# Patient Record
Sex: MALE | Race: WHITE | NOT HISPANIC OR LATINO
[De-identification: names, ages, dates, MRNs, and addresses within clinical notes are randomized per-mention and may not be internally consistent; named-entity substitution may affect disease eponyms.]

---

## 2018-03-26 ENCOUNTER — APPOINTMENT (OUTPATIENT)
Dept: ORTHOPEDIC SURGERY | Facility: CLINIC | Age: 83
End: 2018-03-26
Payer: MEDICARE

## 2018-03-26 VITALS
HEART RATE: 79 BPM | HEIGHT: 72 IN | BODY MASS INDEX: 27.09 KG/M2 | DIASTOLIC BLOOD PRESSURE: 93 MMHG | WEIGHT: 200 LBS | SYSTOLIC BLOOD PRESSURE: 154 MMHG

## 2018-03-26 DIAGNOSIS — R10.31 RIGHT LOWER QUADRANT PAIN: ICD-10-CM

## 2018-03-26 PROCEDURE — 73502 X-RAY EXAM HIP UNI 2-3 VIEWS: CPT

## 2018-03-26 PROCEDURE — 99213 OFFICE O/P EST LOW 20 MIN: CPT

## 2018-03-28 ENCOUNTER — APPOINTMENT (OUTPATIENT)
Dept: CT IMAGING | Facility: CLINIC | Age: 83
End: 2018-03-28
Payer: MEDICARE

## 2018-03-28 ENCOUNTER — OUTPATIENT (OUTPATIENT)
Dept: OUTPATIENT SERVICES | Facility: HOSPITAL | Age: 83
LOS: 1 days | End: 2018-03-28
Payer: MEDICARE

## 2018-03-28 DIAGNOSIS — Z00.8 ENCOUNTER FOR OTHER GENERAL EXAMINATION: ICD-10-CM

## 2018-03-28 PROCEDURE — 73700 CT LOWER EXTREMITY W/O DYE: CPT | Mod: 26,RT

## 2018-03-28 PROCEDURE — 76376 3D RENDER W/INTRP POSTPROCES: CPT

## 2018-03-28 PROCEDURE — 76376 3D RENDER W/INTRP POSTPROCES: CPT | Mod: 26

## 2018-03-28 PROCEDURE — 73700 CT LOWER EXTREMITY W/O DYE: CPT

## 2018-04-24 ENCOUNTER — APPOINTMENT (OUTPATIENT)
Dept: ORTHOPEDIC SURGERY | Facility: CLINIC | Age: 83
End: 2018-04-24
Payer: MEDICARE

## 2018-04-24 VITALS
BODY MASS INDEX: 27.09 KG/M2 | HEART RATE: 73 BPM | WEIGHT: 200 LBS | SYSTOLIC BLOOD PRESSURE: 152 MMHG | DIASTOLIC BLOOD PRESSURE: 95 MMHG | HEIGHT: 72 IN

## 2018-04-24 DIAGNOSIS — Z86.79 PERSONAL HISTORY OF OTHER DISEASES OF THE CIRCULATORY SYSTEM: ICD-10-CM

## 2018-04-24 DIAGNOSIS — Z87.39 PERSONAL HISTORY OF OTHER DISEASES OF THE MUSCULOSKELETAL SYSTEM AND CONNECTIVE TISSUE: ICD-10-CM

## 2018-04-24 DIAGNOSIS — Z85.46 PERSONAL HISTORY OF MALIGNANT NEOPLASM OF PROSTATE: ICD-10-CM

## 2018-04-24 PROCEDURE — 99214 OFFICE O/P EST MOD 30 MIN: CPT

## 2018-05-01 ENCOUNTER — APPOINTMENT (OUTPATIENT)
Dept: RADIATION ONCOLOGY | Facility: CLINIC | Age: 83
End: 2018-05-01
Payer: MEDICARE

## 2018-05-01 ENCOUNTER — OUTPATIENT (OUTPATIENT)
Dept: OUTPATIENT SERVICES | Facility: HOSPITAL | Age: 83
LOS: 1 days | Discharge: HOME | End: 2018-05-01
Payer: MEDICARE

## 2018-05-01 DIAGNOSIS — Z96.649 BROKEN INTERNAL JOINT PROSTHESIS, OTHER SITE, SUBSEQUENT ENCOUNTER: ICD-10-CM

## 2018-05-01 DIAGNOSIS — T84.018D BROKEN INTERNAL JOINT PROSTHESIS, OTHER SITE, SUBSEQUENT ENCOUNTER: ICD-10-CM

## 2018-05-01 DIAGNOSIS — M25.551 PAIN IN RIGHT HIP: ICD-10-CM

## 2018-05-01 PROCEDURE — 99203 OFFICE O/P NEW LOW 30 MIN: CPT | Mod: 25

## 2018-05-01 PROCEDURE — 77261 THER RADIOLOGY TX PLNG SMPL: CPT

## 2018-05-05 ENCOUNTER — APPOINTMENT (OUTPATIENT)
Dept: ORTHOPEDIC SURGERY | Facility: CLINIC | Age: 83
End: 2018-05-05

## 2018-06-08 ENCOUNTER — OUTPATIENT (OUTPATIENT)
Dept: OUTPATIENT SERVICES | Facility: HOSPITAL | Age: 83
LOS: 1 days | End: 2018-06-08
Payer: MEDICARE

## 2018-06-08 VITALS
RESPIRATION RATE: 16 BRPM | WEIGHT: 198.42 LBS | HEART RATE: 67 BPM | OXYGEN SATURATION: 99 % | SYSTOLIC BLOOD PRESSURE: 155 MMHG | TEMPERATURE: 98 F | DIASTOLIC BLOOD PRESSURE: 75 MMHG | HEIGHT: 72 IN

## 2018-06-08 DIAGNOSIS — Z90.49 ACQUIRED ABSENCE OF OTHER SPECIFIED PARTS OF DIGESTIVE TRACT: Chronic | ICD-10-CM

## 2018-06-08 DIAGNOSIS — Z96.649 PRESENCE OF UNSPECIFIED ARTIFICIAL HIP JOINT: ICD-10-CM

## 2018-06-08 DIAGNOSIS — M25.551 PAIN IN RIGHT HIP: ICD-10-CM

## 2018-06-08 DIAGNOSIS — Z98.49 CATARACT EXTRACTION STATUS, UNSPECIFIED EYE: Chronic | ICD-10-CM

## 2018-06-08 DIAGNOSIS — T84.018D BROKEN INTERNAL JOINT PROSTHESIS, OTHER SITE, SUBSEQUENT ENCOUNTER: ICD-10-CM

## 2018-06-08 DIAGNOSIS — I48.91 UNSPECIFIED ATRIAL FIBRILLATION: Chronic | ICD-10-CM

## 2018-06-08 DIAGNOSIS — Z90.89 ACQUIRED ABSENCE OF OTHER ORGANS: Chronic | ICD-10-CM

## 2018-06-08 DIAGNOSIS — Z98.890 OTHER SPECIFIED POSTPROCEDURAL STATES: Chronic | ICD-10-CM

## 2018-06-08 DIAGNOSIS — Z01.818 ENCOUNTER FOR OTHER PREPROCEDURAL EXAMINATION: ICD-10-CM

## 2018-06-08 DIAGNOSIS — I48.91 UNSPECIFIED ATRIAL FIBRILLATION: ICD-10-CM

## 2018-06-08 DIAGNOSIS — Z96.649 PRESENCE OF UNSPECIFIED ARTIFICIAL HIP JOINT: Chronic | ICD-10-CM

## 2018-06-08 LAB
ALBUMIN SERPL ELPH-MCNC: 3.4 G/DL — SIGNIFICANT CHANGE UP (ref 3.3–5)
ALP SERPL-CCNC: 107 U/L — SIGNIFICANT CHANGE UP (ref 30–120)
ALT FLD-CCNC: 10 U/L DA — SIGNIFICANT CHANGE UP (ref 10–60)
ANION GAP SERPL CALC-SCNC: 3 MMOL/L — LOW (ref 5–17)
APTT BLD: 48.2 SEC — HIGH (ref 27.5–37.4)
AST SERPL-CCNC: 20 U/L — SIGNIFICANT CHANGE UP (ref 10–40)
BILIRUB SERPL-MCNC: 1.3 MG/DL — HIGH (ref 0.2–1.2)
BUN SERPL-MCNC: 29 MG/DL — HIGH (ref 7–23)
CALCIUM SERPL-MCNC: 9.3 MG/DL — SIGNIFICANT CHANGE UP (ref 8.4–10.5)
CHLORIDE SERPL-SCNC: 107 MMOL/L — SIGNIFICANT CHANGE UP (ref 96–108)
CO2 SERPL-SCNC: 33 MMOL/L — HIGH (ref 22–31)
CREAT SERPL-MCNC: 1.2 MG/DL — SIGNIFICANT CHANGE UP (ref 0.5–1.3)
GLUCOSE SERPL-MCNC: 90 MG/DL — SIGNIFICANT CHANGE UP (ref 70–99)
HCT VFR BLD CALC: 40.9 % — SIGNIFICANT CHANGE UP (ref 39–50)
HGB BLD-MCNC: 13.3 G/DL — SIGNIFICANT CHANGE UP (ref 13–17)
INR BLD: 2.41 RATIO — HIGH (ref 0.88–1.16)
MCHC RBC-ENTMCNC: 28.1 PG — SIGNIFICANT CHANGE UP (ref 27–34)
MCHC RBC-ENTMCNC: 32.5 GM/DL — SIGNIFICANT CHANGE UP (ref 32–36)
MCV RBC AUTO: 86.5 FL — SIGNIFICANT CHANGE UP (ref 80–100)
PLATELET # BLD AUTO: 143 K/UL — LOW (ref 150–400)
POTASSIUM SERPL-MCNC: 4.1 MMOL/L — SIGNIFICANT CHANGE UP (ref 3.5–5.3)
POTASSIUM SERPL-SCNC: 4.1 MMOL/L — SIGNIFICANT CHANGE UP (ref 3.5–5.3)
PROT SERPL-MCNC: 7.4 G/DL — SIGNIFICANT CHANGE UP (ref 6–8.3)
PROTHROM AB SERPL-ACNC: 26.7 SEC — HIGH (ref 9.8–12.7)
RBC # BLD: 4.72 M/UL — SIGNIFICANT CHANGE UP (ref 4.2–5.8)
RBC # FLD: 14.8 % — HIGH (ref 10.3–14.5)
SODIUM SERPL-SCNC: 143 MMOL/L — SIGNIFICANT CHANGE UP (ref 135–145)
WBC # BLD: 6.1 K/UL — SIGNIFICANT CHANGE UP (ref 3.8–10.5)
WBC # FLD AUTO: 6.1 K/UL — SIGNIFICANT CHANGE UP (ref 3.8–10.5)

## 2018-06-08 PROCEDURE — 86850 RBC ANTIBODY SCREEN: CPT

## 2018-06-08 PROCEDURE — G0463: CPT

## 2018-06-08 PROCEDURE — 87640 STAPH A DNA AMP PROBE: CPT

## 2018-06-08 PROCEDURE — 80053 COMPREHEN METABOLIC PANEL: CPT

## 2018-06-08 PROCEDURE — 86900 BLOOD TYPING SEROLOGIC ABO: CPT

## 2018-06-08 PROCEDURE — 86901 BLOOD TYPING SEROLOGIC RH(D): CPT

## 2018-06-08 PROCEDURE — 36415 COLL VENOUS BLD VENIPUNCTURE: CPT

## 2018-06-08 PROCEDURE — 85610 PROTHROMBIN TIME: CPT

## 2018-06-08 PROCEDURE — 85730 THROMBOPLASTIN TIME PARTIAL: CPT

## 2018-06-08 PROCEDURE — 87641 MR-STAPH DNA AMP PROBE: CPT

## 2018-06-08 PROCEDURE — 85027 COMPLETE CBC AUTOMATED: CPT

## 2018-06-08 RX ORDER — CHLORHEXIDINE GLUCONATE 213 G/1000ML
1 SOLUTION TOPICAL ONCE
Qty: 0 | Refills: 0 | Status: DISCONTINUED | OUTPATIENT
Start: 2018-06-08 | End: 2018-06-23

## 2018-06-08 RX ORDER — FAMOTIDINE 10 MG/ML
20 INJECTION INTRAVENOUS
Qty: 0 | Refills: 0 | Status: DISCONTINUED | OUTPATIENT
Start: 2018-06-08 | End: 2018-06-23

## 2018-06-08 NOTE — H&P PST ADULT - FAMILY HISTORY
Father  Still living? No  Family history of Alzheimer's disease, Age at diagnosis: Age Unknown  Family history of essential hypertension, Age at diagnosis: Age Unknown     Mother  Still living? No  Family history of breast cancer, Age at diagnosis: Age Unknown     Sibling  Still living? Yes, Estimated age: 81-90  Family history of prostate cancer, Age at diagnosis: Age Unknown

## 2018-06-08 NOTE — H&P PST ADULT - PROBLEM SELECTOR PLAN 2
medical and cardiology clearance needed; pacemaker interogation needed; cardiologist needs to clear patient to be off coumadin for a certain number of days-patient will check with cardiologist regarding length of time to be off coumadin

## 2018-06-08 NOTE — H&P PST ADULT - PSH
Afib  cardiac ablation 1998; several pacemaker/AICD insertion and replacements  S/P cataract extraction  bilateral  S/P cholecystectomy  12/17  S/P hernia repair  1996  S/P tonsillectomy    S/P total hip arthroplasty  2006, right

## 2018-06-08 NOTE — H&P PST ADULT - HISTORY OF PRESENT ILLNESS
this is a 83 y/o male who had right total hip replacement 2/06; developed right groin and leg pain with in the last year; it was determined that it was related to the hip replacement; patient has bone loss; needs revision of right hip

## 2018-06-08 NOTE — H&P PST ADULT - PMH
Afib    Cellulitis  right lower leg in past  Degenerative disc disease, lumbar    Edema  right leg  Gout  finger a long time ago  Hypertension    Osteoarthritis    Prostate cancer  treated with cyber knife 2016  Varicose veins  legs  Venous stasis

## 2018-06-09 LAB
MRSA PCR RESULT.: SIGNIFICANT CHANGE UP
S AUREUS DNA NOSE QL NAA+PROBE: SIGNIFICANT CHANGE UP

## 2018-06-20 NOTE — PROGRESS NOTE ADULT - SUBJECTIVE AND OBJECTIVE BOX
Admission medication reconciliation/Presurgical evaluation:  Allergies:  penicillin: Rash  felodipine: Rash  clonidine: Swelling, Rash  sulfa drugs: Rash  adhesives: Rash, irritation from tape    Home Medications:   · 	sotalol 120 mg 2 times a day  · 	olmesartan-hydrochlorothiazide 40 mg-12.5 mg once a day  · 	Coumadin 4 mg once a day  · 	doxazosin 4 mg once a day  · 	allopurinol 100 mg 2 times a day  · 	Tylenol 1000 mg every 6 hours, As Needed     Past Medical History:  Afib    Cellulitis right lower leg in past  Degenerative disc disease, lumbar    Edema right leg  Gout finger a long time ago  Hypertension    Osteoarthritis    Prostate cancer treated with cyber knife 2016  Varicose veins legs  Venous stasis     Past Surgical History (contributory):  Afib  cardiac ablation 1998; several pacemaker/AICD insertion and replacements  S/P total hip arthroplasty  2006, right    PST values of interest:  Plt 143 (¯)  INR 2.41 (warfarin)  CO2 33 (­)  SCr 1.20 (WNL)  BUN 29 (­)  LFTs WNL  Bilirubin 1.3 (slight ­)  QTc 508ms (prolonged)    Presurgical evaluation:  1.	IV TXA  2.	Patient scheduled for preop RT for HO prophylaxis.  3.	Acetaminophen for pain management – 1000mg IV q6h, then 1000mg PO q12h.   4.	No Celecoxib. Meloxicam 7.5mg Qday if patient can tolerate.  5.	Enoxaparin 40mg Qday beginning POD1. Warfarin may be restarted evening of POD0.    6.	QTc 508ms (prolonged): caution with drugs that prolong QT interval. Continuous telemetry monitoring.

## 2018-06-21 RX ORDER — CHLORHEXIDINE GLUCONATE 213 G/1000ML
1 SOLUTION TOPICAL ONCE
Qty: 0 | Refills: 0 | Status: COMPLETED | OUTPATIENT
Start: 2018-06-27 | End: 2018-06-27

## 2018-06-21 RX ORDER — APREPITANT 80 MG/1
40 CAPSULE ORAL ONCE
Qty: 0 | Refills: 0 | Status: COMPLETED | OUTPATIENT
Start: 2018-06-27 | End: 2018-06-27

## 2018-06-26 ENCOUNTER — TRANSCRIPTION ENCOUNTER (OUTPATIENT)
Age: 83
End: 2018-06-26

## 2018-06-26 PROCEDURE — 77280 THER RAD SIMULAJ FIELD SMPL: CPT | Mod: 26

## 2018-06-26 PROCEDURE — 77300 RADIATION THERAPY DOSE PLAN: CPT | Mod: 26

## 2018-06-26 PROCEDURE — 77431 RADIATION THERAPY MANAGEMENT: CPT

## 2018-06-26 RX ORDER — ONDANSETRON 8 MG/1
4 TABLET, FILM COATED ORAL EVERY 6 HOURS
Qty: 0 | Refills: 0 | Status: DISCONTINUED | OUTPATIENT
Start: 2018-06-27 | End: 2018-07-02

## 2018-06-26 RX ORDER — DOCUSATE SODIUM 100 MG
100 CAPSULE ORAL THREE TIMES A DAY
Qty: 0 | Refills: 0 | Status: DISCONTINUED | OUTPATIENT
Start: 2018-06-27 | End: 2018-07-02

## 2018-06-26 RX ORDER — SENNA PLUS 8.6 MG/1
2 TABLET ORAL AT BEDTIME
Qty: 0 | Refills: 0 | Status: DISCONTINUED | OUTPATIENT
Start: 2018-06-27 | End: 2018-07-02

## 2018-06-26 RX ORDER — MAGNESIUM HYDROXIDE 400 MG/1
30 TABLET, CHEWABLE ORAL DAILY
Qty: 0 | Refills: 0 | Status: DISCONTINUED | OUTPATIENT
Start: 2018-06-27 | End: 2018-07-02

## 2018-06-26 RX ORDER — PANTOPRAZOLE SODIUM 20 MG/1
40 TABLET, DELAYED RELEASE ORAL DAILY
Qty: 0 | Refills: 0 | Status: DISCONTINUED | OUTPATIENT
Start: 2018-06-27 | End: 2018-07-02

## 2018-06-26 RX ORDER — FAMOTIDINE 10 MG/ML
1 INJECTION INTRAVENOUS
Qty: 0 | Refills: 0 | COMMUNITY
Start: 2018-06-26 | End: 2018-06-27

## 2018-06-26 RX ORDER — POLYETHYLENE GLYCOL 3350 17 G/17G
17 POWDER, FOR SOLUTION ORAL DAILY
Qty: 0 | Refills: 0 | Status: DISCONTINUED | OUTPATIENT
Start: 2018-06-27 | End: 2018-07-02

## 2018-06-26 RX ORDER — SODIUM CHLORIDE 9 MG/ML
1000 INJECTION, SOLUTION INTRAVENOUS
Qty: 0 | Refills: 0 | Status: DISCONTINUED | OUTPATIENT
Start: 2018-06-27 | End: 2018-06-30

## 2018-06-27 ENCOUNTER — APPOINTMENT (OUTPATIENT)
Dept: ORTHOPEDIC SURGERY | Facility: HOSPITAL | Age: 83
End: 2018-06-27

## 2018-06-27 ENCOUNTER — RESULT REVIEW (OUTPATIENT)
Age: 83
End: 2018-06-27

## 2018-06-27 ENCOUNTER — INPATIENT (INPATIENT)
Facility: HOSPITAL | Age: 83
LOS: 4 days | Discharge: HOME | DRG: 481 | End: 2018-07-02
Attending: ORTHOPAEDIC SURGERY | Admitting: ORTHOPAEDIC SURGERY
Payer: MEDICARE

## 2018-06-27 VITALS
WEIGHT: 192.46 LBS | HEART RATE: 63 BPM | SYSTOLIC BLOOD PRESSURE: 157 MMHG | HEIGHT: 73 IN | DIASTOLIC BLOOD PRESSURE: 77 MMHG | TEMPERATURE: 98 F | OXYGEN SATURATION: 100 % | RESPIRATION RATE: 14 BRPM

## 2018-06-27 DIAGNOSIS — Z96.649 PRESENCE OF UNSPECIFIED ARTIFICIAL HIP JOINT: ICD-10-CM

## 2018-06-27 DIAGNOSIS — Z98.49 CATARACT EXTRACTION STATUS, UNSPECIFIED EYE: Chronic | ICD-10-CM

## 2018-06-27 DIAGNOSIS — Z90.89 ACQUIRED ABSENCE OF OTHER ORGANS: Chronic | ICD-10-CM

## 2018-06-27 DIAGNOSIS — T84.018D BROKEN INTERNAL JOINT PROSTHESIS, OTHER SITE, SUBSEQUENT ENCOUNTER: ICD-10-CM

## 2018-06-27 DIAGNOSIS — Z98.890 OTHER SPECIFIED POSTPROCEDURAL STATES: Chronic | ICD-10-CM

## 2018-06-27 DIAGNOSIS — I48.91 UNSPECIFIED ATRIAL FIBRILLATION: Chronic | ICD-10-CM

## 2018-06-27 DIAGNOSIS — Z90.49 ACQUIRED ABSENCE OF OTHER SPECIFIED PARTS OF DIGESTIVE TRACT: Chronic | ICD-10-CM

## 2018-06-27 DIAGNOSIS — Z96.649 PRESENCE OF UNSPECIFIED ARTIFICIAL HIP JOINT: Chronic | ICD-10-CM

## 2018-06-27 LAB
APTT BLD: 39.7 SEC — HIGH (ref 27.5–37.4)
INR BLD: 1.54 RATIO — HIGH (ref 0.88–1.16)
PROTHROM AB SERPL-ACNC: 16.9 SEC — HIGH (ref 9.8–12.7)

## 2018-06-27 PROCEDURE — 88300 SURGICAL PATH GROSS: CPT | Mod: 26

## 2018-06-27 PROCEDURE — 93010 ELECTROCARDIOGRAM REPORT: CPT

## 2018-06-27 PROCEDURE — 73502 X-RAY EXAM HIP UNI 2-3 VIEWS: CPT | Mod: 26,RT

## 2018-06-27 PROCEDURE — 27134 REVISE HIP JOINT REPLACEMENT: CPT | Mod: RT

## 2018-06-27 PROCEDURE — 99222 1ST HOSP IP/OBS MODERATE 55: CPT

## 2018-06-27 PROCEDURE — 64712 REVISION OF SCIATIC NERVE: CPT | Mod: 59,RT

## 2018-06-27 PROCEDURE — 27045 EXC HIP/PELV TUM DEEP 5 CM/>: CPT | Mod: RT

## 2018-06-27 PROCEDURE — 88311 DECALCIFY TISSUE: CPT | Mod: 26

## 2018-06-27 PROCEDURE — 88305 TISSUE EXAM BY PATHOLOGIST: CPT | Mod: 26

## 2018-06-27 RX ORDER — ACETAMINOPHEN 500 MG
1000 TABLET ORAL EVERY 6 HOURS
Qty: 0 | Refills: 0 | Status: COMPLETED | OUTPATIENT
Start: 2018-06-28 | End: 2018-06-28

## 2018-06-27 RX ORDER — ACETAMINOPHEN 500 MG
1000 TABLET ORAL EVERY 12 HOURS
Qty: 0 | Refills: 0 | Status: DISCONTINUED | OUTPATIENT
Start: 2018-06-28 | End: 2018-07-02

## 2018-06-27 RX ORDER — SODIUM CHLORIDE 9 MG/ML
1000 INJECTION, SOLUTION INTRAVENOUS
Qty: 0 | Refills: 0 | Status: DISCONTINUED | OUTPATIENT
Start: 2018-06-27 | End: 2018-06-27

## 2018-06-27 RX ORDER — VANCOMYCIN HCL 1 G
1500 VIAL (EA) INTRAVENOUS ONCE
Qty: 0 | Refills: 0 | Status: DISCONTINUED | OUTPATIENT
Start: 2018-06-27 | End: 2018-06-27

## 2018-06-27 RX ORDER — HYDROMORPHONE HYDROCHLORIDE 2 MG/ML
0.5 INJECTION INTRAMUSCULAR; INTRAVENOUS; SUBCUTANEOUS
Qty: 0 | Refills: 0 | Status: DISCONTINUED | OUTPATIENT
Start: 2018-06-27 | End: 2018-07-02

## 2018-06-27 RX ORDER — WARFARIN SODIUM 2.5 MG/1
4 TABLET ORAL ONCE
Qty: 0 | Refills: 0 | Status: COMPLETED | OUTPATIENT
Start: 2018-06-27 | End: 2018-06-27

## 2018-06-27 RX ORDER — VANCOMYCIN HCL 1 G
1250 VIAL (EA) INTRAVENOUS ONCE
Qty: 0 | Refills: 0 | Status: COMPLETED | OUTPATIENT
Start: 2018-06-28 | End: 2018-06-28

## 2018-06-27 RX ORDER — ONDANSETRON 8 MG/1
4 TABLET, FILM COATED ORAL ONCE
Qty: 0 | Refills: 0 | Status: DISCONTINUED | OUTPATIENT
Start: 2018-06-27 | End: 2018-06-27

## 2018-06-27 RX ORDER — OXYCODONE HYDROCHLORIDE 5 MG/1
10 TABLET ORAL
Qty: 0 | Refills: 0 | Status: DISCONTINUED | OUTPATIENT
Start: 2018-06-27 | End: 2018-07-02

## 2018-06-27 RX ORDER — ACETAMINOPHEN 500 MG
1000 TABLET ORAL ONCE
Qty: 0 | Refills: 0 | Status: COMPLETED | OUTPATIENT
Start: 2018-06-27 | End: 2018-06-27

## 2018-06-27 RX ORDER — OXYCODONE HYDROCHLORIDE 5 MG/1
5 TABLET ORAL
Qty: 0 | Refills: 0 | Status: DISCONTINUED | OUTPATIENT
Start: 2018-06-27 | End: 2018-07-02

## 2018-06-27 RX ORDER — ENOXAPARIN SODIUM 100 MG/ML
40 INJECTION SUBCUTANEOUS DAILY
Qty: 0 | Refills: 0 | Status: DISCONTINUED | OUTPATIENT
Start: 2018-06-28 | End: 2018-06-30

## 2018-06-27 RX ORDER — ALLOPURINOL 300 MG
100 TABLET ORAL
Qty: 0 | Refills: 0 | Status: DISCONTINUED | OUTPATIENT
Start: 2018-06-27 | End: 2018-07-02

## 2018-06-27 RX ORDER — BENZOCAINE AND MENTHOL 5; 1 G/100ML; G/100ML
1 LIQUID ORAL
Qty: 0 | Refills: 0 | Status: DISCONTINUED | OUTPATIENT
Start: 2018-06-27 | End: 2018-07-02

## 2018-06-27 RX ORDER — DOXAZOSIN MESYLATE 4 MG
4 TABLET ORAL AT BEDTIME
Qty: 0 | Refills: 0 | Status: DISCONTINUED | OUTPATIENT
Start: 2018-06-27 | End: 2018-07-02

## 2018-06-27 RX ORDER — SOTALOL HCL 120 MG
120 TABLET ORAL
Qty: 0 | Refills: 0 | Status: DISCONTINUED | OUTPATIENT
Start: 2018-06-27 | End: 2018-07-02

## 2018-06-27 RX ORDER — LOSARTAN POTASSIUM 100 MG/1
100 TABLET, FILM COATED ORAL DAILY
Qty: 0 | Refills: 0 | Status: DISCONTINUED | OUTPATIENT
Start: 2018-06-29 | End: 2018-07-02

## 2018-06-27 RX ORDER — VANCOMYCIN HCL 1 G
1250 VIAL (EA) INTRAVENOUS ONCE
Qty: 0 | Refills: 0 | Status: COMPLETED | OUTPATIENT
Start: 2018-06-27 | End: 2018-06-27

## 2018-06-27 RX ORDER — HYDROMORPHONE HYDROCHLORIDE 2 MG/ML
0.5 INJECTION INTRAMUSCULAR; INTRAVENOUS; SUBCUTANEOUS
Qty: 0 | Refills: 0 | Status: DISCONTINUED | OUTPATIENT
Start: 2018-06-27 | End: 2018-06-27

## 2018-06-27 RX ADMIN — Medication 100 MILLIGRAM(S): at 22:22

## 2018-06-27 RX ADMIN — BENZOCAINE AND MENTHOL 1 LOZENGE: 5; 1 LIQUID ORAL at 22:32

## 2018-06-27 RX ADMIN — HYDROMORPHONE HYDROCHLORIDE 0.5 MILLIGRAM(S): 2 INJECTION INTRAMUSCULAR; INTRAVENOUS; SUBCUTANEOUS at 20:15

## 2018-06-27 RX ADMIN — SODIUM CHLORIDE 100 MILLILITER(S): 9 INJECTION, SOLUTION INTRAVENOUS at 19:40

## 2018-06-27 RX ADMIN — APREPITANT 40 MILLIGRAM(S): 80 CAPSULE ORAL at 11:03

## 2018-06-27 RX ADMIN — HYDROMORPHONE HYDROCHLORIDE 0.5 MILLIGRAM(S): 2 INJECTION INTRAMUSCULAR; INTRAVENOUS; SUBCUTANEOUS at 19:35

## 2018-06-27 RX ADMIN — SENNA PLUS 2 TABLET(S): 8.6 TABLET ORAL at 22:21

## 2018-06-27 RX ADMIN — WARFARIN SODIUM 4 MILLIGRAM(S): 2.5 TABLET ORAL at 22:21

## 2018-06-27 RX ADMIN — Medication 4 MILLIGRAM(S): at 22:22

## 2018-06-27 RX ADMIN — CHLORHEXIDINE GLUCONATE 1 APPLICATION(S): 213 SOLUTION TOPICAL at 11:03

## 2018-06-27 NOTE — CONSULT NOTE ADULT - SUBJECTIVE AND OBJECTIVE BOX
This is an 83 y/o M with PMH of HTN, A. Fib s/p AV acosta ablation s/p AICD on Coumadin, Prostate CA s/p cyber knife stereotactic RS s/p brachytherapy, Varicose Veins, and OA who was complaining of severe right groin pain when stands up & walks till gradually wears off, relived by rest, associated with limitation of right hip range of motions & sometimes his hip gives up so he falls down, presented for right total hip revision surgery. Routine post-op medical evaluation was requested. Patient denies any chest pain, SOB, palpitations, dizziness, headache, paraesthesias, or focal muscle weakness.          ALLERGIES :    Clonidine, Felodipine, PCN, Sulfa, Adhesives.              PAST MEDICAL & SURGICAL HISTORY:    Edema: right leg  Degenerative disc disease, lumbar  Cellulitis: right lower leg in past  Gout: finger a long time ago  Varicose veins: legs  Venous stasis  Prostate cancer: treated with cyber knife 2016  Osteoarthritis  Afib  Hypertension  S/P cholecystectomy: 12/17  S/P tonsillectomy  Afib: cardiac ablation 1998; several pacemaker/AICD insertion and replacements  S/P hernia repair: 1996  S/P cataract extraction: bilateral  S/P total hip arthroplasty: 2006, right                SOCIAL HISTORY :     , retired graphics , lives alone, former smoker, 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                FAMILY HISTORY :     Family history of prostate cancer (Sibling)  Family history of breast cancer (Mother)  Family history of essential hypertension (Father)  Family history of Alzheimer's disease (Father)                MEDICATIONS  (STANDING):    acetaminophen   Tablet. 1000 milliGRAM(s) Oral every 12 hours  acetaminophen  IVPB. 1000 milliGRAM(s) IV Intermittent every 6 hours  allopurinol 100 milliGRAM(s) Oral two times a day  docusate sodium 100 milliGRAM(s) Oral three times a day  doxazosin 4 milliGRAM(s) Oral at bedtime  enoxaparin Injectable 40 milliGRAM(s) SubCutaneous daily  lactated ringers. 1000 milliLiter(s) (100 mL/Hr) IV Continuous <Continuous>  pantoprazole    Tablet 40 milliGRAM(s) Oral daily  senna 2 Tablet(s) Oral at bedtime  sotalol 120 milliGRAM(s) Oral two times a day  vancomycin  IVPB 1250 milliGRAM(s) IV Intermittent once                MEDICATIONS  (PRN):    aluminum hydroxide/magnesium hydroxide/simethicone Suspension 30 milliLiter(s) Oral four times a day PRN Indigestion  benzocaine 15 mG/menthol 3.6 mG Lozenge 1 Lozenge Oral every 3 hours PRN Sore Throat  HYDROmorphone  Injectable 0.5 milliGRAM(s) IV Push every 3 hours PRN Severe Pain (7 - 10)  magnesium hydroxide Suspension 30 milliLiter(s) Oral daily PRN Constipation  ondansetron Injectable 4 milliGRAM(s) IV Push every 6 hours PRN Nausea and/or Vomiting  oxyCODONE    IR 5 milliGRAM(s) Oral every 3 hours PRN Mild Pain (1 - 3)  oxyCODONE    IR 10 milliGRAM(s) Oral every 3 hours PRN Moderate Pain (4 - 6)  polyethylene glycol 3350 17 Gram(s) Oral daily PRN Constipation          Home Medications:    allopurinol 100 mg oral tablet: 1 tab(s) orally 2 times a day (27 Jun 2018 11:23)  Coumadin 4 mg oral tablet: 1 tab(s) orally once a day (27 Jun 2018 11:23)  doxazosin 4 mg oral tablet: 1 tab(s) orally once a day (27 Jun 2018 11:23)  olmesartan-hydrochlorothiazide 40 mg-12.5 mg oral tablet: 1 tab(s) orally once a day (27 Jun 2018 11:23)  Pepcid AC Maximum Strength 20 mg oral tablet: 1 tab(s) orally 2 times a day(x2 Preoperatively) (27 Jun 2018 11:23)  sotalol 120 mg oral tablet: 1 tab(s) orally 2 times a day (27 Jun 2018 11:23)  Tylenol 500 mg oral tablet: 2 tab(s) orally every 6 hours, As Needed (27 Jun 2018 11:23)          REVIEW OF SYSTEMS:    CONSTITUTIONAL: No fever, weight loss, or fatigue.  EYES: No eye pain, visual disturbances, or discharge.  ENMT:  No difficulty hearing, tinnitus, vertigo; No sinus or throat pain.  NECK: No pain or stiffness.  RESPIRATORY: No cough, wheezing, or hemoptysis; No shortness of breath.  CARDIOVASCULAR: No chest pain, palpitations, dizziness, or leg swelling.  GASTROINTESTINAL: No abdominal pain, no nausea, vomiting, or hematemesis; No diarrhea or Change in bowel habits. No melena or hematochezia.  GENITOURINARY: No dysuria, frequency, hematuria, or incontinence.  NEUROLOGICAL: No headaches, focal muscle weakness, numbness, or tremors.  SKIN: No itching, burning or rashes.  MUSCULOSKELETAL: No joint swelling or pain.  PSYCHIATRIC: No depression, anxiety, or agitation.  HEME/LYMPH: No easy bruising, bleeding gums, or nose bleed.  ALLERGY AND IMMUNOLOGIC: No hives or eczema.              Vital signs:  Vital Signs Last 24 Hrs  T(C): 36.5 (27 Jun 2018 23:20), Max: 37 (27 Jun 2018 19:18)  T(F): 97.7 (27 Jun 2018 23:20), Max: 98.6 (27 Jun 2018 19:18)  HR: 60 (27 Jun 2018 23:20) (60 - 63)  BP: 102/62 (27 Jun 2018 23:20) (93/51 - 157/77)  BP(mean): --  RR: 14 (27 Jun 2018 23:20) (9 - 16)  SpO2: 96% (27 Jun 2018 23:20) (96% - 100%)            PHYSICAL EXAM:    GENERAL: NAD, well-groomed, well-developed.  HEAD:  Atraumatic, Normocephalic.  EYES: PERRLA, conjunctiva clear.  ENMT: no nasal discharge, no rukhsana-pharyngeal erythema or exudates, MMM.   NECK: Supple, No JVD.  NERVOUS SYSTEM:  Alert & oriented X3, neurologically intact grossly.  CHEST/LUNG: Good air entry B/L, no rales, rhonchi, or wheezing.  HEART: Normal S1 & S2, no murmurs, or extra sounds.  ABDOMEN: Soft, non tender, non distended; bowel sounds present, no palpable masses or organomegaly.  EXTREMITIES:  No clubbing, cyanosis, or edema.  VASCULAR: 2+ radial, DPA / PTA pulses B/L.  SKIN: No rashes or lesions.  PSYCH: normal affect & behavior.              LABs:-        06-28    140  |  106  |  32<H>  ----------------------------<  137<H>  3.8   |  28  |  1.66<H>    Ca    8.2<L>      28 Jun 2018 00:27                     10.9   13.03 )-----------( 131      ( 28 Jun 2018 00:27 )             34.0       PT/INR - ( 27 Jun 2018 11:50 )   PT: 16.9 sec;   INR: 1.54 ratio         PTT - ( 27 Jun 2018 11:50 )  PTT:39.7 sec           XR HIP WITH PELV 2-3V RT    :                      Portable postop study obtained in the operating room, 6:51 PM  AP pelvis not including iliac crests, AP right hip.  A right hip replacement is identified, in place. There is a metallic   density, ovoid shaped, resting on the acetabular component. Correlate   clinically.   Metallic densities overlie the pubic bones may represent fiducial   markings, correlate clinically.  Images reviewed by me.          EKG :    As per my review shows V-paced rhythm at 60/min, with underlying atrial flutter.        - This is an 83 y/o M with PMH of HTN, A. Fib s/p AV acosta ablation s/p AICD on Coumadin, Prostate CA s/p cyber knife stereotactic RS s/p brachytherapy, Varicose Veins, and OA who was complaining of severe right groin pain when stands up & walks till gradually wears off, relived by rest, associated with limitation of right hip range of motions & sometimes his hip gives up so he falls down, presented for right total hip revision surgery. Routine post-op medical evaluation was requested. Patient denies any chest pain, SOB, palpitations, dizziness, headache, paraesthesias, or focal muscle weakness.            ALLERGIES :    Clonidine, Felodipine, PCN, Sulfa, Adhesives.              PAST MEDICAL & SURGICAL HISTORY:    Edema: right leg  Degenerative disc disease, lumbar  Cellulitis: right lower leg in past  Gout: finger a long time ago  Varicose veins: legs  Venous stasis  Prostate cancer: treated with cyber knife 2016  Osteoarthritis  Afib  Hypertension  S/P cholecystectomy: 12/17  S/P tonsillectomy  Afib: cardiac ablation 1998; several pacemaker/AICD insertion and replacements  S/P hernia repair: 1996  S/P cataract extraction: bilateral  S/P total hip arthroplasty: 2006, right                SOCIAL HISTORY :     , retired graphics , lives alone, former smoker, 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                FAMILY HISTORY :     Family history of prostate cancer (Sibling)  Family history of breast cancer (Mother)  Family history of essential hypertension (Father)  Family history of Alzheimer's disease (Father)                MEDICATIONS  (STANDING):    acetaminophen   Tablet. 1000 milliGRAM(s) Oral every 12 hours  acetaminophen  IVPB. 1000 milliGRAM(s) IV Intermittent every 6 hours  allopurinol 100 milliGRAM(s) Oral two times a day  docusate sodium 100 milliGRAM(s) Oral three times a day  doxazosin 4 milliGRAM(s) Oral at bedtime  enoxaparin Injectable 40 milliGRAM(s) SubCutaneous daily  lactated ringers. 1000 milliLiter(s) (100 mL/Hr) IV Continuous <Continuous>  pantoprazole    Tablet 40 milliGRAM(s) Oral daily  senna 2 Tablet(s) Oral at bedtime  sotalol 120 milliGRAM(s) Oral two times a day  vancomycin  IVPB 1250 milliGRAM(s) IV Intermittent once                MEDICATIONS  (PRN):    aluminum hydroxide/magnesium hydroxide/simethicone Suspension 30 milliLiter(s) Oral four times a day PRN Indigestion  benzocaine 15 mG/menthol 3.6 mG Lozenge 1 Lozenge Oral every 3 hours PRN Sore Throat  HYDROmorphone  Injectable 0.5 milliGRAM(s) IV Push every 3 hours PRN Severe Pain (7 - 10)  magnesium hydroxide Suspension 30 milliLiter(s) Oral daily PRN Constipation  ondansetron Injectable 4 milliGRAM(s) IV Push every 6 hours PRN Nausea and/or Vomiting  oxyCODONE    IR 5 milliGRAM(s) Oral every 3 hours PRN Mild Pain (1 - 3)  oxyCODONE    IR 10 milliGRAM(s) Oral every 3 hours PRN Moderate Pain (4 - 6)  polyethylene glycol 3350 17 Gram(s) Oral daily PRN Constipation          Home Medications:    allopurinol 100 mg oral tablet: 1 tab(s) orally 2 times a day (27 Jun 2018 11:23)  Coumadin 4 mg oral tablet: 1 tab(s) orally once a day (27 Jun 2018 11:23)  doxazosin 4 mg oral tablet: 1 tab(s) orally once a day (27 Jun 2018 11:23)  olmesartan-hydrochlorothiazide 40 mg-12.5 mg oral tablet: 1 tab(s) orally once a day (27 Jun 2018 11:23)  Pepcid AC Maximum Strength 20 mg oral tablet: 1 tab(s) orally 2 times a day(x2 Preoperatively) (27 Jun 2018 11:23)  sotalol 120 mg oral tablet: 1 tab(s) orally 2 times a day (27 Jun 2018 11:23)  Tylenol 500 mg oral tablet: 2 tab(s) orally every 6 hours, As Needed (27 Jun 2018 11:23)          REVIEW OF SYSTEMS:    CONSTITUTIONAL: No fever, weight loss, or fatigue.  EYES: No eye pain, visual disturbances, or discharge.  ENMT:  No difficulty hearing, tinnitus, vertigo; No sinus or throat pain.  NECK: No pain or stiffness.  RESPIRATORY: No cough, wheezing, or hemoptysis; No shortness of breath.  CARDIOVASCULAR: No chest pain, palpitations, dizziness, or leg swelling.  GASTROINTESTINAL: No abdominal pain, no nausea, vomiting, or hematemesis; No diarrhea or Change in bowel habits. No melena or hematochezia.  GENITOURINARY: No dysuria, frequency, hematuria, or incontinence.  NEUROLOGICAL: No headaches, focal muscle weakness, numbness, or tremors.  SKIN: No itching, burning or rashes.  MUSCULOSKELETAL: No joint swelling or pain other than operative site.  PSYCHIATRIC: No depression, anxiety, or agitation.  HEME/LYMPH: No easy bruising, bleeding gums, or nose bleed.  ALLERGY AND IMMUNOLOGIC: No hives or eczema.                Vital signs:  Vital Signs Last 24 Hrs  T(C): 36.5 (27 Jun 2018 23:20), Max: 37 (27 Jun 2018 19:18)  T(F): 97.7 (27 Jun 2018 23:20), Max: 98.6 (27 Jun 2018 19:18)  HR: 60 (27 Jun 2018 23:20) (60 - 63)  BP: 102/62 (27 Jun 2018 23:20) (93/51 - 157/77)  BP(mean): --  RR: 14 (27 Jun 2018 23:20) (9 - 16)  SpO2: 96% (27 Jun 2018 23:20) (96% - 100%)            PHYSICAL EXAM:    GENERAL: NAD, well-groomed, well-developed.  HEAD:  Atraumatic, Normocephalic.  EYES: PERRLA, conjunctiva clear.  ENMT: no nasal discharge, no rukhsana-pharyngeal erythema or exudates, MMM.   NECK: Supple, No JVD.  NERVOUS SYSTEM:  Alert & oriented X3, neurologically intact grossly.  CHEST/LUNG: Good air entry B/L, no rales, rhonchi, or wheezing.  HEART: Normal S1 & S2, no murmurs, or extra sounds.  ABDOMEN: Soft, non tender, non distended; bowel sounds present, no palpable masses or organomegaly.  EXTREMITIES:  No clubbing, cyanosis, or edema, (+) right varicose veins.  VASCULAR: 2+ radial, DPA / PTA pulses B/L.  SKIN: No rashes or lesions, (+) stasis dermatitis.  PSYCH: normal affect & behavior.              LABs:-        06-28    140  |  106  |  32<H>  ----------------------------<  137<H>  3.8   |  28  |  1.66<H>    Ca    8.2<L>      28 Jun 2018 00:27                     10.9   13.03 )-----------( 131      ( 28 Jun 2018 00:27 )             34.0       PT/INR - ( 27 Jun 2018 11:50 )   PT: 16.9 sec;   INR: 1.54 ratio         PTT - ( 27 Jun 2018 11:50 )  PTT:39.7 sec           XR HIP WITH PELV 2-3V RT    :                      Portable postop study obtained in the operating room, 6:51 PM  AP pelvis not including iliac crests, AP right hip.  A right hip replacement is identified, in place. There is a metallic   density, ovoid shaped, resting on the acetabular component. Correlate   clinically.   Metallic densities overlie the pubic bones may represent fiducial   markings, correlate clinically.  Images reviewed by me.          EKG :    As per my review shows V-paced rhythm at 60/min, with underlying atrial flutter.        -

## 2018-06-27 NOTE — CONSULT NOTE ADULT - PROBLEM SELECTOR RECOMMENDATION 4
Patient is at high risk for VTE, was started on B/L sequential compression device for DVT prophylaxis, pharmacological DVT prophylaxis (coumadin with LMWH bridging) as per orthopedic surgery team.

## 2018-06-27 NOTE — ASU PREOP CHECKLIST - SELECT TESTS ORDERED
BMP/CBC/PT/PTT/INR/EKG/retyped/Type and Screen BMP/INR/EKG/PT/PTT/retyped,pt ptt inr/CBC/Type and Screen

## 2018-06-27 NOTE — BRIEF OPERATIVE NOTE - PROCEDURE
<<-----Click on this checkbox to enter Procedure Total hip revision  06/27/2018  right  Active  Marvel Ortiz

## 2018-06-28 ENCOUNTER — TRANSCRIPTION ENCOUNTER (OUTPATIENT)
Age: 83
End: 2018-06-28

## 2018-06-28 DIAGNOSIS — I10 ESSENTIAL (PRIMARY) HYPERTENSION: ICD-10-CM

## 2018-06-28 DIAGNOSIS — I48.92 UNSPECIFIED ATRIAL FLUTTER: ICD-10-CM

## 2018-06-28 DIAGNOSIS — Z98.890 OTHER SPECIFIED POSTPROCEDURAL STATES: ICD-10-CM

## 2018-06-28 DIAGNOSIS — I48.2 CHRONIC ATRIAL FIBRILLATION: ICD-10-CM

## 2018-06-28 DIAGNOSIS — I50.22 CHRONIC SYSTOLIC (CONGESTIVE) HEART FAILURE: ICD-10-CM

## 2018-06-28 DIAGNOSIS — Z29.9 ENCOUNTER FOR PROPHYLACTIC MEASURES, UNSPECIFIED: ICD-10-CM

## 2018-06-28 DIAGNOSIS — M25.551 PAIN IN RIGHT HIP: ICD-10-CM

## 2018-06-28 LAB
ANION GAP SERPL CALC-SCNC: 6 MMOL/L — SIGNIFICANT CHANGE UP (ref 5–17)
ANION GAP SERPL CALC-SCNC: 7 MMOL/L — SIGNIFICANT CHANGE UP (ref 5–17)
B PERT IGG+IGM PNL SER: ABNORMAL
BUN SERPL-MCNC: 32 MG/DL — HIGH (ref 7–23)
BUN SERPL-MCNC: 32 MG/DL — HIGH (ref 7–23)
CALCIUM SERPL-MCNC: 7.8 MG/DL — LOW (ref 8.4–10.5)
CALCIUM SERPL-MCNC: 8.2 MG/DL — LOW (ref 8.4–10.5)
CHLORIDE SERPL-SCNC: 106 MMOL/L — SIGNIFICANT CHANGE UP (ref 96–108)
CHLORIDE SERPL-SCNC: 106 MMOL/L — SIGNIFICANT CHANGE UP (ref 96–108)
CO2 SERPL-SCNC: 27 MMOL/L — SIGNIFICANT CHANGE UP (ref 22–31)
CO2 SERPL-SCNC: 28 MMOL/L — SIGNIFICANT CHANGE UP (ref 22–31)
COLOR FLD: SIGNIFICANT CHANGE UP
CREAT SERPL-MCNC: 1.66 MG/DL — HIGH (ref 0.5–1.3)
CREAT SERPL-MCNC: 1.71 MG/DL — HIGH (ref 0.5–1.3)
FLUID INTAKE SUBSTANCE CLASS: SIGNIFICANT CHANGE UP
FLUID SEGMENTED GRANULOCYTES: SIGNIFICANT CHANGE UP
GLUCOSE SERPL-MCNC: 137 MG/DL — HIGH (ref 70–99)
GLUCOSE SERPL-MCNC: 152 MG/DL — HIGH (ref 70–99)
HCT VFR BLD CALC: 30.2 % — LOW (ref 39–50)
HCT VFR BLD CALC: 34 % — LOW (ref 39–50)
HGB BLD-MCNC: 10.9 G/DL — LOW (ref 13–17)
HGB BLD-MCNC: 9.9 G/DL — LOW (ref 13–17)
INR BLD: 1.49 RATIO — HIGH (ref 0.88–1.16)
MCHC RBC-ENTMCNC: 27.7 PG — SIGNIFICANT CHANGE UP (ref 27–34)
MCHC RBC-ENTMCNC: 28.3 PG — SIGNIFICANT CHANGE UP (ref 27–34)
MCHC RBC-ENTMCNC: 32.1 GM/DL — SIGNIFICANT CHANGE UP (ref 32–36)
MCHC RBC-ENTMCNC: 32.8 GM/DL — SIGNIFICANT CHANGE UP (ref 32–36)
MCV RBC AUTO: 86.3 FL — SIGNIFICANT CHANGE UP (ref 80–100)
MCV RBC AUTO: 86.5 FL — SIGNIFICANT CHANGE UP (ref 80–100)
NRBC # BLD: 0 /100 WBCS — SIGNIFICANT CHANGE UP (ref 0–0)
NRBC # BLD: 0 /100 WBCS — SIGNIFICANT CHANGE UP (ref 0–0)
PLATELET # BLD AUTO: 129 K/UL — LOW (ref 150–400)
PLATELET # BLD AUTO: 131 K/UL — LOW (ref 150–400)
POTASSIUM SERPL-MCNC: 3.8 MMOL/L — SIGNIFICANT CHANGE UP (ref 3.5–5.3)
POTASSIUM SERPL-MCNC: 4.4 MMOL/L — SIGNIFICANT CHANGE UP (ref 3.5–5.3)
POTASSIUM SERPL-SCNC: 3.8 MMOL/L — SIGNIFICANT CHANGE UP (ref 3.5–5.3)
POTASSIUM SERPL-SCNC: 4.4 MMOL/L — SIGNIFICANT CHANGE UP (ref 3.5–5.3)
PROTHROM AB SERPL-ACNC: 16.4 SEC — HIGH (ref 9.8–12.7)
RBC # BLD: 3.5 M/UL — LOW (ref 4.2–5.8)
RBC # BLD: 3.93 M/UL — LOW (ref 4.2–5.8)
RBC # FLD: 15.1 % — HIGH (ref 10.3–14.5)
RBC # FLD: 15.3 % — HIGH (ref 10.3–14.5)
RCV VOL RI: HIGH /UL (ref 0–5)
SODIUM SERPL-SCNC: 140 MMOL/L — SIGNIFICANT CHANGE UP (ref 135–145)
SODIUM SERPL-SCNC: 140 MMOL/L — SIGNIFICANT CHANGE UP (ref 135–145)
TOTAL NUCLEATED CELL COUNT, BODY FLUID: HIGH /UL (ref 0–5)
TUBE TYPE: SIGNIFICANT CHANGE UP
WBC # BLD: 12.86 K/UL — HIGH (ref 3.8–10.5)
WBC # BLD: 13.03 K/UL — HIGH (ref 3.8–10.5)
WBC # FLD AUTO: 12.86 K/UL — HIGH (ref 3.8–10.5)
WBC # FLD AUTO: 13.03 K/UL — HIGH (ref 3.8–10.5)

## 2018-06-28 PROCEDURE — 99222 1ST HOSP IP/OBS MODERATE 55: CPT

## 2018-06-28 PROCEDURE — 12345: CPT | Mod: NC

## 2018-06-28 PROCEDURE — 99233 SBSQ HOSP IP/OBS HIGH 50: CPT

## 2018-06-28 RX ORDER — ACETAMINOPHEN 500 MG
2 TABLET ORAL
Qty: 0 | Refills: 0 | COMMUNITY

## 2018-06-28 RX ORDER — SODIUM CHLORIDE 9 MG/ML
1000 INJECTION INTRAMUSCULAR; INTRAVENOUS; SUBCUTANEOUS ONCE
Qty: 0 | Refills: 0 | Status: COMPLETED | OUTPATIENT
Start: 2018-06-28 | End: 2018-06-28

## 2018-06-28 RX ORDER — WARFARIN SODIUM 2.5 MG/1
4 TABLET ORAL ONCE
Qty: 0 | Refills: 0 | Status: COMPLETED | OUTPATIENT
Start: 2018-06-28 | End: 2018-06-28

## 2018-06-28 RX ADMIN — Medication 120 MILLIGRAM(S): at 05:19

## 2018-06-28 RX ADMIN — ENOXAPARIN SODIUM 40 MILLIGRAM(S): 100 INJECTION SUBCUTANEOUS at 10:13

## 2018-06-28 RX ADMIN — Medication 100 MILLIGRAM(S): at 18:27

## 2018-06-28 RX ADMIN — SODIUM CHLORIDE 1000 MILLILITER(S): 9 INJECTION INTRAMUSCULAR; INTRAVENOUS; SUBCUTANEOUS at 04:30

## 2018-06-28 RX ADMIN — Medication 400 MILLIGRAM(S): at 14:05

## 2018-06-28 RX ADMIN — Medication 400 MILLIGRAM(S): at 01:43

## 2018-06-28 RX ADMIN — SENNA PLUS 2 TABLET(S): 8.6 TABLET ORAL at 22:11

## 2018-06-28 RX ADMIN — OXYCODONE HYDROCHLORIDE 5 MILLIGRAM(S): 5 TABLET ORAL at 11:04

## 2018-06-28 RX ADMIN — Medication 400 MILLIGRAM(S): at 07:02

## 2018-06-28 RX ADMIN — Medication 100 MILLIGRAM(S): at 14:05

## 2018-06-28 RX ADMIN — BENZOCAINE AND MENTHOL 1 LOZENGE: 5; 1 LIQUID ORAL at 05:24

## 2018-06-28 RX ADMIN — WARFARIN SODIUM 4 MILLIGRAM(S): 2.5 TABLET ORAL at 22:10

## 2018-06-28 RX ADMIN — Medication 100 MILLIGRAM(S): at 22:11

## 2018-06-28 RX ADMIN — Medication 100 MILLIGRAM(S): at 05:20

## 2018-06-28 RX ADMIN — Medication 1000 MILLIGRAM(S): at 07:03

## 2018-06-28 RX ADMIN — Medication 1000 MILLIGRAM(S): at 01:44

## 2018-06-28 RX ADMIN — Medication 166.67 MILLIGRAM(S): at 03:34

## 2018-06-28 RX ADMIN — Medication 1000 MILLIGRAM(S): at 14:06

## 2018-06-28 RX ADMIN — Medication 1000 MILLIGRAM(S): at 18:35

## 2018-06-28 RX ADMIN — Medication 1000 MILLIGRAM(S): at 18:27

## 2018-06-28 RX ADMIN — PANTOPRAZOLE SODIUM 40 MILLIGRAM(S): 20 TABLET, DELAYED RELEASE ORAL at 10:13

## 2018-06-28 RX ADMIN — Medication 4 MILLIGRAM(S): at 22:12

## 2018-06-28 NOTE — PROGRESS NOTE ADULT - SUBJECTIVE AND OBJECTIVE BOX
Discharge medication calendar:  (Warfarin preop; preop RT)  Lovenox 40mg Qday until INR therapeutic  Warfarin Qday  APAP 1000mg q12h x 2-3 weeks  No NSAID ([facility]IMI)  Pantoprazole 40mg QAM x 6 weeks  Narcotic PRN  Docusate 100mg TID while taking narcotic  Miralax, Senna, or Bisacodyl PRN for treatment of constipation

## 2018-06-28 NOTE — OCCUPATIONAL THERAPY INITIAL EVALUATION ADULT - IADL RETRAINING, OT EVAL
Patient will increase standing tolerance to 3-5 minutes for grooming at the sink with in 3-5 sessions. Protected WB with RW

## 2018-06-28 NOTE — DISCHARGE NOTE ADULT - PATIENT PORTAL LINK FT
You can access the [email or web address] Patient Portal, offered by [facility], by registering with the following website: [email or web address]

## 2018-06-28 NOTE — DISCHARGE NOTE ADULT - INSTRUCTIONS
For Constipation :   • Increase your water intake. Drink at least 8 glasses of water daily.  • Try adding fiber to your diet by eating fruits, vegetables and foods that are rich in grains.  • If you do experience constipation, you may take an over-the-counter stool softener/laxative such as Camilla Colace, Senekot or  Milk of Magnesia.

## 2018-06-28 NOTE — CONSULT NOTE ADULT - PROBLEM SELECTOR RECOMMENDATION 2
Rate controlled; titrate warfarin to goal INR 2.5
V-paced s/p AV acosta ablation will continue Sotalol 120 mg PO BID in addition to Coumadin with target INR between 2 and 3.

## 2018-06-28 NOTE — DISCHARGE NOTE ADULT - MEDICATION SUMMARY - MEDICATIONS TO STOP TAKING
I will STOP taking the medications listed below when I get home from the hospital:    Pepcid AC Maximum Strength 20 mg oral tablet  -- 1 tab(s) by mouth 2 times a day(x2 Preoperatively)

## 2018-06-28 NOTE — CONSULT NOTE ADULT - ASSESSMENT
* Stable cardiac status; tolerated elective hip surgery; will f/u as needed - please call with questions.
Asymptomatic post-op 81 y/o M with PMH of HTN, A. Fib s/p AV acosta ablation s/p AICD on Coumadin, Prostate CA s/p cyber knife stereotactic RS s/p brachytherapy, Varicose Veins, and OA.

## 2018-06-28 NOTE — DISCHARGE NOTE ADULT - PROCEDURE 1
Total hip revision Pupils equal, round and reactive to light, Extra-ocular movement intact, eyes are clear b/l

## 2018-06-28 NOTE — DISCHARGE NOTE ADULT - CARE PLAN
Principal Discharge DX:	Status post total replacement of right hip  Goal:	Improve ambulation, ADLs and quality of life  Assessment and plan of treatment:	Your new total hip replacement requires proper care.  Your surgical care provider is your best resource for information.  Your Physical Therapy /Occupational Therapy will include Ambulation, Transfers , Stairs, ADLs (activities of daily living), range of motion, and isometrics.  Your participation is vital for the fullest recovery and best results.  You may bear full weight as tolerated with rolling walker, cane or assistive device as determined by your therapist.  TOTAL HIP PRECAUTIONS   Do not bend your hip more than 90 degrees.   Do not rotate your leg drastically inward.   Continue abduction pillow while in bed.   Sit in Hip Chair or a chair that does not allow your to bend more than 90 degrees at the hip.  Do not sit on low chairs or low toilet seats.   Keep incision clean and dry.  Change the dressing daily if there is drainage noted.  When there is no drainage the wound may be open to air.   The wound is closed with either sutures (stiches) or Prineo (glued on mesh tape.)  Both sutures and Prineo are removed 2 weeks after surgery at rehab facility or in surgeon's office.  If there is no wet drainage you may shower and pat dry with a clean towel.  Pain medication is to used if needed as prescribed. Constipation may occur from pain medications.  For Constipation :   • Increase your water intake. Drink at least 8 glasses of water daily.  • Try adding fiber to your diet by eating fruits, vegetables and foods that are rich in grains.  • If you do experience constipation, you may take an over-the-counter stool softener/laxative such as Camilla Colace, Senekot or Milk of Magnesia.  Call the office with questions.  If you have an emergency call an ambulance or go to the emergency room. Principal Discharge DX:	Status post revision of total hip replacement  Goal:	Improve ambulation, ADLs and quality of life  Assessment and plan of treatment:	Your new total hip replacement requires proper care.  Your surgical care provider is your best resource for information.  Your Physical Therapy /Occupational Therapy will include Ambulation, Transfers , Stairs, ADLs (activities of daily living), range of motion, and isometrics.  Your participation is vital for the fullest recovery and best results.  You may bear full weight as tolerated with rolling walker, cane or assistive device as determined by your therapist.  TOTAL HIP PRECAUTIONS   Do not bend your hip more than 90 degrees.   Do not rotate your leg drastically inward.   Continue abduction pillow while in bed.   Sit in Hip Chair or a chair that does not allow your to bend more than 90 degrees at the hip.  Do not sit on low chairs or low toilet seats.   Keep incision clean and dry.  Change the dressing daily if there is drainage noted.  When there is no drainage the wound may be open to air.   The wound is closed with either sutures (stiches) or Prineo (glued on mesh tape.)  Both sutures and Prineo are removed 2 weeks after surgery at rehab facility or in surgeon's office.  If there is no wet drainage you may shower and pat dry with a clean towel.  Pain medication is to used if needed as prescribed. Constipation may occur from pain medications.  For Constipation :   • Increase your water intake. Drink at least 8 glasses of water daily.  • Try adding fiber to your diet by eating fruits, vegetables and foods that are rich in grains.  • If you do experience constipation, you may take an over-the-counter stool softener/laxative such as Camilla Colace, Senekot or Milk of Magnesia.  Call the office with questions.  If you have an emergency call an ambulance or go to the emergency room.

## 2018-06-28 NOTE — DISCHARGE NOTE ADULT - NS AS ACTIVITY OBS
No Heavy lifting/straining/Do not make important decisions/Showering allowed/Stairs allowed No Heavy lifting/straining/Do not drive or operate machinery/Walking-Outdoors allowed/Do not make important decisions/Showering allowed/Walking-Indoors allowed/Stairs allowed

## 2018-06-28 NOTE — OCCUPATIONAL THERAPY INITIAL EVALUATION ADULT - ADDITIONAL COMMENTS
Lives alone but can have assist from significant other.  No steps to enter home. Split inside 6 up/down with handrail that he needs to do. Has a RW, cane, commode, hip kit and shower chair. Will need another RW for second level.

## 2018-06-28 NOTE — DISCHARGE NOTE ADULT - MEDICATION SUMMARY - MEDICATIONS TO TAKE
I will START or STAY ON the medications listed below when I get home from the hospital:    Rolling walker with 5 inch wheels  -- s/p revision right THR  -- Indication: For Equipement    oxyCODONE 5 mg oral tablet  -- 1-2 tab(s) by mouth every 4 hours MDD:8 tab-  -- Caution federal law prohibits the transfer of this drug to any person other  than the person for whom it was prescribed.  It is very important that you take or use this exactly as directed.  Do not skip doses or discontinue unless directed by your doctor.  May cause drowsiness.  Alcohol may intensify this effect.  Use care when operating dangerous machinery.  This prescription cannot be refilled.  Using more of this medication than prescribed may cause serious breathing problems.    -- Indication: For Pain as needed    Tylenol Extra Strength 500 mg oral tablet  -- 2 tab(s) by mouth every 6 hours   -- This product contains acetaminophen.  Do not use  with any other product containing acetaminophen to prevent possible liver damage.    -- Indication: For Pain as needed    doxazosin 4 mg oral tablet  -- 1 tab(s) by mouth once a day  -- Indication: For Heart    sotalol 120 mg oral tablet  -- 1 tab(s) by mouth 2 times a day  -- Indication: For Heart    Coumadin 4 mg oral tablet  -- 1 tab(s) by mouth once a day  -- Indication: For Prevent blood clots    allopurinol 100 mg oral tablet  -- 1 tab(s) by mouth 2 times a day  -- Indication: For gout    olmesartan-hydrochlorothiazide 40 mg-12.5 mg oral tablet  -- 1 tab(s) by mouth once a day  -- Indication: For Htn    senna oral tablet  -- 2 tab(s) by mouth once a day (at bedtime)  -- Indication: For Constipation    docusate sodium 100 mg oral capsule  -- 1 cap(s) by mouth 3 times a day  -- Indication: For Constipation

## 2018-06-28 NOTE — DISCHARGE NOTE ADULT - COMMUNITY RESOURCES
You are arranging privately for your own home Physical Therapy. Visiting nurse/ Physical Therapy./Occupational therapy/ blood work

## 2018-06-28 NOTE — DIETITIAN INITIAL EVALUATION ADULT. - OTHER INFO
82M s/p hip revision. Pt seen per coumadin-vit k interaction policy. Pt c hx afib, on coumadin PTA- states he has been previously educated as he has been on medication for ~30 years. States he keeps intake of vit k rich foods consistent. Pt tolerating regular diet c adequate intake. Pt offers no nutrition related questions or concerns at time of visit.

## 2018-06-28 NOTE — CONSULT NOTE ADULT - SUBJECTIVE AND OBJECTIVE BOX
CHIEF COMPLAINT: No cardiac complaints of time of encounter    HPI:  82 year old man with a history of CAD, CHF, atrial fibrillation / atrial flutter, ventricular tachycardia, ICD, HTN, OA, prostate cancer who was electively admitted on 6/27/18 for right total hip revision due to progressively worsening pain and weakness (pain is worsened by activity and modestly relieved with rest); symptoms have been worsening over the past few months.  Mr Padilla tolerated surgery and is presently comfortable and without cardiac complaints / symptoms.  He specifically denies: dyspnea, orthopnea, angina, palpitations.     PAST MEDICAL & SURGICAL HISTORY:  Edema: right leg  Degenerative disc disease, lumbar  Cellulitis: right lower leg in past  Gout: finger a long time ago  Varicose veins: legs  Venous stasis  Prostate cancer: treated with cyber knife 2016  Osteoarthritis  Afib  Hypertension  S/P cholecystectomy: 12/17  S/P tonsillectomy  Afib: cardiac ablation 1998; several pacemaker/AICD insertion and replacements  S/P hernia repair: 1996  S/P cataract extraction: bilateral  S/P total hip arthroplasty: 2006, right    SOCIAL HISTORY:   Alcohol: Drinks alcohol  Smoking: Former smoker    FAMILY HISTORY: FAMILY HISTORY:  Family history of prostate cancer (Sibling)  Family history of breast cancer (Mother)  Family history of essential hypertension (Father)  Family history of Alzheimer's disease (Father)    MEDICATIONS  (STANDING):  acetaminophen   Tablet. 1000 milliGRAM(s) Oral every 12 hours  acetaminophen  IVPB. 1000 milliGRAM(s) IV Intermittent every 6 hours  allopurinol 100 milliGRAM(s) Oral two times a day  docusate sodium 100 milliGRAM(s) Oral three times a day  doxazosin 4 milliGRAM(s) Oral at bedtime  enoxaparin Injectable 40 milliGRAM(s) SubCutaneous daily  lactated ringers. 1000 milliLiter(s) (100 mL/Hr) IV Continuous <Continuous>  pantoprazole    Tablet 40 milliGRAM(s) Oral daily  senna 2 Tablet(s) Oral at bedtime  sotalol 120 milliGRAM(s) Oral two times a day    Allergies:  adhesives (Rash)  clonidine (Swelling; Rash)  felodipine (Rash)  penicillin (Rash)  sulfa drugs (Rash)    REVIEW OF SYSTEMS:  CONSTITUTIONAL: No fevers or chills  Eyes: No visual changes  NECK: No pain  RESPIRATORY: No cough; No shortness of breath  CARDIOVASCULAR: No chest pain or palpitations  GASTROINTESTINAL: No abdominal pain. No nausea, vomiting.  GENITOURINARY: No dysuria.  NEUROLOGICAL: No numbness.  SKIN: No itching or rash  MSK:  Controlled incisional pain  All other review of systems is negative unless indicated above    VITAL SIGNS:   Vital Signs Last 24 Hrs  T(C): 36.8 (28 Jun 2018 07:22), Max: 37 (27 Jun 2018 19:18)  T(F): 98.2 (28 Jun 2018 07:22), Max: 98.6 (27 Jun 2018 19:18)  HR: 56 (28 Jun 2018 07:22) (56 - 63)  BP: 109/65 (28 Jun 2018 07:22) (93/51 - 157/77)  RR: 118 (28 Jun 2018 07:22) (9 - 118)  SpO2: 96% (28 Jun 2018 07:22) (96% - 100%)    PHYSICAL EXAM:  Constitutional: NAD, awake and alert, seated in bedside chair, no distress, appears stated age  HEENT:   No oral cyanosis.  Pulmonary: Non-labored, breath sounds are clear bilaterally, No wheezing, rales or rhonchi  Cardiovascular: regular, mild bradycardia  Gastrointestinal: Bowel Sounds present, abdomen is soft, nontender.   Lymph: No cervical lymphadenopathy.  Neurological: Alert  Skin: No rashes.  Psych:  Mood & affect appropriate    LABS:                    9.9    12.86 )-----------( 129      ( 28 Jun 2018 07:11 )             30.2              140    |  106    |  32     ----------------------------<  152    4.4     |  27     |  1.71     PT/INR - ( 28 Jun 2018 07:11 )   PT: 16.4 sec;   INR: 1.49 ratio    PTT - ( 27 Jun 2018 11:50 )  PTT:39.7 sec    Echo (12/2017):  EF 40-45%.  Mild AI.  Mild PI.  Mild to moderate TR.  No pulm HTN.    Nuclear Stress (9/2017):  EF 52%.  No ischemia.    ECG (6/27/18):  Atrial flutter, electronic ventricular pacing.

## 2018-06-28 NOTE — DISCHARGE NOTE ADULT - PLAN OF CARE
Improve ambulation, ADLs and quality of life Your new total hip replacement requires proper care.  Your surgical care provider is your best resource for information.  Your Physical Therapy /Occupational Therapy will include Ambulation, Transfers , Stairs, ADLs (activities of daily living), range of motion, and isometrics.  Your participation is vital for the fullest recovery and best results.  You may bear full weight as tolerated with rolling walker, cane or assistive device as determined by your therapist.  TOTAL HIP PRECAUTIONS   Do not bend your hip more than 90 degrees.   Do not rotate your leg drastically inward.   Continue abduction pillow while in bed.   Sit in Hip Chair or a chair that does not allow your to bend more than 90 degrees at the hip.  Do not sit on low chairs or low toilet seats.   Keep incision clean and dry.  Change the dressing daily if there is drainage noted.  When there is no drainage the wound may be open to air.   The wound is closed with either sutures (stiches) or Prineo (glued on mesh tape.)  Both sutures and Prineo are removed 2 weeks after surgery at rehab facility or in surgeon's office.  If there is no wet drainage you may shower and pat dry with a clean towel.  Pain medication is to used if needed as prescribed. Constipation may occur from pain medications.  For Constipation :   • Increase your water intake. Drink at least 8 glasses of water daily.  • Try adding fiber to your diet by eating fruits, vegetables and foods that are rich in grains.  • If you do experience constipation, you may take an over-the-counter stool softener/laxative such as Camilla Colace, Senekot or Milk of Magnesia.  Call the office with questions.  If you have an emergency call an ambulance or go to the emergency room.

## 2018-06-28 NOTE — DISCHARGE NOTE ADULT - HOME CARE AGENCY
You declined offer for home care services. [facility] - [phone]  Nurse to visit the day after hospital discharge; physical therapist to follow. Please contact the home care agency at the above phone number if you have not heard from them by 12 noon on the day after your hospital discharge.

## 2018-06-28 NOTE — PROGRESS NOTE ADULT - SUBJECTIVE AND OBJECTIVE BOX
SUBJECTIVE: Patient seen and examined. No nausea/vomiting, nor shortness of breath. Light headed secondary to pain medication taken this morning with physical therapy ambulating patient to the side of bed and the bathroom.    OBJECTIVE:     Vital Signs Last 24 Hrs  T(C): 36.8 (28 Jun 2018 07:22), Max: 37 (27 Jun 2018 19:18)  T(F): 98.2 (28 Jun 2018 07:22), Max: 98.6 (27 Jun 2018 19:18)  HR: 56 (28 Jun 2018 07:22) (56 - 63)  BP: 109/65 (28 Jun 2018 07:22) (93/51 - 157/77)  BP(mean): --  RR: 118 (28 Jun 2018 07:22) (9 - 118)  SpO2: 96% (28 Jun 2018 07:22) (96% - 100%)    PAIN SCORE:    3     SCALE USED: (1-10 VNRS)        Affected extremity:          Dressing: clean/dry/intact  post operative dressing with hemovac in place and draining with output of 170cc         Sensation:  intact to light touch to bilateral LEs         Motor exam:   5/ 5 Tibialis Anterior/Gastrocnemius-Soleus , EHL         warm well perfused; capillary refill <3 seconds     LABS:                        9.9    12.86 )-----------( 129      ( 28 Jun 2018 07:11 )             30.2     06-28    140  |  106  |  32<H>  ----------------------------<  152<H>  4.4   |  27  |  1.71<H>    Ca    7.8<L>      28 Jun 2018 07:11      PT/INR - ( 28 Jun 2018 07:11 )   PT: 16.4 sec;   INR: 1.49 ratio         PTT - ( 27 Jun 2018 11:50 )  PTT:39.7 sec  CAPILLARY BLOOD GLUCOSE          MEDICATIONS:    Anticoagulation:  enoxaparin Injectable 40 milliGRAM(s) SubCutaneous daily  Coumadin as pre-op    Antibiotics: finished vancomycin      Pain medications:   acetaminophen   Tablet. 1000 milliGRAM(s) Oral every 12 hours  acetaminophen  IVPB. 1000 milliGRAM(s) IV Intermittent every 6 hours  HYDROmorphone  Injectable 0.5 milliGRAM(s) IV Push every 3 hours PRN  ondansetron Injectable 4 milliGRAM(s) IV Push every 6 hours PRN  oxyCODONE    IR 5 milliGRAM(s) Oral every 3 hours PRN  oxyCODONE    IR 10 milliGRAM(s) Oral every 3 hours PRN      A/P :  s/p Right  THRevision   POD # 1  -    Pain control  -    DVT ppx: Lovenox and Coumadin till therapuetic  -    Check AM labs  -    Weight bearing status: protected WBAT with walker.  -    Physical Therapy  -    Dispo: Home

## 2018-06-28 NOTE — PROGRESS NOTE ADULT - SUBJECTIVE AND OBJECTIVE BOX
S: POD 1 R hip CIARA. Feeling well, c/o thorat discomfort attributed to intubation, but denies odynophagia, dysphagia or cough. Has yost, denies abd pain. +flatus no BM. Denies CP SOB  O: Vital Signs Last 24 Hrs  T(C): 36.4 (28 Jun 2018 11:30), Max: 37 (27 Jun 2018 19:18)  T(F): 97.6 (28 Jun 2018 11:30), Max: 98.6 (27 Jun 2018 19:18)  HR: 60 (28 Jun 2018 11:30) (56 - 60)  BP: 121/75 (28 Jun 2018 11:30) (93/51 - 127/74)  BP(mean): --  RR: 16 (28 Jun 2018 11:30) (9 - 118)  SpO2: 97% (28 Jun 2018 11:30) (96% - 100%)    General: Well developed, well nourished, NAD  HEENT: NCAT, PERRLA, EOMI bl, moist mucous membranes   Neck: Supple, nontender, no mass  Neurology: A&Ox3, nonfocal, CN II-XII grossly intact, sensation intact,  Respiratory: CTA B/L, No W/R/R  CV: RRR, +S1/S2, no murmurs, rubs or gallops  Abdominal: Soft, NT, ND +BSx4   yost cath with 200 CC yellow   Extremities: trace edema RLE gwen neg bilat. neg edema LLE. drain with min OP.   MSK: Normal ROM, no joint erythema or warmth, no joint swelling   Skin: warm, dry, normal color, no rash or abnormal lesions    MEDICATIONS  (STANDING):  acetaminophen   Tablet. 1000 milliGRAM(s) Oral every 12 hours  allopurinol 100 milliGRAM(s) Oral two times a day  docusate sodium 100 milliGRAM(s) Oral three times a day  doxazosin 4 milliGRAM(s) Oral at bedtime  enoxaparin Injectable 40 milliGRAM(s) SubCutaneous daily  lactated ringers. 1000 milliLiter(s) (100 mL/Hr) IV Continuous <Continuous>  pantoprazole    Tablet 40 milliGRAM(s) Oral daily  senna 2 Tablet(s) Oral at bedtime  sotalol 120 milliGRAM(s) Oral two times a day    MEDICATIONS  (PRN):  aluminum hydroxide/magnesium hydroxide/simethicone Suspension 30 milliLiter(s) Oral four times a day PRN Indigestion  benzocaine 15 mG/menthol 3.6 mG Lozenge 1 Lozenge Oral every 3 hours PRN Sore Throat  HYDROmorphone  Injectable 0.5 milliGRAM(s) IV Push every 3 hours PRN Severe Pain (7 - 10)  magnesium hydroxide Suspension 30 milliLiter(s) Oral daily PRN Constipation  ondansetron Injectable 4 milliGRAM(s) IV Push every 6 hours PRN Nausea and/or Vomiting  oxyCODONE    IR 5 milliGRAM(s) Oral every 3 hours PRN Mild Pain (1 - 3)  oxyCODONE    IR 10 milliGRAM(s) Oral every 3 hours PRN Moderate Pain (4 - 6)  polyethylene glycol 3350 17 Gram(s) Oral daily PRN Constipation

## 2018-06-28 NOTE — CONSULT NOTE ADULT - PROBLEM SELECTOR RECOMMENDATION 9
Chronic CHF with mild LV dysfunction appears compensated; continue Sotalol; resume ARB when BP allows (chronically takes Benicar).
s/p right total hip revision, asymptomatic post-op, pain control, IVF hydration, I & O monitoring, incentive spirometry. PT & OT consults, mobilization & weight bearing as per orthopedic surgery team.

## 2018-06-28 NOTE — CONSULT NOTE ADULT - PROBLEM SELECTOR RECOMMENDATION 3
Controlled.
controlled, will resume Losartan instead of Olmesartan HCTZ on POD2 with hold parameters. Patient is also on Doxazosin for prostate related urinary symptoms, will continue.

## 2018-06-28 NOTE — PHYSICAL THERAPY INITIAL EVALUATION ADULT - ADDITIONAL COMMENTS
pt. lives in multilevel house with 1 step to enter and 6 steps between floors with railings. bedroom is not on main floor. pt. owns shower chair, toilet seat with handles, and hip kit.

## 2018-06-28 NOTE — DISCHARGE NOTE ADULT - HOSPITAL COURSE
This patient was admitted to [facility] with a history of severe degenerative joint disease of the right knee, h/o right knee TKR.  Patient went to Pre-Surgical Testing at [facility] and was medically cleared to undergo elective procedure.  Revision Right TKR performed by Dr. Da Silva on 6/27/18. No operative or radha-operative complications arose during patients hospital course.  Patient received antibiotic according to SCIP guidelines for infection prevention.  Lovenox to coumadin bridge was given for DVT prophylaxis.  Anesthesia, Medical Hospitalist, Physical Therapy and Occupational Therapy were consulted. Patient is stable for discharge with a good prognosis.  Appropriate discharge instructions and medications are provided in this document. This patient was admitted to [facility] with a history of severe degenerative joint disease of the right knee, h/o right knee TKR.  Patient went to Pre-Surgical Testing at [facility] and was medically cleared to undergo elective procedure.  Revision Right TKR performed by Dr. Da Silva on 6/27/18. No operative complications arose.  Patient received antibiotic according to SCIP guidelines for infection prevention.  Lovenox to coumadin bridge was given for DVT prophylaxis. Patient received 1 unit PRBCs on 6/30 secondary to symptomatic post-op anemia. Anesthesia, Medical Hospitalist, Physical Therapy and Occupational Therapy were consulted. Patient is stable for discharge with a good prognosis.  Appropriate discharge instructions and medications are provided in this document. This patient was admitted to [facility] with a history of severe degenerative joint disease of the right knee, h/o right knee TKR.  Patient went to Pre-Surgical Testing at [facility] and was medically cleared to undergo elective procedure.  Revision Right TKR performed by Dr. Da Silva on 6/27/18. No operative complications arose.  Patient received antibiotic according to SCIP guidelines for infection prevention.  Lovenox to coumadin bridge was given for DVT prophylaxis. Patient received 1 unit PRBCs on 6/30 secondary to symptomatic post-op anemia. Anesthesia, Medical Hospitalist, Physical Therapy and Occupational Therapy were consulted. Patient is stable for discharge with a good prognosis.  Appropriate discharge instructions and medications are provided in this document.     afib rate controlled, on coumadin INR 1.6, would need INR check thursday and saturday This patient was admitted to [facility] with a history of of a failed right total hip. Patient went to Pre-Surgical Testing at [facility] and was medically cleared to undergo elective procedure.  Revision Right THR performed by Dr. Da Silva on 6/27/18. No operative complications arose.  Patient received antibiotic according to SCIP guidelines for infection prevention.  Lovenox to coumadin bridge was given for DVT prophylaxis. Patient received 1 unit PRBCs on 6/30 secondary to symptomatic post-op anemia. Anesthesia, Medical Hospitalist, Physical Therapy and Occupational Therapy were consulted. Patient is stable for discharge with a good prognosis.  Appropriate discharge instructions and medications are provided in this document.     afib rate controlled, on coumadin INR 1.6, would need INR check thursday and saturday

## 2018-06-29 DIAGNOSIS — D50.0 IRON DEFICIENCY ANEMIA SECONDARY TO BLOOD LOSS (CHRONIC): ICD-10-CM

## 2018-06-29 LAB
ANION GAP SERPL CALC-SCNC: 6 MMOL/L — SIGNIFICANT CHANGE UP (ref 5–17)
APTT BLD: 38.7 SEC — HIGH (ref 27.5–37.4)
BUN SERPL-MCNC: 36 MG/DL — HIGH (ref 7–23)
CALCIUM SERPL-MCNC: 7.9 MG/DL — LOW (ref 8.4–10.5)
CHLORIDE SERPL-SCNC: 107 MMOL/L — SIGNIFICANT CHANGE UP (ref 96–108)
CO2 SERPL-SCNC: 26 MMOL/L — SIGNIFICANT CHANGE UP (ref 22–31)
CREAT SERPL-MCNC: 1.75 MG/DL — HIGH (ref 0.5–1.3)
GLUCOSE SERPL-MCNC: 102 MG/DL — HIGH (ref 70–99)
HCT VFR BLD CALC: 27.1 % — LOW (ref 39–50)
HCT VFR BLD CALC: 27.3 % — LOW (ref 39–50)
HGB BLD-MCNC: 8.7 G/DL — LOW (ref 13–17)
HGB BLD-MCNC: 8.9 G/DL — LOW (ref 13–17)
INR BLD: 1.98 RATIO — HIGH (ref 0.88–1.16)
MCHC RBC-ENTMCNC: 27.8 PG — SIGNIFICANT CHANGE UP (ref 27–34)
MCHC RBC-ENTMCNC: 27.9 PG — SIGNIFICANT CHANGE UP (ref 27–34)
MCHC RBC-ENTMCNC: 32.1 GM/DL — SIGNIFICANT CHANGE UP (ref 32–36)
MCHC RBC-ENTMCNC: 32.6 GM/DL — SIGNIFICANT CHANGE UP (ref 32–36)
MCV RBC AUTO: 85.3 FL — SIGNIFICANT CHANGE UP (ref 80–100)
MCV RBC AUTO: 86.9 FL — SIGNIFICANT CHANGE UP (ref 80–100)
NRBC # BLD: 0 /100 WBCS — SIGNIFICANT CHANGE UP (ref 0–0)
NRBC # BLD: 0 /100 WBCS — SIGNIFICANT CHANGE UP (ref 0–0)
PLATELET # BLD AUTO: 112 K/UL — LOW (ref 150–400)
PLATELET # BLD AUTO: 116 K/UL — LOW (ref 150–400)
POTASSIUM SERPL-MCNC: 3.4 MMOL/L — LOW (ref 3.5–5.3)
POTASSIUM SERPL-SCNC: 3.4 MMOL/L — LOW (ref 3.5–5.3)
PROTHROM AB SERPL-ACNC: 21.9 SEC — HIGH (ref 9.8–12.7)
RBC # BLD: 3.12 M/UL — LOW (ref 4.2–5.8)
RBC # BLD: 3.2 M/UL — LOW (ref 4.2–5.8)
RBC # FLD: 15.3 % — HIGH (ref 10.3–14.5)
RBC # FLD: 15.8 % — HIGH (ref 10.3–14.5)
SODIUM SERPL-SCNC: 139 MMOL/L — SIGNIFICANT CHANGE UP (ref 135–145)
WBC # BLD: 9.37 K/UL — SIGNIFICANT CHANGE UP (ref 3.8–10.5)
WBC # BLD: 9.52 K/UL — SIGNIFICANT CHANGE UP (ref 3.8–10.5)
WBC # FLD AUTO: 9.37 K/UL — SIGNIFICANT CHANGE UP (ref 3.8–10.5)
WBC # FLD AUTO: 9.52 K/UL — SIGNIFICANT CHANGE UP (ref 3.8–10.5)

## 2018-06-29 PROCEDURE — 99232 SBSQ HOSP IP/OBS MODERATE 35: CPT

## 2018-06-29 PROCEDURE — 12345: CPT | Mod: NC

## 2018-06-29 RX ORDER — POTASSIUM CHLORIDE 20 MEQ
20 PACKET (EA) ORAL ONCE
Qty: 0 | Refills: 0 | Status: COMPLETED | OUTPATIENT
Start: 2018-06-29 | End: 2018-06-29

## 2018-06-29 RX ORDER — SODIUM CHLORIDE 9 MG/ML
1000 INJECTION INTRAMUSCULAR; INTRAVENOUS; SUBCUTANEOUS
Qty: 0 | Refills: 0 | Status: COMPLETED | OUTPATIENT
Start: 2018-06-29 | End: 2018-06-30

## 2018-06-29 RX ORDER — WARFARIN SODIUM 2.5 MG/1
4 TABLET ORAL ONCE
Qty: 0 | Refills: 0 | Status: COMPLETED | OUTPATIENT
Start: 2018-06-29 | End: 2018-06-29

## 2018-06-29 RX ADMIN — Medication 4 MILLIGRAM(S): at 21:26

## 2018-06-29 RX ADMIN — Medication 1000 MILLIGRAM(S): at 05:31

## 2018-06-29 RX ADMIN — Medication 1 DROP(S): at 18:10

## 2018-06-29 RX ADMIN — Medication 1000 MILLIGRAM(S): at 18:07

## 2018-06-29 RX ADMIN — OXYCODONE HYDROCHLORIDE 5 MILLIGRAM(S): 5 TABLET ORAL at 16:39

## 2018-06-29 RX ADMIN — PANTOPRAZOLE SODIUM 40 MILLIGRAM(S): 20 TABLET, DELAYED RELEASE ORAL at 11:43

## 2018-06-29 RX ADMIN — SENNA PLUS 2 TABLET(S): 8.6 TABLET ORAL at 21:27

## 2018-06-29 RX ADMIN — SODIUM CHLORIDE 75 MILLILITER(S): 9 INJECTION INTRAMUSCULAR; INTRAVENOUS; SUBCUTANEOUS at 12:57

## 2018-06-29 RX ADMIN — OXYCODONE HYDROCHLORIDE 5 MILLIGRAM(S): 5 TABLET ORAL at 05:25

## 2018-06-29 RX ADMIN — ENOXAPARIN SODIUM 40 MILLIGRAM(S): 100 INJECTION SUBCUTANEOUS at 08:46

## 2018-06-29 RX ADMIN — WARFARIN SODIUM 4 MILLIGRAM(S): 2.5 TABLET ORAL at 21:27

## 2018-06-29 RX ADMIN — Medication 20 MILLIEQUIVALENT(S): at 18:07

## 2018-06-29 RX ADMIN — Medication 100 MILLIGRAM(S): at 05:25

## 2018-06-29 RX ADMIN — OXYCODONE HYDROCHLORIDE 5 MILLIGRAM(S): 5 TABLET ORAL at 06:20

## 2018-06-29 RX ADMIN — Medication 100 MILLIGRAM(S): at 21:27

## 2018-06-29 RX ADMIN — Medication 100 MILLIGRAM(S): at 15:49

## 2018-06-29 RX ADMIN — Medication 100 MILLIGRAM(S): at 18:07

## 2018-06-29 NOTE — PROGRESS NOTE ADULT - SUBJECTIVE AND OBJECTIVE BOX
Patient is a 83y old  Male who presents with a chief complaint of right groin, leg pain  S/p revision Right THR (28 Jun 2018 14:33)        HPI:83 yo M PMH AF HTN Prostate CA is POD#2 R CIARA, anemic, light headnes       SUBJECTIVE & OBJECTIVE: Pt seen and examined at bedside. felt dizzy with physicaly therapy     PHYSICAL EXAM:  T(C): 36.6 (06-29-18 @ 11:15), Max: 36.7 (06-29-18 @ 03:16)  HR: 60 (06-29-18 @ 11:15) (60 - 65)  BP: 91/50 (06-29-18 @ 11:15) (90/50 - 111/64)  RR: 16 (06-29-18 @ 11:15) (16 - 18)  SpO2: 95% (06-29-18 @ 11:15) (95% - 100%)  Wt(kg): --   GENERAL: NAD, well-groomed, well-developed  HEAD:  Atraumatic, Normocephalic  EYES: EOMI, PERRLA, conjunctiva and sclera clear  ENMT: Moist mucous membranes  NECK: Supple, No JVD  NERVOUS SYSTEM:  Alert & Oriented X3, Motor Strength 5/5 B/L upper and lower extremities; DTRs 2+ intact and symmetric  CHEST/LUNG: Clear to auscultation bilaterally; No rales, rhonchi, wheezing, or rubs  HEART: Regular rate and rhythm; No murmurs, rubs, or gallops  ABDOMEN: Soft, Nontender, Nondistended; Bowel sounds present  EXTREMITIES:  2+ Peripheral Pulses, No clubbing, cyanosis, or edema        MEDICATIONS  (STANDING):  acetaminophen   Tablet. 1000 milliGRAM(s) Oral every 12 hours  allopurinol 100 milliGRAM(s) Oral two times a day  docusate sodium 100 milliGRAM(s) Oral three times a day  doxazosin 4 milliGRAM(s) Oral at bedtime  enoxaparin Injectable 40 milliGRAM(s) SubCutaneous daily  lactated ringers. 1000 milliLiter(s) (100 mL/Hr) IV Continuous <Continuous>  losartan 100 milliGRAM(s) Oral daily  pantoprazole    Tablet 40 milliGRAM(s) Oral daily  potassium chloride   Powder 20 milliEquivalent(s) Oral once  senna 2 Tablet(s) Oral at bedtime  sodium chloride 0.9%. 1000 milliLiter(s) (75 mL/Hr) IV Continuous <Continuous>  sotalol 120 milliGRAM(s) Oral two times a day  warfarin 4 milliGRAM(s) Oral once    MEDICATIONS  (PRN):  aluminum hydroxide/magnesium hydroxide/simethicone Suspension 30 milliLiter(s) Oral four times a day PRN Indigestion  benzocaine 15 mG/menthol 3.6 mG Lozenge 1 Lozenge Oral every 3 hours PRN Sore Throat  bisacodyl Suppository 10 milliGRAM(s) Rectal daily PRN If no bowel movement by POD#2  HYDROmorphone  Injectable 0.5 milliGRAM(s) IV Push every 3 hours PRN Severe Pain (7 - 10)  magnesium hydroxide Suspension 30 milliLiter(s) Oral daily PRN Constipation  ondansetron Injectable 4 milliGRAM(s) IV Push every 6 hours PRN Nausea and/or Vomiting  oxyCODONE    IR 5 milliGRAM(s) Oral every 3 hours PRN Mild Pain (1 - 3)  oxyCODONE    IR 10 milliGRAM(s) Oral every 3 hours PRN Moderate Pain (4 - 6)  polyethylene glycol 3350 17 Gram(s) Oral daily PRN Constipation      LABS:                        8.9    9.52  )-----------( 116      ( 29 Jun 2018 08:18 )             27.3     06-29    139  |  107  |  36<H>  ----------------------------<  102<H>  3.4<L>   |  26  |  1.75<H>    Ca    7.9<L>      29 Jun 2018 08:18      PT/INR - ( 29 Jun 2018 08:18 )   PT: 21.9 sec;   INR: 1.98 ratio         PTT - ( 29 Jun 2018 08:18 )  PTT:38.7 sec      CAPILLARY BLOOD GLUCOSE          CAPILLARY BLOOD GLUCOSE        CAPILLARY BLOOD GLUCOSE                RECENT CULTURES:      RADIOLOGY & ADDITIONAL TESTS:                        DVT/GI ppx  Discussed with pt @ bedside

## 2018-06-29 NOTE — PROGRESS NOTE ADULT - SUBJECTIVE AND OBJECTIVE BOX
RUBÉN GOLDBERG [id number]    Pt is a 83y year old Male s/p right revision THR. pain is 6/10. Tolerating regular diet, (+) voids.  Denies chest pain/shortness of breath/nausea/vomitting.     Vital Signs Last 24 Hrs  T(C): 36.6 (29 Jun 2018 11:15), Max: 36.7 (29 Jun 2018 03:16)  T(F): 97.9 (29 Jun 2018 11:15), Max: 98 (29 Jun 2018 03:16)  HR: 60 (29 Jun 2018 11:15) (60 - 65)  BP: 91/50 (29 Jun 2018 11:15) (90/50 - 111/64)  BP(mean): --  RR: 16 (29 Jun 2018 11:15) (16 - 18)  SpO2: 95% (29 Jun 2018 11:15) (95% - 100%)      06-28 @ 07:01  -  06-29 @ 07:00  --------------------------------------------------------  IN: 0 mL / OUT: 1970 mL / NET: -1970 mL                              8.9    9.52  )-----------( 116      ( 29 Jun 2018 08:18 )             27.3     06-29    139  |  107  |  36<H>  ----------------------------<  102<H>  3.4<L>   |  26  |  1.75<H>    Ca    7.9<L>      29 Jun 2018 08:18          PE:   RLE: Dressing changed, wound clean and intact, no erythema or drainage, sutures intact. Sensation intact to light touch distally, (+2) DP/PT pulses, EHL/FHL/TA intact, Capillary refill < 2 seconds. negative calf tenderness PAS on.    A: 83y year old Male s/p right THR POD#2    Plan:   -DVT ppx = PAS +  enoxaparin Injectable 40 milliGRAM(s) SubCutaneous daily + coumadin    -PT/OT = OOB  -Hip dislocation precautions  -Pain control   -Medicine to follow   -Incentive spirometry  dispo: Home when medically stable and clears PT/OT  -Patient has dizziness when standing, medicine aware.  Patient stable at this time, laying comfortably in bed.  Hgb 8.9, bp 96/52.  Discussed with medical Dr Shaw

## 2018-06-30 LAB
ANION GAP SERPL CALC-SCNC: 4 MMOL/L — LOW (ref 5–17)
BUN SERPL-MCNC: 30 MG/DL — HIGH (ref 7–23)
CALCIUM SERPL-MCNC: 7.6 MG/DL — LOW (ref 8.4–10.5)
CHLORIDE SERPL-SCNC: 107 MMOL/L — SIGNIFICANT CHANGE UP (ref 96–108)
CO2 SERPL-SCNC: 28 MMOL/L — SIGNIFICANT CHANGE UP (ref 22–31)
CREAT SERPL-MCNC: 1.67 MG/DL — HIGH (ref 0.5–1.3)
GLUCOSE SERPL-MCNC: 101 MG/DL — HIGH (ref 70–99)
HCT VFR BLD CALC: 25.6 % — LOW (ref 39–50)
HGB BLD-MCNC: 8.4 G/DL — LOW (ref 13–17)
INR BLD: 2.1 RATIO — HIGH (ref 0.88–1.16)
MCHC RBC-ENTMCNC: 28.5 PG — SIGNIFICANT CHANGE UP (ref 27–34)
MCHC RBC-ENTMCNC: 32.8 GM/DL — SIGNIFICANT CHANGE UP (ref 32–36)
MCV RBC AUTO: 86.8 FL — SIGNIFICANT CHANGE UP (ref 80–100)
NRBC # BLD: 0 /100 WBCS — SIGNIFICANT CHANGE UP (ref 0–0)
PLATELET # BLD AUTO: 117 K/UL — LOW (ref 150–400)
POTASSIUM SERPL-MCNC: 3.5 MMOL/L — SIGNIFICANT CHANGE UP (ref 3.5–5.3)
POTASSIUM SERPL-SCNC: 3.5 MMOL/L — SIGNIFICANT CHANGE UP (ref 3.5–5.3)
PROTHROM AB SERPL-ACNC: 23.2 SEC — HIGH (ref 9.8–12.7)
RBC # BLD: 2.95 M/UL — LOW (ref 4.2–5.8)
RBC # FLD: 15.6 % — HIGH (ref 10.3–14.5)
SODIUM SERPL-SCNC: 139 MMOL/L — SIGNIFICANT CHANGE UP (ref 135–145)
WBC # BLD: 7.96 K/UL — SIGNIFICANT CHANGE UP (ref 3.8–10.5)
WBC # FLD AUTO: 7.96 K/UL — SIGNIFICANT CHANGE UP (ref 3.8–10.5)

## 2018-06-30 PROCEDURE — 99233 SBSQ HOSP IP/OBS HIGH 50: CPT

## 2018-06-30 RX ORDER — WARFARIN SODIUM 2.5 MG/1
2.5 TABLET ORAL ONCE
Qty: 0 | Refills: 0 | Status: COMPLETED | OUTPATIENT
Start: 2018-06-30 | End: 2018-06-30

## 2018-06-30 RX ADMIN — Medication 1000 MILLIGRAM(S): at 06:42

## 2018-06-30 RX ADMIN — ENOXAPARIN SODIUM 40 MILLIGRAM(S): 100 INJECTION SUBCUTANEOUS at 09:22

## 2018-06-30 RX ADMIN — Medication 100 MILLIGRAM(S): at 05:48

## 2018-06-30 RX ADMIN — PANTOPRAZOLE SODIUM 40 MILLIGRAM(S): 20 TABLET, DELAYED RELEASE ORAL at 11:52

## 2018-06-30 RX ADMIN — SENNA PLUS 2 TABLET(S): 8.6 TABLET ORAL at 20:43

## 2018-06-30 RX ADMIN — Medication 100 MILLIGRAM(S): at 20:43

## 2018-06-30 RX ADMIN — Medication 4 MILLIGRAM(S): at 20:43

## 2018-06-30 RX ADMIN — Medication 1 DROP(S): at 18:43

## 2018-06-30 RX ADMIN — OXYCODONE HYDROCHLORIDE 5 MILLIGRAM(S): 5 TABLET ORAL at 13:07

## 2018-06-30 RX ADMIN — Medication 1000 MILLIGRAM(S): at 06:12

## 2018-06-30 RX ADMIN — OXYCODONE HYDROCHLORIDE 5 MILLIGRAM(S): 5 TABLET ORAL at 12:32

## 2018-06-30 RX ADMIN — Medication 1000 MILLIGRAM(S): at 18:00

## 2018-06-30 RX ADMIN — WARFARIN SODIUM 2.5 MILLIGRAM(S): 2.5 TABLET ORAL at 20:45

## 2018-06-30 RX ADMIN — SODIUM CHLORIDE 75 MILLILITER(S): 9 INJECTION INTRAMUSCULAR; INTRAVENOUS; SUBCUTANEOUS at 01:24

## 2018-06-30 RX ADMIN — LOSARTAN POTASSIUM 100 MILLIGRAM(S): 100 TABLET, FILM COATED ORAL at 05:49

## 2018-06-30 RX ADMIN — Medication 100 MILLIGRAM(S): at 17:59

## 2018-06-30 RX ADMIN — Medication 120 MILLIGRAM(S): at 05:49

## 2018-06-30 RX ADMIN — Medication 120 MILLIGRAM(S): at 17:59

## 2018-06-30 NOTE — PROGRESS NOTE ADULT - SUBJECTIVE AND OBJECTIVE BOX
POST OPERATIVE DAY #: 3    83y Male  s/p   Right  Posterior THR                        SUBJECTIVE: Patient seen and examined at bedside. Feeling tired    Pain:  well controlled     Pain scale:   3/10  Denies CP, SOB, N/V/D, weakness, numbness   No new complains     OBJECTIVE:     Vital Signs Last 24 Hrs  T(C): 36.8 (30 Jun 2018 08:03), Max: 37.3 (29 Jun 2018 19:00)  T(F): 98.2 (30 Jun 2018 08:03), Max: 99.1 (29 Jun 2018 19:00)  HR: 60 (30 Jun 2018 08:03) (58 - 61)  BP: 108/65 (30 Jun 2018 08:03) (91/50 - 110/61)  BP(mean): --  RR: 16 (30 Jun 2018 08:03) (16 - 16)  SpO2: 96% (30 Jun 2018 08:03) (95% - 97%)    Affected extremity:  RLE         Dressing: changed, Incision clean/dry/intact    [id number] dry decubitus    HV intact, on self suction         Sensation: intact to light touch          Motor exam:  5 / 5 Tibialis Anterior/Gastrocnemius-Soleus, EHL/FHL         warm, well-perfused; capillary refill < 3 seconds         negative calf tenderness B/L LE      LABS:                        8.4    7.96  )-----------( 117      ( 30 Jun 2018 06:58 )             25.6     06-30    139  |  107  |  30<H>  ----------------------------<  101<H>  3.5   |  28  |  1.67<H>    Ca    7.6<L>      30 Jun 2018 06:58  INR: 2.1        MEDICATIONS:      Pain management:  acetaminophen   Tablet. 1000 milliGRAM(s) Oral every 12 hours  HYDROmorphone  Injectable 0.5 milliGRAM(s) IV Push every 3 hours PRN  ondansetron Injectable 4 milliGRAM(s) IV Push every 6 hours PRN  oxyCODONE    IR 5 milliGRAM(s) Oral every 3 hours PRN  oxyCODONE    IR 10 milliGRAM(s) Oral every 3 hours PRN    DVT prophylaxis:   enoxaparin Injectable 40 milliGRAM(s) SubCutaneous daily      RADIOLOGY & ADDITIONAL STUDIES:    ASSESSMENT AND PLAN:   - anemia: decrease in H/H, possible transfusion   - Analgesic pain control  - DVT prophylaxis/A.Fib: Lovenox/coumadin bridge  SCDs       - PT/OT: Weight Bearing Status:  Weight bearing as tolerated, OOBTC          Posteiror Hip precautions   Abduction pillow    -  Incentive spirometry  - IVF  - Advance diet as tolerated  - Hospitalist is following  -  Follow up labs  -  Disposition: Home

## 2018-06-30 NOTE — PROGRESS NOTE ADULT - SUBJECTIVE AND OBJECTIVE BOX
Patient is a 83y old  Male who presents with a chief complaint of right groin, leg pain  S/p revision Right THR (28 Jun 2018 14:33)        HPI: 81 yo M PMH AF HTN Prostate CA is POD#2 R CIARA, anemic, with complaining of dizziness         SUBJECTIVE & OBJECTIVE: Pt seen and examined at bedside.     PHYSICAL EXAM:  T(C): 36.8 (06-30-18 @ 08:03), Max: 37.3 (06-29-18 @ 19:00)  HR: 60 (06-30-18 @ 08:03) (58 - 61)  BP: 108/65 (06-30-18 @ 08:03) (91/50 - 110/61)  RR: 16 (06-30-18 @ 08:03) (16 - 16)  SpO2: 96% (06-30-18 @ 08:03) (95% - 97%)  Wt(kg): --   GENERAL: NAD, well-groomed, well-developed  HEAD:  Atraumatic, Normocephalic  EYES: EOMI, PERRLA, conjunctiva and sclera clear  ENMT: Moist mucous membranes  NECK: Supple, No JVD  NERVOUS SYSTEM:  Alert & Oriented X3, Motor Strength 5/5 B/L upper and lower extremities; DTRs 2+ intact and symmetric  CHEST/LUNG: Clear to auscultation bilaterally; No rales, rhonchi, wheezing, or rubs  HEART: Regular rate and rhythm; No murmurs, rubs, or gallops  ABDOMEN: Soft, Nontender, Nondistended; Bowel sounds present  EXTREMITIES:  2+ Peripheral Pulses, No clubbing, cyanosis, or edema        MEDICATIONS  (STANDING):  acetaminophen   Tablet. 1000 milliGRAM(s) Oral every 12 hours  allopurinol 100 milliGRAM(s) Oral two times a day  docusate sodium 100 milliGRAM(s) Oral three times a day  doxazosin 4 milliGRAM(s) Oral at bedtime  lactated ringers. 1000 milliLiter(s) (100 mL/Hr) IV Continuous <Continuous>  losartan 100 milliGRAM(s) Oral daily  pantoprazole    Tablet 40 milliGRAM(s) Oral daily  senna 2 Tablet(s) Oral at bedtime  sotalol 120 milliGRAM(s) Oral two times a day  warfarin 2.5 milliGRAM(s) Oral once    MEDICATIONS  (PRN):  aluminum hydroxide/magnesium hydroxide/simethicone Suspension 30 milliLiter(s) Oral four times a day PRN Indigestion  artificial  tears Solution 1 Drop(s) Both EYES every 3 hours PRN Dry Eyes  benzocaine 15 mG/menthol 3.6 mG Lozenge 1 Lozenge Oral every 3 hours PRN Sore Throat  bisacodyl Suppository 10 milliGRAM(s) Rectal daily PRN If no bowel movement by POD#2  HYDROmorphone  Injectable 0.5 milliGRAM(s) IV Push every 3 hours PRN Severe Pain (7 - 10)  magnesium hydroxide Suspension 30 milliLiter(s) Oral daily PRN Constipation  ondansetron Injectable 4 milliGRAM(s) IV Push every 6 hours PRN Nausea and/or Vomiting  oxyCODONE    IR 5 milliGRAM(s) Oral every 3 hours PRN Mild Pain (1 - 3)  oxyCODONE    IR 10 milliGRAM(s) Oral every 3 hours PRN Moderate Pain (4 - 6)  polyethylene glycol 3350 17 Gram(s) Oral daily PRN Constipation      LABS:                        8.4    7.96  )-----------( 117      ( 30 Jun 2018 06:58 )             25.6     06-30    139  |  107  |  30<H>  ----------------------------<  101<H>  3.5   |  28  |  1.67<H>    Ca    7.6<L>      30 Jun 2018 06:58      PT/INR - ( 30 Jun 2018 06:58 )   PT: 23.2 sec;   INR: 2.10 ratio         PTT - ( 29 Jun 2018 08:18 )  PTT:38.7 sec      CAPILLARY BLOOD GLUCOSE          CAPILLARY BLOOD GLUCOSE        CAPILLARY BLOOD GLUCOSE                RECENT CULTURES:      RADIOLOGY & ADDITIONAL TESTS:                        DVT/GI ppx  Discussed with pt @ bedside

## 2018-07-01 LAB
ANION GAP SERPL CALC-SCNC: 8 MMOL/L — SIGNIFICANT CHANGE UP (ref 5–17)
APTT BLD: 41.1 SEC — HIGH (ref 27.5–37.4)
BUN SERPL-MCNC: 25 MG/DL — HIGH (ref 7–23)
CALCIUM SERPL-MCNC: 8.2 MG/DL — LOW (ref 8.4–10.5)
CHLORIDE SERPL-SCNC: 113 MMOL/L — HIGH (ref 96–108)
CO2 SERPL-SCNC: 28 MMOL/L — SIGNIFICANT CHANGE UP (ref 22–31)
CREAT SERPL-MCNC: 1.44 MG/DL — HIGH (ref 0.5–1.3)
GLUCOSE SERPL-MCNC: 94 MG/DL — SIGNIFICANT CHANGE UP (ref 70–99)
HCT VFR BLD CALC: 27.5 % — LOW (ref 39–50)
HGB BLD-MCNC: 8.8 G/DL — LOW (ref 13–17)
INR BLD: 1.79 RATIO — HIGH (ref 0.88–1.16)
MCHC RBC-ENTMCNC: 26.5 PG — LOW (ref 27–34)
MCHC RBC-ENTMCNC: 32 GM/DL — SIGNIFICANT CHANGE UP (ref 32–36)
MCV RBC AUTO: 82.8 FL — SIGNIFICANT CHANGE UP (ref 80–100)
NRBC # BLD: 0 /100 WBCS — SIGNIFICANT CHANGE UP (ref 0–0)
PLATELET # BLD AUTO: 103 K/UL — LOW (ref 150–400)
POTASSIUM SERPL-MCNC: 3.9 MMOL/L — SIGNIFICANT CHANGE UP (ref 3.5–5.3)
POTASSIUM SERPL-SCNC: 3.9 MMOL/L — SIGNIFICANT CHANGE UP (ref 3.5–5.3)
PROTHROM AB SERPL-ACNC: 19.7 SEC — HIGH (ref 9.8–12.7)
RBC # BLD: 3.32 M/UL — LOW (ref 4.2–5.8)
RBC # FLD: 20.6 % — HIGH (ref 10.3–14.5)
SODIUM SERPL-SCNC: 149 MMOL/L — HIGH (ref 135–145)
WBC # BLD: 6.91 K/UL — SIGNIFICANT CHANGE UP (ref 3.8–10.5)
WBC # FLD AUTO: 6.91 K/UL — SIGNIFICANT CHANGE UP (ref 3.8–10.5)

## 2018-07-01 PROCEDURE — 99233 SBSQ HOSP IP/OBS HIGH 50: CPT

## 2018-07-01 RX ORDER — WARFARIN SODIUM 2.5 MG/1
2.5 TABLET ORAL ONCE
Qty: 0 | Refills: 0 | Status: DISCONTINUED | OUTPATIENT
Start: 2018-07-01 | End: 2018-07-01

## 2018-07-01 RX ORDER — WARFARIN SODIUM 2.5 MG/1
3.5 TABLET ORAL ONCE
Qty: 0 | Refills: 0 | Status: COMPLETED | OUTPATIENT
Start: 2018-07-01 | End: 2018-07-01

## 2018-07-01 RX ADMIN — Medication 1000 MILLIGRAM(S): at 06:11

## 2018-07-01 RX ADMIN — WARFARIN SODIUM 3.5 MILLIGRAM(S): 2.5 TABLET ORAL at 22:21

## 2018-07-01 RX ADMIN — PANTOPRAZOLE SODIUM 40 MILLIGRAM(S): 20 TABLET, DELAYED RELEASE ORAL at 13:28

## 2018-07-01 RX ADMIN — Medication 1000 MILLIGRAM(S): at 17:56

## 2018-07-01 RX ADMIN — Medication 1000 MILLIGRAM(S): at 05:33

## 2018-07-01 RX ADMIN — Medication 100 MILLIGRAM(S): at 05:34

## 2018-07-01 RX ADMIN — Medication 4 MILLIGRAM(S): at 22:21

## 2018-07-01 RX ADMIN — Medication 120 MILLIGRAM(S): at 17:55

## 2018-07-01 RX ADMIN — Medication 100 MILLIGRAM(S): at 17:54

## 2018-07-01 NOTE — PROGRESS NOTE ADULT - SUBJECTIVE AND OBJECTIVE BOX
Patient is a 83y old  Male who presents with a chief complaint of right groin, leg pain  S/p revision Right THR (28 Jun 2018 14:33)        HPI:81 yo M PMH AF HTN Prostate CA is POD#2 R CIARA, anemic, with no acute complaints       SUBJECTIVE & OBJECTIVE: Pt seen and examined at bedside. , no acute complaints     PHYSICAL EXAM:  T(C): 36.9 (07-01-18 @ 08:08), Max: 37.1 (06-30-18 @ 23:47)  HR: 60 (07-01-18 @ 08:08) (60 - 72)  BP: 126/74 (07-01-18 @ 08:08) (93/52 - 126/74)  RR: 16 (07-01-18 @ 08:08) (16 - 17)  SpO2: 97% (07-01-18 @ 08:08) (96% - 97%)  Wt(kg): --   GENERAL: NAD, well-groomed, well-developed  HEAD:  Atraumatic, Normocephalic  EYES: EOMI, PERRLA, conjunctiva and sclera clear  ENMT: Moist mucous membranes  NECK: Supple, No JVD  NERVOUS SYSTEM:  Alert & Oriented X3, Motor Strength 5/5 B/L upper and lower extremities; DTRs 2+ intact and symmetric  CHEST/LUNG: Clear to auscultation bilaterally; No rales, rhonchi, wheezing, or rubs  HEART: Regular rate and rhythm; No murmurs, rubs, or gallops  ABDOMEN: Soft, Nontender, Nondistended; Bowel sounds present  EXTREMITIES:  2+ Peripheral Pulses, No clubbing, cyanosis, or edema        MEDICATIONS  (STANDING):  acetaminophen   Tablet. 1000 milliGRAM(s) Oral every 12 hours  allopurinol 100 milliGRAM(s) Oral two times a day  docusate sodium 100 milliGRAM(s) Oral three times a day  doxazosin 4 milliGRAM(s) Oral at bedtime  losartan 100 milliGRAM(s) Oral daily  pantoprazole    Tablet 40 milliGRAM(s) Oral daily  senna 2 Tablet(s) Oral at bedtime  sotalol 120 milliGRAM(s) Oral two times a day  warfarin 2.5 milliGRAM(s) Oral once    MEDICATIONS  (PRN):  aluminum hydroxide/magnesium hydroxide/simethicone Suspension 30 milliLiter(s) Oral four times a day PRN Indigestion  artificial  tears Solution 1 Drop(s) Both EYES every 3 hours PRN Dry Eyes  benzocaine 15 mG/menthol 3.6 mG Lozenge 1 Lozenge Oral every 3 hours PRN Sore Throat  bisacodyl Suppository 10 milliGRAM(s) Rectal daily PRN If no bowel movement by POD#2  HYDROmorphone  Injectable 0.5 milliGRAM(s) IV Push every 3 hours PRN Severe Pain (7 - 10)  magnesium hydroxide Suspension 30 milliLiter(s) Oral daily PRN Constipation  ondansetron Injectable 4 milliGRAM(s) IV Push every 6 hours PRN Nausea and/or Vomiting  oxyCODONE    IR 5 milliGRAM(s) Oral every 3 hours PRN Mild Pain (1 - 3)  oxyCODONE    IR 10 milliGRAM(s) Oral every 3 hours PRN Moderate Pain (4 - 6)  polyethylene glycol 3350 17 Gram(s) Oral daily PRN Constipation      LABS:                        8.8    6.91  )-----------( 103      ( 01 Jul 2018 07:07 )             27.5     07-01    149<H>  |  113<H>  |  25<H>  ----------------------------<  94  3.9   |  28  |  1.44<H>    Ca    8.2<L>      01 Jul 2018 07:07      PT/INR - ( 01 Jul 2018 07:07 )   PT: 19.7 sec;   INR: 1.79 ratio         PTT - ( 01 Jul 2018 07:07 )  PTT:41.1 sec      CAPILLARY BLOOD GLUCOSE          CAPILLARY BLOOD GLUCOSE        CAPILLARY BLOOD GLUCOSE                RECENT CULTURES:      RADIOLOGY & ADDITIONAL TESTS:                        DVT/GI ppx  Discussed with pt @ bedside

## 2018-07-01 NOTE — PROGRESS NOTE ADULT - SUBJECTIVE AND OBJECTIVE BOX
POST OPERATIVE DAY #: 4    83y Male  s/p   Right  Posterior THR                        Pain:  well controlled     Pain scale:   3/10  Denies CP, SOB, N/V/D, weakness, numbness   No new complains     OBJECTIVE:     Vital Signs Last 24 Hrs  T(C): 36.8 (30 Jun 2018 08:03), Max: 37.3 (29 Jun 2018 19:00)  T(F): 98.2 (30 Jun 2018 08:03), Max: 99.1 (29 Jun 2018 19:00)  HR: 60 (30 Jun 2018 08:03) (58 - 61)  BP: 108/65 (30 Jun 2018 08:03) (91/50 - 110/61)  BP(mean): --  RR: 16 (30 Jun 2018 08:03) (16 - 16)  SpO2: 96% (30 Jun 2018 08:03) (95% - 97%)    Affected extremity:  RLE         Dressing: changed, Incision clean/dry/intact    [id number] dry decubitus    HV intact, on self suction         Sensation: intact to light touch          Motor exam:  5 / 5 Tibialis Anterior/Gastrocnemius-Soleus, EHL/FHL         warm, well-perfused; capillary refill < 3 seconds         negative calf tenderness B/L LE      LABS:                        8.4    7.96  )-----------( 117      ( 30 Jun 2018 06:58 )             25.6     06-30    139  |  107  |  30<H>  ----------------------------<  101<H>  3.5   |  28  |  1.67<H>    Ca    7.6<L>      30 Jun 2018 06:58  INR: 2.1        MEDICATIONS:      Pain management:  acetaminophen   Tablet. 1000 milliGRAM(s) Oral every 12 hours  HYDROmorphone  Injectable 0.5 milliGRAM(s) IV Push every 3 hours PRN  ondansetron Injectable 4 milliGRAM(s) IV Push every 6 hours PRN  oxyCODONE    IR 5 milliGRAM(s) Oral every 3 hours PRN  oxyCODONE    IR 10 milliGRAM(s) Oral every 3 hours PRN    DVT prophylaxis:   enoxaparin Injectable 40 milliGRAM(s) SubCutaneous daily      RADIOLOGY & ADDITIONAL STUDIES:    ASSESSMENT AND PLAN:   - Analgesic pain control  - DVT prophylaxis/A.Fib: Lovenox/coumadin bridge  SCDs       - PT/OT: Weight Bearing Status:  Weight bearing as tolerated, OOBTC          Posteiror Hip precautions   Abduction pillow    -  Incentive spirometry  - IVF  - Advance diet as tolerated  - Hospitalist is following  -  Follow up labs  -  Disposition: Home   Monday

## 2018-07-02 VITALS
OXYGEN SATURATION: 96 % | TEMPERATURE: 99 F | HEART RATE: 60 BPM | DIASTOLIC BLOOD PRESSURE: 74 MMHG | RESPIRATION RATE: 17 BRPM | SYSTOLIC BLOOD PRESSURE: 131 MMHG

## 2018-07-02 LAB
ALBUMIN SERPL ELPH-MCNC: 2 G/DL — LOW (ref 3.3–5)
ALP SERPL-CCNC: 72 U/L — SIGNIFICANT CHANGE UP (ref 30–120)
ALT FLD-CCNC: 28 U/L DA — SIGNIFICANT CHANGE UP (ref 10–60)
ANION GAP SERPL CALC-SCNC: 5 MMOL/L — SIGNIFICANT CHANGE UP (ref 5–17)
AST SERPL-CCNC: 20 U/L — SIGNIFICANT CHANGE UP (ref 10–40)
BILIRUB SERPL-MCNC: 0.6 MG/DL — SIGNIFICANT CHANGE UP (ref 0.2–1.2)
BUN SERPL-MCNC: 25 MG/DL — HIGH (ref 7–23)
CALCIUM SERPL-MCNC: 8 MG/DL — LOW (ref 8.4–10.5)
CHLORIDE SERPL-SCNC: 109 MMOL/L — HIGH (ref 96–108)
CO2 SERPL-SCNC: 28 MMOL/L — SIGNIFICANT CHANGE UP (ref 22–31)
CREAT SERPL-MCNC: 1.58 MG/DL — HIGH (ref 0.5–1.3)
CULTURE RESULTS: SIGNIFICANT CHANGE UP
GLUCOSE SERPL-MCNC: 93 MG/DL — SIGNIFICANT CHANGE UP (ref 70–99)
HCT VFR BLD CALC: 26.5 % — LOW (ref 39–50)
HGB BLD-MCNC: 8.7 G/DL — LOW (ref 13–17)
INR BLD: 1.63 RATIO — HIGH (ref 0.88–1.16)
MCHC RBC-ENTMCNC: 27.3 PG — SIGNIFICANT CHANGE UP (ref 27–34)
MCHC RBC-ENTMCNC: 32.8 GM/DL — SIGNIFICANT CHANGE UP (ref 32–36)
MCV RBC AUTO: 83.1 FL — SIGNIFICANT CHANGE UP (ref 80–100)
NRBC # BLD: 0 /100 WBCS — SIGNIFICANT CHANGE UP (ref 0–0)
PLATELET # BLD AUTO: 109 K/UL — LOW (ref 150–400)
POTASSIUM SERPL-MCNC: 3.7 MMOL/L — SIGNIFICANT CHANGE UP (ref 3.5–5.3)
POTASSIUM SERPL-SCNC: 3.7 MMOL/L — SIGNIFICANT CHANGE UP (ref 3.5–5.3)
PROT SERPL-MCNC: 4.9 G/DL — LOW (ref 6–8.3)
PROTHROM AB SERPL-ACNC: 17.9 SEC — HIGH (ref 9.8–12.7)
RBC # BLD: 3.19 M/UL — LOW (ref 4.2–5.8)
RBC # FLD: 20.2 % — HIGH (ref 10.3–14.5)
SODIUM SERPL-SCNC: 142 MMOL/L — SIGNIFICANT CHANGE UP (ref 135–145)
SPECIMEN SOURCE: SIGNIFICANT CHANGE UP
WBC # BLD: 5.86 K/UL — SIGNIFICANT CHANGE UP (ref 3.8–10.5)
WBC # FLD AUTO: 5.86 K/UL — SIGNIFICANT CHANGE UP (ref 3.8–10.5)

## 2018-07-02 PROCEDURE — 85027 COMPLETE CBC AUTOMATED: CPT

## 2018-07-02 PROCEDURE — 87205 SMEAR GRAM STAIN: CPT

## 2018-07-02 PROCEDURE — 97110 THERAPEUTIC EXERCISES: CPT

## 2018-07-02 PROCEDURE — 80053 COMPREHEN METABOLIC PANEL: CPT

## 2018-07-02 PROCEDURE — 86923 COMPATIBILITY TEST ELECTRIC: CPT

## 2018-07-02 PROCEDURE — 85730 THROMBOPLASTIN TIME PARTIAL: CPT

## 2018-07-02 PROCEDURE — 88305 TISSUE EXAM BY PATHOLOGIST: CPT

## 2018-07-02 PROCEDURE — 97165 OT EVAL LOW COMPLEX 30 MIN: CPT

## 2018-07-02 PROCEDURE — 73502 X-RAY EXAM HIP UNI 2-3 VIEWS: CPT

## 2018-07-02 PROCEDURE — 86901 BLOOD TYPING SEROLOGIC RH(D): CPT

## 2018-07-02 PROCEDURE — C1713: CPT

## 2018-07-02 PROCEDURE — 36415 COLL VENOUS BLD VENIPUNCTURE: CPT

## 2018-07-02 PROCEDURE — 88311 DECALCIFY TISSUE: CPT

## 2018-07-02 PROCEDURE — C1776: CPT

## 2018-07-02 PROCEDURE — 86850 RBC ANTIBODY SCREEN: CPT

## 2018-07-02 PROCEDURE — 97535 SELF CARE MNGMENT TRAINING: CPT

## 2018-07-02 PROCEDURE — 87075 CULTR BACTERIA EXCEPT BLOOD: CPT

## 2018-07-02 PROCEDURE — 99239 HOSP IP/OBS DSCHRG MGMT >30: CPT

## 2018-07-02 PROCEDURE — 80048 BASIC METABOLIC PNL TOTAL CA: CPT

## 2018-07-02 PROCEDURE — 93971 EXTREMITY STUDY: CPT | Mod: 26,RT

## 2018-07-02 PROCEDURE — 85610 PROTHROMBIN TIME: CPT

## 2018-07-02 PROCEDURE — C1889: CPT

## 2018-07-02 PROCEDURE — 97116 GAIT TRAINING THERAPY: CPT

## 2018-07-02 PROCEDURE — P9016: CPT

## 2018-07-02 PROCEDURE — 97161 PT EVAL LOW COMPLEX 20 MIN: CPT

## 2018-07-02 PROCEDURE — 88300 SURGICAL PATH GROSS: CPT

## 2018-07-02 PROCEDURE — 86900 BLOOD TYPING SEROLOGIC ABO: CPT

## 2018-07-02 PROCEDURE — 93005 ELECTROCARDIOGRAM TRACING: CPT

## 2018-07-02 PROCEDURE — 93971 EXTREMITY STUDY: CPT

## 2018-07-02 PROCEDURE — 89051 BODY FLUID CELL COUNT: CPT

## 2018-07-02 PROCEDURE — 97530 THERAPEUTIC ACTIVITIES: CPT

## 2018-07-02 PROCEDURE — 87070 CULTURE OTHR SPECIMN AEROBIC: CPT

## 2018-07-02 RX ORDER — SENNA PLUS 8.6 MG/1
2 TABLET ORAL
Qty: 0 | Refills: 0 | DISCHARGE
Start: 2018-07-02

## 2018-07-02 RX ORDER — PANTOPRAZOLE SODIUM 20 MG/1
1 TABLET, DELAYED RELEASE ORAL
Qty: 37 | Refills: 0 | OUTPATIENT
Start: 2018-07-02 | End: 2018-08-07

## 2018-07-02 RX ORDER — OXYCODONE HYDROCHLORIDE 5 MG/1
1 TABLET ORAL
Qty: 60 | Refills: 0
Start: 2018-07-02

## 2018-07-02 RX ORDER — ACETAMINOPHEN 500 MG
2 TABLET ORAL
Qty: 60 | Refills: 0
Start: 2018-07-02

## 2018-07-02 RX ORDER — WARFARIN SODIUM 2.5 MG/1
4 TABLET ORAL ONCE
Qty: 0 | Refills: 0 | Status: DISCONTINUED | OUTPATIENT
Start: 2018-07-02 | End: 2018-07-02

## 2018-07-02 RX ORDER — DOCUSATE SODIUM 100 MG
1 CAPSULE ORAL
Qty: 0 | Refills: 0 | DISCHARGE
Start: 2018-07-02

## 2018-07-02 RX ADMIN — Medication 120 MILLIGRAM(S): at 06:05

## 2018-07-02 RX ADMIN — Medication 1000 MILLIGRAM(S): at 06:05

## 2018-07-02 RX ADMIN — Medication 120 MILLIGRAM(S): at 17:21

## 2018-07-02 RX ADMIN — Medication 1000 MILLIGRAM(S): at 06:49

## 2018-07-02 RX ADMIN — Medication 100 MILLIGRAM(S): at 06:05

## 2018-07-02 RX ADMIN — OXYCODONE HYDROCHLORIDE 5 MILLIGRAM(S): 5 TABLET ORAL at 17:56

## 2018-07-02 RX ADMIN — LOSARTAN POTASSIUM 100 MILLIGRAM(S): 100 TABLET, FILM COATED ORAL at 06:05

## 2018-07-02 RX ADMIN — Medication 100 MILLIGRAM(S): at 17:20

## 2018-07-02 RX ADMIN — PANTOPRAZOLE SODIUM 40 MILLIGRAM(S): 20 TABLET, DELAYED RELEASE ORAL at 13:26

## 2018-07-02 NOTE — PROGRESS NOTE ADULT - PROBLEM SELECTOR PROBLEM 4
Essential hypertension
Prophylactic measure

## 2018-07-02 NOTE — PROGRESS NOTE ADULT - PROBLEM SELECTOR PROBLEM 2
Postoperative state
Chronic atrial fibrillation
Postoperative state

## 2018-07-02 NOTE — PROGRESS NOTE ADULT - SUBJECTIVE AND OBJECTIVE BOX
Patient is a 83y old  Male who presents with a chief complaint of right groin, leg pain  S/p revision Right THR (28 Jun 2018 14:33)        HPI:83 yo M PMH AF HTN Prostate CA is POD#3 R CIARA, anemic, with no acute complaints       SUBJECTIVE & OBJECTIVE: Pt seen and examined at bedside, no acute complaints     PHYSICAL EXAM:  T(C): 36.6 (07-02-18 @ 07:52), Max: 36.9 (07-01-18 @ 23:08)  HR: 60 (07-02-18 @ 07:52) (60 - 61)  BP: 125/69 (07-02-18 @ 07:52) (111/62 - 134/85)  RR: 18 (07-02-18 @ 07:52) (16 - 18)  SpO2: 96% (07-02-18 @ 07:52) (95% - 97%)  Wt(kg): --   GENERAL: NAD, well-groomed, well-developed  HEAD:  Atraumatic, Normocephalic  EYES: EOMI, PERRLA, conjunctiva and sclera clear  ENMT: Moist mucous membranes  NECK: Supple, No JVD  NERVOUS SYSTEM:  Alert & Oriented X3, Motor Strength 5/5 B/L upper and lower extremities; DTRs 2+ intact and symmetric  CHEST/LUNG: Clear to auscultation bilaterally; No rales, rhonchi, wheezing, or rubs  HEART: Regular rate and rhythm; No murmurs, rubs, or gallops  ABDOMEN: Soft, Nontender, Nondistended; Bowel sounds present  EXTREMITIES:  2+ Peripheral Pulses, No clubbing, cyanosis, or edema        MEDICATIONS  (STANDING):  acetaminophen   Tablet. 1000 milliGRAM(s) Oral every 12 hours  allopurinol 100 milliGRAM(s) Oral two times a day  docusate sodium 100 milliGRAM(s) Oral three times a day  doxazosin 4 milliGRAM(s) Oral at bedtime  losartan 100 milliGRAM(s) Oral daily  pantoprazole    Tablet 40 milliGRAM(s) Oral daily  senna 2 Tablet(s) Oral at bedtime  sotalol 120 milliGRAM(s) Oral two times a day  warfarin 4 milliGRAM(s) Oral once    MEDICATIONS  (PRN):  aluminum hydroxide/magnesium hydroxide/simethicone Suspension 30 milliLiter(s) Oral four times a day PRN Indigestion  artificial  tears Solution 1 Drop(s) Both EYES every 3 hours PRN Dry Eyes  benzocaine 15 mG/menthol 3.6 mG Lozenge 1 Lozenge Oral every 3 hours PRN Sore Throat  bisacodyl Suppository 10 milliGRAM(s) Rectal daily PRN If no bowel movement by POD#2  HYDROmorphone  Injectable 0.5 milliGRAM(s) IV Push every 3 hours PRN Severe Pain (7 - 10)  magnesium hydroxide Suspension 30 milliLiter(s) Oral daily PRN Constipation  ondansetron Injectable 4 milliGRAM(s) IV Push every 6 hours PRN Nausea and/or Vomiting  oxyCODONE    IR 5 milliGRAM(s) Oral every 3 hours PRN Mild Pain (1 - 3)  oxyCODONE    IR 10 milliGRAM(s) Oral every 3 hours PRN Moderate Pain (4 - 6)  polyethylene glycol 3350 17 Gram(s) Oral daily PRN Constipation      LABS:                        8.7    5.86  )-----------( 109      ( 02 Jul 2018 08:28 )             26.5     07-02    142  |  109<H>  |  25<H>  ----------------------------<  93  3.7   |  28  |  1.58<H>    Ca    8.0<L>      02 Jul 2018 08:28    TPro  4.9<L>  /  Alb  2.0<L>  /  TBili  0.6  /  DBili  x   /  AST  20  /  ALT  28  /  AlkPhos  72  07-02    PT/INR - ( 02 Jul 2018 08:28 )   PT: 17.9 sec;   INR: 1.63 ratio         PTT - ( 01 Jul 2018 07:07 )  PTT:41.1 sec      CAPILLARY BLOOD GLUCOSE          CAPILLARY BLOOD GLUCOSE        CAPILLARY BLOOD GLUCOSE                RECENT CULTURES:      RADIOLOGY & ADDITIONAL TESTS:                        DVT/GI ppx  Discussed with pt @ bedside

## 2018-07-02 NOTE — PROGRESS NOTE ADULT - PROBLEM SELECTOR PROBLEM 3
Chronic atrial fibrillation
Essential hypertension

## 2018-07-02 NOTE — PROGRESS NOTE ADULT - PROBLEM SELECTOR PLAN 1
likely secondary to acute blood loss from OR  HH 8.9  repaet HH if less then 8 would need tranfuse
DVT proph, Pain and bowel reg per ortho team/pharmacy  Recommend TOV, DC yost
likely secondary to acute blood loss from OR  HH 8.4  would transufse 1 unit
likely secondary to acute blood loss from OR  s/p 1 unit prbc   8.8  d/c planning in am
likely secondary to acute blood loss from OR  s/p 1 unit prbc   8.8  d/c planning in am

## 2018-07-02 NOTE — PROGRESS NOTE ADULT - PROBLEM SELECTOR PLAN 3
cardiology consult appreciated  rate controlled  resume coumadin  monitor INR
cardiology consult appreciated  rate controlled  INR 1.5  will go up on couadmin to 4mg  pt can proceed for d/c planning with close moniotring of INR until becomes theruaptic   d/c lovenox  resume coumadin 2.5 mg  repeat INR in am   monitor INR
cardiology consult appreciated  rate controlled  INR 2.1  d/c lovenox  resume coumadin 2.5 mg  repeat INR in am   monitor INR
cardiology consult appreciated  rate controlled  INR 2.1  d/c lovenox  resume coumadin 2.5 mg  repeat INR in am   monitor INR
stable

## 2018-07-02 NOTE — PROGRESS NOTE ADULT - ASSESSMENT
83 yo M PMH AF HTN Prostate CA is POD#2 R CIARA, anemic, with complaining of dizziness
81 yo M PMH AF HTN Prostate CA is POD#1 R CIARA
81 yo M PMH AF HTN Prostate CA is POD#2 R CIARA, anemic, with complaining of dizziness
83 yo M PMH AF HTN Prostate CA is POD#2 R CIARA, anemic, with no acute complaints
83 yo M PMH AF HTN Prostate CA is POD#2 R CIARA, anemic, with no acute complaints
Neurovascular status in tact

## 2018-07-02 NOTE — PROGRESS NOTE ADULT - PROBLEM SELECTOR PROBLEM 1
Anemia due to blood loss
Postoperative state

## 2018-07-02 NOTE — PROGRESS NOTE ADULT - SUBJECTIVE AND OBJECTIVE BOX
RUBÉN GOLDBERG                                                                [id number]                                                      Allergies---adhesives (Rash)  clonidine (Swelling; Rash)  felodipine (Rash)  penicillin (Rash)  sulfa drugs (Rash)         Pt is a 83y year old Male s/p Revision Right THR.   Pain is 2/10.   Tolerating the diet.   The patient is A&O x 3, resting comfortably in bed, with no complaints.   Denies chest pain / shortness of breath / dyspnea / nausea / vomiting / headaches or light headed ness.         Vital Signs Last 24 Hrs  T(F): 97.9 (07-02-18 @ 07:52), Max: 98.4 (07-01-18 @ 23:08)  HR: 60 (07-02-18 @ 07:52)  BP: 125/69 (07-02-18 @ 07:52)  RR: 18 (07-02-18 @ 07:52)  SpO2: 96% (07-02-18 @ 07:52)    I&O's Detail    01 Jul 2018 07:01  -  02 Jul 2018 07:00  --------------------------------------------------------  IN:  Total IN: 0 mL    OUT:    Voided: 300 mL  Total OUT: 300 mL    Total NET: -300 mL          PE:   Right Lower Extremity:   Dressing is intact and fixated well on the patient.   The wound is closed with sutures with scant blood on the old dressing.   It was changed with no complications.   NO redness, swelling, heat or any other evidence of infection superficial or deep is noted.   NVI.   Sensation intact to light touch distally.   No gross evidence of motor deficit.   EHL/FHL/TA intact.   +2 DP/PT pulses.   Capillary refill < 2 seconds.   Toes are pink and mobile.   Negative calf tenderness.   No evidence of impending compartment syndrome.   PAS on.        A:   Pt is a 83y year old Male S/P revision right total hip replacement, Post Op Day #5        Plan:    - Follow up with Medicine    - OOB with PT/OT   - Hip dislocation precautions    - Pain control    - Incentive spirometry   - Labs in A.M.   - Discharge Planning   - DVT ppx = PAS +                                                                                                                                                                             Chucho FLORES RUBÉN GOLDBERG                                                                [id number]                                                      Allergies---adhesives (Rash)  clonidine (Swelling; Rash)  felodipine (Rash)  penicillin (Rash)  sulfa drugs (Rash)         Pt is a 83y year old Male s/p Revision Right THR.   Pain is 2/10.   Tolerating the diet.   The patient is A&O x 3, resting comfortably in bed, with no complaints.   Denies chest pain / shortness of breath / dyspnea / nausea / vomiting / headaches or light headed ness.         Vital Signs Last 24 Hrs  T(F): 97.9 (07-02-18 @ 07:52), Max: 98.4 (07-01-18 @ 23:08)  HR: 60 (07-02-18 @ 07:52)  BP: 125/69 (07-02-18 @ 07:52)  RR: 18 (07-02-18 @ 07:52)  SpO2: 96% (07-02-18 @ 07:52)    I&O's Detail    01 Jul 2018 07:01  -  02 Jul 2018 07:00  --------------------------------------------------------  IN:  Total IN: 0 mL    OUT:    Voided: 300 mL  Total OUT: 300 mL    Total NET: -300 mL          PE:   Right Lower Extremity:   Dressing is intact and fixated well on the patient.   The wound is closed with sutures with scant blood on the old dressing.   It was changed with no complications.   NO redness, swelling, heat or any other evidence of infection superficial or deep is noted.   NVI.   Sensation intact to light touch distally.   No gross evidence of motor deficit.   EHL/FHL/TA intact.   +2 DP/PT pulses.   Capillary refill < 2 seconds.   Toes are pink and mobile.   Negative calf tenderness.   No evidence of impending compartment syndrome.   PAS on.        Labs:   Pending Results        A:   Pt is a 83y year old Male S/P revision right total hip replacement, Post Op Day #5        Plan:    - Follow up with Medicine    - OOB with PT/OT   - Hip dislocation precautions    - Pain control    - Incentive spirometry   - Labs in A.M.   - Discharge Planning if and when cleared by PT and pending Labs (emily pt/inr)   - DVT ppx = PAS +                                                                                                                                                                             Chucho FLORES

## 2018-07-02 NOTE — PROGRESS NOTE ADULT - PROBLEM SELECTOR PLAN 2
DVT proph, Pain and bowel reg per ortho team/pharmacy  site appears swelling, and warm  will apply ice
cardiology consult appreciated  rate controlled  restarting coumadin?

## 2018-07-02 NOTE — PROGRESS NOTE ADULT - PROVIDER SPECIALTY LIST ADULT
Hospitalist
Orthopedics
Pharmacy
Pharmacy
Orthopedics
Hospitalist

## 2018-07-10 ENCOUNTER — APPOINTMENT (OUTPATIENT)
Dept: ORTHOPEDIC SURGERY | Facility: CLINIC | Age: 83
End: 2018-07-10
Payer: MEDICARE

## 2018-07-10 VITALS
DIASTOLIC BLOOD PRESSURE: 69 MMHG | SYSTOLIC BLOOD PRESSURE: 121 MMHG | WEIGHT: 200 LBS | HEIGHT: 72 IN | HEART RATE: 77 BPM | BODY MASS INDEX: 27.09 KG/M2

## 2018-07-10 PROCEDURE — 73502 X-RAY EXAM HIP UNI 2-3 VIEWS: CPT

## 2018-07-10 PROCEDURE — 99024 POSTOP FOLLOW-UP VISIT: CPT

## 2018-07-10 RX ORDER — CLINDAMYCIN HYDROCHLORIDE 300 MG/1
300 CAPSULE ORAL
Qty: 6 | Refills: 0 | Status: DISCONTINUED | COMMUNITY
Start: 2018-02-06 | End: 2018-07-10

## 2018-07-24 ENCOUNTER — APPOINTMENT (OUTPATIENT)
Dept: ORTHOPEDIC SURGERY | Facility: CLINIC | Age: 83
End: 2018-07-24
Payer: MEDICARE

## 2018-07-24 PROBLEM — M10.9 GOUT, UNSPECIFIED: Chronic | Status: ACTIVE | Noted: 2018-06-08

## 2018-07-24 PROBLEM — I10 ESSENTIAL (PRIMARY) HYPERTENSION: Chronic | Status: ACTIVE | Noted: 2018-06-08

## 2018-07-24 PROBLEM — R60.9 EDEMA, UNSPECIFIED: Chronic | Status: ACTIVE | Noted: 2018-06-08

## 2018-07-24 PROBLEM — I87.8 OTHER SPECIFIED DISORDERS OF VEINS: Chronic | Status: ACTIVE | Noted: 2018-06-08

## 2018-07-24 PROBLEM — M51.36 OTHER INTERVERTEBRAL DISC DEGENERATION, LUMBAR REGION: Chronic | Status: ACTIVE | Noted: 2018-06-08

## 2018-07-24 PROBLEM — C61 MALIGNANT NEOPLASM OF PROSTATE: Chronic | Status: ACTIVE | Noted: 2018-06-08

## 2018-07-24 PROBLEM — M19.90 UNSPECIFIED OSTEOARTHRITIS, UNSPECIFIED SITE: Chronic | Status: ACTIVE | Noted: 2018-06-08

## 2018-07-24 PROBLEM — L03.90 CELLULITIS, UNSPECIFIED: Chronic | Status: ACTIVE | Noted: 2018-06-08

## 2018-07-24 PROBLEM — I48.91 UNSPECIFIED ATRIAL FIBRILLATION: Chronic | Status: ACTIVE | Noted: 2018-06-08

## 2018-07-24 PROBLEM — I83.90 ASYMPTOMATIC VARICOSE VEINS OF UNSPECIFIED LOWER EXTREMITY: Chronic | Status: ACTIVE | Noted: 2018-06-08

## 2018-07-24 PROCEDURE — 73502 X-RAY EXAM HIP UNI 2-3 VIEWS: CPT

## 2018-07-24 PROCEDURE — 99024 POSTOP FOLLOW-UP VISIT: CPT

## 2018-08-07 ENCOUNTER — APPOINTMENT (OUTPATIENT)
Dept: ORTHOPEDIC SURGERY | Facility: CLINIC | Age: 83
End: 2018-08-07
Payer: MEDICARE

## 2018-08-07 VITALS — SYSTOLIC BLOOD PRESSURE: 149 MMHG | HEART RATE: 80 BPM | DIASTOLIC BLOOD PRESSURE: 84 MMHG

## 2018-08-07 PROCEDURE — 99024 POSTOP FOLLOW-UP VISIT: CPT

## 2018-08-07 PROCEDURE — 73502 X-RAY EXAM HIP UNI 2-3 VIEWS: CPT

## 2018-08-21 ENCOUNTER — APPOINTMENT (OUTPATIENT)
Dept: ORTHOPEDIC SURGERY | Facility: CLINIC | Age: 83
End: 2018-08-21
Payer: MEDICARE

## 2018-08-21 VITALS — SYSTOLIC BLOOD PRESSURE: 133 MMHG | DIASTOLIC BLOOD PRESSURE: 80 MMHG | HEART RATE: 86 BPM

## 2018-08-21 PROCEDURE — 99024 POSTOP FOLLOW-UP VISIT: CPT

## 2018-08-21 PROCEDURE — 73502 X-RAY EXAM HIP UNI 2-3 VIEWS: CPT

## 2018-09-04 ENCOUNTER — APPOINTMENT (OUTPATIENT)
Dept: ORTHOPEDIC SURGERY | Facility: CLINIC | Age: 83
End: 2018-09-04
Payer: MEDICARE

## 2018-09-04 VITALS
HEIGHT: 72 IN | SYSTOLIC BLOOD PRESSURE: 120 MMHG | WEIGHT: 186 LBS | BODY MASS INDEX: 25.19 KG/M2 | DIASTOLIC BLOOD PRESSURE: 75 MMHG | HEART RATE: 83 BPM

## 2018-09-04 PROCEDURE — 99024 POSTOP FOLLOW-UP VISIT: CPT

## 2018-10-02 ENCOUNTER — APPOINTMENT (OUTPATIENT)
Dept: ORTHOPEDIC SURGERY | Facility: CLINIC | Age: 83
End: 2018-10-02
Payer: MEDICARE

## 2018-10-02 VITALS
BODY MASS INDEX: 25.19 KG/M2 | HEIGHT: 72 IN | WEIGHT: 186 LBS | SYSTOLIC BLOOD PRESSURE: 136 MMHG | DIASTOLIC BLOOD PRESSURE: 86 MMHG | HEART RATE: 79 BPM

## 2018-10-02 PROCEDURE — 99213 OFFICE O/P EST LOW 20 MIN: CPT

## 2018-10-02 RX ORDER — PREDNISONE 20 MG/1
20 TABLET ORAL
Qty: 18 | Refills: 0 | Status: DISCONTINUED | COMMUNITY
Start: 2018-07-17

## 2018-11-13 ENCOUNTER — APPOINTMENT (OUTPATIENT)
Dept: ORTHOPEDIC SURGERY | Facility: CLINIC | Age: 83
End: 2018-11-13
Payer: MEDICARE

## 2018-11-13 VITALS
SYSTOLIC BLOOD PRESSURE: 154 MMHG | HEART RATE: 69 BPM | HEIGHT: 72 IN | WEIGHT: 186 LBS | BODY MASS INDEX: 25.19 KG/M2 | DIASTOLIC BLOOD PRESSURE: 90 MMHG

## 2018-11-13 PROCEDURE — 99213 OFFICE O/P EST LOW 20 MIN: CPT

## 2018-11-13 PROCEDURE — 73502 X-RAY EXAM HIP UNI 2-3 VIEWS: CPT

## 2019-02-19 ENCOUNTER — APPOINTMENT (OUTPATIENT)
Dept: ORTHOPEDIC SURGERY | Facility: CLINIC | Age: 84
End: 2019-02-19
Payer: MEDICARE

## 2019-02-19 VITALS — BODY MASS INDEX: 25.19 KG/M2 | HEIGHT: 72 IN | WEIGHT: 186 LBS

## 2019-02-19 PROCEDURE — 99213 OFFICE O/P EST LOW 20 MIN: CPT

## 2019-02-19 PROCEDURE — 73502 X-RAY EXAM HIP UNI 2-3 VIEWS: CPT

## 2019-02-19 NOTE — HISTORY OF PRESENT ILLNESS
[de-identified] : Patient is an 83-year-old male who presents today for evaluation of his right hip. He is status post revision of his right total hip replacement in June of 2018. Overall he states he is doing very well with no complaints of any pain or discomfort. He states he has returned back to his previous activities. It is no longer taking any pain medications or anti-inflammatories. He presents today for evaluation. And denies any history of new or recent injury. [Improving] : improving [0] : an average pain level of 0/10

## 2019-02-19 NOTE — DISCUSSION/SUMMARY
[de-identified] : Plan the patient is to continue with the present level of activity. This includes a good home exercise program along with pain medicine if only as needed. It is suggested that the patient returned back to the office for his one-year postoperative visit with x-rays. However if he does any increase in pain or discomfort to notify the office.

## 2019-02-19 NOTE — PHYSICAL EXAM
[Normal] : Gait: normal [LE] : Sensory: Intact in bilateral lower extremities [ALL] : dorsalis pedis, posterior tibial, femoral, popliteal, and radial 2+ and symmetric bilaterally [de-identified] : On physical examination of the right hip. Patient is status post revision of a right total hip replacement. There is a well-healed surgical scar with no evidence of infection superficial or deep. He is nontender on examination. Has excellent range of motion in all planes without reproducible pain. There is no evidence of any muscle atrophy. No evidence of any motor or sensory deficit. Patient does have good distal pulses and no calf tenderness. [de-identified] : X-rays of the right hip reveals status post revision of a right total hip replacement. Hardware is in place and shows excellent as well as fixation. There was no evidence of any fracture dislocation or loosening of any hardware.

## 2019-04-13 NOTE — DISCHARGE NOTE ADULT - WARFARIN/COUMADIN INCREASES YOUR RISK FOR BLEEDING. NOTIFY YOUR DOCTOR IF YOU SEE ANY BLEEDING OR ANY OF THE SIDE EFFECTS LISTED IN THE WARFARIN/COUMADIN BOOKLET. DIET AND MEDICATIONS CAN AFFECT THE PT/INR
[id number]  Study Date: 5/7/2018  [id number]  Statement Selected

## 2019-06-18 ENCOUNTER — APPOINTMENT (OUTPATIENT)
Dept: ORTHOPEDIC SURGERY | Facility: CLINIC | Age: 84
End: 2019-06-18
Payer: MEDICARE

## 2019-06-18 VITALS
WEIGHT: 186 LBS | HEIGHT: 72 IN | BODY MASS INDEX: 25.19 KG/M2 | SYSTOLIC BLOOD PRESSURE: 168 MMHG | HEART RATE: 85 BPM | DIASTOLIC BLOOD PRESSURE: 99 MMHG

## 2019-06-18 PROCEDURE — 99212 OFFICE O/P EST SF 10 MIN: CPT

## 2019-06-18 PROCEDURE — 73502 X-RAY EXAM HIP UNI 2-3 VIEWS: CPT

## 2020-07-07 ENCOUNTER — APPOINTMENT (OUTPATIENT)
Dept: ORTHOPEDIC SURGERY | Facility: CLINIC | Age: 85
End: 2020-07-07
Payer: MEDICARE

## 2020-07-07 VITALS — HEART RATE: 87 BPM | DIASTOLIC BLOOD PRESSURE: 86 MMHG | SYSTOLIC BLOOD PRESSURE: 139 MMHG | TEMPERATURE: 98 F

## 2020-07-07 DIAGNOSIS — Z96.649 PRESENCE OF UNSPECIFIED ARTIFICIAL HIP JOINT: ICD-10-CM

## 2020-07-07 DIAGNOSIS — Z80.9 FAMILY HISTORY OF MALIGNANT NEOPLASM, UNSPECIFIED: ICD-10-CM

## 2020-07-07 PROCEDURE — 73502 X-RAY EXAM HIP UNI 2-3 VIEWS: CPT | Mod: RT

## 2020-07-07 PROCEDURE — 99213 OFFICE O/P EST LOW 20 MIN: CPT

## 2020-07-07 RX ORDER — WARFARIN 5 MG/1
5 TABLET ORAL
Refills: 0 | Status: DISCONTINUED | COMMUNITY
End: 2020-07-07

## 2020-07-07 RX ORDER — OXYCODONE 5 MG/1
5 TABLET ORAL
Qty: 60 | Refills: 0 | Status: DISCONTINUED | COMMUNITY
Start: 2018-07-02 | End: 2020-07-07

## 2020-07-07 RX ORDER — WARFARIN 2 MG/1
2 TABLET ORAL
Qty: 15 | Refills: 0 | Status: DISCONTINUED | COMMUNITY
Start: 2018-04-20 | End: 2020-07-07

## 2020-12-23 NOTE — PHYSICAL THERAPY INITIAL EVALUATION ADULT - STANDING BALANCE: DYNAMIC, REHAB EVAL
Postoperative Wound Care Instructions  Physician: Dr. Raisa Sam    Day 1: First 24 hours after surgery  · Do not remove the pressure bandage that has been applied to your surgical site in order to prevent bleeding.  · Keep the surgical site and bandage dry.  · Apply ice to the area, 15 to 20 minutes every hour until bedtime for 24-48 hours.  · Applying ice to the area will help reduce both pain and swelling.  · A “baggy” filled with crushed ice and wrapped in a this towel will do nicely.    Day 2: The day after your surgery  · You may remove the bandage once it has been 24 hours.  · You may get the area wet and lightly lather with soap and water in the shower; do not scrub. Air dry to avoid trauma.  · Swelling is normal; to reduce swelling, continue to apply ice to the area, 15 to 20 minutes every hour     Two times daily, starting 24 hours after surgery, and for the following 6 days   • Wash your hands with soap before changing the bandage.  • Dilute hydrogen peroxide with tap water (half hydrogen peroxide and half tap water) apply to surgical site with a cotton tip applicator to remove any crusts from the wound. Do not scrub the incision with peroxide.  • Apply a thin layer of sterile petroleum jelly with a cotton tip applicator to the site.  • Cover the site with a clean piece of non-adherent bandage, then secure with paper tape.  • Typically, the site can get wet starting 24 hours after surgery (you may shower).  • After 6 days of wound care, you may stop using hydrogen peroxide.  There is nothing for you to do except protect the site from trauma and stretching for 8 weeks.  • The dissolving yellow stitches on top of the skin (used in most cases) typically are gone within 7-10 days; if black stitches were used, these will be removed in clinic in 7-14 days      Bleeding  If bleeding occurs following surgery, apply constant pressure on the bandage for 20 minutes without peeking.  That will stop minor bleeding.   If bleeding does not stop, notify our office immediately.  Phone number: [phone].     Patient concerns  Of note, Dr. Sam is not in the office Fridays but our nurses are. If you have concerns, please call before the weekend so we can get you in if needed. Over the weekend, you may page Dr. Sam for emergent issues by calling [phone].     Activity  To reduce the possibility of bleeding, please follow these instructions:  · Limit all activities for the first 24 hours and limit strenuous activities for the first 2 weeks following surgery.  Limiting activities will help in the healing of your scar and cosmetic outcome.  · Keep the operative site elevated (if appropriate).  · If your surgery was on the face, head, or neck: avoid bending or heavy lifting and straining, and sleep with your head and shoulders elevated on extra pillows.  · If your surgery was on the shin: while elevating your leg, place a pillow underneath knee to help alleviate pressure on your surgical site.      Other Instructions:  Follow up:  If you have any questions or concerns about your surgical site, please call our office at [phone]. We will typically get patients in within a day or 2 if they would like a follow up. Keep in mind that it does take a few months before the lumps and bumps disappear.    Antibiotics  If you were prescribed an antibiotic, please complete the whole course of the medication.  If any side effects occur, please call us and we will change your prescribed antibiotic.    Pain Medications - as needed for pain  Following you surgery, start acetaminophen 1000mg every 8 hours (2 extra strength Tylenol).    Infection  Infection seldom occurs when the wound care instructions have been carefully followed.  • Signs of infection are: increased pain, swelling, redness, warmth of the skin, or excessive yellowish drainage several days after surgery.  Call our office immediately if any of these symptoms  occur.  • Note: Pain, swelling, and redness after the surgery is normal.  If it continues to persist and/or increase, immediately call [phone].     Scar  The way the scar will look depends on its size, location, your genetics, and general health.  At first, it will be very bruised, lumpy, firm, red, numb, and uneven.  Although the bruising will go away within 1 to 2 weeks, the scar could remain numb, firm, and reddish for up to a year.  Remember, everyone heals at a different rate.       Frequently Asked Questions about Postoperative Wound Care:  What do I do after completing the wound care for 5-7 days?  • Stop the hydrogen peroxide- it will irritate the skin.  • The area can be cleaned with soap and water.  • Leave it out to the air unless it is oozing - in that case, call us    When can I put make up, sunscreen, or facial moisturizer on the scar?  • Make up, sunscreen and face moisturizers can be applied gently to the scar after 1 week.    Do I need to use scar creams or oils (example: Vitamin E oil/Mederma) to aid in healing?  • No; scar creams and oils are not recommended unless specifically instructed  • Do not put anything on that we have not told you to.  • If you would like to start massaging the incision after 6 weeks, that is OK. It will help the bottoms stitches dissolve faster. DO NOT MASSAGE BEFORE IT HAS BEEN ONE MONTH; this could make the incision fall apart.    How long do I need to use the hydrogen peroxide for?  • Hydrogen peroxide should not be used any longer than 7 days.    When can the site get wet?  • The site can get wet starting 24 hours after surgery.    How long should I ice the wound?  • Applying ice to the wound is recommended for 15-20 minutes every hour while awake for the first 48 hours.  • Use it up to 72 hours if it is still swollen or bruised.    The scar is still numb; is this normal?  • Yes; the scar can remain numb, firm, and or red for several months.    How long do I  need to wear the compression stockings? (Only if your wound is on the lower leg)  • We want you to wear the stockings and if you will be standing on your feet for longer than 15-20 minutes.  This will aid in healing of the wound and increase the circulation in that particular area.  • Wear them for 2 weeks or longer if not healed.  • Wear them if you are in the car/plane for more than 20 minutes.  • Still put your feet up.    Can I go golfing/running/play tennis/bike today? Why not?  • No; you will break your sutures.  • Rest for one week if wound is on your face; 2-8 weeks if anywhere else.    Why should I ice over this big bandage, it’s not getting through?  • Icing over the bandage is imperative and IS getting through the bandage to this site.  A couple degrees temperature difference is all the wound needs to aid in swelling and discomfort.    When can I wash my hair?  • The area can get wet after 24 hours.  You can shower and wash your hair after the pressure bandage comes off, but avoid scrubbing the surgery site area for one week.        When can I shave over the surgical site?  • You can shave over the surgical site after the stitches dissolve (typically 7-10 days) or after the stitches were removed (only if black stitches were used that had to be taken out in clinic)                   fair plus

## 2021-01-01 ENCOUNTER — APPOINTMENT (OUTPATIENT)
Dept: INFUSION THERAPY | Facility: HOSPITAL | Age: 86
End: 2021-01-01

## 2021-01-01 ENCOUNTER — NON-APPOINTMENT (OUTPATIENT)
Age: 86
End: 2021-01-01

## 2021-01-01 ENCOUNTER — OUTPATIENT (OUTPATIENT)
Dept: OUTPATIENT SERVICES | Facility: HOSPITAL | Age: 86
LOS: 1 days | End: 2021-01-01
Payer: MEDICARE

## 2021-01-01 ENCOUNTER — LABORATORY RESULT (OUTPATIENT)
Age: 86
End: 2021-01-01

## 2021-01-01 ENCOUNTER — RESULT REVIEW (OUTPATIENT)
Age: 86
End: 2021-01-01

## 2021-01-01 ENCOUNTER — TRANSCRIPTION ENCOUNTER (OUTPATIENT)
Age: 86
End: 2021-01-01

## 2021-01-01 ENCOUNTER — INPATIENT (INPATIENT)
Facility: HOSPITAL | Age: 86
LOS: 11 days | DRG: 291 | End: 2021-12-13
Attending: INTERNAL MEDICINE | Admitting: INTERNAL MEDICINE
Payer: MEDICARE

## 2021-01-01 ENCOUNTER — APPOINTMENT (OUTPATIENT)
Dept: HEMATOLOGY ONCOLOGY | Facility: CLINIC | Age: 86
End: 2021-01-01
Payer: MEDICARE

## 2021-01-01 ENCOUNTER — OUTPATIENT (OUTPATIENT)
Dept: OUTPATIENT SERVICES | Facility: HOSPITAL | Age: 86
LOS: 1 days | Discharge: HOME | End: 2021-01-01
Payer: MEDICARE

## 2021-01-01 ENCOUNTER — OUTPATIENT (OUTPATIENT)
Dept: OUTPATIENT SERVICES | Facility: HOSPITAL | Age: 86
LOS: 1 days | Discharge: HOME | End: 2021-01-01

## 2021-01-01 ENCOUNTER — INPATIENT (INPATIENT)
Facility: HOSPITAL | Age: 86
LOS: 21 days | Discharge: NURSING, REHAB OR LONG-TERM CARE | DRG: 689 | End: 2021-10-27
Attending: INTERNAL MEDICINE | Admitting: INTERNAL MEDICINE
Payer: MEDICARE

## 2021-01-01 ENCOUNTER — INPATIENT (INPATIENT)
Facility: HOSPITAL | Age: 86
LOS: 5 days | Discharge: HOME HEALTH | DRG: 683 | End: 2021-07-02
Attending: HOSPITALIST | Admitting: HOSPITALIST
Payer: MEDICARE

## 2021-01-01 ENCOUNTER — APPOINTMENT (OUTPATIENT)
Dept: HEMATOLOGY ONCOLOGY | Facility: CLINIC | Age: 86
End: 2021-01-01

## 2021-01-01 ENCOUNTER — INPATIENT (INPATIENT)
Facility: HOSPITAL | Age: 86
LOS: 6 days | Discharge: HOME HEALTH | DRG: 811 | End: 2021-08-26
Attending: HOSPITALIST | Admitting: HOSPITALIST
Payer: MEDICARE

## 2021-01-01 ENCOUNTER — EMERGENCY (EMERGENCY)
Facility: HOSPITAL | Age: 86
LOS: 1 days | Discharge: HOME | End: 2021-01-01
Attending: STUDENT IN AN ORGANIZED HEALTH CARE EDUCATION/TRAINING PROGRAM
Payer: MEDICARE

## 2021-01-01 ENCOUNTER — INPATIENT (INPATIENT)
Facility: HOSPITAL | Age: 86
LOS: 14 days | Discharge: HOME | DRG: 834 | End: 2021-09-23
Attending: STUDENT IN AN ORGANIZED HEALTH CARE EDUCATION/TRAINING PROGRAM | Admitting: STUDENT IN AN ORGANIZED HEALTH CARE EDUCATION/TRAINING PROGRAM
Payer: MEDICARE

## 2021-01-01 VITALS
HEIGHT: 73 IN | HEART RATE: 65 BPM | SYSTOLIC BLOOD PRESSURE: 118 MMHG | RESPIRATION RATE: 20 BRPM | TEMPERATURE: 98 F | WEIGHT: 199.96 LBS | OXYGEN SATURATION: 100 % | DIASTOLIC BLOOD PRESSURE: 70 MMHG

## 2021-01-01 VITALS
RESPIRATION RATE: 18 BRPM | DIASTOLIC BLOOD PRESSURE: 47 MMHG | HEART RATE: 78 BPM | HEIGHT: 73 IN | SYSTOLIC BLOOD PRESSURE: 78 MMHG | OXYGEN SATURATION: 99 % | TEMPERATURE: 98 F | WEIGHT: 190.92 LBS

## 2021-01-01 VITALS
DIASTOLIC BLOOD PRESSURE: 69 MMHG | OXYGEN SATURATION: 97 % | RESPIRATION RATE: 18 BRPM | SYSTOLIC BLOOD PRESSURE: 117 MMHG | HEART RATE: 62 BPM | TEMPERATURE: 98 F

## 2021-01-01 VITALS
HEART RATE: 62 BPM | OXYGEN SATURATION: 97 % | RESPIRATION RATE: 18 BRPM | TEMPERATURE: 98 F | SYSTOLIC BLOOD PRESSURE: 111 MMHG | DIASTOLIC BLOOD PRESSURE: 69 MMHG

## 2021-01-01 VITALS
OXYGEN SATURATION: 93 % | TEMPERATURE: 98 F | DIASTOLIC BLOOD PRESSURE: 52 MMHG | OXYGEN SATURATION: 100 % | DIASTOLIC BLOOD PRESSURE: 67 MMHG | HEART RATE: 74 BPM | SYSTOLIC BLOOD PRESSURE: 110 MMHG | HEART RATE: 77 BPM | RESPIRATION RATE: 18 BRPM | TEMPERATURE: 98 F | RESPIRATION RATE: 24 BRPM | SYSTOLIC BLOOD PRESSURE: 139 MMHG

## 2021-01-01 VITALS
OXYGEN SATURATION: 99 % | TEMPERATURE: 98 F | HEART RATE: 65 BPM | SYSTOLIC BLOOD PRESSURE: 113 MMHG | DIASTOLIC BLOOD PRESSURE: 48 MMHG | RESPIRATION RATE: 18 BRPM

## 2021-01-01 VITALS
HEART RATE: 62 BPM | TEMPERATURE: 97.1 F | DIASTOLIC BLOOD PRESSURE: 72 MMHG | SYSTOLIC BLOOD PRESSURE: 108 MMHG | RESPIRATION RATE: 14 BRPM | BODY MASS INDEX: 22.37 KG/M2 | OXYGEN SATURATION: 96 % | WEIGHT: 168.43 LBS

## 2021-01-01 VITALS
BODY MASS INDEX: 25.27 KG/M2 | OXYGEN SATURATION: 99 % | SYSTOLIC BLOOD PRESSURE: 127 MMHG | DIASTOLIC BLOOD PRESSURE: 66 MMHG | HEIGHT: 72.76 IN | TEMPERATURE: 97.3 F | WEIGHT: 190.7 LBS | RESPIRATION RATE: 16 BRPM | HEART RATE: 94 BPM

## 2021-01-01 VITALS
TEMPERATURE: 98 F | WEIGHT: 175.05 LBS | HEART RATE: 74 BPM | RESPIRATION RATE: 18 BRPM | SYSTOLIC BLOOD PRESSURE: 115 MMHG | DIASTOLIC BLOOD PRESSURE: 65 MMHG | OXYGEN SATURATION: 94 % | HEIGHT: 73 IN

## 2021-01-01 VITALS
DIASTOLIC BLOOD PRESSURE: 66 MMHG | WEIGHT: 182.98 LBS | RESPIRATION RATE: 14 BRPM | TEMPERATURE: 97.3 F | SYSTOLIC BLOOD PRESSURE: 125 MMHG | HEART RATE: 66 BPM | BODY MASS INDEX: 24.3 KG/M2

## 2021-01-01 VITALS
SYSTOLIC BLOOD PRESSURE: 104 MMHG | RESPIRATION RATE: 28 BRPM | DIASTOLIC BLOOD PRESSURE: 43 MMHG | OXYGEN SATURATION: 92 % | TEMPERATURE: 98 F | HEART RATE: 61 BPM

## 2021-01-01 VITALS
SYSTOLIC BLOOD PRESSURE: 136 MMHG | WEIGHT: 195.11 LBS | HEART RATE: 60 BPM | HEIGHT: 73 IN | TEMPERATURE: 98 F | DIASTOLIC BLOOD PRESSURE: 70 MMHG | OXYGEN SATURATION: 97 % | RESPIRATION RATE: 18 BRPM

## 2021-01-01 VITALS
DIASTOLIC BLOOD PRESSURE: 72 MMHG | HEART RATE: 74 BPM | WEIGHT: 192.02 LBS | SYSTOLIC BLOOD PRESSURE: 134 MMHG | HEIGHT: 73 IN | OXYGEN SATURATION: 97 % | RESPIRATION RATE: 16 BRPM | TEMPERATURE: 98 F

## 2021-01-01 VITALS
SYSTOLIC BLOOD PRESSURE: 133 MMHG | DIASTOLIC BLOOD PRESSURE: 76 MMHG | OXYGEN SATURATION: 98 % | RESPIRATION RATE: 18 BRPM | TEMPERATURE: 98 F | HEART RATE: 62 BPM

## 2021-01-01 DIAGNOSIS — Z51.89 ENCOUNTER FOR OTHER SPECIFIED AFTERCARE: ICD-10-CM

## 2021-01-01 DIAGNOSIS — Z96.649 PRESENCE OF UNSPECIFIED ARTIFICIAL HIP JOINT: Chronic | ICD-10-CM

## 2021-01-01 DIAGNOSIS — R11.2 NAUSEA WITH VOMITING, UNSPECIFIED: ICD-10-CM

## 2021-01-01 DIAGNOSIS — Z98.49 CATARACT EXTRACTION STATUS, UNSPECIFIED EYE: Chronic | ICD-10-CM

## 2021-01-01 DIAGNOSIS — Z98.890 OTHER SPECIFIED POSTPROCEDURAL STATES: Chronic | ICD-10-CM

## 2021-01-01 DIAGNOSIS — Z71.89 OTHER SPECIFIED COUNSELING: ICD-10-CM

## 2021-01-01 DIAGNOSIS — M10.9 GOUT, UNSPECIFIED: ICD-10-CM

## 2021-01-01 DIAGNOSIS — D46.9 MYELODYSPLASTIC SYNDROME, UNSPECIFIED: ICD-10-CM

## 2021-01-01 DIAGNOSIS — Z90.49 ACQUIRED ABSENCE OF OTHER SPECIFIED PARTS OF DIGESTIVE TRACT: Chronic | ICD-10-CM

## 2021-01-01 DIAGNOSIS — D64.9 ANEMIA, UNSPECIFIED: ICD-10-CM

## 2021-01-01 DIAGNOSIS — I10 ESSENTIAL (PRIMARY) HYPERTENSION: ICD-10-CM

## 2021-01-01 DIAGNOSIS — Z51.11 ENCOUNTER FOR ANTINEOPLASTIC CHEMOTHERAPY: ICD-10-CM

## 2021-01-01 DIAGNOSIS — R53.2 FUNCTIONAL QUADRIPLEGIA: ICD-10-CM

## 2021-01-01 DIAGNOSIS — I50.9 HEART FAILURE, UNSPECIFIED: ICD-10-CM

## 2021-01-01 DIAGNOSIS — I48.91 UNSPECIFIED ATRIAL FIBRILLATION: Chronic | ICD-10-CM

## 2021-01-01 DIAGNOSIS — I48.20 CHRONIC ATRIAL FIBRILLATION, UNSPECIFIED: ICD-10-CM

## 2021-01-01 DIAGNOSIS — R53.1 WEAKNESS: ICD-10-CM

## 2021-01-01 DIAGNOSIS — Z90.89 ACQUIRED ABSENCE OF OTHER ORGANS: Chronic | ICD-10-CM

## 2021-01-01 DIAGNOSIS — N18.9 CHRONIC KIDNEY DISEASE, UNSPECIFIED: ICD-10-CM

## 2021-01-01 DIAGNOSIS — N18.30 CHRONIC KIDNEY DISEASE, STAGE 3 UNSPECIFIED: ICD-10-CM

## 2021-01-01 DIAGNOSIS — I50.23 ACUTE ON CHRONIC SYSTOLIC (CONGESTIVE) HEART FAILURE: ICD-10-CM

## 2021-01-01 DIAGNOSIS — E87.71 TRANSFUSION ASSOCIATED CIRCULATORY OVERLOAD: ICD-10-CM

## 2021-01-01 DIAGNOSIS — R31.9 HEMATURIA, UNSPECIFIED: ICD-10-CM

## 2021-01-01 DIAGNOSIS — I50.33 ACUTE ON CHRONIC DIASTOLIC (CONGESTIVE) HEART FAILURE: ICD-10-CM

## 2021-01-01 DIAGNOSIS — R60.0 LOCALIZED EDEMA: ICD-10-CM

## 2021-01-01 DIAGNOSIS — Z29.9 ENCOUNTER FOR PROPHYLACTIC MEASURES, UNSPECIFIED: ICD-10-CM

## 2021-01-01 DIAGNOSIS — Z02.9 ENCOUNTER FOR ADMINISTRATIVE EXAMINATIONS, UNSPECIFIED: ICD-10-CM

## 2021-01-01 DIAGNOSIS — N30.01 ACUTE CYSTITIS WITH HEMATURIA: ICD-10-CM

## 2021-01-01 DIAGNOSIS — Z95.810 PRESENCE OF AUTOMATIC (IMPLANTABLE) CARDIAC DEFIBRILLATOR: ICD-10-CM

## 2021-01-01 DIAGNOSIS — B99.9 UNSPECIFIED INFECTIOUS DISEASE: ICD-10-CM

## 2021-01-01 DIAGNOSIS — J96.01 ACUTE RESPIRATORY FAILURE WITH HYPOXIA: ICD-10-CM

## 2021-01-01 DIAGNOSIS — R09.89 OTHER SPECIFIED SYMPTOMS AND SIGNS INVOLVING THE CIRCULATORY AND RESPIRATORY SYSTEMS: ICD-10-CM

## 2021-01-01 DIAGNOSIS — G93.49 OTHER ENCEPHALOPATHY: ICD-10-CM

## 2021-01-01 DIAGNOSIS — I48.91 UNSPECIFIED ATRIAL FIBRILLATION: ICD-10-CM

## 2021-01-01 DIAGNOSIS — N40.0 BENIGN PROSTATIC HYPERPLASIA WITHOUT LOWER URINARY TRACT SYMPTOMS: ICD-10-CM

## 2021-01-01 DIAGNOSIS — D69.6 THROMBOCYTOPENIA, UNSPECIFIED: ICD-10-CM

## 2021-01-01 DIAGNOSIS — N39.0 URINARY TRACT INFECTION, SITE NOT SPECIFIED: ICD-10-CM

## 2021-01-01 DIAGNOSIS — D46.22 REFRACTORY ANEMIA WITH EXCESS OF BLASTS 2: ICD-10-CM

## 2021-01-01 DIAGNOSIS — N17.9 ACUTE KIDNEY FAILURE, UNSPECIFIED: ICD-10-CM

## 2021-01-01 DIAGNOSIS — K57.92 DIVERTICULITIS OF INTESTINE, PART UNSPECIFIED, WITHOUT PERFORATION OR ABSCESS WITHOUT BLEEDING: ICD-10-CM

## 2021-01-01 DIAGNOSIS — R06.02 SHORTNESS OF BREATH: ICD-10-CM

## 2021-01-01 DIAGNOSIS — C92.00 ACUTE MYELOBLASTIC LEUKEMIA, NOT HAVING ACHIEVED REMISSION: ICD-10-CM

## 2021-01-01 DIAGNOSIS — Z51.5 ENCOUNTER FOR PALLIATIVE CARE: ICD-10-CM

## 2021-01-01 DIAGNOSIS — D69.59 OTHER SECONDARY THROMBOCYTOPENIA: ICD-10-CM

## 2021-01-01 DIAGNOSIS — D46.C MYELODYSPLASTIC SYNDROME WITH ISOLATED DEL(5Q) CHROMOSOMAL ABNORMALITY: ICD-10-CM

## 2021-01-01 DIAGNOSIS — E87.0 HYPEROSMOLALITY AND HYPERNATREMIA: ICD-10-CM

## 2021-01-01 DIAGNOSIS — I31.3 PERICARDIAL EFFUSION (NONINFLAMMATORY): ICD-10-CM

## 2021-01-01 LAB
-  AMPICILLIN: SIGNIFICANT CHANGE UP
-  CIPROFLOXACIN: SIGNIFICANT CHANGE UP
-  LEVOFLOXACIN: SIGNIFICANT CHANGE UP
-  NITROFURANTOIN: SIGNIFICANT CHANGE UP
-  TETRACYCLINE: SIGNIFICANT CHANGE UP
-  VANCOMYCIN: SIGNIFICANT CHANGE UP
A1C WITH ESTIMATED AVERAGE GLUCOSE RESULT: 5.6 % — SIGNIFICANT CHANGE UP (ref 4–5.6)
ACANTHOCYTES BLD QL SMEAR: SLIGHT — SIGNIFICANT CHANGE UP
ACANTHOCYTES BLD QL SMEAR: SLIGHT — SIGNIFICANT CHANGE UP
ALBUMIN SERPL ELPH-MCNC: 2.8 G/DL — LOW (ref 3.3–5)
ALBUMIN SERPL ELPH-MCNC: 2.8 G/DL — LOW (ref 3.3–5)
ALBUMIN SERPL ELPH-MCNC: 2.9 G/DL — LOW (ref 3.3–5)
ALBUMIN SERPL ELPH-MCNC: 2.9 G/DL — LOW (ref 3.3–5)
ALBUMIN SERPL ELPH-MCNC: 3 G/DL — LOW (ref 3.3–5)
ALBUMIN SERPL ELPH-MCNC: 3 G/DL — LOW (ref 3.3–5)
ALBUMIN SERPL ELPH-MCNC: 3.1 G/DL — LOW (ref 3.3–5)
ALBUMIN SERPL ELPH-MCNC: 3.2 G/DL — LOW (ref 3.3–5)
ALBUMIN SERPL ELPH-MCNC: 3.3 G/DL — SIGNIFICANT CHANGE UP (ref 3.3–5)
ALBUMIN SERPL ELPH-MCNC: 3.3 G/DL — SIGNIFICANT CHANGE UP (ref 3.3–5)
ALBUMIN SERPL ELPH-MCNC: 3.4 G/DL — SIGNIFICANT CHANGE UP (ref 3.3–5)
ALBUMIN SERPL ELPH-MCNC: 3.5 G/DL — SIGNIFICANT CHANGE UP (ref 3.3–5)
ALBUMIN SERPL ELPH-MCNC: 3.6 G/DL
ALBUMIN SERPL ELPH-MCNC: 3.6 G/DL — SIGNIFICANT CHANGE UP (ref 3.3–5)
ALBUMIN SERPL ELPH-MCNC: 3.6 G/DL — SIGNIFICANT CHANGE UP (ref 3.3–5)
ALBUMIN SERPL ELPH-MCNC: 3.7 G/DL
ALP BLD-CCNC: 118 U/L
ALP BLD-CCNC: 125 U/L
ALP SERPL-CCNC: 100 U/L — SIGNIFICANT CHANGE UP (ref 40–120)
ALP SERPL-CCNC: 100 U/L — SIGNIFICANT CHANGE UP (ref 40–120)
ALP SERPL-CCNC: 102 U/L — SIGNIFICANT CHANGE UP (ref 40–120)
ALP SERPL-CCNC: 103 U/L — SIGNIFICANT CHANGE UP (ref 40–120)
ALP SERPL-CCNC: 104 U/L — SIGNIFICANT CHANGE UP (ref 40–120)
ALP SERPL-CCNC: 104 U/L — SIGNIFICANT CHANGE UP (ref 40–120)
ALP SERPL-CCNC: 105 U/L — SIGNIFICANT CHANGE UP (ref 40–120)
ALP SERPL-CCNC: 105 U/L — SIGNIFICANT CHANGE UP (ref 40–120)
ALP SERPL-CCNC: 106 U/L — SIGNIFICANT CHANGE UP (ref 40–120)
ALP SERPL-CCNC: 107 U/L — SIGNIFICANT CHANGE UP (ref 40–120)
ALP SERPL-CCNC: 109 U/L — SIGNIFICANT CHANGE UP (ref 40–120)
ALP SERPL-CCNC: 111 U/L — SIGNIFICANT CHANGE UP (ref 40–120)
ALP SERPL-CCNC: 113 U/L — SIGNIFICANT CHANGE UP (ref 40–120)
ALP SERPL-CCNC: 114 U/L — SIGNIFICANT CHANGE UP (ref 40–120)
ALP SERPL-CCNC: 114 U/L — SIGNIFICANT CHANGE UP (ref 40–120)
ALP SERPL-CCNC: 115 U/L — SIGNIFICANT CHANGE UP (ref 40–120)
ALP SERPL-CCNC: 116 U/L — SIGNIFICANT CHANGE UP (ref 40–120)
ALP SERPL-CCNC: 117 U/L — SIGNIFICANT CHANGE UP (ref 40–120)
ALP SERPL-CCNC: 119 U/L — SIGNIFICANT CHANGE UP (ref 40–120)
ALP SERPL-CCNC: 119 U/L — SIGNIFICANT CHANGE UP (ref 40–120)
ALP SERPL-CCNC: 120 U/L — SIGNIFICANT CHANGE UP (ref 40–120)
ALP SERPL-CCNC: 120 U/L — SIGNIFICANT CHANGE UP (ref 40–120)
ALP SERPL-CCNC: 122 U/L — HIGH (ref 40–120)
ALP SERPL-CCNC: 132 U/L — HIGH (ref 40–120)
ALP SERPL-CCNC: 132 U/L — HIGH (ref 40–120)
ALP SERPL-CCNC: 134 U/L — HIGH (ref 40–120)
ALP SERPL-CCNC: 65 U/L — SIGNIFICANT CHANGE UP (ref 40–120)
ALP SERPL-CCNC: 86 U/L — SIGNIFICANT CHANGE UP (ref 40–120)
ALP SERPL-CCNC: 88 U/L — SIGNIFICANT CHANGE UP (ref 40–120)
ALP SERPL-CCNC: 90 U/L — SIGNIFICANT CHANGE UP (ref 40–120)
ALP SERPL-CCNC: 97 U/L — SIGNIFICANT CHANGE UP (ref 40–120)
ALP SERPL-CCNC: 98 U/L — SIGNIFICANT CHANGE UP (ref 40–120)
ALT FLD-CCNC: 10 U/L — SIGNIFICANT CHANGE UP (ref 10–45)
ALT FLD-CCNC: 11 U/L — SIGNIFICANT CHANGE UP (ref 10–45)
ALT FLD-CCNC: 11 U/L — SIGNIFICANT CHANGE UP (ref 10–45)
ALT FLD-CCNC: 12 U/L — SIGNIFICANT CHANGE UP (ref 10–45)
ALT FLD-CCNC: 123 U/L — HIGH (ref 10–45)
ALT FLD-CCNC: 14 U/L — SIGNIFICANT CHANGE UP (ref 10–45)
ALT FLD-CCNC: 14 U/L — SIGNIFICANT CHANGE UP (ref 10–45)
ALT FLD-CCNC: 5 U/L — LOW (ref 10–45)
ALT FLD-CCNC: 5 U/L — LOW (ref 10–45)
ALT FLD-CCNC: 6 U/L — LOW (ref 10–45)
ALT FLD-CCNC: 7 U/L — LOW (ref 10–45)
ALT FLD-CCNC: 8 U/L — LOW (ref 10–45)
ALT FLD-CCNC: 9 U/L — LOW (ref 10–45)
ALT SERPL-CCNC: 10 U/L
ALT SERPL-CCNC: 19 U/L
ANION GAP SERPL CALC-SCNC: 10 MMOL/L — SIGNIFICANT CHANGE UP (ref 5–17)
ANION GAP SERPL CALC-SCNC: 11 MMOL/L — SIGNIFICANT CHANGE UP (ref 5–17)
ANION GAP SERPL CALC-SCNC: 12 MMOL/L
ANION GAP SERPL CALC-SCNC: 12 MMOL/L
ANION GAP SERPL CALC-SCNC: 12 MMOL/L — SIGNIFICANT CHANGE UP (ref 5–17)
ANION GAP SERPL CALC-SCNC: 13 MMOL/L — SIGNIFICANT CHANGE UP (ref 5–17)
ANION GAP SERPL CALC-SCNC: 14 MMOL/L — SIGNIFICANT CHANGE UP (ref 5–17)
ANION GAP SERPL CALC-SCNC: 15 MMOL/L — SIGNIFICANT CHANGE UP (ref 5–17)
ANION GAP SERPL CALC-SCNC: 16 MMOL/L — SIGNIFICANT CHANGE UP (ref 5–17)
ANION GAP SERPL CALC-SCNC: 3 MMOL/L — LOW (ref 5–17)
ANION GAP SERPL CALC-SCNC: 4 MMOL/L — LOW (ref 5–17)
ANION GAP SERPL CALC-SCNC: 5 MMOL/L — SIGNIFICANT CHANGE UP (ref 5–17)
ANION GAP SERPL CALC-SCNC: 7 MMOL/L — SIGNIFICANT CHANGE UP (ref 5–17)
ANION GAP SERPL CALC-SCNC: 8 MMOL/L — SIGNIFICANT CHANGE UP (ref 5–17)
ANION GAP SERPL CALC-SCNC: 9 MMOL/L — SIGNIFICANT CHANGE UP (ref 5–17)
ANISOCYTOSIS BLD QL: SIGNIFICANT CHANGE UP
ANISOCYTOSIS BLD QL: SLIGHT — SIGNIFICANT CHANGE UP
APPEARANCE UR: ABNORMAL
APPEARANCE UR: CLEAR — SIGNIFICANT CHANGE UP
APTT 50/50 2HOUR INCUB: 34.9 SEC — SIGNIFICANT CHANGE UP (ref 27.5–36.5)
APTT BLD: 32.8 SEC — SIGNIFICANT CHANGE UP (ref 27.5–35.5)
APTT BLD: 32.8 SEC — SIGNIFICANT CHANGE UP (ref 27.5–36.5)
APTT BLD: 35.7 SEC — HIGH (ref 27.5–35.5)
APTT BLD: 36 SEC
APTT BLD: 36 SEC — HIGH (ref 27.5–35.5)
APTT BLD: 36.6 SEC — HIGH (ref 27.5–35.5)
APTT BLD: 36.6 SEC — HIGH (ref 27.5–35.5)
APTT BLD: 37.1 SEC — HIGH (ref 27.5–35.5)
APTT BLD: 37.4 SEC — HIGH (ref 27.5–35.5)
APTT BLD: 37.6 SEC — HIGH (ref 27.5–35.5)
APTT BLD: 37.7 SEC — HIGH (ref 27.5–35.5)
APTT BLD: 37.7 SEC — HIGH (ref 27.5–35.5)
APTT BLD: 37.8 SEC — HIGH (ref 27.5–35.5)
APTT BLD: 38.5 SEC — HIGH (ref 27.5–35.5)
APTT BLD: 39.2 SEC
APTT BLD: 44.7 SEC — HIGH (ref 27.5–35.5)
AST SERPL-CCNC: 10 U/L
AST SERPL-CCNC: 10 U/L — SIGNIFICANT CHANGE UP (ref 10–40)
AST SERPL-CCNC: 13 U/L
AST SERPL-CCNC: 14 U/L — SIGNIFICANT CHANGE UP (ref 10–40)
AST SERPL-CCNC: 151 U/L — HIGH (ref 10–40)
AST SERPL-CCNC: 18 U/L — SIGNIFICANT CHANGE UP (ref 10–40)
AST SERPL-CCNC: 23 U/L — SIGNIFICANT CHANGE UP (ref 10–40)
AST SERPL-CCNC: 5 U/L — LOW (ref 10–40)
AST SERPL-CCNC: 5 U/L — LOW (ref 10–40)
AST SERPL-CCNC: 6 U/L — LOW (ref 10–40)
AST SERPL-CCNC: 7 U/L — LOW (ref 10–40)
AST SERPL-CCNC: 8 U/L — LOW (ref 10–40)
AST SERPL-CCNC: 9 U/L — LOW (ref 10–40)
AST SERPL-CCNC: <5 U/L — LOW (ref 10–40)
BACTERIA # UR AUTO: NEGATIVE — SIGNIFICANT CHANGE UP
BASE EXCESS BLDA CALC-SCNC: 14 MMOL/L — HIGH (ref -2–3)
BASE EXCESS BLDA CALC-SCNC: 14.4 MMOL/L — HIGH (ref -2–3)
BASE EXCESS BLDA CALC-SCNC: 3.5 MMOL/L — HIGH (ref -2–3)
BASE EXCESS BLDV CALC-SCNC: -1.6 MMOL/L — SIGNIFICANT CHANGE UP (ref -2–2)
BASE EXCESS BLDV CALC-SCNC: 0.7 MMOL/L — SIGNIFICANT CHANGE UP (ref -2–2)
BASE EXCESS BLDV CALC-SCNC: 0.8 MMOL/L — SIGNIFICANT CHANGE UP (ref -2–2)
BASE EXCESS BLDV CALC-SCNC: 2.6 MMOL/L — HIGH (ref -2–2)
BASOPHILS # BLD AUTO: 0 K/UL — SIGNIFICANT CHANGE UP (ref 0–0.2)
BASOPHILS # BLD AUTO: 0.04 K/UL — SIGNIFICANT CHANGE UP (ref 0–0.2)
BASOPHILS # BLD AUTO: 0.07 K/UL — SIGNIFICANT CHANGE UP (ref 0–0.2)
BASOPHILS # BLD AUTO: 0.08 K/UL — SIGNIFICANT CHANGE UP (ref 0–0.2)
BASOPHILS # BLD AUTO: 0.08 K/UL — SIGNIFICANT CHANGE UP (ref 0–0.2)
BASOPHILS # BLD AUTO: 0.09 K/UL — SIGNIFICANT CHANGE UP (ref 0–0.2)
BASOPHILS # BLD AUTO: 0.1 K/UL — SIGNIFICANT CHANGE UP (ref 0–0.2)
BASOPHILS # BLD AUTO: 0.11 K/UL — SIGNIFICANT CHANGE UP (ref 0–0.2)
BASOPHILS # BLD AUTO: 0.11 K/UL — SIGNIFICANT CHANGE UP (ref 0–0.2)
BASOPHILS # BLD AUTO: 0.14 K/UL — SIGNIFICANT CHANGE UP (ref 0–0.2)
BASOPHILS # BLD AUTO: 0.14 K/UL — SIGNIFICANT CHANGE UP (ref 0–0.2)
BASOPHILS # BLD AUTO: 0.15 K/UL — SIGNIFICANT CHANGE UP (ref 0–0.2)
BASOPHILS # BLD AUTO: 0.17 K/UL — SIGNIFICANT CHANGE UP (ref 0–0.2)
BASOPHILS # BLD AUTO: 0.17 K/UL — SIGNIFICANT CHANGE UP (ref 0–0.2)
BASOPHILS # BLD AUTO: 0.18 K/UL — SIGNIFICANT CHANGE UP (ref 0–0.2)
BASOPHILS # BLD AUTO: 0.21 K/UL — HIGH (ref 0–0.2)
BASOPHILS # BLD AUTO: 0.22 K/UL — HIGH (ref 0–0.2)
BASOPHILS # BLD AUTO: 0.3 K/UL — HIGH (ref 0–0.2)
BASOPHILS # BLD AUTO: 0.33 K/UL — HIGH (ref 0–0.2)
BASOPHILS # BLD AUTO: 0.4 K/UL — HIGH (ref 0–0.2)
BASOPHILS # BLD AUTO: 0.4 K/UL — HIGH (ref 0–0.2)
BASOPHILS # BLD AUTO: 0.48 K/UL — HIGH (ref 0–0.2)
BASOPHILS # BLD AUTO: 0.48 K/UL — HIGH (ref 0–0.2)
BASOPHILS # BLD AUTO: 0.49 K/UL — HIGH (ref 0–0.2)
BASOPHILS # BLD AUTO: 0.57 K/UL — HIGH (ref 0–0.2)
BASOPHILS # BLD AUTO: 0.62 K/UL — HIGH (ref 0–0.2)
BASOPHILS # BLD AUTO: 0.65 K/UL — HIGH (ref 0–0.2)
BASOPHILS # BLD AUTO: 0.67 K/UL — HIGH (ref 0–0.2)
BASOPHILS # BLD AUTO: 0.8 K/UL — HIGH (ref 0–0.2)
BASOPHILS # BLD AUTO: 0.82 K/UL — HIGH (ref 0–0.2)
BASOPHILS # BLD AUTO: 0.83 K/UL — HIGH (ref 0–0.2)
BASOPHILS # BLD AUTO: 1.01 K/UL — HIGH (ref 0–0.2)
BASOPHILS # BLD AUTO: 1.02 K/UL — HIGH (ref 0–0.2)
BASOPHILS # BLD AUTO: 1.08 K/UL — HIGH (ref 0–0.2)
BASOPHILS # BLD AUTO: 1.11 K/UL — HIGH (ref 0–0.2)
BASOPHILS # BLD AUTO: 1.14 K/UL — HIGH (ref 0–0.2)
BASOPHILS # BLD AUTO: 1.16 K/UL — HIGH (ref 0–0.2)
BASOPHILS # BLD AUTO: 1.51 K/UL — HIGH (ref 0–0.2)
BASOPHILS # BLD AUTO: 2.25 K/UL — HIGH (ref 0–0.2)
BASOPHILS NFR BLD AUTO: 0 % — SIGNIFICANT CHANGE UP (ref 0–2)
BASOPHILS NFR BLD AUTO: 1 % — SIGNIFICANT CHANGE UP (ref 0–2)
BASOPHILS NFR BLD AUTO: 10 % — HIGH (ref 0–2)
BASOPHILS NFR BLD AUTO: 11 % — HIGH (ref 0–2)
BASOPHILS NFR BLD AUTO: 11 % — HIGH (ref 0–2)
BASOPHILS NFR BLD AUTO: 12 % — HIGH (ref 0–2)
BASOPHILS NFR BLD AUTO: 14 % — HIGH (ref 0–2)
BASOPHILS NFR BLD AUTO: 14.3 % — HIGH (ref 0–2)
BASOPHILS NFR BLD AUTO: 15 % — HIGH (ref 0–2)
BASOPHILS NFR BLD AUTO: 17 % — HIGH (ref 0–2)
BASOPHILS NFR BLD AUTO: 18 % — HIGH (ref 0–2)
BASOPHILS NFR BLD AUTO: 18 % — HIGH (ref 0–2)
BASOPHILS NFR BLD AUTO: 2 % — SIGNIFICANT CHANGE UP (ref 0–2)
BASOPHILS NFR BLD AUTO: 20 % — HIGH (ref 0–2)
BASOPHILS NFR BLD AUTO: 22.5 % — HIGH (ref 0–2)
BASOPHILS NFR BLD AUTO: 23 % — HIGH (ref 0–2)
BASOPHILS NFR BLD AUTO: 23 % — HIGH (ref 0–2)
BASOPHILS NFR BLD AUTO: 24 % — HIGH (ref 0–2)
BASOPHILS NFR BLD AUTO: 26.1 % — HIGH (ref 0–2)
BASOPHILS NFR BLD AUTO: 27.2 % — HIGH (ref 0–2)
BASOPHILS NFR BLD AUTO: 28 % — HIGH (ref 0–2)
BASOPHILS NFR BLD AUTO: 3 % — HIGH (ref 0–2)
BASOPHILS NFR BLD AUTO: 3.6 % — HIGH (ref 0–2)
BASOPHILS NFR BLD AUTO: 30 % — HIGH (ref 0–2)
BASOPHILS NFR BLD AUTO: 32.1 % — HIGH (ref 0–2)
BASOPHILS NFR BLD AUTO: 38 % — HIGH (ref 0–2)
BASOPHILS NFR BLD AUTO: 4 % — HIGH (ref 0–2)
BASOPHILS NFR BLD AUTO: 4.4 % — HIGH (ref 0–2)
BASOPHILS NFR BLD AUTO: 5 % — HIGH (ref 0–2)
BASOPHILS NFR BLD AUTO: 6 % — HIGH (ref 0–2)
BASOPHILS NFR BLD AUTO: 9 % — HIGH (ref 0–2)
BASOPHILS NFR BLD AUTO: 9.5 % — HIGH (ref 0–2)
BILIRUB DIRECT SERPL-MCNC: 0.3 MG/DL — HIGH (ref 0–0.2)
BILIRUB DIRECT SERPL-MCNC: 0.4 MG/DL — HIGH (ref 0–0.3)
BILIRUB INDIRECT FLD-MCNC: 0.8 MG/DL — SIGNIFICANT CHANGE UP (ref 0.2–1)
BILIRUB INDIRECT FLD-MCNC: 1.5 MG/DL — HIGH (ref 0.2–1)
BILIRUB SERPL-MCNC: 0.3 MG/DL — SIGNIFICANT CHANGE UP (ref 0.2–1.2)
BILIRUB SERPL-MCNC: 1.1 MG/DL — SIGNIFICANT CHANGE UP (ref 0.2–1.2)
BILIRUB SERPL-MCNC: 1.2 MG/DL — SIGNIFICANT CHANGE UP (ref 0.2–1.2)
BILIRUB SERPL-MCNC: 1.2 MG/DL — SIGNIFICANT CHANGE UP (ref 0.2–1.2)
BILIRUB SERPL-MCNC: 1.3 MG/DL — HIGH (ref 0.2–1.2)
BILIRUB SERPL-MCNC: 1.4 MG/DL
BILIRUB SERPL-MCNC: 1.4 MG/DL — HIGH (ref 0.2–1.2)
BILIRUB SERPL-MCNC: 1.5 MG/DL — HIGH (ref 0.2–1.2)
BILIRUB SERPL-MCNC: 1.5 MG/DL — HIGH (ref 0.2–1.2)
BILIRUB SERPL-MCNC: 1.6 MG/DL — HIGH (ref 0.2–1.2)
BILIRUB SERPL-MCNC: 1.7 MG/DL — HIGH (ref 0.2–1.2)
BILIRUB SERPL-MCNC: 1.8 MG/DL — HIGH (ref 0.2–1.2)
BILIRUB SERPL-MCNC: 1.9 MG/DL — HIGH (ref 0.2–1.2)
BILIRUB SERPL-MCNC: 2 MG/DL — HIGH (ref 0.2–1.2)
BILIRUB SERPL-MCNC: 2.1 MG/DL
BILIRUB SERPL-MCNC: 2.2 MG/DL — HIGH (ref 0.2–1.2)
BILIRUB SERPL-MCNC: 2.5 MG/DL — HIGH (ref 0.2–1.2)
BILIRUB SERPL-MCNC: 2.5 MG/DL — HIGH (ref 0.2–1.2)
BILIRUB SERPL-MCNC: 4.4 MG/DL — HIGH (ref 0.2–1.2)
BILIRUB UR-MCNC: NEGATIVE — SIGNIFICANT CHANGE UP
BLASTS # FLD: 1 % — HIGH (ref 0–0)
BLASTS # FLD: 1 % — HIGH (ref 0–0)
BLASTS # FLD: 11 % — HIGH (ref 0–0)
BLASTS # FLD: 12 % — HIGH (ref 0–0)
BLASTS # FLD: 12 % — HIGH (ref 0–0)
BLASTS # FLD: 12.6 % — HIGH (ref 0–0)
BLASTS # FLD: 13 % — HIGH (ref 0–0)
BLASTS # FLD: 13 % — HIGH (ref 0–0)
BLASTS # FLD: 13.2 % — HIGH (ref 0–0)
BLASTS # FLD: 14 % — HIGH (ref 0–0)
BLASTS # FLD: 17 % — HIGH (ref 0–0)
BLASTS # FLD: 18 % — HIGH (ref 0–0)
BLASTS # FLD: 18 % — HIGH (ref 0–0)
BLASTS # FLD: 2 % — HIGH (ref 0–0)
BLASTS # FLD: 2 % — HIGH (ref 0–0)
BLASTS # FLD: 2.7 % — HIGH (ref 0–0)
BLASTS # FLD: 3 % — HIGH (ref 0–0)
BLASTS # FLD: 4 % — HIGH (ref 0–0)
BLASTS # FLD: 5 % — HIGH (ref 0–0)
BLASTS # FLD: 5.3 % — HIGH (ref 0–0)
BLASTS # FLD: 6 % — HIGH (ref 0–0)
BLASTS # FLD: 6 % — HIGH (ref 0–0)
BLASTS # FLD: 8 % — HIGH (ref 0–0)
BLASTS # FLD: 8.9 % — HIGH (ref 0–0)
BLASTS # FLD: 9 % — HIGH (ref 0–0)
BLASTS # FLD: 9 % — HIGH (ref 0–0)
BLD GP AB SCN SERPL QL: NEGATIVE — SIGNIFICANT CHANGE UP
BUN SERPL-MCNC: 27 MG/DL — HIGH (ref 7–23)
BUN SERPL-MCNC: 27 MG/DL — HIGH (ref 7–23)
BUN SERPL-MCNC: 28 MG/DL — HIGH (ref 7–23)
BUN SERPL-MCNC: 30 MG/DL — HIGH (ref 7–23)
BUN SERPL-MCNC: 31 MG/DL — HIGH (ref 7–23)
BUN SERPL-MCNC: 33 MG/DL — HIGH (ref 7–23)
BUN SERPL-MCNC: 34 MG/DL — HIGH (ref 7–23)
BUN SERPL-MCNC: 35 MG/DL — HIGH (ref 7–23)
BUN SERPL-MCNC: 35 MG/DL — HIGH (ref 7–23)
BUN SERPL-MCNC: 36 MG/DL — HIGH (ref 7–23)
BUN SERPL-MCNC: 36 MG/DL — HIGH (ref 7–23)
BUN SERPL-MCNC: 37 MG/DL — HIGH (ref 7–23)
BUN SERPL-MCNC: 38 MG/DL — HIGH (ref 7–23)
BUN SERPL-MCNC: 39 MG/DL — HIGH (ref 7–23)
BUN SERPL-MCNC: 39 MG/DL — HIGH (ref 7–23)
BUN SERPL-MCNC: 40 MG/DL — HIGH (ref 7–23)
BUN SERPL-MCNC: 41 MG/DL — HIGH (ref 7–23)
BUN SERPL-MCNC: 42 MG/DL — HIGH (ref 7–23)
BUN SERPL-MCNC: 43 MG/DL
BUN SERPL-MCNC: 43 MG/DL — HIGH (ref 7–23)
BUN SERPL-MCNC: 43 MG/DL — HIGH (ref 7–23)
BUN SERPL-MCNC: 44 MG/DL — HIGH (ref 7–23)
BUN SERPL-MCNC: 45 MG/DL — HIGH (ref 7–23)
BUN SERPL-MCNC: 46 MG/DL — HIGH (ref 7–23)
BUN SERPL-MCNC: 46 MG/DL — HIGH (ref 7–23)
BUN SERPL-MCNC: 47 MG/DL — HIGH (ref 7–23)
BUN SERPL-MCNC: 48 MG/DL — HIGH (ref 7–23)
BUN SERPL-MCNC: 49 MG/DL
BUN SERPL-MCNC: 49 MG/DL — HIGH (ref 7–23)
BUN SERPL-MCNC: 49 MG/DL — HIGH (ref 7–23)
BUN SERPL-MCNC: 5 MG/DL — LOW (ref 7–23)
BUN SERPL-MCNC: 51 MG/DL — HIGH (ref 7–23)
BUN SERPL-MCNC: 52 MG/DL — HIGH (ref 7–23)
BUN SERPL-MCNC: 52 MG/DL — HIGH (ref 7–23)
BUN SERPL-MCNC: 53 MG/DL — HIGH (ref 7–23)
BUN SERPL-MCNC: 56 MG/DL — HIGH (ref 7–23)
BUN SERPL-MCNC: 56 MG/DL — HIGH (ref 7–23)
BUN SERPL-MCNC: 57 MG/DL — HIGH (ref 7–23)
BUN SERPL-MCNC: 59 MG/DL — HIGH (ref 7–23)
BUN SERPL-MCNC: 59 MG/DL — HIGH (ref 7–23)
BUN SERPL-MCNC: 60 MG/DL — HIGH (ref 7–23)
BUN SERPL-MCNC: 62 MG/DL — HIGH (ref 7–23)
BUN SERPL-MCNC: 63 MG/DL — HIGH (ref 7–23)
BUN SERPL-MCNC: 64 MG/DL — HIGH (ref 7–23)
BUN SERPL-MCNC: 65 MG/DL — HIGH (ref 7–23)
BUN SERPL-MCNC: 66 MG/DL — HIGH (ref 7–23)
BUN SERPL-MCNC: 70 MG/DL — HIGH (ref 7–23)
CA-I SERPL-SCNC: 1.16 MMOL/L — SIGNIFICANT CHANGE UP (ref 1.12–1.3)
CA-I SERPL-SCNC: 1.26 MMOL/L — SIGNIFICANT CHANGE UP (ref 1.15–1.33)
CA-I SERPL-SCNC: 1.28 MMOL/L — SIGNIFICANT CHANGE UP (ref 1.15–1.33)
CA-I SERPL-SCNC: 1.32 MMOL/L — SIGNIFICANT CHANGE UP (ref 1.15–1.33)
CALCIUM SERPL-MCNC: 8 MG/DL — LOW (ref 8.4–10.5)
CALCIUM SERPL-MCNC: 8 MG/DL — LOW (ref 8.4–10.5)
CALCIUM SERPL-MCNC: 8.1 MG/DL — LOW (ref 8.4–10.5)
CALCIUM SERPL-MCNC: 8.2 MG/DL — LOW (ref 8.4–10.5)
CALCIUM SERPL-MCNC: 8.3 MG/DL — LOW (ref 8.4–10.5)
CALCIUM SERPL-MCNC: 8.4 MG/DL — SIGNIFICANT CHANGE UP (ref 8.4–10.5)
CALCIUM SERPL-MCNC: 8.5 MG/DL — SIGNIFICANT CHANGE UP (ref 8.4–10.5)
CALCIUM SERPL-MCNC: 8.6 MG/DL — SIGNIFICANT CHANGE UP (ref 8.4–10.5)
CALCIUM SERPL-MCNC: 8.7 MG/DL — SIGNIFICANT CHANGE UP (ref 8.4–10.5)
CALCIUM SERPL-MCNC: 8.8 MG/DL
CALCIUM SERPL-MCNC: 8.8 MG/DL — SIGNIFICANT CHANGE UP (ref 8.4–10.5)
CALCIUM SERPL-MCNC: 8.9 MG/DL — SIGNIFICANT CHANGE UP (ref 8.4–10.5)
CALCIUM SERPL-MCNC: 9 MG/DL — SIGNIFICANT CHANGE UP (ref 8.4–10.5)
CALCIUM SERPL-MCNC: 9.1 MG/DL — SIGNIFICANT CHANGE UP (ref 8.4–10.5)
CALCIUM SERPL-MCNC: 9.2 MG/DL — SIGNIFICANT CHANGE UP (ref 8.4–10.5)
CALCIUM SERPL-MCNC: 9.3 MG/DL — SIGNIFICANT CHANGE UP (ref 8.4–10.5)
CALCIUM SERPL-MCNC: 9.4 MG/DL — SIGNIFICANT CHANGE UP (ref 8.4–10.5)
CALCIUM SERPL-MCNC: 9.5 MG/DL
CALCIUM SERPL-MCNC: 9.5 MG/DL — SIGNIFICANT CHANGE UP (ref 8.4–10.5)
CALCIUM SERPL-MCNC: 9.6 MG/DL — SIGNIFICANT CHANGE UP (ref 8.4–10.5)
CALCIUM SERPL-MCNC: 9.6 MG/DL — SIGNIFICANT CHANGE UP (ref 8.4–10.5)
CHLORIDE BLDV-SCNC: 103 MMOL/L — SIGNIFICANT CHANGE UP (ref 96–108)
CHLORIDE BLDV-SCNC: 111 MMOL/L — HIGH (ref 96–108)
CHLORIDE BLDV-SCNC: 113 MMOL/L — HIGH (ref 96–108)
CHLORIDE BLDV-SCNC: 113 MMOL/L — HIGH (ref 96–108)
CHLORIDE SERPL-SCNC: 101 MMOL/L — SIGNIFICANT CHANGE UP (ref 96–108)
CHLORIDE SERPL-SCNC: 102 MMOL/L — SIGNIFICANT CHANGE UP (ref 96–108)
CHLORIDE SERPL-SCNC: 102 MMOL/L — SIGNIFICANT CHANGE UP (ref 96–108)
CHLORIDE SERPL-SCNC: 103 MMOL/L — SIGNIFICANT CHANGE UP (ref 96–108)
CHLORIDE SERPL-SCNC: 104 MMOL/L — SIGNIFICANT CHANGE UP (ref 96–108)
CHLORIDE SERPL-SCNC: 105 MMOL/L — SIGNIFICANT CHANGE UP (ref 96–108)
CHLORIDE SERPL-SCNC: 106 MMOL/L
CHLORIDE SERPL-SCNC: 106 MMOL/L — SIGNIFICANT CHANGE UP (ref 96–108)
CHLORIDE SERPL-SCNC: 107 MMOL/L
CHLORIDE SERPL-SCNC: 107 MMOL/L — SIGNIFICANT CHANGE UP (ref 96–108)
CHLORIDE SERPL-SCNC: 108 MMOL/L — SIGNIFICANT CHANGE UP (ref 96–108)
CHLORIDE SERPL-SCNC: 109 MMOL/L — HIGH (ref 96–108)
CHLORIDE SERPL-SCNC: 110 MMOL/L — HIGH (ref 96–108)
CHLORIDE SERPL-SCNC: 110 MMOL/L — HIGH (ref 96–108)
CHLORIDE SERPL-SCNC: 111 MMOL/L — HIGH (ref 96–108)
CHLORIDE SERPL-SCNC: 112 MMOL/L — HIGH (ref 96–108)
CHLORIDE SERPL-SCNC: 114 MMOL/L — HIGH (ref 96–108)
CHLORIDE SERPL-SCNC: 115 MMOL/L — HIGH (ref 96–108)
CHLORIDE SERPL-SCNC: 95 MMOL/L — LOW (ref 96–108)
CK MB BLD-MCNC: 2.4 % — SIGNIFICANT CHANGE UP (ref 0–3.5)
CK MB CFR SERPL CALC: 2.1 NG/ML — SIGNIFICANT CHANGE UP (ref 0–6.7)
CK MB CFR SERPL CALC: 2.9 NG/ML — SIGNIFICANT CHANGE UP (ref 0–6.7)
CK SERPL-CCNC: 19 U/L — LOW (ref 30–200)
CK SERPL-CCNC: 87 U/L — SIGNIFICANT CHANGE UP (ref 30–200)
CO2 BLDA-SCNC: 32 MMOL/L — HIGH (ref 19–24)
CO2 BLDA-SCNC: 46 MMOL/L — HIGH (ref 19–24)
CO2 BLDA-SCNC: 47 MMOL/L — HIGH (ref 19–24)
CO2 BLDV-SCNC: 27 MMOL/L — HIGH (ref 22–26)
CO2 BLDV-SCNC: 27 MMOL/L — SIGNIFICANT CHANGE UP (ref 22–30)
CO2 BLDV-SCNC: 29 MMOL/L — HIGH (ref 22–26)
CO2 BLDV-SCNC: 34 MMOL/L — HIGH (ref 22–26)
CO2 SERPL-SCNC: 19 MMOL/L — LOW (ref 22–31)
CO2 SERPL-SCNC: 20 MMOL/L — LOW (ref 22–31)
CO2 SERPL-SCNC: 20 MMOL/L — LOW (ref 22–31)
CO2 SERPL-SCNC: 21 MMOL/L — LOW (ref 22–31)
CO2 SERPL-SCNC: 22 MMOL/L — SIGNIFICANT CHANGE UP (ref 22–31)
CO2 SERPL-SCNC: 23 MMOL/L — SIGNIFICANT CHANGE UP (ref 22–31)
CO2 SERPL-SCNC: 24 MMOL/L — SIGNIFICANT CHANGE UP (ref 22–31)
CO2 SERPL-SCNC: 25 MMOL/L — SIGNIFICANT CHANGE UP (ref 22–31)
CO2 SERPL-SCNC: 26 MMOL/L
CO2 SERPL-SCNC: 26 MMOL/L — SIGNIFICANT CHANGE UP (ref 22–31)
CO2 SERPL-SCNC: 27 MMOL/L — SIGNIFICANT CHANGE UP (ref 22–31)
CO2 SERPL-SCNC: 27 MMOL/L — SIGNIFICANT CHANGE UP (ref 22–31)
CO2 SERPL-SCNC: 28 MMOL/L
CO2 SERPL-SCNC: 28 MMOL/L — SIGNIFICANT CHANGE UP (ref 22–31)
CO2 SERPL-SCNC: 29 MMOL/L — SIGNIFICANT CHANGE UP (ref 22–31)
CO2 SERPL-SCNC: 30 MMOL/L — SIGNIFICANT CHANGE UP (ref 22–31)
CO2 SERPL-SCNC: 31 MMOL/L — SIGNIFICANT CHANGE UP (ref 22–31)
CO2 SERPL-SCNC: 32 MMOL/L — HIGH (ref 22–31)
CO2 SERPL-SCNC: 32 MMOL/L — HIGH (ref 22–31)
CO2 SERPL-SCNC: 35 MMOL/L — HIGH (ref 22–31)
CO2 SERPL-SCNC: 35 MMOL/L — HIGH (ref 22–31)
CO2 SERPL-SCNC: 36 MMOL/L — HIGH (ref 22–31)
CO2 SERPL-SCNC: 38 MMOL/L — HIGH (ref 22–31)
CO2 SERPL-SCNC: 40 MMOL/L — HIGH (ref 22–31)
CO2 SERPL-SCNC: 41 MMOL/L — HIGH (ref 22–31)
CO2 SERPL-SCNC: 43 MMOL/L — HIGH (ref 22–31)
COLOR SPEC: ABNORMAL
COLOR SPEC: ABNORMAL
COLOR SPEC: SIGNIFICANT CHANGE UP
COLOR SPEC: SIGNIFICANT CHANGE UP
COLOR SPEC: YELLOW — SIGNIFICANT CHANGE UP
COLOR SPEC: YELLOW — SIGNIFICANT CHANGE UP
COVID-19 SPIKE DOMAIN AB INTERP: POSITIVE
COVID-19 SPIKE DOMAIN ANTIBODY RESULT: 230 U/ML — HIGH
COVID-19 SPIKE DOMAIN ANTIBODY RESULT: 95.8 U/ML — HIGH
COVID-19 SPIKE DOMAIN ANTIBODY RESULT: >250 U/ML — HIGH
COVID-19 SPIKE DOMAIN ANTIBODY RESULT: >250 U/ML — HIGH
CREAT ?TM UR-MCNC: 88 MG/DL — SIGNIFICANT CHANGE UP
CREAT ?TM UR-MCNC: 88 MG/DL — SIGNIFICANT CHANGE UP
CREAT SERPL-MCNC: 0.57 MG/DL — SIGNIFICANT CHANGE UP (ref 0.5–1.3)
CREAT SERPL-MCNC: 0.97 MG/DL — SIGNIFICANT CHANGE UP (ref 0.5–1.3)
CREAT SERPL-MCNC: 1.03 MG/DL — SIGNIFICANT CHANGE UP (ref 0.5–1.3)
CREAT SERPL-MCNC: 1.05 MG/DL — SIGNIFICANT CHANGE UP (ref 0.5–1.3)
CREAT SERPL-MCNC: 1.07 MG/DL — SIGNIFICANT CHANGE UP (ref 0.5–1.3)
CREAT SERPL-MCNC: 1.11 MG/DL — SIGNIFICANT CHANGE UP (ref 0.5–1.3)
CREAT SERPL-MCNC: 1.11 MG/DL — SIGNIFICANT CHANGE UP (ref 0.5–1.3)
CREAT SERPL-MCNC: 1.12 MG/DL — SIGNIFICANT CHANGE UP (ref 0.5–1.3)
CREAT SERPL-MCNC: 1.15 MG/DL — SIGNIFICANT CHANGE UP (ref 0.5–1.3)
CREAT SERPL-MCNC: 1.16 MG/DL — SIGNIFICANT CHANGE UP (ref 0.5–1.3)
CREAT SERPL-MCNC: 1.17 MG/DL — SIGNIFICANT CHANGE UP (ref 0.5–1.3)
CREAT SERPL-MCNC: 1.17 MG/DL — SIGNIFICANT CHANGE UP (ref 0.5–1.3)
CREAT SERPL-MCNC: 1.18 MG/DL — SIGNIFICANT CHANGE UP (ref 0.5–1.3)
CREAT SERPL-MCNC: 1.18 MG/DL — SIGNIFICANT CHANGE UP (ref 0.5–1.3)
CREAT SERPL-MCNC: 1.21 MG/DL — SIGNIFICANT CHANGE UP (ref 0.5–1.3)
CREAT SERPL-MCNC: 1.22 MG/DL — SIGNIFICANT CHANGE UP (ref 0.5–1.3)
CREAT SERPL-MCNC: 1.23 MG/DL — SIGNIFICANT CHANGE UP (ref 0.5–1.3)
CREAT SERPL-MCNC: 1.24 MG/DL — SIGNIFICANT CHANGE UP (ref 0.5–1.3)
CREAT SERPL-MCNC: 1.24 MG/DL — SIGNIFICANT CHANGE UP (ref 0.5–1.3)
CREAT SERPL-MCNC: 1.28 MG/DL — SIGNIFICANT CHANGE UP (ref 0.5–1.3)
CREAT SERPL-MCNC: 1.29 MG/DL — SIGNIFICANT CHANGE UP (ref 0.5–1.3)
CREAT SERPL-MCNC: 1.3 MG/DL — SIGNIFICANT CHANGE UP (ref 0.5–1.3)
CREAT SERPL-MCNC: 1.3 MG/DL — SIGNIFICANT CHANGE UP (ref 0.5–1.3)
CREAT SERPL-MCNC: 1.31 MG/DL — HIGH (ref 0.5–1.3)
CREAT SERPL-MCNC: 1.31 MG/DL — HIGH (ref 0.5–1.3)
CREAT SERPL-MCNC: 1.32 MG/DL — HIGH (ref 0.5–1.3)
CREAT SERPL-MCNC: 1.33 MG/DL — HIGH (ref 0.5–1.3)
CREAT SERPL-MCNC: 1.36 MG/DL — HIGH (ref 0.5–1.3)
CREAT SERPL-MCNC: 1.37 MG/DL — HIGH (ref 0.5–1.3)
CREAT SERPL-MCNC: 1.38 MG/DL
CREAT SERPL-MCNC: 1.39 MG/DL — HIGH (ref 0.5–1.3)
CREAT SERPL-MCNC: 1.4 MG/DL — HIGH (ref 0.5–1.3)
CREAT SERPL-MCNC: 1.42 MG/DL — HIGH (ref 0.5–1.3)
CREAT SERPL-MCNC: 1.43 MG/DL — HIGH (ref 0.5–1.3)
CREAT SERPL-MCNC: 1.43 MG/DL — HIGH (ref 0.5–1.3)
CREAT SERPL-MCNC: 1.44 MG/DL — HIGH (ref 0.5–1.3)
CREAT SERPL-MCNC: 1.45 MG/DL — HIGH (ref 0.5–1.3)
CREAT SERPL-MCNC: 1.47 MG/DL
CREAT SERPL-MCNC: 1.48 MG/DL — HIGH (ref 0.5–1.3)
CREAT SERPL-MCNC: 1.49 MG/DL — HIGH (ref 0.5–1.3)
CREAT SERPL-MCNC: 1.52 MG/DL — HIGH (ref 0.5–1.3)
CREAT SERPL-MCNC: 1.53 MG/DL — HIGH (ref 0.5–1.3)
CREAT SERPL-MCNC: 1.55 MG/DL — HIGH (ref 0.5–1.3)
CREAT SERPL-MCNC: 1.56 MG/DL — HIGH (ref 0.5–1.3)
CREAT SERPL-MCNC: 1.57 MG/DL — HIGH (ref 0.5–1.3)
CREAT SERPL-MCNC: 1.58 MG/DL — HIGH (ref 0.5–1.3)
CREAT SERPL-MCNC: 1.58 MG/DL — HIGH (ref 0.5–1.3)
CREAT SERPL-MCNC: 1.59 MG/DL — HIGH (ref 0.5–1.3)
CREAT SERPL-MCNC: 1.59 MG/DL — HIGH (ref 0.5–1.3)
CREAT SERPL-MCNC: 1.62 MG/DL — HIGH (ref 0.5–1.3)
CREAT SERPL-MCNC: 1.63 MG/DL — HIGH (ref 0.5–1.3)
CREAT SERPL-MCNC: 1.63 MG/DL — HIGH (ref 0.5–1.3)
CREAT SERPL-MCNC: 1.65 MG/DL — HIGH (ref 0.5–1.3)
CREAT SERPL-MCNC: 1.67 MG/DL — HIGH (ref 0.5–1.3)
CREAT SERPL-MCNC: 1.69 MG/DL — HIGH (ref 0.5–1.3)
CREAT SERPL-MCNC: 1.69 MG/DL — HIGH (ref 0.5–1.3)
CREAT SERPL-MCNC: 1.7 MG/DL — HIGH (ref 0.5–1.3)
CREAT SERPL-MCNC: 1.71 MG/DL — HIGH (ref 0.5–1.3)
CREAT SERPL-MCNC: 1.75 MG/DL — HIGH (ref 0.5–1.3)
CREAT SERPL-MCNC: 1.79 MG/DL — HIGH (ref 0.5–1.3)
CREAT SERPL-MCNC: 1.84 MG/DL — HIGH (ref 0.5–1.3)
CREAT SERPL-MCNC: 1.91 MG/DL — HIGH (ref 0.5–1.3)
CREAT SERPL-MCNC: 1.93 MG/DL — HIGH (ref 0.5–1.3)
CREAT SERPL-MCNC: 2.03 MG/DL — HIGH (ref 0.5–1.3)
CREAT SERPL-MCNC: 2.03 MG/DL — HIGH (ref 0.5–1.3)
CREAT SERPL-MCNC: 2.1 MG/DL — HIGH (ref 0.5–1.3)
CREAT SERPL-MCNC: 2.41 MG/DL — HIGH (ref 0.5–1.3)
CULTURE RESULTS: NO GROWTH — SIGNIFICANT CHANGE UP
CULTURE RESULTS: NO GROWTH — SIGNIFICANT CHANGE UP
CULTURE RESULTS: SIGNIFICANT CHANGE UP
D DIMER BLD IA.RAPID-MCNC: 2025 NG/ML DDU — HIGH
D DIMER BLD IA.RAPID-MCNC: 2755 NG/ML DDU — HIGH
DACRYOCYTES BLD QL SMEAR: SLIGHT — SIGNIFICANT CHANGE UP
DAT POLY-SP REAG RBC QL: NEGATIVE — SIGNIFICANT CHANGE UP
DIFF PNL FLD: ABNORMAL
DIFF PNL FLD: NEGATIVE — SIGNIFICANT CHANGE UP
ELLIPTOCYTES BLD QL SMEAR: SLIGHT — SIGNIFICANT CHANGE UP
EOSINOPHIL # BLD AUTO: 0 K/UL — SIGNIFICANT CHANGE UP (ref 0–0.5)
EOSINOPHIL # BLD AUTO: 0 K/UL — SIGNIFICANT CHANGE UP (ref 0–0.5)
EOSINOPHIL # BLD AUTO: 0.02 K/UL — SIGNIFICANT CHANGE UP (ref 0–0.5)
EOSINOPHIL # BLD AUTO: 0.03 K/UL — SIGNIFICANT CHANGE UP (ref 0–0.5)
EOSINOPHIL # BLD AUTO: 0.05 K/UL — SIGNIFICANT CHANGE UP (ref 0–0.5)
EOSINOPHIL # BLD AUTO: 0.06 K/UL — SIGNIFICANT CHANGE UP (ref 0–0.5)
EOSINOPHIL # BLD AUTO: 0.07 K/UL — SIGNIFICANT CHANGE UP (ref 0–0.5)
EOSINOPHIL # BLD AUTO: 0.08 K/UL — SIGNIFICANT CHANGE UP (ref 0–0.5)
EOSINOPHIL # BLD AUTO: 0.09 K/UL — SIGNIFICANT CHANGE UP (ref 0–0.5)
EOSINOPHIL # BLD AUTO: 0.1 K/UL — SIGNIFICANT CHANGE UP (ref 0–0.5)
EOSINOPHIL # BLD AUTO: 0.11 K/UL — SIGNIFICANT CHANGE UP (ref 0–0.5)
EOSINOPHIL # BLD AUTO: 0.13 K/UL — SIGNIFICANT CHANGE UP (ref 0–0.5)
EOSINOPHIL # BLD AUTO: 0.14 K/UL — SIGNIFICANT CHANGE UP (ref 0–0.5)
EOSINOPHIL # BLD AUTO: 0.16 K/UL — SIGNIFICANT CHANGE UP (ref 0–0.5)
EOSINOPHIL # BLD AUTO: 0.17 K/UL — SIGNIFICANT CHANGE UP (ref 0–0.5)
EOSINOPHIL # BLD AUTO: 0.17 K/UL — SIGNIFICANT CHANGE UP (ref 0–0.5)
EOSINOPHIL # BLD AUTO: 0.18 K/UL — SIGNIFICANT CHANGE UP (ref 0–0.5)
EOSINOPHIL # BLD AUTO: 0.18 K/UL — SIGNIFICANT CHANGE UP (ref 0–0.5)
EOSINOPHIL # BLD AUTO: 0.19 K/UL — SIGNIFICANT CHANGE UP (ref 0–0.5)
EOSINOPHIL # BLD AUTO: 0.2 K/UL — SIGNIFICANT CHANGE UP (ref 0–0.5)
EOSINOPHIL # BLD AUTO: 0.2 K/UL — SIGNIFICANT CHANGE UP (ref 0–0.5)
EOSINOPHIL # BLD AUTO: 0.21 K/UL — SIGNIFICANT CHANGE UP (ref 0–0.5)
EOSINOPHIL # BLD AUTO: 0.21 K/UL — SIGNIFICANT CHANGE UP (ref 0–0.5)
EOSINOPHIL # BLD AUTO: 0.22 K/UL — SIGNIFICANT CHANGE UP (ref 0–0.5)
EOSINOPHIL # BLD AUTO: 0.23 K/UL — SIGNIFICANT CHANGE UP (ref 0–0.5)
EOSINOPHIL # BLD AUTO: 0.25 K/UL — SIGNIFICANT CHANGE UP (ref 0–0.5)
EOSINOPHIL # BLD AUTO: 0.26 K/UL — SIGNIFICANT CHANGE UP (ref 0–0.5)
EOSINOPHIL # BLD AUTO: 0.26 K/UL — SIGNIFICANT CHANGE UP (ref 0–0.5)
EOSINOPHIL # BLD AUTO: 0.27 K/UL — SIGNIFICANT CHANGE UP (ref 0–0.5)
EOSINOPHIL # BLD AUTO: 0.27 K/UL — SIGNIFICANT CHANGE UP (ref 0–0.5)
EOSINOPHIL # BLD AUTO: 0.29 K/UL — SIGNIFICANT CHANGE UP (ref 0–0.5)
EOSINOPHIL # BLD AUTO: 0.32 K/UL — SIGNIFICANT CHANGE UP (ref 0–0.5)
EOSINOPHIL # BLD AUTO: 0.42 K/UL — SIGNIFICANT CHANGE UP (ref 0–0.5)
EOSINOPHIL # BLD AUTO: 0.45 K/UL — SIGNIFICANT CHANGE UP (ref 0–0.5)
EOSINOPHIL # BLD AUTO: 0.48 K/UL — SIGNIFICANT CHANGE UP (ref 0–0.5)
EOSINOPHIL # BLD AUTO: 0.49 K/UL — SIGNIFICANT CHANGE UP (ref 0–0.5)
EOSINOPHIL # BLD AUTO: 0.63 K/UL — HIGH (ref 0–0.5)
EOSINOPHIL NFR BLD AUTO: 0 % — SIGNIFICANT CHANGE UP (ref 0–6)
EOSINOPHIL NFR BLD AUTO: 0 % — SIGNIFICANT CHANGE UP (ref 0–6)
EOSINOPHIL NFR BLD AUTO: 0.9 % — SIGNIFICANT CHANGE UP (ref 0–6)
EOSINOPHIL NFR BLD AUTO: 0.9 % — SIGNIFICANT CHANGE UP (ref 0–6)
EOSINOPHIL NFR BLD AUTO: 1 % — SIGNIFICANT CHANGE UP (ref 0–6)
EOSINOPHIL NFR BLD AUTO: 1 % — SIGNIFICANT CHANGE UP (ref 0–6)
EOSINOPHIL NFR BLD AUTO: 10 % — HIGH (ref 0–6)
EOSINOPHIL NFR BLD AUTO: 10 % — HIGH (ref 0–6)
EOSINOPHIL NFR BLD AUTO: 12 % — HIGH (ref 0–6)
EOSINOPHIL NFR BLD AUTO: 12.2 % — HIGH (ref 0–6)
EOSINOPHIL NFR BLD AUTO: 13 % — HIGH (ref 0–6)
EOSINOPHIL NFR BLD AUTO: 2 % — SIGNIFICANT CHANGE UP (ref 0–6)
EOSINOPHIL NFR BLD AUTO: 2.7 % — SIGNIFICANT CHANGE UP (ref 0–6)
EOSINOPHIL NFR BLD AUTO: 3 % — SIGNIFICANT CHANGE UP (ref 0–6)
EOSINOPHIL NFR BLD AUTO: 4 % — SIGNIFICANT CHANGE UP (ref 0–6)
EOSINOPHIL NFR BLD AUTO: 4.1 % — SIGNIFICANT CHANGE UP (ref 0–6)
EOSINOPHIL NFR BLD AUTO: 4.5 % — SIGNIFICANT CHANGE UP (ref 0–6)
EOSINOPHIL NFR BLD AUTO: 4.5 % — SIGNIFICANT CHANGE UP (ref 0–6)
EOSINOPHIL NFR BLD AUTO: 5 % — SIGNIFICANT CHANGE UP (ref 0–6)
EOSINOPHIL NFR BLD AUTO: 6 % — SIGNIFICANT CHANGE UP (ref 0–6)
EOSINOPHIL NFR BLD AUTO: 7 % — HIGH (ref 0–6)
EOSINOPHIL NFR BLD AUTO: 7.1 % — HIGH (ref 0–6)
EOSINOPHIL NFR BLD AUTO: 7.9 % — HIGH (ref 0–6)
EOSINOPHIL NFR BLD AUTO: 8 % — HIGH (ref 0–6)
EOSINOPHIL NFR BLD AUTO: 8 % — HIGH (ref 0–6)
EOSINOPHIL NFR BLD AUTO: 9 % — HIGH (ref 0–6)
EPI CELLS # UR: 0 /HPF — SIGNIFICANT CHANGE UP
EPI CELLS # UR: 1 /HPF — SIGNIFICANT CHANGE UP
EPI CELLS # UR: 6 — SIGNIFICANT CHANGE UP
ESTIMATED AVERAGE GLUCOSE: 114 MG/DL — SIGNIFICANT CHANGE UP (ref 68–114)
FIBRINOGEN AG PPP IA-MCNC: 385 MG/DL — HIGH (ref 180–350)
FIBRINOGEN PPP-MCNC: 430 MG/DL — SIGNIFICANT CHANGE UP (ref 290–520)
FIBRINOGEN PPP-MCNC: 511 MG/DL — SIGNIFICANT CHANGE UP (ref 290–520)
FIBRINOGEN PPP-MCNC: 567 MG/DL — HIGH (ref 290–520)
FIBRINOGEN PPP-MCNC: 575 MG/DL — HIGH (ref 290–520)
G6PD RBC-CCNC: 16.3 U/G HGB — SIGNIFICANT CHANGE UP (ref 7–20.5)
GAS PNL BLDA: SIGNIFICANT CHANGE UP
GAS PNL BLDV: 134 MMOL/L — LOW (ref 135–145)
GAS PNL BLDV: 145 MMOL/L — SIGNIFICANT CHANGE UP (ref 136–145)
GAS PNL BLDV: 146 MMOL/L — HIGH (ref 136–145)
GAS PNL BLDV: 146 MMOL/L — HIGH (ref 136–145)
GAS PNL BLDV: SIGNIFICANT CHANGE UP
GLUCOSE BLDC GLUCOMTR-MCNC: 100 MG/DL — HIGH (ref 70–99)
GLUCOSE BLDC GLUCOMTR-MCNC: 101 MG/DL — HIGH (ref 70–99)
GLUCOSE BLDC GLUCOMTR-MCNC: 102 MG/DL — HIGH (ref 70–99)
GLUCOSE BLDC GLUCOMTR-MCNC: 103 MG/DL — HIGH (ref 70–99)
GLUCOSE BLDC GLUCOMTR-MCNC: 104 MG/DL — HIGH (ref 70–99)
GLUCOSE BLDC GLUCOMTR-MCNC: 113 MG/DL — HIGH (ref 70–99)
GLUCOSE BLDC GLUCOMTR-MCNC: 115 MG/DL — HIGH (ref 70–99)
GLUCOSE BLDC GLUCOMTR-MCNC: 117 MG/DL — HIGH (ref 70–99)
GLUCOSE BLDC GLUCOMTR-MCNC: 119 MG/DL — HIGH (ref 70–99)
GLUCOSE BLDC GLUCOMTR-MCNC: 120 MG/DL — HIGH (ref 70–99)
GLUCOSE BLDC GLUCOMTR-MCNC: 122 MG/DL — HIGH (ref 70–99)
GLUCOSE BLDC GLUCOMTR-MCNC: 124 MG/DL — HIGH (ref 70–99)
GLUCOSE BLDC GLUCOMTR-MCNC: 130 MG/DL — HIGH (ref 70–99)
GLUCOSE BLDC GLUCOMTR-MCNC: 131 MG/DL — HIGH (ref 70–99)
GLUCOSE BLDC GLUCOMTR-MCNC: 134 MG/DL — HIGH (ref 70–99)
GLUCOSE BLDC GLUCOMTR-MCNC: 136 MG/DL — HIGH (ref 70–99)
GLUCOSE BLDC GLUCOMTR-MCNC: 144 MG/DL — HIGH (ref 70–99)
GLUCOSE BLDC GLUCOMTR-MCNC: 153 MG/DL — HIGH (ref 70–99)
GLUCOSE BLDC GLUCOMTR-MCNC: 153 MG/DL — HIGH (ref 70–99)
GLUCOSE BLDC GLUCOMTR-MCNC: 164 MG/DL — HIGH (ref 70–99)
GLUCOSE BLDC GLUCOMTR-MCNC: 84 MG/DL — SIGNIFICANT CHANGE UP (ref 70–99)
GLUCOSE BLDC GLUCOMTR-MCNC: 86 MG/DL — SIGNIFICANT CHANGE UP (ref 70–99)
GLUCOSE BLDC GLUCOMTR-MCNC: 92 MG/DL — SIGNIFICANT CHANGE UP (ref 70–99)
GLUCOSE BLDC GLUCOMTR-MCNC: 95 MG/DL — SIGNIFICANT CHANGE UP (ref 70–99)
GLUCOSE BLDC GLUCOMTR-MCNC: 98 MG/DL — SIGNIFICANT CHANGE UP (ref 70–99)
GLUCOSE BLDV-MCNC: 115 MG/DL — HIGH (ref 70–99)
GLUCOSE BLDV-MCNC: 120 MG/DL — HIGH (ref 70–99)
GLUCOSE BLDV-MCNC: 79 MG/DL — SIGNIFICANT CHANGE UP (ref 70–99)
GLUCOSE BLDV-MCNC: 94 MG/DL — SIGNIFICANT CHANGE UP (ref 70–99)
GLUCOSE SERPL-MCNC: 100 MG/DL — HIGH (ref 70–99)
GLUCOSE SERPL-MCNC: 100 MG/DL — HIGH (ref 70–99)
GLUCOSE SERPL-MCNC: 101 MG/DL — HIGH (ref 70–99)
GLUCOSE SERPL-MCNC: 102 MG/DL — HIGH (ref 70–99)
GLUCOSE SERPL-MCNC: 103 MG/DL — HIGH (ref 70–99)
GLUCOSE SERPL-MCNC: 105 MG/DL — HIGH (ref 70–99)
GLUCOSE SERPL-MCNC: 106 MG/DL — HIGH (ref 70–99)
GLUCOSE SERPL-MCNC: 107 MG/DL — HIGH (ref 70–99)
GLUCOSE SERPL-MCNC: 108 MG/DL — HIGH (ref 70–99)
GLUCOSE SERPL-MCNC: 111 MG/DL — HIGH (ref 70–99)
GLUCOSE SERPL-MCNC: 112 MG/DL — HIGH (ref 70–99)
GLUCOSE SERPL-MCNC: 113 MG/DL — HIGH (ref 70–99)
GLUCOSE SERPL-MCNC: 122 MG/DL — HIGH (ref 70–99)
GLUCOSE SERPL-MCNC: 124 MG/DL — HIGH (ref 70–99)
GLUCOSE SERPL-MCNC: 128 MG/DL — HIGH (ref 70–99)
GLUCOSE SERPL-MCNC: 129 MG/DL — HIGH (ref 70–99)
GLUCOSE SERPL-MCNC: 131 MG/DL — HIGH (ref 70–99)
GLUCOSE SERPL-MCNC: 134 MG/DL — HIGH (ref 70–99)
GLUCOSE SERPL-MCNC: 150 MG/DL — HIGH (ref 70–99)
GLUCOSE SERPL-MCNC: 250 MG/DL — HIGH (ref 70–99)
GLUCOSE SERPL-MCNC: 282 MG/DL — HIGH (ref 70–99)
GLUCOSE SERPL-MCNC: 79 MG/DL — SIGNIFICANT CHANGE UP (ref 70–99)
GLUCOSE SERPL-MCNC: 81 MG/DL — SIGNIFICANT CHANGE UP (ref 70–99)
GLUCOSE SERPL-MCNC: 82 MG/DL — SIGNIFICANT CHANGE UP (ref 70–99)
GLUCOSE SERPL-MCNC: 83 MG/DL
GLUCOSE SERPL-MCNC: 83 MG/DL — SIGNIFICANT CHANGE UP (ref 70–99)
GLUCOSE SERPL-MCNC: 83 MG/DL — SIGNIFICANT CHANGE UP (ref 70–99)
GLUCOSE SERPL-MCNC: 85 MG/DL — SIGNIFICANT CHANGE UP (ref 70–99)
GLUCOSE SERPL-MCNC: 86 MG/DL — SIGNIFICANT CHANGE UP (ref 70–99)
GLUCOSE SERPL-MCNC: 86 MG/DL — SIGNIFICANT CHANGE UP (ref 70–99)
GLUCOSE SERPL-MCNC: 87 MG/DL — SIGNIFICANT CHANGE UP (ref 70–99)
GLUCOSE SERPL-MCNC: 88 MG/DL — SIGNIFICANT CHANGE UP (ref 70–99)
GLUCOSE SERPL-MCNC: 89 MG/DL — SIGNIFICANT CHANGE UP (ref 70–99)
GLUCOSE SERPL-MCNC: 90 MG/DL — SIGNIFICANT CHANGE UP (ref 70–99)
GLUCOSE SERPL-MCNC: 91 MG/DL — SIGNIFICANT CHANGE UP (ref 70–99)
GLUCOSE SERPL-MCNC: 91 MG/DL — SIGNIFICANT CHANGE UP (ref 70–99)
GLUCOSE SERPL-MCNC: 92 MG/DL — SIGNIFICANT CHANGE UP (ref 70–99)
GLUCOSE SERPL-MCNC: 92 MG/DL — SIGNIFICANT CHANGE UP (ref 70–99)
GLUCOSE SERPL-MCNC: 93 MG/DL — SIGNIFICANT CHANGE UP (ref 70–99)
GLUCOSE SERPL-MCNC: 94 MG/DL — SIGNIFICANT CHANGE UP (ref 70–99)
GLUCOSE SERPL-MCNC: 95 MG/DL — SIGNIFICANT CHANGE UP (ref 70–99)
GLUCOSE SERPL-MCNC: 96 MG/DL — SIGNIFICANT CHANGE UP (ref 70–99)
GLUCOSE SERPL-MCNC: 98 MG/DL
GLUCOSE SERPL-MCNC: 99 MG/DL — SIGNIFICANT CHANGE UP (ref 70–99)
GLUCOSE UR QL: NEGATIVE — SIGNIFICANT CHANGE UP
HAPTOGLOB SERPL-MCNC: 120 MG/DL — SIGNIFICANT CHANGE UP (ref 34–200)
HAPTOGLOB SERPL-MCNC: 150 MG/DL — SIGNIFICANT CHANGE UP (ref 34–200)
HAPTOGLOB SERPL-MCNC: 151 MG/DL — SIGNIFICANT CHANGE UP (ref 34–200)
HBV CORE IGG+IGM SER QL: NONREACTIVE
HBV SURFACE AB SER QL: NONREACTIVE
HBV SURFACE AG SER QL: NONREACTIVE
HCO3 BLDA-SCNC: 31 MMOL/L — HIGH (ref 21–28)
HCO3 BLDA-SCNC: 43 MMOL/L — HIGH (ref 21–28)
HCO3 BLDA-SCNC: 44 MMOL/L — HIGH (ref 21–28)
HCO3 BLDV-SCNC: 25 MMOL/L — SIGNIFICANT CHANGE UP (ref 21–29)
HCO3 BLDV-SCNC: 26 MMOL/L — SIGNIFICANT CHANGE UP (ref 22–29)
HCO3 BLDV-SCNC: 28 MMOL/L — SIGNIFICANT CHANGE UP (ref 22–29)
HCO3 BLDV-SCNC: 31 MMOL/L — HIGH (ref 22–29)
HCT VFR BLD CALC: 16.1 % — CRITICAL LOW (ref 39–50)
HCT VFR BLD CALC: 17.5 % — CRITICAL LOW (ref 39–50)
HCT VFR BLD CALC: 18.4 % — CRITICAL LOW (ref 39–50)
HCT VFR BLD CALC: 18.5 % — CRITICAL LOW (ref 39–50)
HCT VFR BLD CALC: 18.7 % — CRITICAL LOW (ref 39–50)
HCT VFR BLD CALC: 19.2 % — CRITICAL LOW (ref 39–50)
HCT VFR BLD CALC: 19.4 % — CRITICAL LOW (ref 39–50)
HCT VFR BLD CALC: 20 % — CRITICAL LOW (ref 39–50)
HCT VFR BLD CALC: 20 % — CRITICAL LOW (ref 39–50)
HCT VFR BLD CALC: 20.1 % — CRITICAL LOW (ref 39–50)
HCT VFR BLD CALC: 20.5 % — CRITICAL LOW (ref 39–50)
HCT VFR BLD CALC: 20.9 % — CRITICAL LOW (ref 39–50)
HCT VFR BLD CALC: 21.1 % — LOW (ref 39–50)
HCT VFR BLD CALC: 21.2 % — LOW (ref 39–50)
HCT VFR BLD CALC: 21.3 % — LOW (ref 39–50)
HCT VFR BLD CALC: 21.3 % — LOW (ref 39–50)
HCT VFR BLD CALC: 21.4 % — LOW (ref 39–50)
HCT VFR BLD CALC: 21.5 % — LOW (ref 39–50)
HCT VFR BLD CALC: 21.5 % — LOW (ref 39–50)
HCT VFR BLD CALC: 21.7 % — LOW (ref 39–50)
HCT VFR BLD CALC: 21.7 % — LOW (ref 39–50)
HCT VFR BLD CALC: 21.8 % — LOW (ref 39–50)
HCT VFR BLD CALC: 21.8 % — LOW (ref 39–50)
HCT VFR BLD CALC: 21.9 % — LOW (ref 39–50)
HCT VFR BLD CALC: 21.9 % — LOW (ref 39–50)
HCT VFR BLD CALC: 22 % — LOW (ref 39–50)
HCT VFR BLD CALC: 22.1 % — LOW (ref 39–50)
HCT VFR BLD CALC: 22.2 % — LOW (ref 39–50)
HCT VFR BLD CALC: 22.3 % — LOW (ref 39–50)
HCT VFR BLD CALC: 22.4 % — LOW (ref 39–50)
HCT VFR BLD CALC: 22.5 % — LOW (ref 39–50)
HCT VFR BLD CALC: 22.6 % — LOW (ref 39–50)
HCT VFR BLD CALC: 22.7 % — LOW (ref 39–50)
HCT VFR BLD CALC: 22.7 % — LOW (ref 39–50)
HCT VFR BLD CALC: 23.2 % — LOW (ref 39–50)
HCT VFR BLD CALC: 23.3 % — LOW (ref 39–50)
HCT VFR BLD CALC: 23.3 % — LOW (ref 39–50)
HCT VFR BLD CALC: 23.4 % — LOW (ref 39–50)
HCT VFR BLD CALC: 23.4 % — LOW (ref 39–50)
HCT VFR BLD CALC: 23.5 % — LOW (ref 39–50)
HCT VFR BLD CALC: 23.7 % — LOW (ref 39–50)
HCT VFR BLD CALC: 23.8 % — LOW (ref 39–50)
HCT VFR BLD CALC: 23.9 % — LOW (ref 39–50)
HCT VFR BLD CALC: 24 % — LOW (ref 39–50)
HCT VFR BLD CALC: 24.1 % — LOW (ref 39–50)
HCT VFR BLD CALC: 24.1 % — LOW (ref 39–50)
HCT VFR BLD CALC: 24.3 % — LOW (ref 39–50)
HCT VFR BLD CALC: 24.4 % — LOW (ref 39–50)
HCT VFR BLD CALC: 24.6 % — LOW (ref 39–50)
HCT VFR BLD CALC: 24.7 % — LOW (ref 39–50)
HCT VFR BLD CALC: 24.8 % — LOW (ref 39–50)
HCT VFR BLD CALC: 24.9 % — LOW (ref 39–50)
HCT VFR BLD CALC: 24.9 % — LOW (ref 39–50)
HCT VFR BLD CALC: 25.2 % — LOW (ref 39–50)
HCT VFR BLD CALC: 25.2 % — LOW (ref 39–50)
HCT VFR BLD CALC: 25.4 % — LOW (ref 39–50)
HCT VFR BLD CALC: 25.5 % — LOW (ref 39–50)
HCT VFR BLD CALC: 25.6 % — LOW (ref 39–50)
HCT VFR BLD CALC: 25.7 % — LOW (ref 39–50)
HCT VFR BLD CALC: 26.2 % — LOW (ref 39–50)
HCT VFR BLD CALC: 26.3 % — LOW (ref 39–50)
HCT VFR BLD CALC: 26.7 % — LOW (ref 39–50)
HCT VFR BLD CALC: 26.8 % — LOW (ref 39–50)
HCT VFR BLD CALC: 27.1 % — LOW (ref 39–50)
HCT VFR BLD CALC: 27.3 % — LOW (ref 39–50)
HCT VFR BLD CALC: 27.4 % — LOW (ref 39–50)
HCT VFR BLD CALC: 27.4 % — LOW (ref 39–50)
HCT VFR BLD CALC: 27.8 % — LOW (ref 39–50)
HCT VFR BLD CALC: 27.9 % — LOW (ref 39–50)
HCT VFR BLD CALC: 28.3 % — LOW (ref 39–50)
HCT VFR BLD CALC: 28.3 % — LOW (ref 39–50)
HCT VFR BLD CALC: 28.4 % — LOW (ref 39–50)
HCT VFR BLD CALC: 28.6 % — LOW (ref 39–50)
HCT VFR BLD CALC: 28.7 % — LOW (ref 39–50)
HCT VFR BLD CALC: 28.7 % — LOW (ref 39–50)
HCT VFR BLD CALC: 29.1 % — LOW (ref 39–50)
HCT VFR BLD CALC: 32 % — LOW (ref 39–50)
HCT VFR BLDA CALC: 23 % — LOW (ref 39–51)
HCT VFR BLDA CALC: 23 % — LOW (ref 39–51)
HCT VFR BLDA CALC: 24 % — LOW (ref 39–51)
HCT VFR BLDA CALC: 26 % — LOW (ref 39–50)
HCV AB SER QL: NONREACTIVE
HCV S/CO RATIO: 0.17 S/CO
HGB BLD CALC-MCNC: 7.6 G/DL — LOW (ref 12.6–17.4)
HGB BLD CALC-MCNC: 7.8 G/DL — LOW (ref 12.6–17.4)
HGB BLD CALC-MCNC: 8.1 G/DL — LOW (ref 12.6–17.4)
HGB BLD CALC-MCNC: 8.4 G/DL — LOW (ref 13–17)
HGB BLD-MCNC: 10.3 G/DL — LOW (ref 13–17)
HGB BLD-MCNC: 5.4 G/DL — CRITICAL LOW (ref 13–17)
HGB BLD-MCNC: 5.4 G/DL — CRITICAL LOW (ref 13–17)
HGB BLD-MCNC: 5.6 G/DL — CRITICAL LOW (ref 13–17)
HGB BLD-MCNC: 6 G/DL — CRITICAL LOW (ref 13–17)
HGB BLD-MCNC: 6 G/DL — CRITICAL LOW (ref 13–17)
HGB BLD-MCNC: 6.3 G/DL — CRITICAL LOW (ref 13–17)
HGB BLD-MCNC: 6.4 G/DL — CRITICAL LOW (ref 13–17)
HGB BLD-MCNC: 6.5 G/DL — CRITICAL LOW (ref 13–17)
HGB BLD-MCNC: 6.6 G/DL — CRITICAL LOW (ref 13–17)
HGB BLD-MCNC: 6.7 G/DL — CRITICAL LOW (ref 13–17)
HGB BLD-MCNC: 6.8 G/DL — CRITICAL LOW (ref 13–17)
HGB BLD-MCNC: 6.8 G/DL — CRITICAL LOW (ref 13–17)
HGB BLD-MCNC: 6.9 G/DL — CRITICAL LOW (ref 13–17)
HGB BLD-MCNC: 7 G/DL — CRITICAL LOW (ref 13–17)
HGB BLD-MCNC: 7.1 G/DL — LOW (ref 13–17)
HGB BLD-MCNC: 7.2 G/DL — LOW (ref 13–17)
HGB BLD-MCNC: 7.3 G/DL — LOW (ref 13–17)
HGB BLD-MCNC: 7.4 G/DL — LOW (ref 13–17)
HGB BLD-MCNC: 7.5 G/DL — LOW (ref 13–17)
HGB BLD-MCNC: 7.6 G/DL — LOW (ref 13–17)
HGB BLD-MCNC: 7.7 G/DL — LOW (ref 13–17)
HGB BLD-MCNC: 7.8 G/DL — LOW (ref 13–17)
HGB BLD-MCNC: 7.9 G/DL — LOW (ref 13–17)
HGB BLD-MCNC: 8 G/DL — LOW (ref 13–17)
HGB BLD-MCNC: 8.1 G/DL — LOW (ref 13–17)
HGB BLD-MCNC: 8.2 G/DL — LOW (ref 13–17)
HGB BLD-MCNC: 8.2 G/DL — LOW (ref 13–17)
HGB BLD-MCNC: 8.3 G/DL — LOW (ref 13–17)
HGB BLD-MCNC: 8.3 G/DL — LOW (ref 13–17)
HGB BLD-MCNC: 8.4 G/DL — LOW (ref 13–17)
HGB BLD-MCNC: 8.5 G/DL — LOW (ref 13–17)
HGB BLD-MCNC: 8.6 G/DL — LOW (ref 13–17)
HGB BLD-MCNC: 8.6 G/DL — LOW (ref 13–17)
HGB BLD-MCNC: 8.7 G/DL — LOW (ref 13–17)
HGB BLD-MCNC: 8.7 G/DL — LOW (ref 13–17)
HGB BLD-MCNC: 8.8 G/DL — LOW (ref 13–17)
HGB BLD-MCNC: 8.9 G/DL — LOW (ref 13–17)
HGB BLD-MCNC: 9 G/DL — LOW (ref 13–17)
HGB BLD-MCNC: 9.1 G/DL — LOW (ref 13–17)
HGB BLD-MCNC: 9.2 G/DL — LOW (ref 13–17)
HGB BLD-MCNC: 9.2 G/DL — LOW (ref 13–17)
HGB BLD-MCNC: 9.3 G/DL — LOW (ref 13–17)
HIV1+2 AB SPEC QL IA.RAPID: NONREACTIVE
HOROWITZ INDEX BLDA+IHG-RTO: 36 — SIGNIFICANT CHANGE UP
HOROWITZ INDEX BLDA+IHG-RTO: 36 — SIGNIFICANT CHANGE UP
HYALINE CASTS # UR AUTO: 2 /LPF — SIGNIFICANT CHANGE UP (ref 0–2)
HYALINE CASTS # UR AUTO: 29 /LPF — HIGH (ref 0–7)
HYPOCHROMIA BLD QL: SIGNIFICANT CHANGE UP
HYPOCHROMIA BLD QL: SLIGHT — SIGNIFICANT CHANGE UP
HYPOGRAN NEUTS BLD QL SMEAR: PRESENT — SIGNIFICANT CHANGE UP
IMM GRANULOCYTES NFR BLD AUTO: 0.8 % — SIGNIFICANT CHANGE UP (ref 0–1.5)
INR BLD: 1.24 RATIO — HIGH (ref 0.88–1.16)
INR BLD: 1.26 RATIO — HIGH (ref 0.88–1.16)
INR BLD: 1.3 RATIO — HIGH (ref 0.88–1.16)
INR BLD: 1.32 RATIO — HIGH (ref 0.88–1.16)
INR BLD: 1.36 RATIO — HIGH (ref 0.88–1.16)
INR BLD: 1.36 RATIO — HIGH (ref 0.88–1.16)
INR BLD: 1.37 RATIO — HIGH (ref 0.88–1.16)
INR BLD: 1.37 RATIO — HIGH (ref 0.88–1.16)
INR BLD: 1.38 RATIO — HIGH (ref 0.88–1.16)
INR BLD: 1.41 RATIO — HIGH (ref 0.88–1.16)
INR BLD: 1.43 RATIO — HIGH (ref 0.88–1.16)
INR BLD: 2.27 RATIO — HIGH (ref 0.88–1.16)
INR BLD: 2.37 RATIO — HIGH (ref 0.88–1.16)
INR BLD: 2.44 RATIO — HIGH (ref 0.88–1.16)
INR BLD: 2.48 RATIO — HIGH (ref 0.88–1.16)
INR BLD: 3.08 RATIO — HIGH (ref 0.88–1.16)
INR BLD: 3.27 RATIO — HIGH (ref 0.88–1.16)
INR BLD: 3.35 RATIO — HIGH (ref 0.88–1.16)
INR PPP: 1.18 RATIO
KETONES UR-MCNC: NEGATIVE — SIGNIFICANT CHANGE UP
LACTATE BLDV-MCNC: 1 MMOL/L — SIGNIFICANT CHANGE UP (ref 0.7–2)
LACTATE BLDV-MCNC: 1 MMOL/L — SIGNIFICANT CHANGE UP (ref 0.7–2)
LACTATE BLDV-MCNC: 1.4 MMOL/L — SIGNIFICANT CHANGE UP (ref 0.7–2)
LACTATE BLDV-MCNC: 2 MMOL/L — SIGNIFICANT CHANGE UP (ref 0.7–2)
LDH SERPL L TO P-CCNC: 122 U/L — SIGNIFICANT CHANGE UP (ref 50–242)
LDH SERPL L TO P-CCNC: 123 U/L — SIGNIFICANT CHANGE UP (ref 50–242)
LDH SERPL L TO P-CCNC: 124 U/L — SIGNIFICANT CHANGE UP (ref 50–242)
LDH SERPL L TO P-CCNC: 124 U/L — SIGNIFICANT CHANGE UP (ref 50–242)
LDH SERPL L TO P-CCNC: 126 U/L — SIGNIFICANT CHANGE UP (ref 50–242)
LDH SERPL L TO P-CCNC: 136 U/L — SIGNIFICANT CHANGE UP (ref 50–242)
LDH SERPL L TO P-CCNC: 137 U/L — SIGNIFICANT CHANGE UP (ref 50–242)
LDH SERPL L TO P-CCNC: 142 U/L — SIGNIFICANT CHANGE UP (ref 50–242)
LDH SERPL L TO P-CCNC: 144 U/L — SIGNIFICANT CHANGE UP (ref 50–242)
LDH SERPL L TO P-CCNC: 146 U/L — SIGNIFICANT CHANGE UP (ref 50–242)
LDH SERPL L TO P-CCNC: 147 U/L — SIGNIFICANT CHANGE UP (ref 50–242)
LDH SERPL L TO P-CCNC: 149 U/L — SIGNIFICANT CHANGE UP (ref 50–242)
LDH SERPL L TO P-CCNC: 155 U/L — SIGNIFICANT CHANGE UP (ref 50–242)
LDH SERPL L TO P-CCNC: 160 U/L — SIGNIFICANT CHANGE UP (ref 50–242)
LDH SERPL L TO P-CCNC: 165 U/L — SIGNIFICANT CHANGE UP (ref 50–242)
LDH SERPL L TO P-CCNC: 171 U/L — SIGNIFICANT CHANGE UP (ref 50–242)
LDH SERPL-CCNC: 134 U/L
LEUKOCYTE ESTERASE UR-ACNC: ABNORMAL
LEUKOCYTE ESTERASE UR-ACNC: NEGATIVE — SIGNIFICANT CHANGE UP
LG PLATELETS BLD QL AUTO: SLIGHT — SIGNIFICANT CHANGE UP
LYMPHOCYTES # BLD AUTO: 0.24 K/UL — LOW (ref 1–3.3)
LYMPHOCYTES # BLD AUTO: 0.27 K/UL — LOW (ref 1–3.3)
LYMPHOCYTES # BLD AUTO: 0.3 K/UL — LOW (ref 1–3.3)
LYMPHOCYTES # BLD AUTO: 0.32 K/UL — LOW (ref 1–3.3)
LYMPHOCYTES # BLD AUTO: 0.33 K/UL — LOW (ref 1–3.3)
LYMPHOCYTES # BLD AUTO: 0.34 K/UL — LOW (ref 1–3.3)
LYMPHOCYTES # BLD AUTO: 0.39 K/UL — LOW (ref 1–3.3)
LYMPHOCYTES # BLD AUTO: 0.45 K/UL — LOW (ref 1–3.3)
LYMPHOCYTES # BLD AUTO: 0.45 K/UL — LOW (ref 1–3.3)
LYMPHOCYTES # BLD AUTO: 0.47 K/UL — LOW (ref 1–3.3)
LYMPHOCYTES # BLD AUTO: 0.5 K/UL — LOW (ref 1–3.3)
LYMPHOCYTES # BLD AUTO: 0.54 K/UL — LOW (ref 1–3.3)
LYMPHOCYTES # BLD AUTO: 0.54 K/UL — LOW (ref 1–3.3)
LYMPHOCYTES # BLD AUTO: 0.56 K/UL — LOW (ref 1–3.3)
LYMPHOCYTES # BLD AUTO: 0.63 K/UL — LOW (ref 1–3.3)
LYMPHOCYTES # BLD AUTO: 0.63 K/UL — LOW (ref 1–3.3)
LYMPHOCYTES # BLD AUTO: 0.68 K/UL — LOW (ref 1–3.3)
LYMPHOCYTES # BLD AUTO: 0.74 K/UL — LOW (ref 1–3.3)
LYMPHOCYTES # BLD AUTO: 0.76 K/UL — LOW (ref 1–3.3)
LYMPHOCYTES # BLD AUTO: 0.77 K/UL — LOW (ref 1–3.3)
LYMPHOCYTES # BLD AUTO: 0.78 K/UL — LOW (ref 1–3.3)
LYMPHOCYTES # BLD AUTO: 0.84 K/UL — LOW (ref 1–3.3)
LYMPHOCYTES # BLD AUTO: 0.84 K/UL — LOW (ref 1–3.3)
LYMPHOCYTES # BLD AUTO: 0.87 K/UL — LOW (ref 1–3.3)
LYMPHOCYTES # BLD AUTO: 0.88 K/UL — LOW (ref 1–3.3)
LYMPHOCYTES # BLD AUTO: 0.89 K/UL — LOW (ref 1–3.3)
LYMPHOCYTES # BLD AUTO: 0.9 K/UL — LOW (ref 1–3.3)
LYMPHOCYTES # BLD AUTO: 1.01 K/UL — SIGNIFICANT CHANGE UP (ref 1–3.3)
LYMPHOCYTES # BLD AUTO: 1.02 K/UL — SIGNIFICANT CHANGE UP (ref 1–3.3)
LYMPHOCYTES # BLD AUTO: 1.03 K/UL — SIGNIFICANT CHANGE UP (ref 1–3.3)
LYMPHOCYTES # BLD AUTO: 1.05 K/UL — SIGNIFICANT CHANGE UP (ref 1–3.3)
LYMPHOCYTES # BLD AUTO: 1.06 K/UL — SIGNIFICANT CHANGE UP (ref 1–3.3)
LYMPHOCYTES # BLD AUTO: 1.07 K/UL — SIGNIFICANT CHANGE UP (ref 1–3.3)
LYMPHOCYTES # BLD AUTO: 1.12 K/UL — SIGNIFICANT CHANGE UP (ref 1–3.3)
LYMPHOCYTES # BLD AUTO: 1.16 K/UL — SIGNIFICANT CHANGE UP (ref 1–3.3)
LYMPHOCYTES # BLD AUTO: 1.18 K/UL — SIGNIFICANT CHANGE UP (ref 1–3.3)
LYMPHOCYTES # BLD AUTO: 1.19 K/UL — SIGNIFICANT CHANGE UP (ref 1–3.3)
LYMPHOCYTES # BLD AUTO: 1.23 K/UL — SIGNIFICANT CHANGE UP (ref 1–3.3)
LYMPHOCYTES # BLD AUTO: 1.24 K/UL — SIGNIFICANT CHANGE UP (ref 1–3.3)
LYMPHOCYTES # BLD AUTO: 1.34 K/UL — SIGNIFICANT CHANGE UP (ref 1–3.3)
LYMPHOCYTES # BLD AUTO: 1.43 K/UL — SIGNIFICANT CHANGE UP (ref 1–3.3)
LYMPHOCYTES # BLD AUTO: 11 % — LOW (ref 13–44)
LYMPHOCYTES # BLD AUTO: 12 % — LOW (ref 13–44)
LYMPHOCYTES # BLD AUTO: 14 % — SIGNIFICANT CHANGE UP (ref 13–44)
LYMPHOCYTES # BLD AUTO: 14 % — SIGNIFICANT CHANGE UP (ref 13–44)
LYMPHOCYTES # BLD AUTO: 15 % — SIGNIFICANT CHANGE UP (ref 13–44)
LYMPHOCYTES # BLD AUTO: 16 % — SIGNIFICANT CHANGE UP (ref 13–44)
LYMPHOCYTES # BLD AUTO: 16.8 % — SIGNIFICANT CHANGE UP (ref 13–44)
LYMPHOCYTES # BLD AUTO: 17 % — SIGNIFICANT CHANGE UP (ref 13–44)
LYMPHOCYTES # BLD AUTO: 17 % — SIGNIFICANT CHANGE UP (ref 13–44)
LYMPHOCYTES # BLD AUTO: 18 % — SIGNIFICANT CHANGE UP (ref 13–44)
LYMPHOCYTES # BLD AUTO: 19 % — SIGNIFICANT CHANGE UP (ref 13–44)
LYMPHOCYTES # BLD AUTO: 20 % — SIGNIFICANT CHANGE UP (ref 13–44)
LYMPHOCYTES # BLD AUTO: 20 % — SIGNIFICANT CHANGE UP (ref 13–44)
LYMPHOCYTES # BLD AUTO: 20.7 % — SIGNIFICANT CHANGE UP (ref 13–44)
LYMPHOCYTES # BLD AUTO: 20.9 % — SIGNIFICANT CHANGE UP (ref 13–44)
LYMPHOCYTES # BLD AUTO: 21.5 % — SIGNIFICANT CHANGE UP (ref 13–44)
LYMPHOCYTES # BLD AUTO: 22 % — SIGNIFICANT CHANGE UP (ref 13–44)
LYMPHOCYTES # BLD AUTO: 23 % — SIGNIFICANT CHANGE UP (ref 13–44)
LYMPHOCYTES # BLD AUTO: 23 % — SIGNIFICANT CHANGE UP (ref 13–44)
LYMPHOCYTES # BLD AUTO: 24 % — SIGNIFICANT CHANGE UP (ref 13–44)
LYMPHOCYTES # BLD AUTO: 24 % — SIGNIFICANT CHANGE UP (ref 13–44)
LYMPHOCYTES # BLD AUTO: 25 % — SIGNIFICANT CHANGE UP (ref 13–44)
LYMPHOCYTES # BLD AUTO: 25.4 % — SIGNIFICANT CHANGE UP (ref 13–44)
LYMPHOCYTES # BLD AUTO: 25.9 % — SIGNIFICANT CHANGE UP (ref 13–44)
LYMPHOCYTES # BLD AUTO: 27 % — SIGNIFICANT CHANGE UP (ref 13–44)
LYMPHOCYTES # BLD AUTO: 28 % — SIGNIFICANT CHANGE UP (ref 13–44)
LYMPHOCYTES # BLD AUTO: 28 % — SIGNIFICANT CHANGE UP (ref 13–44)
LYMPHOCYTES # BLD AUTO: 28.8 % — SIGNIFICANT CHANGE UP (ref 13–44)
LYMPHOCYTES # BLD AUTO: 29 % — SIGNIFICANT CHANGE UP (ref 13–44)
LYMPHOCYTES # BLD AUTO: 29 % — SIGNIFICANT CHANGE UP (ref 13–44)
LYMPHOCYTES # BLD AUTO: 30 % — SIGNIFICANT CHANGE UP (ref 13–44)
LYMPHOCYTES # BLD AUTO: 30.3 % — SIGNIFICANT CHANGE UP (ref 13–44)
LYMPHOCYTES # BLD AUTO: 33 % — SIGNIFICANT CHANGE UP (ref 13–44)
LYMPHOCYTES # BLD AUTO: 39 % — SIGNIFICANT CHANGE UP (ref 13–44)
MACROCYTES BLD QL: SLIGHT — SIGNIFICANT CHANGE UP
MAGNESIUM SERPL-MCNC: 1.8 MG/DL — SIGNIFICANT CHANGE UP (ref 1.6–2.6)
MAGNESIUM SERPL-MCNC: 1.9 MG/DL — SIGNIFICANT CHANGE UP (ref 1.6–2.6)
MAGNESIUM SERPL-MCNC: 2 MG/DL — SIGNIFICANT CHANGE UP (ref 1.6–2.6)
MAGNESIUM SERPL-MCNC: 2.1 MG/DL
MAGNESIUM SERPL-MCNC: 2.1 MG/DL — SIGNIFICANT CHANGE UP (ref 1.6–2.6)
MAGNESIUM SERPL-MCNC: 2.2 MG/DL — SIGNIFICANT CHANGE UP (ref 1.6–2.6)
MAGNESIUM SERPL-MCNC: 2.3 MG/DL — SIGNIFICANT CHANGE UP (ref 1.6–2.6)
MAGNESIUM SERPL-MCNC: 2.4 MG/DL
MAGNESIUM SERPL-MCNC: 2.4 MG/DL — SIGNIFICANT CHANGE UP (ref 1.6–2.6)
MAGNESIUM SERPL-MCNC: 2.5 MG/DL — SIGNIFICANT CHANGE UP (ref 1.6–2.6)
MANUAL DIF COMMENT BLD-IMP: SIGNIFICANT CHANGE UP
MANUAL SMEAR VERIFICATION: SIGNIFICANT CHANGE UP
MCHC RBC-ENTMCNC: 26.7 PG — LOW (ref 27–34)
MCHC RBC-ENTMCNC: 26.8 PG — LOW (ref 27–34)
MCHC RBC-ENTMCNC: 27 PG — SIGNIFICANT CHANGE UP (ref 27–34)
MCHC RBC-ENTMCNC: 27 PG — SIGNIFICANT CHANGE UP (ref 27–34)
MCHC RBC-ENTMCNC: 27.1 PG — SIGNIFICANT CHANGE UP (ref 27–34)
MCHC RBC-ENTMCNC: 27.1 PG — SIGNIFICANT CHANGE UP (ref 27–34)
MCHC RBC-ENTMCNC: 27.2 PG — SIGNIFICANT CHANGE UP (ref 27–34)
MCHC RBC-ENTMCNC: 27.3 PG — SIGNIFICANT CHANGE UP (ref 27–34)
MCHC RBC-ENTMCNC: 27.4 PG — SIGNIFICANT CHANGE UP (ref 27–34)
MCHC RBC-ENTMCNC: 27.5 PG — SIGNIFICANT CHANGE UP (ref 27–34)
MCHC RBC-ENTMCNC: 27.6 PG — SIGNIFICANT CHANGE UP (ref 27–34)
MCHC RBC-ENTMCNC: 27.7 PG — SIGNIFICANT CHANGE UP (ref 27–34)
MCHC RBC-ENTMCNC: 27.8 PG — SIGNIFICANT CHANGE UP (ref 27–34)
MCHC RBC-ENTMCNC: 27.8 PG — SIGNIFICANT CHANGE UP (ref 27–34)
MCHC RBC-ENTMCNC: 27.9 PG — SIGNIFICANT CHANGE UP (ref 27–34)
MCHC RBC-ENTMCNC: 28 PG — SIGNIFICANT CHANGE UP (ref 27–34)
MCHC RBC-ENTMCNC: 28.1 PG — SIGNIFICANT CHANGE UP (ref 27–34)
MCHC RBC-ENTMCNC: 28.2 PG — SIGNIFICANT CHANGE UP (ref 27–34)
MCHC RBC-ENTMCNC: 28.3 PG — SIGNIFICANT CHANGE UP (ref 27–34)
MCHC RBC-ENTMCNC: 28.4 PG — SIGNIFICANT CHANGE UP (ref 27–34)
MCHC RBC-ENTMCNC: 28.5 PG — SIGNIFICANT CHANGE UP (ref 27–34)
MCHC RBC-ENTMCNC: 28.6 PG — SIGNIFICANT CHANGE UP (ref 27–34)
MCHC RBC-ENTMCNC: 28.6 PG — SIGNIFICANT CHANGE UP (ref 27–34)
MCHC RBC-ENTMCNC: 28.7 PG — SIGNIFICANT CHANGE UP (ref 27–34)
MCHC RBC-ENTMCNC: 28.8 PG — SIGNIFICANT CHANGE UP (ref 27–34)
MCHC RBC-ENTMCNC: 28.9 PG — SIGNIFICANT CHANGE UP (ref 27–34)
MCHC RBC-ENTMCNC: 29 PG — SIGNIFICANT CHANGE UP (ref 27–34)
MCHC RBC-ENTMCNC: 29 PG — SIGNIFICANT CHANGE UP (ref 27–34)
MCHC RBC-ENTMCNC: 29.1 PG — SIGNIFICANT CHANGE UP (ref 27–34)
MCHC RBC-ENTMCNC: 29.2 PG — SIGNIFICANT CHANGE UP (ref 27–34)
MCHC RBC-ENTMCNC: 29.2 PG — SIGNIFICANT CHANGE UP (ref 27–34)
MCHC RBC-ENTMCNC: 29.3 GM/DL — LOW (ref 32–36)
MCHC RBC-ENTMCNC: 29.3 PG — SIGNIFICANT CHANGE UP (ref 27–34)
MCHC RBC-ENTMCNC: 29.4 PG — SIGNIFICANT CHANGE UP (ref 27–34)
MCHC RBC-ENTMCNC: 29.4 PG — SIGNIFICANT CHANGE UP (ref 27–34)
MCHC RBC-ENTMCNC: 29.5 PG — SIGNIFICANT CHANGE UP (ref 27–34)
MCHC RBC-ENTMCNC: 29.6 GM/DL — LOW (ref 32–36)
MCHC RBC-ENTMCNC: 29.6 PG — SIGNIFICANT CHANGE UP (ref 27–34)
MCHC RBC-ENTMCNC: 29.7 GM/DL — LOW (ref 32–36)
MCHC RBC-ENTMCNC: 29.7 PG — SIGNIFICANT CHANGE UP (ref 27–34)
MCHC RBC-ENTMCNC: 29.8 GM/DL — LOW (ref 32–36)
MCHC RBC-ENTMCNC: 29.8 GM/DL — LOW (ref 32–36)
MCHC RBC-ENTMCNC: 29.8 PG — SIGNIFICANT CHANGE UP (ref 27–34)
MCHC RBC-ENTMCNC: 30 GM/DL — LOW (ref 32–36)
MCHC RBC-ENTMCNC: 30.1 GM/DL — LOW (ref 32–36)
MCHC RBC-ENTMCNC: 30.1 PG — SIGNIFICANT CHANGE UP (ref 27–34)
MCHC RBC-ENTMCNC: 30.2 GM/DL — LOW (ref 32–36)
MCHC RBC-ENTMCNC: 30.4 GM/DL — LOW (ref 32–36)
MCHC RBC-ENTMCNC: 30.5 GM/DL — LOW (ref 32–36)
MCHC RBC-ENTMCNC: 30.5 GM/DL — LOW (ref 32–36)
MCHC RBC-ENTMCNC: 30.6 GM/DL — LOW (ref 32–36)
MCHC RBC-ENTMCNC: 30.7 GM/DL — LOW (ref 32–36)
MCHC RBC-ENTMCNC: 30.9 GM/DL — LOW (ref 32–36)
MCHC RBC-ENTMCNC: 31 GM/DL — LOW (ref 32–36)
MCHC RBC-ENTMCNC: 31.1 GM/DL — LOW (ref 32–36)
MCHC RBC-ENTMCNC: 31.2 GM/DL — LOW (ref 32–36)
MCHC RBC-ENTMCNC: 31.3 G/DL — LOW (ref 32–36)
MCHC RBC-ENTMCNC: 31.3 GM/DL — LOW (ref 32–36)
MCHC RBC-ENTMCNC: 31.4 GM/DL — LOW (ref 32–36)
MCHC RBC-ENTMCNC: 31.5 GM/DL — LOW (ref 32–36)
MCHC RBC-ENTMCNC: 31.5 GM/DL — LOW (ref 32–36)
MCHC RBC-ENTMCNC: 31.6 G/DL — LOW (ref 32–36)
MCHC RBC-ENTMCNC: 31.6 GM/DL — LOW (ref 32–36)
MCHC RBC-ENTMCNC: 31.7 G/DL — LOW (ref 32–36)
MCHC RBC-ENTMCNC: 31.7 GM/DL — LOW (ref 32–36)
MCHC RBC-ENTMCNC: 31.8 GM/DL — LOW (ref 32–36)
MCHC RBC-ENTMCNC: 31.9 GM/DL — LOW (ref 32–36)
MCHC RBC-ENTMCNC: 32 G/DL — SIGNIFICANT CHANGE UP (ref 32–36)
MCHC RBC-ENTMCNC: 32 GM/DL — SIGNIFICANT CHANGE UP (ref 32–36)
MCHC RBC-ENTMCNC: 32.1 GM/DL — SIGNIFICANT CHANGE UP (ref 32–36)
MCHC RBC-ENTMCNC: 32.2 G/DL — SIGNIFICANT CHANGE UP (ref 32–36)
MCHC RBC-ENTMCNC: 32.2 GM/DL — SIGNIFICANT CHANGE UP (ref 32–36)
MCHC RBC-ENTMCNC: 32.3 GM/DL — SIGNIFICANT CHANGE UP (ref 32–36)
MCHC RBC-ENTMCNC: 32.4 G/DL — SIGNIFICANT CHANGE UP (ref 32–36)
MCHC RBC-ENTMCNC: 32.4 G/DL — SIGNIFICANT CHANGE UP (ref 32–36)
MCHC RBC-ENTMCNC: 32.4 GM/DL — SIGNIFICANT CHANGE UP (ref 32–36)
MCHC RBC-ENTMCNC: 32.5 GM/DL — SIGNIFICANT CHANGE UP (ref 32–36)
MCHC RBC-ENTMCNC: 32.7 G/DL — SIGNIFICANT CHANGE UP (ref 32–36)
MCHC RBC-ENTMCNC: 32.8 G/DL — SIGNIFICANT CHANGE UP (ref 32–36)
MCHC RBC-ENTMCNC: 32.8 GM/DL — SIGNIFICANT CHANGE UP (ref 32–36)
MCHC RBC-ENTMCNC: 32.9 G/DL — SIGNIFICANT CHANGE UP (ref 32–36)
MCHC RBC-ENTMCNC: 32.9 G/DL — SIGNIFICANT CHANGE UP (ref 32–36)
MCHC RBC-ENTMCNC: 32.9 GM/DL — SIGNIFICANT CHANGE UP (ref 32–36)
MCHC RBC-ENTMCNC: 33.1 G/DL — SIGNIFICANT CHANGE UP (ref 32–36)
MCHC RBC-ENTMCNC: 33.2 G/DL — SIGNIFICANT CHANGE UP (ref 32–36)
MCHC RBC-ENTMCNC: 33.2 G/DL — SIGNIFICANT CHANGE UP (ref 32–36)
MCHC RBC-ENTMCNC: 33.3 G/DL — SIGNIFICANT CHANGE UP (ref 32–36)
MCHC RBC-ENTMCNC: 33.3 GM/DL — SIGNIFICANT CHANGE UP (ref 32–36)
MCHC RBC-ENTMCNC: 33.5 G/DL — SIGNIFICANT CHANGE UP (ref 32–36)
MCHC RBC-ENTMCNC: 33.5 G/DL — SIGNIFICANT CHANGE UP (ref 32–36)
MCHC RBC-ENTMCNC: 33.6 G/DL — SIGNIFICANT CHANGE UP (ref 32–36)
MCHC RBC-ENTMCNC: 33.7 G/DL — SIGNIFICANT CHANGE UP (ref 32–36)
MCHC RBC-ENTMCNC: 33.8 G/DL — SIGNIFICANT CHANGE UP (ref 32–36)
MCV RBC AUTO: 100 FL — SIGNIFICANT CHANGE UP (ref 80–100)
MCV RBC AUTO: 84.7 FL — SIGNIFICANT CHANGE UP (ref 80–100)
MCV RBC AUTO: 85.5 FL — SIGNIFICANT CHANGE UP (ref 80–100)
MCV RBC AUTO: 85.7 FL — SIGNIFICANT CHANGE UP (ref 80–100)
MCV RBC AUTO: 85.8 FL — SIGNIFICANT CHANGE UP (ref 80–100)
MCV RBC AUTO: 85.8 FL — SIGNIFICANT CHANGE UP (ref 80–100)
MCV RBC AUTO: 85.9 FL — SIGNIFICANT CHANGE UP (ref 80–100)
MCV RBC AUTO: 85.9 FL — SIGNIFICANT CHANGE UP (ref 80–100)
MCV RBC AUTO: 86.1 FL — SIGNIFICANT CHANGE UP (ref 80–100)
MCV RBC AUTO: 86.2 FL — SIGNIFICANT CHANGE UP (ref 80–100)
MCV RBC AUTO: 86.2 FL — SIGNIFICANT CHANGE UP (ref 80–100)
MCV RBC AUTO: 86.6 FL — SIGNIFICANT CHANGE UP (ref 80–100)
MCV RBC AUTO: 86.8 FL — SIGNIFICANT CHANGE UP (ref 80–100)
MCV RBC AUTO: 86.8 FL — SIGNIFICANT CHANGE UP (ref 80–100)
MCV RBC AUTO: 86.9 FL — SIGNIFICANT CHANGE UP (ref 80–100)
MCV RBC AUTO: 86.9 FL — SIGNIFICANT CHANGE UP (ref 80–100)
MCV RBC AUTO: 87 FL — SIGNIFICANT CHANGE UP (ref 80–100)
MCV RBC AUTO: 87.1 FL — SIGNIFICANT CHANGE UP (ref 80–100)
MCV RBC AUTO: 87.2 FL — SIGNIFICANT CHANGE UP (ref 80–100)
MCV RBC AUTO: 87.3 FL — SIGNIFICANT CHANGE UP (ref 80–100)
MCV RBC AUTO: 87.4 FL — SIGNIFICANT CHANGE UP (ref 80–100)
MCV RBC AUTO: 87.5 FL — SIGNIFICANT CHANGE UP (ref 80–100)
MCV RBC AUTO: 87.5 FL — SIGNIFICANT CHANGE UP (ref 80–100)
MCV RBC AUTO: 87.6 FL — SIGNIFICANT CHANGE UP (ref 80–100)
MCV RBC AUTO: 87.7 FL — SIGNIFICANT CHANGE UP (ref 80–100)
MCV RBC AUTO: 87.8 FL — SIGNIFICANT CHANGE UP (ref 80–100)
MCV RBC AUTO: 87.8 FL — SIGNIFICANT CHANGE UP (ref 80–100)
MCV RBC AUTO: 87.9 FL — SIGNIFICANT CHANGE UP (ref 80–100)
MCV RBC AUTO: 88 FL — SIGNIFICANT CHANGE UP (ref 80–100)
MCV RBC AUTO: 88.2 FL — SIGNIFICANT CHANGE UP (ref 80–100)
MCV RBC AUTO: 88.3 FL — SIGNIFICANT CHANGE UP (ref 80–100)
MCV RBC AUTO: 88.4 FL — SIGNIFICANT CHANGE UP (ref 80–100)
MCV RBC AUTO: 88.5 FL — SIGNIFICANT CHANGE UP (ref 80–100)
MCV RBC AUTO: 88.6 FL — SIGNIFICANT CHANGE UP (ref 80–100)
MCV RBC AUTO: 88.9 FL — SIGNIFICANT CHANGE UP (ref 80–100)
MCV RBC AUTO: 88.9 FL — SIGNIFICANT CHANGE UP (ref 80–100)
MCV RBC AUTO: 89 FL — SIGNIFICANT CHANGE UP (ref 80–100)
MCV RBC AUTO: 89.1 FL — SIGNIFICANT CHANGE UP (ref 80–100)
MCV RBC AUTO: 89.2 FL — SIGNIFICANT CHANGE UP (ref 80–100)
MCV RBC AUTO: 89.3 FL — SIGNIFICANT CHANGE UP (ref 80–100)
MCV RBC AUTO: 89.4 FL — SIGNIFICANT CHANGE UP (ref 80–100)
MCV RBC AUTO: 89.5 FL — SIGNIFICANT CHANGE UP (ref 80–100)
MCV RBC AUTO: 89.8 FL — SIGNIFICANT CHANGE UP (ref 80–100)
MCV RBC AUTO: 90.2 FL — SIGNIFICANT CHANGE UP (ref 80–100)
MCV RBC AUTO: 90.3 FL — SIGNIFICANT CHANGE UP (ref 80–100)
MCV RBC AUTO: 90.3 FL — SIGNIFICANT CHANGE UP (ref 80–100)
MCV RBC AUTO: 90.5 FL — SIGNIFICANT CHANGE UP (ref 80–100)
MCV RBC AUTO: 90.7 FL — SIGNIFICANT CHANGE UP (ref 80–100)
MCV RBC AUTO: 90.7 FL — SIGNIFICANT CHANGE UP (ref 80–100)
MCV RBC AUTO: 91.3 FL — SIGNIFICANT CHANGE UP (ref 80–100)
MCV RBC AUTO: 91.9 FL — SIGNIFICANT CHANGE UP (ref 80–100)
MCV RBC AUTO: 93.6 FL — SIGNIFICANT CHANGE UP (ref 80–100)
MCV RBC AUTO: 95 FL — SIGNIFICANT CHANGE UP (ref 80–100)
MCV RBC AUTO: 95.6 FL — SIGNIFICANT CHANGE UP (ref 80–100)
MCV RBC AUTO: 95.9 FL — SIGNIFICANT CHANGE UP (ref 80–100)
MCV RBC AUTO: 95.9 FL — SIGNIFICANT CHANGE UP (ref 80–100)
MCV RBC AUTO: 96.2 FL — SIGNIFICANT CHANGE UP (ref 80–100)
MCV RBC AUTO: 96.3 FL — SIGNIFICANT CHANGE UP (ref 80–100)
MCV RBC AUTO: 96.8 FL — SIGNIFICANT CHANGE UP (ref 80–100)
MCV RBC AUTO: 97 FL — SIGNIFICANT CHANGE UP (ref 80–100)
MCV RBC AUTO: 97 FL — SIGNIFICANT CHANGE UP (ref 80–100)
MCV RBC AUTO: 97.4 FL — SIGNIFICANT CHANGE UP (ref 80–100)
MCV RBC AUTO: 97.4 FL — SIGNIFICANT CHANGE UP (ref 80–100)
MCV RBC AUTO: 97.8 FL — SIGNIFICANT CHANGE UP (ref 80–100)
MCV RBC AUTO: 97.9 FL — SIGNIFICANT CHANGE UP (ref 80–100)
MCV RBC AUTO: 98.3 FL — SIGNIFICANT CHANGE UP (ref 80–100)
METAMYELOCYTES # FLD: 0.9 % — HIGH (ref 0–0)
METAMYELOCYTES # FLD: 1 % — HIGH (ref 0–0)
METAMYELOCYTES # FLD: 2 % — HIGH (ref 0–0)
METHOD TYPE: SIGNIFICANT CHANGE UP
MICROCYTES BLD QL: SLIGHT — SIGNIFICANT CHANGE UP
MONOCYTES # BLD AUTO: 0 K/UL — SIGNIFICANT CHANGE UP (ref 0–0.9)
MONOCYTES # BLD AUTO: 0 K/UL — SIGNIFICANT CHANGE UP (ref 0–0.9)
MONOCYTES # BLD AUTO: 0.03 K/UL — SIGNIFICANT CHANGE UP (ref 0–0.9)
MONOCYTES # BLD AUTO: 0.03 K/UL — SIGNIFICANT CHANGE UP (ref 0–0.9)
MONOCYTES # BLD AUTO: 0.04 K/UL — SIGNIFICANT CHANGE UP (ref 0–0.9)
MONOCYTES # BLD AUTO: 0.04 K/UL — SIGNIFICANT CHANGE UP (ref 0–0.9)
MONOCYTES # BLD AUTO: 0.05 K/UL — SIGNIFICANT CHANGE UP (ref 0–0.9)
MONOCYTES # BLD AUTO: 0.05 K/UL — SIGNIFICANT CHANGE UP (ref 0–0.9)
MONOCYTES # BLD AUTO: 0.06 K/UL — SIGNIFICANT CHANGE UP (ref 0–0.9)
MONOCYTES # BLD AUTO: 0.07 K/UL — SIGNIFICANT CHANGE UP (ref 0–0.9)
MONOCYTES # BLD AUTO: 0.08 K/UL — SIGNIFICANT CHANGE UP (ref 0–0.9)
MONOCYTES # BLD AUTO: 0.09 K/UL — SIGNIFICANT CHANGE UP (ref 0–0.9)
MONOCYTES # BLD AUTO: 0.1 K/UL — SIGNIFICANT CHANGE UP (ref 0–0.9)
MONOCYTES # BLD AUTO: 0.11 K/UL — SIGNIFICANT CHANGE UP (ref 0–0.9)
MONOCYTES # BLD AUTO: 0.11 K/UL — SIGNIFICANT CHANGE UP (ref 0–0.9)
MONOCYTES # BLD AUTO: 0.12 K/UL — SIGNIFICANT CHANGE UP (ref 0–0.9)
MONOCYTES # BLD AUTO: 0.12 K/UL — SIGNIFICANT CHANGE UP (ref 0–0.9)
MONOCYTES # BLD AUTO: 0.13 K/UL — SIGNIFICANT CHANGE UP (ref 0–0.9)
MONOCYTES # BLD AUTO: 0.14 K/UL — SIGNIFICANT CHANGE UP (ref 0–0.9)
MONOCYTES # BLD AUTO: 0.15 K/UL — SIGNIFICANT CHANGE UP (ref 0–0.9)
MONOCYTES # BLD AUTO: 0.15 K/UL — SIGNIFICANT CHANGE UP (ref 0–0.9)
MONOCYTES # BLD AUTO: 0.17 K/UL — SIGNIFICANT CHANGE UP (ref 0–0.9)
MONOCYTES # BLD AUTO: 0.19 K/UL — SIGNIFICANT CHANGE UP (ref 0–0.9)
MONOCYTES # BLD AUTO: 0.25 K/UL — SIGNIFICANT CHANGE UP (ref 0–0.9)
MONOCYTES # BLD AUTO: 0.25 K/UL — SIGNIFICANT CHANGE UP (ref 0–0.9)
MONOCYTES # BLD AUTO: 0.27 K/UL — SIGNIFICANT CHANGE UP (ref 0–0.9)
MONOCYTES # BLD AUTO: 0.27 K/UL — SIGNIFICANT CHANGE UP (ref 0–0.9)
MONOCYTES # BLD AUTO: 0.28 K/UL — SIGNIFICANT CHANGE UP (ref 0–0.9)
MONOCYTES # BLD AUTO: 0.29 K/UL — SIGNIFICANT CHANGE UP (ref 0–0.9)
MONOCYTES # BLD AUTO: 0.72 K/UL — SIGNIFICANT CHANGE UP (ref 0–0.9)
MONOCYTES # BLD AUTO: 1.03 K/UL — HIGH (ref 0–0.9)
MONOCYTES NFR BLD AUTO: 0 % — LOW (ref 2–14)
MONOCYTES NFR BLD AUTO: 0 % — LOW (ref 2–14)
MONOCYTES NFR BLD AUTO: 1 % — LOW (ref 2–14)
MONOCYTES NFR BLD AUTO: 1.7 % — LOW (ref 2–14)
MONOCYTES NFR BLD AUTO: 1.8 % — LOW (ref 2–14)
MONOCYTES NFR BLD AUTO: 1.8 % — LOW (ref 2–14)
MONOCYTES NFR BLD AUTO: 11 % — SIGNIFICANT CHANGE UP (ref 2–14)
MONOCYTES NFR BLD AUTO: 11 % — SIGNIFICANT CHANGE UP (ref 2–14)
MONOCYTES NFR BLD AUTO: 2 % — SIGNIFICANT CHANGE UP (ref 2–14)
MONOCYTES NFR BLD AUTO: 2.6 % — SIGNIFICANT CHANGE UP (ref 2–14)
MONOCYTES NFR BLD AUTO: 2.7 % — SIGNIFICANT CHANGE UP (ref 2–14)
MONOCYTES NFR BLD AUTO: 26.4 % — HIGH (ref 2–14)
MONOCYTES NFR BLD AUTO: 3 % — SIGNIFICANT CHANGE UP (ref 2–14)
MONOCYTES NFR BLD AUTO: 4 % — SIGNIFICANT CHANGE UP (ref 2–14)
MONOCYTES NFR BLD AUTO: 5 % — SIGNIFICANT CHANGE UP (ref 2–14)
MONOCYTES NFR BLD AUTO: 6 % — SIGNIFICANT CHANGE UP (ref 2–14)
MONOCYTES NFR BLD AUTO: 7 % — SIGNIFICANT CHANGE UP (ref 2–14)
MONOCYTES NFR BLD AUTO: 7 % — SIGNIFICANT CHANGE UP (ref 2–14)
MONOCYTES NFR BLD AUTO: 8 % — SIGNIFICANT CHANGE UP (ref 2–14)
MYELOCYTES NFR BLD: 0.9 % — HIGH (ref 0–0)
MYELOCYTES NFR BLD: 1 % — HIGH (ref 0–0)
MYELOCYTES NFR BLD: 1.8 % — HIGH (ref 0–0)
MYELOCYTES NFR BLD: 2 % — HIGH (ref 0–0)
MYELOCYTES NFR BLD: 3 % — HIGH (ref 0–0)
MYELOCYTES NFR BLD: 4 % — HIGH (ref 0–0)
MYELOCYTES NFR BLD: 4 % — HIGH (ref 0–0)
MYELOCYTES NFR BLD: 4.4 % — HIGH (ref 0–0)
NEUTROPHILS # BLD AUTO: 0.79 K/UL — LOW (ref 1.8–7.4)
NEUTROPHILS # BLD AUTO: 0.84 K/UL — LOW (ref 1.8–7.4)
NEUTROPHILS # BLD AUTO: 0.89 K/UL — LOW (ref 1.8–7.4)
NEUTROPHILS # BLD AUTO: 0.92 K/UL — LOW (ref 1.8–7.4)
NEUTROPHILS # BLD AUTO: 0.96 K/UL — LOW (ref 1.8–7.4)
NEUTROPHILS # BLD AUTO: 0.96 K/UL — LOW (ref 1.8–7.4)
NEUTROPHILS # BLD AUTO: 1.07 K/UL — LOW (ref 1.8–7.4)
NEUTROPHILS # BLD AUTO: 1.07 K/UL — LOW (ref 1.8–7.4)
NEUTROPHILS # BLD AUTO: 1.1 K/UL — LOW (ref 1.8–7.4)
NEUTROPHILS # BLD AUTO: 1.15 K/UL — LOW (ref 1.8–7.4)
NEUTROPHILS # BLD AUTO: 1.15 K/UL — LOW (ref 1.8–7.4)
NEUTROPHILS # BLD AUTO: 1.17 K/UL — LOW (ref 1.8–7.4)
NEUTROPHILS # BLD AUTO: 1.2 K/UL — LOW (ref 1.8–7.4)
NEUTROPHILS # BLD AUTO: 1.25 K/UL — LOW (ref 1.8–7.4)
NEUTROPHILS # BLD AUTO: 1.34 K/UL — LOW (ref 1.8–7.4)
NEUTROPHILS # BLD AUTO: 1.34 K/UL — LOW (ref 1.8–7.4)
NEUTROPHILS # BLD AUTO: 1.37 K/UL — LOW (ref 1.8–7.4)
NEUTROPHILS # BLD AUTO: 1.37 K/UL — LOW (ref 1.8–7.4)
NEUTROPHILS # BLD AUTO: 1.38 K/UL — LOW (ref 1.8–7.4)
NEUTROPHILS # BLD AUTO: 1.38 K/UL — LOW (ref 1.8–7.4)
NEUTROPHILS # BLD AUTO: 1.41 K/UL — LOW (ref 1.8–7.4)
NEUTROPHILS # BLD AUTO: 1.5 K/UL — LOW (ref 1.8–7.4)
NEUTROPHILS # BLD AUTO: 1.51 K/UL — LOW (ref 1.8–7.4)
NEUTROPHILS # BLD AUTO: 1.6 K/UL — LOW (ref 1.8–7.4)
NEUTROPHILS # BLD AUTO: 1.62 K/UL — LOW (ref 1.8–7.4)
NEUTROPHILS # BLD AUTO: 1.65 K/UL — LOW (ref 1.8–7.4)
NEUTROPHILS # BLD AUTO: 1.69 K/UL — LOW (ref 1.8–7.4)
NEUTROPHILS # BLD AUTO: 1.7 K/UL — LOW (ref 1.8–7.4)
NEUTROPHILS # BLD AUTO: 1.72 K/UL — LOW (ref 1.8–7.4)
NEUTROPHILS # BLD AUTO: 1.74 K/UL — LOW (ref 1.8–7.4)
NEUTROPHILS # BLD AUTO: 1.74 K/UL — LOW (ref 1.8–7.4)
NEUTROPHILS # BLD AUTO: 1.77 K/UL — LOW (ref 1.8–7.4)
NEUTROPHILS # BLD AUTO: 1.81 K/UL — SIGNIFICANT CHANGE UP (ref 1.8–7.4)
NEUTROPHILS # BLD AUTO: 1.9 K/UL — SIGNIFICANT CHANGE UP (ref 1.8–7.4)
NEUTROPHILS # BLD AUTO: 2.07 K/UL — SIGNIFICANT CHANGE UP (ref 1.8–7.4)
NEUTROPHILS # BLD AUTO: 2.21 K/UL — SIGNIFICANT CHANGE UP (ref 1.8–7.4)
NEUTROPHILS # BLD AUTO: 2.33 K/UL — SIGNIFICANT CHANGE UP (ref 1.8–7.4)
NEUTROPHILS # BLD AUTO: 2.58 K/UL — SIGNIFICANT CHANGE UP (ref 1.8–7.4)
NEUTROPHILS # BLD AUTO: 2.74 K/UL — SIGNIFICANT CHANGE UP (ref 1.8–7.4)
NEUTROPHILS # BLD AUTO: 2.85 K/UL — SIGNIFICANT CHANGE UP (ref 1.8–7.4)
NEUTROPHILS # BLD AUTO: 2.98 K/UL — SIGNIFICANT CHANGE UP (ref 1.8–7.4)
NEUTROPHILS # BLD AUTO: 3.1 K/UL — SIGNIFICANT CHANGE UP (ref 1.8–7.4)
NEUTROPHILS # BLD AUTO: 4.75 K/UL — SIGNIFICANT CHANGE UP (ref 1.8–7.4)
NEUTROPHILS NFR BLD AUTO: 17.5 % — LOW (ref 43–77)
NEUTROPHILS NFR BLD AUTO: 22.3 % — LOW (ref 43–77)
NEUTROPHILS NFR BLD AUTO: 27 % — LOW (ref 43–77)
NEUTROPHILS NFR BLD AUTO: 27 % — LOW (ref 43–77)
NEUTROPHILS NFR BLD AUTO: 31 % — LOW (ref 43–77)
NEUTROPHILS NFR BLD AUTO: 31 % — LOW (ref 43–77)
NEUTROPHILS NFR BLD AUTO: 31.9 % — LOW (ref 43–77)
NEUTROPHILS NFR BLD AUTO: 33.4 % — LOW (ref 43–77)
NEUTROPHILS NFR BLD AUTO: 34 % — LOW (ref 43–77)
NEUTROPHILS NFR BLD AUTO: 34.3 % — LOW (ref 43–77)
NEUTROPHILS NFR BLD AUTO: 36 % — LOW (ref 43–77)
NEUTROPHILS NFR BLD AUTO: 37 % — LOW (ref 43–77)
NEUTROPHILS NFR BLD AUTO: 37 % — LOW (ref 43–77)
NEUTROPHILS NFR BLD AUTO: 38 % — LOW (ref 43–77)
NEUTROPHILS NFR BLD AUTO: 40 % — LOW (ref 43–77)
NEUTROPHILS NFR BLD AUTO: 43 % — SIGNIFICANT CHANGE UP (ref 43–77)
NEUTROPHILS NFR BLD AUTO: 43.6 % — SIGNIFICANT CHANGE UP (ref 43–77)
NEUTROPHILS NFR BLD AUTO: 46 % — SIGNIFICANT CHANGE UP (ref 43–77)
NEUTROPHILS NFR BLD AUTO: 46 % — SIGNIFICANT CHANGE UP (ref 43–77)
NEUTROPHILS NFR BLD AUTO: 47 % — SIGNIFICANT CHANGE UP (ref 43–77)
NEUTROPHILS NFR BLD AUTO: 47.8 % — SIGNIFICANT CHANGE UP (ref 43–77)
NEUTROPHILS NFR BLD AUTO: 48 % — SIGNIFICANT CHANGE UP (ref 43–77)
NEUTROPHILS NFR BLD AUTO: 50 % — SIGNIFICANT CHANGE UP (ref 43–77)
NEUTROPHILS NFR BLD AUTO: 50 % — SIGNIFICANT CHANGE UP (ref 43–77)
NEUTROPHILS NFR BLD AUTO: 51 % — SIGNIFICANT CHANGE UP (ref 43–77)
NEUTROPHILS NFR BLD AUTO: 53 % — SIGNIFICANT CHANGE UP (ref 43–77)
NEUTROPHILS NFR BLD AUTO: 54 % — SIGNIFICANT CHANGE UP (ref 43–77)
NEUTROPHILS NFR BLD AUTO: 55 % — SIGNIFICANT CHANGE UP (ref 43–77)
NEUTROPHILS NFR BLD AUTO: 56 % — SIGNIFICANT CHANGE UP (ref 43–77)
NEUTROPHILS NFR BLD AUTO: 59 % — SIGNIFICANT CHANGE UP (ref 43–77)
NEUTROPHILS NFR BLD AUTO: 60 % — SIGNIFICANT CHANGE UP (ref 43–77)
NEUTROPHILS NFR BLD AUTO: 62 % — SIGNIFICANT CHANGE UP (ref 43–77)
NEUTROPHILS NFR BLD AUTO: 63 % — SIGNIFICANT CHANGE UP (ref 43–77)
NEUTROPHILS NFR BLD AUTO: 63 % — SIGNIFICANT CHANGE UP (ref 43–77)
NEUTROPHILS NFR BLD AUTO: 66 % — SIGNIFICANT CHANGE UP (ref 43–77)
NEUTROPHILS NFR BLD AUTO: 69 % — SIGNIFICANT CHANGE UP (ref 43–77)
NEUTROPHILS NFR BLD AUTO: 69.9 % — SIGNIFICANT CHANGE UP (ref 43–77)
NEUTROPHILS NFR BLD AUTO: 71 % — SIGNIFICANT CHANGE UP (ref 43–77)
NEUTROPHILS NFR BLD AUTO: 72 % — SIGNIFICANT CHANGE UP (ref 43–77)
NEUTS BAND # BLD: 0.9 % — SIGNIFICANT CHANGE UP (ref 0–8)
NEUTS BAND # BLD: 1 % — SIGNIFICANT CHANGE UP (ref 0–8)
NEUTS BAND # BLD: 1.8 % — SIGNIFICANT CHANGE UP (ref 0–8)
NEUTS BAND # BLD: 2 % — SIGNIFICANT CHANGE UP (ref 0–8)
NEUTS BAND # BLD: 2.7 % — SIGNIFICANT CHANGE UP (ref 0–8)
NEUTS BAND # BLD: 3 % — SIGNIFICANT CHANGE UP (ref 0–8)
NEUTS BAND # BLD: 3 % — SIGNIFICANT CHANGE UP (ref 0–8)
NEUTS BAND # BLD: 5 % — SIGNIFICANT CHANGE UP (ref 0–8)
NITRITE UR-MCNC: NEGATIVE — SIGNIFICANT CHANGE UP
NRBC # BLD: 0 /100 WBCS — SIGNIFICANT CHANGE UP (ref 0–0)
NRBC # BLD: 0 /100 — SIGNIFICANT CHANGE UP (ref 0–0)
NRBC # BLD: 1 /100 WBCS — HIGH (ref 0–0)
NRBC # BLD: 1 /100 — HIGH (ref 0–0)
NRBC # BLD: 2 /100 WBCS — HIGH (ref 0–0)
NRBC # BLD: 2 /100 — HIGH (ref 0–0)
NRBC # BLD: 3 /100 WBCS — HIGH (ref 0–0)
NRBC # BLD: 3 /100 — HIGH (ref 0–0)
NRBC # BLD: 4 /100 WBCS — HIGH (ref 0–0)
NRBC # BLD: 4 /100 WBCS — HIGH (ref 0–0)
NRBC # BLD: 4 /100 — HIGH (ref 0–0)
NRBC # BLD: 5 /100 WBCS — HIGH (ref 0–0)
NRBC # BLD: SIGNIFICANT CHANGE UP /100 WBCS (ref 0–0)
NT-PROBNP SERPL-SCNC: 4076 PG/ML — HIGH (ref 0–300)
NT-PROBNP SERPL-SCNC: 6278 PG/ML — HIGH (ref 0–300)
NT-PROBNP SERPL-SCNC: 8549 PG/ML — HIGH (ref 0–300)
NT-PROBNP SERPL-SCNC: 9370 PG/ML — HIGH (ref 0–300)
NT-PROBNP SERPL-SCNC: HIGH PG/ML (ref 0–300)
ORGANISM # SPEC MICROSCOPIC CNT: SIGNIFICANT CHANGE UP
ORGANISM # SPEC MICROSCOPIC CNT: SIGNIFICANT CHANGE UP
OSMOLALITY UR: 467 MOS/KG — SIGNIFICANT CHANGE UP (ref 300–900)
OVALOCYTES BLD QL SMEAR: SLIGHT — SIGNIFICANT CHANGE UP
PAT CTL 2H: 36.3 SEC — SIGNIFICANT CHANGE UP (ref 27.5–36.5)
PCO2 BLDA: 57 MMHG — HIGH (ref 35–48)
PCO2 BLDA: 75 MMHG — CRITICAL HIGH (ref 35–48)
PCO2 BLDA: 86 MMHG — CRITICAL HIGH (ref 35–48)
PCO2 BLDV: 43 MMHG — SIGNIFICANT CHANGE UP (ref 35–50)
PCO2 BLDV: 56 MMHG — HIGH (ref 42–55)
PCO2 BLDV: 57 MMHG — HIGH (ref 42–55)
PCO2 BLDV: 78 MMHG — HIGH (ref 42–55)
PH BLDA: 7.32 — LOW (ref 7.35–7.45)
PH BLDA: 7.34 — LOW (ref 7.35–7.45)
PH BLDA: 7.37 — SIGNIFICANT CHANGE UP (ref 7.35–7.45)
PH BLDV: 7.21 — LOW (ref 7.32–7.43)
PH BLDV: 7.26 — LOW (ref 7.32–7.43)
PH BLDV: 7.3 — LOW (ref 7.32–7.43)
PH BLDV: 7.38 — SIGNIFICANT CHANGE UP (ref 7.35–7.45)
PH UR: 5 — SIGNIFICANT CHANGE UP (ref 5–8)
PH UR: 5.5 — SIGNIFICANT CHANGE UP (ref 5–8)
PH UR: 6 — SIGNIFICANT CHANGE UP (ref 5–8)
PH UR: 8 — SIGNIFICANT CHANGE UP (ref 5–8)
PHOSPHATE SERPL-MCNC: 2.3 MG/DL — LOW (ref 2.5–4.5)
PHOSPHATE SERPL-MCNC: 2.4 MG/DL — LOW (ref 2.5–4.5)
PHOSPHATE SERPL-MCNC: 2.6 MG/DL — SIGNIFICANT CHANGE UP (ref 2.5–4.5)
PHOSPHATE SERPL-MCNC: 2.6 MG/DL — SIGNIFICANT CHANGE UP (ref 2.5–4.5)
PHOSPHATE SERPL-MCNC: 2.7 MG/DL — SIGNIFICANT CHANGE UP (ref 2.5–4.5)
PHOSPHATE SERPL-MCNC: 2.7 MG/DL — SIGNIFICANT CHANGE UP (ref 2.5–4.5)
PHOSPHATE SERPL-MCNC: 2.8 MG/DL — SIGNIFICANT CHANGE UP (ref 2.5–4.5)
PHOSPHATE SERPL-MCNC: 3 MG/DL — SIGNIFICANT CHANGE UP (ref 2.5–4.5)
PHOSPHATE SERPL-MCNC: 3 MG/DL — SIGNIFICANT CHANGE UP (ref 2.5–4.5)
PHOSPHATE SERPL-MCNC: 3.1 MG/DL — SIGNIFICANT CHANGE UP (ref 2.5–4.5)
PHOSPHATE SERPL-MCNC: 3.1 MG/DL — SIGNIFICANT CHANGE UP (ref 2.5–4.5)
PHOSPHATE SERPL-MCNC: 3.2 MG/DL — SIGNIFICANT CHANGE UP (ref 2.5–4.5)
PHOSPHATE SERPL-MCNC: 3.4 MG/DL — SIGNIFICANT CHANGE UP (ref 2.5–4.5)
PHOSPHATE SERPL-MCNC: 3.7 MG/DL — SIGNIFICANT CHANGE UP (ref 2.5–4.5)
PHOSPHATE SERPL-MCNC: 3.8 MG/DL
PHOSPHATE SERPL-MCNC: 4.1 MG/DL — SIGNIFICANT CHANGE UP (ref 2.5–4.5)
PHOSPHATE SERPL-MCNC: 4.1 MG/DL — SIGNIFICANT CHANGE UP (ref 2.5–4.5)
PHOSPHATE SERPL-MCNC: 4.5 MG/DL — SIGNIFICANT CHANGE UP (ref 2.5–4.5)
PHOSPHATE SERPL-MCNC: 4.8 MG/DL
PHOSPHATE SERPL-MCNC: 5.9 MG/DL — HIGH (ref 2.5–4.5)
PHOSPHATE SERPL-MCNC: 6.6 MG/DL — HIGH (ref 2.5–4.5)
PLAT MORPH BLD: ABNORMAL
PLAT MORPH BLD: NORMAL — SIGNIFICANT CHANGE UP
PLATELET # BLD AUTO: 10 K/UL — CRITICAL LOW (ref 150–400)
PLATELET # BLD AUTO: 11 K/UL — CRITICAL LOW (ref 150–400)
PLATELET # BLD AUTO: 12 K/UL — CRITICAL LOW (ref 150–400)
PLATELET # BLD AUTO: 13 K/UL — CRITICAL LOW (ref 150–400)
PLATELET # BLD AUTO: 14 K/UL — CRITICAL LOW (ref 150–400)
PLATELET # BLD AUTO: 15 K/UL — CRITICAL LOW (ref 150–400)
PLATELET # BLD AUTO: 16 K/UL — CRITICAL LOW (ref 150–400)
PLATELET # BLD AUTO: 17 K/UL — CRITICAL LOW (ref 150–400)
PLATELET # BLD AUTO: 18 K/UL — CRITICAL LOW (ref 150–400)
PLATELET # BLD AUTO: 19 K/UL — CRITICAL LOW (ref 150–400)
PLATELET # BLD AUTO: 2 K/UL — CRITICAL LOW (ref 150–400)
PLATELET # BLD AUTO: 20 K/UL — CRITICAL LOW (ref 150–400)
PLATELET # BLD AUTO: 20 K/UL — CRITICAL LOW (ref 150–400)
PLATELET # BLD AUTO: 21 K/UL — LOW (ref 150–400)
PLATELET # BLD AUTO: 22 K/UL — LOW (ref 150–400)
PLATELET # BLD AUTO: 23 K/UL — LOW (ref 150–400)
PLATELET # BLD AUTO: 23 K/UL — LOW (ref 150–400)
PLATELET # BLD AUTO: 24 K/UL — LOW (ref 150–400)
PLATELET # BLD AUTO: 25 K/UL — LOW (ref 150–400)
PLATELET # BLD AUTO: 26 K/UL — LOW (ref 150–400)
PLATELET # BLD AUTO: 27 K/UL — LOW (ref 150–400)
PLATELET # BLD AUTO: 28 K/UL — LOW (ref 150–400)
PLATELET # BLD AUTO: 28 K/UL — LOW (ref 150–400)
PLATELET # BLD AUTO: 29 K/UL — LOW (ref 150–400)
PLATELET # BLD AUTO: 30 K/UL — LOW (ref 150–400)
PLATELET # BLD AUTO: 32 K/UL — LOW (ref 150–400)
PLATELET # BLD AUTO: 33 K/UL — LOW (ref 150–400)
PLATELET # BLD AUTO: 37 K/UL — LOW (ref 150–400)
PLATELET # BLD AUTO: 38 K/UL — LOW (ref 150–400)
PLATELET # BLD AUTO: 4 K/UL — CRITICAL LOW (ref 150–400)
PLATELET # BLD AUTO: 4 K/UL — CRITICAL LOW (ref 150–400)
PLATELET # BLD AUTO: 5 K/UL — CRITICAL LOW (ref 150–400)
PLATELET # BLD AUTO: 6 K/UL — CRITICAL LOW (ref 150–400)
PLATELET # BLD AUTO: 7 K/UL — CRITICAL LOW (ref 150–400)
PLATELET # BLD AUTO: 8 K/UL — CRITICAL LOW (ref 150–400)
PLATELET # BLD AUTO: 9 K/UL — CRITICAL LOW (ref 150–400)
PLATELET COUNT - ESTIMATE: ABNORMAL
PO2 BLDA: 138 MMHG — HIGH (ref 83–108)
PO2 BLDA: 146 MMHG — HIGH (ref 83–108)
PO2 BLDA: 91 MMHG — SIGNIFICANT CHANGE UP (ref 83–108)
PO2 BLDV: 18 MMHG — LOW (ref 25–45)
PO2 BLDV: 28 MMHG — SIGNIFICANT CHANGE UP (ref 25–45)
PO2 BLDV: 40 MMHG — SIGNIFICANT CHANGE UP (ref 25–45)
PO2 BLDV: <20 MMHG — LOW (ref 25–45)
POIKILOCYTOSIS BLD QL AUTO: SIGNIFICANT CHANGE UP
POIKILOCYTOSIS BLD QL AUTO: SLIGHT — SIGNIFICANT CHANGE UP
POLYCHROMASIA BLD QL SMEAR: SLIGHT — SIGNIFICANT CHANGE UP
POLYCHROMASIA BLD QL SMEAR: SLIGHT — SIGNIFICANT CHANGE UP
POTASSIUM BLDV-SCNC: 3.5 MMOL/L — SIGNIFICANT CHANGE UP (ref 3.5–5.3)
POTASSIUM BLDV-SCNC: 3.7 MMOL/L — SIGNIFICANT CHANGE UP (ref 3.5–5.1)
POTASSIUM BLDV-SCNC: 3.9 MMOL/L — SIGNIFICANT CHANGE UP (ref 3.5–5.1)
POTASSIUM BLDV-SCNC: 4.9 MMOL/L — SIGNIFICANT CHANGE UP (ref 3.5–5.1)
POTASSIUM SERPL-MCNC: 3.2 MMOL/L — LOW (ref 3.5–5.3)
POTASSIUM SERPL-MCNC: 3.3 MMOL/L — LOW (ref 3.5–5.3)
POTASSIUM SERPL-MCNC: 3.4 MMOL/L — LOW (ref 3.5–5.3)
POTASSIUM SERPL-MCNC: 3.4 MMOL/L — LOW (ref 3.5–5.3)
POTASSIUM SERPL-MCNC: 3.5 MMOL/L — SIGNIFICANT CHANGE UP (ref 3.5–5.3)
POTASSIUM SERPL-MCNC: 3.6 MMOL/L — SIGNIFICANT CHANGE UP (ref 3.5–5.3)
POTASSIUM SERPL-MCNC: 3.7 MMOL/L — SIGNIFICANT CHANGE UP (ref 3.5–5.3)
POTASSIUM SERPL-MCNC: 3.8 MMOL/L — SIGNIFICANT CHANGE UP (ref 3.5–5.3)
POTASSIUM SERPL-MCNC: 3.9 MMOL/L — SIGNIFICANT CHANGE UP (ref 3.5–5.3)
POTASSIUM SERPL-MCNC: 4 MMOL/L — SIGNIFICANT CHANGE UP (ref 3.5–5.3)
POTASSIUM SERPL-MCNC: 4.1 MMOL/L — SIGNIFICANT CHANGE UP (ref 3.5–5.3)
POTASSIUM SERPL-MCNC: 4.2 MMOL/L — SIGNIFICANT CHANGE UP (ref 3.5–5.3)
POTASSIUM SERPL-MCNC: 4.3 MMOL/L — SIGNIFICANT CHANGE UP (ref 3.5–5.3)
POTASSIUM SERPL-MCNC: 4.4 MMOL/L — SIGNIFICANT CHANGE UP (ref 3.5–5.3)
POTASSIUM SERPL-MCNC: 4.5 MMOL/L — SIGNIFICANT CHANGE UP (ref 3.5–5.3)
POTASSIUM SERPL-MCNC: 4.5 MMOL/L — SIGNIFICANT CHANGE UP (ref 3.5–5.3)
POTASSIUM SERPL-MCNC: 4.6 MMOL/L — SIGNIFICANT CHANGE UP (ref 3.5–5.3)
POTASSIUM SERPL-MCNC: 4.7 MMOL/L — SIGNIFICANT CHANGE UP (ref 3.5–5.3)
POTASSIUM SERPL-MCNC: 4.9 MMOL/L — SIGNIFICANT CHANGE UP (ref 3.5–5.3)
POTASSIUM SERPL-MCNC: 5.1 MMOL/L — SIGNIFICANT CHANGE UP (ref 3.5–5.3)
POTASSIUM SERPL-MCNC: 5.2 MMOL/L — SIGNIFICANT CHANGE UP (ref 3.5–5.3)
POTASSIUM SERPL-MCNC: 5.3 MMOL/L — SIGNIFICANT CHANGE UP (ref 3.5–5.3)
POTASSIUM SERPL-SCNC: 3.2 MMOL/L — LOW (ref 3.5–5.3)
POTASSIUM SERPL-SCNC: 3.3 MMOL/L — LOW (ref 3.5–5.3)
POTASSIUM SERPL-SCNC: 3.4 MMOL/L — LOW (ref 3.5–5.3)
POTASSIUM SERPL-SCNC: 3.4 MMOL/L — LOW (ref 3.5–5.3)
POTASSIUM SERPL-SCNC: 3.5 MMOL/L — SIGNIFICANT CHANGE UP (ref 3.5–5.3)
POTASSIUM SERPL-SCNC: 3.6 MMOL/L — SIGNIFICANT CHANGE UP (ref 3.5–5.3)
POTASSIUM SERPL-SCNC: 3.7 MMOL/L — SIGNIFICANT CHANGE UP (ref 3.5–5.3)
POTASSIUM SERPL-SCNC: 3.8 MMOL/L — SIGNIFICANT CHANGE UP (ref 3.5–5.3)
POTASSIUM SERPL-SCNC: 3.9 MMOL/L — SIGNIFICANT CHANGE UP (ref 3.5–5.3)
POTASSIUM SERPL-SCNC: 4 MMOL/L — SIGNIFICANT CHANGE UP (ref 3.5–5.3)
POTASSIUM SERPL-SCNC: 4.1 MMOL/L
POTASSIUM SERPL-SCNC: 4.1 MMOL/L — SIGNIFICANT CHANGE UP (ref 3.5–5.3)
POTASSIUM SERPL-SCNC: 4.2 MMOL/L — SIGNIFICANT CHANGE UP (ref 3.5–5.3)
POTASSIUM SERPL-SCNC: 4.3 MMOL/L — SIGNIFICANT CHANGE UP (ref 3.5–5.3)
POTASSIUM SERPL-SCNC: 4.4 MMOL/L — SIGNIFICANT CHANGE UP (ref 3.5–5.3)
POTASSIUM SERPL-SCNC: 4.5 MMOL/L — SIGNIFICANT CHANGE UP (ref 3.5–5.3)
POTASSIUM SERPL-SCNC: 4.5 MMOL/L — SIGNIFICANT CHANGE UP (ref 3.5–5.3)
POTASSIUM SERPL-SCNC: 4.6 MMOL/L — SIGNIFICANT CHANGE UP (ref 3.5–5.3)
POTASSIUM SERPL-SCNC: 4.7 MMOL/L — SIGNIFICANT CHANGE UP (ref 3.5–5.3)
POTASSIUM SERPL-SCNC: 4.9 MMOL/L — SIGNIFICANT CHANGE UP (ref 3.5–5.3)
POTASSIUM SERPL-SCNC: 5.1 MMOL/L
POTASSIUM SERPL-SCNC: 5.1 MMOL/L — SIGNIFICANT CHANGE UP (ref 3.5–5.3)
POTASSIUM SERPL-SCNC: 5.2 MMOL/L — SIGNIFICANT CHANGE UP (ref 3.5–5.3)
POTASSIUM SERPL-SCNC: 5.3 MMOL/L — SIGNIFICANT CHANGE UP (ref 3.5–5.3)
PROMYELOCYTES # FLD: 0.9 % — HIGH (ref 0–0)
PROMYELOCYTES # FLD: 3 % — HIGH (ref 0–0)
PROT ?TM UR-MCNC: 17 MG/DL — HIGH (ref 0–12)
PROT SERPL-MCNC: 5.1 G/DL — LOW (ref 6–8.3)
PROT SERPL-MCNC: 5.4 G/DL — LOW (ref 6–8.3)
PROT SERPL-MCNC: 5.5 G/DL — LOW (ref 6–8.3)
PROT SERPL-MCNC: 5.6 G/DL — LOW (ref 6–8.3)
PROT SERPL-MCNC: 5.7 G/DL — LOW (ref 6–8.3)
PROT SERPL-MCNC: 5.8 G/DL — LOW (ref 6–8.3)
PROT SERPL-MCNC: 5.9 G/DL
PROT SERPL-MCNC: 5.9 G/DL — LOW (ref 6–8.3)
PROT SERPL-MCNC: 6 G/DL — SIGNIFICANT CHANGE UP (ref 6–8.3)
PROT SERPL-MCNC: 6.2 G/DL — SIGNIFICANT CHANGE UP (ref 6–8.3)
PROT SERPL-MCNC: 6.4 G/DL
PROT SERPL-MCNC: 6.5 G/DL — SIGNIFICANT CHANGE UP (ref 6–8.3)
PROT UR-MCNC: ABNORMAL
PROT UR-MCNC: SIGNIFICANT CHANGE UP
PROT UR-MCNC: SIGNIFICANT CHANGE UP
PROT/CREAT UR-RTO: 0.2 RATIO — SIGNIFICANT CHANGE UP (ref 0–0.2)
PROTHROM AB SERPL-ACNC: 14.7 SEC — HIGH (ref 10.6–13.6)
PROTHROM AB SERPL-ACNC: 15 SEC — HIGH (ref 10.6–13.6)
PROTHROM AB SERPL-ACNC: 15.4 SEC — HIGH (ref 10.6–13.6)
PROTHROM AB SERPL-ACNC: 15.6 SEC — HIGH (ref 10.6–13.6)
PROTHROM AB SERPL-ACNC: 16.1 SEC — HIGH (ref 10.6–13.6)
PROTHROM AB SERPL-ACNC: 16.1 SEC — HIGH (ref 10.6–13.6)
PROTHROM AB SERPL-ACNC: 16.2 SEC — HIGH (ref 10.6–13.6)
PROTHROM AB SERPL-ACNC: 16.2 SEC — HIGH (ref 10.6–13.6)
PROTHROM AB SERPL-ACNC: 16.3 SEC — HIGH (ref 10.6–13.6)
PROTHROM AB SERPL-ACNC: 16.7 SEC — HIGH (ref 10.6–13.6)
PROTHROM AB SERPL-ACNC: 16.9 SEC — HIGH (ref 10.6–13.6)
PROTHROM AB SERPL-ACNC: 26.2 SEC — HIGH (ref 10.6–13.6)
PROTHROM AB SERPL-ACNC: 27.3 SEC — HIGH (ref 10.6–13.6)
PROTHROM AB SERPL-ACNC: 28.1 SEC — HIGH (ref 10.6–13.6)
PROTHROM AB SERPL-ACNC: 28.5 SEC — HIGH (ref 10.6–13.6)
PROTHROM AB SERPL-ACNC: 35 SEC — HIGH (ref 10.6–13.6)
PROTHROM AB SERPL-ACNC: 37.1 SEC — HIGH (ref 10.6–13.6)
PROTHROM AB SERPL-ACNC: 38 SEC — HIGH (ref 10.6–13.6)
PT 100%: 16.1 SEC — HIGH (ref 10.6–13.6)
PT 50/50: 13 SEC — SIGNIFICANT CHANGE UP (ref 10.6–14.7)
PT BLD: 13.8 SEC
RBC # BLD: 1.84 M/UL — LOW (ref 4.2–5.8)
RBC # BLD: 1.87 M/UL — LOW (ref 4.2–5.8)
RBC # BLD: 2.02 M/UL — LOW (ref 4.2–5.8)
RBC # BLD: 2.11 M/UL — LOW (ref 4.2–5.8)
RBC # BLD: 2.14 M/UL — LOW (ref 4.2–5.8)
RBC # BLD: 2.2 M/UL — LOW (ref 4.2–5.8)
RBC # BLD: 2.21 M/UL — LOW (ref 4.2–5.8)
RBC # BLD: 2.22 M/UL — LOW (ref 4.2–5.8)
RBC # BLD: 2.24 M/UL — LOW (ref 4.2–5.8)
RBC # BLD: 2.25 M/UL — LOW (ref 4.2–5.8)
RBC # BLD: 2.26 M/UL — LOW (ref 4.2–5.8)
RBC # BLD: 2.28 M/UL — LOW (ref 4.2–5.8)
RBC # BLD: 2.29 M/UL — LOW (ref 4.2–5.8)
RBC # BLD: 2.3 M/UL — LOW (ref 4.2–5.8)
RBC # BLD: 2.33 M/UL — LOW (ref 4.2–5.8)
RBC # BLD: 2.35 M/UL — LOW (ref 4.2–5.8)
RBC # BLD: 2.38 M/UL — LOW (ref 4.2–5.8)
RBC # BLD: 2.4 M/UL — LOW (ref 4.2–5.8)
RBC # BLD: 2.42 M/UL — LOW (ref 4.2–5.8)
RBC # BLD: 2.43 M/UL — LOW (ref 4.2–5.8)
RBC # BLD: 2.44 M/UL — LOW (ref 4.2–5.8)
RBC # BLD: 2.44 M/UL — LOW (ref 4.2–5.8)
RBC # BLD: 2.46 M/UL — LOW (ref 4.2–5.8)
RBC # BLD: 2.46 M/UL — LOW (ref 4.2–5.8)
RBC # BLD: 2.47 M/UL — LOW (ref 4.2–5.8)
RBC # BLD: 2.48 M/UL — LOW (ref 4.2–5.8)
RBC # BLD: 2.48 M/UL — LOW (ref 4.2–5.8)
RBC # BLD: 2.5 M/UL — LOW (ref 4.2–5.8)
RBC # BLD: 2.5 M/UL — LOW (ref 4.2–5.8)
RBC # BLD: 2.51 M/UL — LOW (ref 4.2–5.8)
RBC # BLD: 2.51 M/UL — LOW (ref 4.2–5.8)
RBC # BLD: 2.52 M/UL — LOW (ref 4.2–5.8)
RBC # BLD: 2.52 M/UL — LOW (ref 4.2–5.8)
RBC # BLD: 2.53 M/UL — LOW (ref 4.2–5.8)
RBC # BLD: 2.53 M/UL — LOW (ref 4.2–5.8)
RBC # BLD: 2.54 M/UL — LOW (ref 4.2–5.8)
RBC # BLD: 2.54 M/UL — LOW (ref 4.2–5.8)
RBC # BLD: 2.55 M/UL — LOW (ref 4.2–5.8)
RBC # BLD: 2.55 M/UL — LOW (ref 4.2–5.8)
RBC # BLD: 2.56 M/UL — LOW (ref 4.2–5.8)
RBC # BLD: 2.56 M/UL — LOW (ref 4.2–5.8)
RBC # BLD: 2.57 M/UL — LOW (ref 4.2–5.8)
RBC # BLD: 2.58 M/UL — LOW (ref 4.2–5.8)
RBC # BLD: 2.59 M/UL — LOW (ref 4.2–5.8)
RBC # BLD: 2.6 M/UL — LOW (ref 4.2–5.8)
RBC # BLD: 2.6 M/UL — LOW (ref 4.2–5.8)
RBC # BLD: 2.61 M/UL — LOW (ref 4.2–5.8)
RBC # BLD: 2.62 M/UL — LOW (ref 4.2–5.8)
RBC # BLD: 2.62 M/UL — LOW (ref 4.2–5.8)
RBC # BLD: 2.64 M/UL — LOW (ref 4.2–5.8)
RBC # BLD: 2.65 M/UL — LOW (ref 4.2–5.8)
RBC # BLD: 2.66 M/UL — LOW (ref 4.2–5.8)
RBC # BLD: 2.66 M/UL — LOW (ref 4.2–5.8)
RBC # BLD: 2.67 M/UL — LOW (ref 4.2–5.8)
RBC # BLD: 2.69 M/UL — LOW (ref 4.2–5.8)
RBC # BLD: 2.7 M/UL — LOW (ref 4.2–5.8)
RBC # BLD: 2.71 M/UL — LOW (ref 4.2–5.8)
RBC # BLD: 2.72 M/UL — LOW (ref 4.2–5.8)
RBC # BLD: 2.74 M/UL — LOW (ref 4.2–5.8)
RBC # BLD: 2.75 M/UL — LOW (ref 4.2–5.8)
RBC # BLD: 2.76 M/UL — LOW (ref 4.2–5.8)
RBC # BLD: 2.76 M/UL — LOW (ref 4.2–5.8)
RBC # BLD: 2.77 M/UL — LOW (ref 4.2–5.8)
RBC # BLD: 2.79 M/UL — LOW (ref 4.2–5.8)
RBC # BLD: 2.8 M/UL — LOW (ref 4.2–5.8)
RBC # BLD: 2.85 M/UL — LOW (ref 4.2–5.8)
RBC # BLD: 2.87 M/UL — LOW (ref 4.2–5.8)
RBC # BLD: 2.87 M/UL — LOW (ref 4.2–5.8)
RBC # BLD: 2.89 M/UL — LOW (ref 4.2–5.8)
RBC # BLD: 2.89 M/UL — LOW (ref 4.2–5.8)
RBC # BLD: 2.91 M/UL — LOW (ref 4.2–5.8)
RBC # BLD: 2.91 M/UL — LOW (ref 4.2–5.8)
RBC # BLD: 2.92 M/UL — LOW (ref 4.2–5.8)
RBC # BLD: 2.93 M/UL — LOW (ref 4.2–5.8)
RBC # BLD: 2.94 M/UL — LOW (ref 4.2–5.8)
RBC # BLD: 2.96 M/UL — LOW (ref 4.2–5.8)
RBC # BLD: 2.97 M/UL — LOW (ref 4.2–5.8)
RBC # BLD: 2.99 M/UL — LOW (ref 4.2–5.8)
RBC # BLD: 3 M/UL — LOW (ref 4.2–5.8)
RBC # BLD: 3.09 M/UL — LOW (ref 4.2–5.8)
RBC # BLD: 3.1 M/UL — LOW (ref 4.2–5.8)
RBC # BLD: 3.1 M/UL — LOW (ref 4.2–5.8)
RBC # BLD: 3.12 M/UL — LOW (ref 4.2–5.8)
RBC # BLD: 3.15 M/UL — LOW (ref 4.2–5.8)
RBC # BLD: 3.15 M/UL — LOW (ref 4.2–5.8)
RBC # BLD: 3.17 M/UL — LOW (ref 4.2–5.8)
RBC # BLD: 3.17 M/UL — LOW (ref 4.2–5.8)
RBC # BLD: 3.2 M/UL — LOW (ref 4.2–5.8)
RBC # BLD: 3.22 M/UL — LOW (ref 4.2–5.8)
RBC # BLD: 3.23 M/UL — LOW (ref 4.2–5.8)
RBC # BLD: 3.24 M/UL — LOW (ref 4.2–5.8)
RBC # BLD: 3.26 M/UL — LOW (ref 4.2–5.8)
RBC # BLD: 3.27 M/UL — LOW (ref 4.2–5.8)
RBC # BLD: 3.29 M/UL — LOW (ref 4.2–5.8)
RBC # BLD: 3.66 M/UL — LOW (ref 4.2–5.8)
RBC # FLD: 13.2 % — SIGNIFICANT CHANGE UP (ref 10.3–14.5)
RBC # FLD: 13.3 % — SIGNIFICANT CHANGE UP (ref 10.3–14.5)
RBC # FLD: 13.4 % — SIGNIFICANT CHANGE UP (ref 10.3–14.5)
RBC # FLD: 13.5 % — SIGNIFICANT CHANGE UP (ref 10.3–14.5)
RBC # FLD: 13.5 % — SIGNIFICANT CHANGE UP (ref 10.3–14.5)
RBC # FLD: 13.6 % — SIGNIFICANT CHANGE UP (ref 10.3–14.5)
RBC # FLD: 13.8 % — SIGNIFICANT CHANGE UP (ref 10.3–14.5)
RBC # FLD: 13.9 % — SIGNIFICANT CHANGE UP (ref 10.3–14.5)
RBC # FLD: 14 % — SIGNIFICANT CHANGE UP (ref 10.3–14.5)
RBC # FLD: 14.1 % — SIGNIFICANT CHANGE UP (ref 10.3–14.5)
RBC # FLD: 14.2 % — SIGNIFICANT CHANGE UP (ref 10.3–14.5)
RBC # FLD: 14.3 % — SIGNIFICANT CHANGE UP (ref 10.3–14.5)
RBC # FLD: 14.3 % — SIGNIFICANT CHANGE UP (ref 10.3–14.5)
RBC # FLD: 14.4 % — SIGNIFICANT CHANGE UP (ref 10.3–14.5)
RBC # FLD: 14.4 % — SIGNIFICANT CHANGE UP (ref 10.3–14.5)
RBC # FLD: 14.5 % — SIGNIFICANT CHANGE UP (ref 10.3–14.5)
RBC # FLD: 14.6 % — HIGH (ref 10.3–14.5)
RBC # FLD: 14.7 % — HIGH (ref 10.3–14.5)
RBC # FLD: 14.8 % — HIGH (ref 10.3–14.5)
RBC # FLD: 14.9 % — HIGH (ref 10.3–14.5)
RBC # FLD: 15 % — HIGH (ref 10.3–14.5)
RBC # FLD: 15.1 % — HIGH (ref 10.3–14.5)
RBC # FLD: 15.1 % — HIGH (ref 10.3–14.5)
RBC # FLD: 15.2 % — HIGH (ref 10.3–14.5)
RBC # FLD: 15.3 % — HIGH (ref 10.3–14.5)
RBC # FLD: 15.3 % — HIGH (ref 10.3–14.5)
RBC # FLD: 15.4 % — HIGH (ref 10.3–14.5)
RBC # FLD: 15.6 % — HIGH (ref 10.3–14.5)
RBC # FLD: 15.6 % — HIGH (ref 10.3–14.5)
RBC # FLD: 15.7 % — HIGH (ref 10.3–14.5)
RBC # FLD: 15.8 % — HIGH (ref 10.3–14.5)
RBC # FLD: 15.8 % — HIGH (ref 10.3–14.5)
RBC # FLD: 15.9 % — HIGH (ref 10.3–14.5)
RBC # FLD: 16 % — HIGH (ref 10.3–14.5)
RBC # FLD: 16.1 % — HIGH (ref 10.3–14.5)
RBC # FLD: 16.2 % — HIGH (ref 10.3–14.5)
RBC # FLD: 16.3 % — HIGH (ref 10.3–14.5)
RBC # FLD: 16.6 % — HIGH (ref 10.3–14.5)
RBC # FLD: 16.8 % — HIGH (ref 10.3–14.5)
RBC # FLD: 17.2 % — HIGH (ref 10.3–14.5)
RBC # FLD: 17.3 % — HIGH (ref 10.3–14.5)
RBC # FLD: 17.4 % — HIGH (ref 10.3–14.5)
RBC # FLD: 17.4 % — HIGH (ref 10.3–14.5)
RBC # FLD: 17.5 % — HIGH (ref 10.3–14.5)
RBC # FLD: 20 % — HIGH (ref 10.3–14.5)
RBC # FLD: 22.3 % — HIGH (ref 10.3–14.5)
RBC # FLD: 22.6 % — HIGH (ref 10.3–14.5)
RBC # FLD: 22.7 % — HIGH (ref 10.3–14.5)
RBC # FLD: 23.4 % — HIGH (ref 10.3–14.5)
RBC # FLD: 23.8 % — HIGH (ref 10.3–14.5)
RBC # FLD: 23.9 % — HIGH (ref 10.3–14.5)
RBC # FLD: 24.4 % — HIGH (ref 10.3–14.5)
RBC BLD AUTO: ABNORMAL
RBC BLD AUTO: SIGNIFICANT CHANGE UP
RBC CASTS # UR COMP ASSIST: 1 /HPF — SIGNIFICANT CHANGE UP (ref 0–4)
RBC CASTS # UR COMP ASSIST: 1865 /HPF — HIGH (ref 0–4)
RBC CASTS # UR COMP ASSIST: 3 /HPF — SIGNIFICANT CHANGE UP (ref 0–4)
RBC CASTS # UR COMP ASSIST: 561 /HPF — HIGH (ref 0–4)
RBC CASTS # UR COMP ASSIST: 78 /HPF — HIGH (ref 0–4)
RETICS #: 15.5 K/UL — LOW (ref 25–125)
RETICS #: 27.1 K/UL — SIGNIFICANT CHANGE UP (ref 25–125)
RETICS #: 5.8 K/UL — LOW (ref 25–125)
RETICS/RBC NFR: 0.2 % — LOW (ref 0.5–2.5)
RETICS/RBC NFR: 0.6 % — SIGNIFICANT CHANGE UP (ref 0.5–2.5)
RETICS/RBC NFR: 1.1 % — SIGNIFICANT CHANGE UP (ref 0.5–2.5)
RH IG SCN BLD-IMP: POSITIVE — SIGNIFICANT CHANGE UP
SAO2 % BLDA: 98.8 % — HIGH (ref 94–98)
SAO2 % BLDA: 99.5 % — HIGH (ref 94–98)
SAO2 % BLDA: 99.6 % — HIGH (ref 94–98)
SAO2 % BLDV: 22 % — LOW (ref 67–88)
SAO2 % BLDV: 23.5 % — LOW (ref 67–88)
SAO2 % BLDV: 47 % — LOW (ref 67–88)
SAO2 % BLDV: 66.9 % — LOW (ref 67–88)
SARS-COV-2 IGG+IGM SERPL QL IA: 230 U/ML — HIGH
SARS-COV-2 IGG+IGM SERPL QL IA: 95.8 U/ML — HIGH
SARS-COV-2 IGG+IGM SERPL QL IA: >250 U/ML — HIGH
SARS-COV-2 IGG+IGM SERPL QL IA: >250 U/ML — HIGH
SARS-COV-2 IGG+IGM SERPL QL IA: POSITIVE
SARS-COV-2 RNA SPEC QL NAA+PROBE: SIGNIFICANT CHANGE UP
SCHISTOCYTES BLD QL AUTO: SLIGHT — SIGNIFICANT CHANGE UP
SMUDGE CELLS # BLD: PRESENT — SIGNIFICANT CHANGE UP
SMUDGE CELLS # BLD: PRESENT — SIGNIFICANT CHANGE UP
SODIUM SERPL-SCNC: 133 MMOL/L — LOW (ref 135–145)
SODIUM SERPL-SCNC: 135 MMOL/L — SIGNIFICANT CHANGE UP (ref 135–145)
SODIUM SERPL-SCNC: 137 MMOL/L — SIGNIFICANT CHANGE UP (ref 135–145)
SODIUM SERPL-SCNC: 137 MMOL/L — SIGNIFICANT CHANGE UP (ref 135–145)
SODIUM SERPL-SCNC: 139 MMOL/L — SIGNIFICANT CHANGE UP (ref 135–145)
SODIUM SERPL-SCNC: 140 MMOL/L — SIGNIFICANT CHANGE UP (ref 135–145)
SODIUM SERPL-SCNC: 141 MMOL/L — SIGNIFICANT CHANGE UP (ref 135–145)
SODIUM SERPL-SCNC: 141 MMOL/L — SIGNIFICANT CHANGE UP (ref 135–145)
SODIUM SERPL-SCNC: 142 MMOL/L — SIGNIFICANT CHANGE UP (ref 135–145)
SODIUM SERPL-SCNC: 143 MMOL/L — SIGNIFICANT CHANGE UP (ref 135–145)
SODIUM SERPL-SCNC: 144 MMOL/L — SIGNIFICANT CHANGE UP (ref 135–145)
SODIUM SERPL-SCNC: 145 MMOL/L
SODIUM SERPL-SCNC: 145 MMOL/L
SODIUM SERPL-SCNC: 145 MMOL/L — SIGNIFICANT CHANGE UP (ref 135–145)
SODIUM SERPL-SCNC: 146 MMOL/L — HIGH (ref 135–145)
SODIUM SERPL-SCNC: 147 MMOL/L — HIGH (ref 135–145)
SODIUM SERPL-SCNC: 148 MMOL/L — HIGH (ref 135–145)
SODIUM SERPL-SCNC: 149 MMOL/L — HIGH (ref 135–145)
SODIUM SERPL-SCNC: 150 MMOL/L — HIGH (ref 135–145)
SODIUM SERPL-SCNC: 150 MMOL/L — HIGH (ref 135–145)
SODIUM SERPL-SCNC: 152 MMOL/L — HIGH (ref 135–145)
SODIUM SERPL-SCNC: 152 MMOL/L — HIGH (ref 135–145)
SODIUM SERPL-SCNC: 153 MMOL/L — HIGH (ref 135–145)
SODIUM SERPL-SCNC: 154 MMOL/L — HIGH (ref 135–145)
SODIUM SERPL-SCNC: 154 MMOL/L — HIGH (ref 135–145)
SODIUM SERPL-SCNC: 155 MMOL/L — HIGH (ref 135–145)
SODIUM SERPL-SCNC: 156 MMOL/L — HIGH (ref 135–145)
SODIUM UR-SCNC: 64 MMOL/L — SIGNIFICANT CHANGE UP
SODIUM UR-SCNC: 98 MMOL/L — SIGNIFICANT CHANGE UP
SP GR SPEC: 1.01 — SIGNIFICANT CHANGE UP (ref 1.01–1.02)
SP GR SPEC: 1.02 — SIGNIFICANT CHANGE UP (ref 1.01–1.02)
SPECIMEN SOURCE: SIGNIFICANT CHANGE UP
SPHEROCYTES BLD QL SMEAR: SLIGHT — SIGNIFICANT CHANGE UP
TARGETS BLD QL SMEAR: SLIGHT — SIGNIFICANT CHANGE UP
TARGETS BLD QL SMEAR: SLIGHT — SIGNIFICANT CHANGE UP
THROMBIN TIME: 31.5 SEC — HIGH (ref 16–25)
TOXIC GRANULES BLD QL SMEAR: PRESENT — SIGNIFICANT CHANGE UP
TRANSFUSION REACTION INTERP 1: SIGNIFICANT CHANGE UP
TRANSFUSION REACTION INTERP 2: SIGNIFICANT CHANGE UP
TROPONIN T, HIGH SENSITIVITY RESULT: 19 NG/L — SIGNIFICANT CHANGE UP (ref 0–51)
TROPONIN T, HIGH SENSITIVITY RESULT: 22 NG/L — SIGNIFICANT CHANGE UP (ref 0–51)
TROPONIN T, HIGH SENSITIVITY RESULT: 24 NG/L — SIGNIFICANT CHANGE UP (ref 0–51)
TROPONIN T, HIGH SENSITIVITY RESULT: 25 NG/L — SIGNIFICANT CHANGE UP (ref 0–51)
TRYPTASE SERPL-MCNC: 17.8 UG/L — HIGH (ref 2.2–13.2)
URATE SERPL-MCNC: 5.1 MG/DL — SIGNIFICANT CHANGE UP (ref 3.4–8.8)
URATE SERPL-MCNC: 5.3 MG/DL — SIGNIFICANT CHANGE UP (ref 3.4–8.8)
URATE SERPL-MCNC: 5.4 MG/DL — SIGNIFICANT CHANGE UP (ref 3.4–8.8)
URATE SERPL-MCNC: 5.4 MG/DL — SIGNIFICANT CHANGE UP (ref 3.4–8.8)
URATE SERPL-MCNC: 5.6 MG/DL — SIGNIFICANT CHANGE UP (ref 3.4–8.8)
URATE SERPL-MCNC: 5.6 MG/DL — SIGNIFICANT CHANGE UP (ref 3.4–8.8)
URATE SERPL-MCNC: 5.7 MG/DL — SIGNIFICANT CHANGE UP (ref 3.4–8.8)
URATE SERPL-MCNC: 5.9 MG/DL — SIGNIFICANT CHANGE UP (ref 3.4–8.8)
URATE SERPL-MCNC: 6 MG/DL — SIGNIFICANT CHANGE UP (ref 3.4–8.8)
URATE SERPL-MCNC: 6 MG/DL — SIGNIFICANT CHANGE UP (ref 3.4–8.8)
URATE SERPL-MCNC: 6.1 MG/DL — SIGNIFICANT CHANGE UP (ref 3.4–8.8)
URATE SERPL-MCNC: 6.1 MG/DL — SIGNIFICANT CHANGE UP (ref 3.4–8.8)
URATE SERPL-MCNC: 6.2 MG/DL — SIGNIFICANT CHANGE UP (ref 3.4–8.8)
UROBILINOGEN FLD QL: NEGATIVE — SIGNIFICANT CHANGE UP
WBC # BLD: 1.54 K/UL — LOW (ref 3.8–10.5)
WBC # BLD: 1.89 K/UL — LOW (ref 3.8–10.5)
WBC # BLD: 1.9 K/UL — LOW (ref 3.8–10.5)
WBC # BLD: 1.91 K/UL — LOW (ref 3.8–10.5)
WBC # BLD: 1.91 K/UL — LOW (ref 3.8–10.5)
WBC # BLD: 1.95 K/UL — LOW (ref 3.8–10.5)
WBC # BLD: 1.98 K/UL — LOW (ref 3.8–10.5)
WBC # BLD: 2.01 K/UL — LOW (ref 3.8–10.5)
WBC # BLD: 2.01 K/UL — LOW (ref 3.8–10.5)
WBC # BLD: 2.07 K/UL — LOW (ref 3.8–10.5)
WBC # BLD: 2.1 K/UL — LOW (ref 3.8–10.5)
WBC # BLD: 2.13 K/UL — LOW (ref 3.8–10.5)
WBC # BLD: 2.14 K/UL — LOW (ref 3.8–10.5)
WBC # BLD: 2.22 K/UL — LOW (ref 3.8–10.5)
WBC # BLD: 2.22 K/UL — LOW (ref 3.8–10.5)
WBC # BLD: 2.23 K/UL — LOW (ref 3.8–10.5)
WBC # BLD: 2.33 K/UL — LOW (ref 3.8–10.5)
WBC # BLD: 2.36 K/UL — LOW (ref 3.8–10.5)
WBC # BLD: 2.41 K/UL — LOW (ref 3.8–10.5)
WBC # BLD: 2.41 K/UL — LOW (ref 3.8–10.5)
WBC # BLD: 2.43 K/UL — LOW (ref 3.8–10.5)
WBC # BLD: 2.43 K/UL — LOW (ref 3.8–10.5)
WBC # BLD: 2.44 K/UL — LOW (ref 3.8–10.5)
WBC # BLD: 2.45 K/UL — LOW (ref 3.8–10.5)
WBC # BLD: 2.46 K/UL — LOW (ref 3.8–10.5)
WBC # BLD: 2.5 K/UL — LOW (ref 3.8–10.5)
WBC # BLD: 2.52 K/UL — LOW (ref 3.8–10.5)
WBC # BLD: 2.52 K/UL — LOW (ref 3.8–10.5)
WBC # BLD: 2.54 K/UL — LOW (ref 3.8–10.5)
WBC # BLD: 2.59 K/UL — LOW (ref 3.8–10.5)
WBC # BLD: 2.6 K/UL — LOW (ref 3.8–10.5)
WBC # BLD: 2.6 K/UL — LOW (ref 3.8–10.5)
WBC # BLD: 2.61 K/UL — LOW (ref 3.8–10.5)
WBC # BLD: 2.63 K/UL — LOW (ref 3.8–10.5)
WBC # BLD: 2.67 K/UL — LOW (ref 3.8–10.5)
WBC # BLD: 2.71 K/UL — LOW (ref 3.8–10.5)
WBC # BLD: 2.71 K/UL — LOW (ref 3.8–10.5)
WBC # BLD: 2.74 K/UL — LOW (ref 3.8–10.5)
WBC # BLD: 2.75 K/UL — LOW (ref 3.8–10.5)
WBC # BLD: 2.75 K/UL — LOW (ref 3.8–10.5)
WBC # BLD: 2.76 K/UL — LOW (ref 3.8–10.5)
WBC # BLD: 2.77 K/UL — LOW (ref 3.8–10.5)
WBC # BLD: 2.78 K/UL — LOW (ref 3.8–10.5)
WBC # BLD: 2.78 K/UL — LOW (ref 3.8–10.5)
WBC # BLD: 2.8 K/UL — LOW (ref 3.8–10.5)
WBC # BLD: 2.83 K/UL — LOW (ref 3.8–10.5)
WBC # BLD: 2.86 K/UL — LOW (ref 3.8–10.5)
WBC # BLD: 2.89 K/UL — LOW (ref 3.8–10.5)
WBC # BLD: 2.95 K/UL — LOW (ref 3.8–10.5)
WBC # BLD: 2.96 K/UL — LOW (ref 3.8–10.5)
WBC # BLD: 2.96 K/UL — LOW (ref 3.8–10.5)
WBC # BLD: 2.97 K/UL — LOW (ref 3.8–10.5)
WBC # BLD: 2.97 K/UL — LOW (ref 3.8–10.5)
WBC # BLD: 3 K/UL — LOW (ref 3.8–10.5)
WBC # BLD: 3.01 K/UL — LOW (ref 3.8–10.5)
WBC # BLD: 3.04 K/UL — LOW (ref 3.8–10.5)
WBC # BLD: 3.05 K/UL — LOW (ref 3.8–10.5)
WBC # BLD: 3.09 K/UL — LOW (ref 3.8–10.5)
WBC # BLD: 3.1 K/UL — LOW (ref 3.8–10.5)
WBC # BLD: 3.13 K/UL — LOW (ref 3.8–10.5)
WBC # BLD: 3.25 K/UL — LOW (ref 3.8–10.5)
WBC # BLD: 3.31 K/UL — LOW (ref 3.8–10.5)
WBC # BLD: 3.33 K/UL — LOW (ref 3.8–10.5)
WBC # BLD: 3.37 K/UL — LOW (ref 3.8–10.5)
WBC # BLD: 3.37 K/UL — LOW (ref 3.8–10.5)
WBC # BLD: 3.41 K/UL — LOW (ref 3.8–10.5)
WBC # BLD: 3.43 K/UL — LOW (ref 3.8–10.5)
WBC # BLD: 3.46 K/UL — LOW (ref 3.8–10.5)
WBC # BLD: 3.47 K/UL — LOW (ref 3.8–10.5)
WBC # BLD: 3.53 K/UL — LOW (ref 3.8–10.5)
WBC # BLD: 3.54 K/UL — LOW (ref 3.8–10.5)
WBC # BLD: 3.55 K/UL — LOW (ref 3.8–10.5)
WBC # BLD: 3.58 K/UL — LOW (ref 3.8–10.5)
WBC # BLD: 3.65 K/UL — LOW (ref 3.8–10.5)
WBC # BLD: 3.71 K/UL — LOW (ref 3.8–10.5)
WBC # BLD: 3.71 K/UL — LOW (ref 3.8–10.5)
WBC # BLD: 3.73 K/UL — LOW (ref 3.8–10.5)
WBC # BLD: 3.8 K/UL — SIGNIFICANT CHANGE UP (ref 3.8–10.5)
WBC # BLD: 3.86 K/UL — SIGNIFICANT CHANGE UP (ref 3.8–10.5)
WBC # BLD: 3.9 K/UL — SIGNIFICANT CHANGE UP (ref 3.8–10.5)
WBC # BLD: 3.9 K/UL — SIGNIFICANT CHANGE UP (ref 3.8–10.5)
WBC # BLD: 3.96 K/UL — SIGNIFICANT CHANGE UP (ref 3.8–10.5)
WBC # BLD: 4.01 K/UL — SIGNIFICANT CHANGE UP (ref 3.8–10.5)
WBC # BLD: 4.07 K/UL — SIGNIFICANT CHANGE UP (ref 3.8–10.5)
WBC # BLD: 4.32 K/UL — SIGNIFICANT CHANGE UP (ref 3.8–10.5)
WBC # BLD: 4.38 K/UL — SIGNIFICANT CHANGE UP (ref 3.8–10.5)
WBC # BLD: 4.38 K/UL — SIGNIFICANT CHANGE UP (ref 3.8–10.5)
WBC # BLD: 4.41 K/UL — SIGNIFICANT CHANGE UP (ref 3.8–10.5)
WBC # BLD: 4.55 K/UL — SIGNIFICANT CHANGE UP (ref 3.8–10.5)
WBC # BLD: 4.62 K/UL — SIGNIFICANT CHANGE UP (ref 3.8–10.5)
WBC # BLD: 4.66 K/UL — SIGNIFICANT CHANGE UP (ref 3.8–10.5)
WBC # BLD: 4.74 K/UL — SIGNIFICANT CHANGE UP (ref 3.8–10.5)
WBC # BLD: 4.81 K/UL — SIGNIFICANT CHANGE UP (ref 3.8–10.5)
WBC # BLD: 5.14 K/UL — SIGNIFICANT CHANGE UP (ref 3.8–10.5)
WBC # BLD: 5.16 K/UL — SIGNIFICANT CHANGE UP (ref 3.8–10.5)
WBC # BLD: 5.28 K/UL — SIGNIFICANT CHANGE UP (ref 3.8–10.5)
WBC # BLD: 5.28 K/UL — SIGNIFICANT CHANGE UP (ref 3.8–10.5)
WBC # BLD: 5.41 K/UL — SIGNIFICANT CHANGE UP (ref 3.8–10.5)
WBC # BLD: 5.52 K/UL — SIGNIFICANT CHANGE UP (ref 3.8–10.5)
WBC # BLD: 5.74 K/UL — SIGNIFICANT CHANGE UP (ref 3.8–10.5)
WBC # BLD: 5.92 K/UL — SIGNIFICANT CHANGE UP (ref 3.8–10.5)
WBC # BLD: 6.07 K/UL — SIGNIFICANT CHANGE UP (ref 3.8–10.5)
WBC # BLD: 6.16 K/UL — SIGNIFICANT CHANGE UP (ref 3.8–10.5)
WBC # BLD: 6.3 K/UL — SIGNIFICANT CHANGE UP (ref 3.8–10.5)
WBC # BLD: 6.48 K/UL — SIGNIFICANT CHANGE UP (ref 3.8–10.5)
WBC # BLD: 6.58 K/UL — SIGNIFICANT CHANGE UP (ref 3.8–10.5)
WBC # BLD: 6.59 K/UL — SIGNIFICANT CHANGE UP (ref 3.8–10.5)
WBC # BLD: 7.49 K/UL — SIGNIFICANT CHANGE UP (ref 3.8–10.5)
WBC # BLD: 7.92 K/UL — SIGNIFICANT CHANGE UP (ref 3.8–10.5)
WBC # FLD AUTO: 1.54 K/UL — LOW (ref 3.8–10.5)
WBC # FLD AUTO: 1.89 K/UL — LOW (ref 3.8–10.5)
WBC # FLD AUTO: 1.9 K/UL — LOW (ref 3.8–10.5)
WBC # FLD AUTO: 1.91 K/UL — LOW (ref 3.8–10.5)
WBC # FLD AUTO: 1.91 K/UL — LOW (ref 3.8–10.5)
WBC # FLD AUTO: 1.95 K/UL — LOW (ref 3.8–10.5)
WBC # FLD AUTO: 1.98 K/UL — LOW (ref 3.8–10.5)
WBC # FLD AUTO: 2.01 K/UL — LOW (ref 3.8–10.5)
WBC # FLD AUTO: 2.01 K/UL — LOW (ref 3.8–10.5)
WBC # FLD AUTO: 2.07 K/UL — LOW (ref 3.8–10.5)
WBC # FLD AUTO: 2.1 K/UL — LOW (ref 3.8–10.5)
WBC # FLD AUTO: 2.13 K/UL — LOW (ref 3.8–10.5)
WBC # FLD AUTO: 2.14 K/UL — LOW (ref 3.8–10.5)
WBC # FLD AUTO: 2.22 K/UL — LOW (ref 3.8–10.5)
WBC # FLD AUTO: 2.22 K/UL — LOW (ref 3.8–10.5)
WBC # FLD AUTO: 2.23 K/UL — LOW (ref 3.8–10.5)
WBC # FLD AUTO: 2.33 K/UL — LOW (ref 3.8–10.5)
WBC # FLD AUTO: 2.36 K/UL — LOW (ref 3.8–10.5)
WBC # FLD AUTO: 2.41 K/UL — LOW (ref 3.8–10.5)
WBC # FLD AUTO: 2.41 K/UL — LOW (ref 3.8–10.5)
WBC # FLD AUTO: 2.43 K/UL — LOW (ref 3.8–10.5)
WBC # FLD AUTO: 2.43 K/UL — LOW (ref 3.8–10.5)
WBC # FLD AUTO: 2.44 K/UL — LOW (ref 3.8–10.5)
WBC # FLD AUTO: 2.45 K/UL — LOW (ref 3.8–10.5)
WBC # FLD AUTO: 2.46 K/UL — LOW (ref 3.8–10.5)
WBC # FLD AUTO: 2.5 K/UL — LOW (ref 3.8–10.5)
WBC # FLD AUTO: 2.52 K/UL — LOW (ref 3.8–10.5)
WBC # FLD AUTO: 2.52 K/UL — LOW (ref 3.8–10.5)
WBC # FLD AUTO: 2.54 K/UL — LOW (ref 3.8–10.5)
WBC # FLD AUTO: 2.59 K/UL — LOW (ref 3.8–10.5)
WBC # FLD AUTO: 2.6 K/UL — LOW (ref 3.8–10.5)
WBC # FLD AUTO: 2.6 K/UL — LOW (ref 3.8–10.5)
WBC # FLD AUTO: 2.61 K/UL — LOW (ref 3.8–10.5)
WBC # FLD AUTO: 2.63 K/UL — LOW (ref 3.8–10.5)
WBC # FLD AUTO: 2.67 K/UL — LOW (ref 3.8–10.5)
WBC # FLD AUTO: 2.71 K/UL — LOW (ref 3.8–10.5)
WBC # FLD AUTO: 2.71 K/UL — LOW (ref 3.8–10.5)
WBC # FLD AUTO: 2.74 K/UL — LOW (ref 3.8–10.5)
WBC # FLD AUTO: 2.75 K/UL — LOW (ref 3.8–10.5)
WBC # FLD AUTO: 2.75 K/UL — LOW (ref 3.8–10.5)
WBC # FLD AUTO: 2.76 K/UL — LOW (ref 3.8–10.5)
WBC # FLD AUTO: 2.77 K/UL — LOW (ref 3.8–10.5)
WBC # FLD AUTO: 2.78 K/UL — LOW (ref 3.8–10.5)
WBC # FLD AUTO: 2.78 K/UL — LOW (ref 3.8–10.5)
WBC # FLD AUTO: 2.8 K/UL — LOW (ref 3.8–10.5)
WBC # FLD AUTO: 2.83 K/UL — LOW (ref 3.8–10.5)
WBC # FLD AUTO: 2.86 K/UL — LOW (ref 3.8–10.5)
WBC # FLD AUTO: 2.89 K/UL — LOW (ref 3.8–10.5)
WBC # FLD AUTO: 2.95 K/UL — LOW (ref 3.8–10.5)
WBC # FLD AUTO: 2.96 K/UL — LOW (ref 3.8–10.5)
WBC # FLD AUTO: 2.96 K/UL — LOW (ref 3.8–10.5)
WBC # FLD AUTO: 2.97 K/UL — LOW (ref 3.8–10.5)
WBC # FLD AUTO: 2.97 K/UL — LOW (ref 3.8–10.5)
WBC # FLD AUTO: 3 K/UL — LOW (ref 3.8–10.5)
WBC # FLD AUTO: 3.01 K/UL — LOW (ref 3.8–10.5)
WBC # FLD AUTO: 3.04 K/UL — LOW (ref 3.8–10.5)
WBC # FLD AUTO: 3.05 K/UL — LOW (ref 3.8–10.5)
WBC # FLD AUTO: 3.09 K/UL — LOW (ref 3.8–10.5)
WBC # FLD AUTO: 3.1 K/UL — LOW (ref 3.8–10.5)
WBC # FLD AUTO: 3.13 K/UL — LOW (ref 3.8–10.5)
WBC # FLD AUTO: 3.25 K/UL — LOW (ref 3.8–10.5)
WBC # FLD AUTO: 3.31 K/UL — LOW (ref 3.8–10.5)
WBC # FLD AUTO: 3.33 K/UL — LOW (ref 3.8–10.5)
WBC # FLD AUTO: 3.37 K/UL — LOW (ref 3.8–10.5)
WBC # FLD AUTO: 3.37 K/UL — LOW (ref 3.8–10.5)
WBC # FLD AUTO: 3.41 K/UL — LOW (ref 3.8–10.5)
WBC # FLD AUTO: 3.43 K/UL — LOW (ref 3.8–10.5)
WBC # FLD AUTO: 3.46 K/UL — LOW (ref 3.8–10.5)
WBC # FLD AUTO: 3.47 K/UL — LOW (ref 3.8–10.5)
WBC # FLD AUTO: 3.53 K/UL — LOW (ref 3.8–10.5)
WBC # FLD AUTO: 3.54 K/UL — LOW (ref 3.8–10.5)
WBC # FLD AUTO: 3.55 K/UL — LOW (ref 3.8–10.5)
WBC # FLD AUTO: 3.58 K/UL — LOW (ref 3.8–10.5)
WBC # FLD AUTO: 3.65 K/UL — LOW (ref 3.8–10.5)
WBC # FLD AUTO: 3.71 K/UL — LOW (ref 3.8–10.5)
WBC # FLD AUTO: 3.71 K/UL — LOW (ref 3.8–10.5)
WBC # FLD AUTO: 3.73 K/UL — LOW (ref 3.8–10.5)
WBC # FLD AUTO: 3.8 K/UL — SIGNIFICANT CHANGE UP (ref 3.8–10.5)
WBC # FLD AUTO: 3.86 K/UL — SIGNIFICANT CHANGE UP (ref 3.8–10.5)
WBC # FLD AUTO: 3.9 K/UL — SIGNIFICANT CHANGE UP (ref 3.8–10.5)
WBC # FLD AUTO: 3.9 K/UL — SIGNIFICANT CHANGE UP (ref 3.8–10.5)
WBC # FLD AUTO: 3.96 K/UL — SIGNIFICANT CHANGE UP (ref 3.8–10.5)
WBC # FLD AUTO: 4.01 K/UL — SIGNIFICANT CHANGE UP (ref 3.8–10.5)
WBC # FLD AUTO: 4.07 K/UL — SIGNIFICANT CHANGE UP (ref 3.8–10.5)
WBC # FLD AUTO: 4.32 K/UL — SIGNIFICANT CHANGE UP (ref 3.8–10.5)
WBC # FLD AUTO: 4.38 K/UL — SIGNIFICANT CHANGE UP (ref 3.8–10.5)
WBC # FLD AUTO: 4.38 K/UL — SIGNIFICANT CHANGE UP (ref 3.8–10.5)
WBC # FLD AUTO: 4.41 K/UL — SIGNIFICANT CHANGE UP (ref 3.8–10.5)
WBC # FLD AUTO: 4.55 K/UL — SIGNIFICANT CHANGE UP (ref 3.8–10.5)
WBC # FLD AUTO: 4.62 K/UL — SIGNIFICANT CHANGE UP (ref 3.8–10.5)
WBC # FLD AUTO: 4.66 K/UL — SIGNIFICANT CHANGE UP (ref 3.8–10.5)
WBC # FLD AUTO: 4.74 K/UL — SIGNIFICANT CHANGE UP (ref 3.8–10.5)
WBC # FLD AUTO: 4.81 K/UL — SIGNIFICANT CHANGE UP (ref 3.8–10.5)
WBC # FLD AUTO: 5.14 K/UL — SIGNIFICANT CHANGE UP (ref 3.8–10.5)
WBC # FLD AUTO: 5.16 K/UL — SIGNIFICANT CHANGE UP (ref 3.8–10.5)
WBC # FLD AUTO: 5.28 K/UL — SIGNIFICANT CHANGE UP (ref 3.8–10.5)
WBC # FLD AUTO: 5.28 K/UL — SIGNIFICANT CHANGE UP (ref 3.8–10.5)
WBC # FLD AUTO: 5.41 K/UL — SIGNIFICANT CHANGE UP (ref 3.8–10.5)
WBC # FLD AUTO: 5.52 K/UL — SIGNIFICANT CHANGE UP (ref 3.8–10.5)
WBC # FLD AUTO: 5.74 K/UL — SIGNIFICANT CHANGE UP (ref 3.8–10.5)
WBC # FLD AUTO: 5.92 K/UL — SIGNIFICANT CHANGE UP (ref 3.8–10.5)
WBC # FLD AUTO: 6.07 K/UL — SIGNIFICANT CHANGE UP (ref 3.8–10.5)
WBC # FLD AUTO: 6.16 K/UL — SIGNIFICANT CHANGE UP (ref 3.8–10.5)
WBC # FLD AUTO: 6.3 K/UL — SIGNIFICANT CHANGE UP (ref 3.8–10.5)
WBC # FLD AUTO: 6.48 K/UL — SIGNIFICANT CHANGE UP (ref 3.8–10.5)
WBC # FLD AUTO: 6.58 K/UL — SIGNIFICANT CHANGE UP (ref 3.8–10.5)
WBC # FLD AUTO: 6.59 K/UL — SIGNIFICANT CHANGE UP (ref 3.8–10.5)
WBC # FLD AUTO: 7.49 K/UL — SIGNIFICANT CHANGE UP (ref 3.8–10.5)
WBC # FLD AUTO: 7.92 K/UL — SIGNIFICANT CHANGE UP (ref 3.8–10.5)
WBC UR QL: 0 /HPF — SIGNIFICANT CHANGE UP (ref 0–5)
WBC UR QL: 1 /HPF — SIGNIFICANT CHANGE UP (ref 0–5)
WBC UR QL: 2 /HPF — SIGNIFICANT CHANGE UP (ref 0–5)
WBC UR QL: 28 /HPF — HIGH (ref 0–5)
WBC UR QL: 5 /HPF — SIGNIFICANT CHANGE UP (ref 0–5)

## 2021-01-01 PROCEDURE — 99238 HOSP IP/OBS DSCHRG MGMT 30/<: CPT

## 2021-01-01 PROCEDURE — 82435 ASSAY OF BLOOD CHLORIDE: CPT

## 2021-01-01 PROCEDURE — 99232 SBSQ HOSP IP/OBS MODERATE 35: CPT

## 2021-01-01 PROCEDURE — 99497 ADVNCD CARE PLAN 30 MIN: CPT | Mod: 25

## 2021-01-01 PROCEDURE — 99232 SBSQ HOSP IP/OBS MODERATE 35: CPT | Mod: GC

## 2021-01-01 PROCEDURE — 84300 ASSAY OF URINE SODIUM: CPT

## 2021-01-01 PROCEDURE — 82803 BLOOD GASES ANY COMBINATION: CPT

## 2021-01-01 PROCEDURE — 73030 X-RAY EXAM OF SHOULDER: CPT | Mod: 26,LT

## 2021-01-01 PROCEDURE — 86850 RBC ANTIBODY SCREEN: CPT

## 2021-01-01 PROCEDURE — 71045 X-RAY EXAM CHEST 1 VIEW: CPT | Mod: 26

## 2021-01-01 PROCEDURE — 99285 EMERGENCY DEPT VISIT HI MDM: CPT | Mod: GC

## 2021-01-01 PROCEDURE — 88342 IMHCHEM/IMCYTCHM 1ST ANTB: CPT

## 2021-01-01 PROCEDURE — 99291 CRITICAL CARE FIRST HOUR: CPT

## 2021-01-01 PROCEDURE — 85385 FIBRINOGEN ANTIGEN: CPT

## 2021-01-01 PROCEDURE — 80053 COMPREHEN METABOLIC PANEL: CPT

## 2021-01-01 PROCEDURE — 83010 ASSAY OF HAPTOGLOBIN QUANT: CPT

## 2021-01-01 PROCEDURE — 96374 THER/PROPH/DIAG INJ IV PUSH: CPT | Mod: XU

## 2021-01-01 PROCEDURE — 96361 HYDRATE IV INFUSION ADD-ON: CPT

## 2021-01-01 PROCEDURE — 86901 BLOOD TYPING SEROLOGIC RH(D): CPT

## 2021-01-01 PROCEDURE — 86769 SARS-COV-2 COVID-19 ANTIBODY: CPT

## 2021-01-01 PROCEDURE — 99223 1ST HOSP IP/OBS HIGH 75: CPT | Mod: 25

## 2021-01-01 PROCEDURE — 86923 COMPATIBILITY TEST ELECTRIC: CPT

## 2021-01-01 PROCEDURE — 83735 ASSAY OF MAGNESIUM: CPT

## 2021-01-01 PROCEDURE — P9037: CPT

## 2021-01-01 PROCEDURE — 99214 OFFICE O/P EST MOD 30 MIN: CPT

## 2021-01-01 PROCEDURE — 88360 TUMOR IMMUNOHISTOCHEM/MANUAL: CPT

## 2021-01-01 PROCEDURE — 83880 ASSAY OF NATRIURETIC PEPTIDE: CPT

## 2021-01-01 PROCEDURE — 93970 EXTREMITY STUDY: CPT

## 2021-01-01 PROCEDURE — 88237 TISSUE CULTURE BONE MARROW: CPT

## 2021-01-01 PROCEDURE — 88275 CYTOGENETICS 100-300: CPT

## 2021-01-01 PROCEDURE — 93010 ELECTROCARDIOGRAM REPORT: CPT

## 2021-01-01 PROCEDURE — 84156 ASSAY OF PROTEIN URINE: CPT

## 2021-01-01 PROCEDURE — 85730 THROMBOPLASTIN TIME PARTIAL: CPT

## 2021-01-01 PROCEDURE — P9040: CPT

## 2021-01-01 PROCEDURE — 88305 TISSUE EXAM BY PATHOLOGIST: CPT

## 2021-01-01 PROCEDURE — 84100 ASSAY OF PHOSPHORUS: CPT

## 2021-01-01 PROCEDURE — 85027 COMPLETE CBC AUTOMATED: CPT

## 2021-01-01 PROCEDURE — 85379 FIBRIN DEGRADATION QUANT: CPT

## 2021-01-01 PROCEDURE — 99233 SBSQ HOSP IP/OBS HIGH 50: CPT

## 2021-01-01 PROCEDURE — 87205 SMEAR GRAM STAIN: CPT

## 2021-01-01 PROCEDURE — 85610 PROTHROMBIN TIME: CPT

## 2021-01-01 PROCEDURE — 88185 FLOWCYTOMETRY/TC ADD-ON: CPT

## 2021-01-01 PROCEDURE — 97530 THERAPEUTIC ACTIVITIES: CPT

## 2021-01-01 PROCEDURE — 82947 ASSAY GLUCOSE BLOOD QUANT: CPT

## 2021-01-01 PROCEDURE — 76604 US EXAM CHEST: CPT | Mod: 26

## 2021-01-01 PROCEDURE — 88313 SPECIAL STAINS GROUP 2: CPT

## 2021-01-01 PROCEDURE — 96374 THER/PROPH/DIAG INJ IV PUSH: CPT

## 2021-01-01 PROCEDURE — 99233 SBSQ HOSP IP/OBS HIGH 50: CPT | Mod: GC

## 2021-01-01 PROCEDURE — 93005 ELECTROCARDIOGRAM TRACING: CPT

## 2021-01-01 PROCEDURE — 83615 LACTATE (LD) (LDH) ENZYME: CPT

## 2021-01-01 PROCEDURE — 99223 1ST HOSP IP/OBS HIGH 75: CPT | Mod: GC,25

## 2021-01-01 PROCEDURE — 86900 BLOOD TYPING SEROLOGIC ABO: CPT

## 2021-01-01 PROCEDURE — 99223 1ST HOSP IP/OBS HIGH 75: CPT | Mod: GC

## 2021-01-01 PROCEDURE — 88342 IMHCHEM/IMCYTCHM 1ST ANTB: CPT | Mod: 26,59

## 2021-01-01 PROCEDURE — 81245 FLT3 GENE: CPT

## 2021-01-01 PROCEDURE — 99284 EMERGENCY DEPT VISIT MOD MDM: CPT

## 2021-01-01 PROCEDURE — 82248 BILIRUBIN DIRECT: CPT

## 2021-01-01 PROCEDURE — 96375 TX/PRO/DX INJ NEW DRUG ADDON: CPT

## 2021-01-01 PROCEDURE — 93306 TTE W/DOPPLER COMPLETE: CPT | Mod: 26

## 2021-01-01 PROCEDURE — 36430 TRANSFUSION BLD/BLD COMPNT: CPT

## 2021-01-01 PROCEDURE — 85732 THROMBOPLASTIN TIME PARTIAL: CPT

## 2021-01-01 PROCEDURE — 99285 EMERGENCY DEPT VISIT HI MDM: CPT | Mod: 25

## 2021-01-01 PROCEDURE — 87635 SARS-COV-2 COVID-19 AMP PRB: CPT

## 2021-01-01 PROCEDURE — 82330 ASSAY OF CALCIUM: CPT

## 2021-01-01 PROCEDURE — 99223 1ST HOSP IP/OBS HIGH 75: CPT

## 2021-01-01 PROCEDURE — 80048 BASIC METABOLIC PNL TOTAL CA: CPT

## 2021-01-01 PROCEDURE — 94660 CPAP INITIATION&MGMT: CPT

## 2021-01-01 PROCEDURE — U0003: CPT

## 2021-01-01 PROCEDURE — 74176 CT ABD & PELVIS W/O CONTRAST: CPT | Mod: 26

## 2021-01-01 PROCEDURE — 88341 IMHCHEM/IMCYTCHM EA ADD ANTB: CPT | Mod: 26,59

## 2021-01-01 PROCEDURE — 85611 PROTHROMBIN TEST: CPT

## 2021-01-01 PROCEDURE — 93971 EXTREMITY STUDY: CPT

## 2021-01-01 PROCEDURE — 71045 X-RAY EXAM CHEST 1 VIEW: CPT

## 2021-01-01 PROCEDURE — 84295 ASSAY OF SERUM SODIUM: CPT

## 2021-01-01 PROCEDURE — 84132 ASSAY OF SERUM POTASSIUM: CPT

## 2021-01-01 PROCEDURE — 99212 OFFICE O/P EST SF 10 MIN: CPT

## 2021-01-01 PROCEDURE — 82565 ASSAY OF CREATININE: CPT

## 2021-01-01 PROCEDURE — 99292 CRITICAL CARE ADDL 30 MIN: CPT | Mod: 25

## 2021-01-01 PROCEDURE — 83605 ASSAY OF LACTIC ACID: CPT

## 2021-01-01 PROCEDURE — 93971 EXTREMITY STUDY: CPT | Mod: 26,LT

## 2021-01-01 PROCEDURE — 86880 COOMBS TEST DIRECT: CPT

## 2021-01-01 PROCEDURE — 85018 HEMOGLOBIN: CPT

## 2021-01-01 PROCEDURE — U0005: CPT

## 2021-01-01 PROCEDURE — 85025 COMPLETE CBC W/AUTO DIFF WBC: CPT

## 2021-01-01 PROCEDURE — 97110 THERAPEUTIC EXERCISES: CPT

## 2021-01-01 PROCEDURE — 99291 CRITICAL CARE FIRST HOUR: CPT | Mod: 25

## 2021-01-01 PROCEDURE — P9016: CPT

## 2021-01-01 PROCEDURE — 85384 FIBRINOGEN ACTIVITY: CPT

## 2021-01-01 PROCEDURE — 81001 URINALYSIS AUTO W/SCOPE: CPT

## 2021-01-01 PROCEDURE — 82553 CREATINE MB FRACTION: CPT

## 2021-01-01 PROCEDURE — 85097 BONE MARROW INTERPRETATION: CPT

## 2021-01-01 PROCEDURE — 73030 X-RAY EXAM OF SHOULDER: CPT

## 2021-01-01 PROCEDURE — 93010 ELECTROCARDIOGRAM REPORT: CPT | Mod: GC

## 2021-01-01 PROCEDURE — 86985 SPLIT BLOOD OR PRODUCTS: CPT

## 2021-01-01 PROCEDURE — 82955 ASSAY OF G6PD ENZYME: CPT

## 2021-01-01 PROCEDURE — 70450 CT HEAD/BRAIN W/O DYE: CPT | Mod: 26

## 2021-01-01 PROCEDURE — 97161 PT EVAL LOW COMPLEX 20 MIN: CPT

## 2021-01-01 PROCEDURE — 88280 CHROMOSOME KARYOTYPE STUDY: CPT

## 2021-01-01 PROCEDURE — 88271 CYTOGENETICS DNA PROBE: CPT

## 2021-01-01 PROCEDURE — 82962 GLUCOSE BLOOD TEST: CPT

## 2021-01-01 PROCEDURE — 88341 IMHCHEM/IMCYTCHM EA ADD ANTB: CPT

## 2021-01-01 PROCEDURE — 99215 OFFICE O/P EST HI 40 MIN: CPT

## 2021-01-01 PROCEDURE — 93308 TTE F-UP OR LMTD: CPT

## 2021-01-01 PROCEDURE — 83036 HEMOGLOBIN GLYCOSYLATED A1C: CPT

## 2021-01-01 PROCEDURE — 88360 TUMOR IMMUNOHISTOCHEM/MANUAL: CPT | Mod: 26

## 2021-01-01 PROCEDURE — 80076 HEPATIC FUNCTION PANEL: CPT

## 2021-01-01 PROCEDURE — 38222 DX BONE MARROW BX & ASPIR: CPT | Mod: GC,RT

## 2021-01-01 PROCEDURE — 93308 TTE F-UP OR LMTD: CPT | Mod: 26

## 2021-01-01 PROCEDURE — 93306 TTE W/DOPPLER COMPLETE: CPT

## 2021-01-01 PROCEDURE — 84484 ASSAY OF TROPONIN QUANT: CPT

## 2021-01-01 PROCEDURE — 94640 AIRWAY INHALATION TREATMENT: CPT

## 2021-01-01 PROCEDURE — 97116 GAIT TRAINING THERAPY: CPT

## 2021-01-01 PROCEDURE — 87086 URINE CULTURE/COLONY COUNT: CPT

## 2021-01-01 PROCEDURE — 82550 ASSAY OF CK (CPK): CPT

## 2021-01-01 PROCEDURE — 86078 PHYS BLOOD BANK SERV REACTJ: CPT

## 2021-01-01 PROCEDURE — P9011: CPT

## 2021-01-01 PROCEDURE — 93970 EXTREMITY STUDY: CPT | Mod: 26

## 2021-01-01 PROCEDURE — 87040 BLOOD CULTURE FOR BACTERIA: CPT

## 2021-01-01 PROCEDURE — 74018 RADEX ABDOMEN 1 VIEW: CPT | Mod: 26

## 2021-01-01 PROCEDURE — 71045 X-RAY EXAM CHEST 1 VIEW: CPT | Mod: 26,76

## 2021-01-01 PROCEDURE — 99285 EMERGENCY DEPT VISIT HI MDM: CPT

## 2021-01-01 PROCEDURE — 88313 SPECIAL STAINS GROUP 2: CPT | Mod: 26

## 2021-01-01 PROCEDURE — 88189 FLOWCYTOMETRY/READ 16 & >: CPT

## 2021-01-01 PROCEDURE — 88264 CHROMOSOME ANALYSIS 20-25: CPT

## 2021-01-01 PROCEDURE — 85014 HEMATOCRIT: CPT

## 2021-01-01 PROCEDURE — 85060 BLOOD SMEAR INTERPRETATION: CPT

## 2021-01-01 PROCEDURE — 99205 OFFICE O/P NEW HI 60 MIN: CPT

## 2021-01-01 PROCEDURE — 96376 TX/PRO/DX INJ SAME DRUG ADON: CPT

## 2021-01-01 PROCEDURE — G0378: CPT

## 2021-01-01 PROCEDURE — 82247 BILIRUBIN TOTAL: CPT

## 2021-01-01 PROCEDURE — 36415 COLL VENOUS BLD VENIPUNCTURE: CPT

## 2021-01-01 PROCEDURE — 92610 EVALUATE SWALLOWING FUNCTION: CPT

## 2021-01-01 PROCEDURE — 85670 THROMBIN TIME PLASMA: CPT

## 2021-01-01 PROCEDURE — 83520 IMMUNOASSAY QUANT NOS NONAB: CPT

## 2021-01-01 PROCEDURE — 88305 TISSUE EXAM BY PATHOLOGIST: CPT | Mod: 26

## 2021-01-01 PROCEDURE — 82570 ASSAY OF URINE CREATININE: CPT

## 2021-01-01 PROCEDURE — 74018 RADEX ABDOMEN 1 VIEW: CPT

## 2021-01-01 PROCEDURE — 96360 HYDRATION IV INFUSION INIT: CPT

## 2021-01-01 PROCEDURE — 88184 FLOWCYTOMETRY/ TC 1 MARKER: CPT

## 2021-01-01 PROCEDURE — 74176 CT ABD & PELVIS W/O CONTRAST: CPT

## 2021-01-01 PROCEDURE — 84550 ASSAY OF BLOOD/URIC ACID: CPT

## 2021-01-01 PROCEDURE — 85045 AUTOMATED RETICULOCYTE COUNT: CPT

## 2021-01-01 PROCEDURE — 87186 SC STD MICRODIL/AGAR DIL: CPT

## 2021-01-01 RX ORDER — LACTULOSE 10 G/15ML
20 SOLUTION ORAL
Refills: 0 | Status: DISCONTINUED | OUTPATIENT
Start: 2021-01-01 | End: 2021-01-01

## 2021-01-01 RX ORDER — METOCLOPRAMIDE 10 MG/1
10 TABLET ORAL
Qty: 30 | Refills: 0 | Status: COMPLETED | COMMUNITY
Start: 2021-01-01

## 2021-01-01 RX ORDER — WARFARIN SODIUM 2.5 MG/1
4 TABLET ORAL ONCE
Refills: 0 | Status: COMPLETED | OUTPATIENT
Start: 2021-01-01 | End: 2021-01-01

## 2021-01-01 RX ORDER — ALPRAZOLAM 0.5 MG/1
0.5 TABLET, ORALLY DISINTEGRATING ORAL
Qty: 30 | Refills: 0 | Status: COMPLETED | COMMUNITY
Start: 2021-01-01

## 2021-01-01 RX ORDER — METOPROLOL TARTRATE 50 MG
1 TABLET ORAL
Qty: 30 | Refills: 1
Start: 2021-01-01 | End: 2021-01-01

## 2021-01-01 RX ORDER — ONDANSETRON 8 MG/1
8 TABLET, FILM COATED ORAL EVERY 24 HOURS
Refills: 0 | Status: COMPLETED | OUTPATIENT
Start: 2021-01-01 | End: 2021-01-01

## 2021-01-01 RX ORDER — FUROSEMIDE 40 MG
40 TABLET ORAL
Refills: 0 | Status: DISCONTINUED | OUTPATIENT
Start: 2021-01-01 | End: 2021-01-01

## 2021-01-01 RX ORDER — DEXTROSE 50 % IN WATER 50 %
12.5 SYRINGE (ML) INTRAVENOUS ONCE
Refills: 0 | Status: DISCONTINUED | OUTPATIENT
Start: 2021-01-01 | End: 2021-01-01

## 2021-01-01 RX ORDER — SALIVA SUBSTITUTE COMB NO.11 351 MG
15 POWDER IN PACKET (EA) MUCOUS MEMBRANE
Refills: 0 | Status: DISCONTINUED | OUTPATIENT
Start: 2021-01-01 | End: 2021-01-01

## 2021-01-01 RX ORDER — AZACITIDINE FOR 100 MG/1
52.6 INJECTION, POWDER, LYOPHILIZED, FOR SOLUTION INTRAVENOUS; SUBCUTANEOUS EVERY 24 HOURS
Refills: 0 | Status: COMPLETED | OUTPATIENT
Start: 2021-01-01 | End: 2021-01-01

## 2021-01-01 RX ORDER — SOTALOL HCL 120 MG
1 TABLET ORAL
Qty: 0 | Refills: 0 | DISCHARGE

## 2021-01-01 RX ORDER — NITROGLYCERIN 6.5 MG
150 CAPSULE, EXTENDED RELEASE ORAL
Qty: 50 | Refills: 0 | Status: DISCONTINUED | OUTPATIENT
Start: 2021-01-01 | End: 2021-01-01

## 2021-01-01 RX ORDER — INFLUENZA VIRUS VACCINE 15; 15; 15; 15 UG/.5ML; UG/.5ML; UG/.5ML; UG/.5ML
0.5 SUSPENSION INTRAMUSCULAR ONCE
Refills: 0 | Status: DISCONTINUED | OUTPATIENT
Start: 2021-01-01 | End: 2021-01-01

## 2021-01-01 RX ORDER — FUROSEMIDE 40 MG
40 TABLET ORAL ONCE
Refills: 0 | Status: COMPLETED | OUTPATIENT
Start: 2021-01-01 | End: 2021-01-01

## 2021-01-01 RX ORDER — PHYTONADIONE (VIT K1) 5 MG
10 TABLET ORAL DAILY
Refills: 0 | Status: COMPLETED | OUTPATIENT
Start: 2021-01-01 | End: 2021-01-01

## 2021-01-01 RX ORDER — SODIUM CHLORIDE 9 MG/ML
500 INJECTION, SOLUTION INTRAVENOUS ONCE
Refills: 0 | Status: DISCONTINUED | OUTPATIENT
Start: 2021-01-01 | End: 2021-01-01

## 2021-01-01 RX ORDER — FUROSEMIDE 40 MG
20 TABLET ORAL ONCE
Refills: 0 | Status: COMPLETED | OUTPATIENT
Start: 2021-01-01 | End: 2021-01-01

## 2021-01-01 RX ORDER — BENZOCAINE AND MENTHOL 5; 1 G/100ML; G/100ML
1 LIQUID ORAL ONCE
Refills: 0 | Status: COMPLETED | OUTPATIENT
Start: 2021-01-01 | End: 2021-01-01

## 2021-01-01 RX ORDER — DIPHENHYDRAMINE HCL 50 MG
25 CAPSULE ORAL ONCE
Refills: 0 | Status: COMPLETED | OUTPATIENT
Start: 2021-01-01 | End: 2022-09-05

## 2021-01-01 RX ORDER — METRONIDAZOLE 500 MG
500 TABLET ORAL EVERY 8 HOURS
Refills: 0 | Status: DISCONTINUED | OUTPATIENT
Start: 2021-01-01 | End: 2021-01-01

## 2021-01-01 RX ORDER — SODIUM,POTASSIUM PHOSPHATES 278-250MG
1 POWDER IN PACKET (EA) ORAL
Refills: 0 | Status: COMPLETED | OUTPATIENT
Start: 2021-01-01 | End: 2021-01-01

## 2021-01-01 RX ORDER — ALLOPURINOL 300 MG
100 TABLET ORAL
Refills: 0 | Status: DISCONTINUED | OUTPATIENT
Start: 2021-01-01 | End: 2021-01-01

## 2021-01-01 RX ORDER — FUROSEMIDE 40 MG
20 TABLET ORAL DAILY
Refills: 0 | Status: DISCONTINUED | OUTPATIENT
Start: 2021-01-01 | End: 2021-01-01

## 2021-01-01 RX ORDER — SODIUM CHLORIDE 9 MG/ML
1000 INJECTION, SOLUTION INTRAVENOUS
Refills: 0 | Status: DISCONTINUED | OUTPATIENT
Start: 2021-01-01 | End: 2021-01-01

## 2021-01-01 RX ORDER — ACETAMINOPHEN 500 MG
650 TABLET ORAL EVERY 6 HOURS
Refills: 0 | Status: DISCONTINUED | OUTPATIENT
Start: 2021-01-01 | End: 2021-01-01

## 2021-01-01 RX ORDER — CHLORHEXIDINE GLUCONATE 213 G/1000ML
1 SOLUTION TOPICAL DAILY
Refills: 0 | Status: DISCONTINUED | OUTPATIENT
Start: 2021-01-01 | End: 2021-01-01

## 2021-01-01 RX ORDER — FLUCONAZOLE 150 MG/1
1 TABLET ORAL
Qty: 30 | Refills: 0
Start: 2021-01-01 | End: 2021-01-01

## 2021-01-01 RX ORDER — WARFARIN SODIUM 2.5 MG/1
1 TABLET ORAL
Qty: 0 | Refills: 0 | DISCHARGE

## 2021-01-01 RX ORDER — AZTREONAM 2 G
1000 VIAL (EA) INJECTION ONCE
Refills: 0 | Status: COMPLETED | OUTPATIENT
Start: 2021-01-01 | End: 2021-01-01

## 2021-01-01 RX ORDER — IPRATROPIUM/ALBUTEROL SULFATE 18-103MCG
3 AEROSOL WITH ADAPTER (GRAM) INHALATION ONCE
Refills: 0 | Status: COMPLETED | OUTPATIENT
Start: 2021-01-01 | End: 2021-01-01

## 2021-01-01 RX ORDER — OLMESARTAN MEDOXOMIL-HYDROCHLOROTHIAZIDE 25; 40 MG/1; MG/1
1 TABLET, FILM COATED ORAL
Qty: 0 | Refills: 0 | DISCHARGE

## 2021-01-01 RX ORDER — SENNA PLUS 8.6 MG/1
2 TABLET ORAL AT BEDTIME
Refills: 0 | Status: DISCONTINUED | OUTPATIENT
Start: 2021-01-01 | End: 2021-01-01

## 2021-01-01 RX ORDER — HEPARIN SODIUM 5000 [USP'U]/ML
5000 INJECTION INTRAVENOUS; SUBCUTANEOUS EVERY 8 HOURS
Refills: 0 | Status: DISCONTINUED | OUTPATIENT
Start: 2021-01-01 | End: 2021-01-01

## 2021-01-01 RX ORDER — DIPHENHYDRAMINE HCL 50 MG
25 CAPSULE ORAL ONCE
Refills: 0 | Status: COMPLETED | OUTPATIENT
Start: 2021-01-01 | End: 2021-01-01

## 2021-01-01 RX ORDER — SODIUM CHLORIDE 9 MG/ML
1000 INJECTION INTRAMUSCULAR; INTRAVENOUS; SUBCUTANEOUS ONCE
Refills: 0 | Status: COMPLETED | OUTPATIENT
Start: 2021-01-01 | End: 2021-01-01

## 2021-01-01 RX ORDER — SOTALOL HCL 120 MG
120 TABLET ORAL DAILY
Refills: 0 | Status: DISCONTINUED | OUTPATIENT
Start: 2021-01-01 | End: 2021-01-01

## 2021-01-01 RX ORDER — POTASSIUM CHLORIDE 20 MEQ
40 PACKET (EA) ORAL ONCE
Refills: 0 | Status: COMPLETED | OUTPATIENT
Start: 2021-01-01 | End: 2021-01-01

## 2021-01-01 RX ORDER — LACTOBACILLUS ACIDOPHILUS 100MM CELL
1 CAPSULE ORAL
Qty: 28 | Refills: 0
Start: 2021-01-01 | End: 2021-01-01

## 2021-01-01 RX ORDER — WARFARIN 4 MG/1
4 TABLET ORAL
Qty: 90 | Refills: 0 | Status: DISCONTINUED | COMMUNITY
Start: 2018-04-20 | End: 2021-01-01

## 2021-01-01 RX ORDER — ELTROMBOPAG OLAMINE 50 MG/1
50 TABLET, FILM COATED ORAL DAILY
Refills: 0 | Status: DISCONTINUED | OUTPATIENT
Start: 2021-01-01 | End: 2021-01-01

## 2021-01-01 RX ORDER — FUROSEMIDE 40 MG
20 TABLET ORAL
Refills: 0 | Status: DISCONTINUED | OUTPATIENT
Start: 2021-01-01 | End: 2021-01-01

## 2021-01-01 RX ORDER — AZTREONAM 2 G
1000 VIAL (EA) INJECTION EVERY 8 HOURS
Refills: 0 | Status: DISCONTINUED | OUTPATIENT
Start: 2021-01-01 | End: 2021-01-01

## 2021-01-01 RX ORDER — DIPHENHYDRAMINE HCL 50 MG
25 CAPSULE ORAL EVERY 6 HOURS
Refills: 0 | Status: DISCONTINUED | OUTPATIENT
Start: 2021-01-01 | End: 2021-01-01

## 2021-01-01 RX ORDER — POLYETHYLENE GLYCOL 3350 17 G/17G
17 POWDER, FOR SOLUTION ORAL
Refills: 0 | Status: DISCONTINUED | OUTPATIENT
Start: 2021-01-01 | End: 2021-01-01

## 2021-01-01 RX ORDER — VANCOMYCIN HCL 1 G
750 VIAL (EA) INTRAVENOUS ONCE
Refills: 0 | Status: COMPLETED | OUTPATIENT
Start: 2021-01-01 | End: 2021-01-01

## 2021-01-01 RX ORDER — DEXTROSE 50 % IN WATER 50 %
25 SYRINGE (ML) INTRAVENOUS ONCE
Refills: 0 | Status: DISCONTINUED | OUTPATIENT
Start: 2021-01-01 | End: 2021-01-01

## 2021-01-01 RX ORDER — HYDROMORPHONE HYDROCHLORIDE 2 MG/ML
0.3 INJECTION INTRAMUSCULAR; INTRAVENOUS; SUBCUTANEOUS
Refills: 0 | Status: DISCONTINUED | OUTPATIENT
Start: 2021-01-01 | End: 2021-01-01

## 2021-01-01 RX ORDER — ACETAMINOPHEN 500 MG
2 TABLET ORAL
Qty: 0 | Refills: 0 | DISCHARGE
Start: 2021-01-01

## 2021-01-01 RX ORDER — POTASSIUM CHLORIDE 20 MEQ
40 PACKET (EA) ORAL EVERY 4 HOURS
Refills: 0 | Status: COMPLETED | OUTPATIENT
Start: 2021-01-01 | End: 2021-01-01

## 2021-01-01 RX ORDER — SENNOSIDES 8.6 MG/1
CAPSULE, GELATIN COATED ORAL
Refills: 0 | Status: ACTIVE | COMMUNITY

## 2021-01-01 RX ORDER — ACETAMINOPHEN 500 MG
650 TABLET ORAL ONCE
Refills: 0 | Status: COMPLETED | OUTPATIENT
Start: 2021-01-01 | End: 2021-01-01

## 2021-01-01 RX ORDER — LORATADINE 10 MG
17 TABLET,DISINTEGRATING ORAL
Refills: 0 | Status: ACTIVE | COMMUNITY

## 2021-01-01 RX ORDER — FUROSEMIDE 40 MG
1 TABLET ORAL
Qty: 30 | Refills: 0
Start: 2021-01-01 | End: 2021-01-01

## 2021-01-01 RX ORDER — POSACONAZOLE 100 MG/1
300 TABLET, DELAYED RELEASE ORAL
Refills: 0 | Status: COMPLETED | OUTPATIENT
Start: 2021-01-01 | End: 2021-01-01

## 2021-01-01 RX ORDER — CLOBETASOL PROPIONATE 0.5 MG/G
0.05 CREAM TOPICAL
Qty: 60 | Refills: 0 | Status: COMPLETED | COMMUNITY
Start: 2021-01-01

## 2021-01-01 RX ORDER — LENALIDOMIDE 5 MG/1
1 CAPSULE ORAL
Qty: 0 | Refills: 0 | DISCHARGE

## 2021-01-01 RX ORDER — FUROSEMIDE 40 MG
1 TABLET ORAL
Qty: 0 | Refills: 0 | DISCHARGE

## 2021-01-01 RX ORDER — FUROSEMIDE 40 MG
40 TABLET ORAL ONCE
Refills: 0 | Status: DISCONTINUED | OUTPATIENT
Start: 2021-01-01 | End: 2021-01-01

## 2021-01-01 RX ORDER — CIPROFLOXACIN LACTATE 400MG/40ML
400 VIAL (ML) INTRAVENOUS EVERY 12 HOURS
Refills: 0 | Status: DISCONTINUED | OUTPATIENT
Start: 2021-01-01 | End: 2021-01-01

## 2021-01-01 RX ORDER — ACETAZOLAMIDE 250 MG/1
250 TABLET ORAL EVERY 8 HOURS
Refills: 0 | Status: DISCONTINUED | OUTPATIENT
Start: 2021-01-01 | End: 2021-01-01

## 2021-01-01 RX ORDER — SENNA PLUS 8.6 MG/1
2 TABLET ORAL
Qty: 0 | Refills: 0 | DISCHARGE
Start: 2021-01-01

## 2021-01-01 RX ORDER — SODIUM CHLORIDE 9 MG/ML
1000 INJECTION INTRAMUSCULAR; INTRAVENOUS; SUBCUTANEOUS
Refills: 0 | Status: DISCONTINUED | OUTPATIENT
Start: 2021-01-01 | End: 2021-01-01

## 2021-01-01 RX ORDER — ELTROMBOPAG OLAMINE 50 MG/1
2 TABLET, FILM COATED ORAL
Qty: 60 | Refills: 0
Start: 2021-01-01 | End: 2021-01-01

## 2021-01-01 RX ORDER — DOXYCYCLINE HYCLATE 100 MG/1
100 TABLET ORAL
Qty: 10 | Refills: 0 | Status: COMPLETED | COMMUNITY
Start: 2021-01-01

## 2021-01-01 RX ORDER — LACTOBACILLUS ACIDOPHILUS 100MM CELL
1 CAPSULE ORAL
Refills: 0 | Status: DISCONTINUED | OUTPATIENT
Start: 2021-01-01 | End: 2021-01-01

## 2021-01-01 RX ORDER — FUROSEMIDE 40 MG
40 TABLET ORAL DAILY
Refills: 0 | Status: DISCONTINUED | OUTPATIENT
Start: 2021-01-01 | End: 2021-01-01

## 2021-01-01 RX ORDER — FUROSEMIDE 20 MG/1
20 TABLET ORAL
Qty: 30 | Refills: 0 | Status: COMPLETED | COMMUNITY
Start: 2021-01-01

## 2021-01-01 RX ORDER — LENALIDOMIDE 5 MG/1
15 CAPSULE ORAL DAILY
Refills: 0 | Status: DISCONTINUED | OUTPATIENT
Start: 2021-01-01 | End: 2021-01-01

## 2021-01-01 RX ORDER — CLOBETASOL PROPIONATE 0.5 MG/G
0.05 OINTMENT TOPICAL
Qty: 15 | Refills: 0 | Status: COMPLETED | COMMUNITY
Start: 2021-01-01

## 2021-01-01 RX ORDER — ALLOPURINOL 300 MG
2 TABLET ORAL
Qty: 0 | Refills: 0 | DISCHARGE

## 2021-01-01 RX ORDER — METOPROLOL TARTRATE 50 MG
25 TABLET ORAL DAILY
Refills: 0 | Status: DISCONTINUED | OUTPATIENT
Start: 2021-01-01 | End: 2021-01-01

## 2021-01-01 RX ORDER — DOXAZOSIN MESYLATE 4 MG
4 TABLET ORAL AT BEDTIME
Refills: 0 | Status: DISCONTINUED | OUTPATIENT
Start: 2021-01-01 | End: 2021-01-01

## 2021-01-01 RX ORDER — POSACONAZOLE 100 MG/1
300 TABLET, DELAYED RELEASE ORAL DAILY
Refills: 0 | Status: DISCONTINUED | OUTPATIENT
Start: 2021-01-01 | End: 2021-01-01

## 2021-01-01 RX ORDER — FUROSEMIDE 20 MG/1
20 TABLET ORAL
Qty: 30 | Refills: 2 | Status: ACTIVE | COMMUNITY
Start: 2021-01-01 | End: 1900-01-01

## 2021-01-01 RX ORDER — OLMESARTAN MEDOXOMIL AND HYDROCHLOROTHIAZIDE 40; 12.5 MG/1; MG/1
40-12.5 TABLET ORAL
Qty: 90 | Refills: 0 | Status: DISCONTINUED | COMMUNITY
Start: 2018-01-24 | End: 2021-01-01

## 2021-01-01 RX ORDER — AZTREONAM 2 G
2000 VIAL (EA) INJECTION EVERY 8 HOURS
Refills: 0 | Status: DISCONTINUED | OUTPATIENT
Start: 2021-01-01 | End: 2021-01-01

## 2021-01-01 RX ORDER — POTASSIUM CHLORIDE 20 MEQ
40 PACKET (EA) ORAL ONCE
Refills: 0 | Status: DISCONTINUED | OUTPATIENT
Start: 2021-01-01 | End: 2021-01-01

## 2021-01-01 RX ORDER — FUROSEMIDE 40 MG
40 TABLET ORAL EVERY 4 HOURS
Refills: 0 | Status: DISCONTINUED | OUTPATIENT
Start: 2021-01-01 | End: 2021-01-01

## 2021-01-01 RX ORDER — POLYETHYLENE GLYCOL 3350 17 G/17G
17 POWDER, FOR SOLUTION ORAL DAILY
Refills: 0 | Status: DISCONTINUED | OUTPATIENT
Start: 2021-01-01 | End: 2021-01-01

## 2021-01-01 RX ORDER — PHYTONADIONE (VIT K1) 5 MG
5 TABLET ORAL DAILY
Refills: 0 | Status: COMPLETED | OUTPATIENT
Start: 2021-01-01 | End: 2021-01-01

## 2021-01-01 RX ORDER — LACTULOSE 10 G/15ML
20 SOLUTION ORAL ONCE
Refills: 0 | Status: COMPLETED | OUTPATIENT
Start: 2021-01-01 | End: 2021-01-01

## 2021-01-01 RX ORDER — MAGNESIUM SULFATE 500 MG/ML
1 VIAL (ML) INJECTION ONCE
Refills: 0 | Status: COMPLETED | OUTPATIENT
Start: 2021-01-01 | End: 2021-01-01

## 2021-01-01 RX ORDER — LIDOCAINE 4 G/100G
1 CREAM TOPICAL ONCE
Refills: 0 | Status: DISCONTINUED | OUTPATIENT
Start: 2021-01-01 | End: 2021-01-01

## 2021-01-01 RX ORDER — DOXAZOSIN MESYLATE 4 MG
1 TABLET ORAL
Qty: 0 | Refills: 0 | DISCHARGE

## 2021-01-01 RX ORDER — ACETAZOLAMIDE 250 MG/1
250 TABLET ORAL EVERY 8 HOURS
Refills: 0 | Status: COMPLETED | OUTPATIENT
Start: 2021-01-01 | End: 2021-01-01

## 2021-01-01 RX ORDER — LACTULOSE 10 G/15ML
10 SOLUTION ORAL ONCE
Refills: 0 | Status: COMPLETED | OUTPATIENT
Start: 2021-01-01 | End: 2021-01-01

## 2021-01-01 RX ORDER — ONDANSETRON 8 MG/1
4 TABLET, FILM COATED ORAL ONCE
Refills: 0 | Status: COMPLETED | OUTPATIENT
Start: 2021-01-01 | End: 2021-01-01

## 2021-01-01 RX ORDER — DICYCLOMINE HYDROCHLORIDE 10 MG/1
10 CAPSULE ORAL
Qty: 56 | Refills: 0 | Status: COMPLETED | COMMUNITY
Start: 2021-01-01

## 2021-01-01 RX ORDER — SODIUM CHLORIDE 9 MG/ML
250 INJECTION INTRAMUSCULAR; INTRAVENOUS; SUBCUTANEOUS ONCE
Refills: 0 | Status: COMPLETED | OUTPATIENT
Start: 2021-01-01 | End: 2021-01-01

## 2021-01-01 RX ORDER — METOPROLOL SUCCINATE 25 MG/1
25 TABLET, EXTENDED RELEASE ORAL
Refills: 0 | Status: ACTIVE | COMMUNITY

## 2021-01-01 RX ORDER — HALOPERIDOL DECANOATE 100 MG/ML
0.5 INJECTION INTRAMUSCULAR ONCE
Refills: 0 | Status: COMPLETED | OUTPATIENT
Start: 2021-01-01 | End: 2021-01-01

## 2021-01-01 RX ORDER — LANOLIN ALCOHOL/MO/W.PET/CERES
3 CREAM (GRAM) TOPICAL AT BEDTIME
Refills: 0 | Status: DISCONTINUED | OUTPATIENT
Start: 2021-01-01 | End: 2021-01-01

## 2021-01-01 RX ORDER — ONDANSETRON 8 MG/1
8 TABLET, FILM COATED ORAL EVERY 24 HOURS
Refills: 0 | Status: DISCONTINUED | OUTPATIENT
Start: 2021-01-01 | End: 2021-01-01

## 2021-01-01 RX ORDER — METOPROLOL TARTRATE 50 MG
12.5 TABLET ORAL
Refills: 0 | Status: DISCONTINUED | OUTPATIENT
Start: 2021-01-01 | End: 2021-01-01

## 2021-01-01 RX ORDER — DEXTROSE 50 % IN WATER 50 %
15 SYRINGE (ML) INTRAVENOUS ONCE
Refills: 0 | Status: DISCONTINUED | OUTPATIENT
Start: 2021-01-01 | End: 2021-01-01

## 2021-01-01 RX ORDER — MIDODRINE HYDROCHLORIDE 2.5 MG/1
1 TABLET ORAL
Qty: 90 | Refills: 0
Start: 2021-01-01 | End: 2021-01-01

## 2021-01-01 RX ORDER — POTASSIUM CHLORIDE 20 MEQ
20 PACKET (EA) ORAL ONCE
Refills: 0 | Status: COMPLETED | OUTPATIENT
Start: 2021-01-01 | End: 2021-01-01

## 2021-01-01 RX ORDER — POLYETHYLENE GLYCOL 3350 17 G/17G
17 POWDER, FOR SOLUTION ORAL
Qty: 0 | Refills: 0 | DISCHARGE
Start: 2021-01-01

## 2021-01-01 RX ORDER — MIDODRINE HYDROCHLORIDE 10 MG/1
10 TABLET ORAL 3 TIMES DAILY
Refills: 0 | Status: DISCONTINUED | COMMUNITY
End: 2021-01-01

## 2021-01-01 RX ORDER — SALIVA SUBSTITUTE COMB NO.11 351 MG
15 POWDER IN PACKET (EA) MUCOUS MEMBRANE THREE TIMES A DAY
Refills: 0 | Status: DISCONTINUED | OUTPATIENT
Start: 2021-01-01 | End: 2021-01-01

## 2021-01-01 RX ORDER — ALLOPURINOL 300 MG
200 TABLET ORAL DAILY
Refills: 0 | Status: DISCONTINUED | OUTPATIENT
Start: 2021-01-01 | End: 2021-01-01

## 2021-01-01 RX ORDER — SOTALOL HYDROCHLORIDE TABLETS AF 160 MG/1
TABLET ORAL
Refills: 0 | Status: DISCONTINUED | COMMUNITY
End: 2021-01-01

## 2021-01-01 RX ORDER — FUROSEMIDE 40 MG
40 TABLET ORAL EVERY 12 HOURS
Refills: 0 | Status: DISCONTINUED | OUTPATIENT
Start: 2021-01-01 | End: 2021-01-01

## 2021-01-01 RX ORDER — LIDOCAINE 4 G/100G
1 CREAM TOPICAL EVERY 24 HOURS
Refills: 0 | Status: DISCONTINUED | OUTPATIENT
Start: 2021-01-01 | End: 2021-01-01

## 2021-01-01 RX ORDER — DOXAZOSIN 4 MG/1
4 TABLET ORAL DAILY
Refills: 0 | Status: ACTIVE | COMMUNITY

## 2021-01-01 RX ORDER — LIDOCAINE HCL 20 MG/ML
20 VIAL (ML) INJECTION ONCE
Refills: 0 | Status: DISCONTINUED | OUTPATIENT
Start: 2021-01-01 | End: 2021-01-01

## 2021-01-01 RX ORDER — TRAMADOL HYDROCHLORIDE 50 MG/1
25 TABLET ORAL ONCE
Refills: 0 | Status: DISCONTINUED | OUTPATIENT
Start: 2021-01-01 | End: 2021-01-01

## 2021-01-01 RX ORDER — OLMESARTAN MEDOXOMIL / AMLODIPINE BESYLATE / HYDROCHLOROTHIAZIDE 10; 25; 40 MG/1; MG/1; MG/1
TABLET, FILM COATED ORAL
Refills: 0 | Status: DISCONTINUED | COMMUNITY
End: 2021-01-01

## 2021-01-01 RX ORDER — FUROSEMIDE 40 MG
1 TABLET ORAL
Qty: 0 | Refills: 0 | DISCHARGE
Start: 2021-01-01

## 2021-01-01 RX ORDER — LEVOFLOXACIN 750 MG/1
750 TABLET, FILM COATED ORAL DAILY
Qty: 7 | Refills: 0 | Status: ACTIVE | COMMUNITY
Start: 2021-01-01 | End: 1900-01-01

## 2021-01-01 RX ORDER — MIDODRINE HYDROCHLORIDE 2.5 MG/1
5 TABLET ORAL THREE TIMES A DAY
Refills: 0 | Status: DISCONTINUED | OUTPATIENT
Start: 2021-01-01 | End: 2021-01-01

## 2021-01-01 RX ORDER — GLUCAGON INJECTION, SOLUTION 0.5 MG/.1ML
1 INJECTION, SOLUTION SUBCUTANEOUS ONCE
Refills: 0 | Status: DISCONTINUED | OUTPATIENT
Start: 2021-01-01 | End: 2021-01-01

## 2021-01-01 RX ORDER — ONDANSETRON 8 MG/1
4 TABLET, FILM COATED ORAL EVERY 8 HOURS
Refills: 0 | Status: DISCONTINUED | OUTPATIENT
Start: 2021-01-01 | End: 2021-01-01

## 2021-01-01 RX ORDER — SOTALOL HCL 120 MG
120 TABLET ORAL
Refills: 0 | Status: DISCONTINUED | OUTPATIENT
Start: 2021-01-01 | End: 2021-01-01

## 2021-01-01 RX ORDER — FUROSEMIDE 40 MG
20 TABLET ORAL ONCE
Refills: 0 | Status: DISCONTINUED | OUTPATIENT
Start: 2021-01-01 | End: 2021-01-01

## 2021-01-01 RX ORDER — POLYETHYLENE GLYCOL 3350 17 G/17G
17 POWDER, FOR SOLUTION ORAL ONCE
Refills: 0 | Status: COMPLETED | OUTPATIENT
Start: 2021-01-01 | End: 2021-01-01

## 2021-01-01 RX ORDER — ALLOPURINOL 300 MG
1 TABLET ORAL
Qty: 0 | Refills: 0 | DISCHARGE

## 2021-01-01 RX ORDER — MIDODRINE HYDROCHLORIDE 2.5 MG/1
2 TABLET ORAL
Qty: 0 | Refills: 0 | DISCHARGE

## 2021-01-01 RX ORDER — ALLOPURINOL 100 MG/1
100 TABLET ORAL DAILY
Refills: 0 | Status: ACTIVE | COMMUNITY
Start: 2018-03-19

## 2021-01-01 RX ORDER — SOTALOL HCL 120 MG
1 TABLET ORAL
Qty: 0 | Refills: 0 | DISCHARGE
Start: 2021-01-01

## 2021-01-01 RX ORDER — AZTREONAM 2 G
VIAL (EA) INJECTION
Refills: 0 | Status: DISCONTINUED | OUTPATIENT
Start: 2021-01-01 | End: 2021-01-01

## 2021-01-01 RX ORDER — INFLUENZA VIRUS VACCINE 15; 15; 15; 15 UG/.5ML; UG/.5ML; UG/.5ML; UG/.5ML
0.7 SUSPENSION INTRAMUSCULAR ONCE
Refills: 0 | Status: DISCONTINUED | OUTPATIENT
Start: 2021-01-01 | End: 2021-01-01

## 2021-01-01 RX ORDER — MAGNESIUM SULFATE 500 MG/ML
2 VIAL (ML) INJECTION ONCE
Refills: 0 | Status: COMPLETED | OUTPATIENT
Start: 2021-01-01 | End: 2021-01-01

## 2021-01-01 RX ORDER — LENALIDOMIDE 5 MG/1
5 CAPSULE ORAL DAILY
Refills: 0 | Status: DISCONTINUED | OUTPATIENT
Start: 2021-01-01 | End: 2021-01-01

## 2021-01-01 RX ORDER — ACETAMINOPHEN 500 MG
650 TABLET ORAL ONCE
Refills: 0 | Status: DISCONTINUED | OUTPATIENT
Start: 2021-01-01 | End: 2021-01-01

## 2021-01-01 RX ORDER — METOCLOPRAMIDE HCL 10 MG
1 TABLET ORAL
Qty: 56 | Refills: 0
Start: 2021-01-01 | End: 2021-01-01

## 2021-01-01 RX ORDER — DOXAZOSIN MESYLATE 4 MG
1 TABLET ORAL
Qty: 0 | Refills: 0 | DISCHARGE
Start: 2021-01-01

## 2021-01-01 RX ORDER — ZOLPIDEM TARTRATE 5 MG/1
5 TABLET ORAL
Qty: 30 | Refills: 0 | Status: COMPLETED | COMMUNITY
Start: 2021-01-01

## 2021-01-01 RX ADMIN — Medication 120 MILLIGRAM(S): at 05:28

## 2021-01-01 RX ADMIN — Medication 100 MILLIGRAM(S): at 06:09

## 2021-01-01 RX ADMIN — Medication 4 MILLIGRAM(S): at 21:45

## 2021-01-01 RX ADMIN — Medication 650 MILLIGRAM(S): at 04:24

## 2021-01-01 RX ADMIN — Medication 15 MILLILITER(S): at 21:13

## 2021-01-01 RX ADMIN — Medication 10 MILLIGRAM(S): at 09:58

## 2021-01-01 RX ADMIN — Medication 3 MILLIGRAM(S): at 21:29

## 2021-01-01 RX ADMIN — MIDODRINE HYDROCHLORIDE 5 MILLIGRAM(S): 2.5 TABLET ORAL at 05:36

## 2021-01-01 RX ADMIN — Medication 15 MILLILITER(S): at 21:47

## 2021-01-01 RX ADMIN — Medication 100 MILLIGRAM(S): at 17:11

## 2021-01-01 RX ADMIN — POLYETHYLENE GLYCOL 3350 17 GRAM(S): 17 POWDER, FOR SOLUTION ORAL at 17:39

## 2021-01-01 RX ADMIN — Medication 100 MILLIGRAM(S): at 15:39

## 2021-01-01 RX ADMIN — Medication 4 MILLIGRAM(S): at 21:12

## 2021-01-01 RX ADMIN — ONDANSETRON 8 MILLIGRAM(S): 8 TABLET, FILM COATED ORAL at 15:21

## 2021-01-01 RX ADMIN — Medication 25 MILLIGRAM(S): at 06:36

## 2021-01-01 RX ADMIN — Medication 25 MILLIGRAM(S): at 11:14

## 2021-01-01 RX ADMIN — Medication 25 MILLIGRAM(S): at 06:11

## 2021-01-01 RX ADMIN — Medication 4 MILLIGRAM(S): at 21:30

## 2021-01-01 RX ADMIN — Medication 3 MILLILITER(S): at 10:36

## 2021-01-01 RX ADMIN — Medication 100 MILLIGRAM(S): at 05:36

## 2021-01-01 RX ADMIN — Medication 25 MILLIGRAM(S): at 05:18

## 2021-01-01 RX ADMIN — Medication 15 MILLILITER(S): at 21:40

## 2021-01-01 RX ADMIN — AZACITIDINE FOR 52.6 MILLIGRAM(S): 100 INJECTION, POWDER, LYOPHILIZED, FOR SOLUTION INTRAVENOUS; SUBCUTANEOUS at 17:24

## 2021-01-01 RX ADMIN — AZACITIDINE FOR 52.6 MILLIGRAM(S): 100 INJECTION, POWDER, LYOPHILIZED, FOR SOLUTION INTRAVENOUS; SUBCUTANEOUS at 16:29

## 2021-01-01 RX ADMIN — MIDODRINE HYDROCHLORIDE 5 MILLIGRAM(S): 2.5 TABLET ORAL at 17:12

## 2021-01-01 RX ADMIN — Medication 25 MILLIGRAM(S): at 14:20

## 2021-01-01 RX ADMIN — Medication 25 MILLIGRAM(S): at 05:35

## 2021-01-01 RX ADMIN — ONDANSETRON 8 MILLIGRAM(S): 8 TABLET, FILM COATED ORAL at 15:26

## 2021-01-01 RX ADMIN — Medication 1 TABLET(S): at 08:22

## 2021-01-01 RX ADMIN — Medication 100 MILLIGRAM(S): at 21:07

## 2021-01-01 RX ADMIN — Medication 100 MILLIGRAM(S): at 17:39

## 2021-01-01 RX ADMIN — SENNA PLUS 2 TABLET(S): 8.6 TABLET ORAL at 21:46

## 2021-01-01 RX ADMIN — AZACITIDINE FOR 52.6 MILLIGRAM(S): 100 INJECTION, POWDER, LYOPHILIZED, FOR SOLUTION INTRAVENOUS; SUBCUTANEOUS at 17:18

## 2021-01-01 RX ADMIN — Medication 100 MILLIGRAM(S): at 23:21

## 2021-01-01 RX ADMIN — Medication 40 MILLIGRAM(S): at 05:15

## 2021-01-01 RX ADMIN — Medication 25 MILLIGRAM(S): at 05:11

## 2021-01-01 RX ADMIN — Medication 650 MILLIGRAM(S): at 20:02

## 2021-01-01 RX ADMIN — ONDANSETRON 8 MILLIGRAM(S): 8 TABLET, FILM COATED ORAL at 16:05

## 2021-01-01 RX ADMIN — Medication 50 MILLIGRAM(S): at 05:16

## 2021-01-01 RX ADMIN — Medication 1 TABLET(S): at 21:30

## 2021-01-01 RX ADMIN — Medication 650 MILLIGRAM(S): at 09:00

## 2021-01-01 RX ADMIN — Medication 25 MILLIGRAM(S): at 05:26

## 2021-01-01 RX ADMIN — Medication 40 MILLIGRAM(S): at 07:39

## 2021-01-01 RX ADMIN — Medication 4 MILLIGRAM(S): at 21:14

## 2021-01-01 RX ADMIN — Medication 25 MILLIGRAM(S): at 05:37

## 2021-01-01 RX ADMIN — Medication 4 MILLIGRAM(S): at 21:00

## 2021-01-01 RX ADMIN — AZACITIDINE FOR 52.6 MILLIGRAM(S): 100 INJECTION, POWDER, LYOPHILIZED, FOR SOLUTION INTRAVENOUS; SUBCUTANEOUS at 14:07

## 2021-01-01 RX ADMIN — Medication 200 MILLIGRAM(S): at 18:21

## 2021-01-01 RX ADMIN — Medication 1 TABLET(S): at 21:50

## 2021-01-01 RX ADMIN — Medication 12.5 MILLIGRAM(S): at 16:54

## 2021-01-01 RX ADMIN — Medication 30 MILLILITER(S): at 00:45

## 2021-01-01 RX ADMIN — Medication 100 MILLIGRAM(S): at 14:19

## 2021-01-01 RX ADMIN — MIDODRINE HYDROCHLORIDE 5 MILLIGRAM(S): 2.5 TABLET ORAL at 05:31

## 2021-01-01 RX ADMIN — Medication 650 MILLIGRAM(S): at 10:54

## 2021-01-01 RX ADMIN — Medication 25 MILLIGRAM(S): at 05:39

## 2021-01-01 RX ADMIN — Medication 15 MILLILITER(S): at 06:10

## 2021-01-01 RX ADMIN — Medication 50 MILLIGRAM(S): at 14:00

## 2021-01-01 RX ADMIN — Medication 5 MILLIGRAM(S): at 11:30

## 2021-01-01 RX ADMIN — Medication 650 MILLIGRAM(S): at 08:56

## 2021-01-01 RX ADMIN — Medication 100 MILLIGRAM(S): at 05:27

## 2021-01-01 RX ADMIN — AZACITIDINE FOR 52.6 MILLIGRAM(S): 100 INJECTION, POWDER, LYOPHILIZED, FOR SOLUTION INTRAVENOUS; SUBCUTANEOUS at 17:20

## 2021-01-01 RX ADMIN — Medication 25 MILLIGRAM(S): at 05:23

## 2021-01-01 RX ADMIN — Medication 650 MILLIGRAM(S): at 07:41

## 2021-01-01 RX ADMIN — Medication 100 MILLIGRAM(S): at 17:24

## 2021-01-01 RX ADMIN — Medication 25 MILLIGRAM(S): at 11:24

## 2021-01-01 RX ADMIN — Medication 4 MILLIGRAM(S): at 18:53

## 2021-01-01 RX ADMIN — Medication 100 MILLIGRAM(S): at 19:23

## 2021-01-01 RX ADMIN — AZACITIDINE FOR 52.6 MILLIGRAM(S): 100 INJECTION, POWDER, LYOPHILIZED, FOR SOLUTION INTRAVENOUS; SUBCUTANEOUS at 16:17

## 2021-01-01 RX ADMIN — Medication 15 MILLILITER(S): at 21:48

## 2021-01-01 RX ADMIN — Medication 40 MILLIGRAM(S): at 01:30

## 2021-01-01 RX ADMIN — Medication 40 MILLIGRAM(S): at 05:42

## 2021-01-01 RX ADMIN — Medication 4 MILLIGRAM(S): at 22:12

## 2021-01-01 RX ADMIN — Medication 100 MILLIGRAM(S): at 05:28

## 2021-01-01 RX ADMIN — Medication 650 MILLIGRAM(S): at 08:33

## 2021-01-01 RX ADMIN — Medication 50 GRAM(S): at 11:33

## 2021-01-01 RX ADMIN — Medication 100 MILLIGRAM(S): at 18:09

## 2021-01-01 RX ADMIN — Medication 20 MILLIGRAM(S): at 13:35

## 2021-01-01 RX ADMIN — Medication 100 MILLIGRAM(S): at 16:05

## 2021-01-01 RX ADMIN — Medication 25 MILLIGRAM(S): at 10:54

## 2021-01-01 RX ADMIN — Medication 25 MILLIGRAM(S): at 12:01

## 2021-01-01 RX ADMIN — Medication 100 MILLIGRAM(S): at 05:33

## 2021-01-01 RX ADMIN — MIDODRINE HYDROCHLORIDE 5 MILLIGRAM(S): 2.5 TABLET ORAL at 05:42

## 2021-01-01 RX ADMIN — POLYETHYLENE GLYCOL 3350 17 GRAM(S): 17 POWDER, FOR SOLUTION ORAL at 21:31

## 2021-01-01 RX ADMIN — Medication 15 MILLILITER(S): at 05:40

## 2021-01-01 RX ADMIN — Medication 10 MILLIGRAM(S): at 11:12

## 2021-01-01 RX ADMIN — Medication 100 MILLIGRAM(S): at 17:25

## 2021-01-01 RX ADMIN — Medication 4 MILLIGRAM(S): at 23:52

## 2021-01-01 RX ADMIN — CHLORHEXIDINE GLUCONATE 1 APPLICATION(S): 213 SOLUTION TOPICAL at 11:16

## 2021-01-01 RX ADMIN — Medication 200 MILLIGRAM(S): at 05:42

## 2021-01-01 RX ADMIN — MIDODRINE HYDROCHLORIDE 5 MILLIGRAM(S): 2.5 TABLET ORAL at 12:50

## 2021-01-01 RX ADMIN — Medication 25 MILLIGRAM(S): at 02:23

## 2021-01-01 RX ADMIN — AZACITIDINE FOR 52.6 MILLIGRAM(S): 100 INJECTION, POWDER, LYOPHILIZED, FOR SOLUTION INTRAVENOUS; SUBCUTANEOUS at 15:26

## 2021-01-01 RX ADMIN — Medication 20 MILLIGRAM(S): at 06:10

## 2021-01-01 RX ADMIN — Medication 40 MILLIGRAM(S): at 10:42

## 2021-01-01 RX ADMIN — Medication 15 MILLILITER(S): at 05:42

## 2021-01-01 RX ADMIN — LIDOCAINE 1 PATCH: 4 CREAM TOPICAL at 00:22

## 2021-01-01 RX ADMIN — POSACONAZOLE 300 MILLIGRAM(S): 100 TABLET, DELAYED RELEASE ORAL at 18:35

## 2021-01-01 RX ADMIN — Medication 4 MILLIGRAM(S): at 22:21

## 2021-01-01 RX ADMIN — WARFARIN SODIUM 4 MILLIGRAM(S): 2.5 TABLET ORAL at 21:16

## 2021-01-01 RX ADMIN — Medication 25 MILLIGRAM(S): at 05:13

## 2021-01-01 RX ADMIN — Medication 20 MILLIEQUIVALENT(S): at 13:35

## 2021-01-01 RX ADMIN — Medication 120 MILLIGRAM(S): at 05:43

## 2021-01-01 RX ADMIN — Medication 100 MILLIGRAM(S): at 17:12

## 2021-01-01 RX ADMIN — Medication 100 MILLIGRAM(S): at 05:42

## 2021-01-01 RX ADMIN — SODIUM CHLORIDE 50 MILLILITER(S): 9 INJECTION, SOLUTION INTRAVENOUS at 18:10

## 2021-01-01 RX ADMIN — Medication 40 MILLIGRAM(S): at 05:41

## 2021-01-01 RX ADMIN — SODIUM CHLORIDE 75 MILLILITER(S): 9 INJECTION, SOLUTION INTRAVENOUS at 11:30

## 2021-01-01 RX ADMIN — Medication 50 MILLIGRAM(S): at 21:40

## 2021-01-01 RX ADMIN — Medication 15 MILLILITER(S): at 05:33

## 2021-01-01 RX ADMIN — AZACITIDINE FOR 52.6 MILLIGRAM(S): 100 INJECTION, POWDER, LYOPHILIZED, FOR SOLUTION INTRAVENOUS; SUBCUTANEOUS at 17:55

## 2021-01-01 RX ADMIN — Medication 10 MILLIGRAM(S): at 14:22

## 2021-01-01 RX ADMIN — Medication 120 MILLIGRAM(S): at 17:52

## 2021-01-01 RX ADMIN — SODIUM CHLORIDE 1000 MILLILITER(S): 9 INJECTION INTRAMUSCULAR; INTRAVENOUS; SUBCUTANEOUS at 20:02

## 2021-01-01 RX ADMIN — Medication 4 MILLIGRAM(S): at 22:09

## 2021-01-01 RX ADMIN — Medication 4 MILLIGRAM(S): at 20:07

## 2021-01-01 RX ADMIN — SODIUM CHLORIDE 500 MILLILITER(S): 9 INJECTION INTRAMUSCULAR; INTRAVENOUS; SUBCUTANEOUS at 17:10

## 2021-01-01 RX ADMIN — Medication 20 MILLIGRAM(S): at 05:36

## 2021-01-01 RX ADMIN — Medication 10 MILLIGRAM(S): at 12:02

## 2021-01-01 RX ADMIN — Medication 100 MILLIGRAM(S): at 18:46

## 2021-01-01 RX ADMIN — Medication 50 MILLIGRAM(S): at 21:45

## 2021-01-01 RX ADMIN — WARFARIN SODIUM 4 MILLIGRAM(S): 2.5 TABLET ORAL at 21:45

## 2021-01-01 RX ADMIN — ONDANSETRON 8 MILLIGRAM(S): 8 TABLET, FILM COATED ORAL at 17:26

## 2021-01-01 RX ADMIN — Medication 25 MILLIGRAM(S): at 06:00

## 2021-01-01 RX ADMIN — Medication 5 MILLIGRAM(S): at 12:32

## 2021-01-01 RX ADMIN — Medication 3 MILLIGRAM(S): at 21:34

## 2021-01-01 RX ADMIN — Medication 25 MILLIGRAM(S): at 05:38

## 2021-01-01 RX ADMIN — Medication 25 MILLIGRAM(S): at 06:09

## 2021-01-01 RX ADMIN — AZACITIDINE FOR 52.6 MILLIGRAM(S): 100 INJECTION, POWDER, LYOPHILIZED, FOR SOLUTION INTRAVENOUS; SUBCUTANEOUS at 15:27

## 2021-01-01 RX ADMIN — MIDODRINE HYDROCHLORIDE 5 MILLIGRAM(S): 2.5 TABLET ORAL at 12:41

## 2021-01-01 RX ADMIN — Medication 15 MILLILITER(S): at 05:34

## 2021-01-01 RX ADMIN — Medication 40 MILLIGRAM(S): at 06:05

## 2021-01-01 RX ADMIN — Medication 40 MILLIGRAM(S): at 05:14

## 2021-01-01 RX ADMIN — Medication 100 MILLIGRAM(S): at 21:29

## 2021-01-01 RX ADMIN — Medication 650 MILLIGRAM(S): at 06:40

## 2021-01-01 RX ADMIN — Medication 100 MILLIGRAM(S): at 17:03

## 2021-01-01 RX ADMIN — Medication 40 MILLIGRAM(S): at 05:35

## 2021-01-01 RX ADMIN — Medication 200 MILLIGRAM(S): at 06:21

## 2021-01-01 RX ADMIN — Medication 120 MILLIGRAM(S): at 06:10

## 2021-01-01 RX ADMIN — ELTROMBOPAG OLAMINE 50 MILLIGRAM(S): 50 TABLET, FILM COATED ORAL at 12:01

## 2021-01-01 RX ADMIN — Medication 25 MILLIGRAM(S): at 05:27

## 2021-01-01 RX ADMIN — Medication 15 MILLILITER(S): at 21:32

## 2021-01-01 RX ADMIN — Medication 15 MILLILITER(S): at 05:41

## 2021-01-01 RX ADMIN — Medication 100 MILLIGRAM(S): at 23:44

## 2021-01-01 RX ADMIN — Medication 100 GRAM(S): at 12:14

## 2021-01-01 RX ADMIN — MIDODRINE HYDROCHLORIDE 5 MILLIGRAM(S): 2.5 TABLET ORAL at 11:57

## 2021-01-01 RX ADMIN — Medication 100 MILLIGRAM(S): at 17:43

## 2021-01-01 RX ADMIN — Medication 100 MILLIGRAM(S): at 17:26

## 2021-01-01 RX ADMIN — Medication 20 MILLIGRAM(S): at 06:21

## 2021-01-01 RX ADMIN — Medication 200 MILLIGRAM(S): at 19:24

## 2021-01-01 RX ADMIN — Medication 40 MILLIGRAM(S): at 18:13

## 2021-01-01 RX ADMIN — AZACITIDINE FOR 52.6 MILLIGRAM(S): 100 INJECTION, POWDER, LYOPHILIZED, FOR SOLUTION INTRAVENOUS; SUBCUTANEOUS at 16:28

## 2021-01-01 RX ADMIN — BENZOCAINE AND MENTHOL 1 LOZENGE: 5; 1 LIQUID ORAL at 21:48

## 2021-01-01 RX ADMIN — Medication 0.5 MILLIGRAM(S): at 02:59

## 2021-01-01 RX ADMIN — Medication 100 MILLIGRAM(S): at 05:03

## 2021-01-01 RX ADMIN — SODIUM CHLORIDE 50 MILLILITER(S): 9 INJECTION, SOLUTION INTRAVENOUS at 18:30

## 2021-01-01 RX ADMIN — Medication 20 MILLIGRAM(S): at 11:19

## 2021-01-01 RX ADMIN — Medication 40 MILLIEQUIVALENT(S): at 15:27

## 2021-01-01 RX ADMIN — Medication 25 MILLIGRAM(S): at 05:42

## 2021-01-01 RX ADMIN — Medication 100 MILLIGRAM(S): at 05:38

## 2021-01-01 RX ADMIN — Medication 100 MILLIGRAM(S): at 05:37

## 2021-01-01 RX ADMIN — Medication 15 MILLILITER(S): at 22:44

## 2021-01-01 RX ADMIN — Medication 40 MILLIGRAM(S): at 05:17

## 2021-01-01 RX ADMIN — LACTULOSE 20 GRAM(S): 10 SOLUTION ORAL at 17:53

## 2021-01-01 RX ADMIN — Medication 20 MILLIGRAM(S): at 05:11

## 2021-01-01 RX ADMIN — Medication 50 MILLIGRAM(S): at 05:26

## 2021-01-01 RX ADMIN — Medication 100 MILLIGRAM(S): at 06:47

## 2021-01-01 RX ADMIN — Medication 100 MILLIGRAM(S): at 05:31

## 2021-01-01 RX ADMIN — Medication 25 MILLIGRAM(S): at 12:04

## 2021-01-01 RX ADMIN — Medication 15 MILLILITER(S): at 05:37

## 2021-01-01 RX ADMIN — Medication 100 MILLIGRAM(S): at 21:28

## 2021-01-01 RX ADMIN — MIDODRINE HYDROCHLORIDE 5 MILLIGRAM(S): 2.5 TABLET ORAL at 17:14

## 2021-01-01 RX ADMIN — Medication 650 MILLIGRAM(S): at 11:13

## 2021-01-01 RX ADMIN — Medication 4 MILLIGRAM(S): at 21:32

## 2021-01-01 RX ADMIN — Medication 15 MILLILITER(S): at 14:35

## 2021-01-01 RX ADMIN — Medication 100 MILLIGRAM(S): at 07:42

## 2021-01-01 RX ADMIN — ACETAZOLAMIDE 250 MILLIGRAM(S): 250 TABLET ORAL at 13:08

## 2021-01-01 RX ADMIN — ONDANSETRON 8 MILLIGRAM(S): 8 TABLET, FILM COATED ORAL at 16:22

## 2021-01-01 RX ADMIN — Medication 3 MILLIGRAM(S): at 21:50

## 2021-01-01 RX ADMIN — Medication 100 MILLIGRAM(S): at 17:13

## 2021-01-01 RX ADMIN — Medication 40 MILLIEQUIVALENT(S): at 19:33

## 2021-01-01 RX ADMIN — Medication 200 MILLIGRAM(S): at 11:57

## 2021-01-01 RX ADMIN — Medication 40 MILLIGRAM(S): at 21:05

## 2021-01-01 RX ADMIN — HYDROMORPHONE HYDROCHLORIDE 0.3 MILLIGRAM(S): 2 INJECTION INTRAMUSCULAR; INTRAVENOUS; SUBCUTANEOUS at 12:07

## 2021-01-01 RX ADMIN — Medication 50 MILLIGRAM(S): at 06:00

## 2021-01-01 RX ADMIN — Medication 100 MILLIGRAM(S): at 17:16

## 2021-01-01 RX ADMIN — Medication 20 MILLIGRAM(S): at 12:04

## 2021-01-01 RX ADMIN — Medication 100 MILLIGRAM(S): at 05:40

## 2021-01-01 RX ADMIN — Medication 4 MILLIGRAM(S): at 21:51

## 2021-01-01 RX ADMIN — MIDODRINE HYDROCHLORIDE 5 MILLIGRAM(S): 2.5 TABLET ORAL at 11:19

## 2021-01-01 RX ADMIN — HYDROMORPHONE HYDROCHLORIDE 0.3 MILLIGRAM(S): 2 INJECTION INTRAMUSCULAR; INTRAVENOUS; SUBCUTANEOUS at 18:33

## 2021-01-01 RX ADMIN — Medication 100 MILLIGRAM(S): at 17:45

## 2021-01-01 RX ADMIN — Medication 4 MILLIGRAM(S): at 22:11

## 2021-01-01 RX ADMIN — Medication 15 MILLILITER(S): at 14:10

## 2021-01-01 RX ADMIN — Medication 40 MILLIEQUIVALENT(S): at 01:30

## 2021-01-01 RX ADMIN — ELTROMBOPAG OLAMINE 50 MILLIGRAM(S): 50 TABLET, FILM COATED ORAL at 11:25

## 2021-01-01 RX ADMIN — AZACITIDINE FOR 52.6 MILLIGRAM(S): 100 INJECTION, POWDER, LYOPHILIZED, FOR SOLUTION INTRAVENOUS; SUBCUTANEOUS at 16:45

## 2021-01-01 RX ADMIN — ONDANSETRON 8 MILLIGRAM(S): 8 TABLET, FILM COATED ORAL at 15:45

## 2021-01-01 RX ADMIN — Medication 100 MILLIGRAM(S): at 06:43

## 2021-01-01 RX ADMIN — Medication 650 MILLIGRAM(S): at 10:35

## 2021-01-01 RX ADMIN — Medication 20 MILLIGRAM(S): at 05:28

## 2021-01-01 RX ADMIN — Medication 20 MILLIGRAM(S): at 18:02

## 2021-01-01 RX ADMIN — Medication 650 MILLIGRAM(S): at 11:36

## 2021-01-01 RX ADMIN — Medication 15 MILLILITER(S): at 21:10

## 2021-01-01 RX ADMIN — Medication 12.5 MILLIGRAM(S): at 11:30

## 2021-01-01 RX ADMIN — SODIUM CHLORIDE 75 MILLILITER(S): 9 INJECTION, SOLUTION INTRAVENOUS at 16:27

## 2021-01-01 RX ADMIN — POLYETHYLENE GLYCOL 3350 17 GRAM(S): 17 POWDER, FOR SOLUTION ORAL at 18:24

## 2021-01-01 RX ADMIN — Medication 15 MILLILITER(S): at 14:17

## 2021-01-01 RX ADMIN — Medication 100 MILLIGRAM(S): at 18:32

## 2021-01-01 RX ADMIN — AZACITIDINE FOR 52.6 MILLIGRAM(S): 100 INJECTION, POWDER, LYOPHILIZED, FOR SOLUTION INTRAVENOUS; SUBCUTANEOUS at 17:16

## 2021-01-01 RX ADMIN — Medication 20 MILLIGRAM(S): at 05:43

## 2021-01-01 RX ADMIN — Medication 20 MILLIGRAM(S): at 05:07

## 2021-01-01 RX ADMIN — Medication 5 MILLIGRAM(S): at 13:53

## 2021-01-01 RX ADMIN — Medication 15 MILLILITER(S): at 21:42

## 2021-01-01 RX ADMIN — SENNA PLUS 2 TABLET(S): 8.6 TABLET ORAL at 21:31

## 2021-01-01 RX ADMIN — Medication 5 MILLIGRAM(S): at 12:11

## 2021-01-01 RX ADMIN — Medication 40 MILLIGRAM(S): at 18:19

## 2021-01-01 RX ADMIN — Medication 15 MILLILITER(S): at 05:24

## 2021-01-01 RX ADMIN — LIDOCAINE 1 PATCH: 4 CREAM TOPICAL at 18:40

## 2021-01-01 RX ADMIN — ACETAZOLAMIDE 250 MILLIGRAM(S): 250 TABLET ORAL at 22:19

## 2021-01-01 RX ADMIN — Medication 20 MILLIGRAM(S): at 05:38

## 2021-01-01 RX ADMIN — Medication 20 MILLIGRAM(S): at 23:25

## 2021-01-01 RX ADMIN — Medication 20 MILLIGRAM(S): at 05:41

## 2021-01-01 RX ADMIN — Medication 120 MILLIGRAM(S): at 18:32

## 2021-01-01 RX ADMIN — POLYETHYLENE GLYCOL 3350 17 GRAM(S): 17 POWDER, FOR SOLUTION ORAL at 07:55

## 2021-01-01 RX ADMIN — Medication 4 MILLIGRAM(S): at 21:59

## 2021-01-01 RX ADMIN — Medication 100 MILLIGRAM(S): at 18:22

## 2021-01-01 RX ADMIN — Medication 100 MILLIGRAM(S): at 13:55

## 2021-01-01 RX ADMIN — Medication 200 MILLIGRAM(S): at 17:45

## 2021-01-01 RX ADMIN — Medication 50 MILLIGRAM(S): at 01:14

## 2021-01-01 RX ADMIN — AZACITIDINE FOR 52.6 MILLIGRAM(S): 100 INJECTION, POWDER, LYOPHILIZED, FOR SOLUTION INTRAVENOUS; SUBCUTANEOUS at 16:43

## 2021-01-01 RX ADMIN — Medication 200 MILLIGRAM(S): at 18:14

## 2021-01-01 RX ADMIN — Medication 15 MILLILITER(S): at 21:50

## 2021-01-01 RX ADMIN — Medication 200 MILLIGRAM(S): at 16:54

## 2021-01-01 RX ADMIN — Medication 20 MILLIGRAM(S): at 04:47

## 2021-01-01 RX ADMIN — Medication 15 MILLILITER(S): at 05:38

## 2021-01-01 RX ADMIN — Medication 4 MILLIGRAM(S): at 21:28

## 2021-01-01 RX ADMIN — Medication 650 MILLIGRAM(S): at 05:03

## 2021-01-01 RX ADMIN — ONDANSETRON 8 MILLIGRAM(S): 8 TABLET, FILM COATED ORAL at 16:27

## 2021-01-01 RX ADMIN — TRAMADOL HYDROCHLORIDE 25 MILLIGRAM(S): 50 TABLET ORAL at 10:20

## 2021-01-01 RX ADMIN — ELTROMBOPAG OLAMINE 50 MILLIGRAM(S): 50 TABLET, FILM COATED ORAL at 11:24

## 2021-01-01 RX ADMIN — Medication 15 MILLILITER(S): at 08:16

## 2021-01-01 RX ADMIN — MIDODRINE HYDROCHLORIDE 5 MILLIGRAM(S): 2.5 TABLET ORAL at 05:40

## 2021-01-01 RX ADMIN — Medication 4 MILLIGRAM(S): at 21:40

## 2021-01-01 RX ADMIN — ACETAZOLAMIDE 250 MILLIGRAM(S): 250 TABLET ORAL at 21:32

## 2021-01-01 RX ADMIN — Medication 120 MILLIGRAM(S): at 05:10

## 2021-01-01 RX ADMIN — Medication 20 MILLIGRAM(S): at 17:46

## 2021-01-01 RX ADMIN — Medication 15 MILLILITER(S): at 14:07

## 2021-01-01 RX ADMIN — Medication 250 MILLIGRAM(S): at 22:40

## 2021-01-01 RX ADMIN — Medication 5 MILLIGRAM(S): at 11:04

## 2021-01-01 RX ADMIN — Medication 20 MILLIGRAM(S): at 17:04

## 2021-01-01 RX ADMIN — Medication 4 MILLIGRAM(S): at 21:24

## 2021-01-01 RX ADMIN — Medication 20 MILLIGRAM(S): at 06:09

## 2021-01-01 RX ADMIN — MIDODRINE HYDROCHLORIDE 5 MILLIGRAM(S): 2.5 TABLET ORAL at 05:33

## 2021-01-01 RX ADMIN — Medication 100 MILLIGRAM(S): at 05:22

## 2021-01-01 RX ADMIN — Medication 100 MILLIGRAM(S): at 22:07

## 2021-01-01 RX ADMIN — Medication 25 MILLIGRAM(S): at 05:24

## 2021-01-01 RX ADMIN — Medication 100 MILLIGRAM(S): at 05:13

## 2021-01-01 RX ADMIN — Medication 15 MILLILITER(S): at 13:27

## 2021-01-01 RX ADMIN — Medication 100 MILLIGRAM(S): at 17:20

## 2021-01-01 RX ADMIN — ONDANSETRON 4 MILLIGRAM(S): 8 TABLET, FILM COATED ORAL at 14:27

## 2021-01-01 RX ADMIN — Medication 4 MILLIGRAM(S): at 22:19

## 2021-01-01 RX ADMIN — Medication 650 MILLIGRAM(S): at 23:00

## 2021-01-01 RX ADMIN — Medication 100 MILLIGRAM(S): at 05:11

## 2021-01-01 RX ADMIN — Medication 40 MILLIGRAM(S): at 18:44

## 2021-01-01 RX ADMIN — MIDODRINE HYDROCHLORIDE 5 MILLIGRAM(S): 2.5 TABLET ORAL at 17:25

## 2021-01-01 RX ADMIN — CHLORHEXIDINE GLUCONATE 1 APPLICATION(S): 213 SOLUTION TOPICAL at 12:00

## 2021-01-01 RX ADMIN — SODIUM CHLORIDE 60 MILLILITER(S): 9 INJECTION, SOLUTION INTRAVENOUS at 05:06

## 2021-01-01 RX ADMIN — AZACITIDINE FOR 52.6 MILLIGRAM(S): 100 INJECTION, POWDER, LYOPHILIZED, FOR SOLUTION INTRAVENOUS; SUBCUTANEOUS at 16:44

## 2021-01-01 RX ADMIN — MIDODRINE HYDROCHLORIDE 5 MILLIGRAM(S): 2.5 TABLET ORAL at 17:27

## 2021-01-01 RX ADMIN — Medication 15 MILLILITER(S): at 20:32

## 2021-01-01 RX ADMIN — Medication 15 MILLILITER(S): at 00:07

## 2021-01-01 RX ADMIN — Medication 20 MILLIGRAM(S): at 01:42

## 2021-01-01 RX ADMIN — Medication 100 MILLIGRAM(S): at 18:54

## 2021-01-01 RX ADMIN — Medication 15 MILLILITER(S): at 21:00

## 2021-01-01 RX ADMIN — Medication 15 MILLILITER(S): at 15:43

## 2021-01-01 RX ADMIN — Medication 50 MILLIGRAM(S): at 05:15

## 2021-01-01 RX ADMIN — Medication 40 MILLIGRAM(S): at 16:30

## 2021-01-01 RX ADMIN — Medication 40 MILLIGRAM(S): at 05:24

## 2021-01-01 RX ADMIN — Medication 50 MILLIGRAM(S): at 21:21

## 2021-01-01 RX ADMIN — Medication 200 MILLIGRAM(S): at 11:32

## 2021-01-01 RX ADMIN — Medication 50 MILLIGRAM(S): at 22:01

## 2021-01-01 RX ADMIN — Medication 25 MILLIGRAM(S): at 19:00

## 2021-01-01 RX ADMIN — Medication 50 MILLIGRAM(S): at 13:35

## 2021-01-01 RX ADMIN — Medication 100 MILLIGRAM(S): at 22:50

## 2021-01-01 RX ADMIN — Medication 15 MILLILITER(S): at 00:27

## 2021-01-01 RX ADMIN — Medication 650 MILLIGRAM(S): at 09:56

## 2021-01-01 RX ADMIN — Medication 4 MILLIGRAM(S): at 21:11

## 2021-01-01 RX ADMIN — POLYETHYLENE GLYCOL 3350 17 GRAM(S): 17 POWDER, FOR SOLUTION ORAL at 05:42

## 2021-01-01 RX ADMIN — Medication 25 MILLIGRAM(S): at 06:20

## 2021-01-01 RX ADMIN — Medication 3 MILLIGRAM(S): at 21:11

## 2021-01-01 RX ADMIN — MIDODRINE HYDROCHLORIDE 5 MILLIGRAM(S): 2.5 TABLET ORAL at 17:26

## 2021-01-01 RX ADMIN — Medication 100 MILLIGRAM(S): at 05:14

## 2021-01-01 RX ADMIN — Medication 4 MILLIGRAM(S): at 00:02

## 2021-01-01 RX ADMIN — Medication 100 MILLIGRAM(S): at 18:38

## 2021-01-01 RX ADMIN — Medication 3 MILLIGRAM(S): at 21:01

## 2021-01-01 RX ADMIN — Medication 40 MILLIGRAM(S): at 17:35

## 2021-01-01 RX ADMIN — AZACITIDINE FOR 52.6 MILLIGRAM(S): 100 INJECTION, POWDER, LYOPHILIZED, FOR SOLUTION INTRAVENOUS; SUBCUTANEOUS at 17:23

## 2021-01-01 RX ADMIN — SODIUM CHLORIDE 40 MILLILITER(S): 9 INJECTION INTRAMUSCULAR; INTRAVENOUS; SUBCUTANEOUS at 18:16

## 2021-01-01 RX ADMIN — Medication 40 MILLIEQUIVALENT(S): at 11:32

## 2021-01-01 RX ADMIN — Medication 40 MILLIGRAM(S): at 13:35

## 2021-01-01 RX ADMIN — Medication 25 MILLIGRAM(S): at 05:40

## 2021-01-01 RX ADMIN — Medication 200 MILLIGRAM(S): at 12:38

## 2021-01-01 RX ADMIN — Medication 4 MILLIGRAM(S): at 22:44

## 2021-01-01 RX ADMIN — Medication 15 MILLILITER(S): at 05:51

## 2021-01-01 RX ADMIN — SODIUM CHLORIDE 1000 MILLILITER(S): 9 INJECTION INTRAMUSCULAR; INTRAVENOUS; SUBCUTANEOUS at 14:35

## 2021-01-01 RX ADMIN — MIDODRINE HYDROCHLORIDE 5 MILLIGRAM(S): 2.5 TABLET ORAL at 18:07

## 2021-01-01 RX ADMIN — Medication 4 MILLIGRAM(S): at 21:04

## 2021-01-01 RX ADMIN — ONDANSETRON 8 MILLIGRAM(S): 8 TABLET, FILM COATED ORAL at 15:27

## 2021-01-01 RX ADMIN — SENNA PLUS 2 TABLET(S): 8.6 TABLET ORAL at 21:11

## 2021-01-01 RX ADMIN — Medication 650 MILLIGRAM(S): at 11:43

## 2021-01-01 RX ADMIN — Medication 100 MILLIGRAM(S): at 17:41

## 2021-01-01 RX ADMIN — AZACITIDINE FOR 52.6 MILLIGRAM(S): 100 INJECTION, POWDER, LYOPHILIZED, FOR SOLUTION INTRAVENOUS; SUBCUTANEOUS at 17:17

## 2021-01-01 RX ADMIN — Medication 3 MILLIGRAM(S): at 21:24

## 2021-01-01 RX ADMIN — Medication 4 MILLIGRAM(S): at 21:31

## 2021-01-01 RX ADMIN — Medication 15 MILLILITER(S): at 22:12

## 2021-01-01 RX ADMIN — MIDODRINE HYDROCHLORIDE 5 MILLIGRAM(S): 2.5 TABLET ORAL at 11:53

## 2021-01-01 RX ADMIN — Medication 25 MILLIGRAM(S): at 06:13

## 2021-01-01 RX ADMIN — Medication 15 MILLILITER(S): at 22:08

## 2021-01-01 RX ADMIN — Medication 20 MILLIGRAM(S): at 06:04

## 2021-01-01 RX ADMIN — Medication 1 TABLET(S): at 11:42

## 2021-01-01 RX ADMIN — Medication 4 MILLIGRAM(S): at 20:46

## 2021-01-01 RX ADMIN — ONDANSETRON 8 MILLIGRAM(S): 8 TABLET, FILM COATED ORAL at 14:33

## 2021-01-01 RX ADMIN — POSACONAZOLE 300 MILLIGRAM(S): 100 TABLET, DELAYED RELEASE ORAL at 18:45

## 2021-01-01 RX ADMIN — Medication 650 MILLIGRAM(S): at 22:30

## 2021-01-01 RX ADMIN — Medication 20 MILLIGRAM(S): at 06:12

## 2021-01-01 RX ADMIN — Medication 25 MILLIGRAM(S): at 04:25

## 2021-01-01 RX ADMIN — SODIUM CHLORIDE 60 MILLILITER(S): 9 INJECTION, SOLUTION INTRAVENOUS at 16:19

## 2021-01-01 RX ADMIN — AZACITIDINE FOR 52.6 MILLIGRAM(S): 100 INJECTION, POWDER, LYOPHILIZED, FOR SOLUTION INTRAVENOUS; SUBCUTANEOUS at 15:25

## 2021-01-01 RX ADMIN — AZACITIDINE FOR 52.6 MILLIGRAM(S): 100 INJECTION, POWDER, LYOPHILIZED, FOR SOLUTION INTRAVENOUS; SUBCUTANEOUS at 14:05

## 2021-01-01 RX ADMIN — Medication 20 MILLIGRAM(S): at 17:37

## 2021-01-01 RX ADMIN — Medication 15 MILLILITER(S): at 04:40

## 2021-01-01 RX ADMIN — POSACONAZOLE 300 MILLIGRAM(S): 100 TABLET, DELAYED RELEASE ORAL at 11:48

## 2021-01-01 RX ADMIN — Medication 4 MILLIGRAM(S): at 20:51

## 2021-01-01 RX ADMIN — Medication 15 MILLILITER(S): at 13:20

## 2021-01-01 RX ADMIN — Medication 15 MILLILITER(S): at 13:16

## 2021-01-01 RX ADMIN — Medication 650 MILLIGRAM(S): at 12:00

## 2021-01-01 RX ADMIN — Medication 4 MILLIGRAM(S): at 21:27

## 2021-01-01 RX ADMIN — Medication 4 MILLIGRAM(S): at 21:29

## 2021-01-01 RX ADMIN — Medication 50 MILLIGRAM(S): at 21:48

## 2021-01-01 RX ADMIN — MIDODRINE HYDROCHLORIDE 5 MILLIGRAM(S): 2.5 TABLET ORAL at 18:24

## 2021-01-01 RX ADMIN — SENNA PLUS 2 TABLET(S): 8.6 TABLET ORAL at 21:00

## 2021-01-01 RX ADMIN — Medication 4 MILLIGRAM(S): at 21:48

## 2021-01-01 RX ADMIN — Medication 100 MILLIGRAM(S): at 17:52

## 2021-01-01 RX ADMIN — Medication 200 MILLIGRAM(S): at 18:02

## 2021-01-01 RX ADMIN — Medication 10 MILLIGRAM(S): at 09:21

## 2021-01-01 RX ADMIN — Medication 10 MILLIGRAM(S): at 21:51

## 2021-01-01 RX ADMIN — MIDODRINE HYDROCHLORIDE 5 MILLIGRAM(S): 2.5 TABLET ORAL at 05:38

## 2021-01-01 RX ADMIN — Medication 4 MILLIGRAM(S): at 21:42

## 2021-01-01 RX ADMIN — Medication 50 MILLIGRAM(S): at 13:00

## 2021-01-01 RX ADMIN — Medication 15 MILLILITER(S): at 12:32

## 2021-01-01 RX ADMIN — Medication 100 MILLIGRAM(S): at 05:09

## 2021-01-01 RX ADMIN — Medication 40 MILLIGRAM(S): at 12:08

## 2021-01-01 RX ADMIN — AZACITIDINE FOR 52.6 MILLIGRAM(S): 100 INJECTION, POWDER, LYOPHILIZED, FOR SOLUTION INTRAVENOUS; SUBCUTANEOUS at 15:18

## 2021-01-01 RX ADMIN — ONDANSETRON 8 MILLIGRAM(S): 8 TABLET, FILM COATED ORAL at 15:39

## 2021-01-01 RX ADMIN — Medication 20 MILLIGRAM(S): at 12:41

## 2021-01-01 RX ADMIN — HALOPERIDOL DECANOATE 0.5 MILLIGRAM(S): 100 INJECTION INTRAMUSCULAR at 02:29

## 2021-01-01 RX ADMIN — Medication 4 MILLIGRAM(S): at 21:05

## 2021-01-01 RX ADMIN — Medication 25 MILLIGRAM(S): at 15:27

## 2021-01-01 RX ADMIN — AZACITIDINE FOR 52.6 MILLIGRAM(S): 100 INJECTION, POWDER, LYOPHILIZED, FOR SOLUTION INTRAVENOUS; SUBCUTANEOUS at 15:20

## 2021-01-01 RX ADMIN — Medication 40 MILLIGRAM(S): at 05:20

## 2021-01-01 RX ADMIN — Medication 50 MILLIGRAM(S): at 05:38

## 2021-01-01 RX ADMIN — Medication 40 MILLIGRAM(S): at 17:34

## 2021-01-01 RX ADMIN — SENNA PLUS 2 TABLET(S): 8.6 TABLET ORAL at 21:08

## 2021-01-01 RX ADMIN — Medication 4 MILLIGRAM(S): at 22:01

## 2021-01-01 RX ADMIN — Medication 650 MILLIGRAM(S): at 06:46

## 2021-01-01 RX ADMIN — Medication 5 MILLIGRAM(S): at 12:15

## 2021-01-01 RX ADMIN — Medication 3 MILLIGRAM(S): at 21:18

## 2021-01-01 RX ADMIN — Medication 15 MILLILITER(S): at 21:59

## 2021-01-01 RX ADMIN — Medication 120 MILLIGRAM(S): at 18:45

## 2021-01-01 RX ADMIN — Medication 20 MILLIGRAM(S): at 05:37

## 2021-01-01 RX ADMIN — AZACITIDINE FOR 52.6 MILLIGRAM(S): 100 INJECTION, POWDER, LYOPHILIZED, FOR SOLUTION INTRAVENOUS; SUBCUTANEOUS at 15:28

## 2021-01-01 RX ADMIN — POLYETHYLENE GLYCOL 3350 17 GRAM(S): 17 POWDER, FOR SOLUTION ORAL at 05:38

## 2021-01-01 RX ADMIN — MIDODRINE HYDROCHLORIDE 5 MILLIGRAM(S): 2.5 TABLET ORAL at 18:14

## 2021-01-01 RX ADMIN — Medication 25 MILLIGRAM(S): at 05:41

## 2021-01-01 RX ADMIN — Medication 200 MILLIGRAM(S): at 11:23

## 2021-01-01 RX ADMIN — SENNA PLUS 2 TABLET(S): 8.6 TABLET ORAL at 21:40

## 2021-01-01 RX ADMIN — Medication 50 MILLIGRAM(S): at 05:34

## 2021-01-01 RX ADMIN — Medication 25 MILLIGRAM(S): at 21:56

## 2021-01-01 RX ADMIN — Medication 650 MILLIGRAM(S): at 15:28

## 2021-01-01 RX ADMIN — Medication 25 MILLIGRAM(S): at 10:59

## 2021-01-01 RX ADMIN — MIDODRINE HYDROCHLORIDE 5 MILLIGRAM(S): 2.5 TABLET ORAL at 11:55

## 2021-01-01 RX ADMIN — Medication 4 MILLIGRAM(S): at 21:08

## 2021-01-01 RX ADMIN — Medication 200 MILLIGRAM(S): at 05:13

## 2021-01-01 RX ADMIN — POLYETHYLENE GLYCOL 3350 17 GRAM(S): 17 POWDER, FOR SOLUTION ORAL at 12:08

## 2021-01-01 RX ADMIN — POLYETHYLENE GLYCOL 3350 17 GRAM(S): 17 POWDER, FOR SOLUTION ORAL at 17:53

## 2021-01-01 RX ADMIN — Medication 100 MILLIGRAM(S): at 17:28

## 2021-01-01 RX ADMIN — CHLORHEXIDINE GLUCONATE 1 APPLICATION(S): 213 SOLUTION TOPICAL at 12:09

## 2021-01-01 RX ADMIN — Medication 20 MILLIGRAM(S): at 05:10

## 2021-01-01 RX ADMIN — Medication 650 MILLIGRAM(S): at 15:10

## 2021-01-01 RX ADMIN — Medication 15 MILLILITER(S): at 19:40

## 2021-01-01 RX ADMIN — Medication 200 MILLIGRAM(S): at 18:37

## 2021-01-01 RX ADMIN — WARFARIN SODIUM 4 MILLIGRAM(S): 2.5 TABLET ORAL at 21:52

## 2021-01-01 RX ADMIN — Medication 10 MILLIGRAM(S): at 17:41

## 2021-01-01 RX ADMIN — Medication 25 MILLIGRAM(S): at 05:31

## 2021-01-01 RX ADMIN — LIDOCAINE 1 PATCH: 4 CREAM TOPICAL at 12:30

## 2021-01-01 RX ADMIN — Medication 4 MILLIGRAM(S): at 22:08

## 2021-01-01 RX ADMIN — CHLORHEXIDINE GLUCONATE 1 APPLICATION(S): 213 SOLUTION TOPICAL at 12:31

## 2021-01-01 RX ADMIN — MIDODRINE HYDROCHLORIDE 5 MILLIGRAM(S): 2.5 TABLET ORAL at 12:28

## 2021-01-01 RX ADMIN — Medication 25 MILLIGRAM(S): at 09:56

## 2021-01-01 RX ADMIN — Medication 20 MILLIGRAM(S): at 05:12

## 2021-01-01 RX ADMIN — Medication 15 MILLILITER(S): at 05:19

## 2021-01-01 RX ADMIN — ACETAZOLAMIDE 250 MILLIGRAM(S): 250 TABLET ORAL at 13:25

## 2021-01-01 RX ADMIN — Medication 25 MILLIGRAM(S): at 05:36

## 2021-01-01 RX ADMIN — Medication 15 MILLILITER(S): at 15:28

## 2021-01-01 RX ADMIN — Medication 20 MILLIGRAM(S): at 21:56

## 2021-01-01 RX ADMIN — Medication 40 MILLIGRAM(S): at 05:23

## 2021-01-01 RX ADMIN — Medication 25 MILLIGRAM(S): at 07:40

## 2021-01-01 RX ADMIN — Medication 40 MILLIGRAM(S): at 05:26

## 2021-01-01 RX ADMIN — ONDANSETRON 8 MILLIGRAM(S): 8 TABLET, FILM COATED ORAL at 14:02

## 2021-01-01 RX ADMIN — Medication 200 MILLIGRAM(S): at 12:02

## 2021-01-01 RX ADMIN — SODIUM CHLORIDE 250 MILLILITER(S): 9 INJECTION INTRAMUSCULAR; INTRAVENOUS; SUBCUTANEOUS at 17:12

## 2021-01-01 RX ADMIN — ACETAZOLAMIDE 250 MILLIGRAM(S): 250 TABLET ORAL at 06:20

## 2021-01-01 RX ADMIN — Medication 3 MILLIGRAM(S): at 21:40

## 2021-01-01 RX ADMIN — SODIUM CHLORIDE 75 MILLILITER(S): 9 INJECTION, SOLUTION INTRAVENOUS at 19:50

## 2021-01-01 RX ADMIN — Medication 1 TABLET(S): at 12:28

## 2021-01-01 RX ADMIN — SENNA PLUS 2 TABLET(S): 8.6 TABLET ORAL at 21:14

## 2021-01-01 RX ADMIN — Medication 40 MILLIGRAM(S): at 05:39

## 2021-01-01 RX ADMIN — Medication 1 TABLET(S): at 17:24

## 2021-01-01 RX ADMIN — LACTULOSE 10 GRAM(S): 10 SOLUTION ORAL at 23:00

## 2021-01-01 RX ADMIN — Medication 25 MILLIGRAM(S): at 10:15

## 2021-01-01 RX ADMIN — Medication 20 MILLIGRAM(S): at 11:54

## 2021-01-01 RX ADMIN — Medication 20 MILLIGRAM(S): at 06:47

## 2021-01-01 RX ADMIN — Medication 4 MILLIGRAM(S): at 21:50

## 2021-01-01 RX ADMIN — Medication 25 MILLIGRAM(S): at 05:15

## 2021-01-01 RX ADMIN — Medication 20 MILLIGRAM(S): at 13:27

## 2021-01-01 RX ADMIN — ELTROMBOPAG OLAMINE 50 MILLIGRAM(S): 50 TABLET, FILM COATED ORAL at 11:43

## 2021-01-01 RX ADMIN — Medication 100 MILLIGRAM(S): at 06:11

## 2021-01-01 RX ADMIN — Medication 650 MILLIGRAM(S): at 02:50

## 2021-01-01 RX ADMIN — Medication 120 MILLIGRAM(S): at 05:41

## 2021-01-01 RX ADMIN — SENNA PLUS 2 TABLET(S): 8.6 TABLET ORAL at 21:30

## 2021-01-01 RX ADMIN — Medication 40 MILLIGRAM(S): at 17:18

## 2021-01-01 RX ADMIN — Medication 200 MILLIGRAM(S): at 13:25

## 2021-01-01 RX ADMIN — Medication 50 MILLIGRAM(S): at 12:27

## 2021-01-01 RX ADMIN — Medication 50 MILLIGRAM(S): at 13:53

## 2021-01-01 RX ADMIN — MIDODRINE HYDROCHLORIDE 5 MILLIGRAM(S): 2.5 TABLET ORAL at 12:09

## 2021-01-01 RX ADMIN — Medication 40 MILLIGRAM(S): at 05:32

## 2021-01-01 RX ADMIN — ACETAZOLAMIDE 250 MILLIGRAM(S): 250 TABLET ORAL at 22:13

## 2021-01-01 RX ADMIN — Medication 40 MILLIGRAM(S): at 17:36

## 2021-01-01 RX ADMIN — Medication 4 MILLIGRAM(S): at 23:01

## 2021-01-01 RX ADMIN — Medication 15 MILLILITER(S): at 14:01

## 2021-01-01 RX ADMIN — Medication 200 MILLIGRAM(S): at 12:39

## 2021-01-01 RX ADMIN — Medication 15 MILLILITER(S): at 16:09

## 2021-01-01 RX ADMIN — Medication 15 MILLILITER(S): at 17:06

## 2021-01-01 RX ADMIN — POLYETHYLENE GLYCOL 3350 17 GRAM(S): 17 POWDER, FOR SOLUTION ORAL at 12:50

## 2021-01-01 RX ADMIN — Medication 15 MILLILITER(S): at 21:08

## 2021-01-01 RX ADMIN — Medication 650 MILLIGRAM(S): at 05:42

## 2021-01-01 RX ADMIN — Medication 20 MILLIGRAM(S): at 05:40

## 2021-01-01 RX ADMIN — Medication 40 MILLIGRAM(S): at 05:38

## 2021-01-01 RX ADMIN — Medication 40 MILLIGRAM(S): at 17:19

## 2021-01-01 RX ADMIN — Medication 650 MILLIGRAM(S): at 02:23

## 2021-01-01 RX ADMIN — Medication 20 MILLIGRAM(S): at 06:07

## 2021-01-01 RX ADMIN — Medication 40 MILLIGRAM(S): at 12:30

## 2021-01-01 RX ADMIN — Medication 100 MILLIGRAM(S): at 05:43

## 2021-01-01 RX ADMIN — POLYETHYLENE GLYCOL 3350 17 GRAM(S): 17 POWDER, FOR SOLUTION ORAL at 05:51

## 2021-01-01 RX ADMIN — HYDROMORPHONE HYDROCHLORIDE 0.3 MILLIGRAM(S): 2 INJECTION INTRAMUSCULAR; INTRAVENOUS; SUBCUTANEOUS at 16:06

## 2021-01-01 RX ADMIN — Medication 650 MILLIGRAM(S): at 08:20

## 2021-01-01 RX ADMIN — POLYETHYLENE GLYCOL 3350 17 GRAM(S): 17 POWDER, FOR SOLUTION ORAL at 05:39

## 2021-01-01 RX ADMIN — Medication 650 MILLIGRAM(S): at 07:23

## 2021-01-01 RX ADMIN — Medication 40 MILLIGRAM(S): at 06:00

## 2021-01-01 RX ADMIN — Medication 100 MILLIGRAM(S): at 06:42

## 2021-01-01 RX ADMIN — Medication 200 MILLIGRAM(S): at 09:41

## 2021-01-01 RX ADMIN — Medication 120 MILLIGRAM(S): at 17:16

## 2021-01-01 RX ADMIN — Medication 25 MILLIGRAM(S): at 08:33

## 2021-01-01 RX ADMIN — Medication 15 MILLILITER(S): at 20:06

## 2021-01-01 RX ADMIN — Medication 4 MILLIGRAM(S): at 21:18

## 2021-01-01 RX ADMIN — Medication 15 MILLILITER(S): at 05:15

## 2021-01-01 RX ADMIN — MIDODRINE HYDROCHLORIDE 5 MILLIGRAM(S): 2.5 TABLET ORAL at 11:23

## 2021-01-01 RX ADMIN — Medication 200 MILLIGRAM(S): at 09:01

## 2021-01-01 RX ADMIN — POLYETHYLENE GLYCOL 3350 17 GRAM(S): 17 POWDER, FOR SOLUTION ORAL at 17:27

## 2021-01-01 RX ADMIN — SODIUM CHLORIDE 75 MILLILITER(S): 9 INJECTION INTRAMUSCULAR; INTRAVENOUS; SUBCUTANEOUS at 12:06

## 2021-01-01 RX ADMIN — Medication 100 MILLIGRAM(S): at 18:04

## 2021-01-01 RX ADMIN — Medication 15 MILLILITER(S): at 13:04

## 2021-01-01 RX ADMIN — SODIUM CHLORIDE 75 MILLILITER(S): 9 INJECTION, SOLUTION INTRAVENOUS at 21:24

## 2021-01-01 RX ADMIN — Medication 15 MILLILITER(S): at 11:17

## 2021-01-01 RX ADMIN — Medication 200 MILLIGRAM(S): at 13:32

## 2021-01-01 RX ADMIN — Medication 25 MILLIGRAM(S): at 05:32

## 2021-01-01 RX ADMIN — Medication 100 MILLIGRAM(S): at 18:39

## 2021-01-01 RX ADMIN — Medication 3 MILLILITER(S): at 18:41

## 2021-01-01 RX ADMIN — AZACITIDINE FOR 52.6 MILLIGRAM(S): 100 INJECTION, POWDER, LYOPHILIZED, FOR SOLUTION INTRAVENOUS; SUBCUTANEOUS at 15:24

## 2021-01-01 RX ADMIN — Medication 4 MILLIGRAM(S): at 21:02

## 2021-01-01 RX ADMIN — Medication 20 MILLIGRAM(S): at 05:19

## 2021-01-01 RX ADMIN — Medication 40 MILLIGRAM(S): at 05:18

## 2021-01-01 RX ADMIN — Medication 20 MILLIGRAM(S): at 06:11

## 2021-01-01 RX ADMIN — Medication 200 MILLIGRAM(S): at 05:29

## 2021-01-01 RX ADMIN — Medication 25 MILLIGRAM(S): at 05:21

## 2021-01-01 RX ADMIN — Medication 1 TABLET(S): at 17:11

## 2021-01-01 RX ADMIN — Medication 100 MILLIGRAM(S): at 14:24

## 2021-01-01 RX ADMIN — Medication 100 MILLIGRAM(S): at 05:29

## 2021-01-01 RX ADMIN — CHLORHEXIDINE GLUCONATE 1 APPLICATION(S): 213 SOLUTION TOPICAL at 13:21

## 2021-01-01 RX ADMIN — Medication 650 MILLIGRAM(S): at 14:19

## 2021-01-01 RX ADMIN — Medication 100 MILLIGRAM(S): at 18:24

## 2021-01-01 RX ADMIN — MIDODRINE HYDROCHLORIDE 5 MILLIGRAM(S): 2.5 TABLET ORAL at 17:39

## 2021-01-01 RX ADMIN — Medication 650 MILLIGRAM(S): at 09:41

## 2021-01-01 RX ADMIN — Medication 200 MILLIGRAM(S): at 17:42

## 2021-01-01 RX ADMIN — Medication 20 MILLIGRAM(S): at 17:39

## 2021-01-01 RX ADMIN — Medication 50 MILLIGRAM(S): at 05:23

## 2021-01-01 RX ADMIN — AZACITIDINE FOR 52.6 MILLIGRAM(S): 100 INJECTION, POWDER, LYOPHILIZED, FOR SOLUTION INTRAVENOUS; SUBCUTANEOUS at 17:21

## 2021-01-01 RX ADMIN — Medication 1 TABLET(S): at 07:46

## 2021-01-01 RX ADMIN — Medication 50 MILLIGRAM(S): at 20:51

## 2021-01-01 RX ADMIN — POSACONAZOLE 300 MILLIGRAM(S): 100 TABLET, DELAYED RELEASE ORAL at 05:28

## 2021-01-01 RX ADMIN — Medication 10 MILLIGRAM(S): at 11:14

## 2021-01-01 RX ADMIN — Medication 100 MILLIGRAM(S): at 13:38

## 2021-01-01 RX ADMIN — ELTROMBOPAG OLAMINE 50 MILLIGRAM(S): 50 TABLET, FILM COATED ORAL at 11:57

## 2021-01-01 RX ADMIN — Medication 4 MILLIGRAM(S): at 21:46

## 2021-01-01 RX ADMIN — SODIUM CHLORIDE 75 MILLILITER(S): 9 INJECTION INTRAMUSCULAR; INTRAVENOUS; SUBCUTANEOUS at 20:05

## 2021-01-01 RX ADMIN — AZACITIDINE FOR 52.6 MILLIGRAM(S): 100 INJECTION, POWDER, LYOPHILIZED, FOR SOLUTION INTRAVENOUS; SUBCUTANEOUS at 14:06

## 2021-01-01 RX ADMIN — Medication 15 MILLILITER(S): at 13:17

## 2021-01-01 RX ADMIN — Medication 200 MILLIGRAM(S): at 06:04

## 2021-01-01 RX ADMIN — Medication 40 MILLIGRAM(S): at 06:12

## 2021-01-01 RX ADMIN — Medication 650 MILLIGRAM(S): at 21:30

## 2021-01-01 RX ADMIN — Medication 100 MILLIGRAM(S): at 18:14

## 2021-01-01 RX ADMIN — Medication 100 MILLIGRAM(S): at 05:44

## 2021-01-01 RX ADMIN — Medication 200 MILLIGRAM(S): at 11:43

## 2021-01-01 RX ADMIN — Medication 25 MILLIGRAM(S): at 09:41

## 2021-01-01 RX ADMIN — Medication 200 MILLIGRAM(S): at 13:07

## 2021-01-01 RX ADMIN — MIDODRINE HYDROCHLORIDE 5 MILLIGRAM(S): 2.5 TABLET ORAL at 11:20

## 2021-01-01 RX ADMIN — TRAMADOL HYDROCHLORIDE 25 MILLIGRAM(S): 50 TABLET ORAL at 09:33

## 2021-01-01 RX ADMIN — Medication 15 MILLILITER(S): at 21:30

## 2021-01-01 RX ADMIN — Medication 20 MILLIGRAM(S): at 14:01

## 2021-01-01 RX ADMIN — Medication 100 MILLIGRAM(S): at 17:35

## 2021-01-01 RX ADMIN — Medication 15 MILLILITER(S): at 14:27

## 2021-01-01 RX ADMIN — Medication 20 MILLIGRAM(S): at 16:03

## 2021-01-01 RX ADMIN — Medication 15 MILLILITER(S): at 20:51

## 2021-01-01 RX ADMIN — Medication 50 MILLIGRAM(S): at 14:27

## 2021-01-01 RX ADMIN — Medication 40 MILLIGRAM(S): at 06:44

## 2021-01-01 RX ADMIN — Medication 20 MILLIGRAM(S): at 16:59

## 2021-01-01 RX ADMIN — Medication 4 MILLIGRAM(S): at 21:21

## 2021-01-01 RX ADMIN — Medication 45 MICROGRAM(S)/MIN: at 18:02

## 2021-01-01 RX ADMIN — Medication 100 MILLIGRAM(S): at 05:19

## 2021-01-01 RX ADMIN — ACETAZOLAMIDE 250 MILLIGRAM(S): 250 TABLET ORAL at 05:10

## 2021-01-01 RX ADMIN — Medication 200 MILLIGRAM(S): at 18:09

## 2021-06-26 NOTE — ED CLERICAL - NS ED CLERK NOTE PRE-ARRIVAL INFORMATION; ADDITIONAL PRE-ARRIVAL INFORMATION
CC/Reason For referral: [facility] MDS - c/o 10 lb weight loss, dehydration, anorexia  Preferred Consultant(if applicable):  Who admits for you (if needed): [facility]  Do you have documents you would like to fax over? NO  Would you still like to speak to an ED attending? YES

## 2021-06-26 NOTE — H&P ADULT - NSHPREVIEWOFSYSTEMS_GEN_ALL_CORE
General: +weakness, no fever/chills, no weight loss/gain  Skin/Breast: no rash, no jaundice  Ophthalmologic: no vision changes, no dry eyes   Respiratory and Thorax: no cough, no wheezing, no hemoptysis, no dyspnea  Cardiovascular: no chest pain, no shortness of breath, no orthopnea  Gastrointestinal: no n/v/d, no abdominal pain, no dysphagia   Genitourinary: no dysuria, no frequency, no nocturia, no hematuria  Musculoskeletal: no trauma, no sprain/strain, no myalgias, no arthralgias, no fracture  Neurological: no HA, no dizziness, no weakness, no numbness  Psychiatric: no depression, no SI/HI  Hematology/Lymphatics: no easy bruising  Endocrine: no heat or cold intolerance. no weight gain or loss  Allergic/Immunologic: no allergy or recent reaction

## 2021-06-26 NOTE — ED PROVIDER NOTE - CADM POA PRESS ULCER
Hematology/Oncology Clinic Follow Up Visit    Patient Name: Alisha Hooper  Medical Record Number: [id number]    YOB: 1954    PCP: Dr. Yousuf Chaudhry  Other providers:  Dr. Rolo Barker (cardiology), Dr. Edmond Villalobos (rad onc)    Reason for Co insurance authorization  -8/7/17- cycle 2 cis/gem/necitumumab  -8/28/17- cycle 3 cis/gem/necitumumab    =============================================  Interval events:   Presents for follow up and for C3D1 chemotherapy.  A couple days ago he states he got b Past Surgical History:  2/8/17: ANGIOPLASTY (CORONARY)      Comment: 99% SVG to OM s/p 3.0x22 mm Resolute DAYANARA  3/26/06: BYPASS SURGERY      Comment: CABG x3 LIMA to LAD, SVG to OM, SVG to PDA  3/2006: CABG      Comment: 3V (LAD, Circumflex, RCA)  1/26/ hours as needed for Nausea. Disp: 30 tablet Rfl: 3   Pantoprazole Sodium 40 MG Oral Tab EC Take 40 mg by mouth every morning before breakfast. Disp:  Rfl:    aspirin 81 MG Oral Tab Take 81 mg by mouth daily.  Disp:  Rfl:    [DISCONTINUED] carvedilol 6.25 MG 0809)  Temp: 97.9 °F (36.6 °C) (08/28 0809)  Do Not Use - Resp Rate: --  SpO2: 99 % (08/28 0809)    Wt Readings from Last 6 Encounters:  08/28/17 : 91.2 kg (201 lb)  08/14/17 : 89.5 kg (197 lb 6.4 oz)  08/07/17 : 91.2 kg (201 lb)  07/25/17 : 87.9 kg (193 l Lab Results  Component Value Date   PTT 36.0 (H) 04/21/2014   PT 13.8 04/21/2014   INR 1.08 06/27/2017     Component      Latest Ref Rng & Units 8/28/2017   Magnesium, Serum      1.7 - 3.0 mg/dL 2.0     Imaging:    As reviewed above    Impression & P cxs  -will give 1 dose of IV vanco 1000mg now  -continue doxy as above. Add amoxicillin 500mg TID x 10 days  -advised to monitor closely and if erythema is not improving by tomorrow and continuing to improve, he is to call and would admit for IV abx. No

## 2021-06-26 NOTE — ED ADULT TRIAGE NOTE - CHIEF COMPLAINT QUOTE
Pt presents with worsening generalized weakness x 1 month since starting a new medication, Revlamid, for MDS  Reports increase in symptoms today. BP on arrival 78/47

## 2021-06-26 NOTE — H&P ADULT - ASSESSMENT
********************* Incomplete note *********************  84 yo M hx of HTN, a fib s/p ablation w/ AICD now on coumadin, prostate CA, recent MDS started on Revlimid comes to ED for generalized weakness, progressive x 1 month, extra fatigued today, no lightheadedness or falls, has had decreased PO during this 1 month, lost 10lbs this past 2 weeks. Was recently initiated on Revlamid 5 weeks ago. Denies f/c, cp, sob, cough, n/v, diarrhea, abdominal pain, or change in urine/bowel. Last BM today, no melena or blood.    MDS on Revlimid  Dehydration/hypotension  Acute on chronic CKD likely prerenal  LLQ abd pain  Hypertension  Afib s/p ablation s/p AICD  Prostate Ca in remission    Plan:  Pt on Revlimid past few months for MDS (neutropenia and thrombocytopenia baseline ~ 20-30s)  Feeling weak. will r/o infectious etiology. WBC 7 is high for him per oncology.  Blood cultures sent. UA and CXR unremarkable.  CT abd/plevis with oral contrast.  acute on chronic CKD likely prerenal, s/p 1L LR. will continue IVF 75 cc x 24 hrs  monitor urine outpt. bladder scan if retaining. Restart Doxazocin for BPH once his BP better.  Holding all his BP meds for now: Sotolol, Omesartan, HCTZ etc.   Regular diet  DVT ppx    d/w pt and significant other at bedside.  d/w oncology Dr. Crawford.    [facility]  - [phone]  86 yo M hx of HTN, a fib s/p ablation w/ AICD now on coumadin, prostate CA, recent MDS started on Revlimid comes to ED for generalized weakness, progressive x 1 month, extra fatigued today, no lightheadedness or falls, has had decreased PO during this 1 month, lost 10lbs this past 2 weeks. Was recently initiated on Revlamid 5 weeks ago. Denies f/c, cp, sob, cough, n/v, diarrhea, abdominal pain, or change in urine/bowel. Last BM today, no melena or blood.    MDS on Revlimid  Dehydration/hypotension  Acute on chronic CKD likely prerenal  LLQ abd pain  Hypertension  Afib s/p ablation s/p AICD  Prostate Ca in remission    Plan:  Pt on Revlimid past few months for MDS (neutropenia and thrombocytopenia baseline ~ 20-30s)  Feeling weak. will r/o infectious etiology. WBC 7 is high for him per oncology.  Blood cultures sent. UA and CXR unremarkable.  CT abd/plevis with oral contrast for mild LLQ pain with occasional loose stool.   acute on chronic CKD likely prerenal, s/p 1L LR. will continue IVF 75 cc x 24 hrs  monitor urine outpt. bladder scan if retaining. Restart Doxazocin for BPH once his BP better.  Holding all his BP meds for now: Sotolol, Omesartan, HCTZ etc.   Regular diet  DVT ppx    d/w pt and significant other at bedside.  d/w oncology Dr. Crawford.    - Dr. ARELY Evans ([facility])  - [phone]

## 2021-06-26 NOTE — ED PROVIDER NOTE - OBJECTIVE STATEMENT
84yo M hx of HTN, afib s/p ablation w/ AICD now on coumadin, prostate CA, recent MDS comes to ED for generalized weakness. progressive generalized weakness x1 month, extra fatigued today, no lightheadedness or falls, has had decreased PO during this 1 month, lost 10lbs this past 2 weeks. Was recently initiated on     Oncologist: [facility] Dr. Sal Abdalla 84yo M hx of HTN, afib s/p ablation w/ AICD now on coumadin, prostate CA, recent MDS comes to ED for generalized weakness. progressive generalized weakness x1 month, extra fatigued today, no lightheadedness or falls, has had decreased PO during this 1 month, lost 10lbs this past 2 weeks. Was recently initiated on Revlamid 5 weeks ago. Denies f/c, cp, sob, cough, n/v, diarrhea, abdominal pain, or change in urine/bowel. Last BM today, no melena or blood.     PCP: Dr. Crawford [facility]  Oncologist: [facility] Dr. Sal Haile

## 2021-06-26 NOTE — ED ADULT NURSE NOTE - OBJECTIVE STATEMENT
85y male PMHx HTN, afib, prostate ca, and MDS presenting to ED for generalized weakness. Patient reports progressive weakness starting a month back after being started on Revlamid. Pt reports decrease PO intake resulting in a 10 pound weight loss within 2 weeks. Pt reports excessive weakness and fatigue today. Pt denies LOC, lightheadedness, visual disturbances and falls. 85y male PMHx HTN, afib, prostate ca, and MDS presenting to ED for generalized weakness. Patient reports progressive weakness starting a month back after being started on Revlamid. Pt reports decrease PO intake resulting in a 10 pound weight loss within 2 weeks. Pt reports excessive weakness and fatigue today. On exam pt A&Ox3, breathing spontaneously & unlabored w/o distress on room air, abdomen soft, non-tender, non-distended upon palpation, +BS in all 4 quadrants, neg CVA, PERRL 3mm, neg facial droop, neg arm drift, equal b/l strength and sensation in all extremities, no vision changes. Denies dizziness, LOC, visual disturbances, lightheaded, numbness, tingling, no loss or bladder or bowel. Denies N/V/D, fever, chills, dysuria, hematuria, melena. Labs drawn, IV placed, and CM shows paced rhythm.

## 2021-06-26 NOTE — H&P ADULT - NSHPPHYSICALEXAM_GEN_ALL_CORE
Vital Signs Last 24 Hrs  T(C): 36.5 (26 Jun 2021 14:00), Max: 36.5 (26 Jun 2021 14:00)  T(F): 97.7 (26 Jun 2021 14:00), Max: 97.7 (26 Jun 2021 14:00)  HR: 60 (26 Jun 2021 15:45) (60 - 78)  BP: 96/52 (26 Jun 2021 15:45) (74/44 - 96/52)  BP(mean): --  RR: 18 (26 Jun 2021 14:32) (18 - 19)  SpO2: 99% (26 Jun 2021 14:32) (99% - 100%)    PHYSICAL EXAM:  GENERAL: NAD, well-developed, comfortable  HEAD:  Atraumatic, Normocephalic  EYES: EOMI, PERRLA, conjunctiva and sclera clear  NECK: Supple, No JVD  CHEST/LUNG: Clear to auscultation bilaterally; No wheeze  HEART: Regular rate and rhythm; No murmurs, rubs, or gallops  ABDOMEN: Soft, Nontender, Nondistended; Bowel sounds present  Neuro: AAOx3, no focal weakness, 5/5 b/l extremity strength  EXTREMITIES:  2+ Peripheral Pulses, No clubbing, cyanosis, or edema  SKIN: No rashes or lesions

## 2021-06-26 NOTE — H&P ADULT - HISTORY OF PRESENT ILLNESS
84 yo M hx of HTN, a fib s/p ablation w/ AICD now on coumadin, prostate CA, recent MDS started on Revlimid comes to ED for generalized weakness, progressive generalized weakness x 1 month, extra fatigued today, no lightheadedness or falls, has had decreased PO during this 1 month, lost 10lbs this past 2 weeks. Was recently initiated on Revlamid 5 weeks ago. Denies f/c, cp, sob, cough, n/v, diarrhea, abdominal pain, or change in urine/bowel. Last BM today, no melena or blood.

## 2021-06-26 NOTE — ED ADULT NURSE NOTE - NSIMPLEMENTINTERV_GEN_ALL_ED
Implemented All Universal Safety Interventions:  [place] to call system. Call bell, personal items and telephone within reach. Instruct patient to call for assistance. Room bathroom lighting operational. Non-slip footwear when patient is off stretcher. Physically safe environment: no spills, clutter or unnecessary equipment. Stretcher in lowest position, wheels locked, appropriate side rails in place.

## 2021-06-26 NOTE — H&P ADULT - NSHPLABSRESULTS_GEN_ALL_CORE
LABS:                        8.5    7.92  )-----------( 28       ( 2021 15:00 )             26.2         135  |  103  |  41<H>  ----------------------------<  124<H>  3.7   |  20<L>  |  2.41<H>    Ca    8.3<L>      2021 15:00    TPro  5.8<L>  /  Alb  2.8<L>  /  TBili  1.9<H>  /  DBili  x   /  AST  14  /  ALT  14  /  AlkPhos  86        CAPILLARY BLOOD GLUCOSE            Urinalysis Basic - ( 2021 15:45 )    Color: Yellow / Appearance: Slightly Turbid / S.023 / pH: x  Gluc: x / Ketone: Negative  / Bili: Negative / Urobili: Negative   Blood: x / Protein: 100 mg/dL / Nitrite: Negative   Leuk Esterase: Negative / RBC: 3 /hpf / WBC 5 /HPF   Sq Epi: x / Non Sq Epi: 6 / Bacteria: Negative        RADIOLOGY & ADDITIONAL TESTS:    Imaging Personally Reviewed:  [x] YES  [ ] NO    Consultant(s) Notes Reviewed:  [x] YES  [ ] NO    Care Discussed with Consultants/Other Providers [x] YES  [ ] NO

## 2021-06-26 NOTE — PROGRESS NOTE ADULT - SUBJECTIVE AND OBJECTIVE BOX
Patient with MDS and complex cytogenetics has been on Revlimid for about 6 weeks. In last two weeks has had worsening fatigue, weight loss abdominal cramps and at least one episode of explosive diarrhea.  No other localizing symptoms  Was hypotensive on triage  Of note has PPM with paced rhythym    VITAL SIGNS:  T(F): 97.7 (21 @ 14:00)  HR: 60 (21 @ 15:45)  BP: 96/52 (21 @ 15:45)  RR: 18 (21 @ 14:32)  SpO2: 99% (21 @ 14:32)  Wt(kg): --    PHYSICAL EXAM:    Constitutional: NAD  Eyes: EOMI, sclera non-icteric  Neck: supple, no masses, no JVD  Respiratory: CTA b/l, good air entry b/l  Cardiovascular: RRR, no M/R/G  Gastrointestinal: soft minimal tenderness on deep palpation LLQ  Extremities: no c/c/e  Neurological: AAOx3      MEDICATIONS  (STANDING):  lactated ringers Bolus 500 milliLiter(s) IV Bolus once    MEDICATIONS  (PRN):      Allergies    adhesives (Rash)  clonidine (Swelling; Rash)  felodipine (Rash)  penicillin (Rash)  sulfa drugs (Rash)    Intolerances        LABS:                        8.5    7.92  )-----------( 28       ( 2021 15:00 )             26.2         135  |  103  |  41<H>  ----------------------------<  124<H>  3.7   |  20<L>  |  2.41<H>    Ca    8.3<L>      2021 15:00    TPro  5.8<L>  /  Alb  2.8<L>  /  TBili  1.9<H>  /  DBili  x   /  AST  14  /  ALT  14  /  AlkPhos  86        Urinalysis Basic - ( 2021 15:45 )        Color: Yellow / Appearance: Slightly Turbid / S.023 / pH: x  Gluc: x / Ketone: Negative  / Bili: Negative / Urobili: Negative   Blood: x / Protein: 100 mg/dL / Nitrite: Negative   Leuk Esterase: Negative / RBC: 3 /hpf / WBC 5 /HPF   Sq Epi: x / Non Sq Epi: 6 / Bacteria: Negative        RADIOLOGY & ADDITIONAL TESTS:  Studies reviewed.

## 2021-06-26 NOTE — ED PROVIDER NOTE - PHYSICAL EXAMINATION
General: non-toxic, NAD  HEENT: NCAT, PERRL, +scleral palor  Cardiac: RRR, no murmurs, 2+ peripheral pulses  Chest: CTA  Abdomen: soft, non-distended, bowel sounds present, no ttp, no rebound or guarding  Extremities: no peripheral edema, calf tenderness, or leg size discrepancies  Skin: no rashes  Neuro: AAOx4, 5+motor, sensory grossly intact  Psych: mood and affect appropriate

## 2021-06-26 NOTE — ED PROVIDER NOTE - PROGRESS NOTE DETAILS
SANJUANA Lazar (Resident) - pt w/ MIMI on labs, no leukocystosis. BP improved with fluid bolus, HH stable. Will admit to [facility] hospitalists for MIMI. SANJUANA Lazar (Resident) - pt w/ MIMI on labs, no leukocystosis. BP improved with fluid bolus, HH stable. Will admit to [facility] hospitalists for MIMI.     Patient initially hypotensive on arrival, responded to IVF meeting meeting critical care time.  Theodore PROCTOR

## 2021-06-26 NOTE — H&P ADULT - NSICDXPASTMEDICALHX_GEN_ALL_CORE_FT
PAST MEDICAL HISTORY:  Afib     Cellulitis right lower leg in past    Degenerative disc disease, lumbar     Edema right leg    Gout finger a long time ago    Hypertension     Osteoarthritis     Prostate cancer treated with cyber knife 2016    Varicose veins legs    Venous stasis

## 2021-06-26 NOTE — ED PROVIDER NOTE - ATTENDING CONTRIBUTION TO CARE
pt is a 84 y/o male here with weakness decrease po intake likely hypovolemia and taking his bp meds this am. pt started a new chemo med last month for MDS which has improved his blood counts but side effect is weakness. no fever or chills presented hypotensive, ekg paced, likely due to hypovolemia, ivf bolus ordered, doubt sepsis, abd soft, nt, likely will require admission, labs sent.

## 2021-06-26 NOTE — ED PROVIDER NOTE - SHIFT CHANGE DETAILS
pt received at signed out, pt hx MDS on relivamab?, who presented for fatigue/ poor po intake, generalized weakness ongoing x 3 days, pt reassessed, vitals have improved after 1L IVF, chart review w/ hx Atrial fibrillation on coumadin, Congestive cardiomyopathy (unable to find EF), Hypertension, MDS (myelodysplastic syndrome), Osteoarthritis, Prostate cancer, PPM. Pt paced rhythm on monitor. Pt reports feeling improved, asymptomatic at current, no chest pain, on exam, abd soft and nontender, no reported fever/ infectious symptoms, revital's bp 97/48 MAP 63. Plan for admission, pending UA. Will trial further gentle fluids. pt received at signed out, pt hx MDS on relivamab?, who presented for fatigue/ poor po intake, generalized weakness ongoing x 3 days, pt reassessed, vitals have improved after 1L IVF, chart review w/ hx Atrial fibrillation on coumadin, Congestive cardiomyopathy (unable to find EF), Hypertension, MDS (myelodysplastic syndrome), Osteoarthritis, Prostate cancer, PPM. Pt paced rhythm on monitor. Pt reports feeling improved, asymptomatic at current, no chest pain, on exam, abd soft and nontender, no reported fever/ infectious symptoms, revital's bp 97/48 MAP 63. Plan for admission, pending UA. Will trial further gentle fluids. Plts 28, pt denies any episodes of bleeding, hematochezia/ melena.

## 2021-06-26 NOTE — ED ADULT NURSE NOTE - PAIN RATING/NUMBER SCALE (0-10): REST
Drinks 3-4 alcoholic beverages nightly  Will need to monitor closely for s/s of alcohol withdrawal while inpatient  Will consider consulting addiction medicine if indicated  Prn ativan available   5

## 2021-06-26 NOTE — ED PROVIDER NOTE - NS ED ROS FT
Constitutional: no fevers, chills  HEENT: no cough, rhinorrhea  Cardiac: no chest pain, palpitations  Respiratory: no SOB  GI: no n/v, abd pain, bloody or dark stools  : no dysuria, frequency, or hematuria  MSK: no joint pain  Skin: no rashes  Neuro: no headache, change in vision, +generalized weakness  Psych: negative

## 2021-06-26 NOTE — PROGRESS NOTE ADULT - ASSESSMENT
Hypotension MIMI  His ANC is high for him  R/O infection    Pan culture    Recommend abdominal CT    d/w Dr Evans  Management per Dr Eavns.

## 2021-06-27 NOTE — PROGRESS NOTE ADULT - ASSESSMENT
86 yo M hx of HTN, a fib s/p ablation w/ AICD now on coumadin, prostate CA, recent MDS started on Revlimid comes to ED for generalized weakness, progressive x 1 month, extra fatigued today, no lightheadedness or falls, has had decreased PO during this 1 month, lost 10lbs this past 2 weeks. Was recently initiated on Revlamid 5 weeks ago. Denies f/c, cp, sob, cough, n/v, diarrhea, abdominal pain, or change in urine/bowel. Last BM today, no melena or blood.    MDS on Revlimid  Dehydration/hypotension  Acute on chronic CKD likely prerenal  LLQ abd pain, questionable protocolitis/diverticulitis  Hypertension  Afib s/p ablation s/p AICD  Prostate Ca in remission    Plan:  Pt on Revlimid past few months for MDS (neutropenia and thrombocytopenia baseline ~ 20-30s)  Feeling weak. will r/o infectious etiology. WBC 7 is high for him per oncology. usually low baseline  Blood cultures sent. UA and CXR unremarkable.  CT abd/plevis with oral contrast for mild LLQ pain with occasional loose stool  CT with questionable protocolitis/diverticulitis, cipro/flagyl started. GI Dr. Delgado consulted.   acute on chronic CKD likely prerenal, s/p 1L LR. will continue IVF 75 cc x 24 hrs, Cr stable  monitor urine outpt. bladder scan if retaining. Restart Doxazocin for BPH once his BP better.  Holding all his BP meds for now: Sotolol, Omesartan, HCTZ etc.   Regular diet  DVT ppx    - Dr. MCGEE Htet ([facility])  - [phone]

## 2021-06-27 NOTE — PROGRESS NOTE ADULT - SUBJECTIVE AND OBJECTIVE BOX
[facility] [place] [facility]   MD VIOLA BAHENA MD RUORU WONG, PA    TEL:  OFFICE: [phone]  DR DAY CELL: [phone]  ZAHRA NESBITT CELL: [phone]  DR. COON CELL: [phone]  DR. QUEEN CELl: [phone]    FROM 5 PM- 7 AM PLEASE CALL ANSWERING SERVICE AT [phone]    -- INITIAL RENAL CONSULT NOTE --- Date Of service 06-27-21 @ 08:20  --------------------------------------------------------------------------------  HPI:    86 yo M hx of HTN, a fib s/p ablation w/ AICD now on coumadin, prostate CA, recent MDS started on Revlimid comes to ED for generalized weakness, progressive generalized weakness x 1 month, extra fatigued today, no lightheadedness or falls, has had decreased PO during this 1 month, lost 10lbs this past 2 weeks. Was recently initiated on Revlamid 5 weeks ago. Denies f/c, cp, sob, cough, n/v, diarrhea, abdominal pain, or change in urine/bowel. Last BM today, no melena or blood.  Nephrology consulted for MIMI  PAST HISTORY  --------------------------------------------------------------------------------  PAST MEDICAL & SURGICAL HISTORY:  Hypertension    Afib    Osteoarthritis    Prostate cancer  treated with cyber knife 2016    Venous stasis    Varicose veins  legs    Gout  finger a long time ago    Cellulitis  right lower leg in past    Degenerative disc disease, lumbar    Edema  right leg    S/P total hip arthroplasty  2006, right    S/P cataract extraction  bilateral    S/P hernia repair  1996    Afib  cardiac ablation 1998; several pacemaker/AICD insertion and replacements    S/P tonsillectomy    S/P cholecystectomy  12/17      FAMILY HISTORY:  Family history of Alzheimer&#x27;s disease (Father)    Family history of essential hypertension (Father)    Family history of breast cancer (Mother)    Family history of prostate cancer (Sibling)      PAST SOCIAL HISTORY:    ALLERGIES & MEDICATIONS  --------------------------------------------------------------------------------  Allergies    adhesives (Rash)  clonidine (Swelling; Rash)  felodipine (Rash)  penicillin (Rash)  sulfa drugs (Rash)    Intolerances      Standing Inpatient Medications  allopurinol 100 milliGRAM(s) Oral two times a day  sodium chloride 0.9%. 1000 milliLiter(s) IV Continuous <Continuous>    PRN Inpatient Medications  acetaminophen   Tablet .. 650 milliGRAM(s) Oral every 6 hours PRN      REVIEW OF SYSTEMS  --------------------------------------------------------------------------------  Gen: No fevers/chills  Skin: No rashes  Head/Eyes/Ears: Normal hearing,  Normal vision   Respiratory: No dyspnea, cough  CV: No chest pain  GI: No abdominal pain, diarrhea, constipation, nausea, vomiting  : No dysuria, hematuria  MSK: No  edema  Heme: No easy bruising or bleeding  Psych: No significant depression    All other systems were reviewed and are negative, except as noted.    VITALS/PHYSICAL EXAM  --------------------------------------------------------------------------------  T(C): 36.7 (06-27-21 @ 05:11), Max: 36.7 (06-26-21 @ 20:08)  HR: 60 (06-27-21 @ 05:11) (60 - 83)  BP: 103/62 (06-27-21 @ 05:11) (74/44 - 120/67)  RR: 18 (06-27-21 @ 05:11) (18 - 19)  SpO2: 98% (06-27-21 @ 05:11) (95% - 100%)  Wt(kg): --  Height (cm): 185.4 (06-26-21 @ 14:00)  Weight (kg): 86.6 (06-26-21 @ 14:00)  BMI (kg/m2): 25.2 (06-26-21 @ 14:00)  BSA (m2): 2.11 (06-26-21 @ 14:00)      06-26-21 @ 07:01  -  06-27-21 @ 07:00  --------------------------------------------------------  IN: 1140 mL / OUT: 0 mL / NET: 1140 mL      Physical Exam:  	Gen: NAD  	HEENT: MMM  	Pulm: CTA B/L  	CV: S1S2  	Abd: Soft, +BS   	Ext: No LE edema B/L  	Neuro: Awake, alert  	Skin: Warm and dry  	Vascular access:          SHELBY: priyank  LABS/STUDIES  --------------------------------------------------------------------------------              7.5    6.58  >-----------<  26       [06-27-21 @ 07:33]              23.4     135  |  103  |  41  ----------------------------<  124      [06-26-21 @ 15:00]  3.7   |  20  |  2.41        Ca     8.3     [06-26-21 @ 15:00]    TPro  5.8  /  Alb  2.8  /  TBili  1.9  /  DBili  x   /  AST  14  /  ALT  14  /  AlkPhos  86  [06-26-21 @ 15:00]    PT/INR: PT 38.0 , INR 3.35       [06-27-21 @ 06:26]  PTT: 44.7       [06-26-21 @ 18:10]      Creatinine Trend:  SCr 2.41 [06-26 @ 15:00]    Urinalysis - [06-26-21 @ 15:45]      Color Yellow / Appearance Slightly Turbid / SG 1.023 / pH 6.0      Gluc Negative / Ketone Negative  / Bili Negative / Urobili Negative       Blood Trace / Protein 100 mg/dL / Leuk Est Negative / Nitrite Negative      RBC 3 / WBC 5 / Hyaline 29 / Gran  / Sq Epi  / Non Sq Epi 6 / Bacteria Negative           [facility] [place] [facility]   MD VIOLA BAHENA MD RUORU WONG, PA    TEL:  OFFICE: [phone]  DR DAY CELL: [phone]  ZAHRA NESBITT CELL: [phone]  DR. COON CELL: [phone]  DR. QUEEN CELl: [phone]    FROM 5 PM- 7 AM PLEASE CALL ANSWERING SERVICE AT [phone]    -- INITIAL RENAL CONSULT NOTE --- Date Of service 06-27-21 @ 08:20  --------------------------------------------------------------------------------  HPI:    86 yo M hx of HTN, a fib s/p ablation w/ AICD now on coumadin, prostate CA, recent MDS started on Revlimid comes to ED for generalized weakness, progressive generalized weakness x 1 month, extra fatigued today, no lightheadedness or falls, has had decreased PO during this 1 month, lost 10lbs this past 2 weeks. Was recently initiated on Revlamid 5 weeks ago. Denies f/c, cp, sob, cough, n/v, diarrhea, abdominal pain, or change in urine/bowel. Last BM today, no melena or blood.  Nephrology consulted for MIMI  PAST HISTORY  --------------------------------------------------------------------------------  PAST MEDICAL & SURGICAL HISTORY:  Hypertension    Afib    Osteoarthritis    Prostate cancer  treated with cyber knife 2016    Venous stasis    Varicose veins  legs    Gout  finger a long time ago    Cellulitis  right lower leg in past    Degenerative disc disease, lumbar    Edema  right leg    S/P total hip arthroplasty  2006, right    S/P cataract extraction  bilateral    S/P hernia repair  1996    Afib  cardiac ablation 1998; several pacemaker/AICD insertion and replacements    S/P tonsillectomy    S/P cholecystectomy  12/17      FAMILY HISTORY:  Family history of Alzheimer&#x27;s disease (Father)    Family history of essential hypertension (Father)    Family history of breast cancer (Mother)    Family history of prostate cancer (Sibling)      PAST SOCIAL HISTORY:    ALLERGIES & MEDICATIONS  --------------------------------------------------------------------------------  Allergies    adhesives (Rash)  clonidine (Swelling; Rash)  felodipine (Rash)  penicillin (Rash)  sulfa drugs (Rash)    Intolerances      Standing Inpatient Medications  allopurinol 100 milliGRAM(s) Oral two times a day  sodium chloride 0.9%. 1000 milliLiter(s) IV Continuous <Continuous>    PRN Inpatient Medications  acetaminophen   Tablet .. 650 milliGRAM(s) Oral every 6 hours PRN      REVIEW OF SYSTEMS  --------------------------------------------------------------------------------  Gen: No fevers/chills  Skin: No rashes  Head/Eyes/Ears: Normal hearing,  Normal vision   Respiratory: No dyspnea, cough  CV: No chest pain  GI: No abdominal pain, diarrhea, constipation, nausea, vomiting  : No dysuria, hematuria  MSK: No  edema  Heme: No easy bruising or bleeding  Psych: No significant depression    All other systems were reviewed and are negative, except as noted.    VITALS/PHYSICAL EXAM  --------------------------------------------------------------------------------  T(C): 36.7 (06-27-21 @ 05:11), Max: 36.7 (06-26-21 @ 20:08)  HR: 60 (06-27-21 @ 05:11) (60 - 83)  BP: 103/62 (06-27-21 @ 05:11) (74/44 - 120/67)  RR: 18 (06-27-21 @ 05:11) (18 - 19)  SpO2: 98% (06-27-21 @ 05:11) (95% - 100%)  Wt(kg): --  Height (cm): 185.4 (06-26-21 @ 14:00)  Weight (kg): 86.6 (06-26-21 @ 14:00)  BMI (kg/m2): 25.2 (06-26-21 @ 14:00)  BSA (m2): 2.11 (06-26-21 @ 14:00)      06-26-21 @ 07:01  -  06-27-21 @ 07:00  --------------------------------------------------------  IN: 1140 mL / OUT: 0 mL / NET: 1140 mL      Physical Exam:  	Gen: NAD  	HEENT: MMM  	Pulm: CTA B/L  	CV: S1S2  	Abd: Soft, +BS   	Ext: No LE edema B/L  	Neuro: Awake, alert  	Skin: Warm and dry  	Vascular access: no HD cathter          : no  priyank  LABS/STUDIES  --------------------------------------------------------------------------------              7.5    6.58  >-----------<  26       [06-27-21 @ 07:33]              23.4     135  |  103  |  41  ----------------------------<  124      [06-26-21 @ 15:00]  3.7   |  20  |  2.41        Ca     8.3     [06-26-21 @ 15:00]    TPro  5.8  /  Alb  2.8  /  TBili  1.9  /  DBili  x   /  AST  14  /  ALT  14  /  AlkPhos  86  [06-26-21 @ 15:00]    PT/INR: PT 38.0 , INR 3.35       [06-27-21 @ 06:26]  PTT: 44.7       [06-26-21 @ 18:10]      Creatinine Trend:  SCr 2.41 [06-26 @ 15:00]    Urinalysis - [06-26-21 @ 15:45]      Color Yellow / Appearance Slightly Turbid / SG 1.023 / pH 6.0      Gluc Negative / Ketone Negative  / Bili Negative / Urobili Negative       Blood Trace / Protein 100 mg/dL / Leuk Est Negative / Nitrite Negative      RBC 3 / WBC 5 / Hyaline 29 / Gran  / Sq Epi  / Non Sq Epi 6 / Bacteria Negative

## 2021-06-27 NOTE — PROGRESS NOTE ADULT - ASSESSMENT
86 yo M hx of HTN, a fib s/p ablation w/ AICD now on coumadin, prostate CA, recent MDS started on Revlimid comes to ED for generalized weakness, progressive generalized weakness x 1 month, extra fatigued today, no lightheadedness or falls, has had decreased PO during this 1 month, lost 10lbs this past 2 weeks. Was recently initiated on Revlamid 5 weeks ago. Denies f/c, cp, sob, cough, n/v, diarrhea, abdominal pain, or change in urine/bowel. Last BM today, no melena or blood.  Nephrology consulted for MIMI      MIMI  Scr 1.5-1.6 in 2018  Scr 2.4 on admission   MIMI likely pre-renal sec to decreased PO intake  UA with high SG and hyaline casts  Recommmend to continue gentle hydration   Check URine lytes, bladder scan  MOnitor BMP   Avoid further nephrotoxics, NSAIDS RCA      HTN  BP fluctuating  MOnitor BP      Proteinuria/Hematuria  Check repeat UA, Urine TP/Cr  MOnitor at present    84 yo M hx of HTN, a fib s/p ablation w/ AICD now on coumadin, prostate CA, recent MDS started on Revlimid comes to ED for generalized weakness, progressive generalized weakness x 1 month, extra fatigued today, no lightheadedness or falls, has had decreased PO during this 1 month, lost 10lbs this past 2 weeks. Was recently initiated on Revlamid 5 weeks ago. Denies f/c, cp, sob, cough, n/v, diarrhea, abdominal pain, or change in urine/bowel. Last BM today, no melena or blood.  Nephrology consulted for MIMI      MIMI  Scr 1.5-1.6 in 2018  Scr 2.4 on admission   MIMI likely pre-renal sec to decreased PO intake  UA with high SG and hyaline casts  Recommmend to continue gentle hydration   Check URine lytes, bladder scan (ordered)  MOnitor BMP   Avoid further nephrotoxics, NSAIDS RCA      HTN  BP fluctuating  MOnitor BP      Proteinuria/Hematuria  Check repeat UA, Urine TP/Cr  MOnitor at present

## 2021-06-27 NOTE — PROGRESS NOTE ADULT - SUBJECTIVE AND OBJECTIVE BOX
SUBJECTIVE / OVERNIGHT EVENTS:  feels weak  lower abd pain  some loose stool  CT with questionable protocolitis/diverticulitis  cipro/flagyl started  no cp, no sob, no headache, no dizziness.         --------------------------------------------------------------------------------------------  LABS:                        7.5    6.58  )-----------(        ( 2021 07:33 )             23.4         139  |  107  |  36<H>  ----------------------------<  82  3.8   |  22  |  2.10<H>    Ca    8.1<L>      2021 07:30  Mg     1.8         TPro  5.8<L>  /  Alb  2.8<L>  /  TBili  1.9<H>  /  DBili  x   /  AST  14  /  ALT  14  /  AlkPhos  86      PT/INR - ( 2021 06:26 )   PT: 38.0 sec;   INR: 3.35 ratio         PTT - ( 2021 18:10 )  PTT:44.7 sec  CAPILLARY BLOOD GLUCOSE            Urinalysis Basic - ( 2021 09:42 )    Color: Light Yellow / Appearance: Clear / S.013 / pH: x  Gluc: x / Ketone: Negative  / Bili: Negative / Urobili: Negative   Blood: x / Protein: Trace / Nitrite: Negative   Leuk Esterase: Negative / RBC: 2 /hpf / WBC 3 /HPF   Sq Epi: x / Non Sq Epi: 1 /hpf / Bacteria: Negative        RADIOLOGY & ADDITIONAL TESTS:    Imaging Personally Reviewed:  [x] YES  [ ] NO    Consultant(s) Notes Reviewed:  [x] YES  [ ] NO    MEDICATIONS  (STANDING):  allopurinol 100 milliGRAM(s) Oral two times a day  ciprofloxacin   IVPB 400 milliGRAM(s) IV Intermittent every 12 hours  metroNIDAZOLE  IVPB 500 milliGRAM(s) IV Intermittent every 8 hours  sodium chloride 0.9%. 1000 milliLiter(s) (75 mL/Hr) IV Continuous <Continuous>    MEDICATIONS  (PRN):  acetaminophen   Tablet .. 650 milliGRAM(s) Oral every 6 hours PRN Temp greater or equal to 38C (100.4F), Mild Pain (1 - 3)      Care Discussed with Consultants/Other Providers [x] YES  [ ] NO    Vital Signs Last 24 Hrs  T(C): 36.7 (2021 11:44), Max: 36.7 (2021 20:08)  T(F): 98.1 (2021 11:44), Max: 98.1 (2021 00:06)  HR: 65 (2021 11:44) (60 - 83)  BP: 101/61 (2021 11:44) (96/52 - 120/67)  BP(mean): 71 (2021 20:08) (71 - 71)  RR: 18 (2021 11:44) (18 - 18)  SpO2: 94% (2021 11:44) (94% - 100%)  I&O's Summary    2021 07:01  -  2021 07:00  --------------------------------------------------------  IN: 1140 mL / OUT: 0 mL / NET: 1140 mL    2021 07:01  -  2021 15:27  --------------------------------------------------------  IN: 1095 mL / OUT: 255 mL / NET: 840 mL      PHYSICAL EXAM:  GENERAL: NAD, well-developed, comfortable  HEAD:  Atraumatic, Normocephalic  EYES: EOMI, PERRLA, conjunctiva and sclera clear  NECK: Supple, No JVD  CHEST/LUNG: Clear to auscultation bilaterally; No wheeze  HEART: Regular rate and rhythm; No murmurs, rubs, or gallops  ABDOMEN: Soft, Nontender, mild lower abd discomfort, Nondistended; Bowel sounds present, no guarding, no rebound tenderness  Neuro: AAOx3, no focal weakness, 5/5 b/l extremity strength  EXTREMITIES:  2+ Peripheral Pulses, No clubbing, cyanosis, or edema  SKIN: No rashes or lesions

## 2021-06-28 NOTE — PROGRESS NOTE ADULT - ASSESSMENT
84 yo M hx of HTN, a fib s/p ablation w/ AICD now on coumadin, prostate CA, recent MDS started on Revlimid comes to ED for generalized weakness, progressive x 1 month, extra fatigued today, no lightheadedness or falls, has had decreased PO during this 1 month, lost 10lbs this past 2 weeks. Was recently initiated on Revlamid 5 weeks ago. Denies f/c, cp, sob, cough, n/v, diarrhea, abdominal pain, or change in urine/bowel. Last BM today, no melena or blood.    MDS on Revlimid  Dehydration/hypotension  Acute on chronic CKD likely prerenal  LLQ abd pain, questionable protocolitis/diverticulitis  Hypertension  Afib s/p ablation s/p AICD  Prostate Ca in remission    Plan:  Pt on Revlimid past few months for MDS (neutropenia and thrombocytopenia baseline ~ 20-30s)  Feeling weak. will r/o infectious etiology. WBC 7 is high for him per oncology. usually low baseline  Blood cultures sent, so far negative. UA and CXR unremarkable.  CT abd/plevis with oral contrast for mild LLQ pain with occasional loose stool  CT with questionable protocolitis/diverticulitis, cipro/flagyl started. GI Dr. Delgado consulted.     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  d/w pt's onc Dr. aSl Haile. Plan to transfuse PRBC with hgb goal of 9 piror to his discharge.  no melena, no hematochezia. no BRBPR.     Acute on chronic CKD: likely prerenal, s/p 1L LR. will continue IVF 75 cc x 24 hrs, Cr stable  monitor urine outpt. bladder scan if retaining. Restart Doxazocin for BPH once his BP better.    HTN: Holding all his BP meds for now: restart Sotolol if BP stable.  holding Omesartan, HCTZ etc.     Regular diet  DVT ppx    - [facility]  - [phone]

## 2021-06-28 NOTE — PROGRESS NOTE ADULT - ASSESSMENT
86 yo M hx of HTN, a fib s/p ablation w/ AICD now on coumadin, prostate CA, recent MDS started on Revlimid comes to ED for generalized weakness, progressive generalized weakness x 1 month, extra fatigued today, no lightheadedness or falls, has had decreased PO during this 1 month, lost 10lbs this past 2 weeks. Was recently initiated on Revlamid 5 weeks ago. Denies f/c, cp, sob, cough, n/v, diarrhea, abdominal pain, or change in urine/bowel. Last BM today, no melena or blood.  Nephrology consulted for MIMI    MIMI  Scr 1.5-1.6 in 2018  Scr 2.4 on admission   MIMI likely pre-renal sec to decreased PO intake  Scr improving w. IVF   UA with high SG and hyaline casts  MOnitor BMP   Avoid further nephrotoxics, NSAIDS RCA    HTN  BP controlled  MOnitor BP    Proteinuria/Hematuria  Check repeat UA, Urine TP/Cr  MOnitor at present

## 2021-06-28 NOTE — CONSULT NOTE ADULT - ASSESSMENT
85 year old man with gastrointesinal symptoms, possible colitis vs diverticulitis    1. Possible colitis, if ongoing diarrhea would check cdiff, GI PCR    2. Unintentional weight loss    3. Possible chronic pancreaititis, cannot have an MRI, consider elective EUS    4. MDS        Advanced care planning forms were discussed. Code status including forceful chest compressions, defibrillation and intubation were discussed. The risks benefits and alternatives to pertinent gastrointestinal procedures and interventions were discussed in detail and all questions were answered. Duration: 15 Minutes.      Arya Delgado M.D.   Gastroenterology and Hepatology  [address] [place], [place]  Office: [phone]  Cell: [phone]

## 2021-06-28 NOTE — CONSULT NOTE ADULT - SUBJECTIVE AND OBJECTIVE BOX
Chief Complaint:  Patient is a 85y old  Male who presents with a chief complaint of weakness (2021 16:26)      Date of service: 21 @ 23:51    HPI:    The patient is a 86 yo M hx of HTN, a fib s/p ablation w/ AICD now on coumadin, prostate CA, recent MDS started on Revlimid comes to ED for generalized weakness, progressive generalized weakness x 1 month, extra fatigued today, no lightheadedness or falls, has had decreased PO during this 1 month, lost 10lbs this past 2 weeks. Was recently initiated on Revlamid 5 weeks ago. Denies f/c, cp, sob, cough, n/v, diarrhea, abdominal pain, or change in urine/bowel.     The patient denies dysphagia, nausea and vomiting, but does endorse abdominal bloating.    Allergies:  adhesives (Rash)  clonidine (Swelling; Rash)  felodipine (Rash)  penicillin (Rash)  sulfa drugs (Rash)      Home Medications:    Hospital Medications:  acetaminophen   Tablet .. 650 milliGRAM(s) Oral every 6 hours PRN  allopurinol 100 milliGRAM(s) Oral two times a day  ciprofloxacin   IVPB 400 milliGRAM(s) IV Intermittent every 12 hours  metroNIDAZOLE  IVPB 500 milliGRAM(s) IV Intermittent every 8 hours      PMHX/PSHX:  Hypertension    Afib    Osteoarthritis    Prostate cancer    Venous stasis    Varicose veins    Gout    Cellulitis    Degenerative disc disease, lumbar    Edema    S/P total hip arthroplasty    S/P cataract extraction    S/P hernia repair    Afib    S/P tonsillectomy    S/P cholecystectomy        Family history:  Family history of Alzheimer&#x27;s disease (Father)    Family history of essential hypertension (Father)    Family history of breast cancer (Mother)    Family history of prostate cancer (Sibling)        Social History:   Denies ethanol use.  Denies illicit drug use.    ROS:     General:  No wt loss, fevers, chills, night sweats, fatigue,   Eyes:  Good vision, no reported pain  ENT:  No sore throat, pain, runny nose, dysphagia  CV:  No pain, palpitations, hypo/hypertension  Resp:  No dyspnea, cough, tachypnea, wheezing  GI:  See HPI  :  No pain, bleeding, incontinence, nocturia  Muscle:  No pain, weakness  Neuro:  No weakness, tingling, memory problems  Psych:  No fatigue, insomnia, mood problems, depression  Endocrine:  No polyuria, polydipsia, cold/heat intolerance  Heme:  No petechiae, ecchymosis, easy bruisability  Integumentary:  No rash, edema      PHYSICAL EXAM:     GENERAL:  Appears stated age, well-groomed, well-nourished, no distress  HEENT:  NC/AT,  conjunctivae anicteric, clear and pink,   NECK: supple, trachea midline  CHEST:  Full & symmetric excursion, no increased effort, breath sounds clear  HEART:  Regular rhythm, no JVD  ABDOMEN:  Soft, non-tender, non-distended, normoactive bowel sounds,  no masses , no hepatosplenomegaly  EXTREMITIES:  no cyanosis,clubbing or edema  SKIN:  No rash, erythema, or, ecchymoses, no jaundice  NEURO:  Alert, non-focal, no asterixis  PSYCH: Appropriate affect, oriented to place and time  RECTAL: Deferred      Vital Signs:  Vital Signs Last 24 Hrs  T(C): 36.8 (2021 20:40), Max: 36.9 (2021 12:08)  T(F): 98.2 (2021 20:40), Max: 98.4 (2021 12:08)  HR: 62 (2021 20:40) (60 - 63)  BP: 103/62 (2021 20:40) (103/62 - 126/71)  BP(mean): --  RR: 19 (2021 20:40) (18 - 19)  SpO2: 98% (2021 20:40) (96% - 100%)  Daily     Daily     LABS: Labs personally reviewed by me:                        7.2    6.07  )-----------( 24       ( 2021 07:09 )             22.6     06-28    139  |  106  |  33<H>  ----------------------------<  94  3.5   |  21<L>  |  1.93<H>    Ca    8.1<L>      2021 07:08  Mg     1.8     06-27        PT/INR - ( 2021 07:09 )   PT: 35.0 sec;   INR: 3.08 ratio           Urinalysis Basic - ( 2021 09:42 )    Color: Light Yellow / Appearance: Clear / S.013 / pH: x  Gluc: x / Ketone: Negative  / Bili: Negative / Urobili: Negative   Blood: x / Protein: Trace / Nitrite: Negative   Leuk Esterase: Negative / RBC: 2 /hpf / WBC 3 /HPF   Sq Epi: x / Non Sq Epi: 1 /hpf / Bacteria: Negative          Imaging personally reviewed by me:

## 2021-06-28 NOTE — DIETITIAN INITIAL EVALUATION ADULT. - ADD RECOMMEND
monitor for continued improved appetite and adequate intake; assist  with menu selections via diet tech

## 2021-06-28 NOTE — DIETITIAN INITIAL EVALUATION ADULT. - PERTINENT MEDS FT
MEDICATIONS  (STANDING):  allopurinol 100 milliGRAM(s) Oral two times a day  ciprofloxacin   IVPB 400 milliGRAM(s) IV Intermittent every 12 hours  metroNIDAZOLE  IVPB 500 milliGRAM(s) IV Intermittent every 8 hours  sodium chloride 0.9%. 1000 milliLiter(s) (75 mL/Hr) IV Continuous <Continuous>

## 2021-06-28 NOTE — PROGRESS NOTE ADULT - SUBJECTIVE AND OBJECTIVE BOX
SUBJECTIVE / OVERNIGHT EVENTS:  overall feeling better  hgb downtrending due to MDS  no cp, no sob, no n/v/d. no abdominal pain.  no headache, no dizziness.   diarrhea resolving.       --------------------------------------------------------------------------------------------  LABS:                        7.2    6.07  )-----------( 24       ( 2021 07:09 )             22.6     06-    139  |  106  |  33<H>  ----------------------------<  94  3.5   |  21<L>  |  1.93<H>    Ca    8.1<L>      2021 07:08  Mg     1.8     -      PT/INR - ( 2021 07:09 )   PT: 35.0 sec;   INR: 3.08 ratio           CAPILLARY BLOOD GLUCOSE            Urinalysis Basic - ( 2021 09:42 )    Color: Light Yellow / Appearance: Clear / S.013 / pH: x  Gluc: x / Ketone: Negative  / Bili: Negative / Urobili: Negative   Blood: x / Protein: Trace / Nitrite: Negative   Leuk Esterase: Negative / RBC: 2 /hpf / WBC 3 /HPF   Sq Epi: x / Non Sq Epi: 1 /hpf / Bacteria: Negative        RADIOLOGY & ADDITIONAL TESTS:    Imaging Personally Reviewed:  [x] YES  [ ] NO    Consultant(s) Notes Reviewed:  [x] YES  [ ] NO    MEDICATIONS  (STANDING):  allopurinol 100 milliGRAM(s) Oral two times a day  ciprofloxacin   IVPB 400 milliGRAM(s) IV Intermittent every 12 hours  metroNIDAZOLE  IVPB 500 milliGRAM(s) IV Intermittent every 8 hours    MEDICATIONS  (PRN):  acetaminophen   Tablet .. 650 milliGRAM(s) Oral every 6 hours PRN Temp greater or equal to 38C (100.4F), Mild Pain (1 - 3)      Care Discussed with Consultants/Other Providers [x] YES  [ ] NO    Vital Signs Last 24 Hrs  T(C): 36.9 (2021 12:08), Max: 37.5 (2021 20:51)  T(F): 98.4 (2021 12:08), Max: 99.5 (2021 20:51)  HR: 60 (2021 12:08) (60 - 64)  BP: 107/65 (2021 12:08) (107/65 - 126/71)  BP(mean): --  RR: 18 (2021 12:08) (18 - 19)  SpO2: 100% (2021 12:08) (94% - 100%)  I&O's Summary    2021 07:01  -  2021 07:00  --------------------------------------------------------  IN: 2820 mL / OUT: 595 mL / NET: 2225 mL    2021 07:01  -  2021 19:27  --------------------------------------------------------  IN: 1060 mL / OUT: 0 mL / NET: 1060 mL      PHYSICAL EXAM:  GENERAL: NAD, well-developed, comfortable  HEAD:  Atraumatic, Normocephalic  EYES: EOMI, PERRLA, conjunctiva and sclera clear  NECK: Supple, No JVD  CHEST/LUNG: Clear to auscultation bilaterally; No wheeze  HEART: Regular rate and rhythm; No murmurs, rubs, or gallops  ABDOMEN: Soft, Nontender, mild lower abd discomfort, Nondistended; Bowel sounds present, no guarding, no rebound tenderness  Neuro: AAOx3, no focal weakness, 5/5 b/l extremity strength  EXTREMITIES:  2+ Peripheral Pulses, No clubbing, cyanosis, or edema  SKIN: No rashes or lesions

## 2021-06-28 NOTE — PROGRESS NOTE ADULT - SUBJECTIVE AND OBJECTIVE BOX
[facility]   MD Jorge Brito MD, D.O. Ruoru Wong, PA    From 7 AM - 5 PM:  OFFICE: [phone]  Dr. Troy cell: [phone]  Dr. Garcia cell: [phone]  Dr. Stephen cell: [phone]  CHAYO Gutierrez cell: [phone]    From 5 PM - 7 AM: Answering Service: [phone]  Date of service: 06-28-21 @ 09:57    RENAL FOLLOW UP NOTE  --------------------------------------------------------------------------------  HPI:  Pt seen and examined at bedside. Improving appetite   Denies SOB, chest pain     PAST HISTORY  --------------------------------------------------------------------------------  No significant changes to PMH, PSH, FHx, SHx, unless otherwise noted    ALLERGIES & MEDICATIONS  --------------------------------------------------------------------------------  Allergies    adhesives (Rash)  clonidine (Swelling; Rash)  felodipine (Rash)  penicillin (Rash)  sulfa drugs (Rash)    Intolerances      Standing Inpatient Medications  allopurinol 100 milliGRAM(s) Oral two times a day  ciprofloxacin   IVPB 400 milliGRAM(s) IV Intermittent every 12 hours  metroNIDAZOLE  IVPB 500 milliGRAM(s) IV Intermittent every 8 hours  sodium chloride 0.9%. 1000 milliLiter(s) IV Continuous <Continuous>    PRN Inpatient Medications  acetaminophen   Tablet .. 650 milliGRAM(s) Oral every 6 hours PRN      REVIEW OF SYSTEMS  --------------------------------------------------------------------------------  General: no fever  CVS: no chest pain  RESP: no sob, no cough  ABD: no abdominal pain  : no dysuria,  MSK: no edema     VITALS/PHYSICAL EXAM  --------------------------------------------------------------------------------  T(C): 36.6 (06-28-21 @ 04:32), Max: 37.5 (06-27-21 @ 20:51)  HR: 63 (06-28-21 @ 04:32) (63 - 65)  BP: 126/71 (06-28-21 @ 04:32) (101/61 - 126/71)  RR: 19 (06-28-21 @ 04:32) (18 - 19)  SpO2: 96% (06-28-21 @ 04:32) (94% - 96%)  Wt(kg): --  Height (cm): 185.4 (06-26-21 @ 14:00)  Weight (kg): 86.6 (06-26-21 @ 14:00)  BMI (kg/m2): 25.2 (06-26-21 @ 14:00)  BSA (m2): 2.11 (06-26-21 @ 14:00)      06-27-21 @ 07:01  -  06-28-21 @ 07:00  --------------------------------------------------------  IN: 2820 mL / OUT: 595 mL / NET: 2225 mL    06-28-21 @ 07:01  -  06-28-21 @ 09:57  --------------------------------------------------------  IN: 240 mL / OUT: 0 mL / NET: 240 mL      Physical Exam:  	Gen: NAD  	HEENT: MMM  	Pulm: CTA B/L  	CV: S1S2  	Abd: Soft, +BS  	Ext: No LE edema B/L                      Neuro: Awake   	Skin: Warm and Dry   	  LABS/STUDIES  --------------------------------------------------------------------------------              7.2    6.07  >-----------<  24       [06-28-21 @ 07:09]              22.6     139  |  106  |  33  ----------------------------<  94      [06-28-21 @ 07:08]  3.5   |  21  |  1.93        Ca     8.1     [06-28-21 @ 07:08]      Mg     1.8     [06-27-21 @ 07:30]    TPro  5.8  /  Alb  2.8  /  TBili  1.9  /  DBili  x   /  AST  14  /  ALT  14  /  AlkPhos  86  [06-26-21 @ 15:00]    PT/INR: PT 35.0 , INR 3.08       [06-28-21 @ 07:09]  PTT: 44.7       [06-26-21 @ 18:10]      Creatinine Trend:  SCr 1.93 [06-28 @ 07:08]  SCr 2.10 [06-27 @ 07:30]  SCr 2.41 [06-26 @ 15:00]    Urinalysis - [06-27-21 @ 09:42]      Color Light Yellow / Appearance Clear / SG 1.013 / pH 5.0      Gluc Negative / Ketone Negative  / Bili Negative / Urobili Negative       Blood Trace / Protein Trace / Leuk Est Negative / Nitrite Negative      RBC 2 / WBC 3 / Hyaline 4 / Gran  / Sq Epi  / Non Sq Epi 1 / Bacteria Negative    Urine Creatinine 88      [06-27-21 @ 16:09]  Urine Protein 17      [06-27-21 @ 16:09]  Urine Sodium 64      [06-27-21 @ 09:42]

## 2021-06-28 NOTE — DIETITIAN INITIAL EVALUATION ADULT. - OTHER INFO
patient plans to drink more fluids after discharge stating he was admitted with dehydration because often he will drink water only with morning meds and not for remainder of the day. Hydration tips provided

## 2021-06-28 NOTE — DIETITIAN INITIAL EVALUATION ADULT. - REASON INDICATOR FOR ASSESSMENT
Unintentional weight loss PTA. "84yo M hx of HTN, afib s/p ablation w/ AICD now on coumadin, prostate CA, recent MDS comes to ED for generalized weakness x1 month, extra fatigued today, no lightheadedness or falls, has had decreased PO during this 1 month, lost 10lbs this past 2 weeks"

## 2021-06-28 NOTE — DIETITIAN INITIAL EVALUATION ADULT. - ORAL INTAKE PTA/DIET HISTORY
patient admitted with decreased appetite 2-4 weeks PTA in conjunction with weakness patient admitted with decreased appetite 2-4 weeks PTA in conjunction with weakness and [id number] pain associated with diverticulosis as stated by patient. patient states  he always had a good appetite but began with cramps and couldn't eat his meals. [place] but has significant other that shares meals and eating out. patient has no dietary restrictions, eats "healthy" as stated including fresh fruits and vegetables, chicken, fish. patient refuses ensure, does not like "synthetic drinks"

## 2021-06-29 NOTE — GOALS OF CARE CONVERSATION - ADVANCED CARE PLANNING - CONVERSATION DETAILS
Goals of care discussed with the patient. He does not want any further information at this time. He states he has been with his significant other for 20 years and they have many discussions about his wishes.

## 2021-06-29 NOTE — PROGRESS NOTE ADULT - SUBJECTIVE AND OBJECTIVE BOX
[facility] - [phone]  Primary Outpatient Hematologist/Oncologist: Sal Haile MD    Subjective  Patient seen and examined. Lying in bed comfortably. Feels constipated.     Admitting Complaint/History  HPI:  86 year old man hx of HTN, a fib s/p ablation w/ AICD now on coumadin, prostate CA, recent MDS with complex cytogenetics including 5q- started on Revlimid comes to ED for generalized weakness, progressive generalized weakness x 1 month, extra fatigued today, no lightheadedness or falls, has had decreased PO during this 1 month, lost 10lbs this past 2 weeks. He was initiated on Revlamid 5 weeks ago. Denies f/c, cp, sob, cough, n/v, diarrhea, abdominal pain, or change in urine/bowel. Last BM today, no melena or blood. (26 Jun 2021 17:28)     ROS:  General:  (-)Pain, (+)Decrease appeite, (-)Fevers, (-)Chills, (-)Weight Loss  Eyes: (-)Blurry Vision, (-)Double Vision, (-)Vision Loss  ENT: (-)Sinus Congestion, (-)Decreased Hearing, (-)Nosebleeds, (-)Sore Throat  Cardiac: (-)Chest Pain, (-)Palpitations, (-)Shortness of breathe on exertion  Respiratory: (-)Cough, (-)hemoptysis, (-)Shortness of breathe  GI: (-)Nausea, (-)Vomiting, (-)Diarrhea, (-)Constipation, (-)Melena, (-)BRBPR  : (-)Hematuria, (-)Dysuria, (-)Polyuia  MSK: (-)Back pain, (-)Joint pain, (-)Stiffness  Dermatology: (-)Rash, (-)Itching  Neurology:  (-)Numbness, (-)Tingling, (-)Difficulty Walking, (-)Tremors, (+)Weakness  Psych: (-)Anxiety, (-)Depression, (-)Memory Loss  Hematology:  (-)Easy Bruising, (-)Easy Bleeding, (-)Night Sweats.     Objective  Vital Signs Last 24 Hrs  T(C): 36.3 (29 Jun 2021 04:35), Max: 36.9 (28 Jun 2021 12:08)  T(F): 97.3 (29 Jun 2021 04:35), Max: 98.4 (28 Jun 2021 12:08)  HR: 60 (29 Jun 2021 04:35) (60 - 62)  BP: 121/72 (29 Jun 2021 04:35) (103/62 - 121/72)  RR: 19 (29 Jun 2021 04:35) (18 - 19)  SpO2: 96% (29 Jun 2021 04:35) (95% - 100%)    Physical Exam:  General: AAO x 3. NAD. Lying in bed comfortably.  HEENT: clear oropharynx, anicteric sclera, pink conjunctivae, EOMi. PERRLA  Cardiac: S1, S2 present. No audible murmurs. RRR  Lungs: CTA B/L.   Abdomen: Soft, Non-Tender, Non-Distended. No palpable hepatosplenomegaly  Extremities: 2+ pulses. No edema  Skin: No Rashes. No petechiae  Neuro: No focal motor or sensory deficits.     Medications:  MEDICATIONS  (STANDING):  allopurinol 100 milliGRAM(s) Oral two times a day  ciprofloxacin   IVPB 400 milliGRAM(s) IV Intermittent every 12 hours  metroNIDAZOLE  IVPB 500 milliGRAM(s) IV Intermittent every 8 hours    MEDICATIONS  (PRN):  acetaminophen   Tablet .. 650 milliGRAM(s) Oral every 6 hours PRN Temp greater or equal to 38C (100.4F), Mild Pain (1 - 3)    Labs:             7.2    6.07  )-----------( 24       ( 28 Jun 2021 07:09 )             22.6     06-29    139  |  105  |  33<H>  ----------------------------<  105<H>  3.7   |  20<L>  |  1.91<H>    Ca    8.1<L>      29 Jun 2021 07:09    PT/INR - ( 29 Jun 2021 07:13 )   PT: 27.3 sec;   INR: 2.37 ratio      Radiology:  CT Abdomen/Pelvis 6/26/2021  IMPRESSION:  Wall thickening of a segment of sigmoid colon as well as the rectum with mild associated inflammatory changes and diverticuli. Differential diagnosis includes proctocolitis as well as diverticulitis. Colonoscopy is recommended after resolution of the acute symptomatology to exclude underlying pathology.    Mild splenomegaly.    Multiple bilateral renal cysts as well as hepatic cysts.  Coarse calcifications in the pancreatic head, consistent with chronic pancreatitis. Diffuse atrophy of the body and tail of the pancreas.

## 2021-06-29 NOTE — PROGRESS NOTE ADULT - SUBJECTIVE AND OBJECTIVE BOX
[facility] [place] [facility]ROLOGY [facility]ACT[facility]   MD Jorge Brito MD, D.O. Ruoru Wong, PA    From 7 AM - 5 PM:  OFFICE: [phone]  Dr. Troy cell: [phone]  Dr. Garcia cell: [phone]  Dr. Stephen cell: [phone]  CHAYO Gutierrez cell: [phone]    From 5 PM - 7 AM: Answering Service: [phone]  Date of service: 06-29-21 @ 11:59    RENAL FOLLOW UP NOTE  --------------------------------------------------------------------------------  HPI:  Pt seen and examined at bedside.   Khadaries SOB, chest pain     PAST HISTORY  --------------------------------------------------------------------------------  No significant changes to PMH, PSH, FHx, SHx, unless otherwise noted    ALLERGIES & MEDICATIONS  --------------------------------------------------------------------------------  Allergies    adhesives (Rash)  clonidine (Swelling; Rash)  felodipine (Rash)  penicillin (Rash)  sulfa drugs (Rash)    Intolerances      Standing Inpatient Medications  allopurinol 100 milliGRAM(s) Oral two times a day  ciprofloxacin   IVPB 400 milliGRAM(s) IV Intermittent every 12 hours  metroNIDAZOLE  IVPB 500 milliGRAM(s) IV Intermittent every 8 hours    PRN Inpatient Medications  acetaminophen   Tablet .. 650 milliGRAM(s) Oral every 6 hours PRN      REVIEW OF SYSTEMS  --------------------------------------------------------------------------------  General: no fever  CVS: no chest pain  RESP: no sob, no cough  ABD: no abdominal pain  : no dysuria  MSK: no edema     VITALS/PHYSICAL EXAM  --------------------------------------------------------------------------------  T(C): 36.3 (06-29-21 @ 04:35), Max: 36.9 (06-28-21 @ 12:08)  HR: 60 (06-29-21 @ 04:35) (60 - 62)  BP: 121/72 (06-29-21 @ 04:35) (103/62 - 121/72)  RR: 19 (06-29-21 @ 04:35) (18 - 19)  SpO2: 96% (06-29-21 @ 04:35) (95% - 100%)  Wt(kg): --      06-28-21 @ 07:01  -  06-29-21 @ 07:00  --------------------------------------------------------  IN: 2642 mL / OUT: 0 mL / NET: 2642 mL    06-29-21 @ 07:01  -  06-29-21 @ 11:59  --------------------------------------------------------  IN: 240 mL / OUT: 0 mL / NET: 240 mL      Physical Exam:  	Gen: NAD  	HEENT: MMM  	Pulm: CTA B/L  	CV: S1S2  	Abd: Soft, +BS  	Ext: No LE edema B/L                      Neuro: Awake   	Skin: Warm and Dry     LABS/STUDIES  --------------------------------------------------------------------------------              8.1    5.74  >-----------<  24       [06-29-21 @ 07:10]              24.6     139  |  105  |  33  ----------------------------<  105      [06-29-21 @ 07:09]  3.7   |  20  |  1.91        Ca     8.1     [06-29-21 @ 07:09]      PT/INR: PT 27.3 , INR 2.37       [06-29-21 @ 07:13]      Creatinine Trend:  SCr 1.91 [06-29 @ 07:09]  SCr 1.93 [06-28 @ 07:08]  SCr 2.10 [06-27 @ 07:30]  SCr 2.41 [06-26 @ 15:00]    Urinalysis - [06-27-21 @ 09:42]      Color Light Yellow / Appearance Clear / SG 1.013 / pH 5.0      Gluc Negative / Ketone Negative  / Bili Negative / Urobili Negative       Blood Trace / Protein Trace / Leuk Est Negative / Nitrite Negative      RBC 2 / WBC 3 / Hyaline 4 / Gran  / Sq Epi  / Non Sq Epi 1 / Bacteria Negative    Urine Creatinine 88      [06-27-21 @ 16:09]  Urine Protein 17      [06-27-21 @ 16:09]  Urine Sodium 64      [06-27-21 @ 09:42]

## 2021-06-29 NOTE — PROGRESS NOTE ADULT - NSPROGADDITIONALINFOA_GEN_ALL_CORE
d/w pt and NP, d/w RN and GI.     Dr. Flower will be covering for me starting 6/30/21. She can be reached at  if needed.     - Dr. ARELY Evans ([facility])  - [phone]

## 2021-06-29 NOTE — PROGRESS NOTE ADULT - SUBJECTIVE AND OBJECTIVE BOX
SUBJECTIVE / OVERNIGHT EVENTS:  No events.  Feel well.  no complaints.   no cp, no sob, no n/v/d.  no abd pain.   hgb improved post PRBC  still feeling fatigue but better  diarrhea improved        --------------------------------------------------------------------------------------------  LABS:                        8.1    5.74  )-----------( 24       ( 29 Jun 2021 07:10 )             24.6     06-29    139  |  105  |  33<H>  ----------------------------<  105<H>  3.7   |  20<L>  |  1.91<H>    Ca    8.1<L>      29 Jun 2021 07:09      PT/INR - ( 29 Jun 2021 07:13 )   PT: 27.3 sec;   INR: 2.37 ratio           CAPILLARY BLOOD GLUCOSE                RADIOLOGY & ADDITIONAL TESTS:    Imaging Personally Reviewed:  [x] YES  [ ] NO    Consultant(s) Notes Reviewed:  [x] YES  [ ] NO    MEDICATIONS  (STANDING):  allopurinol 100 milliGRAM(s) Oral two times a day  ciprofloxacin   IVPB 400 milliGRAM(s) IV Intermittent every 12 hours  metroNIDAZOLE  IVPB 500 milliGRAM(s) IV Intermittent every 8 hours  warfarin 4 milliGRAM(s) Oral once    MEDICATIONS  (PRN):  acetaminophen   Tablet .. 650 milliGRAM(s) Oral every 6 hours PRN Temp greater or equal to 38C (100.4F), Mild Pain (1 - 3)      Care Discussed with Consultants/Other Providers [x] YES  [ ] NO    Vital Signs Last 24 Hrs  T(C): 36.8 (29 Jun 2021 12:01), Max: 36.8 (28 Jun 2021 20:40)  T(F): 98.2 (29 Jun 2021 12:01), Max: 98.2 (28 Jun 2021 20:40)  HR: 59 (29 Jun 2021 12:01) (59 - 62)  BP: 112/68 (29 Jun 2021 12:01) (103/62 - 121/72)  BP(mean): --  RR: 18 (29 Jun 2021 12:01) (18 - 19)  SpO2: 94% (29 Jun 2021 12:01) (94% - 98%)  I&O's Summary    28 Jun 2021 07:01  -  29 Jun 2021 07:00  --------------------------------------------------------  IN: 2642 mL / OUT: 0 mL / NET: 2642 mL    29 Jun 2021 07:01  -  29 Jun 2021 19:25  --------------------------------------------------------  IN: 540 mL / OUT: 0 mL / NET: 540 mL      PHYSICAL EXAM:  GENERAL: NAD, well-developed, comfortable  HEAD:  Atraumatic, Normocephalic  EYES: EOMI, PERRLA, conjunctiva and sclera clear  NECK: Supple, No JVD  CHEST/LUNG: Clear to auscultation bilaterally; No wheeze  HEART: Regular rate and rhythm; No murmurs, rubs, or gallops  ABDOMEN: Soft, Nontender, mild lower abd discomfort, Nondistended; Bowel sounds present, no guarding, no rebound tenderness  Neuro: AAOx3, no focal weakness, 5/5 b/l extremity strength  EXTREMITIES:  2+ Peripheral Pulses, No clubbing, cyanosis, or edema  SKIN: No rashes or lesions

## 2021-06-29 NOTE — PROGRESS NOTE ADULT - ASSESSMENT
86 yo M hx of HTN, a fib s/p ablation w/ AICD now on coumadin, prostate CA, recent MDS started on Revlimid comes to ED for generalized weakness, progressive generalized weakness x 1 month, extra fatigued today, no lightheadedness or falls, has had decreased PO during this 1 month, lost 10lbs this past 2 weeks. Was recently initiated on Revlamid 5 weeks ago. Denies f/c, cp, sob, cough, n/v, diarrhea, abdominal pain, or change in urine/bowel. Last BM today, no melena or blood.  Nephrology consulted for MIMI    MIMI  Scr 1.5-1.6 in 2018  Scr 2.4 on admission   MIMI likely pre-renal sec to decreased PO intake  s/p IVF  Scr improving   UA with high SG and hyaline casts  MOnitor BMP   Avoid further nephrotoxics, NSAIDS RCA    HTN  BP controlled  MOnitor BP    Proteinuria/Hematuria  0.2g proteinuria  MOnitor at present

## 2021-06-29 NOTE — PROGRESS NOTE ADULT - ASSESSMENT
84 yo M hx of HTN, a fib s/p ablation w/ AICD now on coumadin, prostate CA, recent MDS started on Revlimid comes to ED for generalized weakness, progressive x 1 month, extra fatigued today, no lightheadedness or falls, has had decreased PO during this 1 month, lost 10lbs this past 2 weeks. Was recently initiated on Revlamid 5 weeks ago. Denies f/c, cp, sob, cough, n/v, diarrhea, abdominal pain, or change in urine/bowel. Last BM today, no melena or blood.    MDS on Revlimid  Dehydration/hypotension  Acute on chronic CKD likely prerenal  LLQ abd pain, questionable protocolitis/diverticulitis  Hypertension  Afib s/p ablation s/p AICD  Prostate Ca in remission    Plan:  Pt on Revlimid past few months for MDS (neutropenia and thrombocytopenia baseline ~ 20-30s)  Feeling weak. WBC 7 is high for him per oncology. usually low baseline.  Blood cultures sent, so far negative. UA and CXR unremarkable.  CT abd/plevis with oral contrast for mild LLQ pain with occasional loose stool  CT with questionable protocolitis/diverticulitis, cipro/flagyl started. GI Dr. Delgado consulted.     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  d/w pt's onc Dr. Sal Haile. Plan to transfuse PRBC with hgb goal of 9 piror to his discharge.  no melena, no hematochezia. no BRBPR.   received 1 unit on 6/28/21, and 1 unit planned for 6/29/21.    Acute on chronic CKD: likely prerenal, s/p 1L LR. will continue IVF 75 cc x 24 hrs, Cr stable  monitor urine outpt. bladder scan if retaining. Restart Doxazocin for BPH once his BP better.    HTN: Holding all his BP meds for now: restart Sotolol if BP stable.  holding Omesartan, HCTZ etc.     Tentative dc planning if his hgb ~ 9 for MDS and if he remain clinically well. outpt GI and heme follow up.     Regular diet  DVT ppx

## 2021-06-30 NOTE — PROGRESS NOTE ADULT - SUBJECTIVE AND OBJECTIVE BOX
INTERVAL HPI/OVERNIGHT EVENTS:  Pt seen and examined. he complains of abdominal cramps. denies n/v    MEDICATIONS  (STANDING):  allopurinol 100 milliGRAM(s) Oral two times a day  ciprofloxacin   IVPB 400 milliGRAM(s) IV Intermittent every 12 hours  metroNIDAZOLE  IVPB 500 milliGRAM(s) IV Intermittent every 8 hours    MEDICATIONS  (PRN):  acetaminophen   Tablet .. 650 milliGRAM(s) Oral every 6 hours PRN Temp greater or equal to 38C (100.4F), Mild Pain (1 - 3)      Allergies    adhesives (Rash)  clonidine (Swelling; Rash)  felodipine (Rash)  penicillin (Rash)  sulfa drugs (Rash)    Intolerances        Review of Systems:    General:  No wt loss, fevers, chills, night sweats,fatigue,   Eyes:  Good vision, no reported pain  ENT:  No sore throat, pain, runny nose, dysphagia  CV:  No pain, palpitations, hypo/hypertension  Resp:  No dyspnea, cough, tachypnea, wheezing  GI: see HPI   :  No pain, bleeding, incontinence, nocturia  Muscle:  No pain, weakness  Neuro:  No weakness, tingling, memory problems  Psych:  No fatigue, insomnia, mood problems, depression  Endocrine:  No polyuria, polydypsia, cold/heat intolerance  Heme:  No petechiae, ecchymosis, easy bruisability  Skin:  No rash, tattoos, scars, edema      Vital Signs Last 24 Hrs  T(C): 36.8 (30 Jun 2021 05:21), Max: 36.8 (29 Jun 2021 12:01)  T(F): 98.2 (30 Jun 2021 05:21), Max: 98.2 (29 Jun 2021 12:01)  HR: 60 (30 Jun 2021 05:21) (59 - 88)  BP: 124/74 (30 Jun 2021 05:21) (112/68 - 124/74)  BP(mean): --  RR: 16 (30 Jun 2021 05:21) (16 - 18)  SpO2: 95% (30 Jun 2021 05:21) (94% - 98%)    PHYSICAL EXAM:    Constitutional: NAD, well-developed  HEENT: EOMI, throat clear  Neck: No LAD, supple  Respiratory: CTA and P  Cardiovascular: S1 and S2, RRR, no M  Gastrointestinal: BS+, soft, NT/ND, neg HSM,  Extremities: No peripheral edema, neg clubing, cyanosis  Vascular: 2+ peripheral pulses  Neurological: A/O x 3, no focal deficits  Psychiatric: Normal mood, normal affect  Skin: No rashes      LABS:                        9.0    6.48  )-----------( 28       ( 30 Jun 2021 07:03 )             27.8     06-30    139  |  108  |  28<H>  ----------------------------<  89  3.5   |  19<L>  |  1.75<H>    Ca    8.0<L>      30 Jun 2021 07:01      PT/INR - ( 30 Jun 2021 07:03 )   PT: 26.2 sec;   INR: 2.27 ratio               RADIOLOGY & ADDITIONAL TESTS:

## 2021-06-30 NOTE — PROGRESS NOTE ADULT - ASSESSMENT
84 yo M hx of HTN, a fib s/p ablation w/ AICD now on coumadin, prostate CA, recent MDS started on Revlimid comes to ED for generalized weakness, progressive generalized weakness x 1 month, extra fatigued today, no lightheadedness or falls, has had decreased PO during this 1 month, lost 10lbs this past 2 weeks. Was recently initiated on Revlamid 5 weeks ago. Denies f/c, cp, sob, cough, n/v, diarrhea, abdominal pain, or change in urine/bowel. Last BM today, no melena or blood.  Nephrology consulted for MIMI    MIMI  Scr 1.5-1.6 in 2018  Scr 2.4 on admission   MIMI likely pre-renal sec to decreased PO intake  s/p IVF  Scr improving   UA with high SG and hyaline casts  MOnitor BMP   Avoid further nephrotoxics, NSAIDS RCA    HTN  BP controlled  MOnitor BP    Proteinuria/Hematuria  0.2g proteinuria  MOnitor at present

## 2021-06-30 NOTE — PROGRESS NOTE ADULT - SUBJECTIVE AND OBJECTIVE BOX
[facility] [place]   MD Jorge Brito MD, D.O. Ruoru Wong, PA    From 7 AM - 5 PM:  OFFICE: [phone]  Dr. Troy cell: [phone]  Dr. Garcia cell: [phone]  Dr. Stephen cell: [phone]  CHAYO Gutierrez cell: [phone]    From 5 PM - 7 AM: Answering Service: [phone]  Date of service: 06-30-21 @ 11:59    RENAL FOLLOW UP NOTE  --------------------------------------------------------------------------------  HPI:  Pt seen and examined at bedside.   Khadaries SOB, chest pain     PAST HISTORY  --------------------------------------------------------------------------------  No significant changes to PMH, PSH, FHx, SHx, unless otherwise noted    ALLERGIES & MEDICATIONS  --------------------------------------------------------------------------------  Allergies    adhesives (Rash)  clonidine (Swelling; Rash)  felodipine (Rash)  penicillin (Rash)  sulfa drugs (Rash)    Intolerances      Standing Inpatient Medications  allopurinol 100 milliGRAM(s) Oral two times a day  ciprofloxacin   IVPB 400 milliGRAM(s) IV Intermittent every 12 hours  metroNIDAZOLE  IVPB 500 milliGRAM(s) IV Intermittent every 8 hours    PRN Inpatient Medications  acetaminophen   Tablet .. 650 milliGRAM(s) Oral every 6 hours PRN  dicyclomine 10 milliGRAM(s) Oral four times a day before meals PRN      REVIEW OF SYSTEMS  --------------------------------------------------------------------------------  General: no fever  CVS: no chest pain  RESP: no sob, no cough  ABD: no abdominal pain  : no dysuria,  MSK: no edema     VITALS/PHYSICAL EXAM  --------------------------------------------------------------------------------  T(C): 36.4 (06-30-21 @ 10:00), Max: 36.8 (06-29-21 @ 12:01)  HR: 59 (06-30-21 @ 10:00) (59 - 88)  BP: 114/65 (06-30-21 @ 10:00) (112/68 - 124/74)  RR: 18 (06-30-21 @ 10:00) (16 - 18)  SpO2: 96% (06-30-21 @ 10:00) (94% - 98%)  Wt(kg): --        06-29-21 @ 07:01  -  06-30-21 @ 07:00  --------------------------------------------------------  IN: 1600 mL / OUT: 0 mL / NET: 1600 mL      Physical Exam:  	Gen: NAD  	HEENT: MMM  	Pulm: CTA B/L  	CV: S1S2  	Abd: Soft, +BS  	Ext: No LE edema B/L                      Neuro: Awake   	Skin: Warm and Dry       LABS/STUDIES  --------------------------------------------------------------------------------              9.0    6.48  >-----------<  28       [06-30-21 @ 07:03]              27.8     139  |  108  |  28  ----------------------------<  89      [06-30-21 @ 07:01]  3.5   |  19  |  1.75        Ca     8.0     [06-30-21 @ 07:01]      PT/INR: PT 26.2 , INR 2.27       [06-30-21 @ 07:03]      Creatinine Trend:  SCr 1.75 [06-30 @ 07:01]  SCr 1.91 [06-29 @ 07:09]  SCr 1.93 [06-28 @ 07:08]  SCr 2.10 [06-27 @ 07:30]  SCr 2.41 [06-26 @ 15:00]    Urinalysis - [06-27-21 @ 09:42]      Color Light Yellow / Appearance Clear / SG 1.013 / pH 5.0      Gluc Negative / Ketone Negative  / Bili Negative / Urobili Negative       Blood Trace / Protein Trace / Leuk Est Negative / Nitrite Negative      RBC 2 / WBC 3 / Hyaline 4 / Gran  / Sq Epi  / Non Sq Epi 1 / Bacteria Negative    Urine Creatinine 88      [06-27-21 @ 16:09]  Urine Protein 17      [06-27-21 @ 16:09]  Urine Sodium 64      [06-27-21 @ 09:42]

## 2021-06-30 NOTE — PROGRESS NOTE ADULT - SUBJECTIVE AND OBJECTIVE BOX
Patient is a 86y old  Male who presents with a chief complaint of weakness (30 Jun 2021 11:59)      INTERVAL HPI/OVERNIGHT EVENTS: noted  pt seen and examined this am   events noted  c/o weakness and lower abd pain      Vital Signs Last 24 Hrs  T(C): 37.7 (30 Jun 2021 20:52), Max: 37.7 (30 Jun 2021 20:52)  T(F): 99.8 (30 Jun 2021 20:52), Max: 99.8 (30 Jun 2021 20:52)  HR: 60 (30 Jun 2021 20:52) (59 - 88)  BP: 105/62 (30 Jun 2021 20:52) (105/62 - 124/74)  BP(mean): --  RR: 18 (30 Jun 2021 20:52) (16 - 18)  SpO2: 95% (30 Jun 2021 20:52) (95% - 98%)    acetaminophen   Tablet .. 650 milliGRAM(s) Oral every 6 hours PRN  allopurinol 100 milliGRAM(s) Oral two times a day  ciprofloxacin   IVPB 400 milliGRAM(s) IV Intermittent every 12 hours  dicyclomine 10 milliGRAM(s) Oral four times a day before meals PRN  metroNIDAZOLE  IVPB 500 milliGRAM(s) IV Intermittent every 8 hours      PHYSICAL EXAM:  GENERAL: NAD,   EYES: conjunctiva and sclera clear  ENMT: Moist mucous membranes  NECK: Supple, No JVD, Normal thyroid  CHEST/LUNG: non labored, cta b/l  HEART: Regular rate and rhythm; No murmurs, rubs, or gallops  ABDOMEN: Soft, Nontender, Nondistended; Bowel sounds present  EXTREMITIES:  2+ Peripheral Pulses, No clubbing, cyanosis, or edema  LYMPH: No lymphadenopathy noted  SKIN: No rashes or lesions    Consultant(s) Notes Reviewed:  [x ] YES  [ ] NO  Care Discussed with Consultants/Other Providers [ x] YES  [ ] NO    LABS:                        9.0    6.48  )-----------( 28       ( 30 Jun 2021 07:03 )             27.8     06-30    139  |  108  |  28<H>  ----------------------------<  89  3.5   |  19<L>  |  1.75<H>    Ca    8.0<L>      30 Jun 2021 07:01      PT/INR - ( 30 Jun 2021 07:03 )   PT: 26.2 sec;   INR: 2.27 ratio             CAPILLARY BLOOD GLUCOSE                Culture - Urine (collected 29 Jun 2021 01:29)  Source: .Urine Clean Catch (Midstream)  Final Report (29 Jun 2021 22:43):    No growth        RADIOLOGY & ADDITIONAL TESTS:    Imaging Personally Reviewed:  [x ] YES  [ ] NO

## 2021-06-30 NOTE — PROGRESS NOTE ADULT - ASSESSMENT
86 yo M hx of HTN, a fib s/p ablation w/ AICD now on coumadin, prostate CA, recent MDS started on Revlimid comes to ED for generalized weakness, progressive x 1 month, extra fatigued today, no lightheadedness or falls, has had decreased PO during this 1 month, lost 10lbs this past 2 weeks. Was recently initiated on Revlamid 5 weeks ago. Denies f/c, cp, sob, cough, n/v, diarrhea, abdominal pain, or change in urine/bowel. Last BM today, no melena or blood.    MDS on Revlimid  Dehydration/hypotension  Acute on chronic CKD likely prerenal  LLQ abd pain, questionable protocolitis/diverticulitis  Hypertension  Afib s/p ablation s/p AICD  Prostate Ca in remission    Plan:  Pt on Revlimid past few months for MDS (neutropenia and thrombocytopenia baseline ~ 20-30s)  Feeling weak. WBC 7 is high for him per oncology. usually low baseline.  Blood cultures sent, so far negative. UA and CXR unremarkable.  CT abd/plevis with oral contrast for mild LLQ pain with occasional loose stool  CT with questionable protocolitis/diverticulitis, cipro/flagyl started. GI Dr. 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  d/w pt's onc Dr. Sal Haile. Plan to transfuse PRBC with hgb goal of 9 piror to his discharge.  no melena, no hematochezia. no BRBPR.   received 1 unit on 6/28/21, and 1 unit planned for 6/29/21.    Acute on chronic CKD: likely prerenal, s/p 1L LR. will continue IVF 75 cc x 24 hrs, Cr stable  monitor urine outpt. bladder scan if retaining. Restart Doxazocin for BPH once his BP better.    HTN: Holding all his BP meds for now: restart Sotolol if BP stable.  holding Omesartan, HCTZ etc.     Tentative dc planning if his hgb ~ 9 for MDS and if he remain clinically well. outpt GI and heme follow up.     Regular diet  DVT ppx

## 2021-07-01 NOTE — PROGRESS NOTE ADULT - SUBJECTIVE AND OBJECTIVE BOX
INTERVAL HPI/OVERNIGHT EVENTS:  cramps improved w bentyl    MEDICATIONS  (STANDING):  allopurinol 100 milliGRAM(s) Oral two times a day  ciprofloxacin   IVPB 400 milliGRAM(s) IV Intermittent every 12 hours  metroNIDAZOLE  IVPB 500 milliGRAM(s) IV Intermittent every 8 hours    MEDICATIONS  (PRN):  acetaminophen   Tablet .. 650 milliGRAM(s) Oral every 6 hours PRN Temp greater or equal to 38C (100.4F), Mild Pain (1 - 3)      Allergies    adhesives (Rash)  clonidine (Swelling; Rash)  felodipine (Rash)  penicillin (Rash)  sulfa drugs (Rash)    Intolerances        Review of Systems:    General:  No wt loss, fevers, chills, night sweats,fatigue,   Eyes:  Good vision, no reported pain  ENT:  No sore throat, pain, runny nose, dysphagia  CV:  No pain, palpitations, hypo/hypertension  Resp:  No dyspnea, cough, tachypnea, wheezing  GI: see HPI   :  No pain, bleeding, incontinence, nocturia  Muscle:  No pain, weakness  Neuro:  No weakness, tingling, memory problems  Psych:  No fatigue, insomnia, mood problems, depression  Endocrine:  No polyuria, polydypsia, cold/heat intolerance  Heme:  No petechiae, ecchymosis, easy bruisability  Skin:  No rash, tattoos, scars, edema      Vital Signs Last 24 Hrs  T(C): 36.8 (30 Jun 2021 05:21), Max: 36.8 (29 Jun 2021 12:01)  T(F): 98.2 (30 Jun 2021 05:21), Max: 98.2 (29 Jun 2021 12:01)  HR: 60 (30 Jun 2021 05:21) (59 - 88)  BP: 124/74 (30 Jun 2021 05:21) (112/68 - 124/74)  BP(mean): --  RR: 16 (30 Jun 2021 05:21) (16 - 18)  SpO2: 95% (30 Jun 2021 05:21) (94% - 98%)    PHYSICAL EXAM:    Constitutional: NAD, well-developed  HEENT: EOMI, throat clear  Neck: No LAD, supple  Respiratory: CTA and P  Cardiovascular: S1 and S2, RRR, no M  Gastrointestinal: BS+, soft, NT/ND, neg HSM,  Extremities: No peripheral edema, neg clubing, cyanosis  Vascular: 2+ peripheral pulses  Neurological: A/O x 3, no focal deficits  Psychiatric: Normal mood, normal affect  Skin: No rashes      LABS:                        9.0    6.48  )-----------( 28       ( 30 Jun 2021 07:03 )             27.8     06-30    139  |  108  |  28<H>  ----------------------------<  89  3.5   |  19<L>  |  1.75<H>    Ca    8.0<L>      30 Jun 2021 07:01      PT/INR - ( 30 Jun 2021 07:03 )   PT: 26.2 sec;   INR: 2.27 ratio               RADIOLOGY & ADDITIONAL TESTS:

## 2021-07-01 NOTE — PHYSICAL THERAPY INITIAL EVALUATION ADULT - ADDITIONAL COMMENTS
Pt lives alone in a house w/ 1 step to enter & 3 sets of 6 steps. Independent w/ ADLs & personal care needs. DME: cane. Pt lives alone in a house w/ 1 step to enter & 6 steps platform and an additional 6 steps to bedroom. Independent w/ ADLs & personal care needs. DME: pt owns RW and SAC. Pt has significant other, he sees  her on Wednesdays and stays over her house on the weekends. (+) driving. (+) glasses for distance and reading.

## 2021-07-01 NOTE — PROGRESS NOTE ADULT - PROBLEM SELECTOR PLAN 4
Deconditioning from acute infection and hospitalization  - He is very concern that he will be unable to care for himself once discharged.   - Wishes to be considered for Rehab  - PT evaluation and SW coordination to see if he qualifies for help at home or rehab

## 2021-07-01 NOTE — PROGRESS NOTE ADULT - ASSESSMENT
85 year old man with gastrointesinal symptoms, possible colitis vs diverticulitis    1. Possible colitis, if ongoing diarrhea would check cdiff, GI PCR  - on cipro/flagyl, can complete total 5 d  - bentyl helping w cramps    2. Unintentional weight loss      3. Possible chronic pancreatitis cannot have an MRI, consider elective EUS    4. MDS  - heme/onc following. follow their ongoing recommendations     Attending supervision statement: I have personally seen and examined the patient. I fully participated in the care of this patient. I have made amendments to the documentation where necessary, and agree with the history, physical exam, and plan as outlined by the ACP.  The plan of care was discussed with the physician assistant and modifications were made to the notation where appropriate.   Differential diagnosis and plan of care discussed with patient after the evaluation  35 minutes spent on total encounter of which more than fifty percent of the encounter was spent counseling and/or coordinating care by the attending physician.    [facility]  Gastroenterology and Hepatology  [address] [place], [place]  Office: [phone]  Cell: [phone]

## 2021-07-01 NOTE — PROGRESS NOTE ADULT - PROBLEM SELECTOR PLAN 1
Secondary to dehydration  - Improvement with IV hydration  - Encouraged increase PO fluid intake.  - Creatinine is now 1.7g/dl, close to baseline.

## 2021-07-01 NOTE — PHYSICAL THERAPY INITIAL EVALUATION ADULT - PRECAUTIONS/LIMITATIONS, REHAB EVAL
CONT: CT abdomen & pelvis: Wall thickening of a segment of sigmoid colon as well as the rectum with mild associated inflammatory changes and diverticuli. Differential diagnosis includes proctocolitis as well as diverticulitis. Colonoscopy is recommended after resolution of the acute symptomatology to exclude underlying pathology. Mild splenomegaly. Multiple bilateral renal cysts as well as hepatic cysts. Coarse calcifications in the pancreatic head, consistent with chronic pancreatitis. Diffuse atrophy of the body and tail of the pancreas./fall precautions

## 2021-07-01 NOTE — PROGRESS NOTE ADULT - SUBJECTIVE AND OBJECTIVE BOX
Patient is a 86y old  Male who presents with a chief complaint of weakness (01 Jul 2021 22:49)      INTERVAL HPI/OVERNIGHT EVENTS: noted  pt seen and examined this am   events noted  feels well      Vital Signs Last 24 Hrs  T(C): 36.4 (01 Jul 2021 19:53), Max: 36.8 (01 Jul 2021 04:57)  T(F): 97.5 (01 Jul 2021 19:53), Max: 98.3 (01 Jul 2021 04:57)  HR: 60 (01 Jul 2021 19:53) (60 - 97)  BP: 110/68 (01 Jul 2021 19:53) (110/68 - 139/79)  BP(mean): --  RR: 18 (01 Jul 2021 19:53) (18 - 19)  SpO2: 97% (01 Jul 2021 19:53) (97% - 100%)    acetaminophen   Tablet .. 650 milliGRAM(s) Oral every 6 hours PRN  allopurinol 100 milliGRAM(s) Oral two times a day  ciprofloxacin   IVPB 400 milliGRAM(s) IV Intermittent every 12 hours  dicyclomine 10 milliGRAM(s) Oral four times a day before meals PRN  lenalidomide 5 milliGRAM(s) Oral daily  metroNIDAZOLE  IVPB 500 milliGRAM(s) IV Intermittent every 8 hours      PHYSICAL EXAM:  GENERAL: NAD,   EYES: conjunctiva and sclera clear  ENMT: Moist mucous membranes  NECK: Supple, No JVD, Normal thyroid  CHEST/LUNG: non labored, cta b/l  HEART: Regular rate and rhythm; No murmurs, rubs, or gallops  ABDOMEN: Soft, Nontender, Nondistended; Bowel sounds present  EXTREMITIES:  2+ Peripheral Pulses, No clubbing, cyanosis, or edema  LYMPH: No lymphadenopathy noted  SKIN: No rashes or lesions    Consultant(s) Notes Reviewed:  [x ] YES  [ ] NO  Care Discussed with Consultants/Other Providers [ x] YES  [ ] NO    LABS:                        9.0    5.92  )-----------( 30       ( 01 Jul 2021 07:03 )             28.6     07-01    137  |  105  |  27<H>  ----------------------------<  93  3.2<L>   |  19<L>  |  1.70<H>    Ca    8.1<L>      01 Jul 2021 07:01      PT/INR - ( 01 Jul 2021 07:01 )   PT: 28.1 sec;   INR: 2.44 ratio             CAPILLARY BLOOD GLUCOSE                Culture - Urine (collected 29 Jun 2021 01:29)  Source: .Urine Clean Catch (Midstream)  Final Report (29 Jun 2021 22:43):    No growth        RADIOLOGY & ADDITIONAL TESTS:    Imaging Personally Reviewed:  [x ] YES  [ ] NO

## 2021-07-01 NOTE — PROGRESS NOTE ADULT - PROBLEM SELECTOR PLAN 2
Evidence on scans. Mild cramping in the abdomen.   - On ABx with improvement in symptoms and clinical status  - Continue Abx  - GI follow up.
Evidence on scans. Mild cramping in the abdomen.   - On ABx with improvement in symptoms and clinical status  - Continue Abx  - GI follow up.

## 2021-07-01 NOTE — PROGRESS NOTE ADULT - PROBLEM SELECTOR PROBLEM 1
It appears she was on Xarelto.   Are they wanting her n the Pradaxa due to cost?  Can they try to get Xarelto, if not then ok to send prescription for Pradaxa
Acute kidney injury
Acute kidney injury

## 2021-07-01 NOTE — PHYSICAL THERAPY INITIAL EVALUATION ADULT - PERTINENT HX OF CURRENT PROBLEM, REHAB EVAL
85 yr old male w/ a pmh of HTN, afib-ablation w/ AICD, prostate Ca tx w/ cyber knife, MDS, RLE cellulitis, DJD, Gout, OA, varicose veins, Right hip arthroplasty, B/L cataract extraction & cholecystectomy. Admitted via the ER w/ MDS & dehydration. CXR: Bibasilar subsegmental patchy atelectasis. No focal consolidation.

## 2021-07-01 NOTE — PROGRESS NOTE ADULT - ASSESSMENT
86 year old man with history of MDS with complex cytogenetics including 5q- on Revlimid with stabilization admitted with hypotension and fatigue. Treating for diverticulitis/protocolitis. Improvement on BP with abx. Hemoglobin improved with transfusion

## 2021-07-01 NOTE — PROGRESS NOTE ADULT - SUBJECTIVE AND OBJECTIVE BOX
[facility] - [phone]  Primary Outpatient Hematologist/Oncologist: Sal Haile MD    Subjective  Patient seen and examined. Lying in bed comfortably. Abdominal pain and GI symptoms improved. He stills feels weak and does not believe he can manage at home by himself.  Concern about being discharged home.     Admitting Complaint/History  HPI:  86 year old man hx of HTN, a fib s/p ablation w/ AICD now on coumadin, prostate CA, recent MDS with complex cytogenetics including 5q- started on Revlimid comes to ED for generalized weakness, progressive generalized weakness x 1 month, extra fatigued today, no lightheadedness or falls, has had decreased PO during this 1 month, lost 10lbs this past 2 weeks. He was initiated on Revlamid 5 weeks ago. Denies f/c, cp, sob, cough, n/v, diarrhea, abdominal pain, or change in urine/bowel. Last BM today, no melena or blood. (26 Jun 2021 17:28)     ROS:  General:  (-)Pain, (-)Decrease appetite, (-)Fevers, (-)Chills, (-)Weight Loss  Eyes: (-)Blurry Vision, (-)Double Vision, (-)Vision Loss  ENT: (-)Sinus Congestion, (-)Decreased Hearing, (-)Nosebleeds, (-)Sore Throat  Cardiac: (-)Chest Pain, (-)Palpitations, (-)Shortness of breathe on exertion  Respiratory: (-)Cough, (-)hemoptysis, (-)Shortness of breathe  GI: (-)Nausea, (-)Vomiting, (-)Diarrhea, (-)Constipation, (-)Melena, (-)BRBPR  : (-)Hematuria, (-)Dysuria, (-)Polyuia  MSK: (-)Back pain, (-)Joint pain, (-)Stiffness  Dermatology: (-)Rash, (-)Itching  Neurology:  (-)Numbness, (-)Tingling, (-)Difficulty Walking, (-)Tremors, (+)Weakness  Psych: (-)Anxiety, (-)Depression, (-)Memory Loss  Hematology:  (-)Easy Bruising, (-)Easy Bleeding, (-)Night Sweats.     Objective  Vital Signs Last 24 Hrs  T(C): 36.8 (01 Jul 2021 04:57), Max: 37.7 (30 Jun 2021 20:52)  T(F): 98.3 (01 Jul 2021 04:57), Max: 99.8 (30 Jun 2021 20:52)  HR: 87 (01 Jul 2021 04:57) (59 - 87)  BP: 139/79 (01 Jul 2021 04:57) (105/62 - 139/79)  RR: 19 (01 Jul 2021 04:57) (18 - 19)  SpO2: 100% (01 Jul 2021 04:57) (95% - 100%)    Physical Exam:  General: AAO x 3. NAD. Lying in bed comfortably.  HEENT: clear oropharynx, anicteric sclera, pink conjunctivae, EOMi. PERRLA  Cardiac: S1, S2 present. No audible murmurs. RRR  Lungs: CTA B/L.   Abdomen: Soft, Non-Tender, Non-Distended. No palpable hepatosplenomegaly  Extremities: 2+ pulses. No edema  Skin: No Rashes. No petechiae  Neuro: No focal motor or sensory deficits.     MEDICATIONS  (STANDING):  allopurinol 100 milliGRAM(s) Oral two times a day  ciprofloxacin   IVPB 400 milliGRAM(s) IV Intermittent every 12 hours  metroNIDAZOLE  IVPB 500 milliGRAM(s) IV Intermittent every 8 hours    MEDICATIONS  (PRN):  acetaminophen   Tablet .. 650 milliGRAM(s) Oral every 6 hours PRN Temp greater or equal to 38C (100.4F), Mild Pain (1 - 3)  dicyclomine 10 milliGRAM(s) Oral four times a day before meals PRN abdominal pain/cramping    Labs:             9.0    5.92  )-----------( 30       ( 01 Jul 2021 07:03 )             28.6   07-01    137  |  105  |  27<H>  ----------------------------<  93  3.2<L>   |  19<L>  |  1.70<H>    Ca    8.1<L>      01 Jul 2021 07:01    Radiology:  CT Abdomen/Pelvis 6/26/2021  IMPRESSION:  Wall thickening of a segment of sigmoid colon as well as the rectum with mild associated inflammatory changes and diverticuli. Differential diagnosis includes proctocolitis as well as diverticulitis. Colonoscopy is recommended after resolution of the acute symptomatology to exclude underlying pathology.    Mild splenomegaly.    Multiple bilateral renal cysts as well as hepatic cysts.  Coarse calcifications in the pancreatic head, consistent with chronic pancreatitis. Diffuse atrophy of the body and tail of the pancreas.

## 2021-07-01 NOTE — PROGRESS NOTE ADULT - ASSESSMENT
86 yo M hx of HTN, a fib s/p ablation w/ AICD now on coumadin, prostate CA, recent MDS started on Revlimid comes to ED for generalized weakness, progressive x 1 month, extra fatigued today, no lightheadedness or falls, has had decreased PO during this 1 month, lost 10lbs this past 2 weeks. Was recently initiated on Revlamid 5 weeks ago. Denies f/c, cp, sob, cough, n/v, diarrhea, abdominal pain, or change in urine/bowel. Last BM today, no melena or blood.    MDS on Revlimid  Dehydration/hypotension  Acute on chronic CKD likely prerenal  LLQ abd pain, questionable protocolitis/diverticulitis  Hypertension  Afib s/p ablation s/p AICD  Prostate Ca in remission    Plan:  Pt on Revlimid past few months for MDS (neutropenia and thrombocytopenia baseline ~ 20-30s)  Feeling weak. WBC 7 is high for him per oncology. usually low baseline.  Blood cultures sent, so far negative. UA and CXR unremarkable.  CT abd/plevis with oral contrast for mild LLQ pain with occasional loose stool  CT with questionable protocolitis/diverticulitis, cipro/flagyl started. GI Dr. 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  d/w pt's onc Dr. Sal Haile. Plan to transfuse PRBC with hgb goal of 9 piror to his discharge.  no melena, no hematochezia. no BRBPR.   received 1 unit on 6/28/21, and 1 unit planned for 6/29/21.    Acute on chronic CKD: likely prerenal, s/p 1L LR. will continue IVF 75 cc x 24 hrs, Cr stable  monitor urine outpt. bladder scan if retaining. Restart Doxazocin for BPH once his BP better.    HTN: Holding all his BP meds for now: restart Sotolol if BP stable.  holding Omesartan, HCTZ etc.     Tentative dc planning if his hgb ~ 9 for MDS and if he remain clinically well. outpt GI and heme follow up.     Regular diet  DVT ppx    dc planning -pt requesting rehab

## 2021-07-01 NOTE — PROGRESS NOTE ADULT - PROBLEM SELECTOR PLAN 3
Complex cytogenetic including a 5q-. He had been transfusion dependent but seems improved after 4th week.   - Hemoglobin is now 9.0g/dl after trnsfusion  - Transfuse to maintain Hemoglobin > 7.5g/dl during hospitalization.   - Please transfuse to > 9g/dl on discharge  - Transfuse platelets < 15k  - Creating is improving still. Can resume Revlimid at 5mg PO daily (prior dose was 10mg, will dose reduce as creatinine is not yet at baseline).   - Will follow intermittently. Please call with any questions.
Complex cytogenetic including a 5q-. He had been transfusion dependent but seems improved after 4th week.   - Hemoglobin is now 7.2g/dl. Suppression from infection, abx, and discontinuation of Revlimid.   - Transfuse to maintain Hemoglobin > 7.5g/dl during hospitalization.   - Please transfuse to > 9g/dl on discharge  - Transfuse platelets < 15k  - Restart Revlimid 10mg PO daily once Hypotension resolves and creatinine >1.5g/dl.   - Will follow intermittently. Please call with any questions.

## 2021-07-02 NOTE — DISCHARGE NOTE PROVIDER - NSDCMRMEDTOKEN_GEN_ALL_CORE_FT
allopurinol 100 mg oral tablet: 1 tab(s) orally 2 times a day  Coumadin 4 mg oral tablet: 1 tab(s) orally once a day  doxazosin 4 mg oral tablet: 1 tab(s) orally once a day  olmesartan-hydrochlorothiazide 40 mg-12.5 mg oral tablet: 1 tab(s) orally once a day  sotalol 120 mg oral tablet: 1 tab(s) orally 2 times a day   acetaminophen 325 mg oral tablet: 2 tab(s) orally every 6 hours, As needed, Temp greater or equal to 38C (100.4F), Mild Pain (1 - 3)  allopurinol 100 mg oral tablet: 1 tab(s) orally 2 times a day  Coumadin 4 mg oral tablet: 1 tab(s) orally once a day  doxazosin 4 mg oral tablet: 1 tab(s) orally once a day  olmesartan-hydrochlorothiazide 40 mg-12.5 mg oral tablet: 1 tab(s) orally once a day  sotalol 120 mg oral tablet: 1 tab(s) orally 2 times a day   acetaminophen 325 mg oral tablet: 2 tab(s) orally every 6 hours, As needed, Temp greater or equal to 38C (100.4F), Mild Pain (1 - 3)  allopurinol 100 mg oral tablet: 1 tab(s) orally 2 times a day  Coumadin 4 mg oral tablet: 1 tab(s) orally once a day  dicyclomine 10 mg oral capsule: 1 cap(s) orally 4 times a day (before meals and at bedtime), As needed, abdominal pain/cramping  doxazosin 4 mg oral tablet: 1 tab(s) orally once a day  lactobacillus acidophilus oral capsule: 1 tab(s) orally 2 times a day (before meals)   olmesartan-hydrochlorothiazide 40 mg-12.5 mg oral tablet: 1 tab(s) orally once a day  Revlimid 5 mg oral capsule: 1 cap(s) orally once a day  sotalol 120 mg oral tablet: 1 tab(s) orally 2 times a day   acetaminophen 325 mg oral tablet: 2 tab(s) orally every 6 hours, As needed, Temp greater or equal to 38C (100.4F), Mild Pain (1 - 3)  allopurinol 100 mg oral tablet: 1 tab(s) orally 2 times a day  Coumadin 4 mg oral tablet: 1 tab(s) orally once a day  dicyclomine 10 mg oral capsule: 1 cap(s) orally 4 times a day (before meals and at bedtime), As needed, abdominal pain/cramping  doxazosin 4 mg oral tablet: 1 tab(s) orally once a day  lactobacillus acidophilus oral capsule: 1 tab(s) orally 2 times a day (before meals)   olmesartan-hydrochlorothiazide 40 mg-12.5 mg oral tablet: 1 tab(s) orally once a day  Revlimid 10 mg oral capsule: 1 cap(s) orally every other day  sotalol 120 mg oral tablet: 1 tab(s) orally 2 times a day

## 2021-07-02 NOTE — PROGRESS NOTE ADULT - NSICDXPILOT_GEN_ALL_CORE
[place]

## 2021-07-02 NOTE — PROGRESS NOTE ADULT - SUBJECTIVE AND OBJECTIVE BOX
[facility]   MD VIOLA BAHENA MD RUORU WONG, PA    TEL:  OFFICE: [phone]  DR DAY CELL: [phone]  ZAHRA NESBITT CELL: [phone]  DR. COON CELL: [phone]      FROM 5 PM - 7 AM PLEASE CALL ANSWERING SERVICE: [phone]    RENAL FOLLOW UP NOTE--Date of Service 07-02-21 @ 08:55  --------------------------------------------------------------------------------  HPI:      Pt seen and examined at bedside.       PAST HISTORY  --------------------------------------------------------------------------------  No significant changes to PMH, PSH, FHx, SHx, unless otherwise noted    ALLERGIES & MEDICATIONS  --------------------------------------------------------------------------------  Allergies    adhesives (Rash)  clonidine (Swelling; Rash)  felodipine (Rash)  penicillin (Rash)  sulfa drugs (Rash)    Intolerances      Standing Inpatient Medications  allopurinol 100 milliGRAM(s) Oral two times a day  ciprofloxacin   IVPB 400 milliGRAM(s) IV Intermittent every 12 hours  lenalidomide 5 milliGRAM(s) Oral daily  metroNIDAZOLE  IVPB 500 milliGRAM(s) IV Intermittent every 8 hours    PRN Inpatient Medications  acetaminophen   Tablet .. 650 milliGRAM(s) Oral every 6 hours PRN  dicyclomine 10 milliGRAM(s) Oral four times a day before meals PRN      REVIEW OF SYSTEMS  --------------------------------------------------------------------------------  General: no fever    MSK: no edema     VITALS/PHYSICAL EXAM  --------------------------------------------------------------------------------  T(C): 36.5 (07-02-21 @ 04:18), Max: 36.6 (07-01-21 @ 11:28)  HR: 63 (07-02-21 @ 04:18) (60 - 97)  BP: 130/73 (07-02-21 @ 04:18) (110/68 - 130/73)  RR: 18 (07-02-21 @ 04:18) (18 - 18)  SpO2: 97% (07-02-21 @ 04:18) (97% - 97%)  Wt(kg): --        07-01-21 @ 07:01  -  07-02-21 @ 07:00  --------------------------------------------------------  IN: 1290 mL / OUT: 0 mL / NET: 1290 mL    07-02-21 @ 07:01  -  07-02-21 @ 08:55  --------------------------------------------------------  IN: 100 mL / OUT: 0 mL / NET: 100 mL      Physical Exam:  	Gen: NAD  	HEENT: MMM  	Pulm: CTA B/L  	CV: S1S2  	Abd: Soft, +BS  	Ext: No LE edema B/L                      Neuro: Awake   	Skin: Warm and Dry   	Vascular access: NO HD catheter            no  priyank  LABS/STUDIES  --------------------------------------------------------------------------------              9.1    5.52  >-----------<  33       [07-02-21 @ 07:09]              28.7     137  |  107  |  28  ----------------------------<  85      [07-02-21 @ 07:09]  4.2   |  21  |  1.53        Ca     8.0     [07-02-21 @ 07:09]      PT/INR: PT 28.5 , INR 2.48       [07-02-21 @ 07:09]      Creatinine Trend:  SCr 1.53 [07-02 @ 07:09]  SCr 1.70 [07-01 @ 07:01]  SCr 1.75 [06-30 @ 07:01]  SCr 1.91 [06-29 @ 07:09]  SCr 1.93 [06-28 @ 07:08]    Urinalysis - [06-27-21 @ 09:42]      Color Light Yellow / Appearance Clear / SG 1.013 / pH 5.0      Gluc Negative / Ketone Negative  / Bili Negative / Urobili Negative       Blood Trace / Protein Trace / Leuk Est Negative / Nitrite Negative      RBC 2 / WBC 3 / Hyaline 4 / Gran  / Sq Epi  / Non Sq Epi 1 / Bacteria Negative    Urine Creatinine 88      [06-27-21 @ 16:09]  Urine Protein 17      [06-27-21 @ 16:09]  Urine Sodium 64      [06-27-21 @ 09:42]

## 2021-07-02 NOTE — DISCHARGE NOTE PROVIDER - CARE PROVIDER_API CALL
The patient has been examined and the H&P has been reviewed:    I concur with the findings and no changes have occurred since H&P was written.    Anesthesia/Surgery risks, benefits and alternative options discussed and understood by patient/family.          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      Sal Haile)  Hematology; Internal Medicine; Medical Oncology  [address], [address]  [place] [address]  Phone: [phone]  Fax: [phone]  Established Patient  Follow Up Time:

## 2021-07-02 NOTE — DISCHARGE NOTE PROVIDER - NSDCCPCAREPLAN_GEN_ALL_CORE_FT
PRINCIPAL DISCHARGE DIAGNOSIS  Diagnosis: Diverticulitis  Assessment and Plan of Treatment: Seen by Gastroenterology:   CT with questionable protocolitis/diverticulitis, Completed course of Cipro/ Flagyl started.      SECONDARY DISCHARGE DIAGNOSES  Diagnosis: Generalized weakness  Assessment and Plan of Treatment: Seen by Physical Therapy for increase mobility and strengthening. Home PT recommended.    Diagnosis: Acute kidney injury  Assessment and Plan of Treatment: Nephrology consulted for MIMI likely from dehydration  Improved with intravenous hydration. Encourage oral fluid intake. Creatinine improved(1.5 on 7/3). Can resume Revlimid at 5mg PO daily (prior dose was 10mg, (dose reduced as creatinine is not yet at baseline).  Follow up with Hematology for further dose.    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  Required blood transfusion during hospitalization  no acute bleeding noted.   Received 1 unit on 6/28/21, and second unit on 6/29/21.     PRINCIPAL DISCHARGE DIAGNOSIS  Diagnosis: Diverticulitis  Assessment and Plan of Treatment: Seen by Gastroenterology:   CT with questionable protocolitis/diverticulitis, Completed course of Cipro/ Flagyl started.      SECONDARY DISCHARGE DIAGNOSES  Diagnosis: Generalized weakness  Assessment and Plan of Treatment: Seen by Physical Therapy for increase mobility and strengthening. Home PT recommended.    Diagnosis: Acute kidney injury  Assessment and Plan of Treatment: Nephrology consulted for MIMI likely from dehydration  Improved with intravenous hydration. Encourage oral fluid intake. Creatinine improved(1.5 on 7/3). Can resume Revlimid at 10mg oral daily.  Follow up with Hematology for further dose.    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  Required blood transfusion during hospitalization  no acute bleeding noted.   Received 1 unit on 6/28/21, and second unit on 6/29/21.    Diagnosis: Atrial fibrillation  Assessment and Plan of Treatment: Continue with Coumadin  Follow up with your doctor for INR check  Resume Sotalol - discuss [facility] doctor

## 2021-07-02 NOTE — PROGRESS NOTE ADULT - PROVIDER SPECIALTY LIST ADULT
Gastroenterology
Gastroenterology
Heme/Onc
Internal Medicine
Nephrology
Heme/Onc
Nephrology
Nephrology
Gastroenterology
Internal Medicine
Internal Medicine
Nephrology
Heme/Onc

## 2021-07-02 NOTE — DISCHARGE NOTE NURSING/CASE MANAGEMENT/SOCIAL WORK - PATIENT PORTAL LINK FT
You can access the FollowMyHealth Patient Portal offered by [facility] by registering at the following website: [email or web address]. By joining [facility]’s FollowMyHealth portal, you will also be able to view your health information using other applications (apps) compatible with our system.

## 2021-07-02 NOTE — DISCHARGE NOTE PROVIDER - REASON FOR ADMISSION
----- Message from Joy Kraus DO sent at 8/2/2018  8:49 AM CDT -----  Normal blood sugar, elytes, liver labs  BUN/Cr ratio was high which can be seen when pts are not drinking enough, which can often be seen with relation to fasting-I recall her telling me at her appt she drank abundant water, she should just be sure to continue doing this  Normal thyroid and CBC   weakness

## 2021-07-02 NOTE — PROGRESS NOTE ADULT - ASSESSMENT
85 year old man with gastrointesinal symptoms, possible colitis vs diverticulitis    1. Possible colitis, if ongoing diarrhea would check cdiff, GI PCR  - on cipro/flagyl, can complete total 5 d  - bentyl helping w cramps    2. Unintentional weight loss      3. Possible chronic pancreatitis cannot have an MRI, consider elective EUS    4. MDS  - heme/onc following. follow their ongoing recommendations       [facility]  Gastroenterology and Hepatology  [address] [place], [place]  Office: [phone]  Cell: [phone]

## 2021-07-02 NOTE — DISCHARGE NOTE PROVIDER - HOSPITAL COURSE
86 year old man with history of MDS with complex cytogenetics including 5q- on Revlimid with stabilization admitted with hypotension and fatigue. Treating for diverticulitis /proctocolitis. Improvement on BP with abx. Hemoglobin improved with transfusion    Seen by GI:   Possible colitis,   - on cipro/flagyl, complete total 5 d  - bentyl helping w cramps   Unintentional weight loss  Possible chronic pancreatitis cannot have an MRI    Myelodysplastic syndrome.  Complex cytogenetic including a 5q-. He had been transfusion dependent but seems improved after 4th week.   - Hemoglobin is now 9.0g/dl after trnsfusion  - Transfuse to maintain Hemoglobin > 7.5g/dl during hospitalization.   - Please transfuse to > 9g/dl on discharge  - Transfuse platelets < 15k  - Creating is improving still. Can resume Revlimid at 5mg PO daily (prior dose was 10mg, will dose reduce as creatinine is not yet at baseline).   - Will follow intermittently. Please call with any questions.      Generalized weakness.  Deconditioning from acute infection and hospitalization  - PT evaluation - recommended Home PT.     Nephrology consulted for MIMI likely from dehydration  Scr 1.5-1.6 in 2018  Scr 2.4 on admission   MIMI likely pre-renal sec to decreased PO intake  s/p IVF  Scr improving   UA with high SG and hyaline casts  Avoid further nephrotoxics, NSAIDS RCA  - Encouraged increase PO fluid intake.    Pt medically cleared for discharge to home with home PT 86 year old man with history of MDS with complex cytogenetics including 5q- on Revlimid with stabilization admitted with hypotension and fatigue. Treating for diverticulitis /proctocolitis. Improvement on BP with abx. Hemoglobin improved with transfusion    Seen by GI:   Possible colitis,   - on cipro/flagyl, complete total 5 d  - bentyl helping w cramps   Unintentional weight loss  Possible chronic pancreatitis cannot have an MRI    Myelodysplastic syndrome.  Complex cytogenetic including a 5q-. He had been transfusion dependent but seems improved after 4th week.   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  no melena, no hematochezia. no BRBPR.   received 1 unit on 6/28/21, and 1 unit on 6/29/21.  - Creatinine is improving. Can resume Revlimid at 5mg PO daily (prior dose was 10mg, will dose reduce as creatinine is not yet at baseline).   - Will follow intermittently. Please call with any questions.      Generalized weakness.  Deconditioning from acute infection and hospitalization  - PT evaluation - recommended Home PT.     Nephrology consulted for MIMI likely from dehydration  Scr 1.5-1.6 in 2018  Scr 2.4 on admission   MIMI likely pre-renal sec to decreased PO intake  s/p IVF  Scr improving   UA with high SG and hyaline casts  Avoid further nephrotoxics, NSAIDS RCA  - Encouraged increase PO fluid intake.    Pt medically cleared for discharge to home with home PT 86 year old man with history of MDS with complex cytogenetics including 5q- on Revlimid with stabilization admitted with hypotension and fatigue. Treating for diverticulitis /proctocolitis. Improvement on BP with abx. Hemoglobin improved with transfusion    Seen by GI:   Possible colitis,   - on cipro/flagyl, complete total 5 d  - bentyl helping w cramps   Unintentional weight loss  Possible chronic pancreatitis cannot have an MRI    Myelodysplastic syndrome.  Complex cytogenetic including a 5q-. He had been transfusion dependent but seems improved after 4th week.   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  no melena, no hematochezia. no BRBPR.   received 1 unit on 6/28/21, and 1 unit on 6/29/21.  - Creatinine is improving. Can resume Revlimid at 10mg, every other day.   - Will follow intermittently. Please call with any questions.      Generalized weakness.  Deconditioning from acute infection and hospitalization  - PT evaluation - recommended Home PT.     Nephrology consulted for MIMI likely from dehydration  Scr 1.5-1.6 in 2018  Scr 2.4 on admission   MIMI likely pre-renal sec to decreased PO intake  s/p IVF  Scr improving   UA with high SG and hyaline casts  Avoid further nephrotoxics, NSAIDS RCA  - Encouraged increase PO fluid intake.    Pt medically cleared for discharge to home with home PT

## 2021-08-19 NOTE — H&P ADULT - NSHPPHYSICALEXAM_GEN_ALL_CORE
Vital Signs Last 24 Hrs  T(C): 36.4 (19 Aug 2021 16:43), Max: 36.6 (19 Aug 2021 12:58)  T(F): 97.5 (19 Aug 2021 16:43), Max: 97.9 (19 Aug 2021 12:58)  HR: 67 (19 Aug 2021 16:43) (65 - 98)  BP: 115/59 (19 Aug 2021 16:43) (102/65 - 118/70)  BP(mean): --  RR: 20 (19 Aug 2021 16:43) (20 - 20)  SpO2: 95% (19 Aug 2021 16:43) (95% - 100%)    PHYSICAL EXAM:  GENERAL: NAD, well-developed, comfortable  HEAD:  Atraumatic, Normocephalic  EYES: EOMI, PERRLA, conjunctiva and sclera clear  NECK: Supple, No JVD  CHEST/LUNG: Clear to auscultation bilaterally; No wheeze  HEART: Irregular rate and rhythm; No murmurs, rubs, or gallops  ABDOMEN: Soft, Nontender, Nondistended; Bowel sounds present  Neuro: AAOx3, no focal weakness, 5/5 b/l extremity strength  EXTREMITIES:  2+ Peripheral Pulses, No clubbing, cyanosis, 1+ b/l LE edema  SKIN: No rashes or lesions

## 2021-08-19 NOTE — ED PROVIDER NOTE - OBJECTIVE STATEMENT
84 yo M hx of HTN, a fib s/p ablation w/ AICD now on coumadin, prostate CA, recent MDS started on Revlimid presenting to ED as directed by his oncologist for low H&H and transfusion of plt/prbc. Pt reports extreme fatigue and anemia 2/2 to MDS and regular transfusions ~bi-weekly. Pt has been provided w/ lasix for transfusions in the past due to edema. Pt denies hematochezia and melena. Denies abd pain. Reports bilat LE edema but states this 2/2 to MDS, and has had recent duplex US which were negative.

## 2021-08-19 NOTE — H&P ADULT - NSHPLABSRESULTS_GEN_ALL_CORE
LABS:                        5.6    5.41  )-----------( x        ( 19 Aug 2021 17:35 )             17.5     08-19    143  |  112<H>  |  42<H>  ----------------------------<  107<H>  3.4<L>   |  19<L>  |  2.03<H>    Ca    8.8      19 Aug 2021 17:35    TPro  5.8<L>  /  Alb  3.5  /  TBili  2.5<H>  /  DBili  x   /  AST  8<L>  /  ALT  11  /  AlkPhos  100  08-19    PT/INR - ( 19 Aug 2021 17:35 )   PT: 16.2 sec;   INR: 1.37 ratio         PTT - ( 19 Aug 2021 17:35 )  PTT:32.8 sec  CAPILLARY BLOOD GLUCOSE                RADIOLOGY & ADDITIONAL TESTS:    Imaging Personally Reviewed:  [x] YES  [ ] NO    Consultant(s) Notes Reviewed:  [x] YES  [ ] NO    Care Discussed with Consultants/Other Providers [x] YES  [ ] NO

## 2021-08-19 NOTE — ED PROVIDER NOTE - NSICDXFAMILYHX_GEN_ALL_CORE_FT
FAMILY HISTORY:  Father  Still living? No  Family history of Alzheimer's disease, Age at diagnosis: Age Unknown  Family history of essential hypertension, Age at diagnosis: Age Unknown    Mother  Still living? No  Family history of breast cancer, Age at diagnosis: Age Unknown    Sibling  Still living? Yes, Estimated age: 81-90  Family history of prostate cancer, Age at diagnosis: Age Unknown

## 2021-08-19 NOTE — ED ADULT NURSE NOTE - OBJECTIVE STATEMENT
85 yo male with PMH pacemaker/ defib, MDS, anemia (Q2 week blood transfusions) presents to the ED from home c/o weakness x weeks, worsening the last 2 days. patient states he is supposed to get platelets and RBCs tomorrow- unsure of counts as per patient. c/o greater fatigue and SOB on exertion. patient is AAOx3. lung sounds CTA bilaterally. cap refill <3sec. +2 radial pulses noted. abdomen is soft, nontender, nondistended. skin intact. +2 pitting edema to lower extremities. patient denies CP, fevers, chills, cough, N/V/D, abdominal pain, back pain, numbness or tingling, bloody stools, changes in appetites, weight loss. VSS. MD at the bedside. will continue to monitor.

## 2021-08-19 NOTE — ED PROVIDER NOTE - ATTENDING CONTRIBUTION TO CARE
Private Physician Mic Villafana/Leobardo  86y male pmh Afib off ac, DJD, Gout, HTN, Osteoarthritis, Prostate ca, Pt follow by [facility] and was planning to have transfusion tomorrow [facility]. Pt was seen by pmd and had HCT with Low H/H and feeling fatigued. Referred to [facility] for transfusion. No loc, nvdc, cp. +christopher, PE WDNW male awake alert normocephalic atraumatic neck supple chest clear anterior & posterior cv no rubs, gallops or murmurs abd soft +bs no mass guarding neruo gcs 15 speech fluent power 5/5 all extr pain light touch intact  msk serafin pedal edema.  Pedro Kaur MD, Facep Private Physician Sal Haile : [phone]  [facility]  86y male pmh Afib off ac, DJD, Gout, HTN, Osteoarthritis, Prostate ca, Pt follow by [facility] and was planning to have transfusion tomorrow [facility]. Pt was seen by pmd and had HCT with Low H/H and feeling fatigued. Referred to [facility] for transfusion. No loc, nvdc, cp. +christopher, PE WDNW male awake alert normocephalic atraumatic neck supple chest clear anterior & posterior cv no rubs, gallops or murmurs abd soft +bs no mass guarding neruo gcs 15 speech fluent power 5/5 all extr pain light touch intact  msk serafin pedal edema.  Pedro Kaur MD, Facep

## 2021-08-19 NOTE — ED ADULT NURSE REASSESSMENT NOTE - NS ED NURSE REASSESS COMMENT FT1
Received report from [facility]. Pt is awake and alert, resting comfortably in stretcher, speaking in full coherent sentences. Pt denies CP, SOB, fevers, chills, N/V/D, HA, vision changes. Call bell within reach, comfort & safety provided.

## 2021-08-19 NOTE — ED PROVIDER NOTE - NS ED ROS FT
Gen: Denies fever. +fatigue  HEENT: Denies headache. Denies congestion.  CV: Denies chest pain. Denies lightheadedness.  Skin: Denies rash.   Resp: Denies SOB. Denies cough.  GI: Denies abd pain. Denies nausea. Denies vomiting. Denies diarrhea. Denies melena. Denies hematochezia.  Msk: +extremity swelling. Denies extremity pain.  : Denies dysuria. Denies hematuria.  Neuro: Denies LOC. Denies dizziness. Denies new numbness/tingling. Denies new focal weakness.  Psych: Denies SI

## 2021-08-19 NOTE — H&P ADULT - ASSESSMENT
86 yo M hx of HTN, a fib s/p ablation w/ AICD on coumadin but on hold due to severe thrombocytopenia, prostate CA, MDS started on Revlimid presenting to ED as directed by his oncologist for low H&H and transfusion of plt/prbc. Pt reports extreme fatigue and anemia 2/2 to MDS and regular transfusions ~bi-weekly. Pt has been provided w/ Lasix for transfusions in the past due to edema. Pt denies hematochezia and melena. Denies abd pain. Reports bilat LE edema but states this 2/2 to MDS, and has had recent duplex US which were negative.    MDS on Revlimid  (severe anemia, thrombocytopenia)   chronic CKD stage III  recently treated protocolitis/diverticulitis  Hypertension  Afib s/p ablation s/p AICD  Prostate Ca in remission    Plan:  Pt on Revlimid past few months for MDS (neutropenia and thrombocytopenia baseline ~ 20-30s)  with extreme fatigue/weak. hgb 5.6, plts 6 (outpt)  Anemia/thrombocytopenia due to MDS  d/w pt's onc Dr. Sal Haile. Plan to transfuse PRBC with hgb goal of 9 piror to his discharge.  no melena, no hematochezia. no BRBPR.   Plan for 2 unit PRBC, 1 unit plts.  (with IV Lasix 20 mg in between)    chronic CKD stage III: stable.    monitor urine outpt.   monitor Cr.    BPH on Doxazocin      HTN: c/w Sotolol  he no longer takes Omesartan, HCTZ.     low salt, regular diet  DVT ppx    d/w pt and significant other at bedside with permission.  d/w oncology Dr. Haile.   Physical therapy. Out of bed to chair with assistance.     - Dr. ARELY Evans ([email or web address])  - [phone]  86 yo M hx of HTN, a fib s/p ablation w/ AICD on coumadin but on hold due to severe thrombocytopenia, prostate CA, MDS started on Revlimid presenting to ED as directed by his oncologist for low H&H and transfusion of plt/prbc. Pt reports extreme fatigue and anemia 2/2 to MDS and regular transfusions ~bi-weekly. Pt has been provided w/ Lasix for transfusions in the past due to edema. Pt denies hematochezia and melena. Denies abd pain. Reports bilat LE edema but states this 2/2 to MDS, and has had recent duplex US which were negative.    MDS on Revlimid  (severe anemia, thrombocytopenia)   chronic CKD stage III  recently treated protocolitis/diverticulitis  Hypertension  Afib s/p ablation s/p AICD  Prostate Ca in remission    Plan:  MDS:   Pt on Revlimid past few months for MDS (neutropenia and thrombocytopenia baseline ~ 20-30s)  with extreme fatigue/weak. hgb 5.6, plts 6 (outpt)  Anemia/thrombocytopenia due to MDS  d/w pt's onc Dr. Sal Haile. Plan to transfuse PRBC with hgb goal of 9 piror to his discharge.  no melena, no hematochezia. no BRBPR.   Plan for 2 unit PRBC, 1 unit plts.  (with IV Lasix 20 mg in between)  check TTE given edema LE (can be done as outpt).     chronic CKD stage III: stable.    monitor urine outpt.   monitor Cr.    BPH: c/w Doxazocin      HTN: c/w Sotolol  he no longer takes Omesartan, HCTZ due to soft BP.    low salt, regular diet  DVT ppx    d/w pt and significant other at bedside with permission.  d/w oncology Dr. Haile.   Physical therapy. Out of bed to chair with assistance.     - [facility] ARELY [facility]  - [phone]  84 yo M hx of HTN, a fib s/p ablation w/ AICD on coumadin but on hold due to severe thrombocytopenia, prostate CA, MDS started on Revlimid presenting to ED as directed by his oncologist for low H&H and transfusion of plt/prbc. Pt reports extreme fatigue and anemia 2/2 to MDS and regular transfusions ~bi-weekly. Pt has been provided w/ Lasix for transfusions in the past due to edema. Pt denies hematochezia and melena. Denies abd pain. Reports bilat LE edema but states this 2/2 to MDS, and has had recent duplex US which were negative.    MDS on Revlimid  (severe anemia, thrombocytopenia)   chronic CKD stage III  recently treated protocolitis/diverticulitis  Hypertension  Afib s/p ablation s/p AICD  Prostate Ca in remission    Plan:  MDS:   Pt on Revlimid past few months for MDS (neutropenia and thrombocytopenia baseline ~ 20-30s)  with extreme fatigue/weak. hgb 5.6, plts 6 (outpt)  Anemia/thrombocytopenia due to MDS  d/w pt's onc Dr. Sal Haile. Plan to transfuse PRBC with hgb goal of 9 piror to his discharge.  no melena, no hematochezia. no BRBPR. (report brown stool)  Plan for 2 unit PRBC, 1 unit plts.  (with IV Lasix 20 mg in between)  no signs of infection. okay to continue Revlimid per onc.   check TTE given edema LE (can be done as outpt).   check LE dopplers.     chronic CKD stage III: stable.    monitor urine outpt.   monitor Cr.    BPH: c/w Doxazocin      HTN: c/w Sotolol  he no longer takes Omesartan, HCTZ due to soft BP.    low salt, regular diet  DVT ppx    d/w pt and significant other at bedside with permission.  d/w oncology Dr. Haile.   Physical therapy. Out of bed to chair with assistance.     - Dr. ARELY Evans ([employer])  - [phone]

## 2021-08-19 NOTE — ED PROVIDER NOTE - CLINICAL SUMMARY MEDICAL DECISION MAKING FREE TEXT BOX
86 yo M hx of HTN, a fib s/p ablation w/ AICD now on coumadin, prostate CA, recent MDS started on Revlimid presenting to ED as directed by his oncologist for low H&H and transfusion of plt/prbc. No GIB. Pt denies fevers. No known hx of CHF. Exam as above. Spoke w/ pt's oncologist, they rec admission for anemia and transfusions. Labs, prbc, will admit.

## 2021-08-19 NOTE — H&P ADULT - HISTORY OF PRESENT ILLNESS
84 yo M hx of HTN, a fib s/p ablation w/ AICD on coumadin but on hold due to severe thrombocytopenia, prostate CA, MDS started on Revlimid presenting to ED as directed by his oncologist for low H&H and transfusion of plt/prbc. Pt reports extreme fatigue and anemia 2/2 to MDS and regular transfusions ~bi-weekly. Pt has been provided w/ Lasix for transfusions in the past due to edema. Pt denies hematochezia and melena. Denies abd pain. Reports bilat LE edema but states this 2/2 to MDS, and has had recent duplex US which were negative.

## 2021-08-19 NOTE — ED PROVIDER NOTE - PROGRESS NOTE DETAILS
Discussed with Dr Mic Dempsey Admit [facility] for transfusoin/blood ptls  Pedro Kaur MD, Facep

## 2021-08-19 NOTE — H&P ADULT - NSHPREVIEWOFSYSTEMS_GEN_ALL_CORE
General: +weakness, no fever/chills, no weight loss/gain, +severe fatigue   Skin/Breast: no rash, no jaundice  Ophthalmologic: no vision changes, no dry eyes   Respiratory and Thorax: no cough, no wheezing, no hemoptysis, no dyspnea  Cardiovascular: no chest pain, no shortness of breath, no orthopnea  Gastrointestinal: no n/v/d, no abdominal pain, no dysphagia   Genitourinary: no dysuria, no frequency, no nocturia, no hematuria  Musculoskeletal: no trauma, no sprain/strain, no myalgias, no arthralgias, no fracture  Neurological: no HA, no dizziness, no weakness, no numbness  Psychiatric: no depression, no SI/HI  Hematology/Lymphatics: no easy bruising  Endocrine: no heat or cold intolerance. no weight gain or loss  Allergic/Immunologic: no allergy or recent reaction

## 2021-08-19 NOTE — ED PROVIDER NOTE - NSICDXPASTSURGICALHX_GEN_ALL_CORE_FT
PAST SURGICAL HISTORY:  Afib cardiac ablation 1998; several pacemaker/AICD insertion and replacements    S/P cataract extraction bilateral    S/P cholecystectomy 12/17    S/P hernia repair 1996    S/P tonsillectomy     S/P total hip arthroplasty 2006, right

## 2021-08-19 NOTE — ED ADULT NURSE NOTE - NSIMPLEMENTINTERV_GEN_ALL_ED
Implemented All Fall Risk Interventions:  [place] to call system. Call bell, personal items and telephone within reach. Instruct patient to call for assistance. Room bathroom lighting operational. Non-slip footwear when patient is off stretcher. Physically safe environment: no spills, clutter or unnecessary equipment. Stretcher in lowest position, wheels locked, appropriate side rails in place. Provide visual cue, wrist band, yellow gown, etc. Monitor gait and stability. Monitor for mental status changes and reorient to person, place, and time. Review medications for side effects contributing to fall risk. Reinforce activity limits and safety measures with patient and family.

## 2021-08-20 NOTE — PROGRESS NOTE ADULT - ASSESSMENT
84 yo M hx of HTN, a fib s/p ablation w/ AICD on coumadin but on hold due to severe thrombocytopenia, prostate CA, MDS started on Revlimid presenting to ED as directed by his oncologist for low H&H and transfusion of plt/prbc. Pt reports extreme fatigue and anemia 2/2 to MDS and regular transfusions ~bi-weekly. Pt has been provided w/ Lasix for transfusions in the past due to edema. Pt denies hematochezia and melena. Denies abd pain. Reports bilat LE edema but states this 2/2 to MDS, and has had recent duplex US which were negative.    MDS on Revlimid  (severe anemia, thrombocytopenia)   chronic CKD stage III  recently treated protocolitis/diverticulitis  Hypertension  Afib s/p ablation s/p AICD  Prostate Ca in remission    Plan:  MDS:   Pt on Revlimid past few months for MDS (neutropenia and thrombocytopenia baseline ~ 20-30s)  with extreme fatigue/weak. hgb 5.6, plts 6 (outpt)  Anemia/thrombocytopenia due to MDS  d/w pt's onc Dr. Sal Haile. Plan to transfuse PRBC with hgb goal of 9 piror to his discharge.  no melena, no hematochezia. no BRBPR. (report brown stool)  Plan for 2 unit PRBC, 1 unit plts.  (with IV Lasix 20 mg in between)  no signs of infection. okay to continue Revlimid per onc.   8/20: Plan for 1 unit PRBC  Echo results above  LE dopplers pending     Chronic CKD stage III: stable.    monitor urine outpt.   monitor Cr.    BPH:   c/w Doxazocin      HTN:   c/w Sotalol  he no longer takes Omesartan, HCTZ due to soft BP.    low salt, regular diet  DVT ppx    d/w pt and significant other at bedside with permission.  d/w oncology Dr. Haile.   Physical therapy. Out of bed to chair with assistance.    86 yo M hx of HTN, a fib s/p ablation w/ AICD on coumadin but on hold due to severe thrombocytopenia, prostate CA, MDS started on Revlimid presenting to ED as directed by his oncologist for low H&H and transfusion of plt/prbc. Pt reports extreme fatigue and anemia 2/2 to MDS and regular transfusions ~bi-weekly. Pt has been provided w/ Lasix for transfusions in the past due to edema. Pt denies hematochezia and melena. Denies abd pain. Reports bilat LE edema but states this 2/2 to MDS, and has had recent duplex US which were negative.    MDS on Revlimid  (severe anemia, thrombocytopenia)   chronic CKD stage III  recently treated protocolitis/diverticulitis  Hypertension  Afib s/p ablation s/p AICD  Prostate Ca in remission    Plan:  MDS:   Pt on Revlimid past few months for MDS (neutropenia and thrombocytopenia baseline ~ 20-30s)  with extreme fatigue/weak. hgb 5.6, plts 6 (outpt)  Anemia/thrombocytopenia due to MDS  d/w pt's onc Dr. Sal Haile. Plan to transfuse PRBC with hgb goal of 9 piror to his discharge.  no melena, no hematochezia. no BRBPR. (report brown stool)  Plan for 2 unit PRBC, 1 unit plts.  (with IV Lasix 20 mg in between)  no signs of infection. okay to continue Revlimid per onc.   8/20: Plan for blood transfusion today  Echo results above  LE dopplers pending     Chronic CKD stage III: stable.    monitor urine outpt.   monitor Cr.    BPH:   c/w Doxazocin      HTN:   c/w Sotalol  he no longer takes Omesartan, HCTZ due to soft BP.    low salt, regular diet  DVT ppx    d/w pt and significant other at bedside with permission.  d/w oncology Dr. Haile.   Physical therapy. Out of bed to chair with assistance.

## 2021-08-20 NOTE — PHYSICAL THERAPY INITIAL EVALUATION ADULT - PERTINENT HX OF CURRENT PROBLEM, REHAB EVAL
Pt is an 84 y/o M with PMH of HTN, a-fib s/p ablation w/ AICD on coumadin on hold due to severe thrombocytopenia, prostate CA who presented to ED as directed by his oncologist for low H&H and transfusion of PLT/PRBC. Pt reported extreme fatigue and anemia 2/2 to MDS and regular transfusions ~bi-weekly. Pt has been provided w/ Lasix for transfusions in the past due to edema.

## 2021-08-20 NOTE — PHYSICAL THERAPY INITIAL EVALUATION ADULT - ADDITIONAL COMMENTS
Per pt and significant other at bedside, pt lives alone in house with 1 step to enter and 6+6 steps inside house to living room/kitchen and then to bedroom/bathroom. +tub, +shower chair. Reports pt being independent with all functional mobility prior to admission without AD until 1 month ago when pt began feeling weaker/fatigued and required SAC. +drives, +reading/distance glasses, R handed. Significant other reports she can assist if needed, however she would need to take pt to her house (she lives in house with 4 steps to enter +1 flight to bedroom/bathroom, +walk in shower).

## 2021-08-20 NOTE — PROGRESS NOTE ADULT - ATTENDING COMMENTS
Pt seen and examined with the NP. Agree with the assessment and plan.  Vitals, labs and radiology results personally reviewed.  Above note edited as appropriate.  Discussed with the pt and the significant other Massiel at bedside. Answered all questions.    Still requiring more PRBC.   d/c Sotolol to make room for BP.  start Lasix 20 mg BID for edema and bibasilar crackles in lung.   TTE reviewed. noted small pericardial effusion. EF 45%.    Dr. Evans ([facility])  [phone]

## 2021-08-21 NOTE — CONSULT NOTE ADULT - ASSESSMENT
Poor prognosis myelodysplasia with complex cytogenetics thus far refractory to revlimid.  Would transfuse PRBC to Hgb of 9 grams with lasix as necessary.  Would transfuse platelets for platelet count less than or equal to 10,000.  I have had some success in this setting with off label use of promacta 50 mg daily to raise platelet count.  I have ordered that medication.  We will continue to follow.

## 2021-08-21 NOTE — CONSULT NOTE ADULT - SUBJECTIVE AND OBJECTIVE BOX
This is an 85 yo man followed in our office by Dr. Sal Haile for myelodysplasia with complex cytogenetics including 5q-.  Because of that last mutation, the patient has been treated with revlimid, but unfortunately not with good effect thus far.  He was admitted with extreme fatigue, a Hgb of 6.7, and severe thrombocytopenia.  He has thus far received one unit of PRBC and is presently receiving a second unit.  He also received single donor platelets without any durable benefit. The coumadin which he takes for chronic atrial fibrillation is on hold.    PMH: Chronic A. Fib. s/p failed ablation in 1998           prostate CA           CKD           diverticulitis       P.E.    Chronically ill appearing  Without palpable lymphadenopathy  Lungs clear  Heart irregularly irregular  Abdomen soft, without splenomegaly or masses  Extremities with bilateral pretibial edema    WBC 3.65  Hgb 7.1  Platelets 15K    creatinine 1.84

## 2021-08-21 NOTE — PROGRESS NOTE ADULT - ASSESSMENT
84 yo M hx of HTN, a fib s/p ablation w/ AICD on coumadin but on hold due to severe thrombocytopenia, prostate CA, MDS started on Revlimid presenting to ED as directed by his oncologist for low H&H and transfusion of plt/prbc. Pt reports extreme fatigue and anemia 2/2 to MDS and regular transfusions ~bi-weekly. Pt has been provided w/ Lasix for transfusions in the past due to edema. Pt denies hematochezia and melena. Denies abd pain. Reports bilat LE edema but states this 2/2 to MDS, and has had recent duplex US which were negative.    MDS on Revlimid  (severe anemia, thrombocytopenia)   chronic CKD stage III  recently treated protocolitis/diverticulitis  Hypertension  Afib s/p ablation s/p AICD  Prostate Ca in remission    Plan:  MDS:   Pt on Revlimid past few months for MDS (neutropenia and thrombocytopenia baseline ~ 20-30s)  with extreme fatigue/weak. hgb 5.6, plts 6 (outpt)  Anemia/thrombocytopenia due to MDS  d/w pt's onc Dr. Sal Haile. Plan to transfuse PRBC with hgb goal of 9 piror to his discharge.  no melena, no hematochezia. no BRBPR. (report brown stool)  s/p 2 unit PRBC, 1 unit plts (with IV Lasix 20 mg in between)  no signs of infection. okay to continue Revlimid per onc. The significant other Fritz will bring in the bottle.   8/20: 1 unit PRBC  d/c Sotolol to make room for BP. Start Lasix 20 mg BID for LE edema and bibasilar crackles in lung.  TTE reviewed. noted small pericardial effusion. EF 45%. Echo results above  LE dopplers pending     Chronic CKD stage III: stable.    monitor urine outpt.   monitor Cr.    BPH:   c/w Doxazocin      HTN:   c/w Sotalol reduced dose.   he no longer takes Omesartan, HCTZ due to soft BP.    low salt, regular diet  DVT ppx    Physical therapy. Out of bed to chair with assistance.

## 2021-08-22 NOTE — PROGRESS NOTE ADULT - ASSESSMENT
84 yo M hx of HTN, a fib s/p ablation w/ AICD on coumadin but on hold due to severe thrombocytopenia, prostate CA, MDS started on Revlimid presenting to ED as directed by his oncologist for low H&H and transfusion of plt/prbc. Pt reports extreme fatigue and anemia 2/2 to MDS and regular transfusions ~bi-weekly. Pt has been provided w/ Lasix for transfusions in the past due to edema. Pt denies hematochezia and melena. Denies abd pain. Reports bilat LE edema but states this 2/2 to MDS, and has had recent duplex US which were negative.    MDS on Revlimid  (severe anemia, thrombocytopenia)   chronic CKD stage III  recently treated protocolitis/diverticulitis  Hypertension  Afib s/p ablation s/p AICD  Prostate Ca in remission    Plan:  MDS:   Pt on Revlimid past few months for MDS (neutropenia and thrombocytopenia baseline ~ 20-30s)  with extreme fatigue/weak. hgb 5.6, plts 6 (outpt)  Anemia/thrombocytopenia due to MDS  d/w pt's onc Dr. Sal Haile. Plan to transfuse PRBC with hgb goal of 9 piror to his discharge.  no melena, no hematochezia. no BRBPR. (report brown stool)  s/p 2 unit PRBC, 1 unit plts (with IV Lasix 20 mg in between)  8/20: 1 unit PRBC, 8/21: 1 unit PBRC, 8/22 1 unit PRBC planned.   d/c Sotolol to make room for BP. Start Lasix 20 mg BID for LE edema and bibasilar crackles in lung.  TTE reviewed. noted small pericardial effusion. EF 45%. Echo results above  LE dopplers pending   (per onc Dr. Zimmerman, kirsten hold Revlimid for now. Start promacta per onc).  transfuse plts if < 10    Chronic CKD stage III: stable.    monitor urine outpt.   monitor Cr.    BPH:   c/w Doxazocin      HTN:   c/w Sotalol reduced dose.   he no longer takes Omesartan, HCTZ due to soft BP.    low salt, regular diet  DVT ppx    Physical therapy. Out of bed to chair with assistance.

## 2021-08-23 NOTE — PROGRESS NOTE ADULT - ASSESSMENT
84 yo M hx of HTN, a fib s/p ablation w/ AICD on coumadin but on hold due to severe thrombocytopenia, prostate CA, MDS started on Revlimid presenting to ED as directed by his oncologist for low H&H and transfusion of plt/prbc. Pt reports extreme fatigue and anemia 2/2 to MDS and regular transfusions ~bi-weekly. Pt has been provided w/ Lasix for transfusions in the past due to edema. Pt denies hematochezia and melena. Denies abd pain. Reports bilat LE edema but states this 2/2 to MDS, and has had recent duplex US which were negative.    MDS on Revlimid  (severe anemia, thrombocytopenia)   chronic CKD stage III  recently treated protocolitis/diverticulitis  Hypertension  Afib s/p ablation s/p AICD  Prostate Ca in remission    Plan:  MDS:   Pt on Revlimid past few months for MDS (neutropenia and thrombocytopenia baseline ~ 20-30s)  with extreme fatigue/weak. hgb 5.6, plts 6 (outpt)  Anemia/thrombocytopenia due to MDS  d/w pt's onc Dr. Sal Haile. Plan to transfuse PRBC with hgb goal of 9 piror to his discharge.  no melena, no hematochezia. no BRBPR. (report brown stool)  s/p 2 unit PRBC, 1 unit plts (with IV Lasix 20 mg in between)  8/20: 1 unit PRBC, 8/21: 1 unit PBRC, 8/22 1 unit PRBC planned. 8/23 1 unit PRBC & 1 unit plts  d/c Sotalol to make room for BP.   Start Lasix 20 mg BID for LE edema and bibasilar crackles in lung.  TTE reviewed. noted small pericardial effusion. EF 45%. Echo results above  LE dopplers negative for DVT  (per onc Dr. Zimmerman, kirsten hold Revlimid for now. Start Promacta per onc).  transfuse plts if < 10    Chronic CKD stage III: stable.    monitor urine outpt.   monitor Cr.    BPH:   c/w Doxazocin      HTN:   c/w Sotalol reduced dose.   he no longer takes Omesartan, HCTZ due to soft BP.    low salt, regular diet  DVT ppx    Physical therapy. Out of bed to chair with assistance.

## 2021-08-23 NOTE — PROGRESS NOTE ADULT - ATTENDING COMMENTS
Pt seen and examined with the NP. Agree with the assessment and plan.  Vitals, labs and radiology results personally reviewed.  Above note edited as appropriate.    MDS  1unit of PRBC and 1 unit Plt today.  Monitor clinical improvement on Promacta.   Discussed with the pt and the significant other Fritz at bedside.  Answered all questions.  d/w NP.     Dr. Evans ([facility])  [phone]

## 2021-08-24 NOTE — PROGRESS NOTE ADULT - ASSESSMENT
86 yo M hx of HTN, a fib s/p ablation w/ AICD on coumadin but on hold due to severe thrombocytopenia, prostate CA, MDS started on Revlimid presenting to ED as directed by his oncologist for low H&H and transfusion of plt/prbc. Pt reports extreme fatigue and anemia 2/2 to MDS and regular transfusions ~bi-weekly. Pt has been provided w/ Lasix for transfusions in the past due to edema. Pt denies hematochezia and melena. Denies abd pain. Reports bilat LE edema but states this 2/2 to MDS, and has had recent duplex US which were negative.    MDS on Revlimid  (severe anemia, thrombocytopenia)   chronic CKD stage III  recently treated protocolitis/diverticulitis  Hypertension  Afib s/p ablation s/p AICD  Prostate Ca in remission    Plan:  MDS:   Pt on Revlimid past few months for MDS (neutropenia and thrombocytopenia baseline ~ 20-30s)  with extreme fatigue/weak. hgb 5.6, plts 6 (outpt)  Anemia/thrombocytopenia due to MDS  d/w pt's onc Dr. Sal Haile. Plan to transfuse PRBC with hgb goal of 9 piror to his discharge.  no melena, no hematochezia. no BRBPR. (report brown stool)  8/19: s/p 2 unit PRBC, 1 unit plts (with IV Lasix 20 mg in between)  8/20: 1 unit PRBC  8/21: 1 unit PBRC  8/22: 1 unit PRBC   8/23: 1 unit PRBC & 1 unit plts  8/24: hgb 9, monitor for now. Plts > 10 so no need (transfuse plts if < 10)  d/c Sotalol to make room for BP.   on Lasix 20 mg BID for LE edema and bibasilar crackles in lung. improved. lower dose to Lasix 20 mg qdaily (if Euvolemic, will likely discontinue)   (TTE reviewed. noted small pericardial effusion. EF 45%). LE dopplers negative for DVT  (per onc Dr. Zimmerman, will hold Revlimid for now. Start Promacta per onc, off label use).  If his hgb relatively stable without PRBC, then dc planning.  Physical therapy. Out of bed to chair with assistance.     Chronic CKD stage III: stable at baseline.   monitor urine outpt.   monitor Cr.    BPH:   c/w Doxazocin      HTN:   borderline BP, Sotalol discontinued   he no longer takes Omesartan, HCTZ due to soft BP at home.     low salt, regular diet  DVT ppx

## 2021-08-24 NOTE — PROGRESS NOTE ADULT - NSPROGADDITIONALINFOA_GEN_ALL_CORE
d/w pt and NP Ana Luisa.     - Dr. ARELY Evans ([facility])  - [phone]
Jim Byrd will be covering for me starting 8/25/21. He can be reached at  if needed.     d/w pt and the significant other Fritz at bedside.    - Dr. ARELY Evans ([facility])  - [phone]
d/w pt and NP Hilton.   d/w Oncology Dr. Zimmerman.     - Dr. ARELY Evans ([facility])  - [phone]

## 2021-08-25 NOTE — PROGRESS NOTE ADULT - SUBJECTIVE AND OBJECTIVE BOX
He denies any overt bleeding overnight  No acute SOB or CP    Vital Signs Last 24 Hrs  T(C): 36.6 (25 Aug 2021 08:05), Max: 36.8 (25 Aug 2021 00:41)  T(F): 97.9 (25 Aug 2021 08:05), Max: 98.3 (25 Aug 2021 00:41)  HR: 60 (25 Aug 2021 08:05) (59 - 69)  BP: 148/70 (25 Aug 2021 08:05) (113/63 - 152/74)  BP(mean): --  RR: 18 (25 Aug 2021 08:05) (18 - 18)  SpO2: 97% (25 Aug 2021 08:05) (95% - 97%)    I&O's Summary    08-24-21 @ 07:01  -  08-25-21 @ 07:00  --------------------------------------------------------  IN: 580 mL / OUT: 200 mL / NET: 380 mL        PHYSICAL EXAM:  GENERAL: NAD, well-developed, comfortable  HEAD:  Atraumatic, Normocephalic  EYES: EOMI, PERRLA, conjunctiva and sclera clear  NECK: Supple, No JVD  CHEST/LUNG: mild decrease breath sounds bilaterally; No wheeze   HEART: Regular rate and rhythm; No murmurs, rubs, or gallops  ABDOMEN: Soft, Nontender, Nondistended; Bowel sounds present  NEURO: AAOx3, no focal weakness, 5/5 b/l extremity strength, b/l knee no arthritis, no effusion   EXTREMITIES:  2+ Peripheral Pulses, No clubbing, cyanosis, 1+ b/l edema  SKIN: No rashes or lesions    LABS:                        8.7    2.78  )-----------( 11       ( 25 Aug 2021 07:30 )             27.1     08-25    144  |  112<H>  |  33<H>  ----------------------------<  86  3.6   |  24  |  1.43<H>    Ca    8.3<L>      25 Aug 2021 07:19    TPro  5.4<L>  /  Alb  3.1<L>  /  TBili  4.4<H>  /  DBili  x   /  AST  10  /  ALT  12  /  AlkPhos  109  08-24      CAPILLARY BLOOD GLUCOSE                RADIOLOGY & ADDITIONAL TESTS:    Imaging Personally Reviewed:  [x] YES  [ ] NO    Will obtain old records:  [ ] YES  [x] NO      
SUBJECTIVE / OVERNIGHT EVENTS:  Pt seen and examined at bedside.   No overnight event.  Feeling better though remain fatigue.  no cp, no sob, no n/v/d.   the significant other at bedside.  hgb 9  no melena, no hematochezia. no BRBPR.              --------------------------------------------------------------------------------------------  LABS:                        9.0    3.05  )-----------( 15       ( 24 Aug 2021 07:00 )             27.9     08-24    145  |  111<H>  |  36<H>  ----------------------------<  88  3.6   |  22  |  1.56<H>    Ca    8.4      24 Aug 2021 06:59    TPro  5.4<L>  /  Alb  3.1<L>  /  TBili  4.4<H>  /  DBili  x   /  AST  10  /  ALT  12  /  AlkPhos  109  08-24      CAPILLARY BLOOD GLUCOSE                RADIOLOGY & ADDITIONAL TESTS:    Imaging Personally Reviewed:  [x] YES  [ ] NO    Consultant(s) Notes Reviewed:  [x] YES  [ ] NO    MEDICATIONS  (STANDING):  allopurinol 200 milliGRAM(s) Oral daily  doxazosin 4 milliGRAM(s) Oral at bedtime  eltrombopag 50 milliGRAM(s) Oral daily  furosemide    Tablet 20 milliGRAM(s) Oral daily    MEDICATIONS  (PRN):  acetaminophen   Tablet .. 650 milliGRAM(s) Oral every 6 hours PRN Temp greater or equal to 38.5C (101.3F), Mild Pain (1 - 3)  aluminum hydroxide/magnesium hydroxide/simethicone Suspension 30 milliLiter(s) Oral every 4 hours PRN Dyspepsia  melatonin 3 milliGRAM(s) Oral at bedtime PRN Insomnia  ondansetron Injectable 4 milliGRAM(s) IV Push every 8 hours PRN Nausea and/or Vomiting      Care Discussed with Consultants/Other Providers [x] YES  [ ] NO    Vital Signs Last 24 Hrs  T(C): 36.7 (24 Aug 2021 16:13), Max: 36.7 (24 Aug 2021 00:19)  T(F): 98 (24 Aug 2021 16:13), Max: 98.1 (24 Aug 2021 00:19)  HR: 59 (24 Aug 2021 16:13) (59 - 85)  BP: 113/63 (24 Aug 2021 16:13) (113/63 - 150/85)  BP(mean): --  RR: 18 (24 Aug 2021 16:13) (18 - 18)  SpO2: 95% (24 Aug 2021 16:13) (93% - 97%)  I&O's Summary    23 Aug 2021 07:01  -  24 Aug 2021 07:00  --------------------------------------------------------  IN: 530 mL / OUT: 0 mL / NET: 530 mL    24 Aug 2021 07:01  -  24 Aug 2021 17:23  --------------------------------------------------------  IN: 340 mL / OUT: 0 mL / NET: 340 mL      PHYSICAL EXAM:  GENERAL: NAD, well-developed, comfortable  HEAD:  Atraumatic, Normocephalic  EYES: EOMI, PERRLA, conjunctiva and sclera clear  NECK: Supple, No JVD  CHEST/LUNG: mild decrease breath sounds bilaterally; No wheeze   HEART: Regular rate and rhythm; No murmurs, rubs, or gallops  ABDOMEN: Soft, Nontender, Nondistended; Bowel sounds present  NEURO: AAOx3, no focal weakness, 5/5 b/l extremity strength, b/l knee no arthritis, no effusion   EXTREMITIES:  2+ Peripheral Pulses, No clubbing, cyanosis, 1+ b/l edema  SKIN: No rashes or lesions      
SUBJECTIVE / OVERNIGHT EVENTS:    low h/h and plt this AM  awaiting blood transfusion  denies cp/sob  no n/v/d    --------------------------------------------------------------------------------------------  LABS:                        6.7    5.14  )-----------( 19       ( 20 Aug 2021 11:22 )             21.2     08-20    143  |  112<H>  |  41<H>  ----------------------------<  93  3.3<L>   |  19<L>  |  2.03<H>    Ca    8.6      20 Aug 2021 07:17  Mg     1.9     08-20    TPro  5.8<L>  /  Alb  3.5  /  TBili  2.5<H>  /  DBili  x   /  AST  8<L>  /  ALT  11  /  AlkPhos  100  08-19    PT/INR - ( 19 Aug 2021 17:35 )   PT: 16.2 sec;   INR: 1.37 ratio         PTT - ( 19 Aug 2021 17:35 )  PTT:32.8 sec  CAPILLARY BLOOD GLUCOSE            RADIOLOGY & ADDITIONAL TESTS:    < from: Transthoracic Echocardiogram (08.20.21 @ 08:27) >  Conclusions:  1. Aortic valve notwell visualized; appears calcified.  Mild-moderate aortic regurgitation.  2. Severely dilated left atrium.  LA volume index = 56  cc/m2. 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  3. Endocardium not well visualized; mild left ventricular  systolic dysfunction. Paradoxical septal motion consistent  with paced rhythm.  4. Mild right ventricular enlargement with normal right  ventricular systolic function. A device wire is noted in  the right heart.  5. Small-moderate pericardial effusion adjacent to the  right heart. The effusion measures approximately 1.0 cm  inferior to the right heart. No echocardiographic evidence  of pericardial tamponade.  6. Bilateral pleural effusions.  7. Echo-free space measuring approximately 4.0 cm x 3.7 cm  is noted in the liver consistent with cyst, however,  dedicated imaging recommended for further evaluation if  clinically indicated.  *** No previous Echo exam.    < end of copied text >      Imaging Personally Reviewed:  [x] YES  [ ] NO    Consultant(s) Notes Reviewed:  [x] YES  [ ] NO    MEDICATIONS  (STANDING):  allopurinol 200 milliGRAM(s) Oral daily  doxazosin 4 milliGRAM(s) Oral at bedtime  lenalidomide 15 milliGRAM(s) Oral daily  potassium chloride    Tablet ER 40 milliEquivalent(s) Oral every 4 hours  sotalol 120 milliGRAM(s) Oral daily    MEDICATIONS  (PRN):  acetaminophen   Tablet .. 650 milliGRAM(s) Oral every 6 hours PRN Temp greater or equal to 38.5C (101.3F), Mild Pain (1 - 3)  aluminum hydroxide/magnesium hydroxide/simethicone Suspension 30 milliLiter(s) Oral every 4 hours PRN Dyspepsia  melatonin 3 milliGRAM(s) Oral at bedtime PRN Insomnia  ondansetron Injectable 4 milliGRAM(s) IV Push every 8 hours PRN Nausea and/or Vomiting      Care Discussed with Consultants/Other Providers [x] YES  [ ] NO    Vital Signs Last 24 Hrs  T(C): 36.8 (20 Aug 2021 06:30), Max: 36.8 (20 Aug 2021 06:30)  T(F): 98.2 (20 Aug 2021 06:30), Max: 98.2 (20 Aug 2021 06:30)  HR: 61 (20 Aug 2021 06:30) (60 - 98)  BP: 106/67 (20 Aug 2021 06:30) (102/65 - 119/68)  BP(mean): --  RR: 18 (20 Aug 2021 06:30) (16 - 20)  SpO2: 98% (20 Aug 2021 06:30) (95% - 99%)  I&O's Summary    19 Aug 2021 07:01  -  20 Aug 2021 07:00  --------------------------------------------------------  IN: 0 mL / OUT: 200 mL / NET: -200 mL    PHYSICAL EXAM:  GENERAL: NAD, well-developed, comfortable  HEAD:  Atraumatic, Normocephalic  EYES: EOMI, PERRLA, conjunctiva and sclera clear  NECK: Supple, No JVD  CHEST/LUNG: Clear to auscultation bilaterally; No wheeze  HEART: Regular rate and rhythm; No murmurs, rubs, or gallops  ABDOMEN: Soft, Nontender, Nondistended; Bowel sounds present  NEURO: AAOx3, no focal weakness, 5/5 b/l extremity strength, b/l knee no arthritis, no effusion   EXTREMITIES:  2+ Peripheral Pulses, No clubbing, cyanosis, or edema  SKIN: No rashes or lesions        
SUBJECTIVE / OVERNIGHT EVENTS:  No overnight events.  No cp/sob/n/v/d.  No HA/dizziness.  No abdominal pain.   Urinating a lot with Lasix per pt. will change to am dose but today he still needs pm dose since he needs prbc.   Hgb 7.7, 1 unit PRBC today     no melena, no hematochezia. no BRBPR.        --------------------------------------------------------------------------------------------  LABS:                        7.7    3.54  )-----------( 12       ( 22 Aug 2021 07:23 )             23.9     08-22    146<H>  |  114<H>  |  39<H>  ----------------------------<  79  3.9   |  19<L>  |  1.79<H>    Ca    8.4      22 Aug 2021 07:07        CAPILLARY BLOOD GLUCOSE                RADIOLOGY & ADDITIONAL TESTS:    Imaging Personally Reviewed:  [x] YES  [ ] NO    Consultant(s) Notes Reviewed:  [x] YES  [ ] NO    MEDICATIONS  (STANDING):  allopurinol 200 milliGRAM(s) Oral daily  doxazosin 4 milliGRAM(s) Oral at bedtime  eltrombopag 50 milliGRAM(s) Oral daily  furosemide    Tablet 20 milliGRAM(s) Oral two times a day  lenalidomide 15 milliGRAM(s) Oral daily    MEDICATIONS  (PRN):  acetaminophen   Tablet .. 650 milliGRAM(s) Oral every 6 hours PRN Temp greater or equal to 38.5C (101.3F), Mild Pain (1 - 3)  aluminum hydroxide/magnesium hydroxide/simethicone Suspension 30 milliLiter(s) Oral every 4 hours PRN Dyspepsia  melatonin 3 milliGRAM(s) Oral at bedtime PRN Insomnia  ondansetron Injectable 4 milliGRAM(s) IV Push every 8 hours PRN Nausea and/or Vomiting      Care Discussed with Consultants/Other Providers [x] YES  [ ] NO    Vital Signs Last 24 Hrs  T(C): 36.6 (22 Aug 2021 07:38), Max: 36.7 (21 Aug 2021 23:27)  T(F): 97.9 (22 Aug 2021 07:38), Max: 98 (21 Aug 2021 23:27)  HR: 63 (22 Aug 2021 07:38) (60 - 63)  BP: 133/68 (22 Aug 2021 07:38) (118/70 - 133/68)  BP(mean): --  RR: 18 (22 Aug 2021 07:38) (18 - 18)  SpO2: 94% (22 Aug 2021 07:38) (94% - 95%)  I&O's Summary    21 Aug 2021 07:01  -  22 Aug 2021 07:00  --------------------------------------------------------  IN: 980 mL / OUT: 200 mL / NET: 780 mL    22 Aug 2021 07:01  -  22 Aug 2021 16:30  --------------------------------------------------------  IN: 220 mL / OUT: 0 mL / NET: 220 mL      PHYSICAL EXAM:  GENERAL: NAD, well-developed, comfortable  HEAD:  Atraumatic, Normocephalic  EYES: EOMI, PERRLA, conjunctiva and sclera clear  NECK: Supple, No JVD  CHEST/LUNG: mild decrease breath sounds bilaterally; No wheeze   HEART: Regular rate and rhythm; No murmurs, rubs, or gallops  ABDOMEN: Soft, Nontender, Nondistended; Bowel sounds present  NEURO: AAOx3, no focal weakness, 5/5 b/l extremity strength, b/l knee no arthritis, no effusion   EXTREMITIES:  2+ Peripheral Pulses, No clubbing, cyanosis, 2+edema  SKIN: No rashes or lesions        
SUBJECTIVE / OVERNIGHT EVENTS:    complaints of difficultly sleeping due to constantly having to urinate during the night   patient seen and examined  denies cp/sob  no n/v/d  s/p 1 unit plt  waiting for PRBC transfusion    --------------------------------------------------------------------------------------------  LABS:                        8.4    2.74  )-----------( 8        ( 23 Aug 2021 07:12 )             25.6     08-22    146<H>  |  114<H>  |  39<H>  ----------------------------<  79  3.9   |  19<L>  |  1.79<H>    Ca    8.4      22 Aug 2021 07:07        CAPILLARY BLOOD GLUCOSE                RADIOLOGY & ADDITIONAL TESTS:    Imaging Personally Reviewed:  [x] YES  [ ] NO    Consultant(s) Notes Reviewed:  [x] YES  [ ] NO    MEDICATIONS  (STANDING):  allopurinol 200 milliGRAM(s) Oral daily  doxazosin 4 milliGRAM(s) Oral at bedtime  eltrombopag 50 milliGRAM(s) Oral daily  furosemide    Tablet 20 milliGRAM(s) Oral two times a day    MEDICATIONS  (PRN):  acetaminophen   Tablet .. 650 milliGRAM(s) Oral every 6 hours PRN Temp greater or equal to 38.5C (101.3F), Mild Pain (1 - 3)  aluminum hydroxide/magnesium hydroxide/simethicone Suspension 30 milliLiter(s) Oral every 4 hours PRN Dyspepsia  melatonin 3 milliGRAM(s) Oral at bedtime PRN Insomnia  ondansetron Injectable 4 milliGRAM(s) IV Push every 8 hours PRN Nausea and/or Vomiting      Care Discussed with Consultants/Other Providers [x] YES  [ ] NO    Vital Signs Last 24 Hrs  T(C): 36.8 (23 Aug 2021 08:18), Max: 36.8 (22 Aug 2021 22:00)  T(F): 98.3 (23 Aug 2021 08:18), Max: 98.3 (22 Aug 2021 22:00)  HR: 62 (23 Aug 2021 08:18) (58 - 85)  BP: 138/75 (23 Aug 2021 08:18) (99/56 - 138/75)  BP(mean): --  RR: 18 (23 Aug 2021 08:18) (17 - 18)  SpO2: 97% (23 Aug 2021 08:18) (96% - 99%)  I&O's Summary    22 Aug 2021 07:01  -  23 Aug 2021 07:00  --------------------------------------------------------  IN: 220 mL / OUT: 0 mL / NET: 220 mL    PHYSICAL EXAM:  GENERAL: NAD, well-developed, comfortable  HEAD:  Atraumatic, Normocephalic  EYES: EOMI, PERRLA, conjunctiva and sclera clear  NECK: Supple, No JVD  CHEST/LUNG: mild decrease breath sounds bilaterally; No wheeze   HEART: Regular rate and rhythm; No murmurs, rubs, or gallops  ABDOMEN: Soft, Nontender, Nondistended; Bowel sounds present  NEURO: AAOx3, no focal weakness, 5/5 b/l extremity strength, b/l knee no arthritis, no effusion   EXTREMITIES:  2+ Peripheral Pulses, No clubbing, cyanosis, 2+edema  SKIN: No rashes or lesions      
SUBJECTIVE / OVERNIGHT EVENTS:  No overnight events.  No complaints.  No cp, sob, abdominal pain.  No n/v/d. no HA/dizziness.   AM CBC pending        --------------------------------------------------------------------------------------------  LABS:                        7.3    4.01  )-----------( 17       ( 20 Aug 2021 22:09 )             22.5     08-20    143  |  112<H>  |  41<H>  ----------------------------<  93  3.3<L>   |  19<L>  |  2.03<H>    Ca    8.6      20 Aug 2021 07:17  Mg     1.9     08-20    TPro  5.8<L>  /  Alb  3.5  /  TBili  2.5<H>  /  DBili  x   /  AST  8<L>  /  ALT  11  /  AlkPhos  100  08-19    PT/INR - ( 19 Aug 2021 17:35 )   PT: 16.2 sec;   INR: 1.37 ratio         PTT - ( 19 Aug 2021 17:35 )  PTT:32.8 sec  CAPILLARY BLOOD GLUCOSE                RADIOLOGY & ADDITIONAL TESTS:    Imaging Personally Reviewed:  [x] YES  [ ] NO    Consultant(s) Notes Reviewed:  [x] YES  [ ] NO    MEDICATIONS  (STANDING):  allopurinol 200 milliGRAM(s) Oral daily  doxazosin 4 milliGRAM(s) Oral at bedtime  furosemide    Tablet 20 milliGRAM(s) Oral two times a day  lenalidomide 15 milliGRAM(s) Oral daily    MEDICATIONS  (PRN):  acetaminophen   Tablet .. 650 milliGRAM(s) Oral every 6 hours PRN Temp greater or equal to 38.5C (101.3F), Mild Pain (1 - 3)  aluminum hydroxide/magnesium hydroxide/simethicone Suspension 30 milliLiter(s) Oral every 4 hours PRN Dyspepsia  melatonin 3 milliGRAM(s) Oral at bedtime PRN Insomnia  ondansetron Injectable 4 milliGRAM(s) IV Push every 8 hours PRN Nausea and/or Vomiting      Care Discussed with Consultants/Other Providers [x] YES  [ ] NO    Vital Signs Last 24 Hrs  T(C): 37 (21 Aug 2021 09:12), Max: 37 (21 Aug 2021 09:12)  T(F): 98.6 (21 Aug 2021 09:12), Max: 98.6 (21 Aug 2021 09:12)  HR: 95 (21 Aug 2021 09:12) (60 - 95)  BP: 114/65 (21 Aug 2021 09:12) (96/61 - 117/64)  BP(mean): --  RR: 18 (21 Aug 2021 09:12) (16 - 18)  SpO2: 96% (21 Aug 2021 09:12) (94% - 98%)  I&O's Summary    20 Aug 2021 07:01  -  21 Aug 2021 07:00  --------------------------------------------------------  IN: 500 mL / OUT: 400 mL / NET: 100 mL      PHYSICAL EXAM:  GENERAL: NAD, well-developed, comfortable  HEAD:  Atraumatic, Normocephalic  EYES: EOMI, PERRLA, conjunctiva and sclera clear  NECK: Supple, No JVD  CHEST/LUNG: mild decrease breath sounds bilaterally; No wheeze   HEART: Regular rate and rhythm; No murmurs, rubs, or gallops  ABDOMEN: Soft, Nontender, Nondistended; Bowel sounds present  NEURO: AAOx3, no focal weakness, 5/5 b/l extremity strength, b/l knee no arthritis, no effusion   EXTREMITIES:  2+ Peripheral Pulses, No clubbing, cyanosis, 2+edema  SKIN: No rashes or lesions

## 2021-08-25 NOTE — CHART NOTE - NSCHARTNOTEFT_GEN_A_CORE
[id number]  RUBÉN GOLDBERG    Briefly, this is a 85yoM with hx of HTN, a fib s/p ablation w/ AICD on coumadin but on hold due to severe thrombocytopenia, prostate CA, MDS started on Revlimid presenting to ED as directed by his oncologist for low H&H and transfusion of plt/prbc. Pt reports extreme fatigue and anemia 2/2 to MDS and regular transfusions ~bi-weekly. Pt has been provided w/ Lasix for transfusions in the past due to edema.   Patient is ordered for 1 unit pRBC and 1 unit platelets for Hgb 5.6 and PLT 12k. Per discussion with attending Dr. Evans, will give patient Lasix 20mg IVP after transfusion of one unit pRBC and then transfuse additional unit pRBC.   Patient has a documented allergy to sulfa drugs in [place], discussed this with patient who states that he has received Lasix in the past (most recently 2 weeks ago) without any issues. Patient states his "allergy" with sulfa drugs occurred approximately 40 years ago when he was given an antibiotic cream to apply to his scalp and he "turned red."   Lasix 20mg IVP x1 ordered to be given post first unit pRBC, discussed with RN. Will repeat CBC in AM. Heme/onc eval pending. Will continue to closely monitor patient/vitals .                            5.6    5.41  )-----------( 12       ( 19 Aug 2021 17:35 )             17.5           Titus Downing PA-C  Dept of Medicine  [id number]
Platelets 8 today - 1 unit Platelets  H/H - 8.4/25.6 - 1 unit PRBC today  Pt on Lasix PO 20mg BID   Discussed with Dr. Evans - No need for additional Lasix    [id number]
86 yo M hx of HTN, a fib s/p ablation w/ AICD on coumadin but on hold due to severe thrombocytopenia, prostate CA, MDS started on Revlimid presenting to ED as directed by his oncologist for low H&H and transfusion of plt/prbc s/p plt and prbc transfusion. PT plt 11 today one single donor plt ordered as d/w Dr. Rothman. Called by RN to evaluate pt with SOB near completion of platelets transfusion.    SVital Signs Last 24 Hrs  T(C): 36.3 (25 Aug 2021 18:20), Max: 36.8 (25 Aug 2021 00:41)  T(F): 97.4 (25 Aug 2021 18:20), Max: 98.3 (25 Aug 2021 00:41)  HR: 60 (25 Aug 2021 18:20) (60 - 69)  BP: 151/66 (25 Aug 2021 18:20) (115/63 - 152/74)  BP(mean): --  RR: 18 (25 Aug 2021 18:20) (18 - 18)  SpO2: 93% (25 Aug 2021 18:20) (93% - 97%)    Physical exam   -Aox3  -CV-S1.S2,   -pulm: diminished, wheezing   -abd: soft, nt, nd bsx4  -no C/E/E      08-25    144  |  112<H>  |  33<H>  ----------------------------<  86  3.6   |  24  |  1.43<H>    Ca    8.3<L>      25 Aug 2021 07:19    TPro  5.4<L>  /  Alb  3.1<L>  /  TBili  4.4<H>  /  DBili  x   /  AST  10  /  ALT  12  /  AlkPhos  109  08-24                          8.7    2.78  )-----------( 11       ( 25 Aug 2021 07:30 )             27.1     A/P SOB possible due to transfusion overload (no fever or hypotension)  -Lasix IV 40mgx1 given  -Duonebx1    -CXR ordered   oxygen supplement ordered  f/u repeat cbc in AM  plan d/w Dr. Rothman.

## 2021-08-25 NOTE — PROVIDER CONTACT NOTE (CRITICAL VALUE NOTIFICATION) - ASSESSMENT
Patient's VSS and in NAD
Patient in NAD, VSS
Patient A&Ox4. Vital signs stable. No active signs of bleeding. Patient denies pain or discomfort at this time.
pt resting in bed. no s/s of distress, all VSS at this time.
Pt AOX4, VSS. Denies pain and SOB. No s/s of bleeding.

## 2021-08-25 NOTE — PROGRESS NOTE ADULT - ASSESSMENT
84 yo M hx of HTN, a fib s/p ablation w/ AICD on coumadin but on hold due to severe thrombocytopenia, prostate CA, MDS started on Revlimid presenting to ED as directed by his oncologist for low H&H and transfusion of plt/prbc. Pt reports extreme fatigue and anemia 2/2 to MDS and regular transfusions ~bi-weekly. Pt has been provided w/ Lasix for transfusions in the past due to edema. Pt denies hematochezia and melena. Denies abd pain. Reports bilat LE edema but states this 2/2 to MDS, and has had recent duplex US which were negative.    MDS on Revlimid  (severe anemia, thrombocytopenia)   chronic CKD stage III  recently treated protocolitis/diverticulitis  Hypertension  Afib s/p ablation s/p AICD  Prostate Ca in remission    Plan:  MDS:   Pt on Revlimid past few months for MDS (neutropenia and thrombocytopenia baseline ~ 20-30s)  with extreme fatigue/weak. hgb 5.6, plts 6 (outpt)  Anemia/thrombocytopenia due to MDS  d/w pt's onc Dr. Sal Haile. Plan to transfuse PRBC with hgb goal of 9 piror to his discharge.  no melena, no hematochezia. no BRBPR. (report brown stool)  8/19: s/p 2 unit PRBC, 1 unit plts (with IV Lasix 20 mg in between)  8/20: 1 unit PRBC  8/21: 1 unit PBRC  8/22: 1 unit PRBC   8/23: 1 unit PRBC & 1 unit plts  8/24: hgb 9, monitor for now. Plts > 10 so no need (transfuse plts if < 10)  8/25: no need for transfusion of either pRBC or plts today  d/c Sotalol to make room for BP.   on Lasix 20 mg BID for LE edema and bibasilar crackles in lung. improved. lower dose to Lasix 20 mg qdaily (if Euvolemic, will likely discontinue)   (TTE reviewed. noted small pericardial effusion. EF 45%). LE dopplers negative for DVT  (per onc Dr. Zimmerman, will hold Revlimid for now. Start Promacta per onc, off label use).  If his hgb relatively stable without PRBC, then dc planning.  Physical therapy. Out of bed to chair with assistance.     Chronic CKD stage III: stable at baseline.   monitor urine outpt.   monitor Cr.    BPH:   c/w Doxazocin      HTN:   borderline BP, Sotalol discontinued   he no longer takes Omesartan, HCTZ due to soft BP at home.     low salt, regular diet  DVT ppx

## 2021-08-25 NOTE — PROVIDER CONTACT NOTE (CRITICAL VALUE NOTIFICATION) - SITUATION
Called  by lab for critical Platelet of 15
Called by lab for critical Plt of 11
Platelets 12
Platelets 8
Patients platelet count is 15.

## 2021-08-26 NOTE — DISCHARGE NOTE PROVIDER - NSDCCPCAREPLAN_GEN_ALL_CORE_FT
PRINCIPAL DISCHARGE DIAGNOSIS  Diagnosis: Anemia  Assessment and Plan of Treatment: stable, follow up with Hm/onc      SECONDARY DISCHARGE DIAGNOSES  Diagnosis: CKD (chronic kidney disease)  Assessment and Plan of Treatment: stable    Diagnosis: HTN (hypertension)  Assessment and Plan of Treatment: controlled, cont current home meds    Diagnosis: Atrial fibrillation  Assessment and Plan of Treatment: cont current home meds, coumadin on hold    Diagnosis: Thrombocytopenia  Assessment and Plan of Treatment: s/p platelet transfusion, follow up with hm/onc     PRINCIPAL DISCHARGE DIAGNOSIS  Diagnosis: Anemia  Assessment and Plan of Treatment: stable, follow up with Hm/onc      SECONDARY DISCHARGE DIAGNOSES  Diagnosis: CKD (chronic kidney disease)  Assessment and Plan of Treatment: stable    Diagnosis: HTN (hypertension)  Assessment and Plan of Treatment: controlled, cont current home meds    Diagnosis: Atrial fibrillation  Assessment and Plan of Treatment: controlled,  coumadin and sotalol are on hold, follow up with PCP and card    Diagnosis: Thrombocytopenia  Assessment and Plan of Treatment: s/p platelet transfusion, follow up with hm/onc

## 2021-08-26 NOTE — PROVIDER CONTACT NOTE (CRITICAL VALUE NOTIFICATION) - ACTION/TREATMENT ORDERED:
ordered 1 unit prbc's
NO ORDERS AT THIS TIME
NP made aware. Will continue to monitor patient as per order.
will follow up
Tx team aware. Continue current plan of care
Awaiting intervention at this time
CHARO Julian notified. Ordered 1U platelet. Platelet administered. Will continue to monitor.
No other intervention at this time
Provider aware. no interventions at this time. will continue to monitor.

## 2021-08-26 NOTE — PROVIDER CONTACT NOTE (CRITICAL VALUE NOTIFICATION) - PERSON GIVING RESULT:
Aidan Silvestre
Hematology: Joseline Barone
Aidan Silvestre
Aidan Silvestre, Lab
Aidan Silvestre
Gerardo Saeed
Joseline sAYED

## 2021-08-26 NOTE — PROVIDER CONTACT NOTE (CRITICAL VALUE NOTIFICATION) - TEST AND RESULT REPORTED:
Platelets 12
PLATELETS 19
Platelet: 15
hgb 6.7, plt 19
Platelet count: 15
Platelets 8
Platelets are 17
hgb 6.4, hct 20, plt 19
Platelets 11

## 2021-08-26 NOTE — PROVIDER CONTACT NOTE (CRITICAL VALUE NOTIFICATION) - BACKGROUND
mds
Pt admitted for anemia. PMHx prostate ca, HTN, afib s/p ablation w/ AICD on coumadin and MDS. Received 1U PRBC. Hgb now 8.4. Platelet low 8
Patient with history of MDS and admitting diagnosis of anemia
admitted with anemia
Patient admitted for anemia s/p  transfusion of RBCs and Platelet
Patient admitted for anemia.

## 2021-08-26 NOTE — DISCHARGE NOTE PROVIDER - HOSPITAL COURSE
84 yo M hx of HTN, a fib s/p ablation w/ AICD on coumadin but on hold due to severe thrombocytopenia, prostate CA, MDS started on Revlimid presenting to ED as directed by his oncologist for low H&H and transfusion of plt/prbc. Pt reports extreme fatigue and anemia 2/2 to MDS and regular transfusions ~bi-weekly. Pt has been provided w/ Lasix for transfusions in the past due to edema.   He received multiple PRBC transfusions. He was seen by Hm/Onc, and PT. He is hemodynamically stable to be discharged home today after blood transfusion--he refused lasix, spoke to Attending.

## 2021-08-26 NOTE — DISCHARGE NOTE PROVIDER - CARE PROVIDER_API CALL
Nunu Singh  INTERNAL MEDICINE  [address] [place] [address]  Phone: [phone]  Fax: [phone]  Follow Up Time:     Deandre Zimmerman  [place] [id number] [address]  Phone: [phone]  Fax: [phone]  Follow Up Time:

## 2021-08-26 NOTE — DISCHARGE NOTE PROVIDER - NSDCMRMEDTOKEN_GEN_ALL_CORE_FT
acetaminophen 325 mg oral tablet: 2 tab(s) orally every 6 hours, As needed, Temp greater or equal to 38C (100.4F), Mild Pain (1 - 3)  allopurinol 100 mg oral tablet: 1 tab(s) orally 2 times a day  doxazosin 4 mg oral tablet: 1 tab(s) orally once a day  lactobacillus acidophilus oral capsule: 1 tab(s) orally 2 times a day (before meals)   Revlimid 10 mg oral capsule: 1 cap(s) orally every other day  sotalol 120 mg oral tablet: 1 tab(s) orally 2 times a day   acetaminophen 325 mg oral tablet: 2 tab(s) orally every 6 hours, As needed, Temp greater or equal to 38C (100.4F), Mild Pain (1 - 3)  allopurinol 100 mg oral tablet: 1 tab(s) orally 2 times a day  doxazosin 4 mg oral tablet: 1 tab(s) orally once a day (at bedtime)  eltrombopag 25 mg oral tablet: 2 tab(s) orally once a day  furosemide 20 mg oral tablet: 1 tab(s) orally once a day

## 2021-08-26 NOTE — DISCHARGE NOTE NURSING/CASE MANAGEMENT/SOCIAL WORK - PATIENT PORTAL LINK FT
You can access the FollowMyHealth Patient Portal offered by [facility] by registering at the following website: [email or web address]. By joining [facility]’s FollowMyHealth portal, you will also be able to view your health information using other applications (apps) compatible with our system.

## 2021-09-08 NOTE — ED PROVIDER NOTE - NS ED ROS FT
CONST: no fevers, no chills  EYES: no pain, no vision changes  ENT: no sore throat, no ear pain, no change in hearing  CV: no chest pain, + leg swelling  RESP: + shortness of breath, no cough  ABD: no abdominal pain, no nausea, no vomiting, no diarrhea  : no dysuria, no flank pain, no hematuria  MSK: no back pain, no extremity pain  NEURO: no headache or additional neurologic complaints  HEME: no easy bleeding  SKIN:  no rash

## 2021-09-08 NOTE — ED PROVIDER NOTE - CLINICAL SUMMARY MEDICAL DECISION MAKING FREE TEXT BOX
85 y/o M with hx of MDS presenting to the ED with anemia, thrombocytopenia, concern for blast crisis. Vitals stable. Patient in no acute distress. Plan for labs, EKG, admission heme onc vs medicine. Deandre Travis, DO PGY3 87 y/o M with hx of MDS presenting to the ED with anemia, thrombocytopenia, concern for blast crisis. Vitals stable. Patient in no acute distress. Plan for labs, EKG, admission heme onc vs medicine. Deandre Travis,  PGY3  Rafi: 87 y/o M with PMHx of HTN, Afib, recently dx with CHF, and leukemia presents to the ER c/o fatigue and weakness. has anemia which is symtomatic, thrombocytopenia. Had high band as outpt here not quite as high. Would admit to heme onc to ensure and evaluate leukemic transformation. no fever. will need admission

## 2021-09-08 NOTE — ED PROVIDER NOTE - PHYSICAL EXAMINATION
GENERAL: Awake, alert, NAD  HEENT: NC/AT, moist mucous membranes  LUNGS: CTAB, no wheezes or crackles   CARDIAC: RRR, no m/r/g  ABDOMEN: Soft, normal BS, non tender, non distended, no rebound, no guarding  EXT: 2+ pitting edema to mild calf, no calf tenderness, 2+ DP pulses bilaterally, no deformities.  NEURO: A&Ox3. Moving all extremities.  SKIN: Warm and dry. Few ecchymosis to bilateral extremities  PSYCH: Normal affect.

## 2021-09-08 NOTE — ED ADULT NURSE NOTE - NSIMPLEMENTINTERV_GEN_ALL_ED
Implemented All Universal Safety Interventions:  [place] to call system. Call bell, personal items and telephone within reach. Instruct patient to call for assistance. Room bathroom lighting operational. Non-slip footwear when patient is off stretcher. Physically safe environment: no spills, clutter or unnecessary equipment. Stretcher in lowest position, wheels locked, appropriate side rails in place.

## 2021-09-08 NOTE — ED ADULT TRIAGE NOTE - CHIEF COMPLAINT QUOTE
recently diagnosed with leukemia. Informed to come to ED for transfusion and admission. Denies any complaints at this time. Per PCP blood work, Blast 22% and hemoglobulin 7.1

## 2021-09-08 NOTE — ED PROVIDER NOTE - ATTENDING CONTRIBUTION TO CARE
I performed a history and physical exam of the patient and discussed their management with the resident and /or advanced care provider. I reviewed the resident and /or ACP's note and agree with the documented findings and plan of care. My medical decision making and observations are found above.  Lungs clear, abd soft awake and alert

## 2021-09-08 NOTE — ED ADULT NURSE NOTE - OBJECTIVE STATEMENT
86y M arrived to the ED c/o weakness. Pt PMHx leukemia sent in by oncologist for blood transfusion. Pt states "I have been having fatigue and weakness, I went to my doctor today and was told I had a low hemoglobin and platelets." Upon arrival to ED pt endorsing weakness at present. Pt denies chest pain, HA, N/V/D, abdominal pain, fever, urinary symptoms. Peripheral pulses present b/l. Skin warm, dry and pink. Pt placed in position of comfort. Pt educated on call bell system and provided call bell. Bed in lowest position, wheels locked, appropriate side rails raised. Pt denies needs at this time.

## 2021-09-08 NOTE — ED CLERICAL - NS ED CLERK NOTE PRE-ARRIVAL INFORMATION; ADDITIONAL PRE-ARRIVAL INFORMATION
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 no  Would you still like to speak to an ED attending? no

## 2021-09-08 NOTE — ED PROVIDER NOTE - RAPID ASSESSMENT
86 M with PMHx of HTN, Afib, recently dx with CHF, and Leukemia presents to the ER c/o fatigue, and weakness. Pt had blood work done at [facility] and hgb was noted to be 7.1. of note, stopped taking coumadin secondary to thrombocytopenia one month ago. Well appearing, pale appearing.     Scribe Statement: Sincere PINA Tiffany, attest that this documentation has been prepared under the direction and in the presence of Logan Rodriguez (PA) 86 M with PMHx of HTN, Afib, recently dx with CHF, and Leukemia presents to the ER c/o fatigue, and weakness. Pt had blood work done at [facility] and hgb was noted to be 7.1. of note, stopped taking coumadin secondary to thrombocytopenia one month ago. Denies fever/chills. Well appearing, pale appearing.     Scribe Statement: I, Margy Post, attest that this documentation has been prepared under the direction and in the presence of Logan Rodriguez (PA)    Logan Rodriguez PA-C: The scribe's documentation has been prepared under my direction and personally reviewed by me in its entirety. I confirm that the note above accurately reflects history obtained.   Patient was rapidly assessed via telemedicine encounter; a limited history was obtained. The patient will be seen and further examined and worked up in the main ED and their care will be completed by the main ED team. Receiving team will follow up on labs, analgesia, any clinical imaging, and perform reassessment and disposition of the patient as clinically indicated. All decisions regarding the progression of care will be made at their discretion.

## 2021-09-08 NOTE — ED PROVIDER NOTE - OBJECTIVE STATEMENT
87 y/o M with PMHx of HTN, Afib, recently dx with CHF, and leukemia presents to the ER c/o fatigue and weakness. Pt had [place] work done [place] [facility] and was found to be anemic to 7.1 and with increased blasts concerning for blast crisis. He endorses feeling more tired than normal and states he has noticed increased swelling in his bilateral lower extremities. No chest pain at this time. No recent fever. No diarrhea. No urinary symptoms.

## 2021-09-09 NOTE — CONSULT NOTE ADULT - ASSESSMENT
85yo M w/ PMHx of afib s/p ablation w/ AICD (on coumadin but on hold due to severe thrombocytopenia), prostate CA, MDS with complex cytogenetics refractory to Revlimid, found to have elevated blasts in peripheral blood from outpatient work up, advised to seek inpatient treatment for likely MDS progression to leukemia.     # Leukemia c/b bi-cytopenia - thrombocytopenia and anemia   - h/o MDS refractory to Revlimid, initially stable however in recent 2-3 months recurrent thrombocytopenia and symptomatic anemia  - upon routine work up, found to have 23% blasts in peripheral blood, prompting visit for likely progression to leukemia  - aPTT and PT/INR mildly elevated   - blasts while at [facility] is 12.6% with WBC 6.2k   - obtain records from previous bone marrow bx and flow cytometry   - send flow cytometry   - will review peripheral smear   - pt may need another marrow bx   - trend daily TLS labs: CMP, Phos, Uric acid, LDH  - check G6PD prior to starting rasburicase if uric acid is elevated   - maintain active T&S, transfuse Hgb to maintain above 7 and Plt goals above 10, above 20 if febrile, above 50 if bleeding  - hold all anticoagulants in s/o thrombocytopenia   - r/o DIC and hemolysis: send fibrinogen, co-ags, hapto, LDH    Plan not discussed with fellow/ attending yet. ***INCOMPLETE***     87yo M w/ PMHx of afib s/p ablation w/ AICD (on coumadin but on hold due to severe thrombocytopenia), prostate CA, MDS with complex cytogenetics refractory to Revlimid, found to have elevated blasts in peripheral blood from outpatient work up, advised to seek inpatient treatment for likely MDS progression to leukemia.     # Leukemia c/b bi-cytopenia - thrombocytopenia and anemia   - h/o MDS refractory to Revlimid, initially stable however in recent 2-3 months recurrent thrombocytopenia and symptomatic anemia  - upon routine work up, found to have 23% blasts in peripheral blood, prompting visit for likely progression to leukemia  - aPTT and PT/INR mildly elevated   - blasts while at [facility] is 12.6% with WBC 6.2k   - obtain records from previous bone marrow bx and flow cytometry   - send flow cytometry   - will review peripheral smear   - bone marrow bx today   - trend daily TLS labs: CMP, Phos, Uric acid, LDH  - check G6PD prior to starting rasburicase if uric acid is elevated   - maintain active T&S, transfuse Hgb to maintain above 7 and Plt goals above 10, above 20 if febrile, above 50 if bleeding  - hold all anticoagulants in s/o thrombocytopenia   - r/o DIC and hemolysis: send fibrinogen, co-ags, hapto, LDH         87yo M w/ PMHx of afib s/p ablation w/ AICD (on coumadin but on hold due to severe thrombocytopenia), prostate CA, MDS with complex cytogenetics refractory to Revlimid, found to have elevated blasts in peripheral blood from outpatient work up, advised to seek inpatient treatment for likely MDS progression to leukemia.     # Leukemia c/b bi-cytopenia - thrombocytopenia and anemia   - h/o MDS refractory to Revlimid, initially stable however in recent 2-3 months recurrent thrombocytopenia and symptomatic anemia  - upon routine work up, found to have 23% blasts in peripheral blood, prompting visit for likely progression to leukemia  - aPTT and PT/INR mildly elevated   - blasts while at [facility] is 12.6% with WBC 6.2k   - obtain records from previous bone marrow bx and flow cytometry   - send flow cytometry   - bone marrow bx today   - trend daily TLS labs: CMP, Phos, Uric acid, LDH  - check G6PD prior to starting rasburicase if uric acid is elevated   - maintain active T&S, transfuse Hgb to maintain above 7 and Plt goals above 10, above 20 if febrile, above 50 if bleeding  - hold all anticoagulants in s/o thrombocytopenia   - r/o DIC and hemolysis: send fibrinogen, co-ags, hapto, LDH         85yo M w/ PMHx of afib s/p ablation w/ AICD (on coumadin but on hold due to severe thrombocytopenia), prostate CA, MDS with complex cytogenetics refractory to Revlimid, found to have elevated blasts in peripheral blood from outpatient work up, advised to seek inpatient treatment for likely MDS progression to leukemia.     # Leukemia c/b bi-cytopenia - thrombocytopenia and anemia   - h/o MDS refractory to Revlimid, initially stable however in recent 2-3 months recurrent thrombocytopenia and symptomatic anemia  - upon routine work up, found to have 23% blasts in peripheral blood, prompting visit for likely progression to leukemia  - aPTT and PT/INR mildly elevated   - blasts while at [facility] is 12.6% with WBC 6.2k   - obtain records from previous bone marrow bx and flow cytometry   - send flow cytometry   - bone marrow bx at bedside obtained 9/9/21, f/u results   - trend daily TLS labs: CMP, Phos, Uric acid, LDH  - check G6PD prior to starting rasburicase if uric acid is elevated to above 10  - maintain active T&S, transfuse Hgb to maintain above 7 and Plt goals above 10, above 20 if febrile/infection, above 50 if bleeding  - hold all anticoagulants/antiplatelets in s/o thrombocytopenia   - r/o DIC and hemolysis: send fibrinogen, co-ags, hapto, LDH  - discussed with [facility] for transfer, likely need inpatient treatment for AML pending marrow bx and flow cytometry       87yo M w/ PMHx of afib s/p ablation w/ AICD (on coumadin but on hold due to severe thrombocytopenia), prostate CA, MDS with complex cytogenetics refractory to Revlimid, found to have elevated blasts in peripheral blood from outpatient work up, advised to seek inpatient treatment for likely MDS progression to leukemia.     # Leukemia c/b bi-cytopenia - thrombocytopenia and anemia   - h/o MDS refractory to Revlimid, initially stable however in recent 2-3 months recurrent thrombocytopenia and symptomatic anemia  - upon routine work up, found to have 23% blasts in peripheral blood, prompting visit for likely progression to leukemia  - aPTT and PT/INR mildly elevated   - blasts while at [facility] is 12.6% with WBC 6.2k   - obtain records from previous bone marrow bx and flow cytometry --> please see above section for outpt records   - send flow cytometry   - bone marrow bx at bedside obtained 9/9/21, f/u results   - trend daily TLS labs: CMP, Phos, Uric acid, LDH  - check G6PD prior to starting rasburicase if uric acid is elevated to above 10  - maintain active T&S, transfuse Hgb to maintain above 7 and Plt goals above 10, above 20 if febrile/infection, above 50 if bleeding  - hold all anticoagulants/antiplatelets in s/o thrombocytopenia   - r/o DIC and hemolysis: send fibrinogen, co-ags, hapto, LDH  - discussed with [facility] cho[facility] for transfer, likely need inpatient treatment for AML pending marrow bx and flow cytometry

## 2021-09-09 NOTE — PROCEDURE NOTE - NSINFORMCONSENT_GEN_A_CORE
signed consent in the chart/Benefits, risks, and possible complications of procedure explained to patient/caregiver who verbalized understanding and gave written consent.

## 2021-09-09 NOTE — CHART NOTE - NSCHARTNOTEFT_GEN_A_CORE
Called by Hospitalist to see patient. Patient recently seen in the office and at the Hospital by Dr. Zimmerman, partner at [facility].     Mr. Padilla is a 86  year old man who in January 2021 presented in the office for evaluation from Dr. Nunu Singh for atypical cells seen on manual differential of the CBC. Initial bloodwork showed a platelet count of 118k and peripheral smear notable for nucleated reds. BMBx performed on 1/28/2021 (processed by [email or web address]) showed MDS with ring sideroblasts and multilineage dysplasia. 5% blasts. Cytogenetics were complex. +8(2)/44,XY,[id number].   Patent was followed and then developed anemia and thrombocytopenia in May 2021. Patient was then started on Revlimid 10mg on 5/17/2021 with stabilization for 2 months. Then anemia and thrombocytopenia worsened. He was [place] [facility] [place]. Patient saw Dr. Shanda Corea at [facility] on 9/8/2021. During evaluation, patient was found to have 23% blasts on the peripheral smear and was diagnosed with leukemia. He was offered to be hospitalized at [facility] but declined and decided to go to [facility] as he has been there before. Dr. Corea notified Dr. Zimmerman of peripheral smear findings and directed the patient to the ER at [facility].   Called by Dr. Rm to see patient at the hospital. Discussed with her that unfortunately we, Dr. Zimmerman nor SIL[facility], do not manage leukemic patients and refer them to [facility] or [facility]. Asked hospitalist to call Full-time staff. Dr. Zimmerman has paged hematology fellow/attending and is awaiting call back to discuss case. Called by Hospitalist to see patient. Patient recently seen in the office and at the Hospital by Dr. Zimmerman, partner at [facility].     Mr. Padilla is a 86  year old man who in January 2021 presented in the office for evaluation from Dr. Nunu Singh for atypical cells seen on manual differential of the CBC. Initial bloodwork showed a platelet count of 118k and peripheral smear notable for nucleated reds. BMBx performed on 1/28/2021 (processed by [employer]) showed MDS with ring sideroblasts and multilineage dysplasia. 5% blasts. Cytogenetics were complex. +8(2)/44,XY,[id number].   Patent was followed and then developed anemia and thrombocytopenia in May 2021. Patient was then started on Revlimid 10mg on 5/17/2021 with stabilization for 2 months. Then anemia and thrombocytopenia worsened. He was sent to [facility] for evaluation. Patient saw Dr. Shanda Corea at [facility] on 9/8/2021. During evaluation, patient was found to have 23% blasts on the peripheral smear and was diagnosed with leukemia. He was offered to be hospitalized at [facility] but declined and decided to go to [facility] as he has been there before. Dr. Corea notified Dr. Zimmerman of peripheral smear findings and directed the patient to the ER at [facility].   Called by Dr. Rm to see patient at the hospital. Discussed with her that unfortunately we, Dr. Zimmerman nor I, do not manage leukemic patients and refer them to [facility] or [facility]. Asked hospitalist to call Full-time staff. Dr. Zimmerman has paged hematology fellow/attending and is awaiting call back to discuss case.    Attending and Fellow can call us so we can facilitate transfer of care. Can call my cell, [phone], or Dr. Zimmerman's cell at [phone] Called by Hospitalist to see patient. Patient recently seen in the office and at the Hospital by Dr. Zimmerman, partner at [facility].     Mr. Padilla is a 86  year old man who in January 2021 presented in the office for evaluation from Dr. Nunu Singh for atypical cells seen on manual differential of the CBC. Initial bloodwork showed a platelet count of 118k and peripheral smear notable for nucleated reds. BMBx performed on 1/28/2021 (processed by [employer]) showed MDS with ring sideroblasts and multilineage dysplasia. 5% blasts. Cytogenetics were complex. +8(2)/44,XY,[id number].   Patent was followed and then developed anemia and thrombocytopenia in May 2021. Patient was then started on Revlimid 10mg on 5/17/2021 with stabilization for 2 months. Then anemia and thrombocytopenia worsened. He was sent to [facility] for evaluation. Patient saw Dr. Shanda Corea at [facility] on 9/8/2021. During evaluation, patient was found to have 23% blasts on the peripheral smear and was diagnosed with leukemia. He was offered to be hospitalized at [facility] but declined and decided to go to [facility] as he has been there before. Dr. Corea notified Dr. Zimmerman of peripheral smear findings and directed the patient to the ER at [facility].   Called by Dr. Rm to see patient at the hospital. Discussed with her that unfortunately we, Dr. Zimmerman nor I, do not manage leukemic patients and refer them to [facility] or [facility]. Asked hospitalist to call Full-time staff. Dr. Zimmerman has paged hematology fellow/attending and is awaiting call back to discuss case.    Attending and Fellow can call us so we can facilitate transfer of care. Can call my cell, [phone], or Dr. Zimmerman's cell at [phone]    Addendum:  Correction, BMBx pathology was sent to [facility]  Accession: [id number]  Accession: [id number]  Accession: [id number]

## 2021-09-09 NOTE — H&P ADULT - HISTORY OF PRESENT ILLNESS
85yo M w/ PMHx of afib s/p ablation w/ AICD (on coumadin but on hold due to severe thrombocytopenia), prostate CA, MDS (with frequent transfusions for anemia/thrombocytopenia) presents with shortness of breath, of note, patient was recently admitted to [facility] from 8/19-8/26 for anemia/thrombocytopenia, he received transfusions of plt and RBC and was switched from Revlimid to Promacta, since discharge he had felt overall better but reported worsening weakness and shortness of breath, he reports that he is no longer taking his promacta for unclear reasons, he saw an outpatient leukemia specialist after discharge and had lab work done and was informed that they demonstrated a leukemia and he should immediately go the ED for evaluation, they recommended he go to [facility] ED but he chose to come to [facility] for evaluation since most of his care has been performed here, he reports some recent mouth sores but denies any confusion, change in urine output, chest pain, obvious signs of bleeding, in the ED, he was hemodynamically stable, afebrile, saturating well on room air, labs were significant for normocytic anemia lower than baseline, thrombocytopenia at baseline, 12.6% blasts, CXR showed small bilateral effusions, patient was given 40mg lasix x1 and 40mEq KCl x1 and admitted to general medicine for further management.

## 2021-09-09 NOTE — H&P ADULT - PROBLEM SELECTOR PLAN 3
-aflutter on EKG, V-paced  -c/w sotalol 120mg BID  -monitor electrolytes  -anti-coagulation on hold in setting of thrombocytopenia and anemia

## 2021-09-09 NOTE — H&P ADULT - PROBLEM SELECTOR PLAN 2
-acute anemia in setting of MDS, previously refractory to Revlimid, off-label Promacta was initiated on previous hospitalization but was stopped, labs showing thrombocytopenia at baseline, acute on chronic normocytic anemia and blasts 12.6%   -hematology consult in AM (previously seen by Dr. Zimmerman inpatient, follows with Dr. Haile outpatient)  -transfuse Hg >7, plt >10 pending hematology input, lasix with transfusions PRN

## 2021-09-09 NOTE — H&P ADULT - NSHPLABSRESULTS_GEN_ALL_CORE
Personally reviewed labs, EKG and images    Labs: normocytic anemia, thrombocytopenia at baseline, blasts 12.6%, elevated BNP, CXR showed bilateral pleural effusions    EKG: v-paced rhythm with atrial flutter waves, prolonged QRS    Images: mild vascular congestion with small bilateral pleural effusions

## 2021-09-09 NOTE — ED ADULT NURSE REASSESSMENT NOTE - NS ED NURSE REASSESS COMMENT FT1
Report received from AMI Martinez. Pt a & o x 4, able to follow commands. Breathing spontaneous & nonlabored. Abdomen soft & nondistended. Pt ambulated to restroom using cane without difficulty.  IV site patent, no signs of phlebitis, flushing without difficulty. Call bell within reach, bed in lowest position.

## 2021-09-09 NOTE — H&P ADULT - NSHPPHYSICALEXAM_GEN_ALL_CORE
Vital Signs Last 24 Hrs  T(C): 36.7 (09 Sep 2021 04:02), Max: 36.7 (08 Sep 2021 23:06)  T(F): 98 (09 Sep 2021 04:02), Max: 98.1 (09 Sep 2021 01:30)  HR: 60 (09 Sep 2021 04:02) (60 - 74)  BP: 122/66 (09 Sep 2021 04:02) (108/63 - 134/72)  BP(mean): --  RR: 18 (09 Sep 2021 04:02) (16 - 18)  SpO2: 94% (09 Sep 2021 04:02) (94% - 99%)

## 2021-09-09 NOTE — H&P ADULT - PROBLEM SELECTOR PLAN 1
-likely multifactorial in the setting of anemia and fluid overload secondary to CHF  -no hypoxia or chest pain to suggest PE, no signs of pneumonia  -continue with IV diuresis (monitor Cr and electrolytes) 20mg IV lasix daily   -strict I/O's  -fluid restriction   -would discuss with hematology prior to transfusion, no urgent transfusion indicated  -recent echo 8/2021 with EF 45%  -recent bilateral lower extremity duplex with no signs of DVT

## 2021-09-09 NOTE — H&P ADULT - ASSESSMENT
85yo M w/ PMHx of afib s/p ablation w/ AICD (on coumadin but on hold due to severe thrombocytopenia), prostate CA, refractory MDS presents with worsening anemia

## 2021-09-09 NOTE — CONSULT NOTE ADULT - SUBJECTIVE AND OBJECTIVE BOX
HEMATOLOGY ONCOLOGY CONSULT     Patient is a 86y old  Male who presents with a chief complaint of shortness of breath (09 Sep 2021 05:38)      HPI:  85yo M w/ PMHx of afib s/p ablation w/ AICD (on coumadin but on hold due to severe thrombocytopenia), prostate CA, MDS (with frequent transfusions for anemia/thrombocytopenia) presents with shortness of breath, of note, patient was recently admitted to [facility] from 8/19-8/26 for anemia/thrombocytopenia, he received transfusions of plt and RBC and was switched from Revlimid to Promacta, since discharge he had felt overall better but reported worsening weakness and shortness of breath, he reports that he is no longer taking his promacta for unclear reasons, he saw an outpatient leukemia specialist after discharge and had lab work done and was informed that they demonstrated a leukemia and he should immediately go the ED for evaluation, they recommended he go to [facility] ED but he chose to come to [facility] for evaluation since most of his care has been performed here, he reports some recent mouth sores but denies any confusion, change in urine output, chest pain, obvious signs of bleeding, in the ED, he was hemodynamically stable, afebrile, saturating well on room air, labs were significant for normocytic anemia lower than baseline, thrombocytopenia at baseline, 12.6% blasts, CXR showed small bilateral effusions, patient was given 40mg lasix x1 and 40mEq KCl x1 and admitted to general medicine for further management.   (09 Sep 2021 05:38)     Heme/Onc hx:   In January 2021, pt presented for evaluation for atypical cells seen on manual differential of the CBC. Initial blood work showed a platelet count of 118k and peripheral smear notable for nucleated reds. BMBx performed on 1/28/2021 (processed by [employer]) showed MDS with ring sideroblasts and multilineage dysplasia. 5% blasts. Cytogenetics were complex. +8(2)/44,XY,[id number]. Patent was followed and then developed anemia and thrombocytopenia in May 2021. Patient was then started on Revlimid 10mg on 5/17/2021 with stabilization for 2 months. Then anemia and thrombocytopenia worsened. [place] [facility] [place]. Patient [place] Dr. Shanda Corea [place] [facility] [place]. During evaluation, patient was found to have 23% blasts on the peripheral smear and was diagnosed with leukemia. He was offered to be hospitalized at [facility] but declined and decided to go to [facility] as he has been there before. Dr. Corea notified Dr. Zimmerman of peripheral smear findings and directed the patient to the ER at [facility].     Unfortunately, pt's heme/onc provider Dr. Zimmerman and partners do not manage leukemic patients, and heme/onc team is consulted for further inpatient leukemia management.     ROS:  Negative except for:    PAST MEDICAL & SURGICAL HISTORY:  Hypertension    Afib    Osteoarthritis    Prostate cancer  treated with cyber knife 2016    Venous stasis    Varicose veins  legs    Gout  finger a long time ago    Cellulitis  right lower leg in past    Degenerative disc disease, lumbar    Edema  right leg    S/P total hip arthroplasty  2006, right    S/P cataract extraction  bilateral    S/P hernia repair  1996    Afib  cardiac ablation 1998; several pacemaker/AICD insertion and replacements    S/P tonsillectomy    S/P cholecystectomy  12/17      SOCIAL HISTORY:  Quit smoking cigarettes in 1962, smoked for 10 yrs, denies current alcohol or recreational drug use    FAMILY HISTORY:  Family history of Alzheimer&#x27;s disease (Father)  Family history of essential hypertension (Father)  Family history of breast cancer (Mother)  Family history of prostate cancer (Sibling)    MEDICATIONS  (STANDING):  allopurinol 100 milliGRAM(s) Oral two times a day  doxazosin 4 milliGRAM(s) Oral at bedtime  furosemide   Injectable 20 milliGRAM(s) IV Push daily  influenza   Vaccine 0.5 milliLiter(s) IntraMuscular once  sotalol 120 milliGRAM(s) Oral two times a day    MEDICATIONS  (PRN):  melatonin 3 milliGRAM(s) Oral at bedtime PRN Insomnia    Allergies    adhesives (Rash)  clonidine (Swelling; Rash)  felodipine (Rash)  penicillin (Rash)  sulfa drugs (Rash)    Intolerances    Vital Signs Last 24 Hrs  T(C): 36.5 (09 Sep 2021 10:34), Max: 36.7 (08 Sep 2021 23:06)  T(F): 97.7 (09 Sep 2021 10:34), Max: 98.1 (09 Sep 2021 01:30)  HR: 88 (09 Sep 2021 10:34) (60 - 88)  BP: 149/71 (09 Sep 2021 10:34) (108/63 - 149/71)  BP(mean): --  RR: 18 (09 Sep 2021 10:34) (16 - 18)  SpO2: 99% (09 Sep 2021 10:34) (94% - 99%)    PHYSICAL EXAM  General: adult in NAD  HEENT: clear oropharynx, anicteric sclera, pink conjunctiva  Neck: supple  CV: normal S1/S2 with no murmur rubs or gallops  Lungs: positive air movement b/l ant lungs,clear to auscultation, no wheezes, no rales  Abdomen: soft non-tender non-distended, no hepatosplenomegaly  Ext: no clubbing cyanosis or edema  Skin: no rashes and no petechiae  Neuro: alert and oriented X 4, no focal deficits      LABS:                          7.4    6.16  )-----------( 18       ( 09 Sep 2021 05:16 )             23.8         Mean Cell Volume : 87.5 fl  Mean Cell Hemoglobin : 27.2 pg  Mean Cell Hemoglobin Concentration : 31.1 gm/dL  Auto Neutrophil # : x  Auto Lymphocyte # : x  Auto Monocyte # : x  Auto Eosinophil # : x  Auto Basophil # : x  Auto Neutrophil % : x  Auto Lymphocyte % : x  Auto Monocyte % : x  Auto Eosinophil % : x  Auto Basophil % : x      09-09    143  |  111<H>  |  27<H>  ----------------------------<  100<H>  3.6   |  21<L>  |  1.44<H>    Ca    8.7      09 Sep 2021 05:16    TPro  6.0  /  Alb  3.4  /  TBili  1.5<H>  /  DBili  x   /  AST  8<L>  /  ALT  8<L>  /  AlkPhos  120  09-08      PT/INR - ( 08 Sep 2021 21:27 )   PT: 16.2 sec;   INR: 1.37 ratio         PTT - ( 08 Sep 2021 21:27 )  PTT:36.6 sec      BLOOD SMEAR INTERPRETATION:       RADIOLOGY & ADDITIONAL STUDIES:       HEMATOLOGY ONCOLOGY CONSULT     Patient is a 86y old  Male who presents with a chief complaint of shortness of breath (09 Sep 2021 05:38)      HPI:  87yo M w/ PMHx of afib s/p ablation w/ AICD (on coumadin but on hold due to severe thrombocytopenia), prostate CA, MDS (with frequent transfusions for anemia/thrombocytopenia) presents with shortness of breath, of note, patient was recently admitted to [facility] from 8/19-8/26 for anemia/thrombocytopenia, he received transfusions of plt and RBC and was switched from Revlimid to Promacta, since discharge he had felt overall better but reported worsening weakness and shortness of breath, he reports that he is no longer taking his promacta for unclear reasons, he saw an outpatient leukemia specialist after discharge and had lab work done and was informed that they demonstrated a leukemia and he should immediately go the ED for evaluation, they recommended he go to [facility] ED but he chose to come to [facility] for evaluation since most of his care has been performed here, he reports some recent mouth sores but denies any confusion, change in urine output, chest pain, obvious signs of bleeding, in the ED, he was hemodynamically stable, afebrile, saturating well on room air, labs were significant for normocytic anemia lower than baseline, thrombocytopenia at baseline, 12.6% blasts, CXR showed small bilateral effusions, patient was given 40mg lasix x1 and 40mEq KCl x1 and admitted to general medicine for further management.   (09 Sep 2021 05:38)     Heme/Onc hx:   In January 2021, pt presented for evaluation for atypical cells seen on manual differential of the CBC. Initial blood work showed a platelet count of 118k and peripheral smear notable for nucleated reds. BMBx performed on 1/28/2021 (processed by [employer]) showed MDS with ring sideroblasts and multilineage dysplasia. 5% blasts. Cytogenetics were complex. +8(2)/44,XY,[id number]. Patent was followed and then developed anemia and thrombocytopenia in May 2021. Patient was then started on Revlimid 10mg on 5/17/2021 with stabilization for 2 months. Then anemia and thrombocytopenia worsened. He was sent to [facility] for evaluation. Patient saw Dr. Shanda Corea at [facility] on 9/8/2021. During evaluation, patient was found to have 23% blasts on the peripheral smear and was diagnosed with leukemia. He was offered to be hospitalized at [facility] but declined and decided to go to [facility] as he has been there before. Dr. Corea notified Dr. Zimmerman of peripheral smear findings and directed the patient to the ER at [facility].     Unfortunately, pt's heme/onc provider Dr. Zimmerman and partners do not manage leukemic patients, and heme/onc team is consulted for further inpatient leukemia management.     ROS:  Negative except for:    PAST MEDICAL & SURGICAL HISTORY:  Hypertension    Afib    Osteoarthritis    Prostate cancer  treated with cyber knife 2016    Venous stasis    Varicose veins  legs    Gout  finger a long time ago    Cellulitis  right lower leg in past    Degenerative disc disease, lumbar    Edema  right leg    S/P total hip arthroplasty  2006, right    S/P cataract extraction  bilateral    S/P hernia repair  1996    Afib  cardiac ablation 1998; several pacemaker/AICD insertion and replacements    S/P tonsillectomy    S/P cholecystectomy  12/17      SOCIAL HISTORY:  Quit smoking cigarettes in 1962, smoked for 10 yrs, denies current alcohol or recreational drug use    FAMILY HISTORY:  Family history of Alzheimer&#x27;s disease (Father)  Family history of essential hypertension (Father)  Family history of breast cancer (Mother)  Family history of prostate cancer (Sibling)    MEDICATIONS  (STANDING):  allopurinol 100 milliGRAM(s) Oral two times a day  doxazosin 4 milliGRAM(s) Oral at bedtime  furosemide   Injectable 20 milliGRAM(s) IV Push daily  influenza   Vaccine 0.5 milliLiter(s) IntraMuscular once  sotalol 120 milliGRAM(s) Oral two times a day    MEDICATIONS  (PRN):  melatonin 3 milliGRAM(s) Oral at bedtime PRN Insomnia    Allergies    adhesives (Rash)  clonidine (Swelling; Rash)  felodipine (Rash)  penicillin (Rash)  sulfa drugs (Rash)    Intolerances    Vital Signs Last 24 Hrs  T(C): 36.5 (09 Sep 2021 10:34), Max: 36.7 (08 Sep 2021 23:06)  T(F): 97.7 (09 Sep 2021 10:34), Max: 98.1 (09 Sep 2021 01:30)  HR: 88 (09 Sep 2021 10:34) (60 - 88)  BP: 149/71 (09 Sep 2021 10:34) (108/63 - 149/71)  BP(mean): --  RR: 18 (09 Sep 2021 10:34) (16 - 18)  SpO2: 99% (09 Sep 2021 10:34) (94% - 99%)    PHYSICAL EXAM  General: well-appearing adult in NAD  HEENT: bloody blisters x 2 in oral mucosa, anicteric sclera, pink conjunctiva  Neck: supple  CV: normal S1/S2 with no murmur rubs or gallops  Lungs: positive air movement b/l ant lungs, clear to auscultation, no wheezes, no rales  Abdomen: soft non-tender non-distended, no hepatosplenomegaly  Ext: no clubbing cyanosis, b/l 1+ LE edema  Skin: no rashes and no petechiae  Neuro: alert and oriented X 4, no focal deficits      LABS:                          7.4    6.16  )-----------( 18       ( 09 Sep 2021 05:16 )             23.8         Mean Cell Volume : 87.5 fl  Mean Cell Hemoglobin : 27.2 pg  Mean Cell Hemoglobin Concentration : 31.1 gm/dL  Auto Neutrophil # : x  Auto Lymphocyte # : x  Auto Monocyte # : x  Auto Eosinophil # : x  Auto Basophil # : x  Auto Neutrophil % : x  Auto Lymphocyte % : x  Auto Monocyte % : x  Auto Eosinophil % : x  Auto Basophil % : x      09-09    143  |  111<H>  |  27<H>  ----------------------------<  100<H>  3.6   |  21<L>  |  1.44<H>    Ca    8.7      09 Sep 2021 05:16    TPro  6.0  /  Alb  3.4  /  TBili  1.5<H>  /  DBili  x   /  AST  8<L>  /  ALT  8<L>  /  AlkPhos  120  09-08      PT/INR - ( 08 Sep 2021 21:27 )   PT: 16.2 sec;   INR: 1.37 ratio         PTT - ( 08 Sep 2021 21:27 )  PTT:36.6 sec      BLOOD SMEAR INTERPRETATION:       RADIOLOGY & ADDITIONAL STUDIES:  TTE 8/20/21  EF 45%   Conclusions:  1. Aortic valve not well visualized; appears calcified. Mild-moderate aortic regurgitation.  2. Severely dilated left atrium.  LA volume index = 56 cc/m2. 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  3. Endocardium not well visualized; mild left ventricular systolic dysfunction. Paradoxical septal motion consistent with paced rhythm.  4. Mild right ventricular enlargement with normal right ventricular systolic function. A device wire is noted in the right heart.  5. Small-moderate pericardial effusion adjacent to the right heart. The effusion measures approximately 1.0 cm inferior to the right heart. No echocardiographic evidence of pericardial tamponade.  6. Bilateral pleural effusions.  7. Echo-free space measuring approximately 4.0 cm x 3.7 cm is noted in the liver consistent with cyst, however, dedicated imaging recommended for further evaluation if clinically indicated.  *** No previous Echo exam.  ------------------------------------------------------------------------       HEMATOLOGY ONCOLOGY CONSULT     Patient is a 86y old  Male who presents with a chief complaint of shortness of breath (09 Sep 2021 05:38)      HPI:  85yo M w/ PMHx of afib s/p ablation w/ AICD (on coumadin but on hold due to severe thrombocytopenia), prostate CA, MDS (with frequent transfusions for anemia/thrombocytopenia) presents with shortness of breath, of note, patient was recently admitted to [facility] from 8/19-8/26 for anemia/thrombocytopenia, he received transfusions of plt and RBC and was switched from Revlimid to Promacta, since discharge he had felt overall better but reported worsening weakness and shortness of breath, he reports that he is no longer taking his promacta for unclear reasons, he saw an outpatient leukemia specialist after discharge and had lab work done and was informed that they demonstrated a leukemia and he should immediately go the ED for evaluation, they recommended [place] [facility] [place] but he chose to come to [facility] for evaluation since most of his care has been performed here, he reports some recent mouth sores but denies any confusion, change in urine output, chest pain, obvious signs of bleeding, in the ED, he was hemodynamically stable, afebrile, saturating well on room air, labs were significant for normocytic anemia lower than baseline, thrombocytopenia at baseline, 12.6% blasts, CXR showed small bilateral effusions, patient was given 40mg lasix x1 and 40mEq KCl x1 and admitted to general medicine for further management.   (09 Sep 2021 05:38)     Heme/Onc hx:   In January 2021, pt presented for evaluation for atypical cells seen on manual differential of the CBC. Initial blood work showed a platelet count of 118k and peripheral smear notable for nucleated reds. BMBx performed on 1/28/2021 (processed by [employer]) showed MDS with ring sideroblasts and multilineage dysplasia. 5% blasts. Cytogenetics were complex. +8(2)/44,XY,[id number]. Patent was followed and then developed anemia and thrombocytopenia in May 2021. Patient was then started on Revlimid 10mg on 5/17/2021 with stabilization for 2 months. Then anemia and thrombocytopenia worsened. He was sent to [facility] for evaluation. Patient saw Dr. Shanda Corea at [facility] on 9/8/2021. During evaluation, patient was found to have 23% blasts on the peripheral smear and was diagnosed with leukemia. He was offered to be hospitalized at [facility] but declined and decided to go to [facility] as he has been there before. Dr. Corea notified Dr. Zimmerman of peripheral smear findings and directed the patient to the ER at [facility].     Unfortunately, pt's heme/onc provider Dr. Zimmerman and partners do not manage leukemic patients, and heme/onc team is consulted for further inpatient leukemia management.     Of note, pt admitted 8/19-8/26 for symptomatic anemia requiring multiple units of blood transfusion. Pt also admitted 6/26-7/2 for proctocolitis and diverticulitis in s/o immunosupression from Revlimid.     ROS:  Negative except for: severe fatigue, SOB and LE edema reported to be at baseline.     PAST MEDICAL & SURGICAL HISTORY:  Hypertension    Afib    Osteoarthritis    Prostate cancer  treated with cyber knife 2016    Venous stasis    Varicose veins  legs    Gout  finger a long time ago    Cellulitis  right lower leg in past    Degenerative disc disease, lumbar    Edema  right leg    S/P total hip arthroplasty  2006, right    S/P cataract extraction  bilateral    S/P hernia repair  1996    Afib  cardiac ablation 1998; several pacemaker/AICD insertion and replacements    S/P tonsillectomy    S/P cholecystectomy  12/17      SOCIAL HISTORY:  Quit smoking cigarettes in 1962, smoked for 10 yrs, denies current alcohol or recreational drug use  Retired , no solvent benzene exposure    FAMILY HISTORY:  Family history of Alzheimer&#x27;s disease (Father)  Family history of essential hypertension (Father)  Family history of breast cancer (Mother)  Family history of prostate cancer (Sibling)    MEDICATIONS  (STANDING):  allopurinol 100 milliGRAM(s) Oral two times a day  doxazosin 4 milliGRAM(s) Oral at bedtime  furosemide   Injectable 20 milliGRAM(s) IV Push daily  influenza   Vaccine 0.5 milliLiter(s) IntraMuscular once  sotalol 120 milliGRAM(s) Oral two times a day    MEDICATIONS  (PRN):  melatonin 3 milliGRAM(s) Oral at bedtime PRN Insomnia    Allergies    adhesives (Rash)  clonidine (Swelling; Rash)  felodipine (Rash)  penicillin (Rash)  sulfa drugs (Rash)    Intolerances    Vital Signs Last 24 Hrs  T(C): 36.5 (09 Sep 2021 10:34), Max: 36.7 (08 Sep 2021 23:06)  T(F): 97.7 (09 Sep 2021 10:34), Max: 98.1 (09 Sep 2021 01:30)  HR: 88 (09 Sep 2021 10:34) (60 - 88)  BP: 149/71 (09 Sep 2021 10:34) (108/63 - 149/71)  BP(mean): --  RR: 18 (09 Sep 2021 10:34) (16 - 18)  SpO2: 99% (09 Sep 2021 10:34) (94% - 99%)    PHYSICAL EXAM  General: well-appearing adult in NAD  HEENT: bloody blisters x 2 in oral mucosa, anicteric sclera, pink conjunctiva  Neck: supple  CV: normal S1/S2 with no murmur rubs or gallops  Lungs: positive air movement b/l ant lungs, clear to auscultation, no wheezes, no rales  Abdomen: soft non-tender non-distended, no hepatosplenomegaly  Ext: no clubbing cyanosis, b/l 1+ LE edema  Skin: no rashes and no petechiae  Neuro: alert and oriented X 4, no focal deficits      LABS:                          7.4    6.16  )-----------( 18       ( 09 Sep 2021 05:16 )             23.8         Mean Cell Volume : 87.5 fl  Mean Cell Hemoglobin : 27.2 pg  Mean Cell Hemoglobin Concentration : 31.1 gm/dL  Auto Neutrophil # : x  Auto Lymphocyte # : x  Auto Monocyte # : x  Auto Eosinophil # : x  Auto Basophil # : x  Auto Neutrophil % : x  Auto Lymphocyte % : x  Auto Monocyte % : x  Auto Eosinophil % : x  Auto Basophil % : x      09-09    143  |  111<H>  |  27<H>  ----------------------------<  100<H>  3.6   |  21<L>  |  1.44<H>    Ca    8.7      09 Sep 2021 05:16    TPro  6.0  /  Alb  3.4  /  TBili  1.5<H>  /  DBili  x   /  AST  8<L>  /  ALT  8<L>  /  AlkPhos  120  09-08      PT/INR - ( 08 Sep 2021 21:27 )   PT: 16.2 sec;   INR: 1.37 ratio         PTT - ( 08 Sep 2021 21:27 )  PTT:36.6 sec      BLOOD SMEAR INTERPRETATION:       RADIOLOGY & ADDITIONAL STUDIES:  TTE 8/20/21  EF 45%   Conclusions:  1. Aortic valve not well visualized; appears calcified. Mild-moderate aortic regurgitation.  2. Severely dilated left atrium.  LA volume index = 56 cc/m2. 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  3. Endocardium not well visualized; mild left ventricular systolic dysfunction. Paradoxical septal motion consistent with paced rhythm.  4. Mild right ventricular enlargement with normal right ventricular systolic function. A device wire is noted in the right heart.  5. Small-moderate pericardial effusion adjacent to the right heart. The effusion measures approximately 1.0 cm inferior to the right heart. No echocardiographic evidence of pericardial tamponade.  6. Bilateral pleural effusions.  7. Echo-free space measuring approximately 4.0 cm x 3.7 cm is noted in the liver consistent with cyst, however, dedicated imaging recommended for further evaluation if clinically indicated.  *** No previous Echo exam.  ------------------------------------------------------------------------       HEMATOLOGY ONCOLOGY CONSULT     Patient is a 86y old  Male who presents with a chief complaint of shortness of breath (09 Sep 2021 05:38)      HPI:  87yo M w/ PMHx of afib s/p ablation w/ AICD (on coumadin but on hold due to severe thrombocytopenia), prostate CA, MDS (with frequent transfusions for anemia/thrombocytopenia) presents with shortness of breath, of note, patient was recently admitted to [facility] from 8/19-8/26 for anemia/thrombocytopenia, he received transfusions of plt and RBC and was switched from Revlimid to Promacta, since discharge he had felt overall better but reported worsening weakness and shortness of breath, he reports that he is no longer taking his promacta for unclear reasons, he saw an outpatient leukemia specialist after discharge and had lab work done and was informed that they demonstrated a leukemia and he should immediately go the ED for evaluation, they recommended he go to [facility] ED but he chose to come to [facility] for evaluation since most of his care has been performed here, he reports some recent mouth sores but denies any confusion, change in urine output, chest pain, obvious signs of bleeding, in the ED, he was hemodynamically stable, afebrile, saturating well on room air, labs were significant for normocytic anemia lower than baseline, thrombocytopenia at baseline, 12.6% blasts, CXR showed small bilateral effusions, patient was given 40mg lasix x1 and 40mEq KCl x1 and admitted to general medicine for further management.   (09 Sep 2021 05:38)     Heme/Onc hx:   In January 2021, pt presented for evaluation for atypical cells seen on manual differential of the CBC. Initial blood work showed a platelet count of 118k and peripheral smear notable for nucleated reds. BMBx performed on 1/28/2021 (processed by [employer]) showed MDS with ring sideroblasts and multilineage dysplasia. 5% blasts. Cytogenetics were complex. +8(2)/44,XY,[id number]. Patent was followed and then developed anemia and thrombocytopenia in May 2021. Patient was then started on Revlimid 10mg on 5/17/2021 with stabilization for 2 months. Then anemia and thrombocytopenia worsened. He was sent to [facility] [place]. Patient saw Dr. Shanda Corea at [facility] on 9/8/2021. During evaluation, patient was found to have 23% blasts on the peripheral smear and was diagnosed with leukemia. He was offered to be hospitalized at [facility] but declined and decided to go to [facility] as he has been there before. Dr. Corea notified Dr. Zimmerman of peripheral smear findings and directed the patient to the ER at [facility].     Unfortunately, pt's heme/onc provider Dr. Zimmerman and partners do not manage leukemic patients, and heme/onc team is consulted for further inpatient leukemia management.     Of note, pt admitted 8/19-8/26 for symptomatic anemia requiring multiple units of blood transfusion. Pt also admitted 6/26-7/2 for proctocolitis and diverticulitis in s/o immunosupression from Revlimid.     ROS:  Negative except for: severe fatigue, SOB and LE edema reported to be at baseline.     PAST MEDICAL & SURGICAL HISTORY:  Hypertension    Afib    Osteoarthritis    Prostate cancer  treated with cyber knife 2016    Venous stasis    Varicose veins  legs    Gout  finger a long time ago    Cellulitis  right lower leg in past    Degenerative disc disease, lumbar    Edema  right leg    S/P total hip arthroplasty  2006, right    S/P cataract extraction  bilateral    S/P hernia repair  1996    Afib  cardiac ablation 1998; several pacemaker/AICD insertion and replacements    S/P tonsillectomy    S/P cholecystectomy  12/17      SOCIAL HISTORY:  Quit smoking cigarettes in 1962, smoked for 10 yrs, denies current alcohol or recreational drug use  Retired , no solvent benzene exposure    FAMILY HISTORY:  Family history of Alzheimer&#x27;s disease (Father)  Family history of essential hypertension (Father)  Family history of breast cancer (Mother)  Family history of prostate cancer (Sibling)    MEDICATIONS  (STANDING):  allopurinol 100 milliGRAM(s) Oral two times a day  doxazosin 4 milliGRAM(s) Oral at bedtime  furosemide   Injectable 20 milliGRAM(s) IV Push daily  influenza   Vaccine 0.5 milliLiter(s) IntraMuscular once  sotalol 120 milliGRAM(s) Oral two times a day    MEDICATIONS  (PRN):  melatonin 3 milliGRAM(s) Oral at bedtime PRN Insomnia    Allergies    adhesives (Rash)  clonidine (Swelling; Rash)  felodipine (Rash)  penicillin (Rash)  sulfa drugs (Rash)    Intolerances    Vital Signs Last 24 Hrs  T(C): 36.5 (09 Sep 2021 10:34), Max: 36.7 (08 Sep 2021 23:06)  T(F): 97.7 (09 Sep 2021 10:34), Max: 98.1 (09 Sep 2021 01:30)  HR: 88 (09 Sep 2021 10:34) (60 - 88)  BP: 149/71 (09 Sep 2021 10:34) (108/63 - 149/71)  BP(mean): --  RR: 18 (09 Sep 2021 10:34) (16 - 18)  SpO2: 99% (09 Sep 2021 10:34) (94% - 99%)    PHYSICAL EXAM  General: well-appearing adult in NAD  HEENT: bloody blisters x 2 in oral mucosa, anicteric sclera, pink conjunctiva  Neck: supple  CV: normal S1/S2 with no murmur rubs or gallops  Lungs: positive air movement b/l ant lungs, clear to auscultation, no wheezes, no rales  Abdomen: soft non-tender non-distended, no hepatosplenomegaly  Ext: no clubbing cyanosis, b/l 1+ LE edema  Skin: no rashes and no petechiae  Neuro: alert and oriented X 4, no focal deficits      LABS:                          7.4    6.16  )-----------( 18       ( 09 Sep 2021 05:16 )             23.8         Mean Cell Volume : 87.5 fl  Mean Cell Hemoglobin : 27.2 pg  Mean Cell Hemoglobin Concentration : 31.1 gm/dL  Auto Neutrophil # : x  Auto Lymphocyte # : x  Auto Monocyte # : x  Auto Eosinophil # : x  Auto Basophil # : x  Auto Neutrophil % : x  Auto Lymphocyte % : x  Auto Monocyte % : x  Auto Eosinophil % : x  Auto Basophil % : x      09-09    143  |  111<H>  |  27<H>  ----------------------------<  100<H>  3.6   |  21<L>  |  1.44<H>    Ca    8.7      09 Sep 2021 05:16    TPro  6.0  /  Alb  3.4  /  TBili  1.5<H>  /  DBili  x   /  AST  8<L>  /  ALT  8<L>  /  AlkPhos  120  09-08      PT/INR - ( 08 Sep 2021 21:27 )   PT: 16.2 sec;   INR: 1.37 ratio         PTT - ( 08 Sep 2021 21:27 )  PTT:36.6 sec    BLOOD SMEAR INTERPRETATION:     RADIOLOGY & ADDITIONAL STUDIES:  TTE 8/20/21  EF 45%   Conclusions:  1. Aortic valve not well visualized; appears calcified. Mild-moderate aortic regurgitation.  2. Severely dilated left atrium.  LA volume index = 56 cc/m2. 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  3. Endocardium not well visualized; mild left ventricular systolic dysfunction. Paradoxical septal motion consistent with paced rhythm.  4. Mild right ventricular enlargement with normal right ventricular systolic function. A device wire is noted in the right heart.  5. Small-moderate pericardial effusion adjacent to the right heart. The effusion measures approximately 1.0 cm inferior to the right heart. No echocardiographic evidence of pericardial tamponade.  6. Bilateral pleural effusions.  7. Echo-free space measuring approximately 4.0 cm x 3.7 cm is noted in the liver consistent with cyst, however, dedicated imaging recommended for further evaluation if clinically indicated.  *** No previous Echo exam.  ------------------------------------------------------------------------       HEMATOLOGY ONCOLOGY CONSULT     Patient is a 86y old  Male who presents with a chief complaint of shortness of breath (09 Sep 2021 05:38)      HPI:  85yo M w/ PMHx of afib s/p ablation w/ AICD (on coumadin but on hold due to severe thrombocytopenia), prostate CA, MDS (with frequent transfusions for anemia/thrombocytopenia) presents with shortness of breath, of note, patient was recently admitted to [facility] from 8/19-8/26 for anemia/thrombocytopenia, he received transfusions of plt and RBC and was switched from Revlimid to Promacta, since discharge he had felt overall better but reported worsening weakness and shortness of breath, he reports that he is no longer taking his promacta for unclear reasons, he saw an outpatient leukemia specialist after discharge and had lab work done and was informed that they demonstrated a leukemia and he should immediately go the ED for evaluation, they recommended he go to [facility] ED but he chose to come to [facility] for evaluation since most of his care has been performed here, he reports some recent mouth sores but denies any confusion, change in urine output, chest pain, obvious signs of bleeding, in the ED, he was hemodynamically stable, afebrile, saturating well on room air, labs were significant for normocytic anemia lower than baseline, thrombocytopenia at baseline, 12.6% blasts, CXR showed small bilateral effusions, patient was given 40mg lasix x1 and 40mEq KCl x1 and admitted to general medicine for further management.   (09 Sep 2021 05:38)     Heme/Onc hx:   In January 2021, pt presented for evaluation for atypical cells seen on manual differential of the CBC. Initial blood work showed a platelet count of 118k and peripheral smear notable for nucleated reds. 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 5% blasts. Cytogenetics were complex. +8(2)/44,[id number]. Patent was followed and then developed anemia and thrombocytopenia in May 2021. Patient was then started on Revlimid 10mg on 5/17/2021 with stabilization for 2 months. Then anemia and thrombocytopenia worsened. He was sent to [facility] for evaluation. Patient saw Dr. Shanda Corea at [facility] on 9/8/2021. During evaluation, patient was found to have 23% blasts on the peripheral smear and was diagnosed with leukemia. He was offered to be hospitalized at [facility] but declined and decided to go to [facility] as he has been there before. Dr. Corea notified Dr. Zimmerman of peripheral smear findings and directed the patient to the ER at [facility].     Unfortunately, pt's heme/onc provider Dr. Zimmerman and partners do not manage leukemic patients, and heme/onc team is consulted for further inpatient leukemia management.     Of note, pt admitted 8/19-8/26 for symptomatic anemia requiring multiple units of blood transfusion. Pt also admitted 6/26-7/2 for proctocolitis and diverticulitis in s/o immunosupression from Revlimid.     ROS:  Negative except for: severe fatigue, SOB and LE edema reported to be at baseline.     PAST MEDICAL & SURGICAL HISTORY:  Hypertension    Afib    Osteoarthritis    Prostate cancer  treated with cyber knife 2016    Venous stasis    Varicose veins  legs    Gout  finger a long time ago    Cellulitis  right lower leg in past    Degenerative disc disease, lumbar    Edema  right leg    S/P total hip arthroplasty  2006, right    S/P cataract extraction  bilateral    S/P hernia repair  1996    Afib  cardiac ablation 1998; several pacemaker/AICD insertion and replacements    S/P tonsillectomy    S/P cholecystectomy  12/17      SOCIAL HISTORY:  Quit smoking cigarettes in 1962, smoked for 10 yrs, denies current alcohol or recreational drug use  Retired , no solvent benzene exposure    FAMILY HISTORY:  Family history of Alzheimer&#x27;s disease (Father)  Family history of essential hypertension (Father)  Family history of breast cancer (Mother)  Family history of prostate cancer (Sibling)    MEDICATIONS  (STANDING):  allopurinol 100 milliGRAM(s) Oral two times a day  doxazosin 4 milliGRAM(s) Oral at bedtime  furosemide   Injectable 20 milliGRAM(s) IV Push daily  influenza   Vaccine 0.5 milliLiter(s) IntraMuscular once  sotalol 120 milliGRAM(s) Oral two times a day    MEDICATIONS  (PRN):  melatonin 3 milliGRAM(s) Oral at bedtime PRN Insomnia    Allergies    adhesives (Rash)  clonidine (Swelling; Rash)  felodipine (Rash)  penicillin (Rash)  sulfa drugs (Rash)    Intolerances    Vital Signs Last 24 Hrs  T(C): 36.5 (09 Sep 2021 10:34), Max: 36.7 (08 Sep 2021 23:06)  T(F): 97.7 (09 Sep 2021 10:34), Max: 98.1 (09 Sep 2021 01:30)  HR: 88 (09 Sep 2021 10:34) (60 - 88)  BP: 149/71 (09 Sep 2021 10:34) (108/63 - 149/71)  BP(mean): --  RR: 18 (09 Sep 2021 10:34) (16 - 18)  SpO2: 99% (09 Sep 2021 10:34) (94% - 99%)    PHYSICAL EXAM  General: well-appearing adult in NAD  HEENT: bloody blisters x 2 in oral mucosa, anicteric sclera, pink conjunctiva  Neck: supple  CV: normal S1/S2 with no murmur rubs or gallops  Lungs: positive air movement b/l ant lungs, clear to auscultation, no wheezes, no rales  Abdomen: soft non-tender non-distended, no hepatosplenomegaly  Ext: no clubbing cyanosis, b/l 1+ LE edema  Skin: no rashes and no petechiae  Neuro: alert and oriented X 4, no focal deficits      LABS:                          7.4    6.16  )-----------( 18       ( 09 Sep 2021 05:16 )             23.8         Mean Cell Volume : 87.5 fl  Mean Cell Hemoglobin : 27.2 pg  Mean Cell Hemoglobin Concentration : 31.1 gm/dL  Auto Neutrophil # : x  Auto Lymphocyte # : x  Auto Monocyte # : x  Auto Eosinophil # : x  Auto Basophil # : x  Auto Neutrophil % : x  Auto Lymphocyte % : x  Auto Monocyte % : x  Auto Eosinophil % : x  Auto Basophil % : x      09-09    143  |  111<H>  |  27<H>  ----------------------------<  100<H>  3.6   |  21<L>  |  1.44<H>    Ca    8.7      09 Sep 2021 05:16    TPro  6.0  /  Alb  3.4  /  TBili  1.5<H>  /  DBili  x   /  AST  8<L>  /  ALT  8<L>  /  AlkPhos  120  09-08      PT/INR - ( 08 Sep 2021 21:27 )   PT: 16.2 sec;   INR: 1.37 ratio         PTT - ( 08 Sep 2021 21:27 )  PTT:36.6 sec    BLOOD SMEAR INTERPRETATION:     RADIOLOGY & ADDITIONAL STUDIES:  TTE 8/20/21  EF 45%   Conclusions:  1. Aortic valve not well visualized; appears calcified. Mild-moderate aortic regurgitation.  2. Severely dilated left atrium.  LA volume index = 56 cc/m2. 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  3. Endocardium not well visualized; mild left ventricular systolic dysfunction. Paradoxical septal motion consistent with paced rhythm.  4. Mild right ventricular enlargement with normal right ventricular systolic function. A device wire is noted in the right heart.  5. Small-moderate pericardial effusion adjacent to the right heart. The effusion measures approximately 1.0 cm inferior to the right heart. No echocardiographic evidence of pericardial tamponade.  6. Bilateral pleural effusions.  7. Echo-free space measuring approximately 4.0 cm x 3.7 cm is noted in the liver consistent with cyst, however, dedicated imaging recommended for further evaluation if clinically indicated.  *** No previous Echo exam.  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 The lymphocyte immunophenotypic findings show no diagnostic abnormalities with lymphopenia.     Immunochemical stains: increase in CD34 positive blasts (less than 4%) also positive with . Maturing and mature myeloid (CD15, myeloperoxidase), and erythroid elements (CD71), with few clusters of pronormoblasts (+ for e-cadherin and ). Megakaryocytes do not appear increased with few hypolobulated forms (+ for factor VIII and CD61).    Microscopic:  Biopsy: mild hypercellularity (40-60%), trilineage hematopoiesis with maturation, hypolobulated neutrophils, normal M:E ratio, erythroid maturation present with increased pronormoblasts, megakaryocytes normal in number and morphology, and iron stores present.     Aspirate: cellular spicules present. Maturing and mature myeloid and erythroid elements present with normal M:E ratio (2.6:1). Myeloid elements show hypogranularity and hypolobulation. Erythroid elements show mild dyserythropoiesis, increaesd pronormoblasts, basophilic stippling, nuclear budding. Megakaryocytes appear at least normal in number with normal morphology. Hemosiderin laden macrophages and sea-blue histiocytes are present.

## 2021-09-09 NOTE — H&P ADULT - PROBLEM SELECTOR PLAN 6
-patient reports taking 200mg daily allopurinol for gout, would consider lowering dose in setting of CKD however would first confirm patient is not taking allopurinol for a disease other than or in addition to gout

## 2021-09-09 NOTE — PROCEDURE NOTE - ADDITIONAL PROCEDURE DETAILS
Time out was performed to confirm patient identification with two identifiers. The area to be biopsied was located using anatomic landmarks. The right posterior superior iliac crest was sterilely prepped and draped in the usual manner with povidone iodine swabs. Using sterile technique, 2% lidocaine was generously administered to the tissue and periosteum, with confirmation by the patient that we had achieved adequate analgesia. Aspirate needle was inserted, and bone marrow aspirate was obtained with confirmation of adequate spicules. The aspirate needle was withdrawn. Bone marrow biopsy was performed with adequate marrow specimen obtained. Biopsy needle was removed, and there was no significant post-procedural bleeding or immediate complications. The area was covered with gauze and an adhesive bandage. The patient was given post-procedural instructions, including not to shower until tomorrow and, if possible, to lie down with pressure on the biopsy location. The patient tolerated the procedure well. Specimens were sent for pathology examination, flow cytometry, cytogenetics, and next generation sequencing ([employer]).

## 2021-09-10 NOTE — PROGRESS NOTE ADULT - ASSESSMENT
87 y/o M w/ PMHx of afib s/p ablation w/ AICD (on coumadin but on hold due to severe thrombocytopenia), prostate CA, MDS with complex cytogenetics refractory to Revlimid, found to have elevated blasts in peripheral blood from outpatient work up, advised to seek inpatient treatment for likely MDS progression to leukemia

## 2021-09-10 NOTE — PROGRESS NOTE ADULT - SUBJECTIVE AND OBJECTIVE BOX
Patient is a 86y old  Male who presents with a chief complaint of shortness of breath (10 Sep 2021 07:57)      INTERVAL HPI/OVERNIGHT EVENTS: noted  pt seen and examined this am   events noted  sob and fatigue on min exertion  feels well at rest    Vital Signs Last 24 Hrs  T(C): 37.7 (10 Sep 2021 14:00), Max: 37.7 (10 Sep 2021 14:00)  T(F): 99.9 (10 Sep 2021 14:00), Max: 99.9 (10 Sep 2021 14:00)  HR: 60 (10 Sep 2021 14:00) (60 - 88)  BP: 126/68 (10 Sep 2021 14:00) (126/68 - 151/71)  BP(mean): --  RR: 18 (10 Sep 2021 14:00) (18 - 18)  SpO2: 96% (10 Sep 2021 14:00) (94% - 99%)    allopurinol 100 milliGRAM(s) Oral two times a day  doxazosin 4 milliGRAM(s) Oral at bedtime  furosemide   Injectable 20 milliGRAM(s) IV Push daily  influenza   Vaccine 0.5 milliLiter(s) IntraMuscular once  melatonin 3 milliGRAM(s) Oral at bedtime PRN  phytonadione   Solution 5 milliGRAM(s) Oral daily  sotalol 120 milliGRAM(s) Oral two times a day      PHYSICAL EXAM:  GENERAL: NAD,   EYES: conjunctiva and sclera clear  ENMT: Moist mucous membranes  NECK: Supple, No JVD, Normal thyroid  CHEST/LUNG: non labored, cta b/l  HEART: Regular rate and rhythm; No murmurs, rubs, or gallops  ABDOMEN: Soft, Nontender, Nondistended; Bowel sounds present  EXTREMITIES:  2+ Peripheral Pulses, No clubbing, cyanosis, or edema  LYMPH: No lymphadenopathy noted  SKIN: No rashes or lesions    Consultant(s) Notes Reviewed:  [x ] YES  [ ] NO  Care Discussed with Consultants/Other Providers [ x] YES  [ ] NO    LABS:                        6.8    3.90  )-----------( 16       ( 10 Sep 2021 10:16 )             22.3     09-10    145  |  110<H>  |  28<H>  ----------------------------<  93  3.8   |  23  |  1.45<H>    Ca    8.8      10 Sep 2021 10:16  Phos  2.8     09-10  Mg     1.9     09-10    TPro  5.6<L>  /  Alb  3.2<L>  /  TBili  1.6<H>  /  DBili  x   /  AST  6<L>  /  ALT  7<L>  /  AlkPhos  105  09-10    PT/INR - ( 10 Sep 2021 10:16 )   PT: 16.7 sec;   INR: 1.41 ratio         PTT - ( 10 Sep 2021 10:16 )  PTT:37.1 sec    CAPILLARY BLOOD GLUCOSE                  RADIOLOGY & ADDITIONAL TESTS:    Imaging Personally Reviewed:  [x ] YES  [ ] NO

## 2021-09-10 NOTE — PROGRESS NOTE ADULT - ATTENDING COMMENTS
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  Bone Marrow Bx 9/9/21 -- PENDING  Plan for likely decitabine and bereket after marrow results finalized  Today is Day 0    - Afebrile since admission  - No need for imaging at this time, CT imaging in June 2021, had splenomegaly and proctocolitis, chronic pancreatitis  - CXR 9/9: small pleural effusions bilaterally  - Echo 8/20: LVEF 45%, small-moderate pericardial effusion  - Monitor strict I/Os, daily weights and will diurese PRN for fluid overload, on home regimen of lasix, continue  - OOB/ambulate

## 2021-09-10 NOTE — PROGRESS NOTE ADULT - PROBLEM SELECTOR PLAN 2
-acute anemia in setting of MDS, previously refractory to Revlimid, off-label Promacta was initiated on previous hospitalization but was stopped, labs showing thrombocytopenia at baseline, acute on chronic normocytic anemia and blasts 12.6%   -hematology consult noted (previously seen by Dr. Zimmerman inpatient, follows with Dr. Haile outpatient- given leukemic transformation -pt transferred to [facility] care  -transfuse Hg >7, plt >10 pending hematology input, lasix with transfusions PRN

## 2021-09-10 NOTE — PROGRESS NOTE ADULT - PROBLEM SELECTOR PLAN 1
-likely multifactorial in the setting of anemia and fluid overload secondary to CHF  -no hypoxia or chest pain to suggest PE, no signs of pneumonia  -continue with IV diuresis (monitor Cr and electrolytes) 20mg IV lasix daily   -strict I/O's, fluid restriction   --recent echo 8/2021 with EF 45%  -recent bilateral lower extremity duplex with no signs of DVT  today severe anemia hb 6.8- heme cs noted  transfuse 1/2 unit at a time

## 2021-09-10 NOTE — PROGRESS NOTE ADULT - PROBLEM SELECTOR PLAN 1
R/O AML -  thrombocytopenia and anemia;  Blasts 12.6% (23% as OP)  9/9 Transferred to [facility]  - h/o MDS refractory to Revlimid, initially stable however in recent 2-3 months recurrent thrombocytopenia and symptomatic anemia  - F/U flow cytometry (PB) sent 9/9   - trend daily TLS labs: CMP, Phos, Uric acid, LDH  - Check G6PD  - 9/9 Bone Marrow Bx - F/U results  - Check TTE, consider PICC line  - r/o DIC and hemolysis: send fibrinogen, co-ags, hapto, LDH R/O AML -  thrombocytopenia and anemia;  Blasts 12.6% (23% as OP)  9/9 Transferred to [facility]  - h/o MDS refractory to Revlimid, initially stable however in recent 2-3 months recurrent thrombocytopenia and symptomatic anemia  - F/U flow cytometry (PB) sent 9/9   - trend daily TLS labs: CMP, Phos, Uric acid, LDH  - Check G6PD   - consider PICC line  - Had recent TTe, EF 49%  - 9/9 Bone Marrow Bx - F/U results

## 2021-09-10 NOTE — PROGRESS NOTE ADULT - SUBJECTIVE AND OBJECTIVE BOX
Diagnosis: R/O Leukemia    Protocol/Chemo Regimen:    Day:     Subjective:    Review of Systems:    Pain scale:                     Diet:     Allergies    adhesives (Rash)  clonidine (Swelling; Rash)  felodipine (Rash)  penicillin (Rash)  sulfa drugs (Rash)    Intolerances      MEDICATIONS  (STANDING):  allopurinol 100 milliGRAM(s) Oral two times a day  doxazosin 4 milliGRAM(s) Oral at bedtime  furosemide   Injectable 20 milliGRAM(s) IV Push daily  influenza   Vaccine 0.5 milliLiter(s) IntraMuscular once  lidocaine 2% Injectable 20 milliLiter(s) Local Injection once  sotalol 120 milliGRAM(s) Oral two times a day    MEDICATIONS  (PRN):  melatonin 3 milliGRAM(s) Oral at bedtime PRN Insomnia    Vital Signs Last 24 Hrs  T(C): 36.3 (10 Sep 2021 05:03), Max: 36.7 (09 Sep 2021 17:37)  T(F): 97.4 (10 Sep 2021 05:03), Max: 98.1 (09 Sep 2021 17:37)  HR: 60 (10 Sep 2021 05:03) (60 - 88)  BP: 127/65 (10 Sep 2021 05:03) (127/65 - 151/71)  BP(mean): --  RR: 18 (10 Sep 2021 05:03) (18 - 18)  SpO2: 96% (10 Sep 2021 05:03) (94% - 99%)    PHYSICAL EXAM  General:   HEENT:   Neck:   CV:   Abdomen:   Ext:   Skin:  Neuro:   Central line: PIV    LABS:             -----      RADIOLOGY & ADDITIONAL STUDIES:    < from: Xray Chest 1 View AP/PA (09.08.21 @ 22:34) >  IMPRESSION:  Small bilateral pleural effusions with associated bibasilar passive atelectasis. Underlying pneumonia is not excluded.  Lucency along the right pleura is likely artifactual, secondary to a skin fold, however short interval repeat chest x-ray may be obtained to exclude pneumothorax.       Diagnosis: R/O Leukemia    Protocol/Chemo Regimen: TBD    Day: n/a    Subjective: No overnight events, taking some po's    Review of Systems: Denies n/v, STEINER, SOB, HA or dizziness    Pain scale: 0/10                    Diet: Regular    Allergies  adhesives (Rash)  clonidine (Swelling; Rash)  felodipine (Rash)  penicillin (Rash)  sulfa drugs (Rash)    Intolerances      MEDICATIONS  (STANDING):  allopurinol 100 milliGRAM(s) Oral two times a day  doxazosin 4 milliGRAM(s) Oral at bedtime  furosemide   Injectable 20 milliGRAM(s) IV Push daily  influenza   Vaccine 0.5 milliLiter(s) IntraMuscular once  lidocaine 2% Injectable 20 milliLiter(s) Local Injection once  sotalol 120 milliGRAM(s) Oral two times a day    MEDICATIONS  (PRN):  melatonin 3 milliGRAM(s) Oral at bedtime PRN Insomnia    Vital Signs Last 24 Hrs  T(C): 36.3 (10 Sep 2021 05:03), Max: 36.7 (09 Sep 2021 17:37)  T(F): 97.4 (10 Sep 2021 05:03), Max: 98.1 (09 Sep 2021 17:37)  HR: 60 (10 Sep 2021 05:03) (60 - 88)  BP: 127/65 (10 Sep 2021 05:03) (127/65 - 151/71)  BP(mean): --  RR: 18 (10 Sep 2021 05:03) (18 - 18)  SpO2: 96% (10 Sep 2021 05:03) (94% - 99%)    PHYSICAL EXAM  General: Sitting up in bed in NAD  HEENT: PERRL, sclerae anicteric,   CV: +S1, S2, reg  Abdomen: +BS, soft, NT/ND  Ext: no edema BLE's  Skin: No rashes or ecchymosis  Neuro: A&O x 3, non focal  Central line: PIV    LABS:                                  6.8    3.90  )-----------( 16       ( 10 Sep 2021 10:16 )             22.3     10 Sep 2021 10:16    145    |  110    |  28     ----------------------------<  93     3.8     |  23     |  1.45     Ca    8.8        10 Sep 2021 10:16  Phos  2.8       10 Sep 2021 10:16  Mg     1.9       10 Sep 2021 10:16    TPro  5.6    /  Alb  3.2    /  TBili  1.6    /  DBili  x      /  AST  6      /  ALT  7      /  AlkPhos  105    10 Sep 2021 10:16    PT/INR - ( 10 Sep 2021 10:16 )   PT: 16.7 sec;   INR: 1.41 ratio    PTT - ( 10 Sep 2021 10:16 )  PTT:37.1 sec      LIVER FUNCTIONS - ( 10 Sep 2021 10:16 )  Alb: 3.2 g/dL / Pro: 5.6 g/dL / ALK PHOS: 105 U/L / ALT: 7 U/L / AST: 6 U/L / GGT: x               RADIOLOGY & ADDITIONAL STUDIES:    < from: Xray Chest 1 View AP/PA (09.08.21 @ 22:34) >  IMPRESSION:  Small bilateral pleural effusions with associated bibasilar passive atelectasis. Underlying pneumonia is not excluded.  Lucency along the right pleura is likely artifactual, secondary to a skin fold, however short interval repeat chest x-ray may be obtained to exclude pneumothorax.

## 2021-09-11 NOTE — PROGRESS NOTE ADULT - PROBLEM SELECTOR PLAN 2
Not neutropenic  Recently a/w  diverticulitis (6/26-7/2)  Pancx, CXR consider abx if pt spikes temp +Neutropenic, afebrile  Posaconazole for neutropenia ppx. Levaquin has a major interaction with Sotalol. WIll consider Augmentin for antimicrobial ppx, pt wants to avoid this (+rash >50yrs ago). May need to start Aztreonam +/- Vancomycin.   Recently a/w  diverticulitis (6/26-7/2)  Pancx, CXR consider abx if pt spikes temp

## 2021-09-11 NOTE — PROGRESS NOTE ADULT - SUBJECTIVE AND OBJECTIVE BOX
Diagnosis: R/O Leukemia    Protocol/Chemo Regimen: TBD    Day: n/a    Subjective: No overnight events, taking some po's    Review of Systems: Denies n/v, STEINER, SOB, HA or dizziness    Pain scale: 0/10                    Diet: Regular    Allergies  adhesives (Rash)  clonidine (Swelling; Rash)  felodipine (Rash)  penicillin (Rash)  sulfa drugs (Rash)    Intolerances      MEDICATIONS  (STANDING):  allopurinol 100 milliGRAM(s) Oral two times a day  doxazosin 4 milliGRAM(s) Oral at bedtime  furosemide   Injectable 20 milliGRAM(s) IV Push daily  influenza   Vaccine 0.5 milliLiter(s) IntraMuscular once  lidocaine 2% Injectable 20 milliLiter(s) Local Injection once  sotalol 120 milliGRAM(s) Oral two times a day    MEDICATIONS  (PRN):  melatonin 3 milliGRAM(s) Oral at bedtime PRN Insomnia    Vital Signs Last 24 Hrs  T(C): 36.3 (10 Sep 2021 05:03), Max: 36.7 (09 Sep 2021 17:37)  T(F): 97.4 (10 Sep 2021 05:03), Max: 98.1 (09 Sep 2021 17:37)  HR: 60 (10 Sep 2021 05:03) (60 - 88)  BP: 127/65 (10 Sep 2021 05:03) (127/65 - 151/71)  BP(mean): --  RR: 18 (10 Sep 2021 05:03) (18 - 18)  SpO2: 96% (10 Sep 2021 05:03) (94% - 99%)    PHYSICAL EXAM  General: Sitting up in bed in NAD  HEENT: PERRL, sclerae anicteric,   CV: +S1, S2, reg  Abdomen: +BS, soft, NT/ND  Ext: no edema BLE's  Skin: No rashes or ecchymosis  Neuro: A&O x 3, non focal  Central line: PIV    LABS:                                            6.8    4.07  )-----------( 15       ( 11 Sep 2021 07:05 )             21.5     11 Sep 2021 07:05    143    |  112    |  31     ----------------------------<  96     3.7     |  22     |  1.56     Ca    8.2        11 Sep 2021 07:05  Phos  3.1       11 Sep 2021 07:05  Mg     1.8       11 Sep 2021 07:05    TPro  5.1    /  Alb  2.9    /  TBili  1.7    /  DBili  x      /  AST  6      /  ALT  5      /  AlkPhos  97     11 Sep 2021 07:05    PT/INR - ( 10 Sep 2021 10:16 )   PT: 16.7 sec;   INR: 1.41 ratio         PTT - ( 10 Sep 2021 10:16 )  PTT:37.1 sec  CAPILLARY BLOOD GLUCOSE        LIVER FUNCTIONS - ( 11 Sep 2021 07:05 )  Alb: 2.9 g/dL / Pro: 5.1 g/dL / ALK PHOS: 97 U/L / ALT: 5 U/L / AST: 6 U/L / GGT: x                     RADIOLOGY & ADDITIONAL STUDIES:    < from: Xray Chest 1 View AP/PA (09.08.21 @ 22:34) >  IMPRESSION:  Small bilateral pleural effusions with associated bibasilar passive atelectasis. Underlying pneumonia is not excluded.  Lucency along the right pleura is likely artifactual, secondary to a skin fold, however short interval repeat chest x-ray may be obtained to exclude pneumothorax.       Diagnosis: R/O Leukemia    Protocol/Chemo Regimen: TBD    Day: n/a    Subjective: No overnight events, taking some po's  Confirmed reaction to PCN (>50 yrs ago) - rash    Review of Systems: Denies n/v, STEINER, SOB, HA or dizziness    Pain scale: 0/10                    Diet: Regular    Allergies  adhesives (Rash)  clonidine (Swelling; Rash)  felodipine (Rash)  penicillin (Rash)  sulfa drugs (Rash)    Intolerances      MEDICATIONS  (STANDING):  allopurinol 100 milliGRAM(s) Oral two times a day  doxazosin 4 milliGRAM(s) Oral at bedtime  furosemide   Injectable 20 milliGRAM(s) IV Push daily  influenza   Vaccine 0.5 milliLiter(s) IntraMuscular once  lidocaine 2% Injectable 20 milliLiter(s) Local Injection once  sotalol 120 milliGRAM(s) Oral two times a day    MEDICATIONS  (PRN):  melatonin 3 milliGRAM(s) Oral at bedtime PRN Insomnia    Vital Signs Last 24 Hrs  T(C): 36.3 (10 Sep 2021 05:03), Max: 36.7 (09 Sep 2021 17:37)  T(F): 97.4 (10 Sep 2021 05:03), Max: 98.1 (09 Sep 2021 17:37)  HR: 60 (10 Sep 2021 05:03) (60 - 88)  BP: 127/65 (10 Sep 2021 05:03) (127/65 - 151/71)  BP(mean): --  RR: 18 (10 Sep 2021 05:03) (18 - 18)  SpO2: 96% (10 Sep 2021 05:03) (94% - 99%)    PHYSICAL EXAM  General: Sitting up in bed in NAD  HEENT: PERRL, sclerae anicteric,   CV: +S1, S2, reg  Abdomen: +BS, soft, NT/ND  Ext: no edema BLE's  Skin: No rashes or ecchymosis  Neuro: A&O x 3, non focal  Central line: PIV    LABS:                                            6.8    4.07  )-----------( 15       ( 11 Sep 2021 07:05 )             21.5     11 Sep 2021 07:05    143    |  112    |  31     ----------------------------<  96     3.7     |  22     |  1.56     Ca    8.2        11 Sep 2021 07:05  Phos  3.1       11 Sep 2021 07:05  Mg     1.8       11 Sep 2021 07:05    TPro  5.1    /  Alb  2.9    /  TBili  1.7    /  DBili  x      /  AST  6      /  ALT  5      /  AlkPhos  97     11 Sep 2021 07:05    PT/INR - ( 10 Sep 2021 10:16 )   PT: 16.7 sec;   INR: 1.41 ratio    PTT - ( 10 Sep 2021 10:16 )  PTT:37.1 sec    LIVER FUNCTIONS - ( 11 Sep 2021 07:05 )  Alb: 2.9 g/dL / Pro: 5.1 g/dL / ALK PHOS: 97 U/L / ALT: 5 U/L / AST: 6 U/L / GGT: x                 RADIOLOGY & ADDITIONAL STUDIES:    < from: Xray Chest 1 View AP/PA (09.08.21 @ 22:34) >  IMPRESSION:  Small bilateral pleural effusions with associated bibasilar passive atelectasis. Underlying pneumonia is not excluded.  Lucency along the right pleura is likely artifactual, secondary to a skin fold, however short interval repeat chest x-ray may be obtained to exclude pneumothorax.

## 2021-09-11 NOTE — CONSULT NOTE ADULT - ASSESSMENT
86M with AFIB s/p ablation? 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 Most recent echo showed LVEF 45% with small-moderate pericardial effusion. Patient volume overloaded being diuresed. Plan for treatment with decitabine and venetoclax after bone marrow biopsy and cytogenetics result. Consult for transition off sotalol given drug drug interactions with prophylactic medications   86M with AFIB s/p ablation? 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 Most recent echo showed LVEF 45% with small-moderate pericardial effusion. Patient volume overloaded being diuresed. Plan for treatment with decitabine and venetoclax after bone marrow biopsy and cytogenetics result. Consult for transition off sotalol given drug-drug interactions with prophylactic medications. Cardiology history outlined above.     #Afib/aflutter s/p medtronic ICD currently on sotalol   -Would recommend reaching out to patient's cardiology team to obtain recent records, interrogations, and any ischemic evaluations   -Will touch base in AM about specific concerns (if just qt prolongation or other drug interactions with chemotherapy)   -EP attending to follow in AM with any further recommendations

## 2021-09-11 NOTE — CONSULT NOTE ADULT - CONSULT REASON
Leukemia
Alternative treatment for AFIB given extensive drug drug interactions of sotalol and concern for qt prolongation

## 2021-09-11 NOTE — PROGRESS NOTE ADULT - SUBJECTIVE AND OBJECTIVE BOX
Patient is a 86y old  Male who presents with a chief complaint of shortness of breath (11 Sep 2021 11:03)      INTERVAL HPI/OVERNIGHT EVENTS: noted  pt seen and examined this am   events noted  feels well  sp 1u prbc yesterday      Vital Signs Last 24 Hrs  T(C): 36.6 (11 Sep 2021 11:07), Max: 37.7 (10 Sep 2021 14:00)  T(F): 97.9 (11 Sep 2021 11:07), Max: 99.9 (10 Sep 2021 14:00)  HR: 66 (11 Sep 2021 11:07) (56 - 70)  BP: 106/56 (11 Sep 2021 11:07) (106/56 - 126/70)  BP(mean): --  RR: 18 (11 Sep 2021 11:07) (18 - 18)  SpO2: 96% (11 Sep 2021 11:07) (94% - 97%)    allopurinol 100 milliGRAM(s) Oral two times a day  doxazosin 4 milliGRAM(s) Oral at bedtime  furosemide   Injectable 20 milliGRAM(s) IV Push daily  influenza   Vaccine 0.5 milliLiter(s) IntraMuscular once  melatonin 3 milliGRAM(s) Oral at bedtime PRN  phytonadione   Solution 5 milliGRAM(s) Oral daily  sotalol 120 milliGRAM(s) Oral two times a day      PHYSICAL EXAM:  GENERAL: NAD,   EYES: conjunctiva and sclera clear  ENMT: Moist mucous membranes  NECK: Supple, No JVD, Normal thyroid  CHEST/LUNG: non labored, cta b/l  HEART: Regular rate and rhythm; No murmurs, rubs, or gallops  ABDOMEN: Soft, Nontender, Nondistended; Bowel sounds present  EXTREMITIES:  2+ Peripheral Pulses, No clubbing, cyanosis, or edema  LYMPH: No lymphadenopathy noted  SKIN: No rashes or lesions    Consultant(s) Notes Reviewed:  [x ] YES  [ ] NO  Care Discussed with Consultants/Other Providers [ x] YES  [ ] NO    LABS:                        6.8    4.07  )-----------( 15       ( 11 Sep 2021 07:05 )             21.5     09-11    143  |  112<H>  |  31<H>  ----------------------------<  96  3.7   |  22  |  1.56<H>    Ca    8.2<L>      11 Sep 2021 07:05  Phos  3.1     09-11  Mg     1.8     09-11    TPro  5.1<L>  /  Alb  2.9<L>  /  TBili  1.7<H>  /  DBili  x   /  AST  6<L>  /  ALT  5<L>  /  AlkPhos  97  09-11    PT/INR - ( 10 Sep 2021 10:16 )   PT: 16.7 sec;   INR: 1.41 ratio         PTT - ( 10 Sep 2021 10:16 )  PTT:37.1 sec    CAPILLARY BLOOD GLUCOSE                  RADIOLOGY & ADDITIONAL TESTS:    Imaging Personally Reviewed:  [x ] YES  [ ] NO

## 2021-09-11 NOTE — PROGRESS NOTE ADULT - PROBLEM SELECTOR PLAN 1
-improved  likely multifactorial in the setting of anemia and fluid overload secondary to CHF  -no hypoxia or chest pain to suggest PE, no signs of pneumonia  -continue with IV diuresis (monitor Cr and electrolytes) 20mg IV lasix daily   -strict I/O's, fluid restriction   --recent echo 8/2021 with EF 45%  -recent bilateral lower extremity duplex with no signs of DVT  anemia hb 6.8- heme cs noted    anoth unit of transfusion today

## 2021-09-11 NOTE — PROGRESS NOTE ADULT - PROBLEM SELECTOR PLAN 1
R/O AML -  thrombocytopenia and anemia;  Blasts 12.6% (23% as OP)  9/9 Transferred to [facility]  - h/o MDS refractory to Revlimid, initially stable however in recent 2-3 months recurrent thrombocytopenia and symptomatic anemia  - F/U flow cytometry (PB) sent 9/9   - trend daily TLS labs: CMP, Phos, Uric acid, LDH  - Check G6PD   - consider PICC line  - Had recent TTe, EF 49%  - 9/9 Bone Marrow Bx - F/U results R/O AML -  thrombocytopenia and anemia;  Blasts 12.6% (23% as OP)  9/9 Transferred to [facility]  - h/o MDS refractory to Revlimid, initially stable however in recent 2-3 months recurrent thrombocytopenia and symptomatic anemia  - F/U flow cytometry (PB), FLT3 sent 9/9   - trend daily TLS labs: CMP, Phos, Uric acid, LDH  - Check G6PD  - consider PICC line  - Had recent TTE, EF 49%  - 9/9 Bone Marrow Bx - F/U results

## 2021-09-11 NOTE — CONSULT NOTE ADULT - ATTENDING COMMENTS
86M with history of permanent AFIB/AFL who under an AVJ ablation in 98 and has been pacemaker dependant since who subsequently had an EP study which induced VT and resulted in upgrade of his device to an ICD. He states he was put on Sotalol for VT and not AF, though he denies ever having ICD shocks. Now presents with AML and being planned for chemo. His history is not completely clear at this time. We will need ot have his device interrogated to see if he has had any VT. Recommend touching base with his outpatient cardiologist at [facility] to obtained further history. Suspect likely will have to stop Sotalol for chemotherapy.
Patient who is new to our service ; he has been seen by Dr Newman at [facility]. There is concern about worsening anemia and the appearance of blast cells on review of the peripheral blood smear. This complex management involves consideration of blood transfusion and performance of bone marrow aspiration and biopsy as discussed and agreed upon by patient in our attending rounds today

## 2021-09-11 NOTE — PROGRESS NOTE ADULT - ATTENDING COMMENTS
85 yo male with Afib (sotolol and warfarin), AICD, Prostate Ca 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  Bone Marrow Bx 9/9/21 -- PENDING  Plan for likely decitabine and bereket after marrow results finalized  Today is Day 0    - Afebrile since admission  - No need for imaging at this time, CT imaging in June 2021, had splenomegaly and proctocolitis, chronic pancreatitis  - CXR 9/9: small pleural effusions bilaterally  - Echo 8/20: LVEF 45%, small-moderate pericardial effusion  - Monitor strict I/Os, daily weights and will diurese PRN for fluid overload, on home regimen of lasix, continue  - OOB/ambulate 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 Has 16% peripheral blood blasts on transfer.  Bone Marrow Bx 9/9/21 -- PENDING  FISH / Cytogenetics / NGS -- PENDING  Plan for likely decitabine and bereket after marrow results finalized  Today is Day 0, not yet started on therapy    - Afebrile since admission  - No need for imaging at this time, CT imaging in June 2021, had splenomegaly and proctocolitis, chronic pancreatitis  - Neutropenia: Augmentin (On sotalol, cannot use levofloxacin) and posaconazole ppx  - CXR 9/9: small pleural effusions bilaterally  - Echo 8/20: LVEF 45%, small-moderate pericardial effusion  - Monitor strict I/Os, daily weights and will diurese PRN for fluid overload, on home regimen of lasix, continue  - OOB/ambulate

## 2021-09-11 NOTE — PROGRESS NOTE ADULT - PROBLEM SELECTOR PLAN 3
Continue Sotalol  Holding AC in setting low plts 9/11 - Consult Cardiology for transitioning off Sotalol given multiple interactions with abx/chemo/venetoclax  Holding AC in setting low plts

## 2021-09-11 NOTE — CONSULT NOTE ADULT - SUBJECTIVE AND OBJECTIVE BOX
Patient seen and evaluated at bedside    HPI:  85yo M w/ PMHx of afib s/p ablation w/ AICD (on coumadin but on hold due to severe thrombocytopenia), prostate CA, MDS (with frequent transfusions for anemia/thrombocytopenia) presents with shortness of breath, of note, patient was recently admitted to [facility] from 8/19-8/26 for anemia/thrombocytopenia, he received transfusions of plt and RBC and was switched from Revlimid to Promacta, since discharge he had felt overall better but reported worsening weakness and shortness of breath, he reports that he is no longer taking his promacta for unclear reasons, he saw an outpatient leukemia specialist after discharge and had lab work done and was informed that they demonstrated a leukemia and he should immediately go the ED for evaluation, they [place] [facility] but he chose to come to [facility] for evaluation since most of his care has been performed here, he reports some recent mouth sores but denies any confusion, change in urine output, chest pain, obvious signs of bleeding, in the ED, he was hemodynamically stable, afebrile, saturating well on room air, labs were significant for normocytic anemia lower than baseline, thrombocytopenia at baseline, 12.6% blasts, CXR showed small bilateral effusions, patient was given 40mg lasix x1 and 40mEq KCl x1 and admitted to general medicine for further management.   (09 Sep 2021 05:38)    Cardiology    PMHx:   Hypertension    Afib    Osteoarthritis    Prostate cancer    Venous stasis    Varicose veins    Gout    Cellulitis    Degenerative disc disease, lumbar    Edema        PSHx:   S/P total hip arthroplasty    S/P cataract extraction    S/P hernia repair    Afib    S/P tonsillectomy    S/P cholecystectomy        Allergies:  adhesives (Rash)  clonidine (Swelling; Rash)  felodipine (Rash)  penicillin (Rash)  sulfa drugs (Rash)      Home Meds:    Current Medications:   allopurinol 100 milliGRAM(s) Oral two times a day  aztreonam  IVPB 2000 milliGRAM(s) IV Intermittent every 8 hours  doxazosin 4 milliGRAM(s) Oral at bedtime  furosemide   Injectable 20 milliGRAM(s) IV Push daily  influenza   Vaccine 0.5 milliLiter(s) IntraMuscular once  melatonin 3 milliGRAM(s) Oral at bedtime PRN  phytonadione   Solution 5 milliGRAM(s) Oral daily  posaconazole DR Tablet 300 milliGRAM(s) Oral two times a day  sotalol 120 milliGRAM(s) Oral two times a day      FAMILY HISTORY:  Family history of Alzheimer&#x27;s disease (Father)    Family history of essential hypertension (Father)    Family history of breast cancer (Mother)    Family history of prostate cancer (Sibling)        Social History:  Smoking History:  Alcohol Use:  Drug Use:    REVIEW OF SYSTEMS:  Constitutional:     [x ] negative [ ] fevers [ ] chills [ ] weight loss [ ] weight gain  HEENT:                  [x ] negative [ ] dry eyes [ ] eye irritation [ ] postnasal drip [ ] nasal congestion  CV:                         [ x] negative  [ ] chest pain [ ] orthopnea [ ] palpitations [ ] murmur  Resp:                     [x ] negative [ ] cough [ ] shortness of breath [ ] dyspnea [ ] wheezing [ ] sputum [ ]hemoptysis  GI:                          [ x] negative [ ] nausea [ ] vomiting [ ] diarrhea [ ] constipation [ ] abd pain [ ] dysphagia   :                        [ x] negative [ ] dysuria [ ] nocturia [ ] hematuria [ ] increased urinary frequency  Musculoskeletal: [x ] negative [ ] back pain [ ] myalgias [ ] arthralgias [ ] fracture  Skin:                       [ x] negative [ ] rash [ ] itch  Neurological:        [ x] negative [ ] headache [ ] dizziness [ ] syncope [ ] weakness [ ] numbness  Psychiatric:           [ x] negative [ ] anxiety [ ] depression  Endocrine:            [ x] negative [ ] diabetes [ ] thyroid problem  Heme/Lymph:      [ x] negative [ ] anemia [ ] bleeding problem  Allergic/Immune: [ x] negative [ ] itchy eyes [ ] nasal discharge [ ] hives [ ] angioedema    [ x] All other systems negative  [ ] Unable to assess ROS due to      Physical Exam:  T(F): 97.4 (09-11), Max: 98.2 (09-11)  HR: 63 (09-11) (59 - 70)  BP: 144/70 (09-11) (106/56 - 144/70)  RR: 18 (09-11)  SpO2: 95% (09-11)  GENERAL: No acute distress, well-developed  HEAD:  Atraumatic, Normocephalic  ENT: EOMI, PERRLA, conjunctiva and sclera clear, Neck supple, No JVD, moist mucosa  CHEST/LUNG: Clear to auscultation bilaterally; No wheeze, equal breath sounds bilaterally   BACK: No spinal tenderness  HEART: Regular rate and rhythm; No murmurs, rubs, or gallops  ABDOMEN: Soft, Nontender, Nondistended; Bowel sounds present  EXTREMITIES:  No clubbing, cyanosis, or edema  PSYCH: Nl behavior, nl affect  NEUROLOGY: AAOx3, non-focal, cranial nerves intact  SKIN: Normal color, No rashes or lesions  LINES:    Cardiovascular Diagnostic Testing:    ECG: Personally reviewed:   Aflutter with ventricular paced rhythm    Echo:       Patient name: RUBÉN GOLDBERG  YOB: 1935   Age: 86 (M)   MR#: [id number]  Study Date: 8/20/2021  [facility] Karie [facility] RDCS  Study quality: Technically difficult  Referring Physician: Abbie Evans MD  Blood Pressure: 106/67 mmHg  Height: 185 cm  Weight: 91 kg  BSA: 2.2 m2  ------------------------------------------------------------------------  PROCEDURE: Transthoracic echocardiogram with 2-D, M-Mode  and complete spectral and color flow Doppler.  INDICATION: Edema, unspecified (R60.9)  ------------------------------------------------------------------------  Dimensions:    Normal Values:  LA:     4.1    2.0 - 4.0 cm  Ao:     3.5    2.0 - 3.8 cm  SEPTUM: 1.0    0.6 - 1.2 cm  PWT:    1.1    0.6 - 1.1 cm  LVIDd:  4.9    3.0 - 5.6 cm  LVIDs:  3.5    1.8 - 4.0 cm  Derived variables:  LVMI: 87 g/m2  RWT: 0.44  Fractional short: 29 %  EF (Visual Estimate): 45 %  Doppler Peak Velocity (m/sec): AoV=2.1  ------------------------------------------------------------------------  Observations:  Mitral Valve: Mitral annular calcification, otherwise  normal mitral valve. Mild mitral regurgitation.  Aortic Valve/Aorta: Aortic valve not well visualized;  appears calcified. Peak transaortic valve gradient equals  18 mm Hg, mean transaortic valve gradient equals 9 mm Hg,  aortic valve velocity time integral equals 51 cm.  Mild-moderate aortic regurgitation. Peak left ventricular  outflow tract gradient equals 7 mm Hg, mean gradient is  equal to 4 mm Hg, LVOT velocity time integral equals 32 cm.  Aortic Root: 3.5 cm.  LVOT diameter: 2 cm.  Left Atrium: Severely dilated left atrium.  LA volume index  = 56 cc/m2. 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  Left Ventricle: Endocardium not well visualized; mild left  ventricular systolic dysfunction. Paradoxical septal motion  consistent with paced rhythm. Normal left ventricular  internal dimensions and wall thicknesses. Moderate  diastolic dysfunction (Stage II).  Right Heart: Severe right atrial enlargement. Mild right  ventricular enlargement with normal right ventricular  systolic function. A device wire is noted in the right  heart. Normal tricuspid valve. Mild-moderate tricuspid  regurgitation. Pulmonic valve not well visualized.  Pericardium/Pleura: Small-moderate pericardial effusion  adjacent to the right heart. The effusion measures  approximately 1.0 cm inferior to the right heart. No  echocardiographic evidence of pericardial tamponade.  Bilateral pleural effusions.  Hemodynamic: Estimated right ventricular systolic pressure  equals 51 mm Hg, assuming right atrial pressure equals 15  mm Hg, consistent with moderate pulmonary hypertension.  ------------------------------------------------------------------------  Conclusions:  1. Aortic valve notwell visualized; appears calcified.  Mild-moderate aortic regurgitation.  2. Severely dilated left atrium.  LA volume index = 56  cc/m2. 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  3. Endocardium not well visualized; mild left ventricular  systolic dysfunction. Paradoxical septal motion consistent  with paced rhythm.  4. Mild right ventricular enlargement with normal right  ventricular systolic function. A device wire is noted in  the right heart.  5. Small-moderate pericardial effusion adjacent to the  right heart. The effusion measures approximately 1.0 cm  inferior to the right heart. No echocardiographic evidence  of pericardial tamponade.  6. Bilateral pleural effusions.  7. Echo-free space measuring approximately 4.0 cm x 3.7 cm  is noted in the liver consistent with cyst, however,  dedicated imaging recommended for further evaluation if  clinically indicated.  *** No previous Echo exam.  ------------------------------------------------------------------------  Confirmed on  8/20/2021 - 12:02:22 by Timothy Rivera M.D.  ------------------------------------------------------------------------      Imaging:    CXR: Personally reviewed    Labs: Personally reviewed                        6.8    4.07  )-----------( 15       ( 11 Sep 2021 07:05 )             21.5     09-11    143  |  112<H>  |  31<H>  ----------------------------<  96  3.7   |  22  |  1.56<H>    Ca    8.2<L>      11 Sep 2021 07:05  Phos  3.1     09-11  Mg     1.8     09-11    TPro  5.1<L>  /  Alb  2.9<L>  /  TBili  1.7<H>  /  DBili  x   /  AST  6<L>  /  ALT  5<L>  /  AlkPhos  97  09-11    PT/INR - ( 10 Sep 2021 10:16 )   PT: 16.7 sec;   INR: 1.41 ratio         PTT - ( 10 Sep 2021 10:16 )  PTT:37.1 sec  Serum Pro-Brain Natriuretic Peptide: 6525 pg/mL (09-08 @ 21:27)         Patient seen and evaluated at bedside    HPI:  85yo M w/ PMHx of afib s/p ablation w/ AICD (on coumadin but on hold due to severe thrombocytopenia), prostate CA, MDS (with frequent transfusions for anemia/thrombocytopenia) presents with shortness of breath, of note, patient was recently admitted to [facility] from 8/19-8/26 for anemia/thrombocytopenia, he received transfusions of plt and RBC and was switched from Revlimid to Promacta, since discharge he had felt overall better but reported worsening weakness and shortness of breath, he reports that he is no longer taking his promacta for unclear reasons, he saw an outpatient leukemia specialist after discharge and had lab work done and was informed that they demonstrated a leukemia and he should immediately go the ED for evaluation, they recommended he go [place] [facility] [place] but he chose to come to [facility] for evaluation since most of his care has been performed here, he reports some recent mouth sores but denies any confusion, change in urine output, chest pain, obvious signs of bleeding, in the ED, he was hemodynamically stable, afebrile, saturating well on room air, labs were significant for normocytic anemia lower than baseline, thrombocytopenia at baseline, 12.6% blasts, CXR showed small bilateral effusions, patient was given 40mg lasix x1 and 40mEq KCl x1 and admitted to general medicine for further management.   (09 Sep 2021 05:38)    Cardiology      PMHx:   Hypertension    Afib    Osteoarthritis    Prostate cancer    Venous stasis    Varicose veins    Gout    Cellulitis    Degenerative disc disease, lumbar    Edema        PSHx:   S/P total hip arthroplasty    S/P cataract extraction    S/P hernia repair    Afib    S/P tonsillectomy    S/P cholecystectomy        Allergies:  adhesives (Rash)  clonidine (Swelling; Rash)  felodipine (Rash)  penicillin (Rash)  sulfa drugs (Rash)      Home Meds:    Current Medications:   allopurinol 100 milliGRAM(s) Oral two times a day  aztreonam  IVPB 2000 milliGRAM(s) IV Intermittent every 8 hours  doxazosin 4 milliGRAM(s) Oral at bedtime  furosemide   Injectable 20 milliGRAM(s) IV Push daily  influenza   Vaccine 0.5 milliLiter(s) IntraMuscular once  melatonin 3 milliGRAM(s) Oral at bedtime PRN  phytonadione   Solution 5 milliGRAM(s) Oral daily  posaconazole DR Tablet 300 milliGRAM(s) Oral two times a day  sotalol 120 milliGRAM(s) Oral two times a day      FAMILY HISTORY:  Family history of Alzheimer&#x27;s disease (Father)    Family history of essential hypertension (Father)    Family history of breast cancer (Mother)    Family history of prostate cancer (Sibling)        Social History:  Smoking History:  Alcohol Use:  Drug Use:    REVIEW OF SYSTEMS:  Constitutional:     [x ] negative [ ] fevers [ ] chills [ ] weight loss [ ] weight gain  HEENT:                  [x ] negative [ ] dry eyes [ ] eye irritation [ ] postnasal drip [ ] nasal congestion  CV:                         [ x] negative  [ ] chest pain [ ] orthopnea [ ] palpitations [ ] murmur  Resp:                     [x ] negative [ ] cough [ ] shortness of breath [ ] dyspnea [ ] wheezing [ ] sputum [ ]hemoptysis  GI:                          [ x] negative [ ] nausea [ ] vomiting [ ] diarrhea [ ] constipation [ ] abd pain [ ] dysphagia   :                        [ x] negative [ ] dysuria [ ] nocturia [ ] hematuria [ ] increased urinary frequency  Musculoskeletal: [x ] negative [ ] back pain [ ] myalgias [ ] arthralgias [ ] fracture  Skin:                       [ x] negative [ ] rash [ ] itch  Neurological:        [ x] negative [ ] headache [ ] dizziness [ ] syncope [ ] weakness [ ] numbness  Psychiatric:           [ x] negative [ ] anxiety [ ] depression  Endocrine:            [ x] negative [ ] diabetes [ ] thyroid problem  Heme/Lymph:      [ x] negative [ ] anemia [ ] bleeding problem  Allergic/Immune: [ x] negative [ ] itchy eyes [ ] nasal discharge [ ] hives [ ] angioedema    [ x] All other systems negative  [ ] Unable to assess ROS due to      Physical Exam:  T(F): 97.4 (09-11), Max: 98.2 (09-11)  HR: 63 (09-11) (59 - 70)  BP: 144/70 (09-11) (106/56 - 144/70)  RR: 18 (09-11)  SpO2: 95% (09-11)  GENERAL: No acute distress, well-developed  HEAD:  Atraumatic, Normocephalic  ENT: EOMI, PERRLA, conjunctiva and sclera clear, Neck supple, No JVD, moist mucosa  CHEST/LUNG: Clear to auscultation bilaterally; No wheeze, equal breath sounds bilaterally   BACK: No spinal tenderness  HEART: Regular rate and rhythm; No murmurs, rubs, or gallops  ABDOMEN: Soft, Nontender, Nondistended; Bowel sounds present  EXTREMITIES:  No clubbing, cyanosis, or edema  PSYCH: Nl behavior, nl affect  NEUROLOGY: AAOx3, non-focal, cranial nerves intact  SKIN: Normal color, No rashes or lesions  LINES:    Cardiovascular Diagnostic Testing:    ECG: Personally reviewed:   Aflutter with ventricular paced rhythm    Echo:       Patient name: RUBÉN GOLDBERG  YOB: 1935   Age: 86 (M)   MR#: [id number]  Study Date: 8/20/2021  [facility] RDCS  Study quality: Technically difficult  Referring Physician: Abbie Evans MD  Blood Pressure: 106/67 mmHg  Height: 185 cm  Weight: 91 kg  BSA: 2.2 m2  ------------------------------------------------------------------------  PROCEDURE: Transthoracic echocardiogram with 2-D, M-Mode  and complete spectral and color flow Doppler.  INDICATION: Edema, unspecified (R60.9)  ------------------------------------------------------------------------  Dimensions:    Normal Values:  LA:     4.1    2.0 - 4.0 cm  Ao:     3.5    2.0 - 3.8 cm  SEPTUM: 1.0    0.6 - 1.2 cm  PWT:    1.1    0.6 - 1.1 cm  LVIDd:  4.9    3.0 - 5.6 cm  LVIDs:  3.5    1.8 - 4.0 cm  Derived variables:  LVMI: 87 g/m2  RWT: 0.44  Fractional short: 29 %  EF (Visual Estimate): 45 %  Doppler Peak Velocity (m/sec): AoV=2.1  ------------------------------------------------------------------------  Observations:  Mitral Valve: Mitral annular calcification, otherwise  normal mitral valve. Mild mitral regurgitation.  Aortic Valve/Aorta: Aortic valve not well visualized;  appears calcified. Peak transaortic valve gradient equals  18 mm Hg, mean transaortic valve gradient equals 9 mm Hg,  aortic valve velocity time integral equals 51 cm.  Mild-moderate aortic regurgitation. Peak left ventricular  outflow tract gradient equals 7 mm Hg, mean gradient is  equal to 4 mm Hg, LVOT velocity time integral equals 32 cm.  Aortic Root: 3.5 cm.  LVOT diameter: 2 cm.  Left Atrium: Severely dilated left atrium.  LA volume index  = 56 cc/m2. 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  Left Ventricle: Endocardium not well visualized; mild left  ventricular systolic dysfunction. Paradoxical septal motion  consistent with paced rhythm. Normal left ventricular  internal dimensions and wall thicknesses. Moderate  diastolic dysfunction (Stage II).  Right Heart: Severe right atrial enlargement. Mild right  ventricular enlargement with normal right ventricular  systolic function. A device wire is noted in the right  heart. Normal tricuspid valve. Mild-moderate tricuspid  regurgitation. Pulmonic valve not well visualized.  Pericardium/Pleura: Small-moderate pericardial effusion  adjacent to the right heart. The effusion measures  approximately 1.0 cm inferior to the right heart. No  echocardiographic evidence of pericardial tamponade.  Bilateral pleural effusions.  Hemodynamic: Estimated right ventricular systolic pressure  equals 51 mm Hg, assuming right atrial pressure equals 15  mm Hg, consistent with moderate pulmonary hypertension.  ------------------------------------------------------------------------  Conclusions:  1. Aortic valve notwell visualized; appears calcified.  Mild-moderate aortic regurgitation.  2. Severely dilated left atrium.  LA volume index = 56  cc/m2. 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  3. Endocardium not well visualized; mild left ventricular  systolic dysfunction. Paradoxical septal motion consistent  with paced rhythm.  4. Mild right ventricular enlargement with normal right  ventricular systolic function. A device wire is noted in  the right heart.  5. Small-moderate pericardial effusion adjacent to the  right heart. The effusion measures approximately 1.0 cm  inferior to the right heart. No echocardiographic evidence  of pericardial tamponade.  6. Bilateral pleural effusions.  7. Echo-free space measuring approximately 4.0 cm x 3.7 cm  is noted in the liver consistent with cyst, however,  dedicated imaging recommended for further evaluation if  clinically indicated.  *** No previous Echo exam.  ------------------------------------------------------------------------  Confirmed on  8/20/2021 - 12:02:22 by Timothy Rivera M.D.  ------------------------------------------------------------------------      Imaging:    CXR: Personally reviewed    Labs: Personally reviewed                        6.8    4.07  )-----------( 15       ( 11 Sep 2021 07:05 )             21.5     09-11    143  |  112<H>  |  31<H>  ----------------------------<  96  3.7   |  22  |  1.56<H>    Ca    8.2<L>      11 Sep 2021 07:05  Phos  3.1     09-11  Mg     1.8     09-11    TPro  5.1<L>  /  Alb  2.9<L>  /  TBili  1.7<H>  /  DBili  x   /  AST  6<L>  /  ALT  5<L>  /  AlkPhos  97  09-11    PT/INR - ( 10 Sep 2021 10:16 )   PT: 16.7 sec;   INR: 1.41 ratio         PTT - ( 10 Sep 2021 10:16 )  PTT:37.1 sec  Serum Pro-Brain Natriuretic Peptide: 6525 pg/mL (09-08 @ 21:27)         Patient seen and evaluated at bedside    HPI:  87yo M w/ PMHx of afib s/p ablation w/ AICD (on coumadin but on hold due to severe thrombocytopenia), prostate CA, MDS (with frequent transfusions for anemia/thrombocytopenia) presents with shortness of breath, of note, patient was recently admitted to [facility] from 8/19-8/26 for anemia/thrombocytopenia, he received transfusions of plt and RBC and was switched from Revlimid to Promacta, since discharge he had felt overall better but reported worsening weakness and shortness of breath, he reports that he is no longer taking his promacta for unclear reasons, he saw an outpatient leukemia specialist after discharge and had lab work done and was informed that they demonstrated a leukemia and he should immediately go the ED for evaluation, they recommended he go to [facility] ED but he chose to come to [facility] for evaluation since most of his care has been performed here, he reports some recent mouth sores but denies any confusion, change in urine output, chest pain, obvious signs of bleeding, in the ED, he was hemodynamically stable, afebrile, saturating well on room air, labs were significant for normocytic anemia lower than baseline, thrombocytopenia at baseline, 12.6% blasts, CXR showed small bilateral effusions, patient was given 40mg lasix x1 and 40mEq KCl x1 and admitted to general medicine for further management.   (09 Sep 2021 05:38)    Cardiology  After discussion with primary team patient has had an MDS ot AML transformation. Plan to start treatment with dacogen and venetoclax. Patient is being treated for aflutter with sotalol 120 mg BID. Concern for significant drug interactions with chemotherapy agents, would like consult for transition off sotalol.       PMHx:   Hypertension    Afib    Osteoarthritis    Prostate cancer    Venous stasis    Varicose veins    Gout    Cellulitis    Degenerative disc disease, lumbar    Edema      PSHx:   S/P total hip arthroplasty    S/P cataract extraction    S/P hernia repair    Afib    S/P tonsillectomy    S/P cholecystectomy      Allergies:  adhesives (Rash)  clonidine (Swelling; Rash)  felodipine (Rash)  penicillin (Rash)  sulfa drugs (Rash)      Home Meds:    Current Medications:   allopurinol 100 milliGRAM(s) Oral two times a day  aztreonam  IVPB 2000 milliGRAM(s) IV Intermittent every 8 hours  doxazosin 4 milliGRAM(s) Oral at bedtime  furosemide   Injectable 20 milliGRAM(s) IV Push daily  influenza   Vaccine 0.5 milliLiter(s) IntraMuscular once  melatonin 3 milliGRAM(s) Oral at bedtime PRN  phytonadione   Solution 5 milliGRAM(s) Oral daily  posaconazole DR Tablet 300 milliGRAM(s) Oral two times a day  sotalol 120 milliGRAM(s) Oral two times a day      FAMILY HISTORY:  Family history of Alzheimer&#x27;s disease (Father)    Family history of essential hypertension (Father)    Family history of breast cancer (Mother)    Family history of prostate cancer (Sibling)    REVIEW OF SYSTEMS:  Constitutional:     [x ] negative [ ] fevers [ ] chills [ ] weight loss [ ] weight gain  HEENT:                  [x ] negative [ ] dry eyes [ ] eye irritation [ ] postnasal drip [ ] nasal congestion  CV:                         [ x] negative  [ ] chest pain [ ] orthopnea [ ] palpitations [ ] murmur  Resp:                     [x ] negative [ ] cough [ ] shortness of breath [ ] dyspnea [ ] wheezing [ ] sputum [ ]hemoptysis  GI:                          [ x] negative [ ] nausea [ ] vomiting [ ] diarrhea [ ] constipation [ ] abd pain [ ] dysphagia   :                        [ x] negative [ ] dysuria [ ] nocturia [ ] hematuria [ ] increased urinary frequency  Musculoskeletal: [x ] negative [ ] back pain [ ] myalgias [ ] arthralgias [ ] fracture  Skin:                       [ x] negative [ ] rash [ ] itch  Neurological:        [ x] negative [ ] headache [ ] dizziness [ ] syncope [ ] weakness [ ] numbness  Psychiatric:           [ x] negative [ ] anxiety [ ] depression  Endocrine:            [ x] negative [ ] diabetes [ ] thyroid problem  Heme/Lymph:      [ x] negative [ ] anemia [ ] bleeding problem  Allergic/Immune: [ x] negative [ ] itchy eyes [ ] nasal discharge [ ] hives [ ] angioedema    [ x] All other systems negative  [ ] Unable to assess ROS due to      Physical Exam:  T(F): 97.4 (09-11), Max: 98.2 (09-11)  HR: 63 (09-11) (59 - 70)  BP: 144/70 (09-11) (106/56 - 144/70)  RR: 18 (09-11)/  SpO2: 95% (09-11)  GENERAL: No acute distress, well-developed  HEAD:  Atraumatic, Normocephalic  ENT: EOMI, PERRLA, conjunctiva and sclera clear, Neck supple, No JVD, moist mucosa  CHEST/LUNG: Clear to auscultation bilaterally; No wheeze, equal breath sounds bilaterally   BACK: No spinal tenderness  HEART: Regular rate and rhythm; No murmurs, rubs, or gallops  ABDOMEN: Soft, Nontender, Nondistended; Bowel sounds present  EXTREMITIES:  No clubbing, cyanosis, or edema  PSYCH: Nl behavior, nl affect  NEUROLOGY: AAOx3, non-focal, cranial nerves intact  SKIN: Normal color, No rashes or lesions  LINES:    ECG: Personally reviewed:   Aflutter with ventricular paced rhythm, repeat 9/11 showed Ventricularly paced rhythm       Echo:       Patient name: RUBÉN GOLDBERG  YOB: 1935   Age: 86 (M)   MR#: [id number]  Study Date: 8/20/2021  [facility] Karie [facility] RDCS  Study quality: Technically difficult  Referring Physician: Abbie Evans MD  Blood Pressure: 106/67 mmHg  Height: 185 cm  Weight: 91 kg  BSA: 2.2 m2  ------------------------------------------------------------------------  PROCEDURE: Transthoracic echocardiogram with 2-D, M-Mode  and complete spectral and color flow Doppler.  INDICATION: Edema, unspecified (R60.9)  ------------------------------------------------------------------------  Dimensions:    Normal Values:  LA:     4.1    2.0 - 4.0 cm  Ao:     3.5    2.0 - 3.8 cm  SEPTUM: 1.0    0.6 - 1.2 cm  PWT:    1.1    0.6 - 1.1 cm  LVIDd:  4.9    3.0 - 5.6 cm  LVIDs:  3.5    1.8 - 4.0 cm  Derived variables:  LVMI: 87 g/m2  RWT: 0.44  Fractional short: 29 %  EF (Visual Estimate): 45 %  Doppler Peak Velocity (m/sec): AoV=2.1  ------------------------------------------------------------------------  Observations:  Mitral Valve: Mitral annular calcification, otherwise  normal mitral valve. Mild mitral regurgitation.  Aortic Valve/Aorta: Aortic valve not well visualized;  appears calcified. Peak transaortic valve gradient equals  18 mm Hg, mean transaortic valve gradient equals 9 mm Hg,  aortic valve velocity time integral equals 51 cm.  Mild-moderate aortic regurgitation. Peak left ventricular  outflow tract gradient equals 7 mm Hg, mean gradient is  equal to 4 mm Hg, LVOT velocity time integral equals 32 cm.  Aortic Root: 3.5 cm.  LVOT diameter: 2 cm.  Left Atrium: Severely dilated left atrium.  LA volume index  = 56 cc/m2. 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  Left Ventricle: Endocardium not well visualized; mild left  ventricular systolic dysfunction. Paradoxical septal motion  consistent with paced rhythm. Normal left ventricular  internal dimensions and wall thicknesses. Moderate  diastolic dysfunction (Stage II).  Right Heart: Severe right atrial enlargement. Mild right  ventricular enlargement with normal right ventricular  systolic function. A device wire is noted in the right  heart. Normal tricuspid valve. Mild-moderate tricuspid  regurgitation. Pulmonic valve not well visualized.  Pericardium/Pleura: Small-moderate pericardial effusion  adjacent to the right heart. The effusion measures  approximately 1.0 cm inferior to the right heart. No  echocardiographic evidence of pericardial tamponade.  Bilateral pleural effusions.  Hemodynamic: Estimated right ventricular systolic pressure  equals 51 mm Hg, assuming right atrial pressure equals 15  mm Hg, consistent with moderate pulmonary hypertension.  ------------------------------------------------------------------------  Conclusions:  1. Aortic valve notwell visualized; appears calcified.  Mild-moderate aortic regurgitation.  2. Severely dilated left atrium.  LA volume index = 56  cc/m2. 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  3. Endocardium not well visualized; mild left ventricular  systolic dysfunction. Paradoxical septal motion consistent  with paced rhythm.  4. Mild right ventricular enlargement with normal right  ventricular systolic function. A device wire is noted in  the right heart.  5. Small-moderate pericardial effusion adjacent to the  right heart. The effusion measures approximately 1.0 cm  inferior to the right heart. No echocardiographic evidence  of pericardial tamponade.  6. Bilateral pleural effusions.  7. Echo-free space measuring approximately 4.0 cm x 3.7 cm  is noted in the liver consistent with cyst, however,  dedicated imaging recommended for further evaluation if  clinically indicated.  *** No previous Echo exam.  ------------------------------------------------------------------------  Confirmed on  8/20/2021 - 12:02:22 by Timothy Rivera M.D.  ------------------------------------------------------------------------      Imaging:        Labs: Personally reviewed                        6.8    4.07  )-----------( 15       ( 11 Sep 2021 07:05 )             21.5     09-11    143  |  112<H>  |  31<H>  ----------------------------<  96  3.7   |  22  |  1.56<H>    Ca    8.2<L>      11 Sep 2021 07:05  Phos  3.1     09-11  Mg     1.8     09-11    TPro  5.1<L>  /  Alb  2.9<L>  /  TBili  1.7<H>  /  DBili  x   /  AST  6<L>  /  ALT  5<L>  /  AlkPhos  97  09-11    PT/INR - ( 10 Sep 2021 10:16 )   PT: 16.7 sec;   INR: 1.41 ratio         PTT - ( 10 Sep 2021 10:16 )  PTT:37.1 sec  Serum Pro-Brain Natriuretic Peptide: 6525 pg/mL (09-08 @ 21:27)         Patient seen and evaluated at bedside    HPI:  87yo M w/ PMHx of afib s/p ablation w/ AICD (on coumadin but on hold due to severe thrombocytopenia), prostate CA, MDS (with frequent transfusions for anemia/thrombocytopenia) presents with shortness of breath, of note, patient was recently admitted to [facility] from 8/19-8/26 for anemia/thrombocytopenia, he received transfusions of plt and RBC and was switched from Revlimid to Promacta, since discharge he had felt overall better but reported worsening weakness and shortness of breath, he reports that he is no longer taking his promacta for unclear reasons, he saw an outpatient leukemia specialist after discharge and had lab work done and was informed that they demonstrated a leukemia and he should immediately go the ED for evaluation, they recommended he go to [facility] but he chose to come to [facility] for evaluation since most of his care has been performed here, he reports some recent mouth sores but denies any confusion, change in urine output, chest pain, obvious signs of bleeding, in the ED, he was hemodynamically stable, afebrile, saturating well on room air, labs were significant for normocytic anemia lower than baseline, thrombocytopenia at baseline, 12.6% blasts, CXR showed small bilateral effusions, patient was given 40mg lasix x1 and 40mEq KCl x1 and admitted to general medicine for further management.   (09 Sep 2021 05:38)    Cardiology  After discussion with primary team patient has had an MDS ot AML transformation. Plan to start treatment with dacogen and venetoclax. Patient is being treated for aflutter with sotalol 120 mg BID. Concern for significant drug interactions with chemotherapy agents, would like consult for transition off sotalol.     Patient reports he had a history of AFib since he was 51. After multiple different therapies he underwent AV ablation in 1998 with pacemaker placement. 10 years later there was an issue and he had an EPS study which they induced VTACH and he had an upgrade to ICD device. Reports being on SOtalol for many years. Follows with Dr. Aiden Witt and Dr. Law at [facility] for cardiology. Reports recent device interrogation. Reports medtronic device.       PMHx:   Hypertension    Afib    Osteoarthritis    Prostate cancer    Venous stasis    Varicose veins    Gout    Cellulitis    Degenerative disc disease, lumbar    Edema      PSHx:   S/P total hip arthroplasty    S/P cataract extraction    S/P hernia repair    Afib    S/P tonsillectomy    S/P cholecystectomy      Allergies:  adhesives (Rash)  clonidine (Swelling; Rash)  felodipine (Rash)  penicillin (Rash)  sulfa drugs (Rash)      Home Meds:    Current Medications:   allopurinol 100 milliGRAM(s) Oral two times a day  aztreonam  IVPB 2000 milliGRAM(s) IV Intermittent every 8 hours  doxazosin 4 milliGRAM(s) Oral at bedtime  furosemide   Injectable 20 milliGRAM(s) IV Push daily  influenza   Vaccine 0.5 milliLiter(s) IntraMuscular once  melatonin 3 milliGRAM(s) Oral at bedtime PRN  phytonadione   Solution 5 milliGRAM(s) Oral daily  posaconazole DR Tablet 300 milliGRAM(s) Oral two times a day  sotalol 120 milliGRAM(s) Oral two times a day      FAMILY HISTORY:  Family history of Alzheimer&#x27;s disease (Father)    Family history of essential hypertension (Father)    Family history of breast cancer (Mother)    Family history of prostate cancer (Sibling)    REVIEW OF SYSTEMS:  ROS + dyspnea, chills, fatigue     [ x] All other systems negative  [ ] Unable to assess ROS due to      Physical Exam:  T(F): 97.4 (09-11), Max: 98.2 (09-11)  HR: 63 (09-11) (59 - 70)  BP: 144/70 (09-11) (106/56 - 144/70)  RR: 18 (09-11)/  SpO2: 95% (09-11)  GENERAL: No acute distress, well-developed  HEAD:  Atraumatic, Normocephalic  ENT: JVD +   CHEST/LUNG: Bibasilar crackles   BACK: No spinal tenderness  HEART: Regular with DARWIN   ABDOMEN: Soft, Nontender, Nondistended; Bowel sounds present  EXTREMITIES:  No clubbing, cyanosis, or edema  NEUROLOGY: AAOx3, non-focal, cranial nerves intact  SKIN: Normal color, No rashes or lesions  LINES:    ECG: Personally reviewed:   Aflutter with ventricular paced rhythm, repeat 9/11 showed Ventricularly paced rhythm       Echo:       Patient name: RUBÉN GOLDBERG  YOB: 1935   Age: 86 (M)   MR#: [id number]  Study Date: 8/20/2021  [facility] RDCS  Study quality: Technically difficult  Referring Physician: Abbie Evans MD  Blood Pressure: 106/67 mmHg  Height: 185 cm  Weight: 91 kg  BSA: 2.2 m2  ------------------------------------------------------------------------  PROCEDURE: Transthoracic echocardiogram with 2-D, M-Mode  and complete spectral and color flow Doppler.  INDICATION: Edema, unspecified (R60.9)  ------------------------------------------------------------------------  Dimensions:    Normal Values:  LA:     4.1    2.0 - 4.0 cm  Ao:     3.5    2.0 - 3.8 cm  SEPTUM: 1.0    0.6 - 1.2 cm  PWT:    1.1    0.6 - 1.1 cm  LVIDd:  4.9    3.0 - 5.6 cm  LVIDs:  3.5    1.8 - 4.0 cm  Derived variables:  LVMI: 87 g/m2  RWT: 0.44  Fractional short: 29 %  EF (Visual Estimate): 45 %  Doppler Peak Velocity (m/sec): AoV=2.1  ------------------------------------------------------------------------  Observations:  Mitral Valve: Mitral annular calcification, otherwise  normal mitral valve. Mild mitral regurgitation.  Aortic Valve/Aorta: Aortic valve not well visualized;  appears calcified. Peak transaortic valve gradient equals  18 mm Hg, mean transaortic valve gradient equals 9 mm Hg,  aortic valve velocity time integral equals 51 cm.  Mild-moderate aortic regurgitation. Peak left ventricular  outflow tract gradient equals 7 mm Hg, mean gradient is  equal to 4 mm Hg, LVOT velocity time integral equals 32 cm.  Aortic Root: 3.5 cm.  LVOT diameter: 2 cm.  Left Atrium: Severely dilated left atrium.  LA volume index  = 56 cc/m2. 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  Left Ventricle: Endocardium not well visualized; mild left  ventricular systolic dysfunction. Paradoxical septal motion  consistent with paced rhythm. Normal left ventricular  internal dimensions and wall thicknesses. Moderate  diastolic dysfunction (Stage II).  Right Heart: Severe right atrial enlargement. Mild right  ventricular enlargement with normal right ventricular  systolic function. A device wire is noted in the right  heart. Normal tricuspid valve. Mild-moderate tricuspid  regurgitation. Pulmonic valve not well visualized.  Pericardium/Pleura: Small-moderate pericardial effusion  adjacent to the right heart. The effusion measures  approximately 1.0 cm inferior to the right heart. No  echocardiographic evidence of pericardial tamponade.  Bilateral pleural effusions.  Hemodynamic: Estimated right ventricular systolic pressure  equals 51 mm Hg, assuming right atrial pressure equals 15  mm Hg, consistent with moderate pulmonary hypertension.  ------------------------------------------------------------------------  Conclusions:  1. Aortic valve notwell visualized; appears calcified.  Mild-moderate aortic regurgitation.  2. Severely dilated left atrium.  LA volume index = 56  cc/m2. 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  3. Endocardium not well visualized; mild left ventricular  systolic dysfunction. Paradoxical septal motion consistent  with paced rhythm.  4. Mild right ventricular enlargement with normal right  ventricular systolic function. A device wire is noted in  the right heart.  5. Small-moderate pericardial effusion adjacent to the  right heart. The effusion measures approximately 1.0 cm  inferior to the right heart. No echocardiographic evidence  of pericardial tamponade.  6. Bilateral pleural effusions.  7. Echo-free space measuring approximately 4.0 cm x 3.7 cm  is noted in the liver consistent with cyst, however,  dedicated imaging recommended for further evaluation if  clinically indicated.  *** No previous Echo exam.  ------------------------------------------------------------------------  Confirmed on  8/20/2021 - 12:02:22 by Timothy Rivera M.D.  ------------------------------------------------------------------------    Images: reviewed         Labs: Personally reviewed                        6.8    4.07  )-----------( 15       ( 11 Sep 2021 07:05 )             21.5     09-11    143  |  112<H>  |  31<H>  ----------------------------<  96  3.7   |  22  |  1.56<H>    Ca    8.2<L>      11 Sep 2021 07:05  Phos  3.1     09-11  Mg     1.8     09-11    TPro  5.1<L>  /  Alb  2.9<L>  /  TBili  1.7<H>  /  DBili  x   /  AST  6<L>  /  ALT  5<L>  /  AlkPhos  97  09-11    PT/INR - ( 10 Sep 2021 10:16 )   PT: 16.7 sec;   INR: 1.41 ratio         PTT - ( 10 Sep 2021 10:16 )  PTT:37.1 sec  Serum Pro-Brain Natriuretic Peptide: 6525 pg/mL (09-08 @ 21:27)

## 2021-09-11 NOTE — PROGRESS NOTE ADULT - PROBLEM SELECTOR PLAN 2
-acute anemia in setting of MDS, previously refractory to Revlimid, off-label Promacta was initiated on previous hospitalization but was stopped, labs showing thrombocytopenia at baseline, acute on chronic normocytic anemia and blasts 12.6%   -hematology consult noted (previously seen by Dr. Zimmerman inpatient, follows with Dr. Haile outpatient- given leukemic transformation -pt transferred to [facility] care  -transfuse Hg >7, plt >10 pending hematology input, lasix with transfusions PRN

## 2021-09-12 NOTE — PROGRESS NOTE ADULT - ASSESSMENT
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

## 2021-09-12 NOTE — PROGRESS NOTE ADULT - PROBLEM SELECTOR PLAN 2
-acute anemia in setting of MDS, previously refractory to Revlimid, off-label Promacta was initiated on previous hospitalization but was stopped, labs showing thrombocytopenia at baseline, acute on chronic normocytic anemia and blasts 12.6%   -hematology consult noted (previously seen by Dr. Zimmerman inpatient, follows with Dr. Haile outpatient- given leukemic transformation -pt transferred to [facility] care  -transfuse Hg >7, plt >10 pending hematology input, lasix with transfusions PRN

## 2021-09-12 NOTE — PROGRESS NOTE ADULT - SUBJECTIVE AND OBJECTIVE BOX
Patient is a 86y old  Male who presents with a chief complaint of shortness of breath (12 Sep 2021 07:54)      INTERVAL HPI/OVERNIGHT EVENTS: noted  pt seen and examined this am   events noted  feels well      Vital Signs Last 24 Hrs  T(C): 37 (12 Sep 2021 16:55), Max: 37 (12 Sep 2021 16:55)  T(F): 98.6 (12 Sep 2021 16:55), Max: 98.6 (12 Sep 2021 16:55)  HR: 99 (12 Sep 2021 16:55) (60 - 99)  BP: 121/62 (12 Sep 2021 16:55) (117/66 - 144/70)  BP(mean): --  RR: 18 (12 Sep 2021 16:55) (18 - 18)  SpO2: 96% (12 Sep 2021 16:55) (93% - 96%)    allopurinol 100 milliGRAM(s) Oral two times a day  aztreonam  IVPB 2000 milliGRAM(s) IV Intermittent every 8 hours  doxazosin 4 milliGRAM(s) Oral at bedtime  furosemide   Injectable 20 milliGRAM(s) IV Push daily  influenza   Vaccine 0.5 milliLiter(s) IntraMuscular once  melatonin 3 milliGRAM(s) Oral at bedtime PRN  posaconazole DR Tablet 300 milliGRAM(s) Oral daily  sotalol 120 milliGRAM(s) Oral two times a day      PHYSICAL EXAM:  GENERAL: NAD,   EYES: conjunctiva and sclera clear  ENMT: Moist mucous membranes  NECK: Supple, No JVD, Normal thyroid  CHEST/LUNG: non labored, cta b/l  HEART: Regular rate and rhythm; No murmurs, rubs, or gallops  ABDOMEN: Soft, Nontender, Nondistended; Bowel sounds present  EXTREMITIES:  2+ Peripheral Pulses, No clubbing, cyanosis, or edema  LYMPH: No lymphadenopathy noted  SKIN: No rashes or lesions    Consultant(s) Notes Reviewed:  [x ] YES  [ ] NO  Care Discussed with Consultants/Other Providers [ x] YES  [ ] NO    LABS:                        7.6    3.90  )-----------( 14       ( 12 Sep 2021 06:54 )             24.6     09-12    145  |  111<H>  |  33<H>  ----------------------------<  106<H>  3.8   |  21<L>  |  1.62<H>    Ca    8.4      12 Sep 2021 06:54  Phos  3.1     09-12  Mg     1.9     09-12    TPro  5.7<L>  /  Alb  3.1<L>  /  TBili  2.2<H>  /  DBili  x   /  AST  7<L>  /  ALT  5<L>  /  AlkPhos  103  09-12        CAPILLARY BLOOD GLUCOSE                  RADIOLOGY & ADDITIONAL TESTS:    Imaging Personally Reviewed:  [x ] YES  [ ] NO

## 2021-09-12 NOTE — PROGRESS NOTE ADULT - PROBLEM SELECTOR PLAN 3
9/11 - Consult Cardiology for transitioning off Sotalol given multiple interactions with abx/chemo/venetoclax  Holding AC in setting low plts

## 2021-09-12 NOTE — PROGRESS NOTE ADULT - ATTENDING COMMENTS
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 Has 16% peripheral blood blasts on transfer.  Bone Marrow Bx 9/9/21 -- PENDING  FISH / Cytogenetics / NGS -- PENDING  Plan for likely decitabine and bereket after marrow results finalized  Today is Day 0, not yet started on therapy    - Afebrile since admission  - No need for imaging at this time, CT imaging in June 2021, had splenomegaly and proctocolitis, chronic pancreatitis  - Neutropenia: Augmentin (On sotalol, cannot use levofloxacin) and posaconazole ppx  - CXR 9/9: small pleural effusions bilaterally  - Echo 8/20: LVEF 45%, small-moderate pericardial effusion  - Monitor strict I/Os, daily weights and will diurese PRN for fluid overload, on home regimen of lasix, continue  - OOB/ambulate 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 Has 16% peripheral blood blasts on transfer.  Bone Marrow Bx 9/9/21 -- PENDING  FISH / Cytogenetics / NGS -- PENDING  Plan for likely decitabine and bereket after marrow results finalized  Today is Day 0, not yet started on therapy    - Afebrile since admission  - No need for imaging at this time, CT imaging in June 2021, had splenomegaly and proctocolitis, chronic pancreatitis  - Neutropenia: Augmentin (On sotalol, cannot use levofloxacin) and posaconazole ppx  - CXR 9/9: small pleural effusions bilaterally  - Echo 8/20: LVEF 45%, small-moderate pericardial effusion  - Arrhythmia / Afib Hx: Stable, EP to assess if sotalol can be changed due to interactions -- Keep Mg >2.0, K> 4.0  - Monitor strict I/Os, daily weights and will diurese PRN for fluid overload, on home regimen of lasix, continue  - OOB/ambulate

## 2021-09-12 NOTE — PROGRESS NOTE ADULT - PROBLEM SELECTOR PLAN 1
-improved  likely multifactorial in the setting of anemia and fluid overload secondary to CHF  -no hypoxia or chest pain to suggest PE, no signs of pneumonia  -continue with IV diuresis (monitor Cr and electrolytes) 20mg IV lasix daily   -strict I/O's, fluid restriction   --recent echo 8/2021 with EF 45%  -recent bilateral lower extremity duplex with no signs of DVT  anemia hb 6.8- heme cs noted    sp 2units prbc, hb stable  Cardiology consulted, forseeing multiple interactions with Sotalol.+Neutropenic, afebrile  Posaconazole for neutropenia ppx.    Aztreonam

## 2021-09-12 NOTE — PROGRESS NOTE ADULT - PROBLEM SELECTOR PLAN 1
R/O AML -  thrombocytopenia and anemia;  Blasts 12.6% (23% as OP)  9/9 Transferred to [facility]  - h/o MDS refractory to Revlimid, initially stable however in recent 2-3 months recurrent thrombocytopenia and symptomatic anemia  - F/U flow cytometry (PB), FLT3 sent 9/9   - trend daily TLS labs: CMP, Phos, Uric acid, LDH  - Check G6PD  - consider PICC line  - Had recent TTE, EF 49%  - 9/9 Bone Marrow Bx - F/U results R/O AML -  thrombocytopenia and anemia;  Blasts 12.6% (23% as OP)  9/9 Transferred to [facility]  - h/o MDS refractory to Revlimid, initially stable however in recent 2-3 months recurrent thrombocytopenia and symptomatic anemia  - F/U flow cytometry (PB), FLT3 sent 9/9   - trend daily TLS labs: CMP, Phos, Uric acid, LDH  - Check G6PD  - consider PICC line  - Had recent TTE, EF 49%  - 9/9 Bone Marrow Bx - F/U results  9/11 - Cardiology consulted, forseeing multiple interactions with Sotalol

## 2021-09-12 NOTE — PROGRESS NOTE ADULT - PROBLEM SELECTOR PLAN 2
+Neutropenic, afebrile  Posaconazole for neutropenia ppx. Levaquin has a major interaction with Sotalol. WIll consider Augmentin for antimicrobial ppx, pt wants to avoid this (+rash >50yrs ago). May need to start Aztreonam +/- Vancomycin.   Recently a/w  diverticulitis (6/26-7/2)  Pancx, CXR consider abx if pt spikes temp +Neutropenic, afebrile  Posaconazole for neutropenia ppx. Levaquin has a major interaction with Sotalol. Will consider Augmentin for antimicrobial ppx, pt wants to avoid this (+rash >50yrs ago). Started Aztreonam   If febrile, Pancx, CXR consider  +/- Vancomycin.   Recently a/w  diverticulitis (6/26-7/2)

## 2021-09-12 NOTE — PROGRESS NOTE ADULT - SUBJECTIVE AND OBJECTIVE BOX
Diagnosis: R/O Leukemia    Protocol/Chemo Regimen: TBD    Day: n/a    Subjective:     Review of Systems:     Pain scale: 0/10                    Diet: Regular    Allergies  adhesives (Rash)  clonidine (Swelling; Rash)  felodipine (Rash)  penicillin (Rash)  sulfa drugs (Rash)    Intolerances      MEDICATIONS  (STANDING):  allopurinol 100 milliGRAM(s) Oral two times a day  doxazosin 4 milliGRAM(s) Oral at bedtime  furosemide   Injectable 20 milliGRAM(s) IV Push daily  influenza   Vaccine 0.5 milliLiter(s) IntraMuscular once  lidocaine 2% Injectable 20 milliLiter(s) Local Injection once  sotalol 120 milliGRAM(s) Oral two times a day    MEDICATIONS  (PRN):  melatonin 3 milliGRAM(s) Oral at bedtime PRN Insomnia    Vital Signs Last 24 Hrs    T(C): 36.7 (12 Sep 2021 05:04), Max: 36.8 (12 Sep 2021 00:50)  T(F): 98.1 (12 Sep 2021 05:04), Max: 98.2 (12 Sep 2021 00:50)  HR: 60 (12 Sep 2021 05:04) (60 - 66)  BP: 120/64 (12 Sep 2021 05:04) (106/56 - 144/70)  BP(mean): --  RR: 18 (12 Sep 2021 05:04) (17 - 18)  SpO2: 93% (12 Sep 2021 05:04) (93% - 97%)    PHYSICAL EXAM  General: Sitting up in bed in NAD  HEENT: PERRL, sclerae anicteric,   CV: +S1, S2, reg  Abdomen: +BS, soft, NT/ND  Ext: no edema BLE's  Skin: No rashes or ecchymosis  Neuro: A&O x 3, non focal  Central line: PIV    LABS:                      -      RADIOLOGY & ADDITIONAL STUDIES:    < from: Xray Chest 1 View AP/PA (09.08.21 @ 22:34) >  IMPRESSION:  Small bilateral pleural effusions with associated bibasilar passive atelectasis. Underlying pneumonia is not excluded.  Lucency along the right pleura is likely artifactual, secondary to a skin fold, however short interval repeat chest x-ray may be obtained to exclude pneumothorax.       Diagnosis: R/O Leukemia    Protocol/Chemo Regimen: TBD    Day: n/a    Subjective: No overnight events, didn't sleep well overnight.    Review of Systems: Denies STEINER, HA or dizziness, n/v, abdominal pain    Pain scale: 0/10                    Diet: Regular    Allergies  adhesives (Rash)  clonidine (Swelling; Rash)  felodipine (Rash)  penicillin (Rash)  sulfa drugs (Rash)    Intolerances      MEDICATIONS  (STANDING):  allopurinol 100 milliGRAM(s) Oral two times a day  doxazosin 4 milliGRAM(s) Oral at bedtime  furosemide   Injectable 20 milliGRAM(s) IV Push daily  influenza   Vaccine 0.5 milliLiter(s) IntraMuscular once  lidocaine 2% Injectable 20 milliLiter(s) Local Injection once  sotalol 120 milliGRAM(s) Oral two times a day    MEDICATIONS  (PRN):  melatonin 3 milliGRAM(s) Oral at bedtime PRN Insomnia    Vital Signs Last 24 Hrs    T(C): 36.7 (12 Sep 2021 05:04), Max: 36.8 (12 Sep 2021 00:50)  T(F): 98.1 (12 Sep 2021 05:04), Max: 98.2 (12 Sep 2021 00:50)  HR: 60 (12 Sep 2021 05:04) (60 - 66)  BP: 120/64 (12 Sep 2021 05:04) (106/56 - 144/70)  BP(mean): --  RR: 18 (12 Sep 2021 05:04) (17 - 18)  SpO2: 93% (12 Sep 2021 05:04) (93% - 97%)    PHYSICAL EXAM  General: Sitting up in bed in NAD  HEENT: PERRL, sclerae anicteric,   CV: +S1, S2, reg  Abdomen: +BS, soft, NT/ND  Ext: no edema BLE's  Skin: No rashes or ecchymosis  Neuro: A&O x 3, non focal  Central line: PIV    LABS:                        7.6    3.90  )-----------( 14       ( 12 Sep 2021 06:54 )             24.6     12 Sep 2021 06:54    145    |  111    |  33     ----------------------------<  106    3.8     |  21     |  1.62     Ca    8.4        12 Sep 2021 06:54  Phos  3.1       12 Sep 2021 06:54  Mg     1.9       12 Sep 2021 06:54    TPro  5.7    /  Alb  3.1    /  TBili  2.2    /  DBili  x      /  AST  7      /  ALT  5      /  AlkPhos  103    12 Sep 2021 06:54    LIVER FUNCTIONS - ( 12 Sep 2021 06:54 )  Alb: 3.1 g/dL / Pro: 5.7 g/dL / ALK PHOS: 103 U/L / ALT: 5 U/L / AST: 7 U/L / GGT: x             RADIOLOGY & ADDITIONAL STUDIES:    < from: Xray Chest 1 View AP/PA (09.08.21 @ 22:34) >  IMPRESSION:  Small bilateral pleural effusions with associated bibasilar passive atelectasis. Underlying pneumonia is not excluded.  Lucency along the right pleura is likely artifactual, secondary to a skin fold, however short interval repeat chest x-ray may be obtained to exclude pneumothorax.       Diagnosis: R/O Leukemia    Protocol/Chemo Regimen: TBD    Day: n/a    Subjective: No overnight events, didn't sleep well overnight.    Review of Systems: Denies STEINER, HA or dizziness, n/v, abdominal pain    Pain scale: 0/10                    Diet: Regular    Allergies  adhesives (Rash)  clonidine (Swelling; Rash)  felodipine (Rash)  penicillin (Rash)  sulfa drugs (Rash)    Intolerances      MEDICATIONS  (STANDING):  allopurinol 100 milliGRAM(s) Oral two times a day  aztreonam  IVPB 2000 milliGRAM(s) IV Intermittent every 8 hours  doxazosin 4 milliGRAM(s) Oral at bedtime  furosemide   Injectable 20 milliGRAM(s) IV Push daily  influenza   Vaccine 0.5 milliLiter(s) IntraMuscular once  posaconazole DR Tablet 300 milliGRAM(s) Oral daily  sotalol 120 milliGRAM(s) Oral two times a day    MEDICATIONS  (PRN):  melatonin 3 milliGRAM(s) Oral at bedtime PRN Insomnia      Vital Signs Last 24 Hrs  T(C): 36.7 (12 Sep 2021 05:04), Max: 36.8 (12 Sep 2021 00:50)  T(F): 98.1 (12 Sep 2021 05:04), Max: 98.2 (12 Sep 2021 00:50)  HR: 60 (12 Sep 2021 05:04) (60 - 66)  BP: 120/64 (12 Sep 2021 05:04) (106/56 - 144/70)  BP(mean): --  RR: 18 (12 Sep 2021 05:04) (17 - 18)  SpO2: 93% (12 Sep 2021 05:04) (93% - 97%)    PHYSICAL EXAM  General: Sitting up in bed in NAD  HEENT: PERRL, sclerae anicteric,   CV: +S1, S2, reg  Abdomen: +BS, soft, NT/ND  Ext: no edema BLE's  Skin: No rashes or ecchymosis  Neuro: A&O x 3, non focal  Central line: PIV    LABS:                        7.6    3.90  )-----------( 14       ( 12 Sep 2021 06:54 )             24.6     12 Sep 2021 06:54    145    |  111    |  33     ----------------------------<  106    3.8     |  21     |  1.62     Ca    8.4        12 Sep 2021 06:54  Phos  3.1       12 Sep 2021 06:54  Mg     1.9       12 Sep 2021 06:54    TPro  5.7    /  Alb  3.1    /  TBili  2.2    /  DBili  x      /  AST  7      /  ALT  5      /  AlkPhos  103    12 Sep 2021 06:54    LIVER FUNCTIONS - ( 12 Sep 2021 06:54 )  Alb: 3.1 g/dL / Pro: 5.7 g/dL / ALK PHOS: 103 U/L / ALT: 5 U/L / AST: 7 U/L / GGT: x             RADIOLOGY & ADDITIONAL STUDIES:    < from: Xray Chest 1 View AP/PA (09.08.21 @ 22:34) >  IMPRESSION:  Small bilateral pleural effusions with associated bibasilar passive atelectasis. Underlying pneumonia is not excluded.  Lucency along the right pleura is likely artifactual, secondary to a skin fold, however short interval repeat chest x-ray may be obtained to exclude pneumothorax.

## 2021-09-13 NOTE — PROGRESS NOTE ADULT - PROBLEM SELECTOR PLAN 1
-improved  likely multifactorial in the setting of anemia and fluid overload secondary to CHF  -no hypoxia or chest pain to suggest PE, no signs of pneumonia  -continue with IV diuresis (monitor Cr and electrolytes) 20mg IV lasix daily   -strict I/O's, fluid restriction   --recent echo 8/2021 with EF 45%  -recent bilateral lower extremity duplex with no signs of DVT  anemia hb 6.8- heme cs noted    sp 2units prbc, hb stable  Cardiology consulted, forseeing multiple interactions with Sotalol.

## 2021-09-13 NOTE — PROGRESS NOTE ADULT - SUBJECTIVE AND OBJECTIVE BOX
Diagnosis: R/O Leukemia    Protocol/Chemo Regimen: TBD    Day: n/a    Subjective: No overnight events, didn't sleep well overnight.    Review of Systems: Denies STEINER, HA or dizziness, n/v, abdominal pain    Pain scale: 0/10                    Diet: Regular    Allergies  adhesives (Rash)  clonidine (Swelling; Rash)  felodipine (Rash)  penicillin (Rash)  sulfa drugs (Rash)    Intolerances      MEDICATIONS  (STANDING):  allopurinol 100 milliGRAM(s) Oral two times a day  aztreonam  IVPB 2000 milliGRAM(s) IV Intermittent every 8 hours  doxazosin 4 milliGRAM(s) Oral at bedtime  furosemide   Injectable 20 milliGRAM(s) IV Push daily  influenza   Vaccine 0.5 milliLiter(s) IntraMuscular once  posaconazole DR Tablet 300 milliGRAM(s) Oral daily  sotalol 120 milliGRAM(s) Oral two times a day    MEDICATIONS  (PRN):  melatonin 3 milliGRAM(s) Oral at bedtime PRN Insomnia      Vital Signs Last 24 Hrs  T(C): 37.2 (13 Sep 2021 05:00), Max: 37.2 (12 Sep 2021 21:00)  T(F): 98.9 (13 Sep 2021 05:00), Max: 99 (12 Sep 2021 21:00)  HR: 61 (13 Sep 2021 05:00) (59 - 99)  BP: 128/65 (13 Sep 2021 05:00) (110/58 - 132/61)  BP(mean): --  RR: 18 (13 Sep 2021 05:00) (18 - 18)  SpO2: 93% (13 Sep 2021 05:00) (93% - 96%)    PHYSICAL EXAM  General: Sitting up in bed in NAD  HEENT: PERRL, sclerae anicteric,   CV: +S1, S2, reg  Abdomen: +BS, soft, NT/ND  Ext: no edema BLE's  Skin: No rashes or ecchymosis  Neuro: A&O x 3, non focal  Central line: PIV    LABS:                        7.6    3.90  )-----------( 14       ( 12 Sep 2021 06:54 )             24.6     12 Sep 2021 06:54    145    |  111    |  33     ----------------------------<  106    3.8     |  21     |  1.62     Ca    8.4        12 Sep 2021 06:54  Phos  3.1       12 Sep 2021 06:54  Mg     1.9       12 Sep 2021 06:54    TPro  5.7    /  Alb  3.1    /  TBili  2.2    /  DBili  x      /  AST  7      /  ALT  5      /  AlkPhos  103    12 Sep 2021 06:54    LIVER FUNCTIONS - ( 12 Sep 2021 06:54 )  Alb: 3.1 g/dL / Pro: 5.7 g/dL / ALK PHOS: 103 U/L / ALT: 5 U/L / AST: 7 U/L / GGT: x             RADIOLOGY & ADDITIONAL STUDIES:    < from: Xray Chest 1 View AP/PA (09.08.21 @ 22:34) >  IMPRESSION:  Small bilateral pleural effusions with associated bibasilar passive atelectasis. Underlying pneumonia is not excluded.  Lucency along the right pleura is likely artifactual, secondary to a skin fold, however short interval repeat chest x-ray may be obtained to exclude pneumothorax.       Diagnosis: R/O Leukemia    Protocol/Chemo Regimen: TBD    Day: n/a    Subjective: No overnight events, +bleeding hemorrhoid when wiping    Review of Systems: Denies abdominal pain, STEINER, HA or dizziness, n/v, abdominal pain    Pain scale: 0/10                    Diet: Regular    Allergies  adhesives (Rash)  clonidine (Swelling; Rash)  felodipine (Rash)  penicillin (Rash)  sulfa drugs (Rash)    Intolerances      MEDICATIONS  (STANDING):  allopurinol 100 milliGRAM(s) Oral two times a day  aztreonam  IVPB 2000 milliGRAM(s) IV Intermittent every 8 hours  doxazosin 4 milliGRAM(s) Oral at bedtime  furosemide   Injectable 20 milliGRAM(s) IV Push daily  influenza   Vaccine 0.5 milliLiter(s) IntraMuscular once  posaconazole DR Tablet 300 milliGRAM(s) Oral daily  sotalol 120 milliGRAM(s) Oral two times a day    MEDICATIONS  (PRN):  melatonin 3 milliGRAM(s) Oral at bedtime PRN Insomnia      Vital Signs Last 24 Hrs  T(C): 37.2 (13 Sep 2021 05:00), Max: 37.2 (12 Sep 2021 21:00)  T(F): 98.9 (13 Sep 2021 05:00), Max: 99 (12 Sep 2021 21:00)  HR: 61 (13 Sep 2021 05:00) (59 - 99)  BP: 128/65 (13 Sep 2021 05:00) (110/58 - 132/61)  BP(mean): --  RR: 18 (13 Sep 2021 05:00) (18 - 18)  SpO2: 93% (13 Sep 2021 05:00) (93% - 96%)    PHYSICAL EXAM  General: Sitting up in bed in NAD  HEENT: PERRL, sclerae anicteric,   CV: +S1, S2, reg  Abdomen: +BS, soft, NT/ND  Ext: no edema BLE's  Skin: No rashes or ecchymosis  Neuro: A&O x 3, non focal  Central line: PIV    LABS:                        7.6    3.90  )-----------( 14       ( 12 Sep 2021 06:54 )             24.6     12 Sep 2021 06:54    145    |  111    |  33     ----------------------------<  106    3.8     |  21     |  1.62     Ca    8.4        12 Sep 2021 06:54  Phos  3.1       12 Sep 2021 06:54  Mg     1.9       12 Sep 2021 06:54    TPro  5.7    /  Alb  3.1    /  TBili  2.2    /  DBili  x      /  AST  7      /  ALT  5      /  AlkPhos  103    12 Sep 2021 06:54    LIVER FUNCTIONS - ( 12 Sep 2021 06:54 )  Alb: 3.1 g/dL / Pro: 5.7 g/dL / ALK PHOS: 103 U/L / ALT: 5 U/L / AST: 7 U/L / GGT: x             RADIOLOGY & ADDITIONAL STUDIES:    < from: Xray Chest 1 View AP/PA (09.08.21 @ 22:34) >  IMPRESSION:  Small bilateral pleural effusions with associated bibasilar passive atelectasis. Underlying pneumonia is not excluded.  Lucency along the right pleura is likely artifactual, secondary to a skin fold, however short interval repeat chest x-ray may be obtained to exclude pneumothorax.

## 2021-09-13 NOTE — PROGRESS NOTE ADULT - ASSESSMENT
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

## 2021-09-13 NOTE — PROGRESS NOTE ADULT - PROBLEM SELECTOR PLAN 3
9/11 - Consult Cardiology for transitioning off Sotalol given multiple interactions with abx/chemo/venetoclax  Holding AC in setting low plts 9/11 - Consult Cardiology for transitioning off Sotalol given multiple interactions with abx/chemo/venetoclax  Holding AC in setting low plts  9/13 Appreciate Cardiology consulted, forseeing multiple interactions with Sotalol, d/c'd, starting Toprol xl 25 qd

## 2021-09-13 NOTE — PROGRESS NOTE ADULT - TIME BILLING
Time was spent reviewing chart, direct patient interaction and examination, discussion with consultants, planning and coordination of care.

## 2021-09-13 NOTE — PROGRESS NOTE ADULT - PROBLEM SELECTOR PLAN 1
R/O AML -  thrombocytopenia and anemia;  Blasts 12.6% (23% as OP)  9/9 Transferred to [facility]  - h/o MDS refractory to Revlimid, initially stable however in recent 2-3 months recurrent thrombocytopenia and symptomatic anemia  - F/U flow cytometry (PB), FLT3 sent 9/9   - trend daily TLS labs: CMP, Phos, Uric acid, LDH  - Check G6PD  - consider PICC line  - Had recent TTE, EF 49%  - 9/9 Bone Marrow Bx - F/U results  9/11 - Cardiology consulted, forseeing multiple interactions with Sotalol R/O AML -  thrombocytopenia and anemia;  Blasts 12.6% (23% as OP)  9/9 Transferred to [facility]  - h/o MDS refractory to Revlimid, initially stable however in recent 2-3 months recurrent thrombocytopenia and symptomatic anemia  - F/U flow cytometry (PB), FLT3 sent 9/9   - trend daily TLS labs: CMP, Phos, Uric acid, LDH. Replete prn  Plts x 1u for hemorrhoidal bleed  - Check G6PD  - consider PICC line  - Had recent TTE, EF 49%  - 9/9 Bone Marrow Bx - F/U results  Appreciate Cardiology consulted, forseeing multiple interactions with Sotalol, d/c'd, starting Toprol xl 25 qd

## 2021-09-13 NOTE — PROGRESS NOTE ADULT - SUBJECTIVE AND OBJECTIVE BOX
Patient seen and examined at bedside.    Overnight Events: no acute events overnight, patient remains well appearing and asymptomatic.    Review Of Systems: No chest pain, shortness of breath, or palpitations            Current Meds:  allopurinol 100 milliGRAM(s) Oral two times a day  aztreonam  IVPB 2000 milliGRAM(s) IV Intermittent every 8 hours  doxazosin 4 milliGRAM(s) Oral at bedtime  furosemide   Injectable 20 milliGRAM(s) IV Push daily  influenza   Vaccine 0.5 milliLiter(s) IntraMuscular once  melatonin 3 milliGRAM(s) Oral at bedtime PRN  posaconazole DR Tablet 300 milliGRAM(s) Oral daily  sotalol 120 milliGRAM(s) Oral two times a day    Vitals:  T(F): 98.3 (09-13), Max: 99 (09-12)  HR: 68 (09-13) (59 - 99)  BP: 123/63 (09-13) (110/58 - 128/65)  RR: 18 (09-13)  SpO2: 95% (09-13)  I&O's Summary    12 Sep 2021 07:01  -  13 Sep 2021 07:00  --------------------------------------------------------  IN: 880 mL / OUT: 300 mL / NET: 580 mL        Physical Exam:  Appearance: No acute distress; well appearing  Cardiovascular: RRR, S1, S2, no murmurs, rubs, or gallops; 1+ edema  Respiratory: Clear to auscultation bilaterally  Gastrointestinal: soft, non-tender, non-distended with normal bowel sounds  Musculoskeletal: No clubbing; no joint deformity   Neurologic: Non-focal  Lymphatic: No lymphadenopathy  Psychiatry: AAOx3, mood & affect appropriate  Skin: No rashes, ecchymoses, or cyanosis                          7.1    3.37  )-----------( 13       ( 13 Sep 2021 07:15 )             22.7     09-13    143  |  111<H>  |  33<H>  ----------------------------<  99  3.9   |  23  |  1.67<H>    Ca    8.7      13 Sep 2021 07:15  Phos  3.4     09-13  Mg     2.1     09-13    TPro  5.5<L>  /  Alb  3.0<L>  /  TBili  1.9<H>  /  DBili  x   /  AST  7<L>  /  ALT  6<L>  /  AlkPhos  105  09-13    Serum Pro-Brain Natriuretic Peptide: 6525 pg/mL (09-08 @ 21:27)    EKG: A sensed V paced     Conclusions:  1. Aortic valve notwell visualized; appears calcified.  Mild-moderate aortic regurgitation.  2. Severely dilated left atrium.  LA volume index = 56  cc/m2. 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  3. Endocardium not well visualized; mild left ventricular  systolic dysfunction. Paradoxical septal motion consistent  with paced rhythm.  4. Mild right ventricular enlargement with normal right  ventricular systolic function. A device wire is noted in  the right heart.  5. Small-moderate pericardial effusion adjacent to the  right heart. The effusion measures approximately 1.0 cm  inferior to the right heart. No echocardiographic evidence  of pericardial tamponade.  6. Bilateral pleural effusions.  7. Echo-free space measuring approximately 4.0 cm x 3.7 cm  is noted in the liver consistent with cyst, however,  dedicated imaging recommended for further evaluation if  clinically indicated.  *** No previous Echo exam.    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 Consult for transition off sotalol given drug-drug interactions with prophylactic medications.     - device interrogated without recent VT, does have paroxysmal afib   - please discuss with patient's outside cardiologist   - in this older gentleman without history of clinical VT, labile renal function and borderline EF, the risks of ongoing sotalol use may outweigh the benefits, particularly if it will interfere with optimal treatment of his AML, will discuss alternatives including monitoring off antiarrhythmics   - anticoagulation for afib when safe from a hematologic perspective, limited by thrombocytopenia  - monitor renal function and electrolytes closely   - EP will continue to follow  Patient seen and examined at bedside.    Overnight Events: no acute events overnight, patient remains well appearing and asymptomatic.    Review Of Systems: No chest pain, shortness of breath, or palpitations            Current Meds:  allopurinol 100 milliGRAM(s) Oral two times a day  aztreonam  IVPB 2000 milliGRAM(s) IV Intermittent every 8 hours  doxazosin 4 milliGRAM(s) Oral at bedtime  furosemide   Injectable 20 milliGRAM(s) IV Push daily  influenza   Vaccine 0.5 milliLiter(s) IntraMuscular once  melatonin 3 milliGRAM(s) Oral at bedtime PRN  posaconazole DR Tablet 300 milliGRAM(s) Oral daily  sotalol 120 milliGRAM(s) Oral two times a day    Vitals:  T(F): 98.3 (09-13), Max: 99 (09-12)  HR: 68 (09-13) (59 - 99)  BP: 123/63 (09-13) (110/58 - 128/65)  RR: 18 (09-13)  SpO2: 95% (09-13)  I&O's Summary    12 Sep 2021 07:01  -  13 Sep 2021 07:00  --------------------------------------------------------  IN: 880 mL / OUT: 300 mL / NET: 580 mL        Physical Exam:  Appearance: No acute distress; well appearing  Cardiovascular: RRR, S1, S2, no murmurs, rubs, or gallops; 1+ edema  Respiratory: Clear to auscultation bilaterally  Gastrointestinal: soft, non-tender, non-distended with normal bowel sounds  Musculoskeletal: No clubbing; no joint deformity   Neurologic: Non-focal  Lymphatic: No lymphadenopathy  Psychiatry: AAOx3, mood & affect appropriate  Skin: No rashes, ecchymoses, or cyanosis                          7.1    3.37  )-----------( 13       ( 13 Sep 2021 07:15 )             22.7     09-13    143  |  111<H>  |  33<H>  ----------------------------<  99  3.9   |  23  |  1.67<H>    Ca    8.7      13 Sep 2021 07:15  Phos  3.4     09-13  Mg     2.1     09-13    TPro  5.5<L>  /  Alb  3.0<L>  /  TBili  1.9<H>  /  DBili  x   /  AST  7<L>  /  ALT  6<L>  /  AlkPhos  105  09-13    Serum Pro-Brain Natriuretic Peptide: 6525 pg/mL (09-08 @ 21:27)    EKG: A sensed V paced     Conclusions:  1. Aortic valve notwell visualized; appears calcified.  Mild-moderate aortic regurgitation.  2. Severely dilated left atrium.  LA volume index = 56  cc/m2. 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  3. Endocardium not well visualized; mild left ventricular  systolic dysfunction. Paradoxical septal motion consistent  with paced rhythm.  4. Mild right ventricular enlargement with normal right  ventricular systolic function. A device wire is noted in  the right heart.  5. Small-moderate pericardial effusion adjacent to the  right heart. The effusion measures approximately 1.0 cm  inferior to the right heart. No echocardiographic evidence  of pericardial tamponade.  6. Bilateral pleural effusions.  7. Echo-free space measuring approximately 4.0 cm x 3.7 cm  is noted in the liver consistent with cyst, however,  dedicated imaging recommended for further evaluation if  clinically indicated.  *** No previous Echo exam.    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 Consult for transition off sotalol given drug-drug interactions with prophylactic medications.     - device interrogated without recent VT, does have paroxysmal afib   - discussed with patient's outpatient EP   - in this older gentleman without history of clinical VT, labile renal function and borderline EF, the risks of ongoing sotalol use may outweigh the benefits, particularly if it will interfere with optimal treatment of his AML  - okay to discontinue sotalol, start Toprol 25 in am   - anticoagulation for afib when safe from a hematologic perspective, limited by thrombocytopenia  - monitor renal function and electrolytes closely   - EP will sign off, re consult with ongoing concerns

## 2021-09-13 NOTE — PROGRESS NOTE ADULT - ATTENDING COMMENTS
85 yo male with Afib (sotolol and warfarin), AICD, Prostate Ca 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 Has 16% peripheral blood blasts on transfer.  Bone Marrow Bx 9/9/21 -- PENDING  FISH / Cytogenetics / NGS -- PENDING  Plan for likely decitabine and bereket after marrow results finalized  Today is Day 0, not yet started on therapy    - Afebrile since admission  - No need for imaging at this time, CT imaging in June 2021, had splenomegaly and proctocolitis, chronic pancreatitis  - Neutropenia: Augmentin (On sotalol, cannot use levofloxacin) and posaconazole ppx  - CXR 9/9: small pleural effusions bilaterally  - Echo 8/20: LVEF 45%, small-moderate pericardial effusion  - Arrhythmia / Afib Hx: Stable, EP to assess if sotalol can be changed due to interactions -- Keep Mg >2.0, K> 4.0  - Monitor strict I/Os, daily weights and will diurese PRN for fluid overload, on home regimen of lasix, continue  - OOB/ambulate 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 Has 16% peripheral blood blasts on transfer.  Bone Marrow Bx 9/9/21 -- PENDING  FISH / Cytogenetics / NGS -- PENDING  Plan for likely decitabine and bereket after marrow results finalized. Has marked basophilia.  Today is Day 0, not yet started on therapy    - Afebrile since admission  - No need for imaging at this time, CT imaging in June 2021, had splenomegaly and proctocolitis, chronic pancreatitis  - Neutropenia: D/C prophylaxis - Augmentin (On sotalol, cannot use levofloxacin)   - CXR 9/9: small pleural effusions bilaterally  - Echo 8/20: LVEF 45%, small-moderate pericardial effusion  - Arrhythmia / Afib Hx: Stable, EP to assess if sotalol can be changed due to interactions -- Keep Mg >2.0, K> 4.0 -- will switch to Toprol per EP  - Monitor strict I/Os, daily weights and will diurese PRN for fluid overload, on home regimen of lasix, continue  - OOB/ambulate  Check tryptase level

## 2021-09-13 NOTE — PROGRESS NOTE ADULT - PROBLEM SELECTOR PLAN 2
+Neutropenic, afebrile  Posaconazole for neutropenia ppx. Levaquin has a major interaction with Sotalol. Will consider Augmentin for antimicrobial ppx, pt wants to avoid this (+rash >50yrs ago). Started Aztreonam   If febrile, Pancx, CXR consider  +/- Vancomycin.   Recently a/w  diverticulitis (6/26-7/2) +Neutropenic, afebrile  Will start ppx Levaquin when QTC downtrends off Sotalol  If febrile, Pancx, CXR. Start Aztreonam +/- Vancomycin. Or Cefepime (low cross reactivity for PCN allx)  Holding antimicrobial and antifungal ppx for now  Recently a/w  diverticulitis (6/26-7/2)

## 2021-09-13 NOTE — PROGRESS NOTE ADULT - PROBLEM SELECTOR PLAN 2
-acute anemia in setting of MDS, previously refractory to Revlimid, off-label Promacta was initiated on previous hospitalization but was stopped, labs showing thrombocytopenia at baseline, acute on chronic normocytic anemia and blasts 12.6%   -hematology consult noted (previously seen by Dr. Zimmerman inpatient, follows with Dr. Haile outpatient- given leukemic transformation -pt transferred to [facility] care  -transfuse Hg >7, plt >10 pending hematology input, lasix with transfusions PRN  +Neutropenic,   Posaconazole for neutropenia ppx.    Aztreonam

## 2021-09-14 NOTE — PROGRESS NOTE ADULT - PROBLEM SELECTOR PLAN 1
R/O AML -  thrombocytopenia and anemia;  Blasts 12.6% (23% as OP)  9/9 Transferred to [facility]  - h/o MDS refractory to Revlimid, initially stable however in recent 2-3 months recurrent thrombocytopenia and symptomatic anemia  - F/U flow cytometry (PB), FLT3 sent 9/9   - trend daily TLS labs: CMP, Phos, Uric acid, LDH. Replete prn  Plts x 1u for hemorrhoidal bleed  - Check G6PD  - consider PICC line  - Had recent TTE, EF 49%  - 9/9 Bone Marrow Bx - F/U results  Appreciate Cardiology consulted, forseeing multiple interactions with Sotalol, d/c'd, starting Toprol xl 25 qd h/o MDS refractory to Revlimid, initially stable however in recent 2-3 months recurrent thrombocytopenia and symptomatic anemia now confirmed MDS with excess blasts. Treat with Vidaza subcutaneous for 7 days start 9/15   trend daily TLS labs: CMP, Phos, Uric acid, LDH. Replete prn  G6PD (-), TTE, EF 49%  9/9 Bone Marrow Bx-MDS excess blasts.

## 2021-09-14 NOTE — PROGRESS NOTE ADULT - ASSESSMENT
87 y/o M w/ PMHx of afib s/p ablation w/ AICD (on coumadin but on hold due to severe thrombocytopenia), prostate CA, MDS with complex cytogenetics refractory to Revlimid, found to have elevated blasts in peripheral blood from outpatient work up, advised to seek inpatient treatment for likely MDS progression to leukemia. 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

## 2021-09-14 NOTE — PROGRESS NOTE ADULT - ATTENDING COMMENTS
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 Has 16% peripheral blood blasts on transfer.  Bone Marrow Bx 9/9/21 -- PENDING  FISH / Cytogenetics / NGS -- PENDING  Plan for likely decitabine and bereket after marrow results finalized. Has marked basophilia.  Today is Day 0, not yet started on therapy    - Afebrile since admission  - No need for imaging at this time, CT imaging in June 2021, had splenomegaly and proctocolitis, chronic pancreatitis  - Neutropenia: D/C prophylaxis - Augmentin (On sotalol, cannot use levofloxacin)   - CXR 9/9: small pleural effusions bilaterally  - Echo 8/20: LVEF 45%, small-moderate pericardial effusion  - Arrhythmia / Afib Hx: Stable, EP to assess if sotalol can be changed due to interactions -- Keep Mg >2.0, K> 4.0 -- will switch to Toprol per EP  - Monitor strict I/Os, daily weights and will diurese PRN for fluid overload, on home regimen of lasix, continue  - OOB/ambulate  Check tryptase level 85 yo male with Afib (sotolol and warfarin), AICD, Prostate Ca 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 He was diagnosed with -5q MDS in 2020. Has 16% peripheral blood blasts on transfer.  Bone Marrow Bx 9/9/21 -- MDS with EB2, 17% blasts  FISH / Cytogenetics / NGS -- PENDING  Reviewed diagnosis and explained it. Recommended therapy with 5'azacytidine daily x 7 subq. Reviewed toxicities and all questions answered.     - Afebrile since admission  - No need for imaging at this time, CT imaging in June 2021, had splenomegaly and proctocolitis, chronic pancreatitis  - Neutropenia: D/C prophylaxis - Augmentin (On sotalol, cannot use levofloxacin)   - CXR 9/9: small pleural effusions bilaterally  - Echo 8/20: LVEF 45%, small-moderate pericardial effusion  - Arrhythmia / Afib Hx: Stable, EP to assess if sotalol can be changed due to interactions -- Keep Mg >2.0, K> 4.0 -- will switch to Toprol per EP  - Monitor strict I/Os, daily weights and will diurese PRN for fluid overload, on home regimen of lasix, continue  - OOB/ambulate  Check tryptase level

## 2021-09-14 NOTE — PROGRESS NOTE ADULT - PROBLEM SELECTOR PLAN 2
-acute anemia in setting of MDS, previously refractory to Revlimid, off-label Promacta was initiated on previous hospitalization but was stopped, labs showing thrombocytopenia at baseline, acute on chronic normocytic anemia and blasts 12.6%   -hematology consult noted (previously seen by Dr. Zimmerman inpatient, follows with Dr. Haile outpatient- given leukemic transformation -pt transferred to house heme care  -transfuse Hg >7, plt >10 pending hematology input, lasix with transfusions PRN  +Neutropenic,   Posaconazole for neutropenia ppx.    Aztreonam  mgmt per heme

## 2021-09-14 NOTE — PROGRESS NOTE ADULT - SUBJECTIVE AND OBJECTIVE BOX
Diagnosis: R/O Leukemia    Protocol/Chemo Regimen: TBD    Day: n/a    Subjective: No overnight events, +bleeding hemorrhoid when wiping    Review of Systems: Denies abdominal pain, STEINER, HA or dizziness, n/v, abdominal pain    Pain scale: 0/10                    Diet: Regular    Allergies    adhesives (Rash)  clonidine (Swelling; Rash)  felodipine (Rash)  penicillin (Rash)  sulfa drugs (Rash)    Intolerances        ANTIMICROBIALS      HEME/ONC MEDICATIONS      STANDING MEDICATIONS  allopurinol 100 milliGRAM(s) Oral two times a day  doxazosin 4 milliGRAM(s) Oral at bedtime  furosemide   Injectable 20 milliGRAM(s) IV Push daily  influenza   Vaccine 0.5 milliLiter(s) IntraMuscular once  metoprolol succinate ER 25 milliGRAM(s) Oral daily      PRN MEDICATIONS  acetaminophen   Tablet .. 650 milliGRAM(s) Oral every 6 hours PRN  diphenhydrAMINE 25 milliGRAM(s) Oral every 6 hours PRN  melatonin 3 milliGRAM(s) Oral at bedtime PRN        Vital Signs Last 24 Hrs  T(C): 36.6 (14 Sep 2021 05:50), Max: 37.1 (13 Sep 2021 13:09)  T(F): 97.9 (14 Sep 2021 05:50), Max: 98.8 (13 Sep 2021 13:09)  HR: 61 (14 Sep 2021 05:50) (59 - 68)  BP: 118/60 (14 Sep 2021 05:50) (112/58 - 125/66)  BP(mean): --  RR: 18 (14 Sep 2021 05:50) (18 - 18)  SpO2: 95% (14 Sep 2021 05:50) (92% - 96%)    PHYSICAL EXAM  General: Sitting up in bed in NAD  HEENT: PERRL, sclerae anicteric,   CV: +S1, S2, reg  Abdomen: +BS, soft, NT/ND  Ext: no edema BLE's  Skin: No rashes or ecchymosis  Neuro: A&O x 3, non focal  Central line: PIV    RECENT CULTURES:        LABS:                        6.9    2.86  )-----------( 37       ( 14 Sep 2021 06:44 )             22.0         Mean Cell Volume : 87.6 fl  Mean Cell Hemoglobin : 27.5 pg  Mean Cell Hemoglobin Concentration : 31.4 gm/dL  Auto Neutrophil # : x  Auto Lymphocyte # : x  Auto Monocyte # : x  Auto Eosinophil # : x  Auto Basophil # : x  Auto Neutrophil % : x  Auto Lymphocyte % : x  Auto Monocyte % : x  Auto Eosinophil % : x  Auto Basophil % : x      09-14    143  |  109<H>  |  35<H>  ----------------------------<  94  3.5   |  23  |  1.58<H>    Ca    8.4      14 Sep 2021 06:44  Phos  2.8     09-14  Mg     2.1     09-14    TPro  5.5<L>  /  Alb  3.1<L>  /  TBili  1.9<H>  /  DBili  x   /  AST  6<L>  /  ALT  7<L>  /  AlkPhos  104  09-14      Mg 2.1  Phos 2.8      PT/INR - ( 14 Sep 2021 06:44 )   PT: 16.3 sec;   INR: 1.38 ratio         PTT - ( 14 Sep 2021 06:44 )  PTT:37.4 sec      Uric Acid 5.9        RADIOLOGY & ADDITIONAL STUDIES:         Diagnosis: MDS with excess blasts    Protocol/Chemo Vidaza sc for 7 days    Day: 9/15    Subjective: No overnight events    Review of Systems: Denies abdominal pain, STEINER, HA or dizziness, n/v, abdominal pain    Pain scale: 0/10                    Diet: Regular    Allergies    adhesives (Rash)  clonidine (Swelling; Rash)  felodipine (Rash)  penicillin (Rash)  sulfa drugs (Rash)    Intolerances        ANTIMICROBIALS      HEME/ONC MEDICATIONS      STANDING MEDICATIONS  allopurinol 100 milliGRAM(s) Oral two times a day  doxazosin 4 milliGRAM(s) Oral at bedtime  furosemide   Injectable 20 milliGRAM(s) IV Push daily  influenza   Vaccine 0.5 milliLiter(s) IntraMuscular once  metoprolol succinate ER 25 milliGRAM(s) Oral daily      PRN MEDICATIONS  acetaminophen   Tablet .. 650 milliGRAM(s) Oral every 6 hours PRN  diphenhydrAMINE 25 milliGRAM(s) Oral every 6 hours PRN  melatonin 3 milliGRAM(s) Oral at bedtime PRN        Vital Signs Last 24 Hrs  T(C): 36.6 (14 Sep 2021 05:50), Max: 37.1 (13 Sep 2021 13:09)  T(F): 97.9 (14 Sep 2021 05:50), Max: 98.8 (13 Sep 2021 13:09)  HR: 61 (14 Sep 2021 05:50) (59 - 68)  BP: 118/60 (14 Sep 2021 05:50) (112/58 - 125/66)  BP(mean): --  RR: 18 (14 Sep 2021 05:50) (18 - 18)  SpO2: 95% (14 Sep 2021 05:50) (92% - 96%)    PHYSICAL EXAM  General: Sitting up in bed in NAD  HEENT: PERRL, sclerae anicteric,   CV: +S1, S2, reg  Abdomen: +BS, soft, NT/ND  Ext: no edema BLE's  Skin: No rashes or ecchymosis  Neuro: A&O x 3, non focal  Central line: PIV    RECENT CULTURES:        LABS:                        6.9    2.86  )-----------( 37       ( 14 Sep 2021 06:44 )             22.0         Mean Cell Volume : 87.6 fl  Mean Cell Hemoglobin : 27.5 pg  Mean Cell Hemoglobin Concentration : 31.4 gm/dL  Auto Neutrophil # : x  Auto Lymphocyte # : x  Auto Monocyte # : x  Auto Eosinophil # : x  Auto Basophil # : x  Auto Neutrophil % : x  Auto Lymphocyte % : x  Auto Monocyte % : x  Auto Eosinophil % : x  Auto Basophil % : x      09-14    143  |  109<H>  |  35<H>  ----------------------------<  94  3.5   |  23  |  1.58<H>    Ca    8.4      14 Sep 2021 06:44  Phos  2.8     09-14  Mg     2.1     09-14    TPro  5.5<L>  /  Alb  3.1<L>  /  TBili  1.9<H>  /  DBili  x   /  AST  6<L>  /  ALT  7<L>  /  AlkPhos  104  09-14      Mg 2.1  Phos 2.8      PT/INR - ( 14 Sep 2021 06:44 )   PT: 16.3 sec;   INR: 1.38 ratio         PTT - ( 14 Sep 2021 06:44 )  PTT:37.4 sec      Uric Acid 5.9        RADIOLOGY & ADDITIONAL STUDIES:

## 2021-09-14 NOTE — PROGRESS NOTE ADULT - PROBLEM SELECTOR PLAN 3
9/11 - Consult Cardiology for transitioning off Sotalol given multiple interactions with abx/chemo/venetoclax  Holding AC in setting low plts  9/13 Appreciate Cardiology consulted, forseeing multiple interactions with Sotalol, d/c'd, starting Toprol xl 25 qd

## 2021-09-14 NOTE — PROGRESS NOTE ADULT - PROBLEM SELECTOR PLAN 2
+Neutropenic, afebrile  Will start ppx Levaquin when QTC downtrends off Sotalol  If febrile, Pancx, CXR. Start Aztreonam +/- Vancomycin. Or Cefepime (low cross reactivity for PCN allx)  Holding antimicrobial and antifungal ppx for now  Recently a/w  diverticulitis (6/26-7/2)

## 2021-09-14 NOTE — PROGRESS NOTE ADULT - PROBLEM SELECTOR PLAN 1
-improved  likely multifactorial in the setting of anemia and fluid overload secondary to CHF  -no hypoxia or chest pain to suggest PE, no signs of pneumonia  -continue with IV diuresis (monitor Cr and electrolytes) 20mg IV lasix daily   -strict I/O's, fluid restriction   --recent echo 8/2021 with EF 45%  -recent bilateral lower extremity duplex with no signs of DVT  anemia hb 6.8- heme cs noted    sp 2units prbc, hb stable  Cardiology consult re: interactions with Sotalol.

## 2021-09-14 NOTE — PROGRESS NOTE ADULT - SUBJECTIVE AND OBJECTIVE BOX
Patient is a 86y old  Male who presents with a chief complaint of shortness of breath (14 Sep 2021 08:08)      INTERVAL HPI/OVERNIGHT EVENTS: noted  pt seen and examined this am   events noted  receiving 1u prbc      Vital Signs Last 24 Hrs  T(C): 36.6 (14 Sep 2021 11:15), Max: 36.7 (14 Sep 2021 00:40)  T(F): 97.8 (14 Sep 2021 11:15), Max: 98.1 (14 Sep 2021 00:40)  HR: 64 (14 Sep 2021 11:15) (59 - 73)  BP: 119/61 (14 Sep 2021 11:15) (108/97 - 124/65)  BP(mean): --  RR: 18 (14 Sep 2021 11:15) (18 - 18)  SpO2: 95% (14 Sep 2021 11:15) (92% - 98%)    acetaminophen   Tablet .. 650 milliGRAM(s) Oral every 6 hours PRN  allopurinol 100 milliGRAM(s) Oral two times a day  diphenhydrAMINE 25 milliGRAM(s) Oral every 6 hours PRN  doxazosin 4 milliGRAM(s) Oral at bedtime  furosemide   Injectable 20 milliGRAM(s) IV Push daily  influenza   Vaccine 0.5 milliLiter(s) IntraMuscular once  melatonin 3 milliGRAM(s) Oral at bedtime PRN  metoprolol succinate ER 25 milliGRAM(s) Oral daily  phytonadione   Solution 10 milliGRAM(s) Oral daily      PHYSICAL EXAM:  GENERAL: NAD,   EYES: conjunctiva and sclera clear  ENMT: Moist mucous membranes  NECK: Supple, No JVD, Normal thyroid  CHEST/LUNG: non labored, cta b/l  HEART: Regular rate and rhythm; No murmurs, rubs, or gallops  ABDOMEN: Soft, Nontender, Nondistended; Bowel sounds present  EXTREMITIES:  2+ Peripheral Pulses, No clubbing, cyanosis, or edema  LYMPH: No lymphadenopathy noted  SKIN: No rashes or lesions    Consultant(s) Notes Reviewed:  [x ] YES  [ ] NO  Care Discussed with Consultants/Other Providers [ x] YES  [ ] NO    LABS:                        6.9    2.86  )-----------( 37       ( 14 Sep 2021 06:44 )             22.0     09-14    143  |  109<H>  |  35<H>  ----------------------------<  94  3.5   |  23  |  1.58<H>    Ca    8.4      14 Sep 2021 06:44  Phos  2.8     09-14  Mg     2.1     09-14    TPro  5.5<L>  /  Alb  3.1<L>  /  TBili  1.9<H>  /  DBili  x   /  AST  6<L>  /  ALT  7<L>  /  AlkPhos  104  09-14    PT/INR - ( 14 Sep 2021 06:44 )   PT: 16.3 sec;   INR: 1.38 ratio         PTT - ( 14 Sep 2021 06:44 )  PTT:37.4 sec    CAPILLARY BLOOD GLUCOSE                  RADIOLOGY & ADDITIONAL TESTS:    Imaging Personally Reviewed:  [x ] YES  [ ] NO

## 2021-09-15 NOTE — PROGRESS NOTE ADULT - PROBLEM SELECTOR PLAN 2
acute anemia in setting of MDS, previously refractory to Revlimid, off-label Promacta was initiated on previous hospitalization but was stopped, labs showing thrombocytopenia at baseline, acute on chronic normocytic anemia and blasts 12.6%   hematology consult noted (previously seen by Dr. Zimmerman inpatient, follows with Dr. Haile outpatient- given leukemic transformation -pt transferred to house heme care  transfuse Hg >7, plt >10 pending hematology input, Lasix with transfusions PRN  +Neutropenic,   Posaconazole for neutropenia ppx  Aztreonam  mgmt per heme acute anemia in setting of MDS, previously refractory to Revlimid, off-label Promacta was initiated on previous hospitalization but was stopped, labs showing thrombocytopenia at baseline, acute on chronic normocytic anemia and blasts 12.6%   hematology consult noted (previously seen by Dr. Zimmerman inpatient, follows with Dr. Haile outpatient- given leukemic transformation -pt transferred to house heme care  transfuse Hg >7, plt >10 pending hematology input, Lasix with transfusions PRN  mgmt per heme

## 2021-09-15 NOTE — PROGRESS NOTE ADULT - PROBLEM SELECTOR PLAN 3
aflutter on EKG, V-paced  Cont sotalol 120mg BID  monitor electrolytes  anti-coagulation on hold in setting of thrombocytopenia and anemia

## 2021-09-15 NOTE — PROGRESS NOTE ADULT - SUBJECTIVE AND OBJECTIVE BOX
Diagnosis: MDS with excess blasts    Protocol/Chemo Vidaza sc for 7 days    Day: 1    Subjective: No overnight events    Review of Systems: Denies abdominal pain, STEINER, HA or dizziness, n/v, abdominal pain    Pain scale: 0/10                    Diet: Regular    Allergies    adhesives (Rash)  clonidine (Swelling; Rash)  felodipine (Rash)  penicillin (Rash)  sulfa drugs (Rash)    Intolerances        ANTIMICROBIALS      HEME/ONC MEDICATIONS      STANDING MEDICATIONS  allopurinol 100 milliGRAM(s) Oral two times a day  doxazosin 4 milliGRAM(s) Oral at bedtime  furosemide   Injectable 20 milliGRAM(s) IV Push daily  influenza   Vaccine 0.5 milliLiter(s) IntraMuscular once  metoprolol succinate ER 25 milliGRAM(s) Oral daily  phytonadione   Solution 10 milliGRAM(s) Oral daily      PRN MEDICATIONS  acetaminophen   Tablet .. 650 milliGRAM(s) Oral every 6 hours PRN  diphenhydrAMINE 25 milliGRAM(s) Oral every 6 hours PRN  melatonin 3 milliGRAM(s) Oral at bedtime PRN        Vital Signs Last 24 Hrs  T(C): 36.7 (15 Sep 2021 04:41), Max: 36.8 (15 Sep 2021 01:27)  T(F): 98.1 (15 Sep 2021 04:41), Max: 98.2 (15 Sep 2021 01:27)  HR: 66 (15 Sep 2021 04:41) (60 - 73)  BP: 144/65 (15 Sep 2021 04:41) (108/97 - 149/69)  BP(mean): --  RR: 18 (15 Sep 2021 04:41) (18 - 18)  SpO2: 94% (15 Sep 2021 04:41) (93% - 98%)    PHYSICAL EXAM  General: Sitting up in bed in NAD  HEENT: PERRL, sclerae anicteric,   CV: +S1, S2, reg  Abdomen: +BS, soft, NT/ND  Ext: no edema BLE's  Skin: No rashes or ecchymosis  Neuro: A&O x 3, non focal  Central line: PIV    RECENT CULTURES:        LABS:                        8.1    3.01  )-----------( 29       ( 15 Sep 2021 07:11 )             25.7         Mean Cell Volume : 87.7 fl  Mean Cell Hemoglobin : 27.6 pg  Mean Cell Hemoglobin Concentration : 31.5 gm/dL  Auto Neutrophil # : x  Auto Lymphocyte # : x  Auto Monocyte # : x  Auto Eosinophil # : x  Auto Basophil # : x  Auto Neutrophil % : x  Auto Lymphocyte % : x  Auto Monocyte % : x  Auto Eosinophil % : x  Auto Basophil % : x      09-15    146<H>  |  112<H>  |  34<H>  ----------------------------<  94  3.6   |  23  |  1.55<H>    Ca    8.9      15 Sep 2021 07:01  Phos  2.6     09-15  Mg     2.1     09-15    TPro  5.9<L>  /  Alb  3.2<L>  /  TBili  2.5<H>  /  DBili  x   /  AST  7<L>  /  ALT  6<L>  /  AlkPhos  119  09-15      Mg 2.1  Phos 2.6      PT/INR - ( 14 Sep 2021 17:37 )   PT: 16.9 sec;   INR: 1.43 ratio         PTT - ( 14 Sep 2021 17:37 )  PTT:36.6 sec      Uric Acid 6.0        RADIOLOGY & ADDITIONAL STUDIES:

## 2021-09-15 NOTE — PROGRESS NOTE ADULT - ASSESSMENT
85 y/o M w/ PMHx of afib s/p ablation w/ AICD (on coumadin but on hold due to severe thrombocytopenia), prostate CA, MDS with complex cytogenetics refractory to Revlimid, found to have elevated blasts which confirmed MDS with excess blasts now getting treatment on 7 jibmo with subcutaneous Vidaza x 7 days. 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

## 2021-09-15 NOTE — PROGRESS NOTE ADULT - ATTENDING COMMENTS
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 He was diagnosed with -5q MDS in 2020. Has 16% peripheral blood blasts on transfer.  Bone Marrow Bx 9/9/21 -- MDS with EB2, 17% blasts  FISH / Cytogenetics / NGS -- PENDING  Reviewed diagnosis and explained it. Recommended therapy with 5'azacytidine daily x 7 subq. Reviewed toxicities and all questions answered.     - Afebrile since admission  - No need for imaging at this time, CT imaging in June 2021, had splenomegaly and proctocolitis, chronic pancreatitis  - Neutropenia: D/C prophylaxis - Augmentin (On sotalol, cannot use levofloxacin)   - CXR 9/9: small pleural effusions bilaterally  - Echo 8/20: LVEF 45%, small-moderate pericardial effusion  - Arrhythmia / Afib Hx: Stable, EP to assess if sotalol can be changed due to interactions -- Keep Mg >2.0, K> 4.0 -- will switch to Toprol per EP  - Monitor strict I/Os, daily weights and will diurese PRN for fluid overload, on home regimen of lasix, continue  - OOB/ambulate  Check tryptase level 85 yo male with Afib (sotolol and warfarin), AICD, Prostate Ca 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 He was diagnosed with -5q MDS in 2020. Has 16% peripheral blood blasts on transfer. Bone Marrow Bx 9/9/21 -- MDS with EB2, 17% blasts. FISH / Cytogenetics / NGS -- PENDING  Reviewed diagnosis and explained it. Recommended therapy with 5'azacytidine daily x 7 subq. Reviewed toxicities and all questions answered. He gave informed consent. Today is day 1.    - Afebrile since admission  - No need for imaging at this time, CT imaging in June 2021, had splenomegaly and proctocolitis, chronic pancreatitis  - Neutropenia: D/C prophylaxis - Augmentin (On sotalol, cannot use levofloxacin)   - CXR 9/9: small pleural effusions bilaterally  - Echo 8/20: LVEF 45%, small-moderate pericardial effusion  - Arrhythmia / Afib Hx: Stable, EP to assess if sotalol can be changed due to interactions -- Keep Mg >2.0, K> 4.0 -- will switch to Toprol per EP  - Monitor strict I/Os, daily weights and will diurese PRN for fluid overload, on home regimen of lasix, continue  - OOB/ambulate  Check tryptase level  Bilirubin 2.5 with direct 0.3 - possibly due to ineffective erythropoiesis

## 2021-09-15 NOTE — PROGRESS NOTE ADULT - PROBLEM SELECTOR PLAN 1
improved  likely multifactorial in the setting of anemia and fluid overload secondary to CHF  no hypoxia or chest pain to suggest PE, no signs of pneumonia  continue with IV diuresis (monitor Cr and electrolytes) 20mg IV lasix daily   strict I/O's, fluid restriction   -recent echo 8/2021 with EF 45%  recent bilateral lower extremity duplex with no signs of DVT  anemia hb 6.8- heme cs noted    sp 2units prbc, hb stable  Cardiology consult re: interactions with Sotalol. likely multifactorial in the setting of anemia and fluid overload secondary to CHF  continue with 20mg IV lasix daily   strict I/O's, fluid restriction   recent echo 8/2021 with EF 45%  recent bilateral lower extremity duplex with no signs of DVT

## 2021-09-15 NOTE — PROGRESS NOTE ADULT - SUBJECTIVE AND OBJECTIVE BOX
SUBJECTIVE / OVERNIGHT EVENTS:    INCOMPLETE NOTE      --------------------------------------------------------------------------------------------  LABS:                        8.1    3.01  )-----------( 29       ( 15 Sep 2021 07:11 )             25.7     09-15    146<H>  |  112<H>  |  34<H>  ----------------------------<  94  3.6   |  23  |  1.55<H>    Ca    8.9      15 Sep 2021 07:01  Phos  2.6     09-15  Mg     2.1     09-15    TPro  5.9<L>  /  Alb  3.2<L>  /  TBili  2.5<H>  /  DBili  x   /  AST  7<L>  /  ALT  6<L>  /  AlkPhos  119  09-15    PT/INR - ( 14 Sep 2021 17:37 )   PT: 16.9 sec;   INR: 1.43 ratio         PTT - ( 14 Sep 2021 17:37 )  PTT:36.6 sec  CAPILLARY BLOOD GLUCOSE                RADIOLOGY & ADDITIONAL TESTS:    Imaging Personally Reviewed:  [x] YES  [ ] NO    Consultant(s) Notes Reviewed:  [x] YES  [ ] NO    MEDICATIONS  (STANDING):  allopurinol 100 milliGRAM(s) Oral two times a day  doxazosin 4 milliGRAM(s) Oral at bedtime  furosemide   Injectable 20 milliGRAM(s) IV Push daily  influenza   Vaccine 0.5 milliLiter(s) IntraMuscular once  metoprolol succinate ER 25 milliGRAM(s) Oral daily  phytonadione   Solution 10 milliGRAM(s) Oral daily    MEDICATIONS  (PRN):  acetaminophen   Tablet .. 650 milliGRAM(s) Oral every 6 hours PRN Temp greater or equal to 38C (100.4F), Mild Pain (1 - 3)  diphenhydrAMINE 25 milliGRAM(s) Oral every 6 hours PRN Rash and/or Itching  melatonin 3 milliGRAM(s) Oral at bedtime PRN Insomnia      Care Discussed with Consultants/Other Providers [x] YES  [ ] NO    Vital Signs Last 24 Hrs  T(C): 36.7 (15 Sep 2021 09:50), Max: 36.8 (15 Sep 2021 01:27)  T(F): 98 (15 Sep 2021 09:50), Max: 98.2 (15 Sep 2021 01:27)  HR: 68 (15 Sep 2021 09:50) (61 - 68)  BP: 159/64 (15 Sep 2021 09:50) (113/55 - 159/64)  BP(mean): --  RR: 18 (15 Sep 2021 09:50) (18 - 18)  SpO2: 98% (15 Sep 2021 09:50) (93% - 98%)  I&O's Summary    14 Sep 2021 07:01  -  15 Sep 2021 07:00  --------------------------------------------------------  IN: 780 mL / OUT: 0 mL / NET: 780 mL      PHYSICAL EXAM:  GENERAL: NAD, well-developed, comfortable  HEAD:  Atraumatic, Normocephalic  EYES: EOMI, PERRLA, conjunctiva and sclera clear  NECK: Supple, No JVD  CHEST/LUNG: Clear to auscultation bilaterally; No wheeze  HEART: Regular rate and rhythm; No murmurs, rubs, or gallops  ABDOMEN: Soft, Nontender, Nondistended; Bowel sounds present  NEURO: AAOx3, no focal weakness, 5/5 b/l extremity strength, b/l knee no arthritis, no effusion   EXTREMITIES:  2+ Peripheral Pulses, No clubbing, cyanosis, or edema  SKIN: No rashes or lesions       SUBJECTIVE / OVERNIGHT EVENTS:    pt seen and examined. no complaints. for chemo today.      --------------------------------------------------------------------------------------------  LABS:                        8.1    3.01  )-----------( 29       ( 15 Sep 2021 07:11 )             25.7     09-15    146<H>  |  112<H>  |  34<H>  ----------------------------<  94  3.6   |  23  |  1.55<H>    Ca    8.9      15 Sep 2021 07:01  Phos  2.6     09-15  Mg     2.1     09-15    TPro  5.9<L>  /  Alb  3.2<L>  /  TBili  2.5<H>  /  DBili  x   /  AST  7<L>  /  ALT  6<L>  /  AlkPhos  119  09-15    PT/INR - ( 14 Sep 2021 17:37 )   PT: 16.9 sec;   INR: 1.43 ratio         PTT - ( 14 Sep 2021 17:37 )  PTT:36.6 sec  CAPILLARY BLOOD GLUCOSE                RADIOLOGY & ADDITIONAL TESTS:    Imaging Personally Reviewed:  [x] YES  [ ] NO    Consultant(s) Notes Reviewed:  [x] YES  [ ] NO    MEDICATIONS  (STANDING):  allopurinol 100 milliGRAM(s) Oral two times a day  doxazosin 4 milliGRAM(s) Oral at bedtime  furosemide   Injectable 20 milliGRAM(s) IV Push daily  influenza   Vaccine 0.5 milliLiter(s) IntraMuscular once  metoprolol succinate ER 25 milliGRAM(s) Oral daily  phytonadione   Solution 10 milliGRAM(s) Oral daily    MEDICATIONS  (PRN):  acetaminophen   Tablet .. 650 milliGRAM(s) Oral every 6 hours PRN Temp greater or equal to 38C (100.4F), Mild Pain (1 - 3)  diphenhydrAMINE 25 milliGRAM(s) Oral every 6 hours PRN Rash and/or Itching  melatonin 3 milliGRAM(s) Oral at bedtime PRN Insomnia      Care Discussed with Consultants/Other Providers [x] YES  [ ] NO    Vital Signs Last 24 Hrs  T(C): 36.7 (15 Sep 2021 09:50), Max: 36.8 (15 Sep 2021 01:27)  T(F): 98 (15 Sep 2021 09:50), Max: 98.2 (15 Sep 2021 01:27)  HR: 68 (15 Sep 2021 09:50) (61 - 68)  BP: 159/64 (15 Sep 2021 09:50) (113/55 - 159/64)  BP(mean): --  RR: 18 (15 Sep 2021 09:50) (18 - 18)  SpO2: 98% (15 Sep 2021 09:50) (93% - 98%)  I&O's Summary    14 Sep 2021 07:01  -  15 Sep 2021 07:00  --------------------------------------------------------  IN: 780 mL / OUT: 0 mL / NET: 780 mL      PHYSICAL EXAM:  GENERAL: NAD, well-developed, comfortable  HEAD:  Atraumatic, Normocephalic  CHEST/LUNG: Clear to auscultation bilaterally; No wheeze  HEART: Regular rate and rhythm; No murmurs, rubs, or gallops  ABDOMEN: Soft, Nontender, Nondistended; Bowel sounds present  NEURO: AAOx3, no focal weakness  EXTREMITIES:  2+ Peripheral Pulses, No clubbing, cyanosis, or edema

## 2021-09-15 NOTE — PROGRESS NOTE ADULT - PROBLEM SELECTOR PLAN 2
+Neutropenic, afebrile  Will start ppx Levaquin when QTC downtrends off Sotalol  If febrile, Pancx, CXR. Start Aztreonam +/- Vancomycin. Or Cefepime (low cross reactivity for PCN allx)  Holding antimicrobial and antifungal ppx for now  Recently a/w  diverticulitis (6/26-7/2) +Neutropenic, afebrile  Will start ppx Levaquin when QTC downtrends off Sotalol  9/15 repeat EKG  If febrile, Pancx, CXR. Start Aztreonam +/- Vancomycin. Or Cefepime (low cross reactivity for PCN allx)  Holding antimicrobial and antifungal ppx for now  Recently a/w  diverticulitis (6/26-7/2)

## 2021-09-15 NOTE — PROGRESS NOTE ADULT - PROBLEM SELECTOR PLAN 1
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 Treat with Vidaza subcutaneous for 7 days start 9/15   trend daily TLS labs: CMP, Phos, Uric acid, LDH. Replete prn  G6PD (-), TTE, EF 49%  9/9 Bone Marrow Bx-MDS excess blasts. 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   Treat with Vidaza subcutaneous for 7 days started 9/15   trend daily TLS labs: CMP, Phos, Uric acid, LDH. Replete prn  G6PD (-), TTE, EF 49%  9/9 Bone Marrow Bx-MDS excess blasts.

## 2021-09-16 NOTE — PROGRESS NOTE ADULT - PROBLEM SELECTOR PLAN 1
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   Treat with Vidaza subcutaneous for 7 days started 9/15   trend daily TLS labs: CMP, Phos, Uric acid, LDH. Replete prn  G6PD (-), TTE, EF 49%  9/9 Bone Marrow Bx-MDS excess blasts. 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 Replete prn

## 2021-09-16 NOTE — PROGRESS NOTE ADULT - SUBJECTIVE AND OBJECTIVE BOX
Diagnosis: MDS with excess blasts    Protocol/Chemo Vidaza sc for 7 days    Day: 2    Subjective:     Review of Systems:     Pain scale: 0/10                    Diet: Regular    Allergies    adhesives (Rash)  clonidine (Swelling; Rash)  felodipine (Rash)  penicillin (Rash)  sulfa drugs (Rash)    Intolerances      MEDICATIONS  (STANDING):  allopurinol 100 milliGRAM(s) Oral two times a day  azaCITIDine Injectable (eMAR) 52.6 milliGRAM(s) SubCutaneous every 24 hours  azaCITIDine Injectable (eMAR) 52.6 milliGRAM(s) SubCutaneous every 24 hours  azaCITIDine Injectable (eMAR) 52.6 milliGRAM(s) SubCutaneous every 24 hours  doxazosin 4 milliGRAM(s) Oral at bedtime  furosemide   Injectable 20 milliGRAM(s) IV Push daily  influenza   Vaccine 0.5 milliLiter(s) IntraMuscular once  metoprolol succinate ER 25 milliGRAM(s) Oral daily  ondansetron Injectable 8 milliGRAM(s) IV Push every 24 hours  phytonadione   Solution 10 milliGRAM(s) Oral daily    MEDICATIONS  (PRN):  acetaminophen   Tablet .. 650 milliGRAM(s) Oral every 6 hours PRN Temp greater or equal to 38C (100.4F), Mild Pain (1 - 3)  diphenhydrAMINE 25 milliGRAM(s) Oral every 6 hours PRN Rash and/or Itching  melatonin 3 milliGRAM(s) Oral at bedtime PRN Insomnia      Vital Signs Last 24 Hrs  T(C): 36.8 (16 Sep 2021 08:25), Max: 36.8 (15 Sep 2021 14:24)  T(F): 98.2 (16 Sep 2021 08:25), Max: 98.3 (16 Sep 2021 04:56)  HR: 60 (16 Sep 2021 08:25) (60 - 68)  BP: 98/40 (16 Sep 2021 08:25) (98/40 - 159/64)  BP(mean): --  RR: 18 (16 Sep 2021 08:25) (16 - 18)  SpO2: 95% (16 Sep 2021 08:25) (94% - 98%)    PHYSICAL EXAM  General: Sitting up in bed in NAD  HEENT: PERRL, sclerae anicteric,   CV: +S1, S2, reg  Abdomen: +BS, soft, NT/ND  Ext: no edema BLE's  Skin: No rashes or ecchymosis  Neuro: A&O x 3, non focal  Central line: PIV    RECENT CULTURES:        LABS:                                  7.9    2.97  )-----------( 27       ( 16 Sep 2021 07:25 )             25.7     16 Sep 2021 07:25    142    |  110    |  35     ----------------------------<  102    3.8     |  23     |  1.57     Ca    8.3        16 Sep 2021 07:25  Phos  2.8       16 Sep 2021 07:25  Mg     2.1       16 Sep 2021 07:25    TPro  5.6    /  Alb  2.9    /  TBili  1.8    /  DBili  x      /  AST  5      /  ALT  6      /  AlkPhos  106    16 Sep 2021 07:25    PT/INR - ( 14 Sep 2021 17:37 )   PT: 16.9 sec;   INR: 1.43 ratio    PTT - ( 14 Sep 2021 17:37 )  PTT:36.6 sec      LIVER FUNCTIONS - ( 16 Sep 2021 07:25 )  Alb: 2.9 g/dL / Pro: 5.6 g/dL / ALK PHOS: 106 U/L / ALT: 6 U/L / AST: 5 U/L / GGT: x             RADIOLOGY & ADDITIONAL STUDIES:         Diagnosis: MDS with excess blasts    Protocol/Chemo Vidaza sc for 7 days    Day: 2    Subjective: No overnight events, feels tired. Taking some po's.     Review of Systems: Denies n/v, STEINER, HA or dizziness, abdominal pain    Pain scale: 0/10                    Diet: Regular    Allergies    adhesives (Rash)  clonidine (Swelling; Rash)  felodipine (Rash)  penicillin (Rash)  sulfa drugs (Rash)    Intolerances      MEDICATIONS  (STANDING):  allopurinol 100 milliGRAM(s) Oral two times a day  azaCITIDine Injectable (eMAR) 52.6 milliGRAM(s) SubCutaneous every 24 hours  azaCITIDine Injectable (eMAR) 52.6 milliGRAM(s) SubCutaneous every 24 hours  azaCITIDine Injectable (eMAR) 52.6 milliGRAM(s) SubCutaneous every 24 hours  doxazosin 4 milliGRAM(s) Oral at bedtime  furosemide   Injectable 20 milliGRAM(s) IV Push daily  influenza   Vaccine 0.5 milliLiter(s) IntraMuscular once  metoprolol succinate ER 25 milliGRAM(s) Oral daily  ondansetron Injectable 8 milliGRAM(s) IV Push every 24 hours  phytonadione   Solution 10 milliGRAM(s) Oral daily    MEDICATIONS  (PRN):  acetaminophen   Tablet .. 650 milliGRAM(s) Oral every 6 hours PRN Temp greater or equal to 38C (100.4F), Mild Pain (1 - 3)  diphenhydrAMINE 25 milliGRAM(s) Oral every 6 hours PRN Rash and/or Itching  melatonin 3 milliGRAM(s) Oral at bedtime PRN Insomnia      Vital Signs Last 24 Hrs  T(C): 36.8 (16 Sep 2021 08:25), Max: 36.8 (15 Sep 2021 14:24)  T(F): 98.2 (16 Sep 2021 08:25), Max: 98.3 (16 Sep 2021 04:56)  HR: 60 (16 Sep 2021 08:25) (60 - 68)  BP: 98/40 (16 Sep 2021 08:25) (98/40 - 159/64)  BP(mean): --  RR: 18 (16 Sep 2021 08:25) (16 - 18)  SpO2: 95% (16 Sep 2021 08:25) (94% - 98%)    PHYSICAL EXAM  General: Sitting up in bed in NAD  HEENT: PERRL, sclerae anicteric,   CV: +S1, S2, reg  Abdomen: +BS, soft, NT/ND  Ext: no edema BLE's  Skin: No rashes or ecchymosis  Neuro: A&O x 3, non focal  Central line: PIV    RECENT CULTURES:        LABS:                                  7.9    2.97  )-----------( 27       ( 16 Sep 2021 07:25 )             25.7     16 Sep 2021 07:25    142    |  110    |  35     ----------------------------<  102    3.8     |  23     |  1.57     Ca    8.3        16 Sep 2021 07:25  Phos  2.8       16 Sep 2021 07:25  Mg     2.1       16 Sep 2021 07:25    TPro  5.6    /  Alb  2.9    /  TBili  1.8    /  DBili  x      /  AST  5      /  ALT  6      /  AlkPhos  106    16 Sep 2021 07:25    PT/INR - ( 14 Sep 2021 17:37 )   PT: 16.9 sec;   INR: 1.43 ratio    PTT - ( 14 Sep 2021 17:37 )  PTT:36.6 sec      LIVER FUNCTIONS - ( 16 Sep 2021 07:25 )  Alb: 2.9 g/dL / Pro: 5.6 g/dL / ALK PHOS: 106 U/L / ALT: 6 U/L / AST: 5 U/L / GGT: x             RADIOLOGY & ADDITIONAL STUDIES:

## 2021-09-16 NOTE — PROGRESS NOTE ADULT - ASSESSMENT
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

## 2021-09-16 NOTE — PROGRESS NOTE ADULT - PROBLEM SELECTOR PLAN 2
+Neutropenic, afebrile  Will start ppx Levaquin when QTC downtrends off Sotalol  9/15 repeat EKG  If febrile, Pancx, CXR. Start Aztreonam +/- Vancomycin. Or Cefepime (low cross reactivity for PCN allx)  Holding antimicrobial and antifungal ppx for now  Recently a/w  diverticulitis (6/26-7/2)

## 2021-09-16 NOTE — PROGRESS NOTE ADULT - ATTENDING COMMENTS
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 He was diagnosed with -5q MDS in 2020. Has 16% peripheral blood blasts on transfer. Bone Marrow Bx 9/9/21 -- MDS with EB2, 17% blasts. FISH / Cytogenetics / NGS -- PENDING  Reviewed diagnosis and explained it. Recommended therapy with 5'azacytidine daily x 7 subq. Reviewed toxicities and all questions answered. He gave informed consent. Today is day 1.    - Afebrile since admission  - No need for imaging at this time, CT imaging in June 2021, had splenomegaly and proctocolitis, chronic pancreatitis  - Neutropenia: D/C prophylaxis - Augmentin (On sotalol, cannot use levofloxacin)   - CXR 9/9: small pleural effusions bilaterally  - Echo 8/20: LVEF 45%, small-moderate pericardial effusion  - Arrhythmia / Afib Hx: Stable, EP to assess if sotalol can be changed due to interactions -- Keep Mg >2.0, K> 4.0 -- will switch to Toprol per EP  - Monitor strict I/Os, daily weights and will diurese PRN for fluid overload, on home regimen of lasix, continue  - OOB/ambulate  Check tryptase level  Bilirubin 2.5 with direct 0.3 - possibly due to ineffective erythropoiesis 85 yo male with Afib (sotolol and warfarin), AICD, Prostate Ca 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 He was diagnosed with -5q MDS in 2020. Has 16% peripheral blood blasts on transfer. Bone Marrow Bx 9/9/21 -- MDS with EB2, 17% blasts. FISH / Cytogenetics / NGS -- PENDING  Reviewed diagnosis and explained it. Recommended therapy with 5'azacytidine daily x 7 subq. Reviewed toxicities and all questions answered. He gave informed consent. Today is day 2    - Afebrile since admission  - No need for imaging at this time, CT imaging in June 2021, had splenomegaly and proctocolitis, chronic pancreatitis  - Neutropenia: D/C prophylaxis - Augmentin (On sotalol, cannot use levofloxacin)   - CXR 9/9: small pleural effusions bilaterally  - Echo 8/20: LVEF 45%, small-moderate pericardial effusion  - Arrhythmia / Afib Hx: Stable, EP to assess if sotalol can be changed due to interactions -- Keep Mg >2.0, K> 4.0 -- will switch to Toprol per EP  - Monitor strict I/Os, daily weights and will diurese PRN for fluid overload, on home regimen of lasix, continue  - OOB/ambulate  Check tryptase level  Bilirubin 2.5 with direct 0.3 - possibly due to ineffective erythropoiesis

## 2021-09-16 NOTE — PROGRESS NOTE ADULT - ASSESSMENT
85yo M w/ PMHx of afib s/p ablation w/ AICD (on coumadin but on hold due to severe thrombocytopenia), prostate CA, refractory MDS presents with worsening anemia    # Shortness of breath.   # acute on chronic systolic CHF  likely multifactorial in the setting of anemia and fluid overload secondary to CHF  continue with 20mg IV lasix daily   strict I/O's, fluid restriction   echo 8/2021 with EF 45%  # MDS (myelodysplastic syndrome) with 5q deletion.   acute anemia in setting of MDS, previously refractory to Revlimid, off-label Promacta was initiated on previous hospitalization but was stopped, labs showing thrombocytopenia at baseline, acute on chronic normocytic anemia and blasts 12.6%   follows with Dr. Haile outpatient, given leukemic transformation, under house heme care  transfuse Hg >7, plt >10, Lasix with transfusions PRN  mgmt per hemechemo day 2    # Chronic atrial fibrillation.   sotalol changed to lopressor   monitor electrolytes  anti-coagulation on hold in setting of thrombocytopenia and anemia.    # Prostate enlargement  Cont Doxazosin    # Stage 3 chronic kidney disease  Cr currently at baseline    # Gout.   Allopurinol     QPlease call [email or web address] with questions [phone].

## 2021-09-16 NOTE — PROGRESS NOTE ADULT - SUBJECTIVE AND OBJECTIVE BOX
Patient is a 86y old  Male who presents with a chief complaint of shortness of breath (16 Sep 2021 08:31)      SUBJECTIVE / OVERNIGHT EVENTS:    Patient seen and examined.   co tired. no bleeding bruising. no cp sob.     Vital Signs Last 24 Hrs  T(C): 36.8 (16 Sep 2021 08:25), Max: 36.8 (15 Sep 2021 14:24)  T(F): 98.2 (16 Sep 2021 08:25), Max: 98.3 (16 Sep 2021 04:56)  HR: 60 (16 Sep 2021 08:25) (60 - 68)  BP: 98/40 (16 Sep 2021 08:25) (98/40 - 159/64)  BP(mean): --  RR: 18 (16 Sep 2021 08:25) (16 - 18)  SpO2: 95% (16 Sep 2021 08:25) (94% - 98%)  I&O's Summary    15 Sep 2021 07:01  -  16 Sep 2021 07:00  --------------------------------------------------------  IN: 340 mL / OUT: 0 mL / NET: 340 mL        PE:  GENERAL: NAD, AAOx3  HEAD:  Atraumatic, Normocephalic  CHEST/LUNG: CTABL, No wheeze  HEART: Regular rate and rhythm; no murmur  ABDOMEN: Soft, Nontender, Nondistended; Bowel sounds present  EXTREMITIES:  2+ Peripheral Pulses, No clubbing, cyanosis, or edema  SKIN: No rashes or lesions  NEURO: No focal deficits    LABS:                        7.9    2.97  )-----------( 27       ( 16 Sep 2021 07:25 )             25.7     09-16    142  |  110<H>  |  35<H>  ----------------------------<  102<H>  3.8   |  23  |  1.57<H>    Ca    8.3<L>      16 Sep 2021 07:25  Phos  2.8     09-16  Mg     2.1     09-16    TPro  5.6<L>  /  Alb  2.9<L>  /  TBili  1.8<H>  /  DBili  x   /  AST  5<L>  /  ALT  6<L>  /  AlkPhos  106  09-16    PT/INR - ( 14 Sep 2021 17:37 )   PT: 16.9 sec;   INR: 1.43 ratio         PTT - ( 14 Sep 2021 17:37 )  PTT:36.6 sec  CAPILLARY BLOOD GLUCOSE                RADIOLOGY & ADDITIONAL TESTS:    Imaging Personally Reviewed:  [x] YES  [ ] NO    Consultant(s) Notes Reviewed:  [x] YES  [ ] NO    MEDICATIONS  (STANDING):  allopurinol 100 milliGRAM(s) Oral two times a day  azaCITIDine Injectable (eMAR) 52.6 milliGRAM(s) SubCutaneous every 24 hours  azaCITIDine Injectable (eMAR) 52.6 milliGRAM(s) SubCutaneous every 24 hours  azaCITIDine Injectable (eMAR) 52.6 milliGRAM(s) SubCutaneous every 24 hours  doxazosin 4 milliGRAM(s) Oral at bedtime  furosemide   Injectable 20 milliGRAM(s) IV Push daily  influenza   Vaccine 0.5 milliLiter(s) IntraMuscular once  metoprolol succinate ER 25 milliGRAM(s) Oral daily  ondansetron Injectable 8 milliGRAM(s) IV Push every 24 hours  phytonadione   Solution 10 milliGRAM(s) Oral daily    MEDICATIONS  (PRN):  acetaminophen   Tablet .. 650 milliGRAM(s) Oral every 6 hours PRN Temp greater or equal to 38C (100.4F), Mild Pain (1 - 3)  diphenhydrAMINE 25 milliGRAM(s) Oral every 6 hours PRN Rash and/or Itching  melatonin 3 milliGRAM(s) Oral at bedtime PRN Insomnia      Care Discussed with Consultants/Other Providers [x] YES  [ ] NO    HEALTH ISSUES - PROBLEM Dx:  MDS (myelodysplastic syndrome) with 5q deletion    Prostate enlargement    Chronic atrial fibrillation    Gout    Shortness of breath    Stage 3 chronic kidney disease    MDS (myelodysplastic syndrome)    Prophylactic measure    Infectious disease

## 2021-09-17 NOTE — PROGRESS NOTE ADULT - PROBLEM SELECTOR PLAN 1
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 Replete prn

## 2021-09-17 NOTE — PROGRESS NOTE ADULT - ASSESSMENT
87yo M w/ PMHx of afib s/p ablation w/ AICD (on coumadin but on hold due to severe thrombocytopenia), prostate CA, refractory MDS presents with worsening anemia    # acute on chronic systolic CHF, SOB  on lasix 40mg qd IV  strict I/O's, fluid restriction   echo 8/2021 with EF 45%    # MDS (myelodysplastic syndrome) with 5q deletion  follows with Dr. Haile outpatient, given leukemic transformation, under house heme care  transfuse Hg >7, plt >10, Lasix with transfusions PRN  mgmt per heme  chemo day 3    # Chronic atrial fibrillation  sotalol changed to toprol xl  monitor electrolytes  anti-coagulation on hold in setting of thrombocytopenia and anemia    # Prostate enlargement  Cont Doxazosin    # Stage 3 chronic kidney disease  Cr currently at baseline    # Gout  Allopurinol     Please call [facility] with questions [phone].

## 2021-09-17 NOTE — PROGRESS NOTE ADULT - SUBJECTIVE AND OBJECTIVE BOX
Diagnosis: MDS with excess blasts-2    Protocol/Chemo: Cycle Vidaza SQ for 7 days    Day: 3    Subjective:    Review of Systems: Denies n/v, STEINER, HA or dizziness, abdominal pain    Pain scale: 0/10                    Diet: Regular    Allergies:  adhesives (Rash)  clonidine (Swelling; Rash)  felodipine (Rash)  penicillin (Rash)  sulfa drugs (Rash)      HEME/ONC MEDICATIONS  azaCITIDine Injectable (eMAR) 52.6 milliGRAM(s) SubCutaneous every 24 hours  azaCITIDine Injectable (eMAR) 52.6 milliGRAM(s) SubCutaneous every 24 hours  azaCITIDine Injectable (eMAR) 52.6 milliGRAM(s) SubCutaneous every 24 hours      STANDING MEDICATIONS  allopurinol 100 milliGRAM(s) Oral two times a day  doxazosin 4 milliGRAM(s) Oral at bedtime  furosemide   Injectable 20 milliGRAM(s) IV Push daily  influenza   Vaccine 0.5 milliLiter(s) IntraMuscular once  metoprolol succinate ER 25 milliGRAM(s) Oral daily  ondansetron Injectable 8 milliGRAM(s) IV Push every 24 hours      PRN MEDICATIONS  acetaminophen   Tablet .. 650 milliGRAM(s) Oral every 6 hours PRN  diphenhydrAMINE 25 milliGRAM(s) Oral every 6 hours PRN  melatonin 3 milliGRAM(s) Oral at bedtime PRN      Vital Signs Last 24 Hrs  T(C): 36.8 (17 Sep 2021 05:12), Max: 36.9 (16 Sep 2021 14:13)  T(F): 98.2 (17 Sep 2021 05:12), Max: 98.4 (16 Sep 2021 14:13)  HR: 60 (17 Sep 2021 05:12) (60 - 64)  BP: 115/57 (17 Sep 2021 05:12) (98/40 - 122/60)  RR: 18 (17 Sep 2021 05:12) (18 - 18)  SpO2: 92% (17 Sep 2021 05:12) (92% - 96%)    PHYSICAL EXAM  General: Sitting up in bed in NAD  HEENT: PERRL, sclerae anicteric,   CV: +S1, S2, reg  Abdomen: +BS, soft, NT/ND  Ext: no edema BLE's  Skin: No rashes or ecchymosis  Neuro: A&O x 3, non focal  Central line: PIV    LABS:                        8.0    3.46  )-----------( 22       ( 17 Sep 2021 06:34 )             26.7     Mean Cell Volume : 89.0 fl  Mean Cell Hemoglobin : 26.7 pg  Mean Cell Hemoglobin Concentration : 30.0 gm/dL  Auto Neutrophil # : x  Auto Lymphocyte # : x  Auto Monocyte # : x  Auto Eosinophil # : x  Auto Basophil # : x  Auto Neutrophil % : x  Auto Lymphocyte % : x  Auto Monocyte % : x  Auto Eosinophil % : x  Auto Basophil % : x      09-17    144  |  111<H>  |  40<H>  ----------------------------<  103<H>  3.9   |  24  |  1.59<H>    Ca    8.5      17 Sep 2021 06:34  Phos  2.6     09-17  Mg     2.0     09-17    TPro  5.4<L>  /  Alb  2.8<L>  /  TBili  1.3<H>  /  DBili  x   /  AST  7<L>  /  ALT  7<L>  /  AlkPhos  102  09-17      PT/INR - ( 17 Sep 2021 06:34 )   PT: 15.0 sec;   INR: 1.26 ratio    PTT - ( 17 Sep 2021 06:34 )  PTT:35.7 sec      Uric Acid 5.7      RADIOLOGY & ADDITIONAL STUDIES:         Diagnosis: MDS with excess blasts-2    Protocol/Chemo: Cycle 1 azacitidine subQ for 7 days    Day: 3    Subjective: no complaints, no fever overnight    Review of Systems: Denies n/v, STEINER, HA or dizziness, abdominal pain    Pain scale: 0/10                    Diet: Regular    Allergies:  adhesives (Rash)  clonidine (Swelling; Rash)  felodipine (Rash)  penicillin (Rash)  sulfa drugs (Rash)      HEME/ONC MEDICATIONS  azaCITIDine Injectable (eMAR) 52.6 milliGRAM(s) SubCutaneous every 24 hours  azaCITIDine Injectable (eMAR) 52.6 milliGRAM(s) SubCutaneous every 24 hours  azaCITIDine Injectable (eMAR) 52.6 milliGRAM(s) SubCutaneous every 24 hours      STANDING MEDICATIONS  allopurinol 100 milliGRAM(s) Oral two times a day  doxazosin 4 milliGRAM(s) Oral at bedtime  furosemide   Injectable 20 milliGRAM(s) IV Push daily  influenza   Vaccine 0.5 milliLiter(s) IntraMuscular once  metoprolol succinate ER 25 milliGRAM(s) Oral daily  ondansetron Injectable 8 milliGRAM(s) IV Push every 24 hours      PRN MEDICATIONS  acetaminophen   Tablet .. 650 milliGRAM(s) Oral every 6 hours PRN  diphenhydrAMINE 25 milliGRAM(s) Oral every 6 hours PRN  melatonin 3 milliGRAM(s) Oral at bedtime PRN      Vital Signs Last 24 Hrs  T(C): 36.8 (17 Sep 2021 05:12), Max: 36.9 (16 Sep 2021 14:13)  T(F): 98.2 (17 Sep 2021 05:12), Max: 98.4 (16 Sep 2021 14:13)  HR: 60 (17 Sep 2021 05:12) (60 - 64)  BP: 115/57 (17 Sep 2021 05:12) (98/40 - 122/60)  RR: 18 (17 Sep 2021 05:12) (18 - 18)  SpO2: 92% (17 Sep 2021 05:12) (92% - 96%)    PHYSICAL EXAM  General: Sitting up in bed in NAD  HEENT: PERRL, sclerae anicteric,   CV: +S1, S2, reg  Abdomen: +BS, soft, NT/ND  Ext: no edema BLE's  Skin: No rashes or ecchymosis  Neuro: A&O x 3, non focal  Central line: PIV    LABS:                        8.0    3.46  )-----------( 22       ( 17 Sep 2021 06:34 )             26.7     Mean Cell Volume : 89.0 fl  Mean Cell Hemoglobin : 26.7 pg  Mean Cell Hemoglobin Concentration : 30.0 gm/dL  Auto Neutrophil # : x  Auto Lymphocyte # : x  Auto Monocyte # : x  Auto Eosinophil # : x  Auto Basophil # : x  Auto Neutrophil % : x  Auto Lymphocyte % : x  Auto Monocyte % : x  Auto Eosinophil % : x  Auto Basophil % : x      09-17    144  |  111<H>  |  40<H>  ----------------------------<  103<H>  3.9   |  24  |  1.59<H>    Ca    8.5      17 Sep 2021 06:34  Phos  2.6     09-17  Mg     2.0     09-17    TPro  5.4<L>  /  Alb  2.8<L>  /  TBili  1.3<H>  /  DBili  x   /  AST  7<L>  /  ALT  7<L>  /  AlkPhos  102  09-17      PT/INR - ( 17 Sep 2021 06:34 )   PT: 15.0 sec;   INR: 1.26 ratio    PTT - ( 17 Sep 2021 06:34 )  PTT:35.7 sec      Uric Acid 5.7      RADIOLOGY & ADDITIONAL STUDIES:  from: Xray Chest 1 View AP/PA (09.08.21 @ 22:34)   IMPRESSION:  Small bilateral pleural effusions with associated bibasilar passive atelectasis. Underlying pneumonia is not excluded.  Lucency along the right pleura is likely artifactual, secondary to a skin fold, however short interval repeat chest x-ray may be obtained to exclude pneumothorax.

## 2021-09-17 NOTE — PROGRESS NOTE ADULT - SUBJECTIVE AND OBJECTIVE BOX
Patient is a 86y old  Male who presents with a chief complaint of shortness of breath (17 Sep 2021 07:38)      SUBJECTIVE / OVERNIGHT EVENTS:    Patient seen and examined. co fatigue. no cp sob nvd abd pain bleeding.      Vital Signs Last 24 Hrs  T(C): 36.8 (17 Sep 2021 09:53), Max: 36.9 (16 Sep 2021 14:13)  T(F): 98.3 (17 Sep 2021 09:53), Max: 98.4 (16 Sep 2021 14:13)  HR: 66 (17 Sep 2021 09:53) (60 - 66)  BP: 102/53 (17 Sep 2021 09:53) (102/53 - 122/60)  BP(mean): --  RR: 18 (17 Sep 2021 09:53) (18 - 18)  SpO2: 93% (17 Sep 2021 09:53) (92% - 96%)  I&O's Summary    16 Sep 2021 07:01  -  17 Sep 2021 07:00  --------------------------------------------------------  IN: 980 mL / OUT: 0 mL / NET: 980 mL    17 Sep 2021 07:01  -  17 Sep 2021 13:35  --------------------------------------------------------  IN: 240 mL / OUT: 0 mL / NET: 240 mL        PE:  GENERAL: NAD, AAOx3, pale  HEAD:  Atraumatic, Normocephalic  CHEST/LUNG: CTABL, No wheeze  HEART: Regular rate and rhythm; no murmur  ABDOMEN: Soft, Nontender, Nondistended; Bowel sounds present  EXTREMITIES:  2+ Peripheral Pulses, No clubbing, cyanosis, or edema  SKIN: No rashes or lesions  NEURO: No focal deficits    LABS:                        8.0    3.46  )-----------( 22       ( 17 Sep 2021 06:34 )             26.7     09-17    144  |  111<H>  |  40<H>  ----------------------------<  103<H>  3.9   |  24  |  1.59<H>    Ca    8.5      17 Sep 2021 06:34  Phos  2.6     09-17  Mg     2.0     09-17    TPro  5.4<L>  /  Alb  2.8<L>  /  TBili  1.3<H>  /  DBili  x   /  AST  7<L>  /  ALT  7<L>  /  AlkPhos  102  09-17    PT/INR - ( 17 Sep 2021 06:34 )   PT: 15.0 sec;   INR: 1.26 ratio         PTT - ( 17 Sep 2021 06:34 )  PTT:35.7 sec  CAPILLARY BLOOD GLUCOSE                RADIOLOGY & ADDITIONAL TESTS:    Imaging Personally Reviewed:  [x] YES  [ ] NO    Consultant(s) Notes Reviewed:  [x] YES  [ ] NO    MEDICATIONS  (STANDING):  allopurinol 100 milliGRAM(s) Oral two times a day  azaCITIDine Injectable (eMAR) 52.6 milliGRAM(s) SubCutaneous every 24 hours  azaCITIDine Injectable (eMAR) 52.6 milliGRAM(s) SubCutaneous every 24 hours  azaCITIDine Injectable (eMAR) 52.6 milliGRAM(s) SubCutaneous every 24 hours  doxazosin 4 milliGRAM(s) Oral at bedtime  furosemide   Injectable 20 milliGRAM(s) IV Push daily  influenza   Vaccine 0.5 milliLiter(s) IntraMuscular once  metoprolol succinate ER 25 milliGRAM(s) Oral daily  ondansetron Injectable 8 milliGRAM(s) IV Push every 24 hours    MEDICATIONS  (PRN):  acetaminophen   Tablet .. 650 milliGRAM(s) Oral every 6 hours PRN Temp greater or equal to 38C (100.4F), Mild Pain (1 - 3)  diphenhydrAMINE 25 milliGRAM(s) Oral every 6 hours PRN Rash and/or Itching  melatonin 3 milliGRAM(s) Oral at bedtime PRN Insomnia      Care Discussed with Consultants/Other Providers [x] YES  [ ] NO    HEALTH ISSUES - PROBLEM Dx:  MDS (myelodysplastic syndrome) with 5q deletion    Prostate enlargement    Chronic atrial fibrillation    Gout    Shortness of breath    Stage 3 chronic kidney disease    MDS (myelodysplastic syndrome)    Prophylactic measure    Infectious disease

## 2021-09-17 NOTE — PROGRESS NOTE ADULT - ATTENDING COMMENTS
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 He was diagnosed with -5q MDS in 2020. Has 16% peripheral blood blasts on transfer. Bone Marrow Bx 9/9/21 -- MDS with EB2, 17% blasts. FISH / Cytogenetics / NGS -- PENDING  Reviewed diagnosis and explained it. Recommended therapy with 5'azacytidine daily x 7 subq. Reviewed toxicities and all questions answered. He gave informed consent. Today is day 2    - Afebrile since admission  - No need for imaging at this time, CT imaging in June 2021, had splenomegaly and proctocolitis, chronic pancreatitis  - Neutropenia: D/C prophylaxis - Augmentin (On sotalol, cannot use levofloxacin)   - CXR 9/9: small pleural effusions bilaterally  - Echo 8/20: LVEF 45%, small-moderate pericardial effusion  - Arrhythmia / Afib Hx: Stable, EP to assess if sotalol can be changed due to interactions -- Keep Mg >2.0, K> 4.0 -- will switch to Toprol per EP  - Monitor strict I/Os, daily weights and will diurese PRN for fluid overload, on home regimen of lasix, continue  - OOB/ambulate  Check tryptase level  Bilirubin 2.5 with direct 0.3 - possibly due to ineffective erythropoiesis 85 yo male with Afib (sotolol and warfarin), AICD, Prostate Ca 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 He was diagnosed with -5q MDS in 2020. Has 16% peripheral blood blasts on transfer. Bone Marrow Bx 9/9/21 -- MDS with EB2, 17% blasts. FISH / Cytogenetics / NGS -- PENDING  Reviewed diagnosis and explained it. Recommended therapy with 5'azacytidine daily x 7 subq. Reviewed toxicities and all questions answered. He gave informed consent. Today is day 3  Tolerating 5'aza well.  - Afebrile since admission  - No need for imaging at this time, CT imaging in June 2021, had splenomegaly and proctocolitis, chronic pancreatitis  - CXR 9/9: small pleural effusions bilaterally  - Echo 8/20: LVEF 45%, small-moderate pericardial effusion  - Arrhythmia / Afib Hx: Stable, EP to assess if sotalol can be changed due to interactions -- Keep Mg >2.0, K> 4.0 -- will switch to Toprol per EP  - Monitor strict I/Os, daily weights and will diurese PRN for fluid overload, on home regimen of lasix, continue  - OOB/ambulate  Check tryptase level  Bilirubin 2.5 with direct 0.3 - possibly due to ineffective erythropoiesis

## 2021-09-18 NOTE — PROGRESS NOTE ADULT - PROBLEM SELECTOR PLAN 1
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 Replete prn h/o MDS refractory to Revlimid, initially stable however in recent 2-3 months recurrent thrombocytopenia and symptomatic anemia now confirmed MDS with excess blasts  G6PD (-), TTE, EF 49%  9/9 Bone Marrow Bx-MDS excess blasts  9/15 - Vidaza subcutaneous for 7 days started   Trend daily TLS labs: CMP, Phos, Uric acid, LDH. Replete prn  ordered speech and swallow to eval

## 2021-09-18 NOTE — PROGRESS NOTE ADULT - ATTENDING COMMENTS
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 He was diagnosed with -5q MDS in 2020. Has 16% peripheral blood blasts on transfer. Bone Marrow Bx 9/9/21 -- MDS with EB2, 17% blasts. FISH / Cytogenetics / NGS -- PENDING  Reviewed diagnosis and explained it. Recommended therapy with 5'azacytidine daily x 7 subq. Reviewed toxicities and all questions answered. He gave informed consent. Today is day 3  Tolerating 5'aza well.  - Afebrile since admission  - No need for imaging at this time, CT imaging in June 2021, had splenomegaly and proctocolitis, chronic pancreatitis  - CXR 9/9: small pleural effusions bilaterally  - Echo 8/20: LVEF 45%, small-moderate pericardial effusion  - Arrhythmia / Afib Hx: Stable, EP to assess if sotalol can be changed due to interactions -- Keep Mg >2.0, K> 4.0 -- will switch to Toprol per EP  - Monitor strict I/Os, daily weights and will diurese PRN for fluid overload, on home regimen of lasix, continue  - OOB/ambulate  Check tryptase level  Bilirubin 2.5 with direct 0.3 - possibly due to ineffective erythropoiesis 87 yo male with Afib (sotolol and warfarin), AICD, Prostate Ca 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 He was diagnosed with -5q MDS in 2020. Has 16% peripheral blood blasts on transfer. Bone Marrow Bx 9/9/21 -- MDS with EB2, 17% blasts. FISH / Cytogenetics / NGS -- PENDING  Reviewed diagnosis and explained it. Recommended therapy with 5'azacytidine daily x 7 subq. Reviewed toxicities and all questions answered. He gave informed consent. Today is day 4  Tolerating 5'aza well.  - Afebrile since admission  - No need for imaging at this time, CT imaging in June 2021, had splenomegaly and proctocolitis, chronic pancreatitis  - CXR 9/9: small pleural effusions bilaterally  - Echo 8/20: LVEF 45%, small-moderate pericardial effusion  - Arrhythmia / Afib Hx: Stable, EP to assess if sotalol can be changed due to interactions -- Keep Mg >2.0, K> 4.0 -- will switch to Toprol per EP  - Monitor strict I/Os, daily weights and will diurese PRN for fluid overload, on home regimen of lasix, continue  - OOB/ambulate  Check tryptase level  Bilirubin 2.5 with direct 0.3 - possibly due to ineffective erythropoiesis

## 2021-09-18 NOTE — PROGRESS NOTE ADULT - SUBJECTIVE AND OBJECTIVE BOX
Diagnosis: MDS with excess blasts-2    Protocol/Chemo: Cycle 1 azacitidine subQ for 7 days    Day: 4    Subjective: no complaints, no fever overnight    Review of Systems: Denies n/v, STEINER, HA or dizziness, abdominal pain    Pain scale: 0/10                    Diet: Regular    Allergies:  adhesives (Rash)  clonidine (Swelling; Rash)  felodipine (Rash)  penicillin (Rash)  sulfa drugs (Rash)    HEME/ONC MEDICATIONS  azaCITIDine Injectable (eMAR) 52.6 milliGRAM(s) SubCutaneous every 24 hours  azaCITIDine Injectable (eMAR) 52.6 milliGRAM(s) SubCutaneous every 24 hours  azaCITIDine Injectable (eMAR) 52.6 milliGRAM(s) SubCutaneous every 24 hours      STANDING MEDICATIONS  allopurinol 100 milliGRAM(s) Oral two times a day  doxazosin 4 milliGRAM(s) Oral at bedtime  furosemide   Injectable 20 milliGRAM(s) IV Push daily  influenza   Vaccine 0.5 milliLiter(s) IntraMuscular once  metoprolol succinate ER 25 milliGRAM(s) Oral daily  ondansetron Injectable 8 milliGRAM(s) IV Push every 24 hours      PRN MEDICATIONS  acetaminophen   Tablet .. 650 milliGRAM(s) Oral every 6 hours PRN  diphenhydrAMINE 25 milliGRAM(s) Oral every 6 hours PRN  melatonin 3 milliGRAM(s) Oral at bedtime PRN        Vital Signs Last 24 Hrs  T(C): 36.6 (18 Sep 2021 06:03), Max: 36.9 (17 Sep 2021 13:42)  T(F): 97.8 (18 Sep 2021 06:03), Max: 98.4 (17 Sep 2021 13:42)  HR: 61 (18 Sep 2021 06:03) (59 - 66)  BP: 114/59 (18 Sep 2021 06:03) (102/53 - 127/64)  BP(mean): --  RR: 18 (18 Sep 2021 06:03) (18 - 19)  SpO2: 94% (18 Sep 2021 06:03) (93% - 95%)    PHYSICAL EXAM  General: adult in NAD  HEENT: clear oropharynx, anicteric sclera, pink conjunctiva  Neck: supple  CV: normal S1/S2 RRR  Lungs: positive air movement b/l ant lungs,clear to auscultation, no wheezes, no rales  Abdomen: soft non-tender non-distended, no hepatosplenomegaly  Ext: no clubbing cyanosis or edema  Skin: no rashes and no petechiae  Neuro: alert and oriented X 4, no focal deficits  Central Line: normal    LABS:    Blood Cultures:                           8.0    3.46  )-----------( 22       ( 17 Sep 2021 06:34 )             26.7         Mean Cell Volume : 89.0 fl  Mean Cell Hemoglobin : 26.7 pg  Mean Cell Hemoglobin Concentration : 30.0 gm/dL  Auto Neutrophil # : 1.07 K/uL  Auto Lymphocyte # : 0.87 K/uL  Auto Monocyte # : 0.14 K/uL  Auto Eosinophil # : 0.10 K/uL  Auto Basophil # : 0.80 K/uL  Auto Neutrophil % : 31.0 %  Auto Lymphocyte % : 25.0 %  Auto Monocyte % : 4.0 %  Auto Eosinophil % : 3.0 %  Auto Basophil % : 23.0 %      09-17    144  |  111<H>  |  40<H>  ----------------------------<  103<H>  3.9   |  24  |  1.59<H>    Ca    8.5      17 Sep 2021 06:34  Phos  2.6     09-17  Mg     2.0     09-17    TPro  5.4<L>  /  Alb  2.8<L>  /  TBili  1.3<H>  /  DBili  x   /  AST  7<L>  /  ALT  7<L>  /  AlkPhos  102  09-17      Mg 2.0  Phos 2.6      PT/INR - ( 17 Sep 2021 06:34 )   PT: 15.0 sec;   INR: 1.26 ratio         PTT - ( 17 Sep 2021 06:34 )  PTT:35.7 sec      Uric Acid 5.7        RADIOLOGY & ADDITIONAL STUDIES:         Diagnosis: MDS with excess blasts-2    Protocol/Chemo: Cycle 1 azacitidine subQ for 7 days    Day: 4    Subjective: no complaints, no fever overnight    Review of Systems: Denies n/v, STEINER, HA or dizziness, abdominal pain    Pain scale: 0/10                    Diet: Regular    Allergies:  adhesives (Rash)  clonidine (Swelling; Rash)  felodipine (Rash)  penicillin (Rash)  sulfa drugs (Rash)    HEME/ONC MEDICATIONS  azaCITIDine Injectable (eMAR) 52.6 milliGRAM(s) SubCutaneous every 24 hours  azaCITIDine Injectable (eMAR) 52.6 milliGRAM(s) SubCutaneous every 24 hours  azaCITIDine Injectable (eMAR) 52.6 milliGRAM(s) SubCutaneous every 24 hours      STANDING MEDICATIONS  allopurinol 100 milliGRAM(s) Oral two times a day  doxazosin 4 milliGRAM(s) Oral at bedtime  furosemide   Injectable 20 milliGRAM(s) IV Push daily  influenza   Vaccine 0.5 milliLiter(s) IntraMuscular once  metoprolol succinate ER 25 milliGRAM(s) Oral daily  ondansetron Injectable 8 milliGRAM(s) IV Push every 24 hours      PRN MEDICATIONS  acetaminophen   Tablet .. 650 milliGRAM(s) Oral every 6 hours PRN  diphenhydrAMINE 25 milliGRAM(s) Oral every 6 hours PRN  melatonin 3 milliGRAM(s) Oral at bedtime PRN        Vital Signs Last 24 Hrs  T(C): 36.6 (18 Sep 2021 06:03), Max: 36.9 (17 Sep 2021 13:42)  T(F): 97.8 (18 Sep 2021 06:03), Max: 98.4 (17 Sep 2021 13:42)  HR: 61 (18 Sep 2021 06:03) (59 - 66)  BP: 114/59 (18 Sep 2021 06:03) (102/53 - 127/64)  RR: 18 (18 Sep 2021 06:03) (18 - 19)  SpO2: 94% (18 Sep 2021 06:03) (93% - 95%)    PHYSICAL EXAM  General: adult in NAD  HEENT: clear oropharynx, anicteric sclera, pink conjunctiva  Neck: supple  CV: normal S1/S2 RRR  Lungs: positive air movement b/l ant lungs,clear to auscultation, no wheezes, no rales  Abdomen: soft non-tender non-distended  Ext: no clubbing cyanosis or edema  Skin: no rashes and no petechiae  Neuro: alert and oriented X 4, no focal deficits  Central Line: PIV    Cultures: no recent    LABS:                      7.9    3.71  )-----------( 19       ( 18 Sep 2021 07:05 )             25.7     18 Sep 2021 07:05    142    |  107    |  39     ----------------------------<  103    3.8     |  26     |  1.63     Ca    8.5        18 Sep 2021 07:05  Phos  2.4       18 Sep 2021 07:05  Mg     2.1       18 Sep 2021 07:05    TPro  5.7    /  Alb  3.1    /  TBili  1.3    /  DBili  x      /  AST  6      /  ALT  6      /  AlkPhos  100    18 Sep 2021 07:05    PT/INR - ( 17 Sep 2021 06:34 )   PT: 15.0 sec;   INR: 1.26 ratio    PTT - ( 17 Sep 2021 06:34 )  PTT:35.7 sec    LIVER FUNCTIONS - ( 18 Sep 2021 07:05 )  Alb: 3.1 g/dL / Pro: 5.7 g/dL / ALK PHOS: 100 U/L / ALT: 6 U/L / AST: 6 U/L / GGT: x

## 2021-09-19 NOTE — SWALLOW BEDSIDE ASSESSMENT ADULT - COMMENTS
SOB->Lasix for acute on chronic CHF; MDS->chemo, day 3  Afebrile  CXR 9/9: small pleural effusions bilaterally

## 2021-09-19 NOTE — PROVIDER CONTACT NOTE (OTHER) - ASSESSMENT
NAD, pt denies chest pain, SOB or discomfort
Pt A&OX4.  Redness noted on left upper arm. Pt denies discomfort and itchiness. VSS and afebrile.

## 2021-09-19 NOTE — PROVIDER CONTACT NOTE (OTHER) - SITUATION
Redness on left arm
Pt had 2x episodes of loose stool, not watery, with scant bleeding due to hemorrhoid

## 2021-09-19 NOTE — PROGRESS NOTE ADULT - SUBJECTIVE AND OBJECTIVE BOX
Diagnosis: MDS with excess blasts-2    Protocol/Chemo: Cycle 1 azacitidine subQ for 7 days    Day: 5    Subjective: no complaints, no fever overnight    Review of Systems: Denies n/v, STEINER, HA or dizziness, abdominal pain    Pain scale: 0/10                    Diet: Regular    Allergies:  adhesives (Rash)  clonidine (Swelling; Rash)  felodipine (Rash)  penicillin (Rash)  sulfa drugs (Rash)      HEME/ONC MEDICATIONS  azaCITIDine Injectable (eMAR) 52.6 milliGRAM(s) SubCutaneous every 24 hours  azaCITIDine Injectable (eMAR) 52.6 milliGRAM(s) SubCutaneous every 24 hours  azaCITIDine Injectable (eMAR) 52.6 milliGRAM(s) SubCutaneous every 24 hours      STANDING MEDICATIONS  allopurinol 100 milliGRAM(s) Oral two times a day  doxazosin 4 milliGRAM(s) Oral at bedtime  furosemide   Injectable 20 milliGRAM(s) IV Push daily  influenza   Vaccine 0.5 milliLiter(s) IntraMuscular once  metoprolol succinate ER 25 milliGRAM(s) Oral daily  ondansetron Injectable 8 milliGRAM(s) IV Push every 24 hours      PRN MEDICATIONS  acetaminophen   Tablet .. 650 milliGRAM(s) Oral every 6 hours PRN  diphenhydrAMINE 25 milliGRAM(s) Oral every 6 hours PRN  melatonin 3 milliGRAM(s) Oral at bedtime PRN      Vital Signs Last 24 Hrs  T(C): 36.8 (19 Sep 2021 05:20), Max: 37 (18 Sep 2021 13:28)  T(F): 98.2 (19 Sep 2021 05:20), Max: 98.6 (18 Sep 2021 13:28)  HR: 59 (19 Sep 2021 05:20) (59 - 82)  BP: 114/58 (19 Sep 2021 05:20) (108/57 - 130/62)  RR: 18 (19 Sep 2021 05:20) (18 - 20)  SpO2: 93% (19 Sep 2021 05:20) (93% - 95%)    PHYSICAL EXAM  General: adult in NAD  HEENT: clear oropharynx, anicteric sclera, pink conjunctiva  Neck: supple  CV: normal S1/S2 RRR  Lungs: positive air movement b/l ant lungs,clear to auscultation, no wheezes, no rales  Abdomen: soft non-tender non-distended, no hepatosplenomegaly  Ext: no clubbing cyanosis or edema  Skin: no rashes and no petechiae  Neuro: alert and oriented X 4, no focal deficits  Central Line: normal    LABS:    Blood Cultures:                           7.9    3.71  )-----------( 19       ( 18 Sep 2021 07:05 )             25.7         Mean Cell Volume : 88.9 fl  Mean Cell Hemoglobin : 27.3 pg  Mean Cell Hemoglobin Concentration : 30.7 gm/dL  Auto Neutrophil # : 1.37 K/uL  Auto Lymphocyte # : 0.74 K/uL  Auto Monocyte # : 0.11 K/uL  Auto Eosinophil # : 0.07 K/uL  Auto Basophil # : 0.67 K/uL  Auto Neutrophil % : 36.0 %  Auto Lymphocyte % : 20.0 %  Auto Monocyte % : 3.0 %  Auto Eosinophil % : 2.0 %  Auto Basophil % : 18.0 %      09-18    142  |  107  |  39<H>  ----------------------------<  103<H>  3.8   |  26  |  1.63<H>    Ca    8.5      18 Sep 2021 07:05  Phos  2.4     09-18  Mg     2.1     09-18    TPro  5.7<L>  /  Alb  3.1<L>  /  TBili  1.3<H>  /  DBili  x   /  AST  6<L>  /  ALT  6<L>  /  AlkPhos  100  09-18      Mg 2.1  Phos 2.4      PT/INR - ( 17 Sep 2021 06:34 )   PT: 15.0 sec;   INR: 1.26 ratio         PTT - ( 17 Sep 2021 06:34 )  PTT:35.7 sec      Uric Acid 5.3        RADIOLOGY & ADDITIONAL STUDIES:         Diagnosis: MDS with excess blasts-2    Protocol/Chemo: Cycle 1 azacitidine subQ for 7 days    Day: 5    Subjective: no complaints, no fever overnight    Review of Systems: Denies n/v, STEINER, HA or dizziness, abdominal pain    Pain scale: 0/10                    Diet: Regular    Allergies:  adhesives (Rash)  clonidine (Swelling; Rash)  felodipine (Rash)  penicillin (Rash)  sulfa drugs (Rash)      HEME/ONC MEDICATIONS  azaCITIDine Injectable (eMAR) 52.6 milliGRAM(s) SubCutaneous every 24 hours  azaCITIDine Injectable (eMAR) 52.6 milliGRAM(s) SubCutaneous every 24 hours  azaCITIDine Injectable (eMAR) 52.6 milliGRAM(s) SubCutaneous every 24 hours      STANDING MEDICATIONS  allopurinol 100 milliGRAM(s) Oral two times a day  doxazosin 4 milliGRAM(s) Oral at bedtime  furosemide   Injectable 20 milliGRAM(s) IV Push daily  influenza   Vaccine 0.5 milliLiter(s) IntraMuscular once  metoprolol succinate ER 25 milliGRAM(s) Oral daily  ondansetron Injectable 8 milliGRAM(s) IV Push every 24 hours      PRN MEDICATIONS  acetaminophen   Tablet .. 650 milliGRAM(s) Oral every 6 hours PRN  diphenhydrAMINE 25 milliGRAM(s) Oral every 6 hours PRN  melatonin 3 milliGRAM(s) Oral at bedtime PRN      Vital Signs Last 24 Hrs  T(C): 36.8 (19 Sep 2021 05:20), Max: 37 (18 Sep 2021 13:28)  T(F): 98.2 (19 Sep 2021 05:20), Max: 98.6 (18 Sep 2021 13:28)  HR: 59 (19 Sep 2021 05:20) (59 - 82)  BP: 114/58 (19 Sep 2021 05:20) (108/57 - 130/62)  RR: 18 (19 Sep 2021 05:20) (18 - 20)  SpO2: 93% (19 Sep 2021 05:20) (93% - 95%)    PHYSICAL EXAM  General: Sitting up in bed in NAD  HEENT: PERRL, sclerae anicteric,   CV: +S1, S2, reg  Abdomen: +BS, soft, NT/ND  Ext: LUE mild erythema, warmth, s/p injection site  Skin: No rashes or ecchymosis  Neuro: A&O x 3, non focal  Central line: PIV    LABS:                        8.5    4.81  )-----------( 18       ( 19 Sep 2021 08:00 )             27.4     19 Sep 2021 08:00    144    |  108    |  38     ----------------------------<  95     4.1     |  25     |  1.59     Ca    8.7        19 Sep 2021 08:00  Phos  2.3       19 Sep 2021 08:00  Mg     2.1       19 Sep 2021 08:00    TPro  6.0    /  Alb  3.3    /  TBili  1.4    /  DBili  x      /  AST  6      /  ALT  7      /  AlkPhos  104    19 Sep 2021 08:00    LIVER FUNCTIONS - ( 19 Sep 2021 08:00 )  Alb: 3.3 g/dL / Pro: 6.0 g/dL / ALK PHOS: 104 U/L / ALT: 7 U/L / AST: 6 U/L / GGT: x             RADIOLOGY & ADDITIONAL STUDIES:  from: Xray Chest 1 View AP/PA (09.08.21 @ 22:34)   IMPRESSION:  Small bilateral pleural effusions with associated bibasilar passive atelectasis. Underlying pneumonia is not excluded.  Lucency along the right pleura is likely artifactual, secondary to a skin fold, however short interval repeat chest x-ray may be obtained to exclude pneumothorax.

## 2021-09-19 NOTE — PROGRESS NOTE ADULT - PROBLEM SELECTOR PLAN 2
+Neutropenic, afebrile  Will start ppx Levaquin when QTC downtrends off Sotalol  9/15 repeat EKG  If febrile, Pancx, CXR. Start Aztreonam +/- Vancomycin. Or Cefepime (low cross reactivity for PCN allx)  Holding antimicrobial and antifungal ppx for now  Recently a/w  diverticulitis (6/26-7/2) not neutropenic, afebrile  Will start ppx Levaquin if ANC <1000 and  when QTC downtrends off Sotalol  9/15 repeat EKG daily  If febrile, Pancx, CXR. Start Aztreonam +/- Vancomycin. Or Cefepime (low cross reactivity for PCN allx)  Holding antimicrobial and antifungal ppx for now  Recently a/w  diverticulitis (6/26-7/2)

## 2021-09-19 NOTE — PROGRESS NOTE ADULT - ASSESSMENT
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

## 2021-09-19 NOTE — PROGRESS NOTE ADULT - PROBLEM SELECTOR PLAN 1
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 Replete prn  ordered speech and swallow to eval h/o MDS refractory to Revlimid, initially stable however in recent 2-3 months recurrent thrombocytopenia and symptomatic anemia now confirmed MDS with excess blasts  G6PD (-), TTE, EF 49%  9/9 Bone Marrow Bx-MDS excess blasts  9/15 - Vidaza subcutaneous for 7 days started   Trend daily CBC,  TLS labs: CMP, Phos, Uric acid, LDH. Replete prn  Strict I&os' daily weights, mouth care  ordered speech and swallow to eval- passed bedside evaluation, continue regular diet

## 2021-09-19 NOTE — PROGRESS NOTE ADULT - ATTENDING COMMENTS
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 He was diagnosed with -5q MDS in 2020. Has 16% peripheral blood blasts on transfer. Bone Marrow Bx 9/9/21 -- MDS with EB2, 17% blasts. FISH / Cytogenetics / NGS -- PENDING  Reviewed diagnosis and explained it. Recommended therapy with 5'azacytidine daily x 7 subq. Reviewed toxicities and all questions answered. He gave informed consent. Today is day 4  Tolerating 5'aza well.  - Afebrile since admission  - No need for imaging at this time, CT imaging in June 2021, had splenomegaly and proctocolitis, chronic pancreatitis  - CXR 9/9: small pleural effusions bilaterally  - Echo 8/20: LVEF 45%, small-moderate pericardial effusion  - Arrhythmia / Afib Hx: Stable, EP to assess if sotalol can be changed due to interactions -- Keep Mg >2.0, K> 4.0 -- will switch to Toprol per EP  - Monitor strict I/Os, daily weights and will diurese PRN for fluid overload, on home regimen of lasix, continue  - OOB/ambulate  Check tryptase level  Bilirubin 2.5 with direct 0.3 - possibly due to ineffective erythropoiesis 87 yo male with Afib (sotolol and warfarin), AICD, Prostate Ca 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 He was diagnosed with -5q MDS in 2020. Has 16% peripheral blood blasts on transfer. Bone Marrow Bx 9/9/21 -- MDS with EB2, 17% blasts. FISH / Cytogenetics / NGS -- PENDING  Reviewed diagnosis and explained it. Recommended therapy with 5'azacytidine daily x 7 subq. Reviewed toxicities and all questions answered. He gave informed consent. Today is day 5  Tolerating 5'aza well.  - Afebrile since admission  - No need for imaging at this time, CT imaging in June 2021, had splenomegaly and proctocolitis, chronic pancreatitis  - CXR 9/9: small pleural effusions bilaterally  - Echo 8/20: LVEF 45%, small-moderate pericardial effusion  - Arrhythmia / Afib Hx: Stable, EP to assess if sotalol can be changed due to interactions -- Keep Mg >2.0, K> 4.0 -- will switch to Toprol per EP  - Monitor strict I/Os, daily weights and will diurese PRN for fluid overload, on home regimen of lasix, continue  - OOB/ambulate  Check tryptase level  Bilirubin 2.5 with direct 0.3 - possibly due to ineffective erythropoiesis

## 2021-09-20 NOTE — DISCHARGE NOTE PROVIDER - HOSPITAL COURSE
Mr. 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 Bone Marrow biopsy 9/9 revealed MDS with Excess Blasts-2 (17% Blasts). Treatment started 9/15 with 5-azacitidine SQ x 7 days (9/15-9/21). Patient tolerated treatment well with no significant adverse reactions to the injections.    Upon admission, CXR performed showing small bilateral pleural effusions, Echo 8/20 LVEF 45%. Warfarin was held due to thrombocytopenia, and Sotalol was changed to Toprol per EP recommendations due to concern for QT prolongation associated with other medications such antifungal posaconazole and/or antibiotic levaquin. Hospital course uncomplicated, required prbc/platelet transfusions only 1-2x/week. Mild elevation to bilirubin (2.5) thought to be due to ineffective erythropoiesis.    Mr. 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 Bone Marrow biopsy 9/9 revealed MDS with Excess Blasts-2 (17% Blasts). Treatment started 9/15 with 5-azacitidine SQ x 7 days (9/15-9/21). Patient tolerated treatment well with no significant adverse reactions to the injections. Upon admission, CXR performed showing small bilateral pleural effusions, Echo 8/20 LVEF 45%. Warfarin was held due to thrombocytopenia, and Sotalol was changed to Toprol per EP recommendations due to concern for QT prolongation associated with other medications such antifungal posaconazole and/or antibiotic levaquin. Hospital course uncomplicated, required prbc/platelet transfusions only 1-2x/week. Mild elevation to bilirubin (2.5) thought to be due to ineffective erythropoiesis.

## 2021-09-20 NOTE — PROGRESS NOTE ADULT - PROBLEM SELECTOR PLAN 2
not neutropenic, afebrile  Will start ppx Levaquin if ANC <1000 and  when QTC downtrends off Sotalol  9/15 repeat EKG daily  If febrile, Pancx, CXR. Start Aztreonam +/- Vancomycin. Or Cefepime (low cross reactivity for PCN allx)  Holding antimicrobial and antifungal ppx for now  Recently a/w  diverticulitis (6/26-7/2) not neutropenic, afebrile  Will start ppx Levaquin if ANC <1000 and  when QTC downtrends off Sotalol  9/15 repeat EKG daily  If febrile, Pancx, CXR. Start Aztreonam +/- Vancomycin. Or Cefepime (low cross reactivity for PCN allx)  Holding antimicrobial and antifungal ppx for now, ANC > 1000  Recently a/w  diverticulitis (6/26-7/2)

## 2021-09-20 NOTE — PROGRESS NOTE ADULT - SUBJECTIVE AND OBJECTIVE BOX
Diagnosis: MDS with excess blasts-2    Protocol/Chemo: Cycle 1 azacitidine subQ for 7 days    Day: 6    Subjective: no complaints, no fever overnight    Review of Systems: Denies n/v, STEINER, HA or dizziness, abdominal pain    Pain scale: 0/10                    Diet: Regular    Allergies:  adhesives (Rash)  clonidine (Swelling; Rash)  felodipine (Rash)  penicillin (Rash)  sulfa drugs (Rash)      HEME/ONC MEDICATIONS  azaCITIDine Injectable (eMAR) 52.6 milliGRAM(s) SubCutaneous every 24 hours  azaCITIDine Injectable (eMAR) 52.6 milliGRAM(s) SubCutaneous every 24 hours  azaCITIDine Injectable (eMAR) 52.6 milliGRAM(s) SubCutaneous every 24 hours      STANDING MEDICATIONS  allopurinol 100 milliGRAM(s) Oral two times a day  doxazosin 4 milliGRAM(s) Oral at bedtime  furosemide   Injectable 20 milliGRAM(s) IV Push daily  influenza   Vaccine 0.5 milliLiter(s) IntraMuscular once  lidocaine   4% Patch 1 Patch Transdermal every 24 hours  metoprolol succinate ER 25 milliGRAM(s) Oral daily  ondansetron Injectable 8 milliGRAM(s) IV Push every 24 hours  polyethylene glycol 3350 17 Gram(s) Oral daily  potassium phosphate / sodium phosphate Tablet (K-PHOS No. 2) 1 Tablet(s) Oral four times a day with meals      PRN MEDICATIONS  acetaminophen   Tablet .. 650 milliGRAM(s) Oral every 6 hours PRN  diphenhydrAMINE 25 milliGRAM(s) Oral every 6 hours PRN  melatonin 3 milliGRAM(s) Oral at bedtime PRN      Vital Signs Last 24 Hrs  T(C): 36.7 (20 Sep 2021 05:37), Max: 36.8 (19 Sep 2021 21:05)  T(F): 98.1 (20 Sep 2021 05:37), Max: 98.2 (19 Sep 2021 21:05)  HR: 60 (20 Sep 2021 05:37) (59 - 77)  BP: 103/55 (20 Sep 2021 05:37) (103/55 - 125/55)  RR: 18 (20 Sep 2021 05:37) (18 - 20)  SpO2: 92% (20 Sep 2021 05:37) (92% - 97%)    PHYSICAL EXAM  General: Sitting up in bed in NAD  HEENT: PERRL, sclerae anicteric,   CV: +S1, S2, reg  Abdomen: +BS, soft, NT/ND  Ext: LUE mild erythema, warmth, s/p injection site  Skin: No rashes or ecchymosis  Neuro: A&O x 3, non focal  Central line: PIV    LABS:                        7.2    4.41  )-----------( 16       ( 20 Sep 2021 07:11 )             23.3     Mean Cell Volume : 87.3 fl  Mean Cell Hemoglobin : 27.0 pg  Mean Cell Hemoglobin Concentration : 30.9 gm/dL  Auto Neutrophil # : x  Auto Lymphocyte # : x  Auto Monocyte # : x  Auto Eosinophil # : x  Auto Basophil # : x  Auto Neutrophil % : x  Auto Lymphocyte % : x  Auto Monocyte % : x  Auto Eosinophil % : x  Auto Basophil % : x      09-20    142  |  105  |  42<H>  ----------------------------<  100<H>  4.1   |  26  |  1.71<H>    Ca    8.7      20 Sep 2021 07:12  Phos  2.7     09-20  Mg     2.2     09-20    TPro  5.7<L>  /  Alb  3.1<L>  /  TBili  1.1  /  DBili  x   /  AST  5<L>  /  ALT  7<L>  /  AlkPhos  107  09-20      Uric Acid 5.4      RADIOLOGY & ADDITIONAL STUDIES:         Diagnosis: MDS with excess blasts-2    Protocol/Chemo: Cycle 1 azacitidine subQ for 7 days    Day: 6    Subjective: no complaints, no fever overnight    Review of Systems: Denies n/v, STEINER, HA or dizziness, abdominal pain    Pain scale: 0/10                    Diet: Regular    Allergies:  adhesives (Rash)  clonidine (Swelling; Rash)  felodipine (Rash)  penicillin (Rash)  sulfa drugs (Rash)      HEME/ONC MEDICATIONS  azaCITIDine Injectable (eMAR) 52.6 milliGRAM(s) SubCutaneous every 24 hours  azaCITIDine Injectable (eMAR) 52.6 milliGRAM(s) SubCutaneous every 24 hours  azaCITIDine Injectable (eMAR) 52.6 milliGRAM(s) SubCutaneous every 24 hours      STANDING MEDICATIONS  allopurinol 100 milliGRAM(s) Oral two times a day  doxazosin 4 milliGRAM(s) Oral at bedtime  furosemide   Injectable 20 milliGRAM(s) IV Push daily  influenza   Vaccine 0.5 milliLiter(s) IntraMuscular once  lidocaine   4% Patch 1 Patch Transdermal every 24 hours  metoprolol succinate ER 25 milliGRAM(s) Oral daily  ondansetron Injectable 8 milliGRAM(s) IV Push every 24 hours  polyethylene glycol 3350 17 Gram(s) Oral daily  potassium phosphate / sodium phosphate Tablet (K-PHOS No. 2) 1 Tablet(s) Oral four times a day with meals      PRN MEDICATIONS  acetaminophen   Tablet .. 650 milliGRAM(s) Oral every 6 hours PRN  diphenhydrAMINE 25 milliGRAM(s) Oral every 6 hours PRN  melatonin 3 milliGRAM(s) Oral at bedtime PRN      Vital Signs Last 24 Hrs  T(C): 36.7 (20 Sep 2021 05:37), Max: 36.8 (19 Sep 2021 21:05)  T(F): 98.1 (20 Sep 2021 05:37), Max: 98.2 (19 Sep 2021 21:05)  HR: 60 (20 Sep 2021 05:37) (59 - 77)  BP: 103/55 (20 Sep 2021 05:37) (103/55 - 125/55)  RR: 18 (20 Sep 2021 05:37) (18 - 20)  SpO2: 92% (20 Sep 2021 05:37) (92% - 97%)    PHYSICAL EXAM  General: Sitting up in bed in NAD  HEENT: PERRL, sclerae anicteric,   CV: +S1, S2, reg  Abdomen: +BS, soft, NT/ND  Ext: LUE mild erythema, warmth, s/p injection site  Skin: No rashes or ecchymosis  Neuro: A&O x 3, non focal  Central line: PIV    LABS:                        7.2    4.41  )-----------( 16       ( 20 Sep 2021 07:11 )             23.3     Mean Cell Volume : 87.3 fl  Mean Cell Hemoglobin : 27.0 pg  Mean Cell Hemoglobin Concentration : 30.9 gm/dL  Auto Neutrophil # : x  Auto Lymphocyte # : x  Auto Monocyte # : x  Auto Eosinophil # : x  Auto Basophil # : x  Auto Neutrophil % : x  Auto Lymphocyte % : x  Auto Monocyte % : x  Auto Eosinophil % : x  Auto Basophil % : x      09-20    142  |  105  |  42<H>  ----------------------------<  100<H>  4.1   |  26  |  1.71<H>    Ca    8.7      20 Sep 2021 07:12  Phos  2.7     09-20  Mg     2.2     09-20    TPro  5.7<L>  /  Alb  3.1<L>  /  TBili  1.1  /  DBili  x   /  AST  5<L>  /  ALT  7<L>  /  AlkPhos  107  09-20      Uric Acid 5.4      RADIOLOGY & ADDITIONAL STUDIES:  from: Xray Chest 1 View- PORTABLE-Routine (Xray Chest 1 View- PORTABLE-Routine in AM.) (09.19.21 @ 08:22)   IMPRESSION: Mild central pulmonary vascular congestion again seen.  Increasedright lower lung opacity, possibly an increasing layering right pleural effusion with associated passive atelectasis. Atelectasis of other cause, and/or pneumonia is not excluded.  Continued left basilar and retrocardiac opacity described which maybe due to a left pleural effusion with passive atelectasis, atelectasis of other cause, and/or pneumonia.  New patchy peripheral left midlung opacities which could be due to subsegmental atelectasis or pneumonia.           Diagnosis: MDS with excess blasts-2    Protocol/Chemo: Cycle 1 azacitidine subQ for 7 days    Day: 6    Subjective: no fever overnight, left shoulder/neck soreness    Review of Systems: Denies n/v, STEINER, HA or dizziness, abdominal pain    Pain scale: 0/10                    Diet: Regular    Allergies:  adhesives (Rash)  clonidine (Swelling; Rash)  felodipine (Rash)  penicillin (Rash)  sulfa drugs (Rash)      HEME/ONC MEDICATIONS  azaCITIDine Injectable (eMAR) 52.6 milliGRAM(s) SubCutaneous every 24 hours  azaCITIDine Injectable (eMAR) 52.6 milliGRAM(s) SubCutaneous every 24 hours  azaCITIDine Injectable (eMAR) 52.6 milliGRAM(s) SubCutaneous every 24 hours      STANDING MEDICATIONS  allopurinol 100 milliGRAM(s) Oral two times a day  doxazosin 4 milliGRAM(s) Oral at bedtime  furosemide   Injectable 20 milliGRAM(s) IV Push daily  influenza   Vaccine 0.5 milliLiter(s) IntraMuscular once  lidocaine   4% Patch 1 Patch Transdermal every 24 hours  metoprolol succinate ER 25 milliGRAM(s) Oral daily  ondansetron Injectable 8 milliGRAM(s) IV Push every 24 hours  polyethylene glycol 3350 17 Gram(s) Oral daily  potassium phosphate / sodium phosphate Tablet (K-PHOS No. 2) 1 Tablet(s) Oral four times a day with meals      PRN MEDICATIONS  acetaminophen   Tablet .. 650 milliGRAM(s) Oral every 6 hours PRN  diphenhydrAMINE 25 milliGRAM(s) Oral every 6 hours PRN  melatonin 3 milliGRAM(s) Oral at bedtime PRN      Vital Signs Last 24 Hrs  T(C): 36.7 (20 Sep 2021 05:37), Max: 36.8 (19 Sep 2021 21:05)  T(F): 98.1 (20 Sep 2021 05:37), Max: 98.2 (19 Sep 2021 21:05)  HR: 60 (20 Sep 2021 05:37) (59 - 77)  BP: 103/55 (20 Sep 2021 05:37) (103/55 - 125/55)  RR: 18 (20 Sep 2021 05:37) (18 - 20)  SpO2: 92% (20 Sep 2021 05:37) (92% - 97%)    PHYSICAL EXAM  General: Sitting up in bed in NAD  HEENT: PERRL, sclerae anicteric,   CV: +S1, S2, reg  Abdomen: +BS, soft, NT/ND  Ext: LUE mild erythema, warmth, s/p injection site  Skin: No rashes or ecchymosis  Neuro: A&O x 3, non focal  Central line: PIV    LABS:                        7.2    4.41  )-----------( 16       ( 20 Sep 2021 07:11 )             23.3     Mean Cell Volume : 87.3 fl  Mean Cell Hemoglobin : 27.0 pg  Mean Cell Hemoglobin Concentration : 30.9 gm/dL  Auto Neutrophil # : x  Auto Lymphocyte # : x  Auto Monocyte # : x  Auto Eosinophil # : x  Auto Basophil # : x  Auto Neutrophil % : x  Auto Lymphocyte % : x  Auto Monocyte % : x  Auto Eosinophil % : x  Auto Basophil % : x      09-20    142  |  105  |  42<H>  ----------------------------<  100<H>  4.1   |  26  |  1.71<H>    Ca    8.7      20 Sep 2021 07:12  Phos  2.7     09-20  Mg     2.2     09-20    TPro  5.7<L>  /  Alb  3.1<L>  /  TBili  1.1  /  DBili  x   /  AST  5<L>  /  ALT  7<L>  /  AlkPhos  107  09-20      Uric Acid 5.4      RADIOLOGY & ADDITIONAL STUDIES:  from: Xray Chest 1 View- PORTABLE-Routine (Xray Chest 1 View- PORTABLE-Routine in AM.) (09.19.21 @ 08:22)   IMPRESSION: Mild central pulmonary vascular congestion again seen.  Increasedright lower lung opacity, possibly an increasing layering right pleural effusion with associated passive atelectasis. Atelectasis of other cause, and/or pneumonia is not excluded.  Continued left basilar and retrocardiac opacity described which maybe due to a left pleural effusion with passive atelectasis, atelectasis of other cause, and/or pneumonia.  New patchy peripheral left midlung opacities which could be due to subsegmental atelectasis or pneumonia.           Diagnosis: MDS with excess blasts-2    Protocol/Chemo: Cycle 1 azacitidine subQ for 7 days    Day: 6    Subjective: no fever overnight, left shoulder/flank soreness    Review of Systems: Denies n/v, STEINER, HA or dizziness, abdominal pain    Pain scale: 0/10                    Diet: Regular    Allergies:  adhesives (Rash)  clonidine (Swelling; Rash)  felodipine (Rash)  penicillin (Rash)  sulfa drugs (Rash)      HEME/ONC MEDICATIONS  azaCITIDine Injectable (eMAR) 52.6 milliGRAM(s) SubCutaneous every 24 hours  azaCITIDine Injectable (eMAR) 52.6 milliGRAM(s) SubCutaneous every 24 hours  azaCITIDine Injectable (eMAR) 52.6 milliGRAM(s) SubCutaneous every 24 hours      STANDING MEDICATIONS  allopurinol 100 milliGRAM(s) Oral two times a day  doxazosin 4 milliGRAM(s) Oral at bedtime  furosemide   Injectable 20 milliGRAM(s) IV Push daily  influenza   Vaccine 0.5 milliLiter(s) IntraMuscular once  lidocaine   4% Patch 1 Patch Transdermal every 24 hours  metoprolol succinate ER 25 milliGRAM(s) Oral daily  ondansetron Injectable 8 milliGRAM(s) IV Push every 24 hours  polyethylene glycol 3350 17 Gram(s) Oral daily  potassium phosphate / sodium phosphate Tablet (K-PHOS No. 2) 1 Tablet(s) Oral four times a day with meals      PRN MEDICATIONS  acetaminophen   Tablet .. 650 milliGRAM(s) Oral every 6 hours PRN  diphenhydrAMINE 25 milliGRAM(s) Oral every 6 hours PRN  melatonin 3 milliGRAM(s) Oral at bedtime PRN      Vital Signs Last 24 Hrs  T(C): 36.7 (20 Sep 2021 05:37), Max: 36.8 (19 Sep 2021 21:05)  T(F): 98.1 (20 Sep 2021 05:37), Max: 98.2 (19 Sep 2021 21:05)  HR: 60 (20 Sep 2021 05:37) (59 - 77)  BP: 103/55 (20 Sep 2021 05:37) (103/55 - 125/55)  RR: 18 (20 Sep 2021 05:37) (18 - 20)  SpO2: 92% (20 Sep 2021 05:37) (92% - 97%)    PHYSICAL EXAM  General: Sitting up in bed in NAD  HEENT: PERRL, sclerae anicteric,   CV: +S1, S2, reg  Abdomen: +BS, soft, NT/ND  Ext: LUE mild erythema, warmth, s/p injection site  Skin: No rashes or ecchymosis  Neuro: A&O x 3, non focal  Central line: PIV    LABS:                        7.2    4.41  )-----------( 16       ( 20 Sep 2021 07:11 )             23.3     Mean Cell Volume : 87.3 fl  Mean Cell Hemoglobin : 27.0 pg  Mean Cell Hemoglobin Concentration : 30.9 gm/dL  Auto Neutrophil # : x  Auto Lymphocyte # : x  Auto Monocyte # : x  Auto Eosinophil # : x  Auto Basophil # : x  Auto Neutrophil % : x  Auto Lymphocyte % : x  Auto Monocyte % : x  Auto Eosinophil % : x  Auto Basophil % : x      09-20    142  |  105  |  42<H>  ----------------------------<  100<H>  4.1   |  26  |  1.71<H>    Ca    8.7      20 Sep 2021 07:12  Phos  2.7     09-20  Mg     2.2     09-20    TPro  5.7<L>  /  Alb  3.1<L>  /  TBili  1.1  /  DBili  x   /  AST  5<L>  /  ALT  7<L>  /  AlkPhos  107  09-20      Uric Acid 5.4      RADIOLOGY & ADDITIONAL STUDIES:  from: Xray Chest 1 View- PORTABLE-Routine (Xray Chest 1 View- PORTABLE-Routine in AM.) (09.19.21 @ 08:22)   IMPRESSION: Mild central pulmonary vascular congestion again seen.  Increasedright lower lung opacity, possibly an increasing layering right pleural effusion with associated passive atelectasis. Atelectasis of other cause, and/or pneumonia is not excluded.  Continued left basilar and retrocardiac opacity described which maybe due to a left pleural effusion with passive atelectasis, atelectasis of other cause, and/or pneumonia.  New patchy peripheral left midlung opacities which could be due to subsegmental atelectasis or pneumonia.

## 2021-09-20 NOTE — DISCHARGE NOTE PROVIDER - CARE PROVIDER_API CALL
Darrell Anne  [address] [place] [address]  Phone: [phone]  Fax: [phone]  Follow Up Time:

## 2021-09-20 NOTE — DISCHARGE NOTE PROVIDER - PROVIDER TOKENS
FREE:[LAST:[Omid],FIRST:[Darrell],PHONE:[phone]],FAX:[phone]],ADDRESS:[[address] [place] [address]]]

## 2021-09-20 NOTE — PROGRESS NOTE ADULT - ATTENDING COMMENTS
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 He was diagnosed with -5q MDS in 2020. Has 16% peripheral blood blasts on transfer. Bone Marrow Bx 9/9/21 -- MDS with EB2, 17% blasts. FISH / Cytogenetics / NGS -- PENDING  Reviewed diagnosis and explained it. Recommended therapy with 5'azacytidine daily x 7 subq. Reviewed toxicities and all questions answered. He gave informed consent. Today is day 5  Tolerating 5'aza well.  - Afebrile since admission  - No need for imaging at this time, CT imaging in June 2021, had splenomegaly and proctocolitis, chronic pancreatitis  - CXR 9/9: small pleural effusions bilaterally  - Echo 8/20: LVEF 45%, small-moderate pericardial effusion  - Arrhythmia / Afib Hx: Stable, EP to assess if sotalol can be changed due to interactions -- Keep Mg >2.0, K> 4.0 -- will switch to Toprol per EP  - Monitor strict I/Os, daily weights and will diurese PRN for fluid overload, on home regimen of lasix, continue  - OOB/ambulate  Check tryptase level  Bilirubin 2.5 with direct 0.3 - possibly due to ineffective erythropoiesis 87 yo male with Afib (sotolol and warfarin), AICD, Prostate Ca 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 He was diagnosed with -5q MDS in 2020. Has 16% peripheral blood blasts on transfer. Bone Marrow Bx 9/9/21 -- MDS with EB2, 17% blasts. FISH / Cytogenetics / NGS -- PENDING  Reviewed diagnosis and explained it. Recommended therapy with 5'azacytidine daily x 7 subq. Reviewed toxicities and all questions answered. He gave informed consent. Today is day 6  Tolerating 5'aza well. has discomfort in left posterior shoulder when lies down. Area is nontender and shoulder has FROM. No rash. Will x-ray and apply lidoderm patch.  - Afebrile since admission  - CXR 9/9: small pleural effusions bilaterally  - Echo 8/20: LVEF 45%, small-moderate pericardial effusion  - Arrhythmia / Afib Hx: Stable, EP to assess if sotalol can be changed due to interactions -- Keep Mg >2.0, K> 4.0 -- will switch to Toprol per EP  - Monitor strict I/Os, daily weights and will diurese PRN for fluid overload, on home regimen of lasix, continue  - OOB/ambulate  Tryptase level 17.8  Bilirubin 2.5 with direct 0.3 - possibly due to ineffective erythropoiesis

## 2021-09-20 NOTE — DISCHARGE NOTE PROVIDER - NSDCFUADDAPPT_GEN_ALL_CORE_FT
MD appointment at the [facility] on Monday 9/27 at 2:30PM  Possible Platelet appointments at the [facility] on:   Saturday 9/25 at 1:00PM   Tuesday 9/28 at 3:20PM   Friday 10/1 at 1:50PM

## 2021-09-20 NOTE — DISCHARGE NOTE PROVIDER - NSDCFUSCHEDAPPT_GEN_ALL_CORE_FT
RUBÉN GOLDBERG ; 09/27/2021 ; [facility] RUBÉN GOLDBERG ; 09/25/2021 ; [facility]  RUBÉN GOLDBERG ; 09/27/2021 ; [facility]  RUBÉN GOLDBERG ; 09/28/2021 ; [facility]  RUBÉN GOLDBERG ; 10/01/2021 ; [facility]

## 2021-09-20 NOTE — PHYSICAL THERAPY INITIAL EVALUATION ADULT - STRENGTHENING, PT EVAL
3. GOAL: Pt will increase strength in BLE by at least 1/2 grade within 3 weeks to improve functional mobility.

## 2021-09-20 NOTE — PHYSICAL THERAPY INITIAL EVALUATION ADULT - PERTINENT HX OF CURRENT PROBLEM, REHAB EVAL
Pt is an 85 y/o M with PMH of a-fib s/p ablation w/ AICD, prostate CA, MDS who presented with SOB, patient was recently admitted to [facility] from 8/19-8/26 for anemia/thrombocytopenia. Since discharge he had felt overall better but reported worsening weakness and shortness of breath. He saw an outpatient leukemia specialist, had lab work done and was informed that they demonstrated a leukemia and should go the ED for evaluation.

## 2021-09-20 NOTE — PHYSICAL THERAPY INITIAL EVALUATION ADULT - PLANNED THERAPY INTERVENTIONS, PT EVAL
1. GOAL: Pt will be able to negotiate 10 steps +HR independently with reciprocal pattern in 3 weeks./gait training/strengthening

## 2021-09-20 NOTE — DISCHARGE NOTE PROVIDER - NSDCCPCAREPLAN_GEN_ALL_CORE_FT
PRINCIPAL DISCHARGE DIAGNOSIS  Diagnosis: MDS (myelodysplastic syndrome) with 5q deletion  Assessment and Plan of Treatment: maintain counts, remain free from infections.  Notify MD and report to ER for any temperature greater than or equal to 100.4 degrees, intractable nausea, vomiting, diarrhea, or uncontrolled bleeding.        SECONDARY DISCHARGE DIAGNOSES  Diagnosis: Chronic atrial fibrillation  Assessment and Plan of Treatment: changed Sotalol to Meotprolol due to concerns for QT prolongation and interaction with antimicrobials     PRINCIPAL DISCHARGE DIAGNOSIS  Diagnosis: MDS (myelodysplastic syndrome) with 5q deletion  Assessment and Plan of Treatment: maintain counts, remain free from infections.  Notify MD and report to ER for any temperature greater than or equal to 100.4 degrees, intractable nausea, vomiting, diarrhea, or uncontrolled bleeding.        SECONDARY DISCHARGE DIAGNOSES  Diagnosis: Chronic atrial fibrillation  Assessment and Plan of Treatment: changed Sotalol to Metoprolol due to concerns for QT prolongation and interaction with antimicrobials

## 2021-09-20 NOTE — PROGRESS NOTE ADULT - PROBLEM SELECTOR PLAN 1
h/o MDS refractory to Revlimid, initially stable however in recent 2-3 months recurrent thrombocytopenia and symptomatic anemia now confirmed MDS with excess blasts  G6PD (-), TTE, EF 49%  9/9 Bone Marrow Bx-MDS excess blasts  9/15 - Vidaza subcutaneous for 7 days started   Trend daily CBC,  TLS labs: CMP, Phos, Uric acid, LDH. Replete prn  Strict I&os' daily weights, mouth care  ordered speech and swallow to eval- passed bedside evaluation, continue regular diet h/o MDS refractory to Revlimid, initially stable however in recent 2-3 months recurrent thrombocytopenia and symptomatic anemia now confirmed MDS with excess blasts  G6PD (-), TTE, EF 49%  9/9 Bone Marrow Bx-MDS excess blasts  9/15 - Vidaza subcutaneous for 7 days started   Trend daily CBC,  TLS labs: CMP, Phos, Uric acid, LDH. Replete prn  Strict I&os' daily weights, mouth care  ordered speech and swallow to eval- passed bedside evaluation, continue regular diet  Shoulder/flank soreness - follow up xray, analgesia, lidocaine patch  Discharge plan after Day 7 injection. New patient info email sent. Needs follow up appointment with Dr. Anne, lab appointments.

## 2021-09-20 NOTE — PHYSICAL THERAPY INITIAL EVALUATION ADULT - ADDITIONAL COMMENTS
Pt reports he will be staying with his significant other upon discharge. There is 1 flight of stairs to negotiate. PTA, pt was independent with all functional mobility & ADL's with use of a straight cane.

## 2021-09-20 NOTE — DISCHARGE NOTE PROVIDER - NSDCMRMEDTOKEN_GEN_ALL_CORE_FT
Diflucan 200 mg oral tablet: 1 tab(s) orally once a day   doxazosin 4 mg oral tablet: 1 tab(s) orally once a day (at bedtime)  levoFLOXacin 500 mg oral tablet: 1 tab(s) orally once a day   metoprolol succinate 25 mg oral tablet, extended release: 1 tab(s) orally once a day   doxazosin 4 mg oral tablet: 1 tab(s) orally once a day (at bedtime)  metoprolol succinate 25 mg oral tablet, extended release: 1 tab(s) orally once a day

## 2021-09-20 NOTE — DISCHARGE NOTE PROVIDER - NSDCHC_MEDRECLITE_GEN_ALL_CORE
Click to Modify Medication Indication on Note Save Bill For Surgical Tray: no Performing Laboratory: [id number] Billing Type: Third-Party Bill Expected Date Of Service: 07/17/2020

## 2021-09-21 NOTE — PROVIDER CONTACT NOTE (CRITICAL VALUE NOTIFICATION) - ASSESSMENT
nad, afebrile   no sob, no cp  no signs of bleeding
NAD, pt denies SOB, Chest pain
VSS. afebrile. no signs of bleeding
Patient A&OX4. Vital signs stable. Patient denies pain or discomfort. No active signs of bleeding present.
nad, pt afebrile  no sob, no cp   no signs of bleeding, will continue to monitor
NAD, no signs of bleeding
Pt A&Ox4, VSS. No acute distress noted. No signs/symptoms of bleeding noted.
VSS. No active bleeding noted.
a/ox4

## 2021-09-21 NOTE — ADVANCED PRACTICE NURSE CONSULT - REASON FOR CONSULT
Vidaza day 1 of 7; Cycle 1; Consent on chart.
Vidaza day 4 of 7; Cycle 1; Consent on chart.
Vidaza day 2 of 7; Cycle 1; Consent on chart.
Vidaza day 3 of 7; Cycle 1; Consent on chart.
Vidaza day 5 of 7; Cycle 1
Vidaza day 6 of 7; Cycle 1
Vidaza day 7 of 7; Cycle 1

## 2021-09-21 NOTE — PROVIDER CONTACT NOTE (CRITICAL VALUE NOTIFICATION) - ACTION/TREATMENT ORDERED:
1 unit prbcs  1 unit plt
no orders for now
PA made aware
no new orders
NP made aware. NP will follow up. Patient possibly will receive 1 unit of PRBC's. Will continue to monitor patient.
no new orders at this time
1 unit PRBC
No new orders @ this time
No intervention at this time
at home:

## 2021-09-21 NOTE — PROVIDER CONTACT NOTE (CRITICAL VALUE NOTIFICATION) - BACKGROUND
aml
Pt admitted with MDS
Patient admitted for anemia. Patient has a history of leukemia and CHF.
pt diagnosis MDL getting chemo
MDS
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
pt has MDS receiving chemo
MDS. Receiving Vidaza.

## 2021-09-21 NOTE — PROGRESS NOTE ADULT - PROBLEM SELECTOR PLAN 1
h/o MDS refractory to Revlimid, initially stable however in recent 2-3 months recurrent thrombocytopenia and symptomatic anemia now confirmed MDS with excess blasts  G6PD (-), TTE, EF 49%  9/9 Bone Marrow Bx-MDS excess blasts  9/15 - Vidaza subcutaneous for 7 days started   Trend daily CBC,  TLS labs: CMP, Phos, Uric acid, LDH. Replete prn  Strict I&os' daily weights, mouth care  ordered speech and swallow to eval- passed bedside evaluation, continue regular diet  Shoulder/flank soreness - follow up xray, analgesia, lidocaine patch  Discharge plan after Day 7 injection. New patient info email sent. Needs follow up appointment with Dr. Anne, lab appointments. h/o MDS refractory to Revlimid, initially stable however in recent 2-3 months recurrent thrombocytopenia and symptomatic anemia now confirmed MDS with excess blasts  G6PD (-), TTE, EF 49%  9/9 Bone Marrow Bx-MDS excess blasts  9/15 - Vidaza subcutaneous for 7 days started   Trend daily CBC,TLS labs: CMP, Phos, Uric acid, LDH. Replete prn  Strict I&os' daily weights, mouth care  ordered speech and swallow to eval- passed bedside evaluation, continue regular diet  Shoulder/flank soreness - L shoulder xray-NEG, analgesia - trial of low dose Tramadol,  lidocaine patch  Discharge planning after Day 7 injection. New patient info email sent. Needs follow up appointment with Dr. Anne, lab appointments.

## 2021-09-21 NOTE — PROGRESS NOTE ADULT - ATTENDING COMMENTS
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 He was diagnosed with -5q MDS in 2020. Has 16% peripheral blood blasts on transfer. Bone Marrow Bx 9/9/21 -- MDS with EB2, 17% blasts. FISH / Cytogenetics / NGS -- PENDING  Reviewed diagnosis and explained it. Recommended therapy with 5'azacytidine daily x 7 subq. Reviewed toxicities and all questions answered. He gave informed consent. Today is day 6  Tolerating 5'aza well. has discomfort in left posterior shoulder when lies down. Area is nontender and shoulder has FROM. No rash. Will x-ray and apply lidoderm patch.  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 He was diagnosed with -5q MDS in 2020. Has 16% peripheral blood blasts on transfer. Bone Marrow Bx 9/9/21 -- MDS with EB2, 17% blasts. FISH / Cytogenetics / NGS -- PENDING  Reviewed diagnosis and explained it. Recommended therapy with 5'azacytidine daily x 7 subq. Reviewed toxicities and all questions answered. He gave informed consent. Today is day 7  Tolerating 5'aza well. Discomfort in left posterior shoulder when lies down has resolved. Feels exhausted.  - Afebrile since admission  - CXR 9/9: small pleural effusions bilaterally  - Echo 8/20: LVEF 45%, small-moderate pericardial effusion  - Arrhythmia / Afib Hx: Stable, EP to assess if sotalol can be changed due to interactions -- Keep Mg >2.0, K> 4.0 -- will switch to Toprol per EP  - Monitor strict I/Os, daily weights and will diurese PRN for fluid overload, on home regimen of lasix, continue  - OOB/ambulate  Tryptase level 17.8  Bilirubin 2.5 with direct 0.3 - possibly due to ineffective erythropoiesis  Transfuse prn

## 2021-09-21 NOTE — PROVIDER CONTACT NOTE (CRITICAL VALUE NOTIFICATION) - SITUATION
PLT= 14
Plt=18
Hemoglobin = 6.8  PLT=15
Patients critical labs are: Hgb: 6.0, Hct: 22.0
PLT 16
PLT = 19
low plts
hgb 6.7   hct 21.2   plt 12

## 2021-09-21 NOTE — PROVIDER CONTACT NOTE (CRITICAL VALUE NOTIFICATION) - NAME OF MD/NP/PA/DO NOTIFIED:
CHAYO Hermosillo
German DOMINGO
CHAYO Renee
CHAYO Wolfe
CHAYO Renee
LEONARDO Ventura
leyla rivera np
LEONARDO Ga
Teddy Anderson

## 2021-09-21 NOTE — PROGRESS NOTE ADULT - PROBLEM SELECTOR PLAN 2
not neutropenic, afebrile  Will start ppx Levaquin if ANC <1000 and  when QTC downtrends off Sotalol  9/15 repeat EKG daily  If febrile, Pancx, CXR. Start Aztreonam +/- Vancomycin. Or Cefepime (low cross reactivity for PCN allx)  Holding antimicrobial and antifungal ppx for now, ANC > 1000  Recently a/w  diverticulitis (6/26-7/2) not neutropenic, afebrile  Started ppx Levaquin if ANC below 1000 and  when QTC downtrends off Sotalol9/15 repeat EKG daily  If febrile, Pancx, CXR. Start Aztreonam +/- Vancomycin. Or Cefepime (low cross reactivity for PCN allx)  Holding antimicrobial and antifungal ppx for now, ANC > 1000  Recently a/w  diverticulitis (6/26-7/2)

## 2021-09-21 NOTE — PROGRESS NOTE ADULT - SUBJECTIVE AND OBJECTIVE BOX
Diagnosis: MDS with excess blasts-2    Protocol/Chemo: Cycle 1 azacitidine subQ for 7 days    Day: 7    Subjective:     Review of Systems:     Pain scale: 0/10                    Diet: Regular    Allergies:  adhesives (Rash)  clonidine (Swelling; Rash)  felodipine (Rash)  penicillin (Rash)  sulfa drugs (Rash)      MEDICATIONS  (STANDING):  allopurinol 100 milliGRAM(s) Oral two times a day  azaCITIDine Injectable (eMAR) 52.6 milliGRAM(s) SubCutaneous every 24 hours  azaCITIDine Injectable (eMAR) 52.6 milliGRAM(s) SubCutaneous every 24 hours  azaCITIDine Injectable (eMAR) 52.6 milliGRAM(s) SubCutaneous every 24 hours  Biotene Dry Mouth Oral Rinse 15 milliLiter(s) Swish and Spit five times a day  chlorhexidine 2% Cloths 1 Application(s) Topical daily  doxazosin 4 milliGRAM(s) Oral at bedtime  furosemide   Injectable 20 milliGRAM(s) IV Push daily  influenza   Vaccine 0.5 milliLiter(s) IntraMuscular once  lidocaine   4% Patch 1 Patch Transdermal every 24 hours  lidocaine   4% Patch 1 Patch Transdermal once  metoprolol succinate ER 25 milliGRAM(s) Oral daily  ondansetron Injectable 8 milliGRAM(s) IV Push every 24 hours  polyethylene glycol 3350 17 Gram(s) Oral daily  potassium phosphate / sodium phosphate Tablet (K-PHOS No. 2) 1 Tablet(s) Oral four times a day with meals  senna 2 Tablet(s) Oral at bedtime  sodium chloride 0.9%. 1000 milliLiter(s) (40 mL/Hr) IV Continuous <Continuous>    MEDICATIONS  (PRN):  acetaminophen   Tablet .. 650 milliGRAM(s) Oral every 6 hours PRN Temp greater or equal to 38C (100.4F), Mild Pain (1 - 3)  diphenhydrAMINE 25 milliGRAM(s) Oral every 6 hours PRN Rash and/or Itching  melatonin 3 milliGRAM(s) Oral at bedtime PRN Insomnia    Vital Signs Last 24 Hrs  T(C): 36.7 (21 Sep 2021 05:13), Max: 36.8 (21 Sep 2021 00:55)  T(F): 98.1 (21 Sep 2021 05:13), Max: 98.2 (21 Sep 2021 00:55)  HR: 60 (21 Sep 2021 05:13) (59 - 85)  BP: 109/56 (21 Sep 2021 05:13) (98/55 - 125/64)  BP(mean): --  RR: 18 (21 Sep 2021 05:13) (18 - 18)  SpO2: 92% (21 Sep 2021 05:13) (92% - 98%)    PHYSICAL EXAM  General: Sitting up in bed in NAD  HEENT: PERRL, sclerae anicteric,   CV: +S1, S2, reg  Abdomen: +BS, soft, NT/ND  Ext: LUE mild erythema, warmth, s/p injection site  Skin: No rashes or ecchymosis  Neuro: A&O x 3, non focal  Central line: PIV    LABS:                        7.2    4.41  )-----------( 16       ( 20 Sep 2021 07:11 )             23.3     Mean Cell Volume : 87.3 fl  Mean Cell Hemoglobin : 27.0 pg  Mean Cell Hemoglobin Concentration : 30.9 gm/dL  Auto Neutrophil # : x  Auto Lymphocyte # : x  Auto Monocyte # : x  Auto Eosinophil # : x  Auto Basophil # : x  Auto Neutrophil % : x  Auto Lymphocyte % : x  Auto Monocyte % : x  Auto Eosinophil % : x  Auto Basophil % : x      09-20    142  |  105  |  42<H>  ----------------------------<  100<H>  4.1   |  26  |  1.71<H>    Ca    8.7      20 Sep 2021 07:12  Phos  2.7     09-20  Mg     2.2     09-20    TPro  5.7<L>  /  Alb  3.1<L>  /  TBili  1.1  /  DBili  x   /  AST  5<L>  /  ALT  7<L>  /  AlkPhos  107  09-20      Uric Acid 5.4      RADIOLOGY & ADDITIONAL STUDIES:  from: Xray Chest 1 View- PORTABLE-Routine (Xray Chest 1 View- PORTABLE-Routine in AM.) (09.19.21 @ 08:22)   IMPRESSION: Mild central pulmonary vascular congestion again seen.  Increasedright lower lung opacity, possibly an increasing layering right pleural effusion with associated passive atelectasis. Atelectasis of other cause, and/or pneumonia is not excluded.  Continued left basilar and retrocardiac opacity described which maybe due to a left pleural effusion with passive atelectasis, atelectasis of other cause, and/or pneumonia.  New patchy peripheral left midlung opacities which could be due to subsegmental atelectasis or pneumonia.           Diagnosis: MDS with excess blasts-2    Protocol/Chemo: Cycle 1 azacitidine subQ for 7 days    Day: 7    Subjective: No overnight events, feels very weak, taking some po's    Review of Systems: Denies HA or dizziness, n/v, STEINER    Pain scale: 0/10                    Diet: Regular    Allergies:  adhesives (Rash)  clonidine (Swelling; Rash)  felodipine (Rash)  penicillin (Rash)  sulfa drugs (Rash)      MEDICATIONS  (STANDING):  allopurinol 100 milliGRAM(s) Oral two times a day  azaCITIDine Injectable (eMAR) 52.6 milliGRAM(s) SubCutaneous every 24 hours  azaCITIDine Injectable (eMAR) 52.6 milliGRAM(s) SubCutaneous every 24 hours  azaCITIDine Injectable (eMAR) 52.6 milliGRAM(s) SubCutaneous every 24 hours  Biotene Dry Mouth Oral Rinse 15 milliLiter(s) Swish and Spit five times a day  chlorhexidine 2% Cloths 1 Application(s) Topical daily  doxazosin 4 milliGRAM(s) Oral at bedtime  furosemide   Injectable 20 milliGRAM(s) IV Push daily  influenza   Vaccine 0.5 milliLiter(s) IntraMuscular once  lidocaine   4% Patch 1 Patch Transdermal every 24 hours  lidocaine   4% Patch 1 Patch Transdermal once  metoprolol succinate ER 25 milliGRAM(s) Oral daily  ondansetron Injectable 8 milliGRAM(s) IV Push every 24 hours  polyethylene glycol 3350 17 Gram(s) Oral daily  potassium phosphate / sodium phosphate Tablet (K-PHOS No. 2) 1 Tablet(s) Oral four times a day with meals  senna 2 Tablet(s) Oral at bedtime  sodium chloride 0.9%. 1000 milliLiter(s) (40 mL/Hr) IV Continuous <Continuous>    MEDICATIONS  (PRN):  acetaminophen   Tablet .. 650 milliGRAM(s) Oral every 6 hours PRN Temp greater or equal to 38C (100.4F), Mild Pain (1 - 3)  diphenhydrAMINE 25 milliGRAM(s) Oral every 6 hours PRN Rash and/or Itching  melatonin 3 milliGRAM(s) Oral at bedtime PRN Insomnia    Vital Signs Last 24 Hrs  T(C): 36.7 (21 Sep 2021 05:13), Max: 36.8 (21 Sep 2021 00:55)  T(F): 98.1 (21 Sep 2021 05:13), Max: 98.2 (21 Sep 2021 00:55)  HR: 60 (21 Sep 2021 05:13) (59 - 85)  BP: 109/56 (21 Sep 2021 05:13) (98/55 - 125/64)  BP(mean): --  RR: 18 (21 Sep 2021 05:13) (18 - 18)  SpO2: 92% (21 Sep 2021 05:13) (92% - 98%)    PHYSICAL EXAM  General: Lying in bed in NAD  HEENT: PERRL, sclerae anicteric,   CV: +S1, S2, reg  Abdomen: +BS, soft, NT/ND  Ext: trace BLE edema, +LUE ecchymosis (s/p injection sites)  Skin: +ecchymosis LUE  Neuro: A&O x 3, non focal  Central line: PIV    LABS:             6.7    4.62  )-----------( 12       ( 21 Sep 2021 07:52 )             21.2     21 Sep 2021 07:52    142    |  106    |  43     ----------------------------<  103    4.1     |  25     |  1.65     Ca    8.5        21 Sep 2021 07:52  Phos  3.0       21 Sep 2021 07:52  Mg     2.2       21 Sep 2021 07:52    TPro  5.4    /  Alb  3.0    /  TBili  1.7    /  DBili  x      /  AST  8      /  ALT  8      /  AlkPhos  115    21 Sep 2021 07:52    PT/INR - ( 21 Sep 2021 09:32 )   PT: 16.1 sec;   INR: 1.36 ratio    PTT - ( 21 Sep 2021 09:32 )  PTT:38.5 sec    LIVER FUNCTIONS - ( 21 Sep 2021 07:52 )  Alb: 3.0 g/dL / Pro: 5.4 g/dL / ALK PHOS: 115 U/L / ALT: 8 U/L / AST: 8 U/L / GGT: x               RADIOLOGY & ADDITIONAL STUDIES:    < from: Xray Shoulder 2 Views, Left (09.20.21 @ 17:51) >  IMPRESSION:  No fractures, dislocations, or AC separation.    Minimal glenohumeral articular margin degenerative spurring. Otherwise preserved joint spaces.    No destructive osseous lesions or periosteal reaction.    No periarticular soft tissue calcifications.            from: Xray Chest 1 View- PORTABLE-Routine (Xray Chest 1 View- PORTABLE-Routine in AM.) (09.19.21 @ 08:22)   IMPRESSION: Mild central pulmonary vascular congestion again seen.  Increasedright lower lung opacity, possibly an increasing layering right pleural effusion with associated passive atelectasis. Atelectasis of other cause, and/or pneumonia is not excluded.  Continued left basilar and retrocardiac opacity described which maybe due to a left pleural effusion with passive atelectasis, atelectasis of other cause, and/or pneumonia.  New patchy peripheral left midlung opacities which could be due to subsegmental atelectasis or pneumonia.

## 2021-09-21 NOTE — ADVANCED PRACTICE NURSE CONSULT - ASSESSMENT
Lab results reviewed by Dr. Dickson. Reinforced teachings to patient about his chemo regimen well understood. Patient with left lower wrist peripheral IV gauge 20 patent. 2 RNs verification completed prior to start. Got zofran 8mg IV as antiemetic. Patient got vidaza (3 syringes,each syringe 52.6mg) 2 syringes given to right upper arm,tolerated,third syringe given to left upper arm. Both sites covered with folded 2x2 gauze,sites clean,clear. Primary RN aware of plan of care,safety maintained.
Lab values reviewed  by Dr. Beltran on rounds. Educated patient about his chemo regimen well understood.  made aware about patient's total bili of 1.3 and agreed to give as ordered. EKG done in am. 2 RNs verification completed prior to start. Denies nausea. Pt was premedicated with zofran 8mg IV x 1 as antiemetic prior to start. Vidaza 52.6mg/2.1ml in one syringe(total of 3 syringes) given slowly into right upper arm (2 syringes), and one syringes over left upper arm at 16:17, 16:17 and 16:18, each site covered with folded 2x2 gauze and covered with tape. Sites no signs of bleeding. Primary RN aware of plan of care. Safety maintained.
Lab values reviewed  by Dr. CRUZ on rounds. Educated patient about his chemo regimen well understood. Dr. CRUZ  aware about patient's total bili of 1.7. Its marcela to give as ordered. EKG done in am. 2 RNs verification completed prior to start. Denies nausea. Got zofran 8mg IV x 1 as antiemetic prior to start. Vidaza 52.6mg/2.1ml in one syringe(total of 3 syringes) given slowly into right upper arm (1 syringe), and two syringes  over left upper arm at 17:16, 17:17 and 17:18,[facility] site covered with folded 2x2 gauze and covered with tape. Sites no signs of bleeding. Primary RN aware of plan of care. Safety maintained.
Pt. seen in bed a/ox4,denies any discomfort at this time. Lab results reviewed by Dr. Beltran and team during rounds . Reinforced teachings to patient about his chemo regimen well understood. EKG done seen by Víctor DOMINGO . 2 RNs verification completed prior to start.  zofran 8mg IV as antiemetic given by primary RN . Day 7/7  vidaza (3 syringes,each syringe 52.6mg) 2 syringes given to left lower quadrant of abdomen ,tolerated well. no bleeding noted.Bandaid applied ,third syringe given to right quadrant of the abdomen , tolerated well. Band aid applied.. Primary RN aware of plan of care,safety maintained.
Pt. seen in bed a/ox4,denies any discomfort at this time. Lab results reviewed by Dr. Beltran. Reinforced teachings to patient about his chemo regimen well understood. EKG done seen by German DOMINGO . 2 RNs verification completed prior to start.  zofran 8mg IV as antiemetic given by primary RN . Day 6/7  vidaza (3 syringes,each syringe 52.6mg) 2 syringes given to right lower quadrant of abdomen ,tolerated well. no bleeding noted.Bandaid applied ,third syringe given to left quadrant of the abdomen , tolerated well. Band aid applied.. Primary RN aware of plan of care,safety maintained.
Lab values reviewed  by Dr. Beltran. Educated patient about his chemo regimen well understood. Dr. Beltran aware about patient's total bili of 2.5 from 1.9. Its marcela to give as ordered. 2 RNs verification completed prior to start. Denies nausea. Got zofran 8mg IV x 1 as antiemetic prior to start. Vidaza 52.6mg/2.1ml in one syringe(total of 3 syringes) given slowly into right upper arm (2 syringes), and third syringe over left upper arm at 1755,[facility] site covered with folded 2x2 gauze and covered with tape. Sites no signs of bleeding. Primary RN aware of plan of care. Safety maintained.
Found pt up in bed. VS, Lab values reviewed  by Dr. Dickson on rounds. Educated patient about his chemo regimen well understood.  made aware about patient's total bili of 1.3 and agreed to give as ordered. EKG done in am. 2 RNs verification completed prior to start. Denies nausea. Pt was premedicated with zofran 8mg IV x 1 as antiemetic prior to start. Vidaza 52.6mg/2.1ml in one syringe(total of 3 syringes) given slowly into right upper arm (1 syringe), and two syringes over left upper arm at 16:28, 16:28 and 16:29, each site covered with folded 2x2 gauze and covered with tape. Sites no signs of bleeding. Primary RN aware of plan of care. Safety maintained.

## 2021-09-21 NOTE — PROVIDER CONTACT NOTE (CRITICAL VALUE NOTIFICATION) - PERSON GIVING RESULT:
Cleveland Santos
Geetha Shaver, lab
Joan
Diaz Jeffers
Aury Iniguez
core lab: Ryan Thorpe
manuel Iniguez
luis enrique mccall
Joan

## 2021-09-22 NOTE — PROGRESS NOTE ADULT - ATTENDING COMMENTS
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 He was diagnosed with -5q MDS in 2020. Has 16% peripheral blood blasts on transfer. Bone Marrow Bx 9/9/21 -- MDS with EB2, 17% blasts. FISH / Cytogenetics / NGS -- PENDING  Reviewed diagnosis and explained it. Recommended therapy with 5'azacytidine daily x 7 subq. Reviewed toxicities and all questions answered. He gave informed consent. Today is day 7  Tolerating 5'aza well. Discomfort in left posterior shoulder when lies down has resolved. Feels exhausted.  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 He was diagnosed with -5q MDS in 2020. Has 16% peripheral blood blasts on transfer. Bone Marrow Bx 9/9/21 -- MDS with EB2, 17% blasts. FISH / Cytogenetics / NGS -- PENDING  Reviewed diagnosis and explained it. Recommended therapy with 5'azacytidine daily x 7 subq. Reviewed toxicities and all questions answered. He gave informed consent. Today is day 8  Tolerated 5'aza well. Discomfort in left posterior shoulder when lies down resolved, now recurring. Feels better.  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

## 2021-09-22 NOTE — PROGRESS NOTE ADULT - PROBLEM SELECTOR PLAN 1
h/o MDS refractory to Revlimid, initially stable however in recent 2-3 months recurrent thrombocytopenia and symptomatic anemia now confirmed MDS with excess blasts  G6PD (-), TTE, EF 49%  9/9 Bone Marrow Bx-MDS excess blasts  9/15 - Vidaza subcutaneous for 7 days started   Trend daily CBC,TLS labs: CMP, Phos, Uric acid, LDH. Replete prn  Strict I&os' daily weights, mouth care  ordered speech and swallow to eval- passed bedside evaluation, continue regular diet  Shoulder/flank soreness - L shoulder xray-NEG, analgesia - trial of low dose Tramadol,  lidocaine patch  Discharge planning after Day 7 injection. New patient info email sent. Needs follow up appointment with Dr. Anne, lab appointments. h/o MDS refractory to Revlimid, initially stable however in recent 2-3 months recurrent thrombocytopenia and symptomatic anemia now confirmed MDS with excess blasts  G6PD (-), TTE, EF 49%  9/9 Bone Marrow Bx-MDS excess blasts  9/15 - Vidaza subcutaneous for 7 days started   Trend daily CBC,TLS labs: CMP, Phos, Uric acid, LDH. Replete prn  Strict I&os' daily weights, mouth care  ordered speech and swallow to eval- passed bedside evaluation, continue regular diet  Shoulder/flank soreness - L shoulder xray-NEG, analgesia - trial of low dose Tramadol,  lidocaine patch  Discharge planning after Day 7 injection. New patient info email sent. Has follow up appointment with Dr. Anne, +lab appointments.

## 2021-09-22 NOTE — PROGRESS NOTE ADULT - PROBLEM SELECTOR PLAN 2
not neutropenic, afebrile  Started ppx Levaquin if ANC below 1000 and  when QTC downtrends off Sotalol9/15 repeat EKG daily  If febrile, Pancx, CXR. Start Aztreonam +/- Vancomycin. Or Cefepime (low cross reactivity for PCN allx)  Holding antimicrobial and antifungal ppx for now, ANC > 1000  Recently a/w  diverticulitis (6/26-7/2)

## 2021-09-22 NOTE — PROGRESS NOTE ADULT - SUBJECTIVE AND OBJECTIVE BOX
Diagnosis: MDS with excess blasts-2    Protocol/Chemo: Cycle 1 azacitidine subQ for 7 days    Day: 8    Subjective:     Review of Systems:     Pain scale: 0/10                    Diet: Regular    Allergies:  adhesives (Rash)  clonidine (Swelling; Rash)  felodipine (Rash)  penicillin (Rash)  sulfa drugs (Rash)      MEDICATIONS  (STANDING):  allopurinol 100 milliGRAM(s) Oral two times a day  azaCITIDine Injectable (eMAR) 52.6 milliGRAM(s) SubCutaneous every 24 hours  azaCITIDine Injectable (eMAR) 52.6 milliGRAM(s) SubCutaneous every 24 hours  azaCITIDine Injectable (eMAR) 52.6 milliGRAM(s) SubCutaneous every 24 hours  Biotene Dry Mouth Oral Rinse 15 milliLiter(s) Swish and Spit five times a day  chlorhexidine 2% Cloths 1 Application(s) Topical daily  doxazosin 4 milliGRAM(s) Oral at bedtime  furosemide   Injectable 20 milliGRAM(s) IV Push daily  influenza   Vaccine 0.5 milliLiter(s) IntraMuscular once  lidocaine   4% Patch 1 Patch Transdermal every 24 hours  lidocaine   4% Patch 1 Patch Transdermal once  metoprolol succinate ER 25 milliGRAM(s) Oral daily  ondansetron Injectable 8 milliGRAM(s) IV Push every 24 hours  polyethylene glycol 3350 17 Gram(s) Oral daily  potassium phosphate / sodium phosphate Tablet (K-PHOS No. 2) 1 Tablet(s) Oral four times a day with meals  senna 2 Tablet(s) Oral at bedtime  sodium chloride 0.9%. 1000 milliLiter(s) (40 mL/Hr) IV Continuous <Continuous>    MEDICATIONS  (PRN):  acetaminophen   Tablet .. 650 milliGRAM(s) Oral every 6 hours PRN Temp greater or equal to 38C (100.4F), Mild Pain (1 - 3)  diphenhydrAMINE 25 milliGRAM(s) Oral every 6 hours PRN Rash and/or Itching  melatonin 3 milliGRAM(s) Oral at bedtime PRN Insomnia    Vital Signs Last 24 Hrs  T(C): 36.6 (22 Sep 2021 04:25), Max: 36.8 (22 Sep 2021 01:11)  T(F): 97.9 (22 Sep 2021 04:25), Max: 98.3 (22 Sep 2021 01:11)  HR: 60 (22 Sep 2021 04:25) (59 - 63)  BP: 124/61 (22 Sep 2021 04:25) (100/50 - 127/65)  BP(mean): --  RR: 18 (22 Sep 2021 04:25) (18 - 20)  SpO2: 92% (22 Sep 2021 04:25) (92% - 97%)    PHYSICAL EXAM  General: Lying in bed in NAD  HEENT: PERRL, sclerae anicteric,   CV: +S1, S2, reg  Abdomen: +BS, soft, NT/ND  Ext: trace BLE edema, +LUE ecchymosis (s/p injection sites)  Skin: +ecchymosis LUE  Neuro: A&O x 3, non focal  Central line: PIV    LABs:                        8.9    7.49  )-----------( 29       ( 22 Sep 2021 06:44 )             28.3     22 Sep 2021 06:41    141    |  106    |  47     ----------------------------<  93     4.4     |  23     |  1.69     Ca    8.8        22 Sep 2021 06:41  Phos  3.2       22 Sep 2021 06:41  Mg     2.4       22 Sep 2021 06:41    TPro  5.8    /  Alb  3.1    /  TBili  1.8    /  DBili  x      /  AST  8      /  ALT  9      /  AlkPhos  116    22 Sep 2021 06:41    PT/INR - ( 21 Sep 2021 18:02 )   PT: 16.1 sec;   INR: 1.36 ratio    PTT - ( 21 Sep 2021 18:02 )  PTT:37.7 sec          LIVER FUNCTIONS - ( 22 Sep 2021 06:41 )  Alb: 3.1 g/dL / Pro: 5.8 g/dL / ALK PHOS: 116 U/L / ALT: 9 U/L / AST: 8 U/L / GGT: x             RADIOLOGY & ADDITIONAL STUDIES:    < from: Xray Shoulder 2 Views, Left (09.20.21 @ 17:51) >  IMPRESSION:  No fractures, dislocations, or AC separation.    Minimal glenohumeral articular margin degenerative spurring. Otherwise preserved joint spaces.    No destructive osseous lesions or periosteal reaction.    No periarticular soft tissue calcifications.            from: Xray Chest 1 View- PORTABLE-Routine (Xray Chest 1 View- PORTABLE-Routine in AM.) (09.19.21 @ 08:22)   IMPRESSION: Mild central pulmonary vascular congestion again seen.  Increasedright lower lung opacity, possibly an increasing layering right pleural effusion with associated passive atelectasis. Atelectasis of other cause, and/or pneumonia is not excluded.  Continued left basilar and retrocardiac opacity described which maybe due to a left pleural effusion with passive atelectasis, atelectasis of other cause, and/or pneumonia.  New patchy peripheral left midlung opacities which could be due to subsegmental atelectasis or pneumonia.           Diagnosis: MDS with excess blasts-2    Protocol/Chemo: Cycle 1 azacitidine subQ for 7 days    Day: 8    Subjective: No overnight events, L axillary/scapula pain improved, taking po's, +OOB to BR only    Review of Systems: Denies STEINER, n/v, HA or dizziness, abdominal pain    Pain scale: 0/10                    Diet: Regular    Allergies:  adhesives (Rash)  clonidine (Swelling; Rash)  felodipine (Rash)  penicillin (Rash)  sulfa drugs (Rash)      MEDICATIONS  (STANDING):  allopurinol 100 milliGRAM(s) Oral two times a day  azaCITIDine Injectable (eMAR) 52.6 milliGRAM(s) SubCutaneous every 24 hours  azaCITIDine Injectable (eMAR) 52.6 milliGRAM(s) SubCutaneous every 24 hours  azaCITIDine Injectable (eMAR) 52.6 milliGRAM(s) SubCutaneous every 24 hours  Biotene Dry Mouth Oral Rinse 15 milliLiter(s) Swish and Spit five times a day  chlorhexidine 2% Cloths 1 Application(s) Topical daily  doxazosin 4 milliGRAM(s) Oral at bedtime  furosemide   Injectable 20 milliGRAM(s) IV Push daily  influenza   Vaccine 0.5 milliLiter(s) IntraMuscular once  lidocaine   4% Patch 1 Patch Transdermal every 24 hours  lidocaine   4% Patch 1 Patch Transdermal once  metoprolol succinate ER 25 milliGRAM(s) Oral daily  ondansetron Injectable 8 milliGRAM(s) IV Push every 24 hours  polyethylene glycol 3350 17 Gram(s) Oral daily  potassium phosphate / sodium phosphate Tablet (K-PHOS No. 2) 1 Tablet(s) Oral four times a day with meals  senna 2 Tablet(s) Oral at bedtime  sodium chloride 0.9%. 1000 milliLiter(s) (40 mL/Hr) IV Continuous <Continuous>    MEDICATIONS  (PRN):  acetaminophen   Tablet .. 650 milliGRAM(s) Oral every 6 hours PRN Temp greater or equal to 38C (100.4F), Mild Pain (1 - 3)  diphenhydrAMINE 25 milliGRAM(s) Oral every 6 hours PRN Rash and/or Itching  melatonin 3 milliGRAM(s) Oral at bedtime PRN Insomnia    Vital Signs Last 24 Hrs  T(C): 36.6 (22 Sep 2021 04:25), Max: 36.8 (22 Sep 2021 01:11)  T(F): 97.9 (22 Sep 2021 04:25), Max: 98.3 (22 Sep 2021 01:11)  HR: 60 (22 Sep 2021 04:25) (59 - 63)  BP: 124/61 (22 Sep 2021 04:25) (100/50 - 127/65)  BP(mean): --  RR: 18 (22 Sep 2021 04:25) (18 - 20)  SpO2: 92% (22 Sep 2021 04:25) (92% - 97%)    PHYSICAL EXAM  General: Lying in bed in NAD  HEENT: PERRL, sclerae anicteric,   CV: +S1, S2, reg  Abdomen: +BS, soft, NT/ND  Ext: trace BLE edema, +LUE ecchymosis (s/p injection sites)  Skin: +ecchymosis LUE  Neuro: A&O x 3, non focal  Central line: PIV    LABs:                        8.9    7.49  )-----------( 29       ( 22 Sep 2021 06:44 )             28.3     22 Sep 2021 06:41    141    |  106    |  47     ----------------------------<  93     4.4     |  23     |  1.69     Ca    8.8        22 Sep 2021 06:41  Phos  3.2       22 Sep 2021 06:41  Mg     2.4       22 Sep 2021 06:41    TPro  5.8    /  Alb  3.1    /  TBili  1.8    /  DBili  x      /  AST  8      /  ALT  9      /  AlkPhos  116    22 Sep 2021 06:41    PT/INR - ( 21 Sep 2021 18:02 )   PT: 16.1 sec;   INR: 1.36 ratio    PTT - ( 21 Sep 2021 18:02 )  PTT:37.7 sec          LIVER FUNCTIONS - ( 22 Sep 2021 06:41 )  Alb: 3.1 g/dL / Pro: 5.8 g/dL / ALK PHOS: 116 U/L / ALT: 9 U/L / AST: 8 U/L / GGT: x             RADIOLOGY & ADDITIONAL STUDIES:    < from: Xray Shoulder 2 Views, Left (09.20.21 @ 17:51) >  IMPRESSION:  No fractures, dislocations, or AC separation.    Minimal glenohumeral articular margin degenerative spurring. Otherwise preserved joint spaces.    No destructive osseous lesions or periosteal reaction.    No periarticular soft tissue calcifications.            from: Xray Chest 1 View- PORTABLE-Routine (Xray Chest 1 View- PORTABLE-Routine in AM.) (09.19.21 @ 08:22)   IMPRESSION: Mild central pulmonary vascular congestion again seen.  Increasedright lower lung opacity, possibly an increasing layering right pleural effusion with associated passive atelectasis. Atelectasis of other cause, and/or pneumonia is not excluded.  Continued left basilar and retrocardiac opacity described which maybe due to a left pleural effusion with passive atelectasis, atelectasis of other cause, and/or pneumonia.  New patchy peripheral left midlung opacities which could be due to subsegmental atelectasis or pneumonia.

## 2021-09-23 NOTE — PROGRESS NOTE ADULT - PROBLEM SELECTOR PROBLEM 2
Infectious disease
Infectious disease
MDS (myelodysplastic syndrome) with 5q deletion
Infectious disease
Infectious disease
MDS (myelodysplastic syndrome) with 5q deletion
Infectious disease
MDS (myelodysplastic syndrome) with 5q deletion
Infectious disease
Infectious disease
MDS (myelodysplastic syndrome) with 5q deletion
Infectious disease

## 2021-09-23 NOTE — DISCHARGE NOTE NURSING/CASE MANAGEMENT/SOCIAL WORK - NSDCFUADDAPPT_GEN_ALL_CORE_FT
MD appointment at the [facility] on Monday 9/27 at 2:30PM  Possible Platelet appointments at the [facility] on:   Saturday 9/25 at 1:00PM   Tuesday 9/28 at 3:20PM   Friday 10/1 at 1:50PM

## 2021-09-23 NOTE — PROGRESS NOTE ADULT - PROBLEM SELECTOR PROBLEM 6
Gout
Prophylactic measure
Gout
Prophylactic measure
Gout
Gout
Prophylactic measure
Gout
Gout
Prophylactic measure

## 2021-09-23 NOTE — PROGRESS NOTE ADULT - PROBLEM SELECTOR PROBLEM 4
Stage 3 chronic kidney disease
Prostate enlargement
Stage 3 chronic kidney disease
Prostate enlargement
Stage 3 chronic kidney disease
Prostate enlargement
Stage 3 chronic kidney disease
Prostate enlargement
Stage 3 chronic kidney disease

## 2021-09-23 NOTE — PROGRESS NOTE ADULT - PROBLEM SELECTOR PLAN 6
Holding Lovenox for low plts
allopurinol lowered dose
Holding Lovenox for low plts
Holding Lovenox for low plts
Allopurinol lowered dose
Holding Lovenox for low plts
allopurinol lowered dose
Holding Lovenox for low plts
allopurinol lowered dose

## 2021-09-23 NOTE — PROGRESS NOTE ADULT - PROBLEM SELECTOR PLAN 1
h/o MDS refractory to Revlimid, initially stable however in recent 2-3 months recurrent thrombocytopenia and symptomatic anemia now confirmed MDS with excess blasts  G6PD (-), TTE, EF 49%  9/9 Bone Marrow Bx-MDS excess blasts  9/15 - Vidaza subcutaneous for 7 days started   Trend daily CBC,TLS labs: CMP, Phos, Uric acid, LDH. Replete prn  Strict I&os' daily weights, mouth care  ordered speech and swallow to eval- passed bedside evaluation, continue regular diet  Shoulder/flank soreness - L shoulder xray-NEG, analgesia - trial of low dose Tramadol,  lidocaine patch  Discharge planning after Day 7 injection. New patient info email sent. Has follow up appointment with Dr. Anne, +lab appointments. h/o MDS refractory to Revlimid, initially stable however in recent 2-3 months recurrent thrombocytopenia and symptomatic anemia now confirmed MDS with excess blasts  G6PD (-), TTE, EF 49%  9/9 Bone Marrow Bx-MDS excess blasts  9/15 - Vidaza subcutaneous for 7 days started   Trend daily CBC,TLS labs: CMP, Phos, Uric acid, LDH. Replete prn  Strict I&os' daily weights, mouth care  ordered speech and swallow to eval- passed bedside evaluation, continue regular diet  Shoulder/flank soreness - L shoulder xray-NEG, analgesia - trial of low dose Tramadol,  lidocaine patch  Discharge plan today after PLTs transfusion.  New patient info email sent. Has follow up appointment with Dr. Anne, +lab appointments.

## 2021-09-23 NOTE — DISCHARGE NOTE NURSING/CASE MANAGEMENT/SOCIAL WORK - PATIENT PORTAL LINK FT
You can access the FollowMyHealth Patient Portal offered by [facility] by registering at the following website: [email or web address]. By joining [employer]’s FollowMyHealth portal, you will also be able to view your health information using other applications (apps) compatible with our system.

## 2021-09-23 NOTE — PROGRESS NOTE ADULT - PROBLEM SELECTOR PROBLEM 5
Stage 3 chronic kidney disease
Prostate enlargement
Stage 3 chronic kidney disease
Prostate enlargement
Stage 3 chronic kidney disease
Prostate enlargement
Stage 3 chronic kidney disease
Stage 3 chronic kidney disease
Prostate enlargement
Stage 3 chronic kidney disease
Prostate enlargement

## 2021-09-23 NOTE — PROGRESS NOTE ADULT - PROVIDER SPECIALTY LIST ADULT
Electrophysiology
Internal Medicine
Heme/Onc
Heme/Onc
Internal Medicine
Internal Medicine
Heme/Onc
Internal Medicine
Internal Medicine
Heme/Onc
Internal Medicine
Heme/Onc
Internal Medicine
Heme/Onc
Internal Medicine
Heme/Onc
Heme/Onc

## 2021-09-23 NOTE — PROGRESS NOTE ADULT - PROBLEM SELECTOR PLAN 4
-c/w doxazosin
-c/w doxazosin
Cr At baseline  Follow close
Cont Doxazosin
Cr At baseline  Follow close
Cr At baseline  Follow close
-c/w doxazosin
-c/w doxazosin
Cr At baseline  Follow close
-c/w doxazosin
Cr At baseline  Follow close

## 2021-09-23 NOTE — PROGRESS NOTE ADULT - PROBLEM SELECTOR PROBLEM 3
Chronic atrial fibrillation

## 2021-09-23 NOTE — PROGRESS NOTE ADULT - PROBLEM SELECTOR PROBLEM 1
MDS (myelodysplastic syndrome)
Shortness of breath
MDS (myelodysplastic syndrome)
MDS (myelodysplastic syndrome)
Shortness of breath
Shortness of breath
MDS (myelodysplastic syndrome)
Shortness of breath
MDS (myelodysplastic syndrome)
Shortness of breath
MDS (myelodysplastic syndrome)
Shortness of breath
MDS (myelodysplastic syndrome)

## 2021-09-23 NOTE — PROGRESS NOTE ADULT - REASON FOR ADMISSION

## 2021-09-29 PROBLEM — D69.59 THROMBOCYTOPENIA, SECONDARY: Status: ACTIVE | Noted: 2021-01-01

## 2021-09-29 NOTE — ASSESSMENT
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 He started azacitidine on 9/15/21 (d1-7). He is currently on cycle 1 day 13.\par \par He remains transfusion dependant for anemia and thrombocytopenia, however maintains normal range neutrophil counts. CBC today WBC 4.66, Hb 7.0, Plt 13. He continues to have peripheral blasts, although lower than pre-treatment. He is not having any bleeding issues at present. He has experienced fluid overload related to transfusions, so will give 20 mg lasix orally with each transfusion.\par \par Plan:\par - CMP, LDH, retic %, PT/PTT\par - Hep B/C, HIV screen\par - Type and cross today\par - Plts today and plt check 3x per week for now\par - pRBC transfusion planned for 9/28\par - Lasix 20 mg PO with transfusions\par - Plan for 4 cycles of azacitidine (7 days given M-F then MTues) followed by bone marrow biopsy assessment of response\par - Return visit in ~2 weeks\par \par \par ---\par I personally have spent a total of 60 minutes of time on the date of this encounter discussing current status, prognosis, reviewing test results, documenting findings, coordinating care and directly consulting with the patient and/or designated family member.

## 2021-09-29 NOTE — PHYSICAL EXAM
[Ambulatory and capable of all self care but unable to carry out any work activities] : Status 2- Ambulatory and capable of all self care but unable to carry out any work activities. Up and about more than 50% of waking hours [Normal] : normal appearance, no rash, nodules, vesicles, ulcers, erythema [de-identified] : AICD present, no murmurs, regular rate and rhythm  [de-identified] : bilateral equal 1+ pitting lower extremity edema from mid calf

## 2021-09-29 NOTE — RESULTS/DATA
[FreeTextEntry1] : Hematopathology Report - 9/9/2021\par \par Specimen(s) Submitted\par 1  Right hip bone marrow biopsy\par 2  Bone marrow aspirate for Flow\par \par Final Diagnosis\par 1, 2. Bone marrow biopsy and bone marrow aspirate\par - Myelodysplastic syndrome with excess blasts-2.\par \par Diagnostic note:\par The bone marrow biopsy shows overall 17% with focally greater than 20% myeloblasts.  The patient's history of MDS is noted.  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 Flow cytometry  of bone marrow aspirate shows   9% myeloblasts (positive for HLA-DR, CD34, CD33, CD13, partial   CD7, ; negative CD3, CD4, CD11b, CD15, CD19, CD56) and decreased myeloid granularity. Immunohistochemical stains   (CD34, , CD15, CD68, MPO, glycophorin A, Factor VIII, muramidase) were performed on block 1A.  There is increase\par in immaturity (CD34+, +) accounting for overall 15 to 19% of cells with focally greater than 20%.  CD15, CD68, MPO and muramidase highlight myeloid cells while glycophoring A highlights erythroid cells.  Factor VIII shows decreased megakaryocytes.\par \par Microscopic description:\par 1. Biopsy:  Sections of clot and biopsy show hypercellularity (85 to 90%) with erythroid predominance, myeloid and erythroid maturation, dyserythropoiesis, decreased megakaryocytes, increased immaturiy, mildly increased basophils, and increased iron stores.\par \par 2. Aspirate:  Particulate and cellular aspirate smear with decreased M:E ratio (1.2:1), erythroid predominance, myeloid and erythroid maturation, dysgranulopoiesis, dyserythropoiesis, decreased megakaryocytes, mildly increased eosinophils and basophils, and few lymphocytes.\par \par Bone Marrow Aspirate Differential: (400 Cells).\par Type  % Normal*\par Blast  17% 0-3\par Neutrophil and\par Precursors   34% 33-63\par Eosinophil  4% 1-5\par Basophil  3% 0-1\par Pronormoblast  2% 0-2\par Normoblast  32% 15-25\par Monocyte  2% 0-2\par Lymphocyte  5% 10-15\par Plasma cell  1% 0-1  \par \par 9/9/21:  Bone marrow aspirate:\par  - The immunophenotypic findings show 9% myeloblasts (positive for HLA-DR, CD34, CD33, CD13, partial CD7, ; negative CD3, CD4, CD11b, CD15, CD19, CD56) and decreased myeloid granularity.\par

## 2021-09-29 NOTE — HISTORY OF PRESENT ILLNESS
[de-identified] : 87yo M w/ PMHx of Afib s/p ablation w/ AICD (on Warfarin but on hold due to severe thrombocytopenia), mildly reduced LVEF (45% in Sept 2021) prostate CA (Dx 2015, s/p cyberknife), MDS (Dx 1/2021 with Dr Haile ([facility]); -5q with frequent transfusions for anemia/thrombocytopenia) presents today for management of MDS-EB2 on therapy with azacitidine. \par \par He was first diagnosed in Jan/Feb 2021. His BM bx showed -5q and he was started on Revlimid 10 mg daily. In June 2021 he was having weakness, GI issues, bloating, and went to [facility] where he received transfusions of plt and RBC and Revlimid (took for 2-3 months) was discontinued,  Of note, patient was recently admitted to [facility] from 8/19-8/26 for anemia/thrombocytopenia, after discharge he was started on promacta as a bridge to get him to a [facility] appointment Dr Shanda Corea.\par \par In September following hospital discharge, he saw another hematologist for MDS at (Dr Corea; [facility]) when his labs showed elevated peripheral blasts >20% so he was sent to the ED for further evaluation for possible leukemia. He was admitted to [facility] on 9/8/21. Upon admission he had shortness of breath. He denied any confusion, change in urine output, chest pain, obvious signs of bleeding, in the ED, he was hemodynamically stable, afebrile, saturating well on room air, labs were significant for normocytic anemia lower than baseline, thrombocytopenia at baseline, 12.6% blasts on peripheral blood, CXR showed small bilateral effusions, patient was given 40mg lasix x1 and 40mEq KCl x1 and admitted to general medicine for further management. \par \par He was admitted to the [facility] floor where he underwent bone marrow biopsy and aspirate assessment on 9/9/21which showed MDS with excess blasts (10-19%). Specifically he was found to have 17% blasts on the core biopsy specimen. He received azacitidine 52.6 mg daily on days 1-7 (9/15/21 - 9/21/21). FLT3-ITD was negative. 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 During that admission he had been managed for fluid overload related to transfusions.\par \par Hematopathology Report - 9/9/2021: Final Diagnosis. 1, 2. Bone marrow biopsy and bone marrow aspirate. - Myelodysplastic syndrome with excess blasts-2.\par \par Diagnostic note:\par The bone marrow biopsy shows overall 17% with focally greater than 20% myeloblasts.  The patient's history of MDS is noted.  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 Flow cytometry  of bone marrow aspirate shows   9% myeloblasts (positive for HLA-DR, CD34, CD33, CD13, partial   CD7, ; negative CD3, CD4, CD11b, CD15, CD19, CD56) and decreased myeloid granularity. Immunohistochemical stains   (CD34, , CD15, CD68, MPO, glycophorin A, Factor VIII, muramidase) were performed on block 1A.  There is increase\par in immaturity (CD34+, +) accounting for overall 15 to 19% of cells with focally greater than 20%.  CD15, CD68, MPO and muramidase highlight myeloid cells while glycophoring A highlights erythroid cells.  Factor VIII shows decreased megakaryocytes.\par \par Microscopic description:\par 1. Biopsy:  Sections of clot and biopsy show hypercellularity (85 to 90%) with erythroid predominance, myeloid and erythroid maturation, dyserythropoiesis, decreased megakaryocytes, increased immaturiy, mildly increased basophils, and increased iron stores.\par 2. Aspirate:  Particulate and cellular aspirate smear with decreased M:E ratio (1.2:1), erythroid predominance, myeloid and erythroid maturation, dysgranulopoiesis, dyserythropoiesis, decreased megakaryocytes, mildly increased eosinophils and basophils, and few lymphocytes.\par \par Fam Hx:\par \par \par Social Hx:\par Currently living with his girlfriend\par Never smoker\par No alcohol use\par \par Allergies:\par Multiple drug allergies:  [de-identified] : 9/27/21: Initial visit\par \par \par ---\par A comprehensive review of systems was performed including constitutional, eyes, ENT, cardiovascular, respiratory, gastrointestinal, genitourinary, musculoskeletal, integumentary, neurological, psychiatric and hematologic / lymphatic. All pertinent positives are included in the H&P under interval history above and the remaining review of systems listed are negative.

## 2021-10-05 NOTE — H&P ADULT - ASSESSMENT
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    Risks/benefits reviewed with patient with IV Azactam and one dose of IV Vancomycin for broad spectrum coverage with PCN allergy.  Would consider formal urology evaluation.    Admitted to telemetry with nondiagnostic troponin assay.  Will repeat echo given TACO.    Would consider contacting haematology in the AM.    Patient NOT accepted by the MICU.   Will assume aspiration risk for now.   NPO x medications.   Follow FS to monitor for hypoglycemia.

## 2021-10-05 NOTE — H&P ADULT - NSHPADDITIONALINFOADULT_GEN_ALL_CORE
NIGHT HOSPITALIST:   Patient/ family aware of course and agree with plan/care as above.   Given patient's comorbidities, patient's long term prognosis is guarded.   Emotional support provided to patient.   Care reviewed with covering NP/PA for endorsement to the [employer].    Rob Rothman MD  [phone] NIGHT HOSPITALIST:   Patient/ family aware of course and agree with plan/care as above.   Given patient's comorbidities, patient's long term prognosis is guarded.   Emotional support provided to patient.   The patient is not yet ready to discuss advance directives.  Care reviewed with covering PA Bill for endorsement to the [employer].    Rob Rothman MD  [phone]

## 2021-10-05 NOTE — H&P ADULT - NSHPLABSRESULTS_GEN_ALL_CORE
WBC 2.9    Hgb 7.4    Platelets 15K>> 26K.    HS troponin 24>>19    Random glucose of 101    K+ 3.9    Cr 1.3    BNP 8649    U/A moderate leukocyte esterase  28 WBC.    INR 1.3    COVID-19 PCR>>negative.    Chest radiograph reviewed with CM, + AICD, bilateral pulmonary venous congestion.    EKG tracing reviewed with ventricular paced at 90 with Q I, L, V4-V5.

## 2021-10-05 NOTE — ED PROVIDER NOTE - CARE PLAN
1 Principal Discharge DX:	Hematuria   Principal Discharge DX:	Hematuria  Secondary Diagnosis:	Thrombocytopenia   Principal Discharge DX:	Hematuria  Secondary Diagnosis:	Thrombocytopenia  Secondary Diagnosis:	TACO (transfusion associated circulatory overload)

## 2021-10-05 NOTE — ED ADULT NURSE NOTE - OBJECTIVE STATEMENT
86 year old male pt presented to the ED via ems stating pt with 1 episode of hematuria, pt states he has been feeling short of breath for a few days and has received blood products in the past, pt denies any chest pain, no nausea no vomiting, lung filled cta abd soft nontender non distended, pt is A&OX3 and ambulatory at times uses a cane, pt denies any sick contacts no fever no chills, denies any burning on urination or any urinary symptoms

## 2021-10-05 NOTE — ED ADULT NURSE REASSESSMENT NOTE - NS ED NURSE REASSESS COMMENT FT1
Report received from Kacie. Pt is A&Ox4 breathing comfortably with diminished lung sounds at the bases.   Pt denies: headache, dizziness, chest pain, palpitations, cough, SOB, abdominal pain, n/v/d, urinary symptoms, fevers, chills, generalized or focal weakness at this time.

## 2021-10-05 NOTE — ED PROVIDER NOTE - CONSTITUTIONAL, MLM
pale chronically ill looking pt  awake, alert, oriented to person, place, time/situation dyspneic normal...

## 2021-10-05 NOTE — ED ADULT NURSE REASSESSMENT NOTE - NS ED NURSE REASSESS COMMENT FT1
pt moved to [facility] due to pt being increasingly short of breath pt is on bipap   care will be handed over to mariana MUELLER

## 2021-10-05 NOTE — H&P ADULT - PROBLEM SELECTOR PLAN 1
See above.   Empiric IV Azactam and one dose of IV Vancomycin.    Would consider formal urology consult (patient's urologist is in [place] and does not go to [place]).

## 2021-10-05 NOTE — H&P ADULT - NSHPOUTPATIENTPROVIDERS_GEN_ALL_CORE
Nunu Singh MD (PCP) [phone] Nunu Singh MD (PCP) [phone]  Darrell Anne MD (haematology) [phone]

## 2021-10-05 NOTE — H&P ADULT - PROBLEM SELECTOR PLAN 3
Would consider EPS evaluation of AICD if not done recently. Would consider EPS evaluation of AICD if not done recently.  IV Lasix for TACO.

## 2021-10-05 NOTE — ED ADULT NURSE NOTE - NSIMPLEMENTINTERV_GEN_ALL_ED
Implemented All Universal Safety Interventions:  [place] to call system. Call bell, personal items and telephone within reach. Instruct patient to call for assistance. Room bathroom lighting operational. Non-slip footwear when patient is off stretcher. Physically safe environment: no spills, clutter or unnecessary equipment. Stretcher in lowest position, wheels locked, appropriate side rails in place.

## 2021-10-05 NOTE — H&P ADULT - HISTORY OF PRESENT ILLNESS
NIGHT HOSPITALIST:    Patient UNKNOWN to me previously, assigned to me at this point via the ER and by Dr. 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  No abdominal pain,  no tenesmus.  NO N/V.  NO diaphoresis.   NO flank pain.  No back pain, no tearing back pain.  NO fever, no chills, no rigors.  No palliative or exacerbating factors.    Patient was transfused platelets in the ER with patient developing acute dyspnoea, and noted to have Transfusion Associated Circulatory Overload (TACO) and provided parenteral furosemide and initially on BiPAP.   Seen by the MICU and NOT accepted to the MICU.   Patient transitioned to a high flow 50 L/min nasal cannula.   Patient presently denies dyspnoea, no cough.  No wheezing.

## 2021-10-05 NOTE — H&P ADULT - PROBLEM SELECTOR PLAN 5
Transitions of Care Status:  1.  Name of PCP:     Nunu Singh MD (PCP) [phone]  2.  PCP Contacted on Admission: [ ] Y    [x ] N    3.  PCP contacted at Discharge: [ ] Y    [ ] N    [ ] N/A  4.  Post-Discharge Appointment Date and Location:  5.  Summary of Handoff given to PCP:

## 2021-10-05 NOTE — CONSULT NOTE ADULT - ASSESSMENT
86M with Afib s/p PPM for unclear reasons, recently diagnosed MSD s/p Revlimid c/b colitis in June 2021, transfusion dependent (7-10 transfusions in last 2 weeks, gets Lasix and Benadryl with each transfusion), presents to hospital with hematuria and developed respiratory distess requiring BiPAP wih POCUS showing acute pulmonary edema, bilat effusions, preserved ventricular function, and dilated IVC c/w TACO i/s/o chronic transfusion overload, now much improved s/p Lasix and breathing has improved    Recommendations  - pt not Lasix naive - would administer 40 mg IV BID Lasix  - wean BiPAP - patient ideally could tolerate HFNC at this time  - consider empiric abx for possible hemorrhagic cystitis, as well as correction of thombo-coagulopathic abnormalities  - consider urology c/s for peter hematuria with clots  - Benadryl prior to further transfusions with Tylenol  - hematology consult  - at this time not a MICU candidate  - please reconsult as needed 86M with Afib s/p PPM for unclear reasons, recently diagnosed MDS s/p Revlimid c/b colitis in June 2021, transfusion dependent (7-10 transfusions in last 2 weeks, gets Lasix and Benadryl with each transfusion), presents to hospital with hematuria and developed respiratory distess requiring BiPAP wih POCUS showing acute pulmonary edema, bilat effusions, preserved ventricular function, and dilated IVC c/w TACO i/s/o chronic transfusion overload, now much improved s/p Lasix and breathing has improved    Recommendations  - pt not Lasix naive - would increase to 40 mg IV Lasix and following I/Os with goal net negative -500-1L in the next 24H;   - wean BiPAP - patient ideally could tolerate HFNC at this time  - consider empiric abx for possible hemorrhagic cystitis, as well as correction of thombo-coagulopathic abnormalities  - consider urology c/s for peter hematuria with clots  - Benadryl prior to further transfusions with Tylenol  - hematology consult  - at this time not a MICU candidate  - please reconsult as needed

## 2021-10-05 NOTE — ED PROVIDER NOTE - OBJECTIVE STATEMENT
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

## 2021-10-05 NOTE — ED PROVIDER NOTE - PROGRESS NOTE DETAILS
Anup Hsu MD, Attending: Received sign out on patient. states off warfarin. pending UA, possible need for yost. no abdominal pain. Anup Hsu MD, Attending: patient finished platelets and having shortness of breath, will get stat ekg and cxr. concerning for rxn to platelets. no crackles on exam. Anup Hsu MD, Attending: continue to has shortness of breath, will treat Bipap, vbg. rpt cxr appears unchanged. tripp attending- transferred to my covering section of the ed, cc c, patient now on nitro gtt with bipap, micu consulted discussed with wife and patient bedside for goc would like to remain with full code at this time. rpt labs pending likely taco no signs of trali at this time. afebrile tripp attending- micu at bedside to eval, patient with mild improvement with nitro gtt down titrating to now 75mcg bipap downtitrating, pocus with ivc plethoric pc eff without tamponade physiology but with bilateral pleff and blines consistent with overload, will cont diuresis and nitro bipap pending micu reccs pettet attending- patient now off bipap, nitrogtt, on hiflo 50/50 nad, more comfortable micu recc tele admit cont diuresis. wife at bedside agreeable with plan. will paged for admission

## 2021-10-05 NOTE — ED PROVIDER NOTE - RESPIRATORY [+], MLM
Pt would like refill of amLODIPine Besylate 5 MG Oral Tab sent to [facility] #[id number] - [address], [phone] he is out of medication please advise. Thank you. EXERTIONAL DYSPNEA/ORTHOPNEA/SHORTNESS OF BREATH

## 2021-10-05 NOTE — H&P ADULT - NSHPSOURCEINFOTX_GEN_ALL_CORE
Reviewed Medex with patient from recent [facility].  Family aware of admission and patient did not wish examiner to contact family at this hour.

## 2021-10-05 NOTE — ED ADULT NURSE REASSESSMENT NOTE - NS ED NURSE REASSESS COMMENT FT1
Pt brought to [facility] from [facility] in significant respiratory distress on bipap.  MD FELICITA Ragland bedside US showing flash pulm edema.  Nitro infusion initiated with verbal orders from MD Hsu.

## 2021-10-05 NOTE — H&P ADULT - NSHPREVIEWOFSYSTEMS_GEN_ALL_CORE
No chest pain/pressure.  No palpitations.  NO fever, no chills, no rigors.  NO HA, no focal weakness.  NO abdominal pain, no red blood per rectum or melena.  NO back pain, no tearing back pain.    No rash.  No joint pain.  NO dysuria, no haematuria.    No weight loss but admits to anorexia.  NO SI/HI  NO thyroid symptoms.

## 2021-10-05 NOTE — CONSULT NOTE ADULT - SUBJECTIVE AND OBJECTIVE BOX
Keaton Landis MD  Internal Medicine Resident  [phone]    PATIENT:  RUBÉN GOLDBERG  [id number]    CHIEF COMPLAINT:  Patient is a 86y old  Male who presents with a chief complaint of     HPI:  86M with Afib s/p PPM for unclear reasons, recently diagnosed MSD s/p Revlimid c/b colitis in 2021, transfusion dependent (7-10 transfusions in last 2 weeks, gets Lasix and Benadryl with each transfusion), presents to hospital with hematuria. Was in the bathroom, called the wife, and noted blood clots emerging from the penis. Called hematologist, then came to ED via [facility] ambulance. Patient received a platelet transfusion in the ED rapidly today due to hematuria and thrombocytopenia, and developed respiratory distress and was placed on BiPAP. No fever, no hypotension, pt was hypertension and placed on a nitro drip    Home meds:   Lasix with transfusion  metoprolol    ALLERGIES:  Allergies  adhesives (Rash)  clonidine (Swelling; Rash)  felodipine (Rash)  penicillin (Rash)  sulfa drugs (Rash)    Intolerances    OBJECTIVE:  ICU Vital Signs Last 24 Hrs  T(C): 36.7 (05 Oct 2021 13:20), Max: 36.7 (05 Oct 2021 13:20)  T(F): 98.1 (05 Oct 2021 13:20), Max: 98.1 (05 Oct 2021 13:20)  HR: 62 (05 Oct 2021 19:22) (57 - 79)  BP: 128/59 (05 Oct 2021 19:22) (109/61 - 173/95)  BP(mean): 73 (05 Oct 2021 18:36) (73 - 95)  RR: 24 (05 Oct 2021 19:22) (16 - 48)  SpO2: 100% (05 Oct 2021 19:22) (97% - 100%)    Daily Height in cm: 185.42 (05 Oct 2021 13:20)    Daily     PHYSICAL EXAMINATION:  General: WN/WD NAD  HEENT: PERRLA, EOMI, moist mucous membranes  Neurology: A&Ox3, nonfocal, RAMIREZ x 4  Respiratory: crackles exam, on BiPAP, comfortable appearing  CV: RRR, S1S2, no murmurs, rubs or gallops  Abdominal: Soft, NT, ND +BS  Extremities: No edema, + peripheral pulses    LABS:                          7.5    5.28  )-----------( 26       ( 05 Oct 2021 18:14 )             24.3     10-05    149<H>  |  112<H>  |  37<H>  ----------------------------<  101<H>  3.9   |  25  |  1.32<H>    Ca    8.8      05 Oct 2021 14:06    TPro  6.2  /  Alb  3.6  /  TBili  1.6<H>  /  DBili  x   /  AST  9<L>  /  ALT  8<L>  /  AlkPhos  120  10-05    LIVER FUNCTIONS - ( 05 Oct 2021 14:06 )  Alb: 3.6 g/dL / Pro: 6.2 g/dL / ALK PHOS: 120 U/L / ALT: 8 U/L / AST: 9 U/L / GGT: x           PT/INR - ( 05 Oct 2021 15:23 )   PT: 15.4 sec;   INR: 1.30 ratio    PTT - ( 05 Oct 2021 15:23 )  PTT:37.8 sec    Urinalysis Basic - ( 05 Oct 2021 16:28 )    Color: Light Orange / Appearance: Slightly Turbid / S.021 / pH: x  Gluc: x / Ketone: Negative  / Bili: Negative / Urobili: Negative   Blood: x / Protein: 30 mg/dL / Nitrite: Negative   Leuk Esterase: Moderate / RBC: 1865 /hpf / WBC 28 /HPF   Sq Epi: x / Non Sq Epi: 1 /hpf / Bacteria: Negative Keatno Landis MD  Internal Medicine Resident  [phone]    PATIENT:  RUBÉN GOLDBERG  [id number]    CHIEF COMPLAINT:  Patient is a 86y old  Male who presents with a chief complaint of     HPI:  86M with Afib s/p PPM for unclear reasons, recently diagnosed MDS s/p Revlimid c/b colitis in 2021, transfusion dependent (7-10 transfusions in last 2 weeks, gets Lasix and Benadryl with each transfusion), presents to hospital with hematuria. Was in the bathroom, called the wife, and noted blood clots emerging from the penis. Called hematologist, then came to ED via [facility] ambulance. Patient received a platelet transfusion in the ED rapidly today due to hematuria and thrombocytopenia, and developed respiratory distress and was placed on BiPAP. No fever, no hypotension, pt was hypertension and placed on a nitro drip    Home meds:   Lasix with transfusion  metoprolol    ALLERGIES:  Allergies  adhesives (Rash)  clonidine (Swelling; Rash)  felodipine (Rash)  penicillin (Rash)  sulfa drugs (Rash)    Intolerances    OBJECTIVE:  ICU Vital Signs Last 24 Hrs  T(C): 36.7 (05 Oct 2021 13:20), Max: 36.7 (05 Oct 2021 13:20)  T(F): 98.1 (05 Oct 2021 13:20), Max: 98.1 (05 Oct 2021 13:20)  HR: 62 (05 Oct 2021 19:22) (57 - 79)  BP: 128/59 (05 Oct 2021 19:22) (109/61 - 173/95)  BP(mean): 73 (05 Oct 2021 18:36) (73 - 95)  RR: 24 (05 Oct 2021 19:22) (16 - 48)  SpO2: 100% (05 Oct 2021 19:22) (97% - 100%)    Daily Height in cm: 185.42 (05 Oct 2021 13:20)    Daily     PHYSICAL EXAMINATION:  General: WN/WD NAD  HEENT: PERRLA, EOMI, moist mucous membranes  Neurology: A&Ox3, nonfocal, RAMIREZ x 4  Respiratory: crackles exam, on BiPAP, comfortable appearing  CV: RRR, S1S2, no murmurs, rubs or gallops  Abdominal: Soft, NT, ND +BS  Extremities: No edema, + peripheral pulses    LABS:                          7.5    5.28  )-----------( 26       ( 05 Oct 2021 18:14 )             24.3     10-05    149<H>  |  112<H>  |  37<H>  ----------------------------<  101<H>  3.9   |  25  |  1.32<H>    Ca    8.8      05 Oct 2021 14:06    TPro  6.2  /  Alb  3.6  /  TBili  1.6<H>  /  DBili  x   /  AST  9<L>  /  ALT  8<L>  /  AlkPhos  120  10-05    LIVER FUNCTIONS - ( 05 Oct 2021 14:06 )  Alb: 3.6 g/dL / Pro: 6.2 g/dL / ALK PHOS: 120 U/L / ALT: 8 U/L / AST: 9 U/L / GGT: x           PT/INR - ( 05 Oct 2021 15:23 )   PT: 15.4 sec;   INR: 1.30 ratio    PTT - ( 05 Oct 2021 15:23 )  PTT:37.8 sec    Urinalysis Basic - ( 05 Oct 2021 16:28 )    Color: Light Orange / Appearance: Slightly Turbid / S.021 / pH: x  Gluc: x / Ketone: Negative  / Bili: Negative / Urobili: Negative   Blood: x / Protein: 30 mg/dL / Nitrite: Negative   Leuk Esterase: Moderate / RBC: 1865 /hpf / WBC 28 /HPF   Sq Epi: x / Non Sq Epi: 1 /hpf / Bacteria: Negative

## 2021-10-05 NOTE — ED PROVIDER NOTE - CLINICAL SUMMARY MEDICAL DECISION MAKING FREE TEXT BOX
86 y old male with a multiple medical issues on coumadin ,came in complains of painless hematuria ,concern for thrombocytopenia ,and coumadin  coagulopathy  less thoughts of UTI or renal colic will obtain blood work ,reassess ZR

## 2021-10-06 NOTE — PHYSICAL THERAPY INITIAL EVALUATION ADULT - PLANNED THERAPY INTERVENTIONS, PT EVAL
Goal Pt will negotiate one flight of steps w /one handrail 2 weeks/balance training/gait training/strengthening

## 2021-10-06 NOTE — PHYSICAL THERAPY INITIAL EVALUATION ADULT - PRECAUTIONS/LIMITATIONS, REHAB EVAL
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

## 2021-10-06 NOTE — CHART NOTE - NSCHARTNOTEFT_GEN_A_CORE
MEDICINE PA NOTE    As per hematology, repeated cbc this evening which is significant for hgb 7.5 and platelet 23. Recommended to transfuse for hgb <7 and platelet <50 in setting of recent bleed.   Of note, patient developed respiratory distress after transfusion yesterday requiring Bipap and Lasix iv, clinical presentation concerning for TACO.   Urine now clear and no further evidence of active bleed. Hematuria resolved. Hgb and platelets stable since this AM. Will hold off on platelet transfusion unless less than <10, and <50 if bleeding restarts. Discussed with attending and hematology fellow on call.     CHAYO Oropeza  [phone]

## 2021-10-06 NOTE — CHART NOTE - NSCHARTNOTEFT_GEN_A_CORE
MEDICINE PA NOTE    Patient follows with hematologist Dr Anne as outpatient. [facility] house heme consult. Recommended by heme fellow to recheck cbc tonight and to transfuse for hgb <7 and platelet <50. Will be seen by service tomorrow.    CHAYO Oropeza  [id number] MEDICINE PA NOTE    Patient follows with hematologist Dr Anne as outpatient. [facility] house heme consult. Recommended by heme fellow to recheck cbc tonight and to transfuse for hgb <7 and platelet <50 in setting of recent bleed (hematuria). Will be seen by service tomorrow.    CHAYO Oropeza  [id number] MEDICINE PA NOTE    Patient follows with hematologist Dr Anne as outpatient. [facility] house heme consult. Recommended by heme fellow to recheck cbc tonight and to transfuse for hgb <7 and platelet <50 in setting of hematuria. Will be seen by service tomorrow.    Joanne CHAYO Tyson  [id number]

## 2021-10-06 NOTE — PHYSICAL THERAPY INITIAL EVALUATION ADULT - PERTINENT HX OF CURRENT PROBLEM, REHAB EVAL
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

## 2021-10-06 NOTE — PHYSICAL THERAPY INITIAL EVALUATION ADULT - ACTIVE RANGE OF MOTION EXAMINATION, REHAB EVAL
serafin. upper extremity Active ROM was WNL (within normal limits)/bilateral lower extremity Active ROM was WNL (within normal limits)

## 2021-10-07 NOTE — PROGRESS NOTE ADULT - SUBJECTIVE AND OBJECTIVE BOX
SUBJECTIVE / OVERNIGHT EVENTS:    no events overnight  patient seen and examined  sating well on room air    --------------------------------------------------------------------------------------------  LABS:                        7.4    3.41  )-----------( 14       ( 07 Oct 2021 07:26 )             23.4     10-07    147<H>  |  109<H>  |  40<H>  ----------------------------<  96  3.6   |  27  |  1.37<H>    Ca    9.0      07 Oct 2021 05:46  Phos  4.7     10-05  Mg     2.1     10-05    TPro  6.5  /  Alb  3.4  /  TBili  1.8<H>  /  DBili  0.3<H>  /  AST  23  /  ALT  12  /  AlkPhos  119  10-05    PT/INR - ( 05 Oct 2021 15:23 )   PT: 15.4 sec;   INR: 1.30 ratio         PTT - ( 05 Oct 2021 15:23 )  PTT:37.8 sec  CAPILLARY BLOOD GLUCOSE      POCT Blood Glucose.: 103 mg/dL (07 Oct 2021 07:35)  POCT Blood Glucose.: 103 mg/dL (06 Oct 2021 21:28)  POCT Blood Glucose.: 98 mg/dL (06 Oct 2021 16:13)  POCT Blood Glucose.: 86 mg/dL (06 Oct 2021 12:25)    CARDIAC MARKERS ( 05 Oct 2021 18:14 )  x     / x     / 87 U/L / x     / 2.1 ng/mL      Urinalysis Basic - ( 05 Oct 2021 16:28 )    Color: Light Orange / Appearance: Slightly Turbid / S.021 / pH: x  Gluc: x / Ketone: Negative  / Bili: Negative / Urobili: Negative   Blood: x / Protein: 30 mg/dL / Nitrite: Negative   Leuk Esterase: Moderate / RBC: 1865 /hpf / WBC 28 /HPF   Sq Epi: x / Non Sq Epi: 1 /hpf / Bacteria: Negative        RADIOLOGY & ADDITIONAL TESTS:    Imaging Personally Reviewed:  [x] YES  [ ] NO    Consultant(s) Notes Reviewed:  [x] YES  [ ] NO    MEDICATIONS  (STANDING):  acetaminophen   Tablet .. 650 milliGRAM(s) Oral once  aztreonam  IVPB 1000 milliGRAM(s) IV Intermittent every 8 hours  aztreonam  IVPB      Biotene Dry Mouth Oral Rinse 15 milliLiter(s) Swish and Spit three times a day  dextrose 40% Gel 15 Gram(s) Oral once  dextrose 5%. 1000 milliLiter(s) (50 mL/Hr) IV Continuous <Continuous>  dextrose 5%. 1000 milliLiter(s) (100 mL/Hr) IV Continuous <Continuous>  dextrose 50% Injectable 25 Gram(s) IV Push once  dextrose 50% Injectable 12.5 Gram(s) IV Push once  dextrose 50% Injectable 25 Gram(s) IV Push once  diphenhydrAMINE 25 milliGRAM(s) Oral once  doxazosin 4 milliGRAM(s) Oral at bedtime  furosemide   Injectable 40 milliGRAM(s) IV Push two times a day  glucagon  Injectable 1 milliGRAM(s) IntraMuscular once  influenza   Vaccine 0.5 milliLiter(s) IntraMuscular once  metoprolol succinate ER 25 milliGRAM(s) Oral daily    MEDICATIONS  (PRN):      Care Discussed with Consultants/Other Providers [x] YES  [ ] NO    Vital Signs Last 24 Hrs  T(C): 36.6 (07 Oct 2021 11:27), Max: 37.1 (07 Oct 2021 00:19)  T(F): 97.9 (07 Oct 2021 11:27), Max: 98.8 (07 Oct 2021 00:19)  HR: 60 (07 Oct 2021 11:27) (59 - 60)  BP: 121/69 (07 Oct 2021 11:27) (119/59 - 142/72)  BP(mean): --  RR: 18 (07 Oct 2021 11:27) (18 - 20)  SpO2: 93% (07 Oct 2021 11:27) (92% - 98%)  I&O's Summary    06 Oct 2021 07:01  -  07 Oct 2021 07:00  --------------------------------------------------------  IN: 855 mL / OUT: 1250 mL / NET: -395 mL    PHYSICAL EXAM:  GENERAL: NAD, well-developed  HEAD:  Atraumatic, Normocephalic  EYES: EOMI, PERRLA, conjunctiva and sclera clear  NECK: Supple, No JVD  CHEST/LUNG: Clear to auscultation bilaterally; No wheeze  HEART: Regular rate and rhythm; No murmurs, rubs, or gallops  ABDOMEN: Soft, Nontender, Nondistended; Bowel sounds present  EXTREMITIES:  2+ Peripheral Pulses, No clubbing, cyanosis, or edema  NEUROLOGY: non-focal  SKIN: No rashes or lesions

## 2021-10-07 NOTE — PROVIDER CONTACT NOTE (CRITICAL VALUE NOTIFICATION) - ASSESSMENT
No acute changes in status, no visible bleeding. Pt transfusion dependent with transfusion if hgb <7 and plt <10.

## 2021-10-07 NOTE — DIETITIAN INITIAL EVALUATION ADULT. - PERSON TAUGHT/METHOD
verbal instruction/patient instructed verbal instruction/patient instructed/teach back - (Patient repeats in own words)

## 2021-10-07 NOTE — DIETITIAN INITIAL EVALUATION ADULT. - OTHER CALCULATIONS
energy and protein needs based on dosing weight, fluid needs deferred to MD due to edema. 0.8-1 g/kg protein due to elevated creatinine

## 2021-10-07 NOTE — CONSULT NOTE ADULT - ATTENDING COMMENTS
Mr. Padilla is a 86M with Afib, MDS with transfusion dependance presents to hospital with hematuria in the setting of thrombocytopenia. He was found to have an acute episode of respiratory distress requiring BiPAP after his platelet transfusion. His vital signs, imaging and clinical presentation is concerning for TACO. He has a history of requiring premedication prior to transfusions which he will need before transfusing. His EKG was nonischemic and he has clinically improved with diuresis and NIV. On exam was not in acute distress and able to talk to me with his BIPAP. I recommend increasing his Lasix dose to 40mg and following his urine output and Cr. His goal output is negative 500cc-1L in the next 24H. He has 2+ bilateral pitting edema on exam.  May wean O2 support to HFNC given his clinical improvement. His O2 saturation goal is >92%. Please consult urology for his hematuria and also [facility] for further recs. Thank you for this consults. Please call the MICU with any questions.
86-yr-old man admitted with gross hematuria seen in consult for thrombocytopenia and h/o AML. Patient was diagnosed with 5q deletion MDS in 2020. He was refractory to Revlimid which presumably worsened his thrombocytopenia. 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 He is s/p platelet transfusion, bleeding resolved. He is on treatment with Vidaza at his primary hematologist. Supportive transfusion as needed, ppx abx. F/u with primary hematologist after discharge.

## 2021-10-07 NOTE — DIETITIAN INITIAL EVALUATION ADULT. - ORAL INTAKE PTA/DIET HISTORY
Pt reports eating breakfast and dinner and sometimes snacks. Will have cereal with 1% milk and banana for breakfast, will eat out 1-2x/week for dinner, has cheese, crackers, and fruit for snacks.

## 2021-10-07 NOTE — DIETITIAN INITIAL EVALUATION ADULT. - EDUCATION DIETARY MODIFICATIONS
Verbally encouraged intake as tolerated/teach back/(1) partially meets; needs review/practice/verbalization

## 2021-10-07 NOTE — DIETITIAN INITIAL EVALUATION ADULT. - OTHER INFO
Denies nausea/vomit :  Denies difficulty chewing /swallow : Pt was having difficulty with mastication yesterday due to discomfort under his tongue. Nursing addressed it and pt has no complaints today.  Denies diarrhea/constipation:  Last BM : Wednesday morning  NKFA  Ht: 6'0  Ht taken from pt  Dosing ht: 185.4 cm  Dosing wt: 88.5 kg  Ht used for BMI 6'0"  Wt used for BMI 88.5 kg  Education Provided : pt was educated on the importance of supplements to increase calorie and protein intake in light of RD's nutritional findings.   pressure injury: Denies nausea/vomit :  Denies difficulty chewing /swallow : Pt was having difficulty with mastication yesterday due to discomfort under his tongue. Nursing addressed it and pt has no complaints today.  Denies diarrhea/constipation:  Last BM : Wednesday morning  NKFA  Ht: 6'0.5  Ht taken from pt   Dosing ht: 185.4 cm  Dosing wt: 88.5 kg  Ht used for BMI 6'0.5" pt reports  Wt used for BMI 88.5 kg  Education Provided : pt was educated on the importance of supplements to increase calorie and protein intake in light of RD's nutritional findings.   pressure injury: Denies nausea/vomit :  Denies difficulty chewing /swallow : Pt was having difficulty with mastication yesterday due to discomfort under his tongue. Nursing addressed it and pt has no complaints today.  Denies diarrhea/constipation:  Last BM : 10/6 morning  NKFA  Ht: 6'0.5"  Ht taken from pt   Dosing ht: 185.4 cm  Dosing wt: 88.5 kg  Ht used for BMI 6'0.5" pt reports  Wt used for BMI 88.5 kg  Education Provided : pt was educated on the importance of supplements to increase calorie and protein intake in light of RD's nutritional findings.   pressure injury:

## 2021-10-07 NOTE — DIETITIAN INITIAL EVALUATION ADULT. - PROBLEM SELECTOR PLAN 5
Transitions of Care Status:  1.  Name of PCP:     Nunu Singh MD (PCP) [phone]  2.  PCP Contacted on Admission: [ ] Y    [x ] N    3.  PCP contacted at Discharge: [ ] Y    [ ] N    [ ] N/A  4.  Post-Discharge Appointment Date and Location:  5.  Summary of Handoff given to PCP:

## 2021-10-07 NOTE — DIETITIAN INITIAL EVALUATION ADULT. - PERTINENT LABORATORY DATA
10-07 @ 05:46: Na 147<H>, BUN 40<H>, Cr 1.37<H>, BG 96, K+ 3.6, Phos --, Mg --, Alk Phos --, ALT/SGPT --, AST/SGOT --, HbA1c --

## 2021-10-07 NOTE — CONSULT NOTE ADULT - ASSESSMENT
Nathen Padilla is an 85 y/o M with hx of AFib w/AICD MDS w/ blast conversion to AML, currently receiving Vidaza, admitted for gross hematuria w/ dyspnea in ER following platelet transfusion. Patient is currently stable and reports no dyspnea, chest pain, or SOB. Has not had any further episodes of hematuria.     #AML  - Continue to monitor CBC. Transfuse if Hb<7. Plt transfusion if <50    #DVT Prophylaxis  - Thrombocytopenia/gross hematuria episodes preclude pharmacologic DVT prophylaxis Nathen Padilla is an 87 y/o M with hx of AFib w/AICD 5q deletion MDS w/ blast progression to AML, currently receiving Vidaza, admitted for gross hematuria w/ dyspnea in ER following platelet transfusion. Patient is currently stable and reports no dyspnea, chest pain, or SOB. Has not had any further episodes of hematuria.     #AML  - Continue to monitor CBC. Transfuse if Hb<7. Plt transfusion if <10 if hematuria does not recur. If hematuria present, transfuse if plts <50    #DVT Prophylaxis  - Thrombocytopenia/gross hematuria episodes preclude pharmacologic DVT prophylaxis

## 2021-10-07 NOTE — DIETITIAN INITIAL EVALUATION ADULT. - PROBLEM SELECTOR PLAN 1
See above.   Empiric IV Azactam and one dose of IV Vancomycin.    Would consider formal urology consult (patient's urologist is in [place] and does not go to [place]).

## 2021-10-07 NOTE — DIETITIAN INITIAL EVALUATION ADULT. - ORAL NUTRITION SUPPLEMENTS
continue ensure enlive 3x/day continue ensure enlive 3x/day. monitor renal indices and need to change supplement to suplena

## 2021-10-07 NOTE — DIETITIAN INITIAL EVALUATION ADULT. - PERTINENT MEDS FT
MEDICATIONS  (STANDING):  acetaminophen   Tablet .. 650 milliGRAM(s) Oral once  aztreonam  IVPB 1000 milliGRAM(s) IV Intermittent every 8 hours  aztreonam  IVPB      Biotene Dry Mouth Oral Rinse 15 milliLiter(s) Swish and Spit three times a day  dextrose 40% Gel 15 Gram(s) Oral once  dextrose 5%. 1000 milliLiter(s) (50 mL/Hr) IV Continuous <Continuous>  dextrose 5%. 1000 milliLiter(s) (100 mL/Hr) IV Continuous <Continuous>  dextrose 50% Injectable 25 Gram(s) IV Push once  dextrose 50% Injectable 12.5 Gram(s) IV Push once  dextrose 50% Injectable 25 Gram(s) IV Push once  diphenhydrAMINE 25 milliGRAM(s) Oral once  doxazosin 4 milliGRAM(s) Oral at bedtime  furosemide   Injectable 40 milliGRAM(s) IV Push two times a day  glucagon  Injectable 1 milliGRAM(s) IntraMuscular once  influenza   Vaccine 0.5 milliLiter(s) IntraMuscular once  metoprolol succinate ER 25 milliGRAM(s) Oral daily    MEDICATIONS  (PRN):

## 2021-10-07 NOTE — CONSULT NOTE ADULT - SUBJECTIVE AND OBJECTIVE BOX
Hematology Consult Note    HPI:  NIGHT HOSPITALIST:    Patient UNKNOWN to me previously, assigned to me at this point via the ER and by Dr. Flower of the [facility] to admit this 85 y/o--recently discharged from [place] on 9/23/21-- patient with a history of atrial fibrillation previously on sotalol and Coumadin (held due to thrombocytopaenia and sotalol changed to Toprol per EPS),  S/P ablation with AICD upgrade for PPM following ablation apparently due to ventricular ectopy noted on EPS evaluation, prostate CA followed by a urologist in [place] (unclear to current status of prostate CA), with 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  No abdominal pain,  no tenesmus.  NO N/V.  NO diaphoresis.   NO flank pain.  No back pain, no tearing back pain.  NO fever, no chills, no rigors.  No palliative or exacerbating factors.    Patient was transfused platelets in the ER with patient developing acute dyspnoea, and noted to have Transfusion Associated Circulatory Overload (TACO) and provided parenteral furosemide and initially on BiPAP.   Seen by the MICU and NOT accepted to the MICU.   Patient transitioned to a high flow 50 L/min nasal cannula.   Patient presently denies dyspnoea, no cough.  No wheezing. (05 Oct 2021 21:43)    Heme consult:    Patient reports no overnight events. Denies chest pain, palpitations, n/v, flank pain, fevers, or chills. Denies wheezing or SOB. Denies rashes or hematuria. Patient reports fatigue but explains it has been constant since stopping revlimid regimen.       PAST MEDICAL & SURGICAL HISTORY:  Hypertension    Afib    Osteoarthritis    Prostate cancer  treated with cyber knife 2016    Venous stasis    Varicose veins  legs    Gout  finger a long time ago    Cellulitis  right lower leg in past    Degenerative disc disease, lumbar    Edema  right leg    S/P total hip arthroplasty  2006, right    S/P cataract extraction  bilateral    S/P hernia repair  1996    Afib  cardiac ablation 1998; several pacemaker/AICD insertion and replacements    S/P tonsillectomy    S/P cholecystectomy  12/17        FAMILY HISTORY:  Family history of Alzheimer&#x27;s disease (Father)    Family history of essential hypertension (Father)    Family history of breast cancer (Mother)    Family history of prostate cancer (Sibling)        MEDICATIONS  (STANDING):  acetaminophen   Tablet .. 650 milliGRAM(s) Oral once  aztreonam  IVPB 1000 milliGRAM(s) IV Intermittent every 8 hours  aztreonam  IVPB      Biotene Dry Mouth Oral Rinse 15 milliLiter(s) Swish and Spit three times a day  dextrose 40% Gel 15 Gram(s) Oral once  dextrose 5%. 1000 milliLiter(s) (50 mL/Hr) IV Continuous <Continuous>  dextrose 5%. 1000 milliLiter(s) (100 mL/Hr) IV Continuous <Continuous>  dextrose 50% Injectable 25 Gram(s) IV Push once  dextrose 50% Injectable 12.5 Gram(s) IV Push once  dextrose 50% Injectable 25 Gram(s) IV Push once  diphenhydrAMINE 25 milliGRAM(s) Oral once  doxazosin 4 milliGRAM(s) Oral at bedtime  furosemide   Injectable 40 milliGRAM(s) IV Push two times a day  glucagon  Injectable 1 milliGRAM(s) IntraMuscular once  influenza   Vaccine 0.5 milliLiter(s) IntraMuscular once  metoprolol succinate ER 25 milliGRAM(s) Oral daily    MEDICATIONS  (PRN):      Allergies    adhesives (Rash)  clonidine (Swelling; Rash)  felodipine (Rash)  penicillin (Rash)  sulfa drugs (Rash)    Intolerances        SOCIAL HISTORY: No EtOH, no tobacco    REVIEW OF SYSTEMS:    CONSTITUTIONAL: No weakness, fevers or chills  EYES/ENT: No visual changes;  No vertigo or throat pain   NECK: No pain or stiffness  RESPIRATORY: No cough, wheezing; No shortness of breath  CARDIOVASCULAR: No chest pain or palpitations  GASTROINTESTINAL: No abdominal or epigastric pain. No nausea, vomiting, or hematemesis; No diarrhea or constipation. No melena or hematochezia.  GENITOURINARY: No dysuria, frequency or hematuria  NEUROLOGICAL: No numbness or weakness  SKIN: No itching, burning, rashes, or lesions   All other review of systems is negative unless indicated above.        T(F): 98.3 (10-07-21 @ 04:07), Max: 98.8 (10-07-21 @ 00:19)  HR: 60 (10-07-21 @ 04:07)  BP: 130/67 (10-07-21 @ 04:07)  RR: 19 (10-07-21 @ 04:07)  SpO2: 98% (10-07-21 @ 04:07)  Wt(kg): --    GENERAL: NAD, well-developed  HEAD:  Atraumatic, Normocephalic  EYES: EOMI, PERRLA, conjunctiva and sclera clear  NECK: Supple, No JVD  CHEST/LUNG: Clear to auscultation bilaterally; No wheeze  HEART: Regular rate and rhythm; No murmurs, rubs, or gallops  ABDOMEN: Soft, Nontender, Nondistended; Bowel sounds present  EXTREMITIES:  2+ Peripheral Pulses, No clubbing, cyanosis, or edema  NEUROLOGY: non-focal  SKIN: No rashes or lesions                          7.4    3.41  )-----------( 14       ( 07 Oct 2021 07:26 )             23.4       10-07    147<H>  |  109<H>  |  40<H>  ----------------------------<  96  3.6   |  27  |  1.37<H>    Ca    9.0      07 Oct 2021 05:46  Phos  4.7     10-05  Mg     2.1     10-05    TPro  6.5  /  Alb  3.4  /  TBili  1.8<H>  /  DBili  0.3<H>  /  AST  23  /  ALT  12  /  AlkPhos  119  10-05

## 2021-10-08 NOTE — CONSULT NOTE ADULT - SUBJECTIVE AND OBJECTIVE BOX
[facility] Cardiology Consult  _________________________    Patient is a 86y old  Male who presents with a chief complaint of Episode of gross haematuria at home, with dyspnoea in the ER past platelet transfusion. (07 Oct 2021 11:57)      HPI:  87 y/o--recently discharged from [place] on 9/23/21-- patient with a history of atrial fibrillation previously on sotalol and Coumadin (held due to thrombocytopaenia and sotalol changed to Toprol per EPS),  S/P ablation with AICD upgrade for PPM following ablation apparently due to ventricular ectopy noted on EPS evaluation, prostate CA followed by a urologist in [place] (unclear to current status of prostate CA), with 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  No abdominal pain,  no tenesmus.  NO N/V.  NO diaphoresis.   NO flank pain.  No back pain, no tearing back pain.  NO fever, no chills, no rigors.  No palliative or exacerbating factors.    Patient was transfused platelets in the ER with patient developing acute dyspnoea, and noted to have Transfusion Associated Circulatory Overload (TACO) and provided parenteral furosemide and initially on BiPAP.   Seen by the MICU and NOT accepted to the MICU.   Patient transitioned to a high flow 50 L/min nasal cannula.   Patient presently denies dyspnoea, no cough.  No wheezing. (05 Oct 2021 21:43)      PAST MEDICAL & SURGICAL HISTORY:  Hypertension    Afib    Osteoarthritis    Prostate cancer  treated with cyber knife 2016    Venous stasis    Varicose veins  legs    Gout  finger a long time ago    Cellulitis  right lower leg in past    Degenerative disc disease, lumbar    Edema  right leg    S/P total hip arthroplasty  2006, right    S/P cataract extraction  bilateral    S/P hernia repair  1996    Afib  cardiac ablation 1998; several pacemaker/AICD insertion and replacements    S/P tonsillectomy    S/P cholecystectomy  12/17        MEDICATIONS  (STANDING):  acetaminophen   Tablet .. 650 milliGRAM(s) Oral once  aztreonam  IVPB 1000 milliGRAM(s) IV Intermittent every 8 hours  aztreonam  IVPB      Biotene Dry Mouth Oral Rinse 15 milliLiter(s) Swish and Spit three times a day  dextrose 40% Gel 15 Gram(s) Oral once  dextrose 5%. 1000 milliLiter(s) (50 mL/Hr) IV Continuous <Continuous>  dextrose 5%. 1000 milliLiter(s) (100 mL/Hr) IV Continuous <Continuous>  dextrose 50% Injectable 25 Gram(s) IV Push once  dextrose 50% Injectable 12.5 Gram(s) IV Push once  dextrose 50% Injectable 25 Gram(s) IV Push once  diphenhydrAMINE 25 milliGRAM(s) Oral once  doxazosin 4 milliGRAM(s) Oral at bedtime  furosemide   Injectable 40 milliGRAM(s) IV Push two times a day  glucagon  Injectable 1 milliGRAM(s) IntraMuscular once  influenza   Vaccine 0.5 milliLiter(s) IntraMuscular once  metoprolol succinate ER 25 milliGRAM(s) Oral daily    MEDICATIONS  (PRN):      Allergies    adhesives (Rash)  clonidine (Swelling; Rash)  felodipine (Rash)  penicillin (Rash)  sulfa drugs (Rash)    Intolerances        Social Histroy: Tobacco- , ETOH-, Illicit Drugs-    T(C): 36.5 (10-08-21 @ 04:02), Max: 36.8 (10-07-21 @ 16:22)  HR: 60 (10-08-21 @ 04:02) (60 - 65)  BP: 143/70 (10-08-21 @ 04:02) (119/63 - 143/70)  RR: 18 (10-08-21 @ 04:02) (18 - 18)  SpO2: 93% (10-08-21 @ 04:02) (93% - 96%)  I&O's Summary    07 Oct 2021 07:01  -  08 Oct 2021 07:00  --------------------------------------------------------  IN: 485 mL / OUT: 2650 mL / NET: -2165 mL        Review of Systems:  Constitutional: [ ] Fever [ ] Chills [ ] Fatigue [ ] Weight change   HEENT: [ ] Blurred vision [ ] Eye Pain [ ] Headache [ ] Runny nose [ ] Sore Throat   Respiratory: [ ] Cough [ ] Wheezing [x ] Shortness of breath  Cardiovascular: [ ] Chest Pain [ ] Palpitations [ ] STEINER [ ] PND [ ] Orthopnea  Gastrointestinal: [ ] Abdominal Pain [ ] Diarrhea [ ] Constipation [ ] Hemorrhoids [ ] Nausea [ ] Vomiting  Genitourinary: [ ] Nocturia [ ] Dysuria [ ] Incontinence  Extremities: [ ] Swelling [ ] Joint Pain  Neurologic: [ ] Focal deficit [ ] Paresthesias [ ] Syncope  Lymphatic: [ ] Swelling [ ] Lymphadenopathy   Skin: [ ] Rash [ ] Ecchymoses [ ] Wounds [ ] Lesions  Psychiatry: [ ] Depression [ ] Suicidal/Homicidal Ideation [ ] Anxiety [ ] Sleep Disturbances  [ x] 10 point review of systems is otherwise negative except as mentioned above            [ ]Unable to obtain    PHYSICAL EXAM:  GENERAL: Alert, NAD  NECK: Supple  CHEST/LUNG: decreased breath sounds bibasilarly.  HEART: S1 S2 normal, RRR,  No murmurs, rubs, or gallops  ABDOMEN: Soft, Nondistended  EXTREMITIES:  No LE edema.      LABS:                        7.2    2.52  )-----------( 10       ( 08 Oct 2021 07:08 )             22.6     10-08    147<H>  |  107  |  41<H>  ----------------------------<  92  3.5   |  25  |  1.28    Ca    8.6      08 Oct 2021 06:03        CARDIAC MARKERS ( 05 Oct 2021 18:14 )  x     / x     / 87 U/L / x     / 2.1 ng/mL              MEDICATIONS  (STANDING):  acetaminophen   Tablet .. 650 milliGRAM(s) Oral once  aztreonam  IVPB 1000 milliGRAM(s) IV Intermittent every 8 hours  aztreonam  IVPB      Biotene Dry Mouth Oral Rinse 15 milliLiter(s) Swish and Spit three times a day  dextrose 40% Gel 15 Gram(s) Oral once  dextrose 5%. 1000 milliLiter(s) (50 mL/Hr) IV Continuous <Continuous>  dextrose 5%. 1000 milliLiter(s) (100 mL/Hr) IV Continuous <Continuous>  dextrose 50% Injectable 25 Gram(s) IV Push once  dextrose 50% Injectable 12.5 Gram(s) IV Push once  dextrose 50% Injectable 25 Gram(s) IV Push once  diphenhydrAMINE 25 milliGRAM(s) Oral once  doxazosin 4 milliGRAM(s) Oral at bedtime  furosemide   Injectable 40 milliGRAM(s) IV Push two times a day  glucagon  Injectable 1 milliGRAM(s) IntraMuscular once  influenza   Vaccine 0.5 milliLiter(s) IntraMuscular once  metoprolol succinate ER 25 milliGRAM(s) Oral daily    MEDICATIONS  (PRN):          RADIOLOGY & ADDITIONAL TESTS:    Cardiology testing:  EKG: v paced 60 bpm    Telemetry: v paced

## 2021-10-08 NOTE — CHART NOTE - NSCHARTNOTEFT_GEN_A_CORE
MEDICINE PA NOTE    Patient noted to have return of gross hematuria today, still asymptomatic. CBC this AM with platelet count 10. In setting of active bleed, will transfuse 1 unit platelet (over 3 hours to avoid fluid overload). If oxygen desaturates or SOB develops, will give an extra dose of Lasix IV.   Discussed with hematology fellow and attending.     CHAYO Oropeza  [id number]

## 2021-10-08 NOTE — ED PROCEDURE NOTE - PROCEDURE ADDITIONAL DETAILS
Emergency Department Focused Ultrasound performed at patient's bedside for educational purposes. The study will have a follow up study performed.    b/l Pleural effusion

## 2021-10-08 NOTE — CONSULT NOTE ADULT - ASSESSMENT
85 y/o--recently discharged from [place] on 9/23/21-- patient with a history of atrial fibrillation previously on sotalol and Coumadin (held due to thrombocytopaenia and sotalol changed to Toprol per EPS),  S/P ablation with AICD upgrade for PPM following ablation apparently due to ventricular ectopy noted on EPS evaluation, prostate CA followed by a urologist in [place] (unclear to current status of prostate CA), with 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    TTE  Moderate aortic regurgitation.  Mild global left ventricular systolic dysfunction.  Moderate diastolic dysfunction (Stage II).  Mild tricuspid regurgitation.  Small pericardial effusion.  Bilateral pleural effusions.  Echo-free space is noted in the liver consistent with  cyst, however, dedicated imaging recommended for further  evaluation if clinically indicated.

## 2021-10-08 NOTE — CONSULT NOTE ADULT - PROBLEM SELECTOR RECOMMENDATION 3
-  -small effusion noted on echocardiogram  -no evidence of tamponade.   -hemodynamics remain stable    Jose Agee D.O.  [phone]

## 2021-10-08 NOTE — PROGRESS NOTE ADULT - SUBJECTIVE AND OBJECTIVE BOX
SUBJECTIVE / OVERNIGHT EVENTS:    low plt/hgb overnight  patient seen and examined  denies cp/sob  no n/v/d    --------------------------------------------------------------------------------------------  LABS:                        7.2    2.52  )-----------( 10       ( 08 Oct 2021 07:08 )             22.6     10-08    147<H>  |  107  |  41<H>  ----------------------------<  92  3.5   |  25  |  1.28    Ca    8.6      08 Oct 2021 06:03        CAPILLARY BLOOD GLUCOSE      POCT Blood Glucose.: 103 mg/dL (08 Oct 2021 07:25)  POCT Blood Glucose.: 120 mg/dL (07 Oct 2021 21:14)  POCT Blood Glucose.: 124 mg/dL (07 Oct 2021 15:57)  POCT Blood Glucose.: 104 mg/dL (07 Oct 2021 12:00)            RADIOLOGY & ADDITIONAL TESTS:    Imaging Personally Reviewed:  [x] YES  [ ] NO    Consultant(s) Notes Reviewed:  [x] YES  [ ] NO    MEDICATIONS  (STANDING):  acetaminophen   Tablet .. 650 milliGRAM(s) Oral once  aztreonam  IVPB 1000 milliGRAM(s) IV Intermittent every 8 hours  aztreonam  IVPB      Biotene Dry Mouth Oral Rinse 15 milliLiter(s) Swish and Spit three times a day  dextrose 40% Gel 15 Gram(s) Oral once  dextrose 5%. 1000 milliLiter(s) (50 mL/Hr) IV Continuous <Continuous>  dextrose 5%. 1000 milliLiter(s) (100 mL/Hr) IV Continuous <Continuous>  dextrose 50% Injectable 25 Gram(s) IV Push once  dextrose 50% Injectable 12.5 Gram(s) IV Push once  dextrose 50% Injectable 25 Gram(s) IV Push once  diphenhydrAMINE 25 milliGRAM(s) Oral once  doxazosin 4 milliGRAM(s) Oral at bedtime  furosemide   Injectable 40 milliGRAM(s) IV Push two times a day  glucagon  Injectable 1 milliGRAM(s) IntraMuscular once  influenza   Vaccine 0.5 milliLiter(s) IntraMuscular once  metoprolol succinate ER 25 milliGRAM(s) Oral daily    MEDICATIONS  (PRN):      Care Discussed with Consultants/Other Providers [x] YES  [ ] NO    Vital Signs Last 24 Hrs  T(C): 36.5 (08 Oct 2021 04:02), Max: 36.8 (07 Oct 2021 16:22)  T(F): 97.7 (08 Oct 2021 04:02), Max: 98.3 (07 Oct 2021 16:22)  HR: 60 (08 Oct 2021 04:02) (60 - 65)  BP: 143/70 (08 Oct 2021 04:02) (119/63 - 143/70)  BP(mean): --  RR: 18 (08 Oct 2021 04:02) (18 - 18)  SpO2: 93% (08 Oct 2021 04:02) (93% - 96%)  I&O's Summary    07 Oct 2021 07:01  -  08 Oct 2021 07:00  --------------------------------------------------------  IN: 485 mL / OUT: 2650 mL / NET: -2165 mL    PHYSICAL EXAM:  GENERAL: NAD, well-developed  HEAD:  Atraumatic, Normocephalic  EYES: EOMI, PERRLA, conjunctiva and sclera clear  NECK: Supple, No JVD  CHEST/LUNG: Diminished BS bilaterally; No wheeze  HEART: Regular rate and rhythm; No murmurs, rubs, or gallops  ABDOMEN: Soft, Nontender, Nondistended; Bowel sounds present  EXTREMITIES:  2+ Peripheral Pulses, No clubbing, cyanosis, or edema  NEUROLOGY: non-focal  SKIN: No rashes or lesions       SUBJECTIVE / OVERNIGHT EVENTS:    low plt/hgb overnight  patient seen and examined  denies cp/sob  Plt transfusion today    --------------------------------------------------------------------------------------------  LABS:                        7.2    2.52  )-----------( 10       ( 08 Oct 2021 07:08 )             22.6     10-08    147<H>  |  107  |  41<H>  ----------------------------<  92  3.5   |  25  |  1.28    Ca    8.6      08 Oct 2021 06:03        CAPILLARY BLOOD GLUCOSE      POCT Blood Glucose.: 103 mg/dL (08 Oct 2021 07:25)  POCT Blood Glucose.: 120 mg/dL (07 Oct 2021 21:14)  POCT Blood Glucose.: 124 mg/dL (07 Oct 2021 15:57)  POCT Blood Glucose.: 104 mg/dL (07 Oct 2021 12:00)            RADIOLOGY & ADDITIONAL TESTS:    Imaging Personally Reviewed:  [x] YES  [ ] NO    Consultant(s) Notes Reviewed:  [x] YES  [ ] NO    MEDICATIONS  (STANDING):  acetaminophen   Tablet .. 650 milliGRAM(s) Oral once  aztreonam  IVPB 1000 milliGRAM(s) IV Intermittent every 8 hours  aztreonam  IVPB      Biotene Dry Mouth Oral Rinse 15 milliLiter(s) Swish and Spit three times a day  dextrose 40% Gel 15 Gram(s) Oral once  dextrose 5%. 1000 milliLiter(s) (50 mL/Hr) IV Continuous <Continuous>  dextrose 5%. 1000 milliLiter(s) (100 mL/Hr) IV Continuous <Continuous>  dextrose 50% Injectable 25 Gram(s) IV Push once  dextrose 50% Injectable 12.5 Gram(s) IV Push once  dextrose 50% Injectable 25 Gram(s) IV Push once  diphenhydrAMINE 25 milliGRAM(s) Oral once  doxazosin 4 milliGRAM(s) Oral at bedtime  furosemide   Injectable 40 milliGRAM(s) IV Push two times a day  glucagon  Injectable 1 milliGRAM(s) IntraMuscular once  influenza   Vaccine 0.5 milliLiter(s) IntraMuscular once  metoprolol succinate ER 25 milliGRAM(s) Oral daily    MEDICATIONS  (PRN):      Care Discussed with Consultants/Other Providers [x] YES  [ ] NO    Vital Signs Last 24 Hrs  T(C): 36.5 (08 Oct 2021 04:02), Max: 36.8 (07 Oct 2021 16:22)  T(F): 97.7 (08 Oct 2021 04:02), Max: 98.3 (07 Oct 2021 16:22)  HR: 60 (08 Oct 2021 04:02) (60 - 65)  BP: 143/70 (08 Oct 2021 04:02) (119/63 - 143/70)  BP(mean): --  RR: 18 (08 Oct 2021 04:02) (18 - 18)  SpO2: 93% (08 Oct 2021 04:02) (93% - 96%)  I&O's Summary    07 Oct 2021 07:01  -  08 Oct 2021 07:00  --------------------------------------------------------  IN: 485 mL / OUT: 2650 mL / NET: -2165 mL    PHYSICAL EXAM:  GENERAL: NAD, well-developed  HEAD:  Atraumatic, Normocephalic  EYES: EOMI, PERRLA, conjunctiva and sclera clear  NECK: Supple, No JVD  CHEST/LUNG: Diminished BS bilaterally; No wheeze  HEART: Regular rate and rhythm; No murmurs, rubs, or gallops  ABDOMEN: Soft, Nontender, Nondistended; Bowel sounds present  EXTREMITIES:  2+ Peripheral Pulses, No clubbing, cyanosis, or edema  NEUROLOGY: non-focal  SKIN: No rashes or lesions

## 2021-10-08 NOTE — CONSULT NOTE ADULT - PROBLEM SELECTOR RECOMMENDATION 2
-  -rate is controlled  -not a candidate for anticoagulation due to history of bleeding and profound pancytopenia

## 2021-10-08 NOTE — CONSULT NOTE ADULT - PROBLEM SELECTOR RECOMMENDATION 9
-  -Exacerbation in the setting of recent transfusion  -continue with lasix 40 iv bid.   -check daily weights  -check i/os  -keep potassium and mag above 4 and 2 respectively  -tte as above

## 2021-10-09 NOTE — PROGRESS NOTE ADULT - SUBJECTIVE AND OBJECTIVE BOX
CARDIOLOGY FOLLOW UP NOTE - DR. CORRALES (for [facility])    Patient Name: RUBÉN GOLDBERG  Date of Service: 10-09-21 @ 13:47    Patient seen and examined    Subjective:    cv: denies chest pain, dyspnea, palpitations, dizziness  pulmonary: denies cough  GI: denies abdominal pain, nausea, vomiting  vascular/legs: no edema   skin: no rash  ROS: otherwise negative   overnight events:      PHYSICAL EXAM:  T(C): 36.8 (10-09-21 @ 11:13), Max: 36.9 (10-08-21 @ 20:27)  HR: 54 (10-09-21 @ 11:13) (54 - 72)  BP: 101/61 (10-09-21 @ 11:13) (101/61 - 127/67)  RR: 18 (10-09-21 @ 11:13) (18 - 18)  SpO2: 96% (10-09-21 @ 11:13) (90% - 96%)  Wt(kg): --  I&O's Summary    08 Oct 2021 07:01  -  09 Oct 2021 07:00  --------------------------------------------------------  IN: 480 mL / OUT: 2600 mL / NET: -2120 mL    09 Oct 2021 07:01  -  09 Oct 2021 13:47  --------------------------------------------------------  IN: 480 mL / OUT: 0 mL / NET: 480 mL      Daily     Daily     Appearance: Normal	  Cardiovascular: Normal S1 S2,RRR, No JVD, No murmurs  Respiratory: Lungs clear to auscultation	  Gastrointestinal:  Soft, Non-tender, + BS	  Extremities: Normal range of motion, No clubbing, cyanosis or edema      Home Medications:  allopurinol: orally once a day (05 Oct 2021 21:45)  doxazosin 4 mg oral tablet: 1 tab(s) orally once a day (at bedtime) (05 Oct 2021 21:45)  Vidaza: Sq for 7days recieved in hosp (05 Oct 2021 21:45)      MEDICATIONS  (STANDING):  acetaminophen   Tablet .. 650 milliGRAM(s) Oral once  aztreonam  IVPB 1000 milliGRAM(s) IV Intermittent every 8 hours  aztreonam  IVPB      Biotene Dry Mouth Oral Rinse 15 milliLiter(s) Swish and Spit three times a day  dextrose 40% Gel 15 Gram(s) Oral once  dextrose 5%. 1000 milliLiter(s) (50 mL/Hr) IV Continuous <Continuous>  dextrose 5%. 1000 milliLiter(s) (100 mL/Hr) IV Continuous <Continuous>  dextrose 50% Injectable 25 Gram(s) IV Push once  dextrose 50% Injectable 12.5 Gram(s) IV Push once  dextrose 50% Injectable 25 Gram(s) IV Push once  diphenhydrAMINE 25 milliGRAM(s) Oral once  doxazosin 4 milliGRAM(s) Oral at bedtime  furosemide   Injectable 40 milliGRAM(s) IV Push two times a day  glucagon  Injectable 1 milliGRAM(s) IntraMuscular once  influenza   Vaccine 0.5 milliLiter(s) IntraMuscular once  metoprolol succinate ER 25 milliGRAM(s) Oral daily      TELEMETRY: 	    ECG:  	  RADIOLOGY:   DIAGNOSTIC TESTING:  [ ] Echocardiogram:  [ ] Catheterization:  [ ] Stress Test:    OTHER: 	    LABS:	 	    CARDIAC MARKERS:        Troponin T, High Sensitivity Result: 22 ng/L (10-05 @ 18:14)  Troponin T, High Sensitivity Result: 19 ng/L (10-05 @ 15:26)  Troponin T, High Sensitivity Result: 24 ng/L (10-05 @ 14:06)                                7.0    2.46  )-----------( 17       ( 09 Oct 2021 07:16 )             22.1     10-09    146<H>  |  105  |  43<H>  ----------------------------<  87  3.7   |  29  |  1.22    Ca    8.7      09 Oct 2021 07:16      proBNP:     Lipid Profile:   HgA1c:     Creatinine, Serum: 1.22 mg/dL (10-09-21 @ 07:16)  Creatinine, Serum: 1.28 mg/dL (10-08-21 @ 06:03)  Creatinine, Serum: 1.37 mg/dL (10-07-21 @ 05:46)

## 2021-10-09 NOTE — PROGRESS NOTE ADULT - ASSESSMENT
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    TTE  Moderate aortic regurgitation.  Mild global left ventricular systolic dysfunction.  Moderate diastolic dysfunction (Stage II).  Mild tricuspid regurgitation.  Small pericardial effusion.  Bilateral pleural effusions.  Echo-free space is noted in the liver consistent with  cyst, however, dedicated imaging recommended for further  evaluation if clinically indicated.    Problem/Recommendation - 1:  ·  Problem: Acute on chronic heart failure with preserved ejection fraction (HFpEF).   -Exacerbation in the setting of recent transfusion  -clinically improved, vol status improved, no dyspnea  -change to lasix 40mg po qd negar am   -check daily weights  -check i/os  -keep potassium and mag above 4 and 2 respectively  -tte as above.    Problem/Recommendation - 2:  ·  Problem: Atrial fibrillation.   ·  Recommendation: -  -rate is controlled  -not a candidate for anticoagulation due to history of bleeding and profound pancytopenia.    Problem/Recommendation - 3:  ·  Problem: Pericardial effusion.   ·  Recommendation: -  -small effusion noted on echocardiogram  -no evidence of tamponade.   -hemodynamics remain stable    35 minutes spent on total encounter; more than 50% of the visit was spent counseling and/or coordinating care by the attending physician.

## 2021-10-09 NOTE — PROGRESS NOTE ADULT - SUBJECTIVE AND OBJECTIVE BOX
Patient is a 86y old  Male who presents with a chief complaint of Episode of gross haematuria at home, with dyspnoea in the ER past platelet transfusion. (09 Oct 2021 13:47)      INTERVAL HPI/OVERNIGHT EVENTS: noted  pt seen and examined this am   events noted   urinary bag clear urine      Vital Signs Last 24 Hrs  T(C): 36.8 (09 Oct 2021 20:18), Max: 36.9 (09 Oct 2021 16:38)  T(F): 98.3 (09 Oct 2021 20:18), Max: 98.4 (09 Oct 2021 16:38)  HR: 58 (09 Oct 2021 20:18) (54 - 72)  BP: 110/63 (09 Oct 2021 20:18) (101/61 - 127/72)  BP(mean): --  RR: 18 (09 Oct 2021 20:18) (18 - 18)  SpO2: 94% (09 Oct 2021 20:18) (90% - 96%)    acetaminophen   Tablet .. 650 milliGRAM(s) Oral once  aztreonam  IVPB 1000 milliGRAM(s) IV Intermittent every 8 hours  aztreonam  IVPB      Biotene Dry Mouth Oral Rinse 15 milliLiter(s) Swish and Spit three times a day  dextrose 40% Gel 15 Gram(s) Oral once  dextrose 5%. 1000 milliLiter(s) IV Continuous <Continuous>  dextrose 5%. 1000 milliLiter(s) IV Continuous <Continuous>  dextrose 50% Injectable 25 Gram(s) IV Push once  dextrose 50% Injectable 12.5 Gram(s) IV Push once  dextrose 50% Injectable 25 Gram(s) IV Push once  diphenhydrAMINE 25 milliGRAM(s) Oral once  doxazosin 4 milliGRAM(s) Oral at bedtime  furosemide   Injectable 40 milliGRAM(s) IV Push two times a day  glucagon  Injectable 1 milliGRAM(s) IntraMuscular once  influenza   Vaccine 0.5 milliLiter(s) IntraMuscular once  metoprolol succinate ER 25 milliGRAM(s) Oral daily      PHYSICAL EXAM:  GENERAL: NAD,   EYES: conjunctiva and sclera clear  ENMT: Moist mucous membranes  NECK: Supple, No JVD, Normal thyroid  CHEST/LUNG: non labored, cta b/l  HEART: Regular rate and rhythm; No murmurs, rubs, or gallops  ABDOMEN: Soft, Nontender, Nondistended; Bowel sounds present  EXTREMITIES:  2+ Peripheral Pulses, No clubbing, cyanosis, or edema  LYMPH: No lymphadenopathy noted  SKIN: No rashes or lesions    Consultant(s) Notes Reviewed:  [x ] YES  [ ] NO  Care Discussed with Consultants/Other Providers [ x] YES  [ ] NO    LABS:                        7.0    2.46  )-----------( 17       ( 09 Oct 2021 07:16 )             22.1     10-09    146<H>  |  105  |  43<H>  ----------------------------<  87  3.7   |  29  |  1.22    Ca    8.7      09 Oct 2021 07:16          CAPILLARY BLOOD GLUCOSE                  RADIOLOGY & ADDITIONAL TESTS:    Imaging Personally Reviewed:  [x ] YES  [ ] NO

## 2021-10-10 NOTE — PROGRESS NOTE ADULT - SUBJECTIVE AND OBJECTIVE BOX
CARDIOLOGY FOLLOW UP NOTE - DR. CORRALES (for [facility])    Patient Name: RUBÉN GOLDBERG  Date of Service: 10-10-21 @ 11:35    Patient seen and examined    Subjective:    cv: denies chest pain, dyspnea, palpitations, dizziness  pulmonary: denies cough  GI: denies abdominal pain, nausea, vomiting  vascular/legs: no edema   skin: no rash  ROS: otherwise negative   overnight events:      PHYSICAL EXAM:  T(C): 36.5 (10-10-21 @ 11:20), Max: 36.9 (10-09-21 @ 16:38)  HR: 58 (10-10-21 @ 11:20) (58 - 60)  BP: 101/60 (10-10-21 @ 11:20) (101/60 - 127/72)  RR: 18 (10-10-21 @ 11:20) (18 - 18)  SpO2: 94% (10-10-21 @ 11:20) (93% - 94%)  Wt(kg): --  I&O's Summary    09 Oct 2021 07:01  -  10 Oct 2021 07:00  --------------------------------------------------------  IN: 480 mL / OUT: 2100 mL / NET: -1620 mL      Daily     Daily Weight in k.1 (10 Oct 2021 08:36)    Appearance: Normal	  Cardiovascular: Normal S1 S2,RRR, No JVD, No murmurs  Respiratory: Lungs clear to auscultation	  Gastrointestinal:  Soft, Non-tender, + BS	  Extremities: Normal range of motion, No clubbing, cyanosis or edema      Home Medications:  allopurinol: orally once a day (05 Oct 2021 21:45)  doxazosin 4 mg oral tablet: 1 tab(s) orally once a day (at bedtime) (05 Oct 2021 21:45)  Vidaza: Sq for 7days recieved in hosp (05 Oct 2021 21:45)      MEDICATIONS  (STANDING):  acetaminophen   Tablet .. 650 milliGRAM(s) Oral once  aztreonam  IVPB 1000 milliGRAM(s) IV Intermittent every 8 hours  aztreonam  IVPB      Biotene Dry Mouth Oral Rinse 15 milliLiter(s) Swish and Spit three times a day  dextrose 40% Gel 15 Gram(s) Oral once  dextrose 5%. 1000 milliLiter(s) (50 mL/Hr) IV Continuous <Continuous>  dextrose 5%. 1000 milliLiter(s) (100 mL/Hr) IV Continuous <Continuous>  dextrose 50% Injectable 25 Gram(s) IV Push once  dextrose 50% Injectable 12.5 Gram(s) IV Push once  dextrose 50% Injectable 25 Gram(s) IV Push once  diphenhydrAMINE 25 milliGRAM(s) Oral once  doxazosin 4 milliGRAM(s) Oral at bedtime  furosemide    Tablet 40 milliGRAM(s) Oral daily  glucagon  Injectable 1 milliGRAM(s) IntraMuscular once  influenza   Vaccine 0.5 milliLiter(s) IntraMuscular once  metoprolol succinate ER 25 milliGRAM(s) Oral daily      TELEMETRY: 	    ECG:  	  RADIOLOGY:   DIAGNOSTIC TESTING:  [ ] Echocardiogram:  [ ] Catheterization:  [ ] Stress Test:    OTHER: 	    LABS:	 	    CARDIAC MARKERS:        Troponin T, High Sensitivity Result: 22 ng/L (10-05 @ 18:14)  Troponin T, High Sensitivity Result: 19 ng/L (10-05 @ 15:26)  Troponin T, High Sensitivity Result: 24 ng/L (10-05 @ 14:06)                                7.0    2.41  )-----------( 13       ( 10 Oct 2021 06:45 )             21.3     10-10    147<H>  |  105  |  46<H>  ----------------------------<  87  4.0   |  29  |  1.17    Ca    9.1      10 Oct 2021 06:45      proBNP:     Lipid Profile:   HgA1c:     Creatinine, Serum: 1.17 mg/dL (10-10-21 @ 06:45)  Creatinine, Serum: 1.22 mg/dL (10-09-21 @ 07:16)  Creatinine, Serum: 1.28 mg/dL (10-08-21 @ 06:03)

## 2021-10-10 NOTE — PROGRESS NOTE ADULT - SUBJECTIVE AND OBJECTIVE BOX
Patient is a 86y old  Male who presents with a chief complaint of Episode of gross haematuria at home, with dyspnoea in the ER past platelet transfusion. (10 Oct 2021 11:35)      INTERVAL HPI/OVERNIGHT EVENTS: noted  pt seen and examined this am   events noted  feels well      Vital Signs Last 24 Hrs  T(C): 36.6 (10 Oct 2021 16:12), Max: 36.8 (09 Oct 2021 20:18)  T(F): 97.8 (10 Oct 2021 16:12), Max: 98.3 (09 Oct 2021 20:18)  HR: 60 (10 Oct 2021 16:12) (58 - 60)  BP: 121/68 (10 Oct 2021 16:12) (101/60 - 124/70)  BP(mean): --  RR: 18 (10 Oct 2021 16:12) (18 - 18)  SpO2: 95% (10 Oct 2021 16:12) (93% - 95%)    acetaminophen   Tablet .. 650 milliGRAM(s) Oral once  aztreonam  IVPB 1000 milliGRAM(s) IV Intermittent every 8 hours  aztreonam  IVPB      Biotene Dry Mouth Oral Rinse 15 milliLiter(s) Swish and Spit three times a day  dextrose 40% Gel 15 Gram(s) Oral once  dextrose 5%. 1000 milliLiter(s) IV Continuous <Continuous>  dextrose 5%. 1000 milliLiter(s) IV Continuous <Continuous>  dextrose 50% Injectable 25 Gram(s) IV Push once  dextrose 50% Injectable 12.5 Gram(s) IV Push once  dextrose 50% Injectable 25 Gram(s) IV Push once  diphenhydrAMINE 25 milliGRAM(s) Oral once  doxazosin 4 milliGRAM(s) Oral at bedtime  furosemide    Tablet 40 milliGRAM(s) Oral daily  glucagon  Injectable 1 milliGRAM(s) IntraMuscular once  influenza   Vaccine 0.5 milliLiter(s) IntraMuscular once  metoprolol succinate ER 25 milliGRAM(s) Oral daily      PHYSICAL EXAM:  GENERAL: NAD,   EYES: conjunctiva and sclera clear  ENMT: Moist mucous membranes  NECK: Supple, No JVD, Normal thyroid  CHEST/LUNG: non labored, cta b/l  HEART: Regular rate and rhythm; No murmurs, rubs, or gallops  ABDOMEN: Soft, Nontender, Nondistended; Bowel sounds present  EXTREMITIES:  2+ Peripheral Pulses, No clubbing, cyanosis, or edema  LYMPH: No lymphadenopathy noted  SKIN: No rashes or lesions    Consultant(s) Notes Reviewed:  [x ] YES  [ ] NO  Care Discussed with Consultants/Other Providers [ x] YES  [ ] NO    LABS:                        7.0    2.41  )-----------( 13       ( 10 Oct 2021 06:45 )             21.3     10-10    147<H>  |  105  |  46<H>  ----------------------------<  87  4.0   |  29  |  1.17    Ca    9.1      10 Oct 2021 06:45          CAPILLARY BLOOD GLUCOSE                  RADIOLOGY & ADDITIONAL TESTS:    Imaging Personally Reviewed:  [x ] YES  [ ] NO

## 2021-10-10 NOTE — PROGRESS NOTE ADULT - ASSESSMENT
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    TTE  Moderate aortic regurgitation.  Mild global left ventricular systolic dysfunction.  Moderate diastolic dysfunction (Stage II).  Mild tricuspid regurgitation.  Small pericardial effusion.  Bilateral pleural effusions.  Echo-free space is noted in the liver consistent with  cyst, however, dedicated imaging recommended for further  evaluation if clinically indicated.    Problem/Recommendation - 1:  ·  Problem: Acute on chronic heart failure with preserved ejection fraction (HFpEF).   -Exacerbation in the setting of recent transfusion  -clinically improved  -vol status improved, no dyspnea  -cont lasix 40mg po qd for now   -check daily weights  -check i/os  -keep potassium and mag above 4 and 2 respectively  -tte as above.    Problem/Recommendation - 2:  ·  Problem: Atrial fibrillation.   ·  Recommendation: -  -rate is controlled  -not a candidate for anticoagulation due to history of bleeding and profound pancytopenia.    Problem/Recommendation - 3:  ·  Problem: Pericardial effusion.   ·  Recommendation: -  -small effusion noted on echocardiogram  -no evidence of tamponade.   -hemodynamics remain stable    35 minutes spent on total encounter; more than 50% of the visit was spent counseling and/or coordinating care by the attending physician.

## 2021-10-11 NOTE — PROVIDER CONTACT NOTE (CRITICAL VALUE NOTIFICATION) - BACKGROUND
86M with Afib s/p ablation with ICD upgrade, recently diagnosed MDS s/p Revlimid, transfusion dependent, presents to hospital with hematuria.

## 2021-10-11 NOTE — PROGRESS NOTE ADULT - SUBJECTIVE AND OBJECTIVE BOX
Patient is a 86y old  Male who presents with a chief complaint of Episode of gross haematuria at home, with dyspnoea in the ER past platelet transfusion. (10 Oct 2021 18:30)      INTERVAL HPI/OVERNIGHT EVENTS: noted  pt seen and examined this am   events noted  sp 1u prbc today  +graciela  denies cp/sob at rest      Vital Signs Last 24 Hrs  T(C): 36.7 (11 Oct 2021 16:28), Max: 36.8 (10 Oct 2021 19:33)  T(F): 98.1 (11 Oct 2021 16:28), Max: 98.3 (10 Oct 2021 19:33)  HR: 60 (11 Oct 2021 16:28) (58 - 74)  BP: 125/70 (11 Oct 2021 16:28) (102/62 - 152/84)  BP(mean): --  RR: 18 (11 Oct 2021 16:28) (18 - 18)  SpO2: 94% (11 Oct 2021 16:28) (91% - 97%)    acetaminophen   Tablet .. 650 milliGRAM(s) Oral once  aztreonam  IVPB 1000 milliGRAM(s) IV Intermittent every 8 hours  aztreonam  IVPB      Biotene Dry Mouth Oral Rinse 15 milliLiter(s) Swish and Spit three times a day  dextrose 40% Gel 15 Gram(s) Oral once  dextrose 5%. 1000 milliLiter(s) IV Continuous <Continuous>  dextrose 5%. 1000 milliLiter(s) IV Continuous <Continuous>  dextrose 50% Injectable 25 Gram(s) IV Push once  dextrose 50% Injectable 25 Gram(s) IV Push once  dextrose 50% Injectable 12.5 Gram(s) IV Push once  diphenhydrAMINE 25 milliGRAM(s) Oral once  doxazosin 4 milliGRAM(s) Oral at bedtime  furosemide    Tablet 40 milliGRAM(s) Oral daily  glucagon  Injectable 1 milliGRAM(s) IntraMuscular once  influenza   Vaccine 0.5 milliLiter(s) IntraMuscular once  metoprolol succinate ER 25 milliGRAM(s) Oral daily      PHYSICAL EXAM:  GENERAL: NAD,   EYES: conjunctiva and sclera clear  ENMT: Moist mucous membranes  NECK: Supple, No JVD, Normal thyroid  CHEST/LUNG: non labored, cta b/l  HEART: Regular rate and rhythm; No murmurs, rubs, or gallops  ABDOMEN: Soft, Nontender, Nondistended; Bowel sounds present  EXTREMITIES:  2+ Peripheral Pulses, No clubbing, cyanosis, or edema  LYMPH: No lymphadenopathy noted  SKIN: No rashes or lesions    Consultant(s) Notes Reviewed:  [x ] YES  [ ] NO  Care Discussed with Consultants/Other Providers [ x] YES  [ ] NO    LABS:                        6.0    2.14  )-----------( 8        ( 11 Oct 2021 07:09 )             19.2     10-11    144  |  104  |  48<H>  ----------------------------<  95  3.8   |  30  |  1.15    Ca    8.7      11 Oct 2021 07:09          CAPILLARY BLOOD GLUCOSE                  RADIOLOGY & ADDITIONAL TESTS:    Imaging Personally Reviewed:  [x ] YES  [ ] NO

## 2021-10-11 NOTE — PROVIDER CONTACT NOTE (CRITICAL VALUE NOTIFICATION) - ACTION/TREATMENT ORDERED:
PA aware. Follow orders for transfuse for hgb <7 and plt <10. Will recheck CBC with am labs. Continue to monitor for now.

## 2021-10-12 NOTE — PROGRESS NOTE ADULT - PROBLEM SELECTOR PROBLEM 1
Acute hemorrhagic cystitis

## 2021-10-12 NOTE — PROVIDER CONTACT NOTE (CRITICAL VALUE NOTIFICATION) - ACTION/TREATMENT ORDERED:
pt to receive transfusion pt to receive transfusion     MODIFY 0900 : as per PA pt not to receive transfusion, hematology to follow up .

## 2021-10-12 NOTE — PROGRESS NOTE ADULT - PROBLEM SELECTOR PLAN 2
Cont to monitor CBC  Transfuse if Hb<7. Plt transfusion if <50  Heme/onc following
Hematology consult pending
Cont to monitor CBC  Transfuse if Hb<7. Plt transfusion if <50  Heme/onc following
Cont to monitor CBC  Transfuse if Hb<7. Plt transfusion if <50  Heme/onc following
pancytopenia, check differential count  Cont to monitor CBC, hb 6, pl 8  Transfuse if Hb<7. Plt transfusion if <50  Heme/onc following
Cont to monitor CBC  Transfuse if Hb<7. Plt transfusion if <50  Heme/onc following
pancytopenia, sp prbcs and pl transfusions  check differential count  Cont to monitor CBC closely  hb 6.9 will hold off on transfusion for today -pt asymptomatic  Transfuse if Hb<7. Plt transfusion if <50    Per hem note-  diagnosed with 5q deletion MDS in 2020. He was refractory to Revlimid which presumably worsened his thrombocytopenia. 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 He is s/p platelet transfusion, bleeding resolved. He is on treatment with Vidaza at his primary hematologist. Supportive transfusion as needed, ppx abx.

## 2021-10-12 NOTE — PROGRESS NOTE ADULT - PROBLEM SELECTOR PLAN 1
See above  Empiric IV Azactam and one dose of IV Vancomycin  Cont IV Azactam  Now with clear urine
See above  Empiric IV Azactam and one dose of IV Vancomycin  Cont IV Azactam  Now with clear urine  monitor  will dc abx after 5-7d course
See above  Empiric IV Azactam and one dose of IV Vancomycin  Cont IV Azactam  Now with clear urine  monitor  will dcabx after 5d
See above  Empiric IV Azactam and one dose of IV Vancomycin.  Cont IV Azactam  Now with clear urine, no need for urology consult
See above  Empiric IV Azactam and one dose of IV Vancomycin  Cont IV Azactam  Now with clear urine  monitor  will dc abx after 5d
See above  Empiric IV Azactam and one dose of IV Vancomycin  Cont IV Azactam  Now with clear urine, no need for urology consult
See above  Empiric IV Azactam and one dose of IV Vancomycin  Cont IV Azactam  Now with clear urine, no need for urology consult

## 2021-10-12 NOTE — PROGRESS NOTE ADULT - ASSESSMENT
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     #AML  - on azacitidine  - will hold treatment in setting of UTI  - recommend transfusion to Hgb >8, Plt >20. Recommend slow transfusion given previous hx of TACO    #UTI  - UCx with Enterococcus faecalis   - ABX per primary team    Discussed with primary team

## 2021-10-12 NOTE — PROGRESS NOTE ADULT - PROBLEM SELECTOR PROBLEM 6
Chronic heart failure

## 2021-10-12 NOTE — PROGRESS NOTE ADULT - SUBJECTIVE AND OBJECTIVE BOX
Patient is a 86y old  Male who presents with a chief complaint of Episode of gross haematuria at home, with dyspnoea in the ER past platelet transfusion. (11 Oct 2021 11:08)      INTERVAL HPI/OVERNIGHT EVENTS: noted  pt seen and examined this am   events noted  pt comfortably lying on bed, just went for a walk w PT  denies gp/sob/dizziness/sob      Vital Signs Last 24 Hrs  T(C): 36.5 (12 Oct 2021 08:34), Max: 36.8 (11 Oct 2021 14:30)  T(F): 97.7 (12 Oct 2021 08:34), Max: 98.3 (12 Oct 2021 00:05)  HR: 60 (12 Oct 2021 08:53) (59 - 61)  BP: 122/67 (12 Oct 2021 08:53) (102/62 - 127/70)  BP(mean): --  RR: 18 (12 Oct 2021 08:34) (18 - 18)  SpO2: 97% (12 Oct 2021 08:53) (94% - 97%)    acetaminophen   Tablet .. 650 milliGRAM(s) Oral once  aztreonam  IVPB 1000 milliGRAM(s) IV Intermittent every 8 hours  aztreonam  IVPB      Biotene Dry Mouth Oral Rinse 15 milliLiter(s) Swish and Spit three times a day  dextrose 40% Gel 15 Gram(s) Oral once  dextrose 5%. 1000 milliLiter(s) IV Continuous <Continuous>  dextrose 5%. 1000 milliLiter(s) IV Continuous <Continuous>  dextrose 50% Injectable 25 Gram(s) IV Push once  dextrose 50% Injectable 12.5 Gram(s) IV Push once  dextrose 50% Injectable 25 Gram(s) IV Push once  diphenhydrAMINE 25 milliGRAM(s) Oral once  doxazosin 4 milliGRAM(s) Oral at bedtime  furosemide    Tablet 40 milliGRAM(s) Oral daily  glucagon  Injectable 1 milliGRAM(s) IntraMuscular once  influenza   Vaccine 0.5 milliLiter(s) IntraMuscular once  metoprolol succinate ER 25 milliGRAM(s) Oral daily      PHYSICAL EXAM:  GENERAL: NAD,   EYES: conjunctiva and sclera clear  ENMT: Moist mucous membranes  NECK: Supple, No JVD, Normal thyroid  CHEST/LUNG: non labored, cta b/l  HEART: Regular rate and rhythm; No murmurs, rubs, or gallops  ABDOMEN: Soft, Nontender, Nondistended; Bowel sounds present  EXTREMITIES:  2+ Peripheral Pulses, No clubbing, cyanosis, or edema  LYMPH: No lymphadenopathy noted  SKIN: No rashes or lesions    Consultant(s) Notes Reviewed:  [x ] YES  [ ] NO  Care Discussed with Consultants/Other Providers [ x] YES  [ ] NO    LABS:                        6.9    2.41  )-----------( 18       ( 12 Oct 2021 07:56 )             22.1     10-12    146<H>  |  105  |  49<H>  ----------------------------<  87  4.1   |  28  |  1.16    Ca    9.2      12 Oct 2021 07:55          CAPILLARY BLOOD GLUCOSE                  RADIOLOGY & ADDITIONAL TESTS:    Imaging Personally Reviewed:  [x ] YES  [ ] NO

## 2021-10-12 NOTE — PROGRESS NOTE ADULT - PROBLEM SELECTOR PROBLEM 3
AICD (automatic cardioverter/defibrillator) present

## 2021-10-12 NOTE — PROGRESS NOTE ADULT - PROBLEM SELECTOR PLAN 5
Transitions of Care Status:  1.  Name of PCP:     Nunu Singh MD (PCP) [phone]  2.  PCP Contacted on Admission: [ ] Y    [x ] N    3.  PCP contacted at Discharge: [ ] Y    [ ] N    [ ] N/A  4.  Post-Discharge Appointment Date and Location:  5.  Summary of Handoff given to PCP:
Transitions of Care Status:  1.  Name of PCP:     Nunu Singh MD (PCP) [phone]  2.  PCP Contacted on Admission: [ ] Y    [x ] N    3.  PCP contacted at Discharge: [ ] Y    [ ] N    [ ] N/A  4.  Post-Discharge Appointment Date and Location:  5.  Summary of Handoff given to PCP:
Transitions of Care Status:  1.  Name of PCP:     Nunu Singh MD (PCP) [phone]  2.  PCP Contacted on Admission: [ ] Y    [x ] N    3.  PCP contacted at Discharge: [ ] Y    [ ] N    [ ] N/A  4.  Post-Discharge Appointment Date and Location:  5.  Summary of Handoff given to PCP:
Transitions of Care Status:  1.  Name of PCP:     Nunu Singh MD (PCP) [phone]  2.  PCP Contacted on Admission: [ ] Y    [x ] N    3.  PCP contacted at Discharge: [ ] Y    [ ] N    [ ] N/A  4.  Post-Discharge Appointment Date and Location:  5.  Summary of Handoff given to PCP:
Transitions of Care Status:  1.  Name of PCP:     Nunu iSngh MD (PCP) [phone]  2.  PCP Contacted on Admission: [ ] Y    [x ] N    3.  PCP contacted at Discharge: [ ] Y    [ ] N    [ ] N/A  4.  Post-Discharge Appointment Date and Location:  5.  Summary of Handoff given to PCP:
Transitions of Care Status:  1.  Name of PCP:     Nunu Singh MD (PCP) [phone]  2.  PCP Contacted on Admission: [ ] Y    [x ] N    3.  PCP contacted at Discharge: [ ] Y    [ ] N    [ ] N/A  4.  Post-Discharge Appointment Date and Location:  5.  Summary of Handoff given to PCP:
Transitions of Care Status:  1.  Name of PCP:     Nunu Singh MD (PCP) [phone]  2.  PCP Contacted on Admission: [ ] Y    [x ] N    3.  PCP contacted at Discharge: [ ] Y    [ ] N    [ ] N/A  4.  Post-Discharge Appointment Date and Location:  5.  Summary of Handoff given to PCP:

## 2021-10-12 NOTE — PROGRESS NOTE ADULT - PROBLEM SELECTOR PLAN 6
acute on chronic chf  ProBNP 43616  Exacerbation in the setting of recent transfusion  Cont Lasix 40po BID , extra lasix with transfusions  Daily weights  Strict I&Os  Keep K>4 and Mg >2  Cards following  TTE reviewed- systolic and diastolic dysfunction    Dr Varner will be covering  starting 10/13/21  please call [employer] @ [phone] for questions or concerns
ProBNP 89760  Exacerbation in the setting of recent transfusion  Cont Lasix 40po BID   Daily weights  Strict I&Os  Keep K>4 and Mg >2  Cards following
ProBNP 00565  Exacerbation in the setting of recent transfusion  Cont Lasix 40IV BID   Daily weights  Strict I&Os  Keep K>4 and Mg >2  Cards following
ProBNP 29726  Exacerbation in the setting of recent transfusion  Cont Lasix 40po BID   Daily weights  Strict I&Os  Keep K>4 and Mg >2  Cards following
ProBNP 18237  Exacerbation in the setting of recent transfusion  Cont Lasix 40IV BID   Daily weights  Strict I&Os  Keep K>4 and Mg >2  Cards following

## 2021-10-12 NOTE — PROVIDER CONTACT NOTE (CRITICAL VALUE NOTIFICATION) - BACKGROUND
86M with Afib s/p ablation with ICD upgrade, recently diagnosed MDS s/p Revlimid c/b colitis in June 2021, transfusion dependent (7-10 transfusions in last 2 weeks, gets Lasix and Benadryl with each transfusion), presents to hospital with hematuria.

## 2021-10-12 NOTE — PROGRESS NOTE ADULT - PROBLEM SELECTOR PLAN 3
Would consider EPS evaluation of AICD if not done recently.  IV Lasix for TACO.

## 2021-10-12 NOTE — PROGRESS NOTE ADULT - SUBJECTIVE AND OBJECTIVE BOX
Hematology Oncology Follow-up    INTERVAL HPI/OVERNIGHT EVENTS:  No o/n events, patient resting comfortably.   Denies hematuria    VITAL SIGNS:  T(F): 98 (10-12-21 @ 11:30)  HR: 60 (10-12-21 @ 11:30)  BP: 96/65 (10-12-21 @ 11:30)  RR: 18 (10-12-21 @ 11:30)  SpO2: 95% (10-12-21 @ 11:30)  Wt(kg): --    10-11-21 @ 07:01  -  10-12-21 @ 07:00  --------------------------------------------------------  IN: 480 mL / OUT: 1700 mL / NET: -1220 mL    10-12-21 @ 07:01  -  10-12-21 @ 15:56  --------------------------------------------------------  IN: 590 mL / OUT: 400 mL / NET: 190 mL          Review of Systems:  General: denies fevers/chills  Respiratory: denies cough, shortness of breath  Cardiovascular: denies chest pain, palpitations  Gastrointestinal: denies nausea, vomiting, abdominal pain, constipation, diarrhea, melena, hematochezia  MSK: denies joint pain or muscle pain  Neuro: denies headache, weakness, or parasthesias  Skin: denies rash, petichiae, echymoses  Psych: denies anxiety or sleep disturbances    PHYSICAL EXAM:  Constitutional: NAD  Respiratory: CTA b/l, symmetric chest rise, with normal respiratory effort  Cardiovascular: RRR  Gastrointestinal: soft, NTND  Extremities:  no edema  MSK: no obvious abnormalities  Neurological: Grossly intact  Skin: no rash, no echymoses, no petichiae  Psych: normal affect    MEDICATIONS  (STANDING):  acetaminophen   Tablet .. 650 milliGRAM(s) Oral once  Biotene Dry Mouth Oral Rinse 15 milliLiter(s) Swish and Spit three times a day  dextrose 40% Gel 15 Gram(s) Oral once  dextrose 5%. 1000 milliLiter(s) (50 mL/Hr) IV Continuous <Continuous>  dextrose 5%. 1000 milliLiter(s) (100 mL/Hr) IV Continuous <Continuous>  dextrose 50% Injectable 25 Gram(s) IV Push once  dextrose 50% Injectable 12.5 Gram(s) IV Push once  dextrose 50% Injectable 25 Gram(s) IV Push once  diphenhydrAMINE 25 milliGRAM(s) Oral once  doxazosin 4 milliGRAM(s) Oral at bedtime  furosemide    Tablet 40 milliGRAM(s) Oral daily  glucagon  Injectable 1 milliGRAM(s) IntraMuscular once  influenza   Vaccine 0.5 milliLiter(s) IntraMuscular once  metoprolol succinate ER 25 milliGRAM(s) Oral daily    MEDICATIONS  (PRN):      adhesives (Rash)  clonidine (Swelling; Rash)  felodipine (Rash)  penicillin (Rash)  sulfa drugs (Rash)      LABS:                        6.9    2.41  )-----------( 18       ( 12 Oct 2021 07:56 )             22.1     10-12    146<H>  |  105  |  49<H>  ----------------------------<  87  4.1   |  28  |  1.16    Ca    9.2      12 Oct 2021 07:55       Haptoglobin, Serum: 150 mg/dL (10-12 @ 09:35)  Lactate Dehydrogenase, Serum: 124 U/L (10-12 @ 07:55)                  RADIOLOGY & ADDITIONAL TESTS:  Studies reviewed.

## 2021-10-13 NOTE — CONSULT NOTE ADULT - REASON FOR ADMISSION
DATE OF CONSULTATION:  11/18/2017      REASON FOR CONSULTATION:  Nocturia.      PRIMARY CARE PROVIDER:  Dr. Clint Pizarro      The history is obtained from review of the EMR as well as discussion with the patient himself.        CHIEF COMPLAINT:  Nocturia with associated weakness and unsteadiness.      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  The patient has had a long history of having nocturia and actually nocturnal enuresis.  He has been under the care of Dr. Wesley Hunt, and there has been no evidence of high-grade obstruction.  However, he has not seen Dr. Hunt recently.  The patient was admitted through the Emergency Room after noticing in the past couple weeks he has had increasing difficulties with nighttime enuresis and a significant diuresis and he has been having use a number of hand towels to keep dry.  The patient has had laboratory analysis this hospital stay showing that he has a normal serum creatinine of 1.07 with a normal sodium and potassium and a slightly decreased calcium.  Glucose is normal, and his hemoglobin is 10.1 with a white blood cell count of 9.2.      The patient when seen was sitting up in the chair and hopeful to go home.  He states that his symptoms have gradually improved.  The patient denies any pain.  He has had no fevers or chills and has had no grossly bloody urine.  Preliminary urine culture is negative, and his urinalysis showing white blood cells and red blood cells was performed after attempt at catheterization by the nursing staff x2.      PHYSICAL EXAMINATION:  On examination, the patient is seated, awake and alert, in no acute distress.  Abdomen soft, nondistended.  He does have a wound VAC on the right lower extremity.  Grossly, genital exam is 
normal.  I did not perform a rectal exam.      ASSESSMENT AND PLAN:  The nocturia is a multifactorial issue, and I do not believe that the prostate is the main cause of this.  However, it may be contributing, given that he does have benign prostatic hypertrophy.  I do not see that nocturia is a reason for admission and believe the patient can be dismissed since I believe he is at his baseline.  The patient is going to initiate Flomax 0.4 mg daily.  He was also started on Cipro.  However, I do not believe that this is indicated at this time as he had no true signs of infection.  I will await the urine culture, and the patient will be instructed to call Dr. Hunt' office on Monday morning, and hopefully at that time will have the results.  In the interim, also the patient will monitor daily frequency and incontinence, and should he desire to be evaluated further, he may require a cystoscopic exam as an outpatient in the office.         LIV NEVES MD             D: 2017 17:42   T: 2017 19:07   MT:       Name:     YINKA GONZALEZ   MRN:      [id number]        Account:       [id number]   :      1929           Consult Date:  2017      Document: [id number]       cc: Clint Neves MD   
Episode of gross haematuria at home, with dyspnoea in the ER past platelet transfusion.

## 2021-10-13 NOTE — CONSULT NOTE ADULT - ASSESSMENT
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 He received platelet transfusion for thrombocytopenia & hematuria c/b TACO      hematuria  likely 2/2 thrombocytopenia  urine culture noted- E faecalis 50-99K cfu  Patient w/ significant number of allergies- reports penicillin caused rash in his 20s  received a few days of aztreonam and vanc  hematuria has resolved and patient denies any lower urinary tract symptoms- no dysuria, suprapubic tenderness, frequency, urgency  if patient develops urinary symptoms would repeat UA and urine culture; would treat at that time  transfuse as per hem/onc  monitor resp status emily if receiving transfusions and consider giving concomitant diuretics if given    We will sign off. Thank you for allowing us to participate in the care of Mr. Padilla. Please feel free to call us with any questions or concerns.    Jhoan Espinoza M.D.  [phone]  [facility], Division of Infectious Diseases  [phone]  After 5pm on weekdays and all day on weekends - please call [phone] 87 y/o M PMhx atrial fibrillation S/P ablation with AICD upgrade for PPM, prostate CA, MDS refractory of Revlimid, apparently with blast conversion to AML currently on chemotherapy and followed outpt by haematology who presented w/ gross hematuria at home. He received platelet transfusion for thrombocytopenia & hematuria c/b TACO      hematuria  likely 2/2 thrombocytopenia  urine culture noted- E faecalis 50-99K cfu  Patient w/ significant number of allergies- reports penicillin caused rash in his 20s  received a few days of aztreonam and vanc  hematuria has resolved and patient denies any lower urinary tract symptoms- no dysuria, suprapubic tenderness, frequency, urgency  if patient develops urinary symptoms would repeat UA and urine culture; would treat at that time  transfuse as per hem/onc  monitor resp status emily if receiving transfusions and consider giving concomitant diuretics if given    Hx prostate cancer  Monitor for obstructive uropathy as this will increase his risk of UTI    We will sign off. Thank you for allowing us to participate in the care of Mr. Padilla. Please feel free to call us with any questions or concerns.    Jhoan Espinoza M.D.  [phone]  [facility], Division of Infectious Diseases  [phone]  After 5pm on weekdays and all day on weekends - please call [phone]

## 2021-10-13 NOTE — PROGRESS NOTE ADULT - SUBJECTIVE AND OBJECTIVE BOX
Patient is a 86y old  Male who presents with a chief complaint of Episode of gross haematuria at home, with dyspnoea in the ER past platelet transfusion. (13 Oct 2021 08:51)      SUBJECTIVE / OVERNIGHT EVENTS:    Patient seen and examined. no cp sob. no dizziness. no bleeding.      Vital Signs Last 24 Hrs  T(C): 36.4 (13 Oct 2021 06:12), Max: 37.2 (12 Oct 2021 20:30)  T(F): 97.6 (13 Oct 2021 06:12), Max: 98.9 (12 Oct 2021 20:30)  HR: 60 (13 Oct 2021 06:12) (59 - 60)  BP: 131/70 (13 Oct 2021 06:12) (96/65 - 131/70)  BP(mean): --  RR: 18 (13 Oct 2021 06:12) (18 - 18)  SpO2: 95% (13 Oct 2021 06:12) (95% - 96%)  I&O's Summary    12 Oct 2021 07:01  -  13 Oct 2021 07:00  --------------------------------------------------------  IN: 790 mL / OUT: 1200 mL / NET: -410 mL        PE:  GENERAL: NAD, AAOx3, pale  HEAD:  Atraumatic, Normocephalic  CHEST/LUNG: CTABL, No wheeze  HEART: Regular rate and rhythm; no murmur  ABDOMEN: Soft, Nontender, Nondistended; Bowel sounds present  EXTREMITIES:  2+ Peripheral Pulses, No clubbing, cyanosis, or edema  NEURO: No focal deficits    LABS:                        7.0    2.33  )-----------( 14       ( 13 Oct 2021 01:18 )             21.8     10-13    145  |  105  |  52<H>  ----------------------------<  87  4.1   |  30  |  1.17    Ca    9.0      13 Oct 2021 06:57        CAPILLARY BLOOD GLUCOSE      POCT Blood Glucose.: 92 mg/dL (13 Oct 2021 07:50)            RADIOLOGY & ADDITIONAL TESTS:    Imaging Personally Reviewed:  [x] YES  [ ] NO    Consultant(s) Notes Reviewed:  [x] YES  [ ] NO    MEDICATIONS  (STANDING):  acetaminophen   Tablet .. 650 milliGRAM(s) Oral once  Biotene Dry Mouth Oral Rinse 15 milliLiter(s) Swish and Spit three times a day  dextrose 40% Gel 15 Gram(s) Oral once  dextrose 5%. 1000 milliLiter(s) (50 mL/Hr) IV Continuous <Continuous>  dextrose 5%. 1000 milliLiter(s) (100 mL/Hr) IV Continuous <Continuous>  dextrose 50% Injectable 25 Gram(s) IV Push once  dextrose 50% Injectable 12.5 Gram(s) IV Push once  dextrose 50% Injectable 25 Gram(s) IV Push once  diphenhydrAMINE 25 milliGRAM(s) Oral once  doxazosin 4 milliGRAM(s) Oral at bedtime  furosemide    Tablet 40 milliGRAM(s) Oral daily  glucagon  Injectable 1 milliGRAM(s) IntraMuscular once  influenza   Vaccine 0.5 milliLiter(s) IntraMuscular once  metoprolol succinate ER 25 milliGRAM(s) Oral daily    MEDICATIONS  (PRN):      Care Discussed with Consultants/Other Providers [x] YES  [ ] NO    HEALTH ISSUES - PROBLEM Dx:  Acute hemorrhagic cystitis    AML (acute myeloblastic leukemia)    AICD (automatic cardioverter/defibrillator) present    Need for prophylactic measure    Discharge planning issues    Acute on chronic heart failure with preserved ejection fraction (HFpEF)    Atrial fibrillation    Pericardial effusion    Chronic heart failure

## 2021-10-13 NOTE — PROVIDER CONTACT NOTE (CRITICAL VALUE NOTIFICATION) - BACKGROUND
Pt is an 86M with Afib s/p ablation with ICD upgrade, recently diagnosed MDS s/p Revlimid, transfusion dependent, presents to hospital with hematuria. Dx: Acute hemorrhagic cystitis, MDS, HFpEF

## 2021-10-13 NOTE — CONSULT NOTE ADULT - SUBJECTIVE AND OBJECTIVE BOX
[facility], Division of Infectious Diseases  RACHNA Briggs, ARYAN Benitez  [phone]  ([phone] - weekdays after 5pm and weekends)    RUBÉN GOLDBERG  86y, Male  [id number]    HPI--  HPI:  85 y/o--recently discharged from [place] on 9/23/21-- patient with a history of atrial fibrillation previously on sotalol and Coumadin (held due to thrombocytopaenia and sotalol changed to Toprol per EPS),  S/P ablation with AICD upgrade for PPM following ablation apparently due to ventricular ectopy noted on EPS evaluation, prostate CA followed by a urologist in [place] (unclear to current status of prostate CA), with 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  No abdominal pain,  no tenesmus.  NO N/V.  NO diaphoresis.   NO flank pain.  No back pain, no tearing back pain.  NO fever, no chills, no rigors.  No palliative or exacerbating factors.    Patient was transfused platelets in the ER with patient developing acute dyspnoea, and noted to have Transfusion Associated Circulatory Overload (TACO) and provided parenteral furosemide and initially on BiPAP.   Seen by the MICU and NOT accepted to the MICU.   Patient transitioned to a high flow 50 L/min nasal cannula.   Patient presently denies dyspnoea, no cough.  No wheezing. (05 Oct 2021 21:43)      ROS: 14 point review of systems completed, pertinent positives and negatives as per HPI.    Allergies: adhesives (Rash)  clonidine (Swelling; Rash)  felodipine (Rash)  penicillin (Rash)  sulfa drugs (Rash)    PMH -- Hypertension    Afib    Osteoarthritis    Prostate cancer    Venous stasis    Varicose veins    Gout    Cellulitis    Degenerative disc disease, lumbar    Edema      PSH -- S/P total hip arthroplasty    S/P cataract extraction    S/P hernia repair    Afib    S/P tonsillectomy    S/P cholecystectomy      FH -- Family history of Alzheimer&#x27;s disease (Father)    Family history of essential hypertension (Father)    Family history of breast cancer (Mother)    Family history of prostate cancer (Sibling)      Social History -- denies tobacco, alcohol or illicit drug use  Travel/Environmental/Occupational History: ***    Physical Exam--  Vital Signs Last 24 Hrs  T(F): 97.6 (13 Oct 2021 06:12), Max: 98.9 (12 Oct 2021 20:30)  HR: 60 (13 Oct 2021 06:12) (59 - 60)  BP: 131/70 (13 Oct 2021 06:12) (110/61 - 131/70)  RR: 18 (13 Oct 2021 06:12) (18 - 18)  SpO2: 95% (13 Oct 2021 06:12) (95% - 96%)  General: nontoxic-appearing, no acute distress  HEENT: NC/AT, EOMI, anicteric  Neck:  No cervical LAD  Lungs: Clear bilaterally without rales, wheezing or rhonchi  Heart: Regular rate and rhythm  Abdomen: Soft. Nondistended. Nontender.  : yost  Neuro: no obvious focal deficits   Extremities: No edema.   Skin: Warm. Dry. Good turgor. No rash.  Psychiatric: Appropriate affect and mood for situation.   Lines:    Laboratory & Imaging Data--  CBC:                       7.0    2.33  )-----------( 14       ( 13 Oct 2021 01:18 )             21.8     WBC Count: 2.33 K/uL (10-13-21 @ 01:18)  WBC Count: 2.22 K/uL (10-13-21 @ 00:02)  WBC Count: 2.41 K/uL (10-12-21 @ 07:56)  WBC Count: 2.71 K/uL (10-11-21 @ 22:33)  WBC Count: 2.14 K/uL (10-11-21 @ 07:09)  WBC Count: 2.41 K/uL (10-10-21 @ 06:45)  WBC Count: 2.46 K/uL (10-09-21 @ 07:16)  WBC Count: 2.52 K/uL (10-08-21 @ 07:08)  WBC Count: 2.50 K/uL (10-08-21 @ 06:03)  WBC Count: 3.41 K/uL (10-07-21 @ 07:26)  WBC Count: 3.09 K/uL (10-07-21 @ 05:46)  WBC Count: 3.86 K/uL (10-06-21 @ 18:43)    CMP: 10-13    145  |  105  |  52<H>  ----------------------------<  87  4.1   |  30  |  1.17    Ca    9.0      13 Oct 2021 06:57          Microbiology:     Culture - Urine (collected 10-05-21 @ 18:55)  Source: Clean Catch Clean Catch (Midstream)  Final Report (10-08-21 @ 11:55):    50,000 - 99,000 CFU/mL Enterococcus faecalis  Organism: Enterococcus faecalis (10-08-21 @ 11:55)  Organism: Enterococcus faecalis (10-08-21 @ 11:55)      -  Ampicillin: S <=2 Predicts results to ampicillin/sulbactam, amoxacillin-clavulanate and  piperacillin-tazobactam.      -  Ciprofloxacin: S <=1      -  Levofloxacin: S <=1      -  Nitrofurantoin: S <=32 Should not be used to treat pyelonephritis.      -  Tetra/Doxy: R >8      -  Vancomycin: S 2      Method Type: RADHA      Radiology--  TTE 10/5 w/ small pericardial effusion    Active Medications--  acetaminophen   Tablet .. 650 milliGRAM(s) Oral once  Biotene Dry Mouth Oral Rinse 15 milliLiter(s) Swish and Spit three times a day  dextrose 40% Gel 15 Gram(s) Oral once  dextrose 5%. 1000 milliLiter(s) IV Continuous <Continuous>  dextrose 5%. 1000 milliLiter(s) IV Continuous <Continuous>  dextrose 50% Injectable 25 Gram(s) IV Push once  dextrose 50% Injectable 12.5 Gram(s) IV Push once  dextrose 50% Injectable 25 Gram(s) IV Push once  diphenhydrAMINE 25 milliGRAM(s) Oral once  doxazosin 4 milliGRAM(s) Oral at bedtime  furosemide    Tablet 40 milliGRAM(s) Oral daily  glucagon  Injectable 1 milliGRAM(s) IntraMuscular once  influenza   Vaccine 0.5 milliLiter(s) IntraMuscular once  metoprolol succinate ER 25 milliGRAM(s) Oral daily    Antimicrobials:     Immunologic: influenza   Vaccine 0.5 milliLiter(s) IntraMuscular once

## 2021-10-13 NOTE — PROGRESS NOTE ADULT - ASSESSMENT
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 Patient was transfused platelets in the ER with patient developing acute dyspnoea, and noted to have Transfusion Associated Circulatory Overload (TACO) and provided parenteral furosemide and initially on BiPAP.  NOT accepted to the MICU.      # Suspected TACO in the S/P platelet transfusion in the setting of AML conversion from MDS on chemotherapy with hemorrhagic cystitis.      sp IV Azactam   Now with clear urine  ID consult  # AML (acute myeloblastic leukemia)  pancytopenia, sp prbcs and pl transfusions  Cont to monitor CBC closely  Transfuse if Hb<8. Plt transfusion if <20  heme following    # acute on chronic combined chf  # AICD (automatic cardioverter/defibrillator) present  Exacerbation in the setting of recent transfusion  Cont Lasix 40po BID, extra lasix with transfusions  Daily weights, Strict I&Os  Keep K>4 and Mg >2  TTE reviewed - systolic and diastolic dysfunction      DVT prophylaxis. ALISSA for now.    PCP:     Nunu Singh MD (PCP) [phone]    please call [facility] @ [phone] for questions or concerns.   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 Patient was transfused platelets in the ER with patient developing acute dyspnoea, and noted to have Transfusion Associated Circulatory Overload (TACO) and provided parenteral furosemide and initially on BiPAP.  NOT accepted to the MICU.      # Suspected TACO in the S/P platelet transfusion in the setting of AML conversion from MDS on chemotherapy with hemorrhagic cystitis.      sp IV Azactam   Now with clear urine  completed abx    # AML (acute myeloblastic leukemia)  pancytopenia, sp prbcs and pl transfusions  Cont to monitor CBC closely  Transfuse if Hb<8. Plt transfusion if <20  heme following, fu if any further interventions    # acute on chronic combined chf  # AICD (automatic cardioverter/defibrillator) present  Exacerbation in the setting of recent transfusion  Cont Lasix 40po BID, extra lasix with transfusions  Daily weights, Strict I&Os  Keep K>4 and Mg >2  TTE reviewed - systolic and diastolic dysfunction      DVT prophylaxis. ALISSA for now.    PCP:     Nunu Singh MD (PCP) [phone]    please call [facility] @ [phone] for questions or concerns.

## 2021-10-13 NOTE — PROVIDER CONTACT NOTE (CRITICAL VALUE NOTIFICATION) - ASSESSMENT
Pt A&Ox4, VSS. No s/s of active bleeding. Pt A&Ox4, VSS. No s/s of active bleeding. Pt s/p 1 unit PRBC last night.

## 2021-10-13 NOTE — CHART NOTE - NSCHARTNOTEFT_GEN_A_CORE
Medicine NP note    CC; Anemia  Post PRBC transfusion CBC    Complete Blood Count (10.13.21 @ 00:02)    Nucleated RBC: 0 /100 WBCs    WBC Count: 2.22 K/uL    RBC Count: 2.60 M/uL    Hemoglobin: 7.2 g/dL    Hematocrit: 22.3 %    Mean Cell Volume: 85.8 fl    Mean Cell Hemoglobin: 27.7 pg    Mean Cell Hemoglobin Conc: 32.3 gm/dL    Red Cell Distrib Width: 14.2 %    Platelet Count - Automated: 13: Test Repeated K/uL      A/P    87 y/o M with hx of AFib. 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   Now with hb 7.2, PLT 13 post transfusion  patient's VSS, Asymptomatic, no hematuria  Repeat CBC, transfuse for Hb< 8.0, plt< 10, per hem onc recommendation  No occult source of bleeding noted.  Will follow    Catarina Bartlett [facility] [place]  [id number] Medicine NP note    CC; Anemia  Post PRBC transfusion CBC    Complete Blood Count (10.13.21 @ 00:02)    Nucleated RBC: 0 /100 WBCs    WBC Count: 2.22 K/uL    RBC Count: 2.60 M/uL    Hemoglobin: 7.2 g/dL    Hematocrit: 22.3 %    Mean Cell Volume: 85.8 fl    Mean Cell Hemoglobin: 27.7 pg    Mean Cell Hemoglobin Conc: 32.3 gm/dL    Red Cell Distrib Width: 14.2 %    Platelet Count - Automated: 13: Test Repeated K/uL      A/P    85 y/o M with hx of AFib. 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   Now with hb 7.2, PLT 13 post transfusion, repEAT cbC 7.0/21  patient's VSS, Asymptomatic, MILD hematuria  Repeat CBC, transfuse for Hb< 8.0, plt< 20, per hem onc recommendation  Will premedicate the patient with Tylenol nd benadryl, Larix in between transfusion in setting of TACO to prevent volume overload  No occult source of bleeding noted.  Will follow    Catarina Bartlett [place]  [id number] Medicine NP note    CC; Anemia  Post PRBC transfusion CBC    Complete Blood Count (10.13.21 @ 00:02)    Nucleated RBC: 0 /100 WBCs    WBC Count: 2.22 K/uL    RBC Count: 2.60 M/uL    Hemoglobin: 7.2 g/dL    Hematocrit: 22.3 %    Mean Cell Volume: 85.8 fl    Mean Cell Hemoglobin: 27.7 pg    Mean Cell Hemoglobin Conc: 32.3 gm/dL    Red Cell Distrib Width: 14.2 %    Platelet Count - Automated: 13: Test Repeated K/uL      A/P    87 y/o M with hx of AFib. 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   Now with hb 7.2, PLT 13 post transfusion, repEAT cbC 7.0/21  patient's VSS, Asymptomatic, MILD hematuria  Repeat CBC, transfuse for Hb< 8.0, plt< 20, per hem onc recommendation  Will premedicate the patient with Tylenol nd benadryl, Larix in between transfusion in setting of TACO to prevent volume overload  No occult source of bleeding noted.  call placed for Attending, call back Attending  Will follow    Catarina Bartlett [facility] [place]  [id number]

## 2021-10-14 NOTE — PROGRESS NOTE ADULT - ASSESSMENT
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     #AML  - on azacitidine  - recommend transfusion to Hgb >8, Plt >20. Recommend slow transfusion given previous hx of TACO  - patient due for next treatment, will start azacitidine 10/14-10/15  - anticipate discharge over weekend and he can continue azacitidine at [facility] 10/18    #UTI  - UCx with Enterococcus faecalis   - ABX per primary team    Discussed with primary team

## 2021-10-14 NOTE — PROGRESS NOTE ADULT - ASSESSMENT
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 Patient was transfused platelets in the ER with patient developing acute dyspnoea, and noted to have Transfusion Associated Circulatory Overload (TACO) and provided parenteral furosemide and initially on BiPAP.  NOT accepted to the MICU.      # Suspected TACO in the S/P platelet transfusion in the setting of AML conversion from MDS on chemotherapy with hemorrhagic cystitis.  resolved  sp IV Azactam   Now with clear urine  completed abx    # AML (acute myeloblastic leukemia)  pancytopenia, sp prbcs and pl transfusions  Transfuse if Hb<8. Plt transfusion if <20  heme following, repeat labs tomorrow, if stable possible dc tomorrow  outpt fu monday    # acute on chronic combined chf  # AICD (automatic cardioverter/defibrillator) present  Cont Lasix 40po BID, extra lasix with transfusions  Daily weights, Strict I&Os  Keep K>4 and Mg >2  TTE reviewed - systolic and diastolic dysfunction      DVT prophylaxis. ALISSA for now.    PCP:     Nunu Singh MD (PCP) [phone]    please call [facility] @ [phone] for questions or concerns.

## 2021-10-14 NOTE — PROGRESS NOTE ADULT - SUBJECTIVE AND OBJECTIVE BOX
Hematology Oncology Follow-up    INTERVAL HPI/OVERNIGHT EVENTS:  No o/n events, patient resting comfortably.    VITAL SIGNS:  T(F): 97.3 (10-14-21 @ 16:10)  HR: 60 (10-14-21 @ 16:10)  BP: 102/58 (10-14-21 @ 16:10)  RR: 18 (10-14-21 @ 16:10)  SpO2: 94% (10-14-21 @ 16:10)  Wt(kg): --    10-13-21 @ 07:01  -  10-14-21 @ 07:00  --------------------------------------------------------  IN: 725 mL / OUT: 2050 mL / NET: -1325 mL    10-14-21 @ 07:01  -  10-14-21 @ 16:28  --------------------------------------------------------  IN: 555 mL / OUT: 1400 mL / NET: -845 mL          Review of Systems:  General: denies fevers/chills  Respiratory: denies cough, shortness of breath  Cardiovascular: denies chest pain, palpitations  Gastrointestinal: denies nausea, vomiting, abdominal pain, constipation, diarrhea, melena, hematochezia  MSK: denies joint pain or muscle pain  Neuro: denies headache, weakness, or parasthesias  Skin: denies rash, petichiae, echymoses  Psych: denies anxiety or sleep disturbances    PHYSICAL EXAM:  Constitutional: NAD  Respiratory: CTA b/l, symmetric chest rise, with normal respiratory effort  Cardiovascular: RRR  Gastrointestinal: soft, NTND  Extremities:  no edema  MSK: no obvious abnormalities  Neurological: Grossly intact  Skin: no rash, no echymoses, no petichiae  Psych: normal affect    MEDICATIONS  (STANDING):  acetaminophen   Tablet .. 650 milliGRAM(s) Oral once  azaCITIDine Injectable (eMAR) 52.6 milliGRAM(s) SubCutaneous every 24 hours  azaCITIDine Injectable (eMAR) 52.6 milliGRAM(s) SubCutaneous every 24 hours  azaCITIDine Injectable (eMAR) 52.6 milliGRAM(s) SubCutaneous every 24 hours  Biotene Dry Mouth Oral Rinse 15 milliLiter(s) Swish and Spit three times a day  dextrose 40% Gel 15 Gram(s) Oral once  dextrose 5%. 1000 milliLiter(s) (50 mL/Hr) IV Continuous <Continuous>  dextrose 5%. 1000 milliLiter(s) (100 mL/Hr) IV Continuous <Continuous>  dextrose 50% Injectable 25 Gram(s) IV Push once  dextrose 50% Injectable 12.5 Gram(s) IV Push once  dextrose 50% Injectable 25 Gram(s) IV Push once  diphenhydrAMINE 25 milliGRAM(s) Oral once  doxazosin 4 milliGRAM(s) Oral at bedtime  furosemide    Tablet 40 milliGRAM(s) Oral daily  glucagon  Injectable 1 milliGRAM(s) IntraMuscular once  influenza   Vaccine 0.5 milliLiter(s) IntraMuscular once  metoprolol succinate ER 25 milliGRAM(s) Oral daily  ondansetron    Tablet 8 milliGRAM(s) Oral every 24 hours    MEDICATIONS  (PRN):  acetaminophen   Tablet .. 650 milliGRAM(s) Oral every 6 hours PRN Mild Pain (1 - 3)      adhesives (Rash)  clonidine (Swelling; Rash)  felodipine (Rash)  penicillin (Rash)  sulfa drugs (Rash)      LABS:                        7.9    2.78  )-----------( 17       ( 14 Oct 2021 07:18 )             24.6     10-13    145  |  105  |  52<H>  ----------------------------<  87  4.1   |  30  |  1.17    Ca    9.0      13 Oct 2021 06:57                       RADIOLOGY & ADDITIONAL TESTS:  Studies reviewed.

## 2021-10-14 NOTE — ADVANCED PRACTICE NURSE CONSULT - ASSESSMENT
Patient received in bed, alert and oriented x 4, capable of expressing all needs to staff. Day 1/2,Height and weight verified.  Consent in chart. Lab results as per Md dong aware of same. Vital signs stable prior to chemotherapy and  within acceptable parameters, see sunrise. Pt  and spouse educated on the importance of saving urine, verbalizes good understanding. Pt education done re chemo regimen, drug effects and potential side effects, written materials provided, pt states understanding. Reports that he has been tolerating chemotherapy well without side effects.  Pre- medicated with Zofran 8mg PO. azacitidine 75mg/z7=156eq Subcutaneous. Same given in three divided syringes. Two injections administered  to right deltoid and one to left deltoid area. Patient tolerated same without immediate signs and symptoms of adverse reactions noted.  Patient received in bed, alert and oriented x 4, capable of expressing all needs to staff. Day 1/2,Height and weight verified.  Consent in chart. Lab results as per Md dong aware of same. Vital signs stable prior to chemotherapy and  within acceptable parameters, see sunrise. Pt  and spouse educated on the importance of saving urine, verbalizes good understanding. Pt education done re chemo regimen, drug effects and potential side effects, written materials provided, pt states understanding. Reports that he has been tolerating chemotherapy well without side effects.  Pre- medicated with Zofran 8mg PO. azacitidine 75mg/[id number] Subcutaneous. Same given in three divided syringes. Two injections administered  to right upper arm and one to left upper arm. Patient tolerated same without immediate signs and symptoms of adverse reactions noted.

## 2021-10-14 NOTE — CHART NOTE - NSCHARTNOTEFT_GEN_A_CORE
MEDICINE NP     ASHLYN RUBÉN  86y Male  Patient is a 86y old  Male who presents with a chief complaint of Episode of gross haematuria at home, with dyspnoea in the ER past platelet transfusion. (13 Oct 2021 15:02)       Today patient with hemoglobin of 7.9 and plt count of 17.  Discuss with Hematology, Dr. Hernández if patient would require blood transfusion today, planning on preparing patient for discharge to home.  Per Dr Hernández after speaking with patient's hematologist who states gets transfusions at the [facility] every other day.  So today we will monitor patient   no need for blood transfusion today, but tomorrow patient will require Blood transfusion and probably platelets as well.  If stable may be discharged on Friday.   Also plan to give Chemotherapy on the day of discharge.  Dr. Hernández will work on getting an appointment scheduled for Monday at the [facility].   Discussed with Dr. Coffey.      Vital Signs Last 24 Hrs  T(C): 36.6 (14 Oct 2021 11:39), Max: 37 (13 Oct 2021 20:22)  T(F): 97.9 (14 Oct 2021 11:39), Max: 98.6 (13 Oct 2021 20:22)  HR: 60 (14 Oct 2021 11:39) (59 - 65)  BP: 117/60 (14 Oct 2021 11:39) (117/60 - 133/68)  BP(mean): --  RR: 18 (14 Oct 2021 11:39) (18 - 18)  SpO2: 97% (14 Oct 2021 11:39) (94% - 98%)      Kalani Curran, ISABEL-C  Medicine Department

## 2021-10-14 NOTE — PROGRESS NOTE ADULT - SUBJECTIVE AND OBJECTIVE BOX
Patient is a 86y old  Male who presents with a chief complaint of Episode of gross haematuria at home, with dyspnoea in the ER past platelet transfusion. (13 Oct 2021 15:02)      SUBJECTIVE / OVERNIGHT EVENTS:    Patient seen and examined. no complaints.      Vital Signs Last 24 Hrs  T(C): 36.6 (14 Oct 2021 11:39), Max: 37 (13 Oct 2021 20:22)  T(F): 97.9 (14 Oct 2021 11:39), Max: 98.6 (13 Oct 2021 20:22)  HR: 60 (14 Oct 2021 11:39) (59 - 65)  BP: 117/60 (14 Oct 2021 11:39) (117/60 - 133/68)  BP(mean): --  RR: 18 (14 Oct 2021 11:39) (18 - 18)  SpO2: 97% (14 Oct 2021 11:39) (94% - 98%)  I&O's Summary    13 Oct 2021 07:01  -  14 Oct 2021 07:00  --------------------------------------------------------  IN: 725 mL / OUT: 2050 mL / NET: -1325 mL    14 Oct 2021 07:01  -  14 Oct 2021 13:50  --------------------------------------------------------  IN: 555 mL / OUT: 0 mL / NET: 555 mL        PE:  GENERAL: NAD, AAOx3, pale  HEAD:  Atraumatic, Normocephalic  CHEST/LUNG: CTABL, No wheeze  HEART: Regular rate and rhythm; no murmur  ABDOMEN: Soft, Nontender, Nondistended; Bowel sounds present  EXTREMITIES:  2+ Peripheral Pulses, No clubbing, cyanosis, or edema  NEURO: No focal deficits    LABS:                        7.9    2.78  )-----------( 17       ( 14 Oct 2021 07:18 )             24.6     10-13    145  |  105  |  52<H>  ----------------------------<  87  4.1   |  30  |  1.17    Ca    9.0      13 Oct 2021 06:57        CAPILLARY BLOOD GLUCOSE      POCT Blood Glucose.: 130 mg/dL (14 Oct 2021 11:48)  POCT Blood Glucose.: 100 mg/dL (14 Oct 2021 07:27)  POCT Blood Glucose.: 117 mg/dL (13 Oct 2021 21:09)  POCT Blood Glucose.: 95 mg/dL (13 Oct 2021 16:10)            RADIOLOGY & ADDITIONAL TESTS:    Imaging Personally Reviewed:  [x] YES  [ ] NO    Consultant(s) Notes Reviewed:  [x] YES  [ ] NO    MEDICATIONS  (STANDING):  acetaminophen   Tablet .. 650 milliGRAM(s) Oral once  Biotene Dry Mouth Oral Rinse 15 milliLiter(s) Swish and Spit three times a day  dextrose 40% Gel 15 Gram(s) Oral once  dextrose 5%. 1000 milliLiter(s) (50 mL/Hr) IV Continuous <Continuous>  dextrose 5%. 1000 milliLiter(s) (100 mL/Hr) IV Continuous <Continuous>  dextrose 50% Injectable 25 Gram(s) IV Push once  dextrose 50% Injectable 12.5 Gram(s) IV Push once  dextrose 50% Injectable 25 Gram(s) IV Push once  diphenhydrAMINE 25 milliGRAM(s) Oral once  doxazosin 4 milliGRAM(s) Oral at bedtime  furosemide    Tablet 40 milliGRAM(s) Oral daily  glucagon  Injectable 1 milliGRAM(s) IntraMuscular once  influenza   Vaccine 0.5 milliLiter(s) IntraMuscular once  metoprolol succinate ER 25 milliGRAM(s) Oral daily    MEDICATIONS  (PRN):      Care Discussed with Consultants/Other Providers [x] YES  [ ] NO    HEALTH ISSUES - PROBLEM Dx:  Acute hemorrhagic cystitis    AML (acute myeloblastic leukemia)    AICD (automatic cardioverter/defibrillator) present    Need for prophylactic measure    Discharge planning issues    Acute on chronic heart failure with preserved ejection fraction (HFpEF)    Atrial fibrillation    Pericardial effusion    Chronic heart failure

## 2021-10-15 NOTE — ADVANCED PRACTICE NURSE CONSULT - ASSESSMENT
Patient received in bed, alert and oriented x 4, capable of expressing all needs to staff. Day 2/2,Height and weight verified.  Consent in chart. Lab results as per Md dong aware of same. Vital signs stable prior to chemotherapy and  within acceptable parameters, see sunrise. Pt  re- educated on the importance of saving urine, verbalizes good understanding. Pt re-education done re chemo regimen, drug effects and potential side effects, pt states understanding. Reports that he has been tolerating chemotherapy well without side effects.  Pre- medicated with Zofran 8mg PO. aZACitidine 75mg/z1=185vt Subcutaneous. Same given in three divided syringes. Two injections administered  to left upper arm and one to right upper arm. Patient tolerated same without immediate signs and symptoms of adverse reactions noted.

## 2021-10-15 NOTE — PROGRESS NOTE ADULT - ASSESSMENT
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 Patient was transfused platelets in the ER with patient developing acute dyspnoea, and noted to have Transfusion Associated Circulatory Overload (TACO) and provided parenteral furosemide and initially on BiPAP.     # Suspected TACO in the S/P platelet transfusion in the setting of AML conversion from MDS on chemotherapy with hemorrhagic cystitis.  resolved, sp IV Azactam, completed abx  Now with clear urine    # AML (acute myeloblastic leukemia)  pancytopenia, sp prbcs and pl transfusions  Transfuse if Hb<8. Plt transfusion <20  heme following, Vidaza today, platelet transfusion today  outpt fu monday    # acute on chronic combined chf  # AICD (automatic cardioverter/defibrillator) present  Cont Lasix 40po BID, extra lasix with transfusions  Daily weights, Strict I&Os  Keep K>4 and Mg >2  TTE reviewed - systolic and diastolic dysfunction    dispo: dc planning 24-48 hrs  check O2 on RA      PCP:     Nunu Singh MD (PCP) [phone]    please call [facility] @ [phone] for questions or concerns.

## 2021-10-15 NOTE — PROGRESS NOTE ADULT - SUBJECTIVE AND OBJECTIVE BOX
Patient is a 86y old  Male who presents with a chief complaint of Episode of gross haematuria at home, with dyspnoea in the ER past platelet transfusion. (15 Oct 2021 10:46)      SUBJECTIVE / OVERNIGHT EVENTS:    Patient seen and examined. no complaints. to get platelet transfusion. on 2L.      Vital Signs Last 24 Hrs  T(C): 37.1 (15 Oct 2021 11:15), Max: 37.3 (15 Oct 2021 08:16)  T(F): 98.8 (15 Oct 2021 11:15), Max: 99.2 (15 Oct 2021 08:16)  HR: 60 (15 Oct 2021 11:15) (57 - 63)  BP: 97/57 (15 Oct 2021 11:15) (94/50 - 120/65)  BP(mean): --  RR: 18 (15 Oct 2021 11:15) (18 - 18)  SpO2: 95% (15 Oct 2021 11:15) (85% - 95%)  I&O's Summary    14 Oct 2021 07:01  -  15 Oct 2021 07:00  --------------------------------------------------------  IN: 1115 mL / OUT: 1750 mL / NET: -635 mL    15 Oct 2021 07:01  -  15 Oct 2021 12:25  --------------------------------------------------------  IN: 280 mL / OUT: 0 mL / NET: 280 mL        PE:  GENERAL: NAD, AAOx3, pale  HEAD:  Atraumatic, Normocephalic  CHEST/LUNG: CTABL, No wheeze  HEART: Regular rate and rhythm; no murmur  ABDOMEN: Soft, Nontender, Nondistended; Bowel sounds present  EXTREMITIES:  2+ Peripheral Pulses, No clubbing, cyanosis, or edema  NEURO: No focal deficits      LABS:                        8.8    2.76  )-----------( 10       ( 15 Oct 2021 06:56 )             27.8     10-15    142  |  101  |  59<H>  ----------------------------<  93  4.5   |  28  |  1.36<H>    Ca    9.0      15 Oct 2021 06:57        CAPILLARY BLOOD GLUCOSE      POCT Blood Glucose.: 134 mg/dL (15 Oct 2021 11:17)  POCT Blood Glucose.: 101 mg/dL (15 Oct 2021 07:19)  POCT Blood Glucose.: 115 mg/dL (14 Oct 2021 21:11)  POCT Blood Glucose.: 164 mg/dL (14 Oct 2021 16:07)            RADIOLOGY & ADDITIONAL TESTS:    Imaging Personally Reviewed:  [x] YES  [ ] NO    Consultant(s) Notes Reviewed:  [x] YES  [ ] NO    MEDICATIONS  (STANDING):  acetaminophen   Tablet .. 650 milliGRAM(s) Oral once  azaCITIDine Injectable (eMAR) 52.6 milliGRAM(s) SubCutaneous every 24 hours  azaCITIDine Injectable (eMAR) 52.6 milliGRAM(s) SubCutaneous every 24 hours  azaCITIDine Injectable (eMAR) 52.6 milliGRAM(s) SubCutaneous every 24 hours  Biotene Dry Mouth Oral Rinse 15 milliLiter(s) Swish and Spit three times a day  dextrose 40% Gel 15 Gram(s) Oral once  dextrose 5%. 1000 milliLiter(s) (50 mL/Hr) IV Continuous <Continuous>  dextrose 5%. 1000 milliLiter(s) (100 mL/Hr) IV Continuous <Continuous>  dextrose 50% Injectable 25 Gram(s) IV Push once  dextrose 50% Injectable 12.5 Gram(s) IV Push once  dextrose 50% Injectable 25 Gram(s) IV Push once  diphenhydrAMINE 25 milliGRAM(s) Oral once  doxazosin 4 milliGRAM(s) Oral at bedtime  furosemide    Tablet 40 milliGRAM(s) Oral daily  furosemide   Injectable 20 milliGRAM(s) IV Push once  glucagon  Injectable 1 milliGRAM(s) IntraMuscular once  influenza   Vaccine 0.5 milliLiter(s) IntraMuscular once  metoprolol succinate ER 25 milliGRAM(s) Oral daily  ondansetron    Tablet 8 milliGRAM(s) Oral every 24 hours    MEDICATIONS  (PRN):  acetaminophen   Tablet .. 650 milliGRAM(s) Oral every 6 hours PRN Mild Pain (1 - 3)      Care Discussed with Consultants/Other Providers [x] YES  [ ] NO    HEALTH ISSUES - PROBLEM Dx:  Acute hemorrhagic cystitis    AML (acute myeloblastic leukemia)    AICD (automatic cardioverter/defibrillator) present    Need for prophylactic measure    Discharge planning issues    Acute on chronic heart failure with preserved ejection fraction (HFpEF)    Atrial fibrillation    Pericardial effusion    Chronic heart failure

## 2021-10-15 NOTE — PROGRESS NOTE ADULT - SUBJECTIVE AND OBJECTIVE BOX
Hematology Oncology Follow-up    INTERVAL HPI/OVERNIGHT EVENTS:  No o/n events, patient resting comfortably. No complaints at this time.  Due for D2 azacitidine today.    VITAL SIGNS:  T(F): 99.2 (10-15-21 @ 08:16)  HR: 57 (10-15-21 @ 08:16)  BP: 100/61 (10-15-21 @ 08:16)  RR: 18 (10-15-21 @ 08:16)  SpO2: 95% (10-15-21 @ 08:16)  Wt(kg): --    10-14-21 @ 07:01  -  10-15-21 @ 07:00  --------------------------------------------------------  IN: 1115 mL / OUT: 1750 mL / NET: -635 mL    10-15-21 @ 07:01  -  10-15-21 @ 10:47  --------------------------------------------------------  IN: 280 mL / OUT: 0 mL / NET: 280 mL          Review of Systems:  General: denies fevers/chills  Respiratory: denies cough, shortness of breath  Cardiovascular: denies chest pain, palpitations  Gastrointestinal: denies nausea, vomiting, abdominal pain, constipation, diarrhea, melena, hematochezia  MSK: denies joint pain or muscle pain  Neuro: denies headache, weakness, or parasthesias  Skin: denies rash, petichiae, echymoses  Psych: denies anxiety or sleep disturbances    PHYSICAL EXAM:  Constitutional: NAD  Respiratory: CTA b/l, symmetric chest rise, with normal respiratory effort  Cardiovascular: RRR  Gastrointestinal: soft, NTND  Extremities:  no edema  MSK: no obvious abnormalities  Neurological: Grossly intact  Skin: no rash, no echymoses, no petichiae  Psych: normal affect    MEDICATIONS  (STANDING):  acetaminophen   Tablet .. 650 milliGRAM(s) Oral once  azaCITIDine Injectable (eMAR) 52.6 milliGRAM(s) SubCutaneous every 24 hours  azaCITIDine Injectable (eMAR) 52.6 milliGRAM(s) SubCutaneous every 24 hours  azaCITIDine Injectable (eMAR) 52.6 milliGRAM(s) SubCutaneous every 24 hours  Biotene Dry Mouth Oral Rinse 15 milliLiter(s) Swish and Spit three times a day  dextrose 40% Gel 15 Gram(s) Oral once  dextrose 5%. 1000 milliLiter(s) (50 mL/Hr) IV Continuous <Continuous>  dextrose 5%. 1000 milliLiter(s) (100 mL/Hr) IV Continuous <Continuous>  dextrose 50% Injectable 25 Gram(s) IV Push once  dextrose 50% Injectable 12.5 Gram(s) IV Push once  dextrose 50% Injectable 25 Gram(s) IV Push once  diphenhydrAMINE 25 milliGRAM(s) Oral once  doxazosin 4 milliGRAM(s) Oral at bedtime  furosemide    Tablet 40 milliGRAM(s) Oral daily  furosemide   Injectable 20 milliGRAM(s) IV Push once  glucagon  Injectable 1 milliGRAM(s) IntraMuscular once  influenza   Vaccine 0.5 milliLiter(s) IntraMuscular once  metoprolol succinate ER 25 milliGRAM(s) Oral daily  ondansetron    Tablet 8 milliGRAM(s) Oral every 24 hours    MEDICATIONS  (PRN):  acetaminophen   Tablet .. 650 milliGRAM(s) Oral every 6 hours PRN Mild Pain (1 - 3)      adhesives (Rash)  clonidine (Swelling; Rash)  felodipine (Rash)  penicillin (Rash)  sulfa drugs (Rash)      LABS:                        8.8    2.76  )-----------( 10       ( 15 Oct 2021 06:56 )             27.8     10-15    142  |  101  |  59<H>  ----------------------------<  93  4.5   |  28  |  1.36<H>    Ca    9.0      15 Oct 2021 06:57                       RADIOLOGY & ADDITIONAL TESTS:  Studies reviewed.

## 2021-10-15 NOTE — PROGRESS NOTE ADULT - ASSESSMENT
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     #AML  - on azacitidine  - recommend transfusion to Hgb >8, Plt >20. Recommend slow transfusion given previous hx of TACO  - patient due for next cycle azacitidine, started (10/14- )  - no heme barriers to discharge over weekend; please ensure Hb >8 and Plt >20 prior to discharge  - patient has appointments at [facility] starting 10/18 to continue azacitidine     #UTI  - UCx with Enterococcus faecalis   - completed course of aztrenoam     Discussed with primary team

## 2021-10-16 NOTE — PROGRESS NOTE ADULT - SUBJECTIVE AND OBJECTIVE BOX
Patient is a 86y old  Male who presents with a chief complaint of Episode of gross haematuria at home, with dyspnoea in the ER past platelet transfusion. (15 Oct 2021 12:25)      SUBJECTIVE / OVERNIGHT EVENTS:    Patient seen and examined. no complaints. no bleeding or bruising.      Vital Signs Last 24 Hrs  T(C): 36.7 (16 Oct 2021 11:53), Max: 37.4 (15 Oct 2021 16:10)  T(F): 98 (16 Oct 2021 11:53), Max: 99.4 (15 Oct 2021 16:10)  HR: 63 (16 Oct 2021 11:53) (60 - 63)  BP: 101/62 (16 Oct 2021 11:53) (93/56 - 113/56)  BP(mean): --  RR: 18 (16 Oct 2021 12:16) (18 - 18)  SpO2: 97% (16 Oct 2021 12:16) (87% - 98%)  I&O's Summary    15 Oct 2021 07:01  -  16 Oct 2021 07:00  --------------------------------------------------------  IN: 840 mL / OUT: 1210 mL / NET: -370 mL    16 Oct 2021 07:01  -  16 Oct 2021 14:00  --------------------------------------------------------  IN: 240 mL / OUT: 0 mL / NET: 240 mL        PE:  GENERAL: NAD, AAOx3, pale  HEAD:  Atraumatic, Normocephalic  CHEST/LUNG: CTABL, No wheeze  HEART: Regular rate and rhythm; no murmur  ABDOMEN: Soft, Nontender, Nondistended; Bowel sounds present  gu condom cath  EXTREMITIES:  2+ Peripheral Pulses, No clubbing, cyanosis, or edema  NEURO: No focal deficits    LABS:                        7.3    2.59  )-----------( 15       ( 16 Oct 2021 05:41 )             23.5     10-16    144  |  104  |  62<H>  ----------------------------<  91  4.7   |  29  |  1.49<H>    Ca    9.1      16 Oct 2021 05:41        CAPILLARY BLOOD GLUCOSE      POCT Blood Glucose.: 122 mg/dL (15 Oct 2021 21:06)  POCT Blood Glucose.: 136 mg/dL (15 Oct 2021 16:12)            RADIOLOGY & ADDITIONAL TESTS:    Imaging Personally Reviewed:  [x] YES  [ ] NO    Consultant(s) Notes Reviewed:  [x] YES  [ ] NO    MEDICATIONS  (STANDING):  acetaminophen   Tablet .. 650 milliGRAM(s) Oral once  Biotene Dry Mouth Oral Rinse 15 milliLiter(s) Swish and Spit three times a day  dextrose 40% Gel 15 Gram(s) Oral once  dextrose 5%. 1000 milliLiter(s) (50 mL/Hr) IV Continuous <Continuous>  dextrose 5%. 1000 milliLiter(s) (100 mL/Hr) IV Continuous <Continuous>  dextrose 50% Injectable 25 Gram(s) IV Push once  dextrose 50% Injectable 12.5 Gram(s) IV Push once  dextrose 50% Injectable 25 Gram(s) IV Push once  doxazosin 4 milliGRAM(s) Oral at bedtime  furosemide    Tablet 40 milliGRAM(s) Oral daily  glucagon  Injectable 1 milliGRAM(s) IntraMuscular once  influenza   Vaccine 0.5 milliLiter(s) IntraMuscular once  metoprolol succinate ER 25 milliGRAM(s) Oral daily    MEDICATIONS  (PRN):  acetaminophen   Tablet .. 650 milliGRAM(s) Oral every 6 hours PRN Mild Pain (1 - 3)      Care Discussed with Consultants/Other Providers [x] YES  [ ] NO    HEALTH ISSUES - PROBLEM Dx:  Acute hemorrhagic cystitis    AML (acute myeloblastic leukemia)    AICD (automatic cardioverter/defibrillator) present    Need for prophylactic measure    Discharge planning issues    Acute on chronic heart failure with preserved ejection fraction (HFpEF)    Atrial fibrillation    Pericardial effusion    Chronic heart failure

## 2021-10-16 NOTE — PROGRESS NOTE ADULT - ASSESSMENT
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 Patient was transfused platelets in the ER with patient developing acute dyspnoea, and noted to have Transfusion Associated Circulatory Overload (TACO) and provided parenteral furosemide and initially on BiPAP.     # Suspected TACO in the S/P platelet transfusion in the setting of AML conversion from MDS on chemotherapy with hemorrhagic cystitis.  resolved, sp IV Azactam, completed abx  clear urine    # AML (acute myeloblastic leukemia)  pancytopenia, sp multiple prbcs and pl transfusions  Transfuse if Hb<8. Plt transfusion <20  heme following, trasfuse prbc and platelets today, repeat post transfusions CBC, possible DC home later  outpt fu monday at [facility]    # acute on chronic combined chf  # AICD (automatic cardioverter/defibrillator) present  Cont Lasix 40po BID, extra lasix with transfusions  Daily weights, Strict I&Os  Keep K>4 and Mg >2  TTE reviewed - systolic and diastolic dysfunction  check O2 on RA    PCP:     Nunu Singh MD (PCP) [phone]    please call [employer] @ [phone] for questions or concerns.

## 2021-10-17 NOTE — DISCHARGE NOTE PROVIDER - NSDCFUADDAPPT_GEN_ALL_CORE_FT
APPTS ARE READY TO BE MADE: [ ] YES    Best Family or Patient Contact (if needed):    Additional Information about above appointments (if needed):    1:   2:   3:     Other comments or requests:    APPTS ARE READY TO BE MADE: [ ] YES    Best Family or Patient Contact (if needed):    Additional Information about above appointments (if needed):    1: should be seen in [place] 10/20   2: f/u PCP- per ID, consider patient undergo penicillin skin testing outpt as reports received penicillin in his teens  3:     Other comments or requests:

## 2021-10-17 NOTE — DISCHARGE NOTE PROVIDER - PROVIDER TOKENS
PROVIDER:[TOKEN:[[id number]],FOLLOWUP:[Routine]] PROVIDER:[TOKEN:[[id number]],FOLLOWUP:[Routine]],PROVIDER:[TOKEN:[[id number]]]

## 2021-10-17 NOTE — DISCHARGE NOTE PROVIDER - CARE PROVIDER_API CALL
Nunu Singh  INTERNAL MEDICINE  [address] [place] [address]  Phone: [phone]  Fax: [phone]  Follow Up Time: Routine   Nunu Singh  INTERNAL MEDICINE  [address] [place] [address]  Phone: [phone]  Fax: [phone]  Follow Up Time: Darrell Spring (DO)  Internal Medicine  [address] [place] [address]  Phone: [phone]  Fax: [phone]  Follow Up Time:

## 2021-10-17 NOTE — DISCHARGE NOTE PROVIDER - NSDCFUADDINST_GEN_ALL_CORE_FT
Next chemo is scheduled ~11/15 and no plan for chemo during rehab stay but can delay chemotherapy if rehab stay prolonged (should discuss with outpatient hematologist Dr. Anne). However You need to get CBC checked at least 2 times/week for potential need for blood transfusions.  - outpatient f/u with Dr Darrell Anne

## 2021-10-17 NOTE — DISCHARGE NOTE PROVIDER - NSDCCPCAREPLAN_GEN_ALL_CORE_FT
PRINCIPAL DISCHARGE DIAGNOSIS  Diagnosis: Hematuria  Assessment and Plan of Treatment: Secondary to UTI  Resolved, received IV antibiotics   Urine now clear      SECONDARY DISCHARGE DIAGNOSES  Diagnosis: TACO (transfusion associated circulatory overload)  Assessment and Plan of Treatment: Provided IV furosemide and initially on BiPAP, now resolved.  No longer requiring supplemental oxygen.  Continue Lasix 40mg once a day, give extra lasix with transfusions      Diagnosis: AML (acute myeloid leukemia)  Assessment and Plan of Treatment: You have an appointment scheduled at the [facility] on 10/18.    Diagnosis: CHF, acute on chronic  Assessment and Plan of Treatment: Continue with Lasix and metoprolol     PRINCIPAL DISCHARGE DIAGNOSIS  Diagnosis: Hematuria  Assessment and Plan of Treatment: Secondary to UTI  Resolved, received IV antibiotics   Urine now clear      SECONDARY DISCHARGE DIAGNOSES  Diagnosis: TACO (transfusion associated circulatory overload)  Assessment and Plan of Treatment: Provided IV furosemide and initially on BiPAP, now resolved.  No longer requiring supplemental oxygen.  Continue Lasix 40mg once a day,       Diagnosis: AML (acute myeloid leukemia)  Assessment and Plan of Treatment: You have received several units of augustine blood cells and platelts to keep Hgb >8 and plt >20  given you were not discharge on 10/18, chemo given on 10/18 and 10/19  Follow up at [facility] 10/20    Diagnosis: CHF, acute on chronic  Assessment and Plan of Treatment: Continue with Lasix and metoprolol    Diagnosis: Arm erythema  Assessment and Plan of Treatment: redness round L upper arm where IV site pulled  US around this area  >US showed no clot  Infectious disease started abx doxycycline for 7 days 10/18 - 10/24  continue with arm elevation and warm compresses throughout the day  if it does not improve, plese follow up with your primary care doctor    Diagnosis: Orthostatic hypotension  Assessment and Plan of Treatment: started midodrine 10/18  now still + low blood pressure with standing, but no symptoms  continue     PRINCIPAL DISCHARGE DIAGNOSIS  Diagnosis: Hematuria  Assessment and Plan of Treatment: Secondary to UTI  Resolved, received IV antibiotics   Urine now clear        SECONDARY DISCHARGE DIAGNOSES  Diagnosis: TACO (transfusion associated circulatory overload)  Assessment and Plan of Treatment: Provided IV furosemide and initially on BiPAP, now resolved.  No longer requiring supplemental oxygen.  Continue Lasix 40mg once a day,       Diagnosis: AML (acute myeloid leukemia)  Assessment and Plan of Treatment: You have received several units of augustine blood cells and platelts to keep Hgb >8 and plt >20  You completed chemo at the hospital. Next chemo is scheduled around 11/15. As per Heme/onc, no plan for chemo during rehab stay but can delay chemotherapy if rehab stay prolonged (should discuss with outpatient hematologist Dr. Anne). You need to get CBC checked at least 2 times/week for potential need for blood transfusions. Please follow up with Heme/onc with Dr. Darrell Anne    Diagnosis: CHF, acute on chronic  Assessment and Plan of Treatment: Continue with Lasix and metoprolol  Weigh yourself daily.  If you gain 3lbs in 3 days, or 5lbs in a week call your Health Care Provider.  Do not eat or drink foods containing more than 2000mg of salt (sodium) in your diet every day.  Call your Health Care Provider if you have any swelling or increased swelling in your feet, ankles, and/or stomach.  Take all of your medication as directed.  If you become dizzy call your Health Care Provider.      Diagnosis: Arm erythema  Assessment and Plan of Treatment: redness round L upper arm where IV site pulled  US around this area  >US showed no clot  Infectious disease started abx doxycycline for 7 days 10/18 - 10/24  continue with arm elevation and warm compresses throughout the day  if it does not improve, plese follow up with your primary care doctor    Diagnosis: Orthostatic hypotension  Assessment and Plan of Treatment: started midodrine 10/18  now still + low blood pressure with standing, but no symptoms  continue     PRINCIPAL DISCHARGE DIAGNOSIS  Diagnosis: Hematuria  Assessment and Plan of Treatment: Secondary to UTI  Resolved, received IV antibiotics   Urine now clear  Continue yost - Trial of void in Rehab        SECONDARY DISCHARGE DIAGNOSES  Diagnosis: TACO (transfusion associated circulatory overload)  Assessment and Plan of Treatment: Provided IV furosemide and initially on BiPAP, now resolved.  No longer requiring supplemental oxygen.  Hold standing Lasix 40MG for now, but continue IV Lasix 20 as needed post transfusions  TTE reviewed - systolic and diastolic dysfunction      Diagnosis: AML (acute myeloid leukemia)  Assessment and Plan of Treatment: You have received several units of augustine blood cells and platelts to keep Hgb >8 and plt >20  You completed chemo at the hospital. Next chemo is scheduled around 11/15. As per Heme/onc, no plan for chemo during rehab stay but can delay chemotherapy if rehab stay prolonged (should discuss with outpatient hematologist Dr. Anne). You need to get CBC checked at least 2 times/week for potential need for blood transfusions. Please follow up with Heme/onc with Dr. Darrell Anne    Diagnosis: CHF, acute on chronic  Assessment and Plan of Treatment: Continue with Lasix and metoprolol  Weigh yourself daily.  If you gain 3lbs in 3 days, or 5lbs in a week call your Health Care Provider.  Do not eat or drink foods containing more than 2000mg of salt (sodium) in your diet every day.  Call your Health Care Provider if you have any swelling or increased swelling in your feet, ankles, and/or stomach.  Take all of your medication as directed.  If you become dizzy call your Health Care Provider.      Diagnosis: Arm erythema  Assessment and Plan of Treatment: redness round L upper arm where IV site pulled  US around this area  >US showed no clot  Infectious disease started abx doxycycline for 7 days 10/18 - 10/24  continue with arm elevation and warm compresses throughout the day  if it does not improve, plese follow up with your primary care doctor    Diagnosis: Orthostatic hypotension  Assessment and Plan of Treatment: started midodrine 10/18  now still + low blood pressure with standing, but no symptoms  continue     PRINCIPAL DISCHARGE DIAGNOSIS  Diagnosis: Hematuria  Assessment and Plan of Treatment: Secondary to UTI  Resolved, received IV antibiotics   Urine now clear  Continue yost - Trial of void in Rehab        SECONDARY DISCHARGE DIAGNOSES  Diagnosis: TACO (transfusion associated circulatory overload)  Assessment and Plan of Treatment: Provided IV furosemide and initially on BiPAP, now resolved.  No longer requiring supplemental oxygen.  Hold standing Lasix 40MG for now, but continue IV Lasix 20 as needed post transfusions  TTE reviewed - systolic and diastolic dysfunction      Diagnosis: AML (acute myeloid leukemia)  Assessment and Plan of Treatment: You have received several units of augustine blood cells and platelts to keep Hgb >8 and plt >20  You completed chemo at the hospital. Next chemo is scheduled around 11/15. As per Heme/onc, no plan for chemo during rehab stay but can delay chemotherapy if rehab stay prolonged (should discuss with outpatient hematologist Dr. Anne). You need to get CBC checked at least 2 times/week for potential need for blood transfusions. Please follow up with Heme/onc with Dr. Darrell Anne    Diagnosis: CHF, acute on chronic  Assessment and Plan of Treatment: Continue with Lasix and metoprolol  Weigh yourself daily.  If you gain 3lbs in 3 days, or 5lbs in a week call your Health Care Provider.  Do not eat or drink foods containing more than 2000mg of salt (sodium) in your diet every day.  Call your Health Care Provider if you have any swelling or increased swelling in your feet, ankles, and/or stomach.  Take all of your medication as directed.  If you become dizzy call your Health Care Provider.      Diagnosis: Arm erythema  Assessment and Plan of Treatment: redness round L upper arm where IV site pulled  US around this area  >US showed no clot  Infectious disease status post abx doxycycline for 7 days 10/18 - 10/24  continue with arm elevation and warm compresses throughout the day  if it does not improve, plese follow up with your primary care doctor    Diagnosis: Orthostatic hypotension  Assessment and Plan of Treatment: started midodrine 10/18  now still + low blood pressure with standing, but no symptoms  continue

## 2021-10-17 NOTE — PROVIDER CONTACT NOTE (OTHER) - ASSESSMENT
Patient A&O4. Patient able to make needs known. O2 sat 85- 90%. VSS. Patient denies SOB, CP, headache, discomfort. No signs of distress noted.
Pt & pt's significant other, Ashley, at bedside requesting to wait for another IV insertion until pt requires IV intervention at this time. Pt does not have IV medications at this time. Last transfusion 10/16 PM (PRBC) & 10/17 AM (Plt) with hgb 9.2 & plt 32 after transfusion.

## 2021-10-17 NOTE — DISCHARGE NOTE NURSING/CASE MANAGEMENT/SOCIAL WORK - PATIENT PORTAL LINK FT
You can access the FollowMyHealth Patient Portal offered by [facility] by registering at the following website: [email or web address]. By joining [facility]’s FollowMyHealth portal, you will also be able to view your health information using other applications (apps) compatible with our system.

## 2021-10-17 NOTE — PROGRESS NOTE ADULT - ASSESSMENT
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 Patient was transfused platelets in the ER with patient developing acute dyspnoea, and noted to have Transfusion Associated Circulatory Overload (TACO) and provided parenteral furosemide and initially on BiPAP.     # Suspected TACO in the S/P platelet transfusion in the setting of AML conversion from MDS on chemotherapy with hemorrhagic cystitis.  resolved, sp IV Azactam, completed abx  clear urine    # likely phlebitis ro cellulitis and dvt  check doppler UE ro dvt  will have ID ro cellulitis    # AML (acute myeloblastic leukemia)  pancytopenia, sp multiple prbcs and pl transfusions  Transfuse if Hb<8. Plt transfusion <20  outpt fu monday at [facility]    # acute on chronic combined chf  # AICD (automatic cardioverter/defibrillator) present  Cont Lasix 40po BID, extra lasix with transfusions  TTE reviewed - systolic and diastolic dysfunction      PCP:     Nunu Singh MD (PCP) [phone]

## 2021-10-17 NOTE — DISCHARGE NOTE PROVIDER - HOSPITAL COURSE
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 Patient was transfused platelets in the ER with patient developing acute dyspnoea, and noted to have Transfusion Associated Circulatory Overload (TACO) and provided parenteral furosemide and initially on BiPAP.     # Suspected TACO in the S/P platelet transfusion in the setting of AML conversion from MDS on chemotherapy with hemorrhagic cystitis.  resolved, sp IV Azactam, completed abx  clear urine    # AML (acute myeloblastic leukemia)  pancytopenia, sp multiple prbcs and pl transfusions  Transfuse if Hb<8. Plt transfusion <20  heme following, trasfuse prbc and platelets today, repeat post transfusions CBC, possible DC home later  outpt fu monday at [facility]    # acute on chronic combined chf  # AICD (automatic cardioverter/defibrillator) present  Cont Lasix 40po BID, extra lasix with transfusions  Daily weights, Strict I&Os  Keep K>4 and Mg >2  TTE reviewed - systolic and diastolic dysfunction  check O2 on RA    Medically stable for discharge. [place] [facility] [place].      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 Patient was transfused platelets in the ER with patient developing acute dyspnoea, and noted to have Transfusion Associated Circulatory Overload (TACO) and provided parenteral furosemide and initially on BiPAP.     # Suspected TACO in the S/P platelet transfusion in the setting of AML conversion from MDS on chemotherapy with hemorrhagic cystitis.  resolved, sp IV Azactam, completed abx  clear urine    # AML (acute myeloblastic leukemia)  pancytopenia, sp multiple prbcs and pl transfusions  Transfuse if Hb<8. Plt transfusion <20  received total of 6 PRBC transfusions and 8 plt transfusions  heme following,  given delay in dispo on 10/18 for orthostatics hypotension and eval of LUE arm swelling and erythema, heme started chemo 10/18 3rd dose and another on 10/19  - per heme, after plt transfusion today for plt 15 and completion of today's chemo, patient ok to be dispo from heme standpoint  outpt fu at [facility] next day after d/c    # acute on chronic combined chf  # AICD (automatic cardioverter/defibrillator) present  Cont Lasix 40po BID, extra lasix with transfusions  Daily weights, Strict I&Os  Keep K>4 and Mg >2  TTE reviewed - systolic and diastolic dysfunction  check O2 on RA    #orthostatic hypotension  - patient worked with PT on 10/18, very weak compared to prior eval, orthostatics +, ASAD recommended, started midodrine 5 TID, repeat ortho neg that following information neg prior to first dose  Repeat orthos following am +, but asymptomatic, continue midodrine 5 TID as is  - patient would not want ASAD as he feels his partner who is younger, stronger would be able to manage him    # LUE IV erythema from IV site  - dopplers neg for DVT, + superficial thrombophlebitis of cephalic vein, seen by ID, started on Doxy for 7 day course, no CI to dispo from ID standpoint    PT recommending ASAD, but patient does not want given he feel his partner can manage him and also so he does not miss chemo tx going forward.   Medically stable for discharge. To follow up at the [facility] on 10/19  Discussed with heme and medicine attending.   Med rec to be reviewed prior to discharge.     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 Patient was transfused platelets in the ER with patient developing acute dyspnoea, and noted to have Transfusion Associated Circulatory Overload (TACO) and provided parenteral furosemide and initially on BiPAP.     # Suspected TACO in the S/P platelet transfusion in the setting of AML conversion from MDS on chemotherapy with hemorrhagic cystitis.  resolved, sp IV Azactam, completed abx  clear urine    # likely phlebitis ro cellulitis and dvt  doxycycline 100mg bid to complete 7 day course  appreciate ID recs  warm compresses    # orthostatic hypotension  increase midodrine still symptomatic   monitor orthos    # AML (acute myeloblastic leukemia)  pancytopenia, sp multiple prbcs and pl transfusions  Transfuse if Hb<8. Plt transfusion <20  heme started azacitidine, s/p chemo daily by 10/22    # acute on chronic combined chf  # AICD (automatic cardioverter/defibrillator) present  Hold standing Lasix 40MG for now, but continue IV Lasix 20 as needed post transfusions  TTE reviewed - systolic and diastolic dysfunction    dc planning to rehab once counts stabilized  outpt mgmt for  prbc and pl transfusion    PT recommending ASAD, but patient does not want given he feel his partner can manage him and also so he does not miss chemo tx going forward.   Medically stable for discharge. To [place] [facility]  Discussed with heme and medicine attending.   Med rec to be reviewed prior to discharge.

## 2021-10-17 NOTE — DISCHARGE NOTE PROVIDER - NSFOLLOWUPCLINICS_GEN_ALL_ED_FT
[facility]  Hematology/Oncology  [address] [id number]  Phone: [phone]  Fax:   Follow Up Time: 1-3 days

## 2021-10-17 NOTE — DISCHARGE NOTE NURSING/CASE MANAGEMENT/SOCIAL WORK - NSDCFUADDAPPT_GEN_ALL_CORE_FT
APPTS ARE READY TO BE MADE: [ ] YES    Best Family or Patient Contact (if needed):    Additional Information about above appointments (if needed):    1:   2:   3:     Other comments or requests:

## 2021-10-17 NOTE — DISCHARGE NOTE PROVIDER - NSDCMRMEDTOKEN_GEN_ALL_CORE_FT
acetaminophen 325 mg oral tablet: 2 tab(s) orally every 6 hours, As needed, Mild Pain (1 - 3)  allopurinol: orally once a day  doxazosin 4 mg oral tablet: 1 tab(s) orally once a day (at bedtime)  furosemide 40 mg oral tablet: 1 tab(s) orally once a day  metoprolol succinate 25 mg oral tablet, extended release: 1 tab(s) orally once a day  Reglan 10 mg oral tablet: 1 tab(s) orally every 6 hours, As Needed   Vidaza: Sq for 7days recieved in hosp   acetaminophen 325 mg oral tablet: 2 tab(s) orally every 6 hours, As needed, Mild Pain (1 - 3)  allopurinol: orally once a day  bisacodyl 5 mg oral delayed release tablet: 1 tab(s) orally every 12 hours, As needed, Constipation  doxazosin 4 mg oral tablet: 1 tab(s) orally once a day (at bedtime)  metoprolol succinate 25 mg oral tablet, extended release: 1 tab(s) orally once a day  polyethylene glycol 3350 oral powder for reconstitution: 17 gram(s) orally 2 times a day  Reglan 10 mg oral tablet: 1 tab(s) orally every 6 hours, As Needed   senna oral tablet: 2 tab(s) orally once a day (at bedtime)  Vidaza: Sq for 7days recieved in hosp   acetaminophen 325 mg oral tablet: 2 tab(s) orally every 6 hours, As needed, Mild Pain (1 - 3)  allopurinol: orally once a day  bisacodyl 5 mg oral delayed release tablet: 1 tab(s) orally every 12 hours, As needed, Constipation  doxazosin 4 mg oral tablet: 1 tab(s) orally once a day (at bedtime)  metoprolol succinate 25 mg oral tablet, extended release: 1 tab(s) orally once a day  midodrine 5 mg oral tablet: 2 tab(s) orally 3 times a day  polyethylene glycol 3350 oral powder for reconstitution: 17 gram(s) orally 2 times a day  senna oral tablet: 2 tab(s) orally once a day (at bedtime)

## 2021-10-17 NOTE — PROGRESS NOTE ADULT - SUBJECTIVE AND OBJECTIVE BOX
Patient is a 86y old  Male who presents with a chief complaint of Episode of gross haematuria at home, with dyspnoea in the ER past platelet transfusion. (17 Oct 2021 09:33)      SUBJECTIVE / OVERNIGHT EVENTS:    Patient seen and examined. no complaints. planned to dc home but new erythema and pain on arm after IV removed.      Vital Signs Last 24 Hrs  T(C): 36.9 (18 Oct 2021 04:57), Max: 37.2 (17 Oct 2021 20:06)  T(F): 98.5 (18 Oct 2021 04:57), Max: 99 (17 Oct 2021 20:06)  HR: 98 (18 Oct 2021 04:57) (61 - 98)  BP: 116/64 (18 Oct 2021 04:57) (102/55 - 116/64)  BP(mean): --  RR: 18 (18 Oct 2021 04:57) (18 - 18)  SpO2: 95% (18 Oct 2021 04:57) (94% - 95%)  I&O's Summary    17 Oct 2021 07:01  -  18 Oct 2021 07:00  --------------------------------------------------------  IN: 600 mL / OUT: 1850 mL / NET: -1250 mL        PE:  GENERAL: NAD, AAOx3  HEAD:  Atraumatic, Normocephalic  CHEST/LUNG: CTABL, No wheeze  HEART: Regular rate and rhythm; no murmur  ABDOMEN: Soft, Nontender, Nondistended; Bowel sounds present  EXTREMITIES:  2+ Peripheral Pulses, No clubbing, cyanosis, or edema  SKIN: arm with erythema and tenderness    LABS:                        8.4    2.36  )-----------( 22       ( 18 Oct 2021 06:04 )             26.2     10-17    144  |  103  |  62<H>  ----------------------------<  93  4.6   |  31  |  1.32<H>    Ca    9.4      17 Oct 2021 06:58        CAPILLARY BLOOD GLUCOSE                RADIOLOGY & ADDITIONAL TESTS:    Imaging Personally Reviewed:  [x] YES  [ ] NO    Consultant(s) Notes Reviewed:  [x] YES  [ ] NO    MEDICATIONS  (STANDING):  acetaminophen   Tablet .. 650 milliGRAM(s) Oral once  Biotene Dry Mouth Oral Rinse 15 milliLiter(s) Swish and Spit three times a day  dextrose 40% Gel 15 Gram(s) Oral once  dextrose 5%. 1000 milliLiter(s) (50 mL/Hr) IV Continuous <Continuous>  dextrose 5%. 1000 milliLiter(s) (100 mL/Hr) IV Continuous <Continuous>  dextrose 50% Injectable 25 Gram(s) IV Push once  dextrose 50% Injectable 12.5 Gram(s) IV Push once  dextrose 50% Injectable 25 Gram(s) IV Push once  doxazosin 4 milliGRAM(s) Oral at bedtime  furosemide    Tablet 40 milliGRAM(s) Oral daily  glucagon  Injectable 1 milliGRAM(s) IntraMuscular once  influenza   Vaccine 0.5 milliLiter(s) IntraMuscular once  metoprolol succinate ER 25 milliGRAM(s) Oral daily    MEDICATIONS  (PRN):  acetaminophen   Tablet .. 650 milliGRAM(s) Oral every 6 hours PRN Mild Pain (1 - 3)      Care Discussed with Consultants/Other Providers [x] YES  [ ] NO    HEALTH ISSUES - PROBLEM Dx:  Acute hemorrhagic cystitis    AML (acute myeloblastic leukemia)    AICD (automatic cardioverter/defibrillator) present    Need for prophylactic measure    Discharge planning issues    Acute on chronic heart failure with preserved ejection fraction (HFpEF)    Atrial fibrillation    Pericardial effusion    Chronic heart failure

## 2021-10-17 NOTE — PROVIDER CONTACT NOTE (OTHER) - BACKGROUND
Pt admitted with dx. Acute hemorrhagic cystitis, MDS->AML (transfusion dependent). Pt's discharge today canceled 2/2 LUE redness/warmth. Currently pending LLE doppler to r/o DVT & ID consult.
Patient admitted for hematuria  Problem/ DX : Acute hemorrhagic cystitis, AML, CHF

## 2021-10-17 NOTE — DISCHARGE NOTE PROVIDER - NSDCFUSCHEDAPPT_GEN_ALL_CORE_FT
RUBÉN GOLDBERG I ; 10/18/2021 ; [facility]  RUBÉN GOLDBERG I ; 10/19/2021 ; [facility]  RUBÉN GOLDBERG I ; 10/20/2021 ; [facility]  RUBÉN GOLDBERG I ; 10/21/2021 ; [facility]  RUBÉN GOLDBERG I ; 10/22/2021 ; [facility]  RUBÉN GOLDBERG I ; 10/25/2021 ; [facility]  RUBÉN GOLDBERG I ; 10/27/2021 ; [facility]  RUBÉN GOLDBERG I ; 10/29/2021 ; [facility] RUBÉN GOLDBERG I ; 10/19/2021 ; [facility]  RUBÉN GOLDBERG I ; 10/20/2021 ; [facility]  RUBÉN GOLDBERG I ; 10/21/2021 ; [facility]  RUBÉN GOLDBERG I ; 10/22/2021 ; [facility]  RUBÉN GOLDBERG I ; 10/25/2021 ; [facility]  RUBÉN GOLDBERG I ; 10/27/2021 ; [facility]  RUBÉN GOLDBERG I ; 10/29/2021 ; [facility] RUBÉN GOLDBERG I ; 10/27/2021 ; [facility]  RUBÉN GOLDBERG I ; 10/29/2021 ; [facility]  RUBÉN GOLDBERG ; 11/01/2021 ; [facility]  RUBÉN GOLDBERG I ; 11/03/2021 ; [facility]  RUBÉN GOLDBERG I ; 11/03/2021 ; [facility]  RUBÉN GOLDBERG I ; 11/05/2021 ; [facility] RUBÉN GOLDBERG ; 10/29/2021 ; [facility]  RUBÉN GOLDBERG ; 11/01/2021 ; [facility]  RUBÉN GOLDBERG ; 11/03/2021 ; [facility]  RUBÉN GOLDBERG ; 11/03/2021 ; [facility]  RUBÉN GOLDBERG ; 11/05/2021 ; [facility]

## 2021-10-18 NOTE — PROGRESS NOTE ADULT - SUBJECTIVE AND OBJECTIVE BOX
[facility], Division of Infectious Diseases  RACNHA Briggs Y. Patel, S. Shah  [phone]  ([phone] - weekdays after 5pm and weekends)    Name: RUBÉN GOLDBERG  Age/Gender: 86y Male  MRN: [id number]    Interval History:  Patient seen this morning.   Notes reviewed.   No concerning overnight events.  Afebrile.   Patient developed thrombophlebitis at prior IV site. ID re-consulted to evaluate     Allergies: adhesives (Rash)  clonidine (Swelling; Rash)  felodipine (Rash)  penicillin (Rash)  sulfa drugs (Rash)      Objective:  Vitals:   T(F): 98.5 (10-18-21 @ 04:57), Max: 99 (10-17-21 @ 20:06)  HR: 98 (10-18-21 @ 04:57) (61 - 98)  BP: 116/64 (10-18-21 @ 04:57) (102/55 - 116/64)  RR: 18 (10-18-21 @ 04:57) (18 - 18)  SpO2: 95% (10-18-21 @ 04:57) (94% - 95%)  Physical Examination:  General: no acute distress, nontoxic appearing  HEENT: NC/AT, EOMI, anicteric  Cardio: S1, S2 present, normal rate  Resp: clear to auscultation bilaterally  Abd: soft, NT, ND  Neuro: AAOx3, no obvious focal deficits  Ext: no LE edema  Skin: warm, dry, L antecubital fossa w/ erythema, swelling, tenderness  Psych: appropriate affect and mood for situation  Lines:    Laboratory Studies:  CBC:                       8.4    2.36  )-----------( 22       ( 18 Oct 2021 06:04 )             26.2     WBC Trend:  2.36 10-18-21 @ 06:04  2.89 10-17-21 @ 06:59  2.60 10-17-21 @ 00:32  2.75 10-16-21 @ 17:04  2.60 10-16-21 @ 15:17  2.59 10-16-21 @ 05:41  2.71 10-15-21 @ 16:14  2.76 10-15-21 @ 06:56  2.78 10-14-21 @ 07:18  2.96 10-13-21 @ 17:19  2.33 10-13-21 @ 01:18  2.22 10-13-21 @ 00:02  2.41 10-12-21 @ 07:56  2.71 10-11-21 @ 22:33    CMP: 10-17    144  |  103  |  62<H>  ----------------------------<  93  4.6   |  31  |  1.32<H>    Ca    9.4      17 Oct 2021 06:58              Microbiology: reviewed     Culture - Urine (collected 10-05-21 @ 18:55)  Source: Clean Catch Clean Catch (Midstream)  Final Report (10-08-21 @ 11:55):    50,000 - 99,000 CFU/mL Enterococcus faecalis  Organism: Enterococcus faecalis (10-08-21 @ 11:55)  Organism: Enterococcus faecalis (10-08-21 @ 11:55)      -  Ampicillin: S <=2 Predicts results to ampicillin/sulbactam, amoxacillin-clavulanate and  piperacillin-tazobactam.      -  Ciprofloxacin: S <=1      -  Levofloxacin: S <=1      -  Nitrofurantoin: S <=32 Should not be used to treat pyelonephritis.      -  Tetra/Doxy: R >8      -  Vancomycin: S 2      Method Type: RADHA      Radiology: reviewed     Medications:  acetaminophen   Tablet .. 650 milliGRAM(s) Oral every 6 hours PRN  acetaminophen   Tablet .. 650 milliGRAM(s) Oral once  Biotene Dry Mouth Oral Rinse 15 milliLiter(s) Swish and Spit three times a day  dextrose 40% Gel 15 Gram(s) Oral once  dextrose 5%. 1000 milliLiter(s) IV Continuous <Continuous>  dextrose 5%. 1000 milliLiter(s) IV Continuous <Continuous>  dextrose 50% Injectable 25 Gram(s) IV Push once  dextrose 50% Injectable 12.5 Gram(s) IV Push once  dextrose 50% Injectable 25 Gram(s) IV Push once  doxazosin 4 milliGRAM(s) Oral at bedtime  furosemide    Tablet 40 milliGRAM(s) Oral daily  glucagon  Injectable 1 milliGRAM(s) IntraMuscular once  influenza   Vaccine 0.5 milliLiter(s) IntraMuscular once  metoprolol succinate ER 25 milliGRAM(s) Oral daily  midodrine. 5 milliGRAM(s) Oral three times a day    Antimicrobials:

## 2021-10-18 NOTE — PROGRESS NOTE ADULT - ASSESSMENT
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 He received platelet transfusion for thrombocytopenia & hematuria c/b TACO      thrombophlebitis, cellulitis  induration, erythema at prior IV site, some tenderness  w/ overlying cellulitis  allergies reviewed- states rash to penicillins  reports tolerating doxycyline with no issues in the past  start doxycycline 100mg bid to complete 7 day course  warm compress, elevation  no objection to discharge from ID perspective    penicillin allergy  would have patient undergo penicillin skin testing outpt as reports received penicillin in his teens w/ rash reaction  would increase the available antibiotics to treat him in the future if penicillins can be used    hematuria  resolved    Hx prostate cancer  Monitor for obstructive uropathy as this will increase his risk of UTI    We will sign off. Thank you for allowing us to participate in the care of Mr. Padilla. Please feel free to call us with any questions or concerns.    Jhoan Espinoza M.D.  [phone]  [facility], Division of Infectious Diseases  [phone]  After 5pm on weekdays and all day on weekends - please call [phone]

## 2021-10-18 NOTE — PROGRESS NOTE ADULT - SUBJECTIVE AND OBJECTIVE BOX
Hematology Follow-up    INTERVAL HPI/OVERNIGHT EVENTS:  Patient S&E at bedside. Pt had orthostatic hypotension (symptomatic), dizzy when he was trying to walk.    VITAL SIGNS:  T(F): 97.6 (10-18-21 @ 12:18)  HR: 98 (10-18-21 @ 04:57)  BP: 116/64 (10-18-21 @ 04:57)  RR: 18 (10-18-21 @ 12:18)  SpO2: 96% (10-18-21 @ 12:18)  Wt(kg): --    PHYSICAL EXAM:    Constitutional: AAOx3, NAD  Eyes: EOMI, sclera non-icteric  Neck: supple, no masses, no JVD  Respiratory: CTA b/l, good air entry b/l, no wheezing, rhonchi, rales  Cardiovascular: RRR, normal S1S2  Gastrointestinal: soft, NTND, no masses palpable, BS normal in all four quadrants  Extremities:  no c/c/e  Neurological: Grossly intact  Skin: Normal temperature    MEDICATIONS  (STANDING):  acetaminophen   Tablet .. 650 milliGRAM(s) Oral once  Biotene Dry Mouth Oral Rinse 15 milliLiter(s) Swish and Spit three times a day  dextrose 40% Gel 15 Gram(s) Oral once  dextrose 5%. 1000 milliLiter(s) (50 mL/Hr) IV Continuous <Continuous>  dextrose 5%. 1000 milliLiter(s) (100 mL/Hr) IV Continuous <Continuous>  dextrose 50% Injectable 25 Gram(s) IV Push once  dextrose 50% Injectable 12.5 Gram(s) IV Push once  dextrose 50% Injectable 25 Gram(s) IV Push once  doxazosin 4 milliGRAM(s) Oral at bedtime  furosemide    Tablet 40 milliGRAM(s) Oral daily  glucagon  Injectable 1 milliGRAM(s) IntraMuscular once  influenza   Vaccine 0.5 milliLiter(s) IntraMuscular once  metoprolol succinate ER 25 milliGRAM(s) Oral daily  midodrine. 5 milliGRAM(s) Oral three times a day    MEDICATIONS  (PRN):  acetaminophen   Tablet .. 650 milliGRAM(s) Oral every 6 hours PRN Mild Pain (1 - 3)      adhesives (Rash)  clonidine (Swelling; Rash)  felodipine (Rash)  penicillin (Rash)  sulfa drugs (Rash)      LABS:                        8.4    2.36  )-----------( 22       ( 18 Oct 2021 06:04 )             26.2     10-18    147<H>  |  102  |  62<H>  ----------------------------<  106<H>  4.1   |  32<H>  |  1.24    Ca    9.3      18 Oct 2021 12:12    TPro  6.0  /  Alb  3.4  /  TBili  1.2  /  DBili  x   /  AST  9<L>  /  ALT  12  /  AlkPhos  132<H>  10-18         Haptoglobin, Serum: 150 mg/dL (10-12-21 @ 09:35)  Haptoglobin, Serum: 120 mg/dL (10-07-21 @ 14:21)  Direct Melissa Poly: Negative (10-05-21 @ 19:26)  Fibrinogen Assay: 430 mg/dL (10-05-21 @ 18:14)  D-Dimer Assay, Quantitative: 2755 ng/mL DDU (10-05-21 @ 18:14)        RADIOLOGY & ADDITIONAL TESTS:  Studies reviewed.

## 2021-10-18 NOTE — ADVANCED PRACTICE NURSE CONSULT - ASSESSMENT
Patient received in bed, alert and oriented x 4, capable of expressing all needs to staff. Day 1/2,Height and weight verified.  Consent in chart. Lab results as per Md dong aware of same. Vital signs stable prior to chemotherapy and  within acceptable parameters, see sunrise. Pt and spouse educated on the importance of saving urine, verbalizes good understanding. Pt education done re chemo regimen, drug effects and potential side effects, written materials provided, pt states understanding. Reports that he has been tolerating chemotherapy well without side effects.  Pre- medicated with Zofran 8mg PO. Azacitidine 75mg/m5=240xl Subcutaneous. Same given in three divided syringes. There injections administered  to LLQ. Patient tolerated same without immediate signs and symptoms of adverse reactions noted. Report given to area nurse.

## 2021-10-18 NOTE — PROGRESS NOTE ADULT - ASSESSMENT
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 Patient was transfused platelets in the ER with patient developing acute dyspnoea, and noted to have Transfusion Associated Circulatory Overload (TACO) and provided parenteral furosemide and initially on BiPAP.     # Suspected TACO in the S/P platelet transfusion in the setting of AML conversion from MDS on chemotherapy with hemorrhagic cystitis.  resolved, sp IV Azactam, completed abx  clear urine    # likely phlebitis ro cellulitis and dvt  check doppler UE ro dvt  will have ID ro cellulitis    # AML (acute myeloblastic leukemia)  pancytopenia, sp multiple prbcs and pl transfusions  Transfuse if Hb<8. Plt transfusion <20  outpt fu monday at [facility]    # acute on chronic combined chf  # AICD (automatic cardioverter/defibrillator) present  Cont Lasix 40po BID, extra lasix with transfusions  TTE reviewed - systolic and diastolic dysfunction    dc planning if doppler negative and after ID fu  [facility] appt today 4pm 85 y/o M PMHx atrial fibrillation previously on sotalol and Coumadin (held due to thrombocytopaenia and sotalol changed to Toprol per EPS),  S/P ablation with AICD upgrade for PPM following ablation apparently due to ventricular ectopy noted on EPS evaluation, prostate CA followed by a urologist in [place] (unclear to current status of prostate CA), with 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 Patient was transfused platelets in the ER with patient developing acute dyspnoea, and noted to have Transfusion Associated Circulatory Overload (TACO) and provided parenteral furosemide and initially on BiPAP.     # Suspected TACO in the S/P platelet transfusion in the setting of AML conversion from MDS on chemotherapy with hemorrhagic cystitis.  resolved, sp IV Azactam, completed abx  clear urine    # likely phlebitis ro cellulitis and dvt  check doppler UE ro dvt  doxycycline 100mg bid to complete 7 day course  appreciate ID recs  warm compresses    # orthostatic hypotension  start midodrine as high risk for pulm edema with IVF  repeat orthos this afternoon and negar AM    # AML (acute myeloblastic leukemia)  pancytopenia, sp multiple prbcs and pl transfusions  Transfuse if Hb<8. Plt transfusion <20  heme to fu for chemo    # acute on chronic combined chf  # AICD (automatic cardioverter/defibrillator) present  Cont Lasix 40po BID, extra lasix with transfusions  TTE reviewed - systolic and diastolic dysfunction    dc cancelled for orthostasis

## 2021-10-18 NOTE — PROGRESS NOTE ADULT - SUBJECTIVE AND OBJECTIVE BOX
Patient is a 86y old  Male who presents with a chief complaint of Episode of gross haematuria at home, with dyspnoea in the ER past platelet transfusion. (17 Oct 2021 18:05)      SUBJECTIVE / OVERNIGHT EVENTS:    Patient seen and examined.   new erythema and tenderness on arm after IV removed. t max 99.    Vital Signs Last 24 Hrs  T(C): 36.9 (18 Oct 2021 04:57), Max: 37.2 (17 Oct 2021 20:06)  T(F): 98.5 (18 Oct 2021 04:57), Max: 99 (17 Oct 2021 20:06)  HR: 98 (18 Oct 2021 04:57) (61 - 98)  BP: 116/64 (18 Oct 2021 04:57) (102/55 - 116/64)  BP(mean): --  RR: 18 (18 Oct 2021 04:57) (18 - 18)  SpO2: 95% (18 Oct 2021 04:57) (94% - 95%)  I&O's Summary    17 Oct 2021 07:01  -  18 Oct 2021 07:00  --------------------------------------------------------  IN: 600 mL / OUT: 1850 mL / NET: -1250 mL        PE:  GENERAL: NAD, AAOx3  HEAD:  Atraumatic, Normocephalic  CHEST/LUNG: CTABL, No wheeze  HEART: Regular rate and rhythm; no murmur  ABDOMEN: Soft, Nontender, Nondistended; Bowel sounds present  EXTREMITIES:  2+ Peripheral Pulses, No clubbing, cyanosis, or edema  SKIN: arm with erythema and tenderness    LABS:                        8.4    2.36  )-----------( 22       ( 18 Oct 2021 06:04 )             26.2     10-17    144  |  103  |  62<H>  ----------------------------<  93  4.6   |  31  |  1.32<H>    Ca    9.4      17 Oct 2021 06:58        CAPILLARY BLOOD GLUCOSE                RADIOLOGY & ADDITIONAL TESTS:    Imaging Personally Reviewed:  [x] YES  [ ] NO    Consultant(s) Notes Reviewed:  [x] YES  [ ] NO    MEDICATIONS  (STANDING):  acetaminophen   Tablet .. 650 milliGRAM(s) Oral once  Biotene Dry Mouth Oral Rinse 15 milliLiter(s) Swish and Spit three times a day  dextrose 40% Gel 15 Gram(s) Oral once  dextrose 5%. 1000 milliLiter(s) (50 mL/Hr) IV Continuous <Continuous>  dextrose 5%. 1000 milliLiter(s) (100 mL/Hr) IV Continuous <Continuous>  dextrose 50% Injectable 25 Gram(s) IV Push once  dextrose 50% Injectable 12.5 Gram(s) IV Push once  dextrose 50% Injectable 25 Gram(s) IV Push once  doxazosin 4 milliGRAM(s) Oral at bedtime  furosemide    Tablet 40 milliGRAM(s) Oral daily  glucagon  Injectable 1 milliGRAM(s) IntraMuscular once  influenza   Vaccine 0.5 milliLiter(s) IntraMuscular once  metoprolol succinate ER 25 milliGRAM(s) Oral daily    MEDICATIONS  (PRN):  acetaminophen   Tablet .. 650 milliGRAM(s) Oral every 6 hours PRN Mild Pain (1 - 3)      Care Discussed with Consultants/Other Providers [x] YES  [ ] NO    HEALTH ISSUES - PROBLEM Dx:  Acute hemorrhagic cystitis    AML (acute myeloblastic leukemia)    AICD (automatic cardioverter/defibrillator) present    Need for prophylactic measure    Discharge planning issues    Acute on chronic heart failure with preserved ejection fraction (HFpEF)    Atrial fibrillation    Pericardial effusion    Chronic heart failure         Patient is a 86y old  Male who presents with a chief complaint of Episode of gross haematuria at home, with dyspnoea in the ER past platelet transfusion. (17 Oct 2021 18:05)      SUBJECTIVE / OVERNIGHT EVENTS:    Patient seen and examined.   new erythema and tenderness on arm after IV removed. t max 99.  dizzy after getting up today and could barely ambulate.    Vital Signs Last 24 Hrs  T(C): 36.9 (18 Oct 2021 04:57), Max: 37.2 (17 Oct 2021 20:06)  T(F): 98.5 (18 Oct 2021 04:57), Max: 99 (17 Oct 2021 20:06)  HR: 98 (18 Oct 2021 04:57) (61 - 98)  BP: 116/64 (18 Oct 2021 04:57) (102/55 - 116/64)  BP(mean): --  RR: 18 (18 Oct 2021 04:57) (18 - 18)  SpO2: 95% (18 Oct 2021 04:57) (94% - 95%)  I&O's Summary    17 Oct 2021 07:01  -  18 Oct 2021 07:00  --------------------------------------------------------  IN: 600 mL / OUT: 1850 mL / NET: -1250 mL        PE:  GENERAL: NAD, AAOx3  HEAD:  Atraumatic, Normocephalic  CHEST/LUNG: CTABL, No wheeze  HEART: Regular rate and rhythm; no murmur  ABDOMEN: Soft, Nontender, Nondistended; Bowel sounds present  EXTREMITIES:  2+ Peripheral Pulses, No clubbing, cyanosis, or edema  SKIN: l arm with erythema and tenderness, streaking    LABS:                        8.4    2.36  )-----------( 22       ( 18 Oct 2021 06:04 )             26.2     10-17    144  |  103  |  62<H>  ----------------------------<  93  4.6   |  31  |  1.32<H>    Ca    9.4      17 Oct 2021 06:58        CAPILLARY BLOOD GLUCOSE                RADIOLOGY & ADDITIONAL TESTS:    Imaging Personally Reviewed:  [x] YES  [ ] NO    Consultant(s) Notes Reviewed:  [x] YES  [ ] NO    MEDICATIONS  (STANDING):  acetaminophen   Tablet .. 650 milliGRAM(s) Oral once  Biotene Dry Mouth Oral Rinse 15 milliLiter(s) Swish and Spit three times a day  dextrose 40% Gel 15 Gram(s) Oral once  dextrose 5%. 1000 milliLiter(s) (50 mL/Hr) IV Continuous <Continuous>  dextrose 5%. 1000 milliLiter(s) (100 mL/Hr) IV Continuous <Continuous>  dextrose 50% Injectable 25 Gram(s) IV Push once  dextrose 50% Injectable 12.5 Gram(s) IV Push once  dextrose 50% Injectable 25 Gram(s) IV Push once  doxazosin 4 milliGRAM(s) Oral at bedtime  furosemide    Tablet 40 milliGRAM(s) Oral daily  glucagon  Injectable 1 milliGRAM(s) IntraMuscular once  influenza   Vaccine 0.5 milliLiter(s) IntraMuscular once  metoprolol succinate ER 25 milliGRAM(s) Oral daily    MEDICATIONS  (PRN):  acetaminophen   Tablet .. 650 milliGRAM(s) Oral every 6 hours PRN Mild Pain (1 - 3)      Care Discussed with Consultants/Other Providers [x] YES  [ ] NO    HEALTH ISSUES - PROBLEM Dx:  Acute hemorrhagic cystitis    AML (acute myeloblastic leukemia)    AICD (automatic cardioverter/defibrillator) present    Need for prophylactic measure    Discharge planning issues    Acute on chronic heart failure with preserved ejection fraction (HFpEF)    Atrial fibrillation    Pericardial effusion    Chronic heart failure

## 2021-10-18 NOTE — PROGRESS NOTE ADULT - ASSESSMENT
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     #AML  - on azacitidine  - recommend transfusion to Hgb >8, Plt >20. Recommend slow transfusion given previous hx of TACO  - patient due for next cycle azacitidine, started (10/14- ). Today is day 3 of the chemo. Continue chemo daily till discharge.  - no heme barriers to discharge once stable: please ensure Hb >8 and Plt >20 prior to discharge  - patient has appointments at [facility] starting right away to continue azacitidine after discharge    #UTI - resolved.  - UCx with Enterococcus faecalis   - completed course of aztrenoam, pt not febrile.     # Orthostatic hypotension  - Pt is on lasix for LV dysfunction, HF  - Midodrine added by the primary team  - Continue to monitor orthostatic BP and symptoms  - Consider discontinue Cadura, adjust cardiac meds that lowers BP.    Gaye Borrero MD  PGY 6, Oncology/Hematology fellow  (P) [phone]  After 5pm, please contact on-call team. 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 Started on azacitidine. Plan is to continue 4C of azacitidine followed by BMBx  - recommend transfusion to Hgb >8, Plt >20. Recommend slow transfusion given previous hx of TACO  - patient due for next cycle azacitidine, started (10/14- ). Today is day 3 of the chemo. Continue chemo daily till discharge.  - no heme barriers to discharge once stable: please ensure Hb >8 and Plt >20 prior to discharge  - patient has appointments at [facility] starting right away to continue azacitidine after discharge    #UTI - resolved.  - UCx with Enterococcus faecalis   - completed course of aztrenoam, pt not febrile.     # Orthostatic hypotension  - Pt is on lasix for LV dysfunction, HF  - Midodrine added by the primary team  - Continue to monitor orthostatic BP and symptoms  - Consider discontinue Cadura, adjust cardiac meds that lowers BP.    Gaye Borrero MD  PGY 6, Oncology/Hematology fellow  (P) [phone]  After 5pm, please contact on-call team.

## 2021-10-19 NOTE — ADVANCED PRACTICE NURSE CONSULT - ASSESSMENT
Patient received in bed, alert and oriented x 4, capable of expressing all needs to staff. Day 2/2,Height and weight verified.  Consent in chart. Lab results as per Md dong aware of same. Vital signs stable prior to chemotherapy and  within acceptable parameters, see sunrise. Pt educated on the importance of saving urine, verbalizes good understanding. Pt education done re chemo regimen, drug effects and potential side effects, written materials provided, pt states understanding. Reports that he has been tolerating chemotherapy well without side effects.  Pre- medicated with Zofran 8mg PO. Azacitidine 75mg/w8=704mp Subcutaneous. Same given in three divided syringes. There injections administered  to RLQ. Patient tolerated same without immediate signs and symptoms of adverse reactions noted. Report given to area nurse.

## 2021-10-19 NOTE — PROGRESS NOTE ADULT - SUBJECTIVE AND OBJECTIVE BOX
Hematology Follow-up    INTERVAL HPI/OVERNIGHT EVENTS:  Patient S&E at bedside. No o/n events, patient resting comfortably. Pt still feels weak.    VITAL SIGNS:  T(F): 98.4 (10-19-21 @ 11:30)  HR: 60 (10-19-21 @ 12:40)  BP: 107/52 (10-19-21 @ 12:40)  RR: 18 (10-19-21 @ 11:30)  SpO2: 98% (10-19-21 @ 11:30)  Wt(kg): --    PHYSICAL EXAM:    Constitutional: AAOx3, NAD  Eyes: EOMI, sclera non-icteric  Neck: supple, no masses, no JVD  Respiratory: CTA b/l, good air entry b/l, no wheezing, rhonchi, rales, with normal respiratory effort and no intercostal retractions  Cardiovascular: RRR, normal S1S2   Gastrointestinal: soft, NTND, no masses palpable, BS normal in all four quadrants   Extremities: no c/c/e  Neurological: Grossly intact  Skin: Normal temperature    MEDICATIONS  (STANDING):  acetaminophen   Tablet .. 650 milliGRAM(s) Oral once  azaCITIDine Injectable (eMAR) 52.6 milliGRAM(s) SubCutaneous every 24 hours  azaCITIDine Injectable (eMAR) 52.6 milliGRAM(s) SubCutaneous every 24 hours  azaCITIDine Injectable (eMAR) 52.6 milliGRAM(s) SubCutaneous every 24 hours  Biotene Dry Mouth Oral Rinse 15 milliLiter(s) Swish and Spit three times a day  dextrose 40% Gel 15 Gram(s) Oral once  dextrose 5%. 1000 milliLiter(s) (50 mL/Hr) IV Continuous <Continuous>  dextrose 5%. 1000 milliLiter(s) (100 mL/Hr) IV Continuous <Continuous>  dextrose 50% Injectable 25 Gram(s) IV Push once  dextrose 50% Injectable 12.5 Gram(s) IV Push once  dextrose 50% Injectable 25 Gram(s) IV Push once  doxazosin 4 milliGRAM(s) Oral at bedtime  doxycycline hyclate Capsule 100 milliGRAM(s) Oral every 12 hours  furosemide    Tablet 40 milliGRAM(s) Oral daily  glucagon  Injectable 1 milliGRAM(s) IntraMuscular once  influenza   Vaccine 0.5 milliLiter(s) IntraMuscular once  metoprolol succinate ER 25 milliGRAM(s) Oral daily  midodrine. 5 milliGRAM(s) Oral three times a day  ondansetron    Tablet 8 milliGRAM(s) Oral every 24 hours    MEDICATIONS  (PRN):  acetaminophen   Tablet .. 650 milliGRAM(s) Oral every 6 hours PRN Mild Pain (1 - 3)      adhesives (Rash)  clonidine (Swelling; Rash)  felodipine (Rash)  penicillin (Rash)  sulfa drugs (Rash)      LABS:                        8.6    2.22  )-----------( 15       ( 19 Oct 2021 07:05 )             26.7     10-19    140  |  101  |  56<H>  ----------------------------<  91  4.1   |  31  |  1.30    Ca    9.2      19 Oct 2021 07:07    TPro  6.2  /  Alb  3.4  /  TBili  1.6<H>  /  DBili  x   /  AST  9<L>  /  ALT  10  /  AlkPhos  134<H>  10-19         Haptoglobin, Serum: 150 mg/dL (10-12-21 @ 09:35)  Haptoglobin, Serum: 120 mg/dL (10-07-21 @ 14:21)  Direct Melissa Poly: Negative (10-05-21 @ 19:26)  Fibrinogen Assay: 430 mg/dL (10-05-21 @ 18:14)  D-Dimer Assay, Quantitative: 2755 ng/mL DDU (10-05-21 @ 18:14)        RADIOLOGY & ADDITIONAL TESTS:  Studies reviewed.      < from: [facility] Duplex Upper Ext Vein Scan, Left (10.18.21 @ 14:19) >    IMPRESSION: No evidence of left upper extremity deep venous thrombosis.    Superficial thrombophlebitis affects the left cephalic vein.    < end of copied text >

## 2021-10-19 NOTE — PROGRESS NOTE ADULT - ASSESSMENT
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 Patient was transfused platelets in the ER with patient developing acute dyspnoea, and noted to have Transfusion Associated Circulatory Overload (TACO) and provided parenteral furosemide and initially on BiPAP.     # Suspected TACO in the S/P platelet transfusion in the setting of AML conversion from MDS on chemotherapy with hemorrhagic cystitis.  resolved, sp IV Azactam, completed abx  clear urine    # likely phlebitis ro cellulitis and dvt  doxycycline 100mg bid to complete 7 day course  appreciate ID recs  warm compresses    # orthostatic hypotension  increase midodrine still symptomatic   repeat orthos in AM    # AML (acute myeloblastic leukemia)  pancytopenia, sp multiple prbcs and pl transfusions  Transfuse if Hb<8. Plt transfusion <20  heme started azacitidine, continue chemo daily till 10/22    # acute on chronic combined chf  # AICD (automatic cardioverter/defibrillator) present  Cont Lasix 40po BID, extra lasix with transfusions  TTE reviewed - systolic and diastolic dysfunction    dc planning to rehab once chemo completed    Dr. Flower will take over care tomorrow.  Please contact with any questions or concerns [phone].

## 2021-10-19 NOTE — PROGRESS NOTE ADULT - SUBJECTIVE AND OBJECTIVE BOX
Patient is a 86y old  Male who presents with a chief complaint of Episode of gross haematuria at home, with dyspnoea in the ER past platelet transfusion. (19 Oct 2021 13:32)      SUBJECTIVE / OVERNIGHT EVENTS:    Patient seen and examined. co dizziness standing. started on midodrine. orthos still pos.      Vital Signs Last 24 Hrs  T(C): 36.9 (19 Oct 2021 11:30), Max: 37.1 (18 Oct 2021 20:10)  T(F): 98.4 (19 Oct 2021 11:30), Max: 98.8 (18 Oct 2021 20:10)  HR: 60 (19 Oct 2021 12:40) (60 - 82)  BP: 107/52 (19 Oct 2021 12:40) (105/52 - 109/51)  BP(mean): --  RR: 18 (19 Oct 2021 11:30) (18 - 18)  SpO2: 98% (19 Oct 2021 11:30) (95% - 98%)  I&O's Summary    18 Oct 2021 07:01  -  19 Oct 2021 07:00  --------------------------------------------------------  IN: 590 mL / OUT: 1150 mL / NET: -560 mL    19 Oct 2021 07:01  -  19 Oct 2021 13:52  --------------------------------------------------------  IN: 240 mL / OUT: 0 mL / NET: 240 mL        PE:  GENERAL: NAD, AAOx3  HEAD:  Atraumatic, Normocephalic  CHEST/LUNG: CTABL, No wheeze  HEART: Regular rate and rhythm; no murmur  ABDOMEN: Soft, Nontender, Nondistended; Bowel sounds present  EXTREMITIES:  2+ Peripheral Pulses, No clubbing, cyanosis, or edema  SKIN: l arm with erythema and tenderness, streaking    LABS:                        8.6    2.22  )-----------( 15       ( 19 Oct 2021 07:05 )             26.7     10-19    140  |  101  |  56<H>  ----------------------------<  91  4.1   |  31  |  1.30    Ca    9.2      19 Oct 2021 07:07    TPro  6.2  /  Alb  3.4  /  TBili  1.6<H>  /  DBili  x   /  AST  9<L>  /  ALT  10  /  AlkPhos  134<H>  10-19      CAPILLARY BLOOD GLUCOSE                RADIOLOGY & ADDITIONAL TESTS:    Imaging Personally Reviewed:  [x] YES  [ ] NO    Consultant(s) Notes Reviewed:  [x] YES  [ ] NO    MEDICATIONS  (STANDING):  acetaminophen   Tablet .. 650 milliGRAM(s) Oral once  azaCITIDine Injectable (eMAR) 52.6 milliGRAM(s) SubCutaneous every 24 hours  azaCITIDine Injectable (eMAR) 52.6 milliGRAM(s) SubCutaneous every 24 hours  azaCITIDine Injectable (eMAR) 52.6 milliGRAM(s) SubCutaneous every 24 hours  Biotene Dry Mouth Oral Rinse 15 milliLiter(s) Swish and Spit three times a day  dextrose 40% Gel 15 Gram(s) Oral once  dextrose 5%. 1000 milliLiter(s) (50 mL/Hr) IV Continuous <Continuous>  dextrose 5%. 1000 milliLiter(s) (100 mL/Hr) IV Continuous <Continuous>  dextrose 50% Injectable 25 Gram(s) IV Push once  dextrose 50% Injectable 12.5 Gram(s) IV Push once  dextrose 50% Injectable 25 Gram(s) IV Push once  doxazosin 4 milliGRAM(s) Oral at bedtime  doxycycline hyclate Capsule 100 milliGRAM(s) Oral every 12 hours  furosemide    Tablet 40 milliGRAM(s) Oral daily  glucagon  Injectable 1 milliGRAM(s) IntraMuscular once  influenza   Vaccine 0.5 milliLiter(s) IntraMuscular once  metoprolol succinate ER 25 milliGRAM(s) Oral daily  midodrine. 5 milliGRAM(s) Oral three times a day  ondansetron    Tablet 8 milliGRAM(s) Oral every 24 hours    MEDICATIONS  (PRN):  acetaminophen   Tablet .. 650 milliGRAM(s) Oral every 6 hours PRN Mild Pain (1 - 3)      Care Discussed with Consultants/Other Providers [x] YES  [ ] NO    HEALTH ISSUES - PROBLEM Dx:  Acute hemorrhagic cystitis    AML (acute myeloblastic leukemia)    AICD (automatic cardioverter/defibrillator) present    Need for prophylactic measure    Discharge planning issues    Acute on chronic heart failure with preserved ejection fraction (HFpEF)    Atrial fibrillation    Pericardial effusion    Chronic heart failure

## 2021-10-19 NOTE — PROGRESS NOTE ADULT - ASSESSMENT
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 Started on azacitidine. Plan is to continue 4C of azacitidine followed by BMBx  - recommend transfusion to Hgb >8, Plt >20. Recommend slow transfusion given previous hx of TACO  - patient due for next cycle azacitidine, started (10/14- ). Today is day 4 of the chemo. Continue chemo daily till 10/22.  - no heme barriers to discharge once stable: please ensure Hb >8 and Plt >20 prior to discharge  - According to the primary team, pt is to be discharged to rehab after finishing chemo. Next chemo is scheduled ~11/15 and no plan for chemo during rehab stay. However patient needs to get CBC checked at least 2 times/week for potential need for blood transfusions.      # Orthostatic hypotension  - Pt is on lasix for LV dysfunction, HF  - Midodrine added by the primary team  - Continue to monitor orthostatic BP and symptoms  - Consider discontinue Cadura, adjust cardiac meds that lowers BP.    Gaye Borrero MD  PGY 6, Oncology/Hematology fellow  (P) [phone]  After 5pm, please contact on-call team.

## 2021-10-20 NOTE — PROGRESS NOTE ADULT - ASSESSMENT
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 Patient was transfused platelets in the ER with patient developing acute dyspnoea, and noted to have Transfusion Associated Circulatory Overload (TACO) and provided parenteral furosemide and initially on BiPAP.     # Suspected TACO in the S/P platelet transfusion in the setting of AML conversion from MDS on chemotherapy with hemorrhagic cystitis.  resolved, sp IV Azactam, completed abx  clear urine    # likely phlebitis ro cellulitis and dvt  doxycycline 100mg bid to complete 7 day course  appreciate ID recs  warm compresses    # orthostatic hypotension  increase midodrine still symptomatic   monitor orthos    # AML (acute myeloblastic leukemia)  pancytopenia, sp multiple prbcs and pl transfusions  Transfuse if Hb<8. Plt transfusion <20  heme started azacitidine, continue chemo daily till 10/22    # acute on chronic combined chf  # AICD (automatic cardioverter/defibrillator) present  Hold standing Lasix 40MG for now, but continue IV Lasix 20 as needed post transfusions  TTE reviewed - systolic and diastolic dysfunction    dc planning to rehab once chemo completed

## 2021-10-20 NOTE — PROGRESS NOTE ADULT - ASSESSMENT
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 Started on azacitidine. Plan is to continue 4C of azacitidine followed by BMBx  - recommend transfusion to Hgb >8, Plt >20. Recommend slow transfusion given previous hx of TACO  - patient due for next cycle azacitidine, started (10/14- ). Today is day 5 of the chemo. Continue chemo daily till 10/22.  - no heme barriers to discharge once stable: please ensure Hb >8 and Plt >20 prior to discharge  - According to the primary team, pt is to be discharged to rehab after finishing chemo. Next chemo is scheduled ~11/15 and no plan for chemo during rehab stay. However patient needs to get CBC checked at least 2 times/week for potential need for blood transfusions.    # Orthostatic hypotension  - Pt is on lasix for LV dysfunction, HF  - Midodrine added by the primary team  - Continue to monitor orthostatic BP and symptoms  - care per primary team

## 2021-10-20 NOTE — ADVANCED PRACTICE NURSE CONSULT - ASSESSMENT
Pt with day 1/3 tx, cycle 2 labs noted in sunrise, height, weight and bsa verified okay to proceed with tx as per MD Anne.  Pt premedicated as noted in sunrise.  Pt administered azacitidine 75 mg/m2 = 158 mg subcu x3 syringes via bilateral upper extremities.  Reinforced with pt chemo regimen and signs and symptoms to report on to area rn and staff, pt verbalized understanding.  Emotional support provided.  Report given to area rn.

## 2021-10-20 NOTE — PROGRESS NOTE ADULT - SUBJECTIVE AND OBJECTIVE BOX
Diagnosis: MDS with excess blasts-2    Protocol/Chemo: Cycle 1 azacitidine subQ for 7 days    Day: 9    Subjective: No overnight events, L axillary/scapula pain improved, taking po's, +OOB to BR only    Review of Systems: Denies STEINER, n/v, HA or dizziness, abdominal pain    Pain scale: 0/10                    Diet: Regular    Allergies:  adhesives (Rash)  clonidine (Swelling; Rash)  felodipine (Rash)  penicillin (Rash)  sulfa drugs (Rash)      STANDING MEDICATIONS  allopurinol 100 milliGRAM(s) Oral two times a day  Biotene Dry Mouth Oral Rinse 15 milliLiter(s) Swish and Spit five times a day  chlorhexidine 2% Cloths 1 Application(s) Topical daily  doxazosin 4 milliGRAM(s) Oral at bedtime  furosemide   Injectable 20 milliGRAM(s) IV Push daily  influenza   Vaccine 0.5 milliLiter(s) IntraMuscular once  lidocaine   4% Patch 1 Patch Transdermal every 24 hours  lidocaine   4% Patch 1 Patch Transdermal once  metoprolol succinate ER 25 milliGRAM(s) Oral daily  phytonadione   Solution 5 milliGRAM(s) Oral daily  polyethylene glycol 3350 17 Gram(s) Oral daily  senna 2 Tablet(s) Oral at bedtime      PRN MEDICATIONS  acetaminophen   Tablet .. 650 milliGRAM(s) Oral every 6 hours PRN  diphenhydrAMINE 25 milliGRAM(s) Oral every 6 hours PRN  melatonin 3 milliGRAM(s) Oral at bedtime PRN      Vital Signs Last 24 Hrs  T(C): 36.9 (23 Sep 2021 05:05), Max: 36.9 (23 Sep 2021 05:05)  T(F): 98.4 (23 Sep 2021 05:05), Max: 98.4 (23 Sep 2021 05:05)  HR: 60 (23 Sep 2021 05:05) (59 - 65)  BP: 120/59 (23 Sep 2021 05:05) (101/57 - 135/62)  RR: 18 (23 Sep 2021 05:05) (18 - 20)  SpO2: 92% (23 Sep 2021 05:05) (92% - 96%)    PHYSICAL EXAM  General: Lying in bed in NAD  HEENT: PERRL, sclerae anicteric,   CV: +S1, S2, reg  Abdomen: +BS, soft, NT/ND  Ext: trace BLE edema, +LUE ecchymosis (s/p injection sites)  Skin: +ecchymosis LUE  Neuro: A&O x 3, non focal  Central line: PIV    LABs:                          8.2    6.30  )-----------( 21       ( 23 Sep 2021 06:50 )             26.2         Mean Cell Volume : 88.5 fl  Mean Cell Hemoglobin : 27.7 pg  Mean Cell Hemoglobin Concentration : 31.3 gm/dL  Auto Neutrophil # : x  Auto Lymphocyte # : x  Auto Monocyte # : x  Auto Eosinophil # : x  Auto Basophil # : x  Auto Neutrophil % : x  Auto Lymphocyte % : x  Auto Monocyte % : x  Auto Eosinophil % : x  Auto Basophil % : x      09-23    142  |  105  |  46<H>  ----------------------------<  89  4.3   |  26  |  1.69<H>    Ca    8.9      23 Sep 2021 06:50  Phos  3.0     09-23  Mg     2.4     09-23    TPro  6.0  /  Alb  3.2<L>  /  TBili  1.4<H>  /  DBili  x   /  AST  8<L>  /  ALT  8<L>  /  AlkPhos  114  09-23      Mg 2.4  Phos 3.0      PT/INR - ( 23 Sep 2021 06:50 )   PT: 14.7 sec;   INR: 1.24 ratio         PTT - ( 23 Sep 2021 06:50 )  PTT:37.6 sec      Uric Acid 5.3        RADIOLOGY & ADDITIONAL STUDIES:         Diagnosis: MDS with excess blasts-2    Protocol/Chemo: Cycle 1 azacitidine subQ for 7 days    Day: 9    Subjective: constipated    Review of Systems: Denies STEINER, n/v, HA or dizziness, abdominal pain    Pain scale: 0/10                    Diet: Regular    Allergies:  adhesives (Rash)  clonidine (Swelling; Rash)  felodipine (Rash)  penicillin (Rash)  sulfa drugs (Rash)      STANDING MEDICATIONS  allopurinol 100 milliGRAM(s) Oral two times a day  Biotene Dry Mouth Oral Rinse 15 milliLiter(s) Swish and Spit five times a day  chlorhexidine 2% Cloths 1 Application(s) Topical daily  doxazosin 4 milliGRAM(s) Oral at bedtime  furosemide   Injectable 20 milliGRAM(s) IV Push daily  influenza   Vaccine 0.5 milliLiter(s) IntraMuscular once  lidocaine   4% Patch 1 Patch Transdermal every 24 hours  lidocaine   4% Patch 1 Patch Transdermal once  metoprolol succinate ER 25 milliGRAM(s) Oral daily  phytonadione   Solution 5 milliGRAM(s) Oral daily  polyethylene glycol 3350 17 Gram(s) Oral daily  senna 2 Tablet(s) Oral at bedtime      PRN MEDICATIONS  acetaminophen   Tablet .. 650 milliGRAM(s) Oral every 6 hours PRN  diphenhydrAMINE 25 milliGRAM(s) Oral every 6 hours PRN  melatonin 3 milliGRAM(s) Oral at bedtime PRN      Vital Signs Last 24 Hrs  T(C): 36.9 (23 Sep 2021 05:05), Max: 36.9 (23 Sep 2021 05:05)  T(F): 98.4 (23 Sep 2021 05:05), Max: 98.4 (23 Sep 2021 05:05)  HR: 60 (23 Sep 2021 05:05) (59 - 65)  BP: 120/59 (23 Sep 2021 05:05) (101/57 - 135/62)  RR: 18 (23 Sep 2021 05:05) (18 - 20)  SpO2: 92% (23 Sep 2021 05:05) (92% - 96%)    PHYSICAL EXAM  General: Lying in bed in NAD  HEENT: PERRL, sclerae anicteric,   CV: +S1, S2, reg  Abdomen: +BS, soft, NT/ND  Ext: trace BLE edema, +LUE ecchymosis (s/p injection sites)  Skin: +ecchymosis LUE  Neuro: A&O x 3, non focal  Central line: PIV    LABs:                        8.2    6.30  )-----------( 21       ( 23 Sep 2021 06:50 )             26.2       Mean Cell Volume : 88.5 fl  Mean Cell Hemoglobin : 27.7 pg  Mean Cell Hemoglobin Concentration : 31.3 gm/dL  Auto Neutrophil # : x  Auto Lymphocyte # : x  Auto Monocyte # : x  Auto Eosinophil # : x  Auto Basophil # : x  Auto Neutrophil % : x  Auto Lymphocyte % : x  Auto Monocyte % : x  Auto Eosinophil % : x  Auto Basophil % : x      09-23    142  |  105  |  46<H>  ----------------------------<  89  4.3   |  26  |  1.69<H>    Ca    8.9      23 Sep 2021 06:50  Phos  3.0     09-23  Mg     2.4     09-23    TPro  6.0  /  Alb  3.2<L>  /  TBili  1.4<H>  /  DBili  x   /  AST  8<L>  /  ALT  8<L>  /  AlkPhos  114  09-23    PT/INR - ( 23 Sep 2021 06:50 )   PT: 14.7 sec;   INR: 1.24 ratio    PTT - ( 23 Sep 2021 06:50 )  PTT:37.6 sec      Uric Acid 5.3      RADIOLOGY & ADDITIONAL STUDIES:  from: Xray Shoulder 2 Views, Left (09.20.21 @ 17:51)     IMPRESSION:  No fractures, dislocations, or AC separation.  Minimal glenohumeral articular margin degenerative spurring. Otherwise preserved joint spaces.  No destructive osseous lesions or periosteal reaction.  No periarticular soft tissue calcifications.         Implemented All Fall with Harm Risk Interventions:  [place] to call system. Call bell, personal items and telephone within reach. Instruct patient to call for assistance. Room bathroom lighting operational. Non-slip footwear when patient is off stretcher. Physically safe environment: no spills, clutter or unnecessary equipment. Stretcher in lowest position, wheels locked, appropriate side rails in place. Provide visual cue, wrist band, yellow gown, etc. Monitor gait and stability. Monitor for mental status changes and reorient to person, place, and time. Review medications for side effects contributing to fall risk. Reinforce activity limits and safety measures with patient and family. Provide visual clues: red socks.

## 2021-10-20 NOTE — PROGRESS NOTE ADULT - SUBJECTIVE AND OBJECTIVE BOX
SUBJECTIVE / OVERNIGHT EVENTS:    Plt 20 this AM  patient seen and examined  + urine retention, follow up next bladder scan    --------------------------------------------------------------------------------------------  LABS:                        8.0    2.67  )-----------( 20       ( 20 Oct 2021 07:24 )             25.2     10-20    141  |  101  |  59<H>  ----------------------------<  102<H>  4.2   |  29  |  1.42<H>    Ca    9.4      20 Oct 2021 07:18  Mg     2.4     10-20    TPro  6.0  /  Alb  3.4  /  TBili  1.3<H>  /  DBili  x   /  AST  8<L>  /  ALT  11  /  AlkPhos  132<H>  10-20      CAPILLARY BLOOD GLUCOSE                RADIOLOGY & ADDITIONAL TESTS:    Imaging Personally Reviewed:  [x] YES  [ ] NO    Consultant(s) Notes Reviewed:  [x] YES  [ ] NO    MEDICATIONS  (STANDING):  acetaminophen   Tablet .. 650 milliGRAM(s) Oral once  azaCITIDine Injectable (eMAR) 52.6 milliGRAM(s) SubCutaneous every 24 hours  azaCITIDine Injectable (eMAR) 52.6 milliGRAM(s) SubCutaneous every 24 hours  azaCITIDine Injectable (eMAR) 52.6 milliGRAM(s) SubCutaneous every 24 hours  Biotene Dry Mouth Oral Rinse 15 milliLiter(s) Swish and Spit three times a day  dextrose 40% Gel 15 Gram(s) Oral once  dextrose 5%. 1000 milliLiter(s) (50 mL/Hr) IV Continuous <Continuous>  dextrose 5%. 1000 milliLiter(s) (100 mL/Hr) IV Continuous <Continuous>  dextrose 50% Injectable 25 Gram(s) IV Push once  dextrose 50% Injectable 12.5 Gram(s) IV Push once  dextrose 50% Injectable 25 Gram(s) IV Push once  doxazosin 4 milliGRAM(s) Oral at bedtime  doxycycline hyclate Capsule 100 milliGRAM(s) Oral every 12 hours  glucagon  Injectable 1 milliGRAM(s) IntraMuscular once  influenza   Vaccine 0.5 milliLiter(s) IntraMuscular once  metoprolol succinate ER 25 milliGRAM(s) Oral daily  midodrine. 5 milliGRAM(s) Oral three times a day  ondansetron    Tablet 8 milliGRAM(s) Oral every 24 hours  polyethylene glycol 3350 17 Gram(s) Oral two times a day  senna 2 Tablet(s) Oral at bedtime    MEDICATIONS  (PRN):  acetaminophen   Tablet .. 650 milliGRAM(s) Oral every 6 hours PRN Mild Pain (1 - 3)      Care Discussed with Consultants/Other Providers [x] YES  [ ] NO    Vital Signs Last 24 Hrs  T(C): 37 (20 Oct 2021 11:24), Max: 37.2 (20 Oct 2021 06:08)  T(F): 98.6 (20 Oct 2021 11:24), Max: 99 (20 Oct 2021 06:08)  HR: 61 (20 Oct 2021 11:53) (60 - 65)  BP: 95/47 (20 Oct 2021 11:53) (95/47 - 121/62)  BP(mean): --  RR: 18 (20 Oct 2021 11:24) (18 - 19)  SpO2: 93% (20 Oct 2021 11:53) (90% - 96%)  I&O's Summary    19 Oct 2021 07:01  -  20 Oct 2021 07:00  --------------------------------------------------------  IN: 680 mL / OUT: 1850 mL / NET: -1170 mL    20 Oct 2021 07:01  -  20 Oct 2021 12:31  --------------------------------------------------------  IN: 400 mL / OUT: 600 mL / NET: -200 mL      PHYSICAL EXAM:  GENERAL: NAD, AAOx3  HEAD:  Atraumatic, Normocephalic  CHEST/LUNG: CTABL, No wheeze  HEART: Regular rate and rhythm; no murmur  ABDOMEN: Soft, Nontender, Nondistended; Bowel sounds present  EXTREMITIES:  2+ Peripheral Pulses, No clubbing, cyanosis, or edema  SKIN: l arm with erythema and tenderness, streaking

## 2021-10-20 NOTE — PROGRESS NOTE ADULT - SUBJECTIVE AND OBJECTIVE BOX
Hematology Oncology Follow-up    INTERVAL HPI/OVERNIGHT EVENTS:  No o/n events, patient resting comfortably. No complaints at this time.    VITAL SIGNS:  T(F): 99 (10-20-21 @ 06:08)  HR: 60 (10-20-21 @ 06:08)  BP: 107/55 (10-20-21 @ 06:08)  RR: 18 (10-20-21 @ 06:08)  SpO2: 90% (10-20-21 @ 06:08)  Wt(kg): --    10-19-21 @ 07:01  -  10-20-21 @ 07:00  --------------------------------------------------------  IN: 680 mL / OUT: 1850 mL / NET: -1170 mL    10-20-21 @ 07:01  -  10-20-21 @ 10:53  --------------------------------------------------------  IN: 200 mL / OUT: 0 mL / NET: 200 mL          Review of Systems:  General: denies fevers/chills  Respiratory: denies cough, shortness of breath  Cardiovascular: denies chest pain, palpitations  Gastrointestinal: denies nausea, vomiting, abdominal pain, constipation, diarrhea, melena, hematochezia  MSK: denies joint pain or muscle pain  Neuro: denies headache, weakness, or parasthesias  Skin: denies rash, petichiae, echymoses  Psych: denies anxiety or sleep disturbances    PHYSICAL EXAM:  Constitutional: NAD  Respiratory: CTA b/l, symmetric chest rise, with normal respiratory effort  Cardiovascular: RRR  Gastrointestinal: soft, NTND  Extremities:  no edema  MSK: no obvious abnormalities  Skin: no rash, no echymoses, no petichiae  Psych: normal affect    MEDICATIONS  (STANDING):  acetaminophen   Tablet .. 650 milliGRAM(s) Oral once  Biotene Dry Mouth Oral Rinse 15 milliLiter(s) Swish and Spit three times a day  dextrose 40% Gel 15 Gram(s) Oral once  dextrose 5%. 1000 milliLiter(s) (50 mL/Hr) IV Continuous <Continuous>  dextrose 5%. 1000 milliLiter(s) (100 mL/Hr) IV Continuous <Continuous>  dextrose 50% Injectable 25 Gram(s) IV Push once  dextrose 50% Injectable 12.5 Gram(s) IV Push once  dextrose 50% Injectable 25 Gram(s) IV Push once  doxazosin 4 milliGRAM(s) Oral at bedtime  doxycycline hyclate Capsule 100 milliGRAM(s) Oral every 12 hours  furosemide    Tablet 40 milliGRAM(s) Oral daily  glucagon  Injectable 1 milliGRAM(s) IntraMuscular once  influenza   Vaccine 0.5 milliLiter(s) IntraMuscular once  metoprolol succinate ER 25 milliGRAM(s) Oral daily  midodrine. 5 milliGRAM(s) Oral three times a day  polyethylene glycol 3350 17 Gram(s) Oral two times a day  senna 2 Tablet(s) Oral at bedtime    MEDICATIONS  (PRN):  acetaminophen   Tablet .. 650 milliGRAM(s) Oral every 6 hours PRN Mild Pain (1 - 3)      adhesives (Rash)  clonidine (Swelling; Rash)  felodipine (Rash)  penicillin (Rash)  sulfa drugs (Rash)      LABS:                        8.0    2.67  )-----------( 20       ( 20 Oct 2021 07:24 )             25.2     10-20    141  |  101  |  59<H>  ----------------------------<  102<H>  4.2   |  29  |  1.42<H>    Ca    9.4      20 Oct 2021 07:18  Mg     2.4     10-20    TPro  6.0  /  Alb  3.4  /  TBili  1.3<H>  /  DBili  x   /  AST  8<L>  /  ALT  11  /  AlkPhos  132<H>  10-20                     RADIOLOGY & ADDITIONAL TESTS:  Studies reviewed.

## 2021-10-21 NOTE — PROGRESS NOTE ADULT - SUBJECTIVE AND OBJECTIVE BOX
SUBJECTIVE / OVERNIGHT EVENTS:    no events overnight  patient seen and examined  Plt 12 today  hgb <8    --------------------------------------------------------------------------------------------  LABS:                        7.4    3.31  )-----------( 12       ( 21 Oct 2021 06:17 )             22.5     10-    142  |  101  |  66<H>  ----------------------------<  99  4.7   |  30  |  1.63<H>    Ca    9.1      21 Oct 2021 06:17  Phos  4.1     10-  Mg     2.4     10-    TPro  6.0  /  Alb  3.3  /  TBili  1.6<H>  /  DBili  x   /  AST  8<L>  /  ALT  9<L>  /  AlkPhos  122<H>  10-21      CAPILLARY BLOOD GLUCOSE      POCT Blood Glucose.: 113 mg/dL (21 Oct 2021 07:43)        Urinalysis Basic - ( 20 Oct 2021 17:35 )    Color: Yellow / Appearance: Slightly Turbid / S.016 / pH: x  Gluc: x / Ketone: Negative  / Bili: Negative / Urobili: Negative   Blood: x / Protein: 30 mg/dL / Nitrite: Negative   Leuk Esterase: Negative / RBC: 78 /hpf / WBC 1 /HPF   Sq Epi: x / Non Sq Epi: 0 /hpf / Bacteria: Negative        RADIOLOGY & ADDITIONAL TESTS:    Imaging Personally Reviewed:  [x] YES  [ ] NO    Consultant(s) Notes Reviewed:  [x] YES  [ ] NO    MEDICATIONS  (STANDING):  acetaminophen   Tablet .. 650 milliGRAM(s) Oral once  azaCITIDine Injectable (eMAR) 52.6 milliGRAM(s) SubCutaneous every 24 hours  azaCITIDine Injectable (eMAR) 52.6 milliGRAM(s) SubCutaneous every 24 hours  azaCITIDine Injectable (eMAR) 52.6 milliGRAM(s) SubCutaneous every 24 hours  Biotene Dry Mouth Oral Rinse 15 milliLiter(s) Swish and Spit three times a day  dextrose 40% Gel 15 Gram(s) Oral once  dextrose 5%. 1000 milliLiter(s) (50 mL/Hr) IV Continuous <Continuous>  dextrose 5%. 1000 milliLiter(s) (100 mL/Hr) IV Continuous <Continuous>  dextrose 50% Injectable 25 Gram(s) IV Push once  dextrose 50% Injectable 12.5 Gram(s) IV Push once  dextrose 50% Injectable 25 Gram(s) IV Push once  doxazosin 4 milliGRAM(s) Oral at bedtime  doxycycline hyclate Capsule 100 milliGRAM(s) Oral every 12 hours  glucagon  Injectable 1 milliGRAM(s) IntraMuscular once  influenza   Vaccine 0.5 milliLiter(s) IntraMuscular once  metoprolol succinate ER 25 milliGRAM(s) Oral daily  midodrine. 5 milliGRAM(s) Oral three times a day  ondansetron    Tablet 8 milliGRAM(s) Oral every 24 hours  polyethylene glycol 3350 17 Gram(s) Oral two times a day  senna 2 Tablet(s) Oral at bedtime    MEDICATIONS  (PRN):  acetaminophen   Tablet .. 650 milliGRAM(s) Oral every 6 hours PRN Mild Pain (1 - 3)      Care Discussed with Consultants/Other Providers [x] YES  [ ] NO    Vital Signs Last 24 Hrs  T(C): 36.7 (21 Oct 2021 08:13), Max: 36.7 (21 Oct 2021 04:24)  T(F): 98 (21 Oct 2021 08:13), Max: 98 (21 Oct 2021 04:24)  HR: 60 (21 Oct 2021 08:13) (58 - 66)  BP: 101/54 (21 Oct 2021 08:13) (95/47 - 116/66)  BP(mean): --  RR: 17 (21 Oct 2021 08:13) (17 - 18)  SpO2: 92% (21 Oct 2021 08:13) (92% - 96%)  I&O's Summary    20 Oct 2021 07:01  -  21 Oct 2021 07:00  --------------------------------------------------------  IN: 640 mL / OUT: 1700 mL / NET: -1060 mL      PHYSICAL EXAM:  GENERAL: NAD, AAOx3  HEAD:  Atraumatic, Normocephalic  CHEST/LUNG: CTABL, No wheeze  HEART: Regular rate and rhythm; no murmur  ABDOMEN: Soft, Nontender, Nondistended; Bowel sounds present  EXTREMITIES:  2+ Peripheral Pulses, No clubbing, cyanosis, or edema  SKIN: l arm with erythema and tenderness, streaking     SUBJECTIVE / OVERNIGHT EVENTS:    no events overnight  patient seen and examined  Plt 12 today  hgb <8    --------------------------------------------------------------------------------------------  LABS:                        7.4    3.31  )-----------( 12       ( 21 Oct 2021 06:17 )             22.5     10-    142  |  101  |  66<H>  ----------------------------<  99  4.7   |  30  |  1.63<H>    Ca    9.1      21 Oct 2021 06:17  Phos  4.1     10-  Mg     2.4     10-    TPro  6.0  /  Alb  3.3  /  TBili  1.6<H>  /  DBili  x   /  AST  8<L>  /  ALT  9<L>  /  AlkPhos  122<H>  10-21      CAPILLARY BLOOD GLUCOSE      POCT Blood Glucose.: 113 mg/dL (21 Oct 2021 07:43)        Urinalysis Basic - ( 20 Oct 2021 17:35 )    Color: Yellow / Appearance: Slightly Turbid / S.016 / pH: x  Gluc: x / Ketone: Negative  / Bili: Negative / Urobili: Negative   Blood: x / Protein: 30 mg/dL / Nitrite: Negative   Leuk Esterase: Negative / RBC: 78 /hpf / WBC 1 /HPF   Sq Epi: x / Non Sq Epi: 0 /hpf / Bacteria: Negative        RADIOLOGY & ADDITIONAL TESTS:    Imaging Personally Reviewed:  [x] YES  [ ] NO    Consultant(s) Notes Reviewed:  [x] YES  [ ] NO    MEDICATIONS  (STANDING):  acetaminophen   Tablet .. 650 milliGRAM(s) Oral once  azaCITIDine Injectable (eMAR) 52.6 milliGRAM(s) SubCutaneous every 24 hours  azaCITIDine Injectable (eMAR) 52.6 milliGRAM(s) SubCutaneous every 24 hours  azaCITIDine Injectable (eMAR) 52.6 milliGRAM(s) SubCutaneous every 24 hours  Biotene Dry Mouth Oral Rinse 15 milliLiter(s) Swish and Spit three times a day  dextrose 40% Gel 15 Gram(s) Oral once  dextrose 5%. 1000 milliLiter(s) (50 mL/Hr) IV Continuous <Continuous>  dextrose 5%. 1000 milliLiter(s) (100 mL/Hr) IV Continuous <Continuous>  dextrose 50% Injectable 25 Gram(s) IV Push once  dextrose 50% Injectable 12.5 Gram(s) IV Push once  dextrose 50% Injectable 25 Gram(s) IV Push once  doxazosin 4 milliGRAM(s) Oral at bedtime  doxycycline hyclate Capsule 100 milliGRAM(s) Oral every 12 hours  glucagon  Injectable 1 milliGRAM(s) IntraMuscular once  influenza   Vaccine 0.5 milliLiter(s) IntraMuscular once  metoprolol succinate ER 25 milliGRAM(s) Oral daily  midodrine. 5 milliGRAM(s) Oral three times a day  ondansetron    Tablet 8 milliGRAM(s) Oral every 24 hours  polyethylene glycol 3350 17 Gram(s) Oral two times a day  senna 2 Tablet(s) Oral at bedtime    MEDICATIONS  (PRN):  acetaminophen   Tablet .. 650 milliGRAM(s) Oral every 6 hours PRN Mild Pain (1 - 3)      Care Discussed with Consultants/Other Providers [x] YES  [ ] NO    Vital Signs Last 24 Hrs  T(C): 36.7 (21 Oct 2021 08:13), Max: 36.7 (21 Oct 2021 04:24)  T(F): 98 (21 Oct 2021 08:13), Max: 98 (21 Oct 2021 04:24)  HR: 60 (21 Oct 2021 08:13) (58 - 66)  BP: 101/54 (21 Oct 2021 08:13) (95/47 - 116/66)  BP(mean): --  RR: 17 (21 Oct 2021 08:13) (17 - 18)  SpO2: 92% (21 Oct 2021 08:13) (92% - 96%)  I&O's Summary    20 Oct 2021 07:01  -  21 Oct 2021 07:00  --------------------------------------------------------  IN: 640 mL / OUT: 1700 mL / NET: -1060 mL      PHYSICAL EXAM:  GENERAL: NAD, AAOx3  HEAD:  Atraumatic, Normocephalic  CHEST/LUNG: CTABL, No wheeze  HEART: Regular rate and rhythm; no murmur  ABDOMEN: Soft, Nontender, Nondistended; Bowel sounds present  EXTREMITIES:  2+ Peripheral Pulses, No clubbing, cyanosis, or edema  SKIN: l arm with erythema and tenderness, streaking  : yost

## 2021-10-21 NOTE — PROGRESS NOTE ADULT - ASSESSMENT
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 Started on azacitidine. Plan is to continue 4C of azacitidine followed by BMBx  - recommend transfusion to Hgb >8, Plt >20. Recommend slow transfusion given previous hx of TACO  - patient due for next cycle azacitidine, started (10/14- ). Today is day 6 of the chemo. Continue chemo daily till 10/22.  - no heme barriers to discharge once stable: please ensure Hb >8 and Plt >20 prior to discharge  - According to the primary team, pt is to be discharged to rehab after finishing chemo. Next chemo is scheduled ~11/15 and no plan for chemo during rehab stay. However patient needs to get CBC checked at least 2 times/week for potential need for blood transfusions.    # Orthostatic hypotension  - Pt is on lasix for LV dysfunction, HF  - Midodrine added by the primary team  - Continue to monitor orthostatic BP and symptoms  - care per primary team

## 2021-10-21 NOTE — PROGRESS NOTE ADULT - SUBJECTIVE AND OBJECTIVE BOX
Nathen was awake and alert in bed as I fit and delivered one pair of compression stocking ,knee high. Instructions and contact info dunia provided.   [facility]

## 2021-10-21 NOTE — PROGRESS NOTE ADULT - ASSESSMENT
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 Patient was transfused platelets in the ER with patient developing acute dyspnoea, and noted to have Transfusion Associated Circulatory Overload (TACO) and provided parenteral furosemide and initially on BiPAP.     # Suspected TACO in the S/P platelet transfusion in the setting of AML conversion from MDS on chemotherapy with hemorrhagic cystitis.  resolved, sp IV Azactam, completed abx  clear urine    # likely phlebitis ro cellulitis and dvt  doxycycline 100mg bid to complete 7 day course  appreciate ID recs  warm compresses    # orthostatic hypotension  increase midodrine still symptomatic   monitor orthos    # AML (acute myeloblastic leukemia)  pancytopenia, sp multiple prbcs and pl transfusions  Transfuse if Hb<8. Plt transfusion <20  heme started azacitidine, continue chemo daily till 10/22    # acute on chronic combined chf  # AICD (automatic cardioverter/defibrillator) present  Hold standing Lasix 40MG for now, but continue IV Lasix 20 as needed post transfusions  TTE reviewed - systolic and diastolic dysfunction    dc planning to rehab once chemo completed

## 2021-10-21 NOTE — PROVIDER CONTACT NOTE (CRITICAL VALUE NOTIFICATION) - BACKGROUND
Pt is an 86M with Afib s/p ablation with ICD upgrade, recently diagnosed MDS s/p Revlimid c/b colitis in June 2021, transfusion dependent (7-10 transfusions in last 2 weeks, gets Lasix and Benadryl with each transfusion), presents to hospital with hematuria.

## 2021-10-21 NOTE — ADVANCED PRACTICE NURSE CONSULT - ASSESSMENT
Pt with day 2/3 tx, cycle 1, labs noted in sunrise, height, weight and bsa verified, okay to proceed with tx as per MD Hernández.  Pt premedicated as noted in sunrise.  Pt administered Azacitidine 75 mg/m2 = 158 mg subcu x 3 syringes via bilateral upper extremities.  Pt sleeping but arousable, spouse at bedside, reinforced chemo regimen and signs and symptoms to report on to area rn and staff, verbalized understanding.  Emotional support provided.  Report given to area rn.

## 2021-10-21 NOTE — PROGRESS NOTE ADULT - SUBJECTIVE AND OBJECTIVE BOX
Hematology Oncology Follow-up    INTERVAL HPI/OVERNIGHT EVENTS:  No o/n events, patient resting comfortably. No complaints at this time.  Eating breakfast when examined at bedside this morning.       VITAL SIGNS:  T(F): 98 (10-21-21 @ 08:13)  HR: 60 (10-21-21 @ 08:13)  BP: 101/54 (10-21-21 @ 08:13)  RR: 17 (10-21-21 @ 08:13)  SpO2: 92% (10-21-21 @ 08:13)  Wt(kg): --    10-20-21 @ 07:01  -  10-21-21 @ 07:00  --------------------------------------------------------  IN: 640 mL / OUT: 1700 mL / NET: -1060 mL          Review of Systems:  General: denies fevers/chills  Respiratory: denies cough, shortness of breath  Cardiovascular: denies chest pain, palpitations  Gastrointestinal: denies nausea, vomiting, abdominal pain, constipation, diarrhea, melena, hematochezia  MSK: denies joint pain or muscle pain  Neuro: denies headache, weakness, or parasthesias  Skin: denies rash, petichiae, echymoses  Psych: denies anxiety or sleep disturbances    PHYSICAL EXAM:  Constitutional: NAD  Respiratory: symmetric chest rise, with normal respiratory effort  Cardiovascular: RRR  Gastrointestinal: soft, NTND  MSK: no obvious abnormalities  Neurological: Grossly intact  Skin: no rash, no echymoses, no petichiae  Psych: normal affect    MEDICATIONS  (STANDING):  acetaminophen   Tablet .. 650 milliGRAM(s) Oral once  azaCITIDine Injectable (eMAR) 52.6 milliGRAM(s) SubCutaneous every 24 hours  azaCITIDine Injectable (eMAR) 52.6 milliGRAM(s) SubCutaneous every 24 hours  azaCITIDine Injectable (eMAR) 52.6 milliGRAM(s) SubCutaneous every 24 hours  Biotene Dry Mouth Oral Rinse 15 milliLiter(s) Swish and Spit three times a day  dextrose 40% Gel 15 Gram(s) Oral once  dextrose 5%. 1000 milliLiter(s) (50 mL/Hr) IV Continuous <Continuous>  dextrose 5%. 1000 milliLiter(s) (100 mL/Hr) IV Continuous <Continuous>  dextrose 50% Injectable 25 Gram(s) IV Push once  dextrose 50% Injectable 12.5 Gram(s) IV Push once  dextrose 50% Injectable 25 Gram(s) IV Push once  doxazosin 4 milliGRAM(s) Oral at bedtime  doxycycline hyclate Capsule 100 milliGRAM(s) Oral every 12 hours  glucagon  Injectable 1 milliGRAM(s) IntraMuscular once  influenza   Vaccine 0.5 milliLiter(s) IntraMuscular once  metoprolol succinate ER 25 milliGRAM(s) Oral daily  midodrine. 5 milliGRAM(s) Oral three times a day  ondansetron    Tablet 8 milliGRAM(s) Oral every 24 hours  polyethylene glycol 3350 17 Gram(s) Oral two times a day  senna 2 Tablet(s) Oral at bedtime    MEDICATIONS  (PRN):  acetaminophen   Tablet .. 650 milliGRAM(s) Oral every 6 hours PRN Mild Pain (1 - 3)      adhesives (Rash)  clonidine (Swelling; Rash)  felodipine (Rash)  penicillin (Rash)  sulfa drugs (Rash)      LABS:                        7.4    3.31  )-----------( 12       ( 21 Oct 2021 06:17 )             22.5     10-21    142  |  101  |  66<H>  ----------------------------<  99  4.7   |  30  |  1.63<H>    Ca    9.1      21 Oct 2021 06:17  Phos  4.1     10-21  Mg     2.4     10-21    TPro  6.0  /  Alb  3.3  /  TBili  1.6<H>  /  DBili  x   /  AST  8<L>  /  ALT  9<L>  /  AlkPhos  122<H>  10-21       Urinalysis Basic - ( 20 Oct 2021 17:35 )    Color: Yellow / Appearance: Slightly Turbid / S.016 / pH: x  Gluc: x / Ketone: Negative  / Bili: Negative / Urobili: Negative   Blood: x / Protein: 30 mg/dL / Nitrite: Negative   Leuk Esterase: Negative / RBC: 78 /hpf / WBC 1 /HPF   Sq Epi: x / Non Sq Epi: 0 /hpf / Bacteria: Negative              Bilirubin: Negative (10-20-21 @ 17:35)      RADIOLOGY & ADDITIONAL TESTS:  Studies reviewed.

## 2021-10-22 NOTE — PROVIDER CONTACT NOTE (CRITICAL VALUE NOTIFICATION) - RECOMMENDATIONS
Notify provider.
CHAYO Carrillo notified.
Provider made aware.
none at this time
LEONARDO Elmore notified.
Notify provider
transfuse.
platelet transfusion.
transfusion.
PA made aware.
notify provider
notify provider
CHAYO Salazar notified.
Notify provider
transfuse patient with PRBC and platelets

## 2021-10-22 NOTE — PROGRESS NOTE ADULT - SUBJECTIVE AND OBJECTIVE BOX
SUBJECTIVE / OVERNIGHT EVENTS:    no events overnight  patient seen and examined  +BM x2 today  Plt 19     --------------------------------------------------------------------------------------------  LABS:                        8.0    4.55  )-----------(        ( 22 Oct 2021 05:23 )             24.7     10-    144  |  102  |  65<H>  ----------------------------<  93  4.6   |  30  |  1.56<H>    Ca    9.0      22 Oct 2021 05:23  Phos  4.1     10-  Mg     2.4     10-    TPro  6.0  /  Alb  3.3  /  TBili  1.6<H>  /  DBili  x   /  AST  8<L>  /  ALT  9<L>  /  AlkPhos  122<H>  10-21      CAPILLARY BLOOD GLUCOSE      POCT Blood Glucose.: 131 mg/dL (22 Oct 2021 11:28)  POCT Blood Glucose.: 103 mg/dL (22 Oct 2021 07:18)        Urinalysis Basic - ( 20 Oct 2021 17:35 )    Color: Yellow / Appearance: Slightly Turbid / S.016 / pH: x  Gluc: x / Ketone: Negative  / Bili: Negative / Urobili: Negative   Blood: x / Protein: 30 mg/dL / Nitrite: Negative   Leuk Esterase: Negative / RBC: 78 /hpf / WBC 1 /HPF   Sq Epi: x / Non Sq Epi: 0 /hpf / Bacteria: Negative        RADIOLOGY & ADDITIONAL TESTS:    Imaging Personally Reviewed:  [x] YES  [ ] NO    Consultant(s) Notes Reviewed:  [x] YES  [ ] NO    MEDICATIONS  (STANDING):  acetaminophen   Tablet .. 650 milliGRAM(s) Oral once  azaCITIDine Injectable (eMAR) 52.6 milliGRAM(s) SubCutaneous every 24 hours  azaCITIDine Injectable (eMAR) 52.6 milliGRAM(s) SubCutaneous every 24 hours  azaCITIDine Injectable (eMAR) 52.6 milliGRAM(s) SubCutaneous every 24 hours  Biotene Dry Mouth Oral Rinse 15 milliLiter(s) Swish and Spit three times a day  dextrose 40% Gel 15 Gram(s) Oral once  dextrose 5%. 1000 milliLiter(s) (50 mL/Hr) IV Continuous <Continuous>  dextrose 5%. 1000 milliLiter(s) (100 mL/Hr) IV Continuous <Continuous>  dextrose 50% Injectable 25 Gram(s) IV Push once  dextrose 50% Injectable 12.5 Gram(s) IV Push once  dextrose 50% Injectable 25 Gram(s) IV Push once  doxazosin 4 milliGRAM(s) Oral at bedtime  doxycycline hyclate Capsule 100 milliGRAM(s) Oral every 12 hours  glucagon  Injectable 1 milliGRAM(s) IntraMuscular once  influenza   Vaccine 0.5 milliLiter(s) IntraMuscular once  metoprolol succinate ER 25 milliGRAM(s) Oral daily  midodrine. 5 milliGRAM(s) Oral three times a day  ondansetron    Tablet 8 milliGRAM(s) Oral every 24 hours  polyethylene glycol 3350 17 Gram(s) Oral two times a day  senna 2 Tablet(s) Oral at bedtime    MEDICATIONS  (PRN):  acetaminophen   Tablet .. 650 milliGRAM(s) Oral every 6 hours PRN Mild Pain (1 - 3)      Care Discussed with Consultants/Other Providers [x] YES  [ ] NO    Vital Signs Last 24 Hrs  T(C): 36.8 (22 Oct 2021 11:13), Max: 37.1 (21 Oct 2021 22:30)  T(F): 98.2 (22 Oct 2021 11:13), Max: 98.7 (21 Oct 2021 22:30)  HR: 60 (22 Oct 2021 11:13) (60 - 67)  BP: 106/61 (22 Oct 2021 11:13) (99/59 - 119/59)  BP(mean): --  RR: 18 (22 Oct 2021 11:13) (18 - 18)  SpO2: 93% (22 Oct 2021 11:13) (92% - 98%)  I&O's Summary    21 Oct 2021 07:01  -  22 Oct 2021 07:00  --------------------------------------------------------  IN: 680 mL / OUT: 690 mL / NET: -10 mL    22 Oct 2021 07:01  -  22 Oct 2021 13:09  --------------------------------------------------------  IN: 240 mL / OUT: 0 mL / NET: 240 mL    PHYSICAL EXAM:  GENERAL: NAD, AAOx3  HEAD:  Atraumatic, Normocephalic  CHEST/LUNG: CTABL, No wheeze  HEART: Regular rate and rhythm; no murmur  ABDOMEN: Soft, Nontender, Nondistended; Bowel sounds present  EXTREMITIES:  2+ Peripheral Pulses, No clubbing, cyanosis, or edema  SKIN: left arm with erythema and tenderness, streaking

## 2021-10-22 NOTE — ADVANCED PRACTICE NURSE CONSULT - ASSESSMENT
Patient received in chair, alert and oriented x 4, capable of expressing all needs to staff. Day 3/3, Height and weight verified.  Consent in chart. Lab results as per Md dong aware of same. Vital signs stable prior to chemotherapy and  within acceptable parameters, see sunrise. Pt and spouse re- educated on the importance of saving urine, verbalizes good understanding. Pt and spouse re-education done re chemo regimen, drug effects and potential side effects, pt states understanding. Reports that he has been tolerating chemotherapy well without side effects.  Pre- medicated with Zofran 8mg PO. aZACitidine 75mg/l3=211dn Subcutaneous. Same given in three divided syringes. Two injections administered  to left upper arm and one to right upper arm. Patient tolerated same without immediate signs and symptoms of adverse reactions noted.

## 2021-10-22 NOTE — PROGRESS NOTE ADULT - ASSESSMENT
85 y/o M PMHx atrial fibrillation previously on sotalol and Coumadin (held due to thrombocytopaenia and sotalol changed to Toprol per EPS),  S/P ablation with AICD upgrade for PPM following ablation apparently due to ventricular ectopy noted on EPS evaluation, prostate CA followed by a urologist in [place] (unclear to current status of prostate CA), with 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 Patient was transfused platelets in the ER with patient developing acute dyspnoea, and noted to have Transfusion Associated Circulatory Overload (TACO) and provided parenteral furosemide and initially on BiPAP.     # Suspected TACO in the S/P platelet transfusion in the setting of AML conversion from MDS on chemotherapy with hemorrhagic cystitis.  resolved, sp IV Azactam, completed abx  clear urine    # likely phlebitis ro cellulitis and dvt  doxycycline 100mg bid to complete 7 day course  appreciate ID recs  warm compresses    # orthostatic hypotension  increase midodrine still symptomatic   monitor orthos    # AML (acute myeloblastic leukemia)  pancytopenia, sp multiple prbcs and pl transfusions  Transfuse if Hb<8. Plt transfusion <20  heme started azacitidine, continue chemo daily till 10/22    # acute on chronic combined chf  # AICD (automatic cardioverter/defibrillator) present  Hold standing Lasix 40MG for now, but continue IV Lasix 20 as needed post transfusions  TTE reviewed - systolic and diastolic dysfunction    dc planning to rehab once chemo completed

## 2021-10-22 NOTE — ADVANCED PRACTICE NURSE CONSULT - RECOMMEDATIONS
Full report given to area nurse, aware to monitor.
Full report given to area nurse, aware to monitor.
Strict I & O's   Monitor Labs  Encourage fluids 
monitor strict i and o, n/v, injection site
Full report given to area nurse, aware to monitor.
Strict I & O's   Monitor Labs  Encourage fluids 
monitor n/v

## 2021-10-22 NOTE — PROGRESS NOTE ADULT - SUBJECTIVE AND OBJECTIVE BOX
Hematology Oncology Follow-up    INTERVAL HPI/OVERNIGHT EVENTS:  No o/n events, patient resting comfortably. No complaints at this time.  States he was not interested in breakfast this morning.     VITAL SIGNS:  T(F): 98.1 (10-22-21 @ 04:58)  HR: 66 (10-22-21 @ 04:58)  BP: 109/57 (10-22-21 @ 04:58)  RR: 18 (10-22-21 @ 04:58)  SpO2: 98% (10-22-21 @ 04:58)  Wt(kg): --    10-21-21 @ 07:01  -  10-22-21 @ 07:00  --------------------------------------------------------  IN: 680 mL / OUT: 690 mL / NET: -10 mL          Review of Systems:  General: denies fevers/chills  Respiratory: denies cough, shortness of breath  Cardiovascular: denies chest pain, palpitations  Gastrointestinal: denies nausea, vomiting, abdominal pain, constipation, diarrhea, melena, hematochezia  MSK: denies joint pain or muscle pain  Neuro: denies headache, weakness, or parasthesias  Skin: denies rash, petichiae, echymoses  Psych: denies anxiety or sleep disturbances    PHYSICAL EXAM:  Constitutional: NAD  Respiratory: symmetric chest rise, with normal respiratory effort  Cardiovascular: RRR  Gastrointestinal: soft, NTND  Extremities:  no edema  MSK: no obvious abnormalities  Neurological: Grossly intact, moves extremities spontaneously   Skin: no rash, no echymoses, no petichiae  Psych: normal affect    MEDICATIONS  (STANDING):  acetaminophen   Tablet .. 650 milliGRAM(s) Oral once  azaCITIDine Injectable (eMAR) 52.6 milliGRAM(s) SubCutaneous every 24 hours  azaCITIDine Injectable (eMAR) 52.6 milliGRAM(s) SubCutaneous every 24 hours  azaCITIDine Injectable (eMAR) 52.6 milliGRAM(s) SubCutaneous every 24 hours  Biotene Dry Mouth Oral Rinse 15 milliLiter(s) Swish and Spit three times a day  dextrose 40% Gel 15 Gram(s) Oral once  dextrose 5%. 1000 milliLiter(s) (50 mL/Hr) IV Continuous <Continuous>  dextrose 5%. 1000 milliLiter(s) (100 mL/Hr) IV Continuous <Continuous>  dextrose 50% Injectable 25 Gram(s) IV Push once  dextrose 50% Injectable 12.5 Gram(s) IV Push once  dextrose 50% Injectable 25 Gram(s) IV Push once  doxazosin 4 milliGRAM(s) Oral at bedtime  doxycycline hyclate Capsule 100 milliGRAM(s) Oral every 12 hours  glucagon  Injectable 1 milliGRAM(s) IntraMuscular once  influenza   Vaccine 0.5 milliLiter(s) IntraMuscular once  metoprolol succinate ER 25 milliGRAM(s) Oral daily  midodrine. 5 milliGRAM(s) Oral three times a day  ondansetron    Tablet 8 milliGRAM(s) Oral every 24 hours  polyethylene glycol 3350 17 Gram(s) Oral two times a day  senna 2 Tablet(s) Oral at bedtime    MEDICATIONS  (PRN):  acetaminophen   Tablet .. 650 milliGRAM(s) Oral every 6 hours PRN Mild Pain (1 - 3)      adhesives (Rash)  clonidine (Swelling; Rash)  felodipine (Rash)  penicillin (Rash)  sulfa drugs (Rash)      LABS:                        8.0    4.55  )-----------(        ( 22 Oct 2021 05:23 )             24.7     10-    144  |  102  |  65<H>  ----------------------------<  93  4.6   |  30  |  1.56<H>    Ca    9.0      22 Oct 2021 05:23  Phos  4.1     10-21  Mg     2.4     10-    TPro  6.0  /  Alb  3.3  /  TBili  1.6<H>  /  DBili  x   /  AST  8<L>  /  ALT  9<L>  /  AlkPhos  122<H>  10-21       Urinalysis Basic - ( 20 Oct 2021 17:35 )    Color: Yellow / Appearance: Slightly Turbid / S.016 / pH: x  Gluc: x / Ketone: Negative  / Bili: Negative / Urobili: Negative   Blood: x / Protein: 30 mg/dL / Nitrite: Negative   Leuk Esterase: Negative / RBC: 78 /hpf / WBC 1 /HPF   Sq Epi: x / Non Sq Epi: 0 /hpf / Bacteria: Negative                  RADIOLOGY & ADDITIONAL TESTS:  Studies reviewed.

## 2021-10-22 NOTE — PROGRESS NOTE ADULT - ASSESSMENT
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 Started on azacitidine. Plan is to continue 4C of azacitidine followed by BMBx  - recommend transfusion to Hgb >8, Plt >20. Recommend slow transfusion given previous hx of TACO  - patient due for next cycle azacitidine, started (10/14- ). Today is day 7 (last day) of chemotherapy  - no heme barriers to discharge once stable: please ensure Hb >8 and Plt >20 prior to discharge  - According to the primary team, pt is to be discharged to rehab after finishing chemo. Next chemo is scheduled ~11/15 and no plan for chemo during rehab stay. However patient needs to get CBC checked at least 2 times/week for potential need for blood transfusions.  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 Started on azacitidine. Plan is to continue 4C of azacitidine followed by BMBx  - recommend transfusion to Hgb >8, Plt >20. Recommend slow transfusion given previous hx of TACO  - patient due for next cycle azacitidine, started (10/14- ). Today is day 7 (last day) of chemotherapy  - no heme barriers to discharge once stable: please ensure Hb >8 and Plt >30 prior to discharge  - According to the primary team, pt is to be discharged to rehab after finishing chemo. Next chemo is scheduled ~11/15 and no plan for chemo during rehab stay but can delay chemotherapy if rehab stay prolonged (should discuss with outpatient hematologist Dr. Anne). However patient needs to get CBC checked at least 2 times/week for potential need for blood transfusions.  - outpatient f/u with Dr Darrell Anne    # Orthostatic hypotension  - Pt is on lasix for LV dysfunction, HF  - Midodrine added by the primary team  - Continue to monitor orthostatic BP and symptoms  - care per primary team

## 2021-10-23 NOTE — PROGRESS NOTE ADULT - ASSESSMENT
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 Patient was transfused platelets in the ER with patient developing acute dyspnoea, and noted to have Transfusion Associated Circulatory Overload (TACO) and provided parenteral furosemide and initially on BiPAP.     # Suspected TACO in the S/P platelet transfusion in the setting of AML conversion from MDS on chemotherapy with hemorrhagic cystitis.  resolved, sp IV Azactam, completed abx  clear urine    # likely phlebitis ro cellulitis and dvt  doxycycline 100mg bid to complete 7 day course  appreciate ID recs  warm compresses    # orthostatic hypotension  increase midodrine still symptomatic   monitor orthos    # AML (acute myeloblastic leukemia)  pancytopenia, sp multiple prbcs and pl transfusions  Transfuse if Hb<8. Plt transfusion <20  heme started azacitidine, continue chemo daily till 10/22    # acute on chronic combined chf  # AICD (automatic cardioverter/defibrillator) present  Hold standing Lasix 40MG for now, but continue IV Lasix 20 as needed post transfusions  TTE reviewed - systolic and diastolic dysfunction    dc planning to rehab once chemo completed

## 2021-10-23 NOTE — PROGRESS NOTE ADULT - SUBJECTIVE AND OBJECTIVE BOX
Patient is a 86y old  Male who presents with a chief complaint of Episode of gross haematuria at home, with dyspnoea in the ER past platelet transfusion. (22 Oct 2021 13:09)      INTERVAL HPI/OVERNIGHT EVENTS: noted  pt seen and examined this am   events noted  feels well      Vital Signs Last 24 Hrs  T(C): 36.4 (23 Oct 2021 11:11), Max: 37.1 (23 Oct 2021 00:30)  T(F): 97.6 (23 Oct 2021 11:11), Max: 98.7 (23 Oct 2021 00:30)  HR: 61 (23 Oct 2021 11:11) (60 - 68)  BP: 103/59 (23 Oct 2021 11:11) (101/59 - 110/72)  BP(mean): --  RR: 17 (23 Oct 2021 11:11) (17 - 18)  SpO2: 92% (23 Oct 2021 11:11) (92% - 97%)    acetaminophen   Tablet .. 650 milliGRAM(s) Oral once  acetaminophen   Tablet .. 650 milliGRAM(s) Oral every 6 hours PRN  Biotene Dry Mouth Oral Rinse 15 milliLiter(s) Swish and Spit three times a day  dextrose 40% Gel 15 Gram(s) Oral once  dextrose 5%. 1000 milliLiter(s) IV Continuous <Continuous>  dextrose 5%. 1000 milliLiter(s) IV Continuous <Continuous>  dextrose 50% Injectable 25 Gram(s) IV Push once  dextrose 50% Injectable 12.5 Gram(s) IV Push once  dextrose 50% Injectable 25 Gram(s) IV Push once  doxazosin 4 milliGRAM(s) Oral at bedtime  doxycycline hyclate Capsule 100 milliGRAM(s) Oral every 12 hours  glucagon  Injectable 1 milliGRAM(s) IntraMuscular once  influenza   Vaccine 0.5 milliLiter(s) IntraMuscular once  metoprolol succinate ER 25 milliGRAM(s) Oral daily  midodrine. 5 milliGRAM(s) Oral three times a day  polyethylene glycol 3350 17 Gram(s) Oral two times a day  senna 2 Tablet(s) Oral at bedtime      PHYSICAL EXAM:  GENERAL: NAD,   EYES: conjunctiva and sclera clear  ENMT: Moist mucous membranes  NECK: Supple, No JVD, Normal thyroid  CHEST/LUNG: non labored, cta b/l  HEART: Regular rate and rhythm; No murmurs, rubs, or gallops  ABDOMEN: Soft, Nontender, Nondistended; Bowel sounds present  EXTREMITIES:  2+ Peripheral Pulses, No clubbing, cyanosis, or edema  LYMPH: No lymphadenopathy noted  SKIN: No rashes or lesions    Consultant(s) Notes Reviewed:  [x ] YES  [ ] NO  Care Discussed with Consultants/Other Providers [ x] YES  [ ] NO    LABS:                        9.3    4.38  )-----------( 27       ( 23 Oct 2021 18:18 )             28.7     10-23    142  |  103  |  70<H>  ----------------------------<  86  4.7   |  29  |  1.48<H>    Ca    9.0      23 Oct 2021 07:28  Phos  4.5     10-23  Mg     2.5     10-23    TPro  5.7<L>  /  Alb  3.1<L>  /  TBili  1.5<H>  /  DBili  x   /  AST  6<L>  /  ALT  7<L>  /  AlkPhos  111  10-23        CAPILLARY BLOOD GLUCOSE                  RADIOLOGY & ADDITIONAL TESTS:    Imaging Personally Reviewed:  [x ] YES  [ ] NO

## 2021-10-24 NOTE — PROGRESS NOTE ADULT - SUBJECTIVE AND OBJECTIVE BOX
Patient is a 86y old  Male who presents with a chief complaint of Episode of gross haematuria at home, with dyspnoea in the ER past platelet transfusion. (23 Oct 2021 11:59)      INTERVAL HPI/OVERNIGHT EVENTS: noted  pt seen and examined this am   events noted  receiving pl transfusion      Vital Signs Last 24 Hrs  T(C): 36.6 (24 Oct 2021 08:50), Max: 37.1 (24 Oct 2021 04:58)  T(F): 97.8 (24 Oct 2021 08:50), Max: 98.7 (24 Oct 2021 04:58)  HR: 59 (24 Oct 2021 08:50) (59 - 65)  BP: 107/54 (24 Oct 2021 08:50) (107/54 - 125/75)  BP(mean): --  RR: 18 (24 Oct 2021 08:50) (17 - 18)  SpO2: 95% (24 Oct 2021 08:50) (94% - 96%)    acetaminophen   Tablet .. 650 milliGRAM(s) Oral every 6 hours PRN  acetaminophen   Tablet .. 650 milliGRAM(s) Oral once  Biotene Dry Mouth Oral Rinse 15 milliLiter(s) Swish and Spit three times a day  dextrose 40% Gel 15 Gram(s) Oral once  dextrose 5%. 1000 milliLiter(s) IV Continuous <Continuous>  dextrose 5%. 1000 milliLiter(s) IV Continuous <Continuous>  dextrose 50% Injectable 25 Gram(s) IV Push once  dextrose 50% Injectable 12.5 Gram(s) IV Push once  dextrose 50% Injectable 25 Gram(s) IV Push once  doxazosin 4 milliGRAM(s) Oral at bedtime  doxycycline hyclate Capsule 100 milliGRAM(s) Oral every 12 hours  furosemide   Injectable 20 milliGRAM(s) IV Push once  glucagon  Injectable 1 milliGRAM(s) IntraMuscular once  influenza   Vaccine 0.5 milliLiter(s) IntraMuscular once  metoprolol succinate ER 25 milliGRAM(s) Oral daily  midodrine. 5 milliGRAM(s) Oral three times a day  polyethylene glycol 3350 17 Gram(s) Oral two times a day  senna 2 Tablet(s) Oral at bedtime      PHYSICAL EXAM:  GENERAL: NAD,   EYES: conjunctiva and sclera clear  ENMT: Moist mucous membranes  NECK: Supple, No JVD, Normal thyroid  CHEST/LUNG: non labored, cta b/l  HEART: Regular rate and rhythm; No murmurs, rubs, or gallops  ABDOMEN: Soft, Nontender, Nondistended; Bowel sounds present  EXTREMITIES:  2+ Peripheral Pulses, No clubbing, cyanosis, or edema  LYMPH: No lymphadenopathy noted  SKIN: No rashes or lesions    Consultant(s) Notes Reviewed:  [x ] YES  [ ] NO  Care Discussed with Consultants/Other Providers [ x] YES  [ ] NO    LABS:                        8.9    3.55  )-----------( 20       ( 24 Oct 2021 07:20 )             27.4     10-24    146<H>  |  105  |  63<H>  ----------------------------<  83  4.6   |  29  |  1.39<H>    Ca    9.2      24 Oct 2021 07:19  Phos  4.5     10-23  Mg     2.3     10-24    TPro  5.7<L>  /  Alb  3.2<L>  /  TBili  1.7<H>  /  DBili  x   /  AST  6<L>  /  ALT  7<L>  /  AlkPhos  113  10-24        CAPILLARY BLOOD GLUCOSE                  RADIOLOGY & ADDITIONAL TESTS:    Imaging Personally Reviewed:  [x ] YES  [ ] NO

## 2021-10-24 NOTE — PROVIDER CONTACT NOTE (CRITICAL VALUE NOTIFICATION) - NAME OF MD/NP/PA/DO NOTIFIED:
CHARO Cook
Idania DOMINGO
LEONARDO Hill
CHAYO Kendall
CHAYO Hill
NEREIDA Kendall
NP Catarina Bartlett
CHARO Lizama
Kaylene DOMINGO
Kassandra DOMINGO
LEONARDO zhang
NP [place]
CHARO Cook
CHARO Manzo
CHAYO Salazar
CHAYO Veliz
Idania DOMINGO
Kassandra Cook
Kaylene Kendall, PA
LEONARDO Curran

## 2021-10-24 NOTE — PROVIDER CONTACT NOTE (CRITICAL VALUE NOTIFICATION) - PERSON GIVING RESULT:
Aidan Silvestre  Aury
Lab Rima Garcia
Mariah Narayanan / Lab
Tiago Sy
Lab - Gerardo Saeed
lab Heren Mystry
Aidan Silvestre  Aury
Aidan Nguyen
Aidan Silvestre
Aury- Rima Garcia
Dez Mcmullen
Aidan Silvestre  Aury
Aury Crouch
Lab- German Francois
Aidan Silvestre/Ankit
Joseline Barone [facility]
Aidan Denton, Lab
Aidan Silvestre  Aury
LAB  Serena
LAB  Serena

## 2021-10-24 NOTE — PROVIDER CONTACT NOTE (CRITICAL VALUE NOTIFICATION) - SITUATION
PLT 19, PT received 1 unit of PLT last night.
Hgb 6.9, Hmt: 21.2, Plt: 11
Platelet 13
Platelet 19
H/H 7.2/22.6 platelets 10
Hgb 6.9 / Hct 22.1 / platelets 18
Platelets 10
Platelets 12
Hgb: 6.7, Hmt: 21.2, Plt 16
HGB 7.0 Platelets 13
Notified by lab that patient had a critical lab value of plt 17
Patients platelets 13 this AM.
Platelet 15
H/H 6.0/19.2, Platelets 8.0
Platelets - 20 on this AM CBC.
Hemoglobin 7, plt 19
Platelets 20
Platelet 10
Platelet-15
Plt 14

## 2021-10-24 NOTE — PROGRESS NOTE ADULT - ASSESSMENT
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 Patient was transfused platelets in the ER with patient developing acute dyspnoea, and noted to have Transfusion Associated Circulatory Overload (TACO) and provided parenteral furosemide and initially on BiPAP.     # Suspected TACO in the S/P platelet transfusion in the setting of AML conversion from MDS on chemotherapy with hemorrhagic cystitis.  resolved, sp IV Azactam, completed abx  clear urine    # likely phlebitis ro cellulitis and dvt  doxycycline 100mg bid to complete 7 day course  appreciate ID recs  warm compresses    # orthostatic hypotension  increase midodrine still symptomatic   monitor orthos    # AML (acute myeloblastic leukemia)  pancytopenia, sp multiple prbcs and pl transfusions  Transfuse if Hb<8. Plt transfusion <20  heme started azacitidine, continue chemo daily till 10/22    # acute on chronic combined chf  # AICD (automatic cardioverter/defibrillator) present  Hold standing Lasix 40MG for now, but continue IV Lasix 20 as needed post transfusions  TTE reviewed - systolic and diastolic dysfunction    dc planning to rehab once counts stabilized  outpt mgmt for  prbc and pl transfusion

## 2021-10-24 NOTE — PROVIDER CONTACT NOTE (CRITICAL VALUE NOTIFICATION) - TEST AND RESULT REPORTED:
Platelet 15
Plt 14
Platelet 13
Platelets 10
Platelets 20
H/H 6.0/19.2, Platelets 8.0
Hemoglobin 7, plt 19
HGB 7.0 Platelets 13
Platelets - 20
Hgb 6.9, Hmt: 21.2, Plt: 11
PLT 19
Platelet 10
Platelet-15
H/H 7.2/22.6 platelets 10
Hgb 6.9 / Hct 22.1 / platelets 18
Hgb: 6.7, Hmt: 21.2, Plt 16
Platelet 19
Platelets 12
plt 17
Platelet 13

## 2021-10-25 NOTE — PROGRESS NOTE ADULT - SUBJECTIVE AND OBJECTIVE BOX
Patient is a 86y old  Male who presents with a chief complaint of Episode of gross haematuria at home, with dyspnoea in the ER past platelet transfusion. (24 Oct 2021 11:25)      INTERVAL HPI/OVERNIGHT EVENTS: noted  pt seen and examined this am   events noted  feels well      Vital Signs Last 24 Hrs  T(C): 36.4 (25 Oct 2021 20:11), Max: 36.7 (25 Oct 2021 17:35)  T(F): 97.5 (25 Oct 2021 20:11), Max: 98 (25 Oct 2021 17:35)  HR: 74 (25 Oct 2021 20:11) (60 - 74)  BP: 142/71 (25 Oct 2021 20:11) (113/56 - 142/71)  BP(mean): --  RR: 18 (25 Oct 2021 20:11) (18 - 18)  SpO2: 95% (25 Oct 2021 20:11) (93% - 99%)    acetaminophen   Tablet .. 650 milliGRAM(s) Oral once  acetaminophen   Tablet .. 650 milliGRAM(s) Oral every 6 hours PRN  Biotene Dry Mouth Oral Rinse 15 milliLiter(s) Swish and Spit three times a day  bisacodyl 5 milliGRAM(s) Oral every 12 hours PRN  dextrose 40% Gel 15 Gram(s) Oral once  dextrose 5%. 1000 milliLiter(s) IV Continuous <Continuous>  dextrose 5%. 1000 milliLiter(s) IV Continuous <Continuous>  dextrose 50% Injectable 25 Gram(s) IV Push once  dextrose 50% Injectable 12.5 Gram(s) IV Push once  dextrose 50% Injectable 25 Gram(s) IV Push once  doxazosin 4 milliGRAM(s) Oral at bedtime  glucagon  Injectable 1 milliGRAM(s) IntraMuscular once  influenza   Vaccine 0.5 milliLiter(s) IntraMuscular once  metoprolol succinate ER 25 milliGRAM(s) Oral daily  midodrine. 5 milliGRAM(s) Oral three times a day  polyethylene glycol 3350 17 Gram(s) Oral two times a day  senna 2 Tablet(s) Oral at bedtime      PHYSICAL EXAM:  GENERAL: NAD,   EYES: conjunctiva and sclera clear  ENMT: Moist mucous membranes  NECK: Supple, No JVD, Normal thyroid  CHEST/LUNG: non labored, cta b/l  HEART: Regular rate and rhythm; No murmurs, rubs, or gallops  ABDOMEN: Soft, Nontender, Nondistended; Bowel sounds present  EXTREMITIES:  2+ Peripheral Pulses, No clubbing, cyanosis, or edema  LYMPH: No lymphadenopathy noted  SKIN: No rashes or lesions    Consultant(s) Notes Reviewed:  [x ] YES  [ ] NO  Care Discussed with Consultants/Other Providers [ x] YES  [ ] NO    LABS:                        9.2    4.32  )-----------( 29       ( 25 Oct 2021 20:56 )             28.3     10-25    144  |  103  |  57<H>  ----------------------------<  112<H>  4.2   |  28  |  1.30    Ca    9.3      25 Oct 2021 07:23  Mg     2.3     10-24    TPro  5.5<L>  /  Alb  3.2<L>  /  TBili  1.4<H>  /  DBili  x   /  AST  6<L>  /  ALT  9<L>  /  AlkPhos  114  10-25        CAPILLARY BLOOD GLUCOSE                  RADIOLOGY & ADDITIONAL TESTS:    Imaging Personally Reviewed:  [x ] YES  [ ] NO

## 2021-10-25 NOTE — PROGRESS NOTE ADULT - ASSESSMENT
87 y/o M PMHx atrial fibrillation previously on sotalol and Coumadin (held due to thrombocytopaenia and sotalol changed to Toprol per EPS),  S/P ablation with AICD upgrade for PPM following ablation apparently due to ventricular ectopy noted on EPS evaluation, prostate CA followed by a urologist in [place] (unclear to current status of prostate CA), with 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 Patient was transfused platelets in the ER with patient developing acute dyspnoea, and noted to have Transfusion Associated Circulatory Overload (TACO) and provided parenteral furosemide and initially on BiPAP.     # Suspected TACO in the S/P platelet transfusion in the setting of AML conversion from MDS on chemotherapy with hemorrhagic cystitis.  resolved, sp IV Azactam, completed abx  clear urine    # likely phlebitis ro cellulitis and dvt  doxycycline 100mg bid to complete 7 day course  appreciate ID recs  warm compresses    # orthostatic hypotension  increase midodrine still symptomatic   monitor orthos    # AML (acute myeloblastic leukemia)  pancytopenia, sp multiple prbcs and pl transfusions  Transfuse if Hb<8. Plt transfusion <20  heme started azacitidine, continue chemo daily till 10/22    # acute on chronic combined chf  # AICD (automatic cardioverter/defibrillator) present  Hold standing Lasix 40MG for now, but continue IV Lasix 20 as needed post transfusions  TTE reviewed - systolic and diastolic dysfunction    dc planning to rehab once counts stabilized  outpt mgmt for  prbc and pl transfusion

## 2021-10-26 NOTE — PROGRESS NOTE ADULT - ASSESSMENT
87 y/o M PMHx atrial fibrillation previously on sotalol and Coumadin (held due to thrombocytopaenia and sotalol changed to Toprol per EPS),  S/P ablation with AICD upgrade for PPM following ablation apparently due to ventricular ectopy noted on EPS evaluation, prostate CA followed by a urologist in [place] (unclear to current status of prostate CA), with 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 Patient was transfused platelets in the ER with patient developing acute dyspnoea, and noted to have Transfusion Associated Circulatory Overload (TACO) and provided parenteral furosemide and initially on BiPAP.     # Suspected TACO in the S/P platelet transfusion in the setting of AML conversion from MDS on chemotherapy with hemorrhagic cystitis.  resolved, sp IV Azactam, completed abx  clear urine    # likely phlebitis ro cellulitis and dvt  doxycycline 100mg bid to complete 7 day course  appreciate ID recs  warm compresses    # orthostatic hypotension  increase midodrine still symptomatic   monitor orthos    # AML (acute myeloblastic leukemia)  pancytopenia, sp multiple prbcs and pl transfusions  Transfuse if Hb<8. Plt transfusion <20  heme started azacitidine, continue chemo daily till 10/22    # acute on chronic combined chf  # AICD (automatic cardioverter/defibrillator) present  Hold standing Lasix 40MG for now, but continue IV Lasix 20 as needed post transfusions  TTE reviewed - systolic and diastolic dysfunction    dc planning to rehab once counts stabilized  outpt mgmt for  prbc and pl transfusion

## 2021-10-26 NOTE — PROGRESS NOTE ADULT - ATTENDING COMMENTS
[facility]  [phone]
Mr PAUL Padilla is continuing the azacitidine chemotherapy today; according to female friend he has variations of blood pressure on standing and transfer to rehabilitation is on hold. May consider lowering blood pressure medication although heart failure may be keeping current remain as planned. Patient will be staying in hospital ; day 4 of 7 for azacitidine
Mr Padilla is seen in follow up today for treatment of acute myeloid leukemia; he is on hold for treatment due to hematuria. He has a transfusion requirement. Plan for outpatient management of subcutaneous  azacitidine.   Physical examination is normal and unchanged from admission
[facility]  [phone]
PAUL Hernandeznn is a 86 year old male with ECOG ps 3 and age/co morbidities that dictate treatment with hypomethylating agent azacitadine; Given second dose subcutaneously today he will be transfused red cells and then discharged for follow up at [facility] to praveen the seven day coarse of treatment
PAUL Padilla is seen hospital day 16; he is seen with Dr Anne and his lady friend in attendance. There was a greater effort at oral nutrition yesterday and he has greater conversation today. More alert and less arm tremor noted. SC azacitidine will complete tomorrow 10/22/2021 as he will have completed 7/7 subcutaneous treatments
[facility]  [phone]
[facility]  [phone]
PAUL Padilla is an 86 year old male now hospital day 17; he has completed 7 days of subcutaneous azacitidine.   Although he has recently eaten and he has received a transfusion he remains bed bound for 22 of 24 daily hours. Skin color is improved post transfusion of packed red cells.  Plan for rehabilitation placement and attempt at outpatient SC azacitidine at [facility] and outpatient transfusion.   Patient and friend are aware of need for attendant in future home stay
PAUL Padilla is an 86 year old male with myelodysplasia. Currently stable blood pressure in the range of  systolic in AM. He has limited ambulation activity and resting tremor suggestive of basal ganglia dysfunction.  He is fluent in speech and recognizes that he needs to increase food intake but has not been eating
R Valerie discharge was held in the past 48 hours due to episodes of hypotension that were postual. He will resume SC azacitadine today for two additional days. Blood counts are reviewed and discussion with is wife regarding treament
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 demand ischemia/nstemi  elev probnp  acute systolic and diastolic chf  TTE reviewed- global lV systolic dysfunction   cont tele monitoring  cont lasix 40iv bid, i/o, daily wts  cards cs,   ppm interrogation       [facility]  [phone]
[facility]  [phone]
[facility]  [phone]

## 2021-10-26 NOTE — PROGRESS NOTE ADULT - REASON FOR ADMISSION
Episode of gross haematuria at home, with dyspnoea in the ER past platelet transfusion.
Episode of gross haematuria at home, with dyspnea in the ER past platelet transfusion.
Episode of gross haematuria at home, with dyspnoea in the ER past platelet transfusion.

## 2021-10-26 NOTE — PROGRESS NOTE ADULT - PROVIDER SPECIALTY LIST ADULT
Cardiology
Heme/Onc
Heme/Onc
Internal Medicine
Heme/Onc
Internal Medicine
Cardiology
Heme/Onc
Infectious Disease
Internal Medicine
Orthopedics
Heme/Onc
Internal Medicine

## 2021-10-26 NOTE — PROGRESS NOTE ADULT - SUBJECTIVE AND OBJECTIVE BOX
SUBJECTIVE / OVERNIGHT EVENTS:    patient seen and examined  DC tomorrow    --------------------------------------------------------------------------------------------  LABS:                        8.9    3.53  )-----------( 24       ( 26 Oct 2021 07:17 )             28.4     10-26    146<H>  |  104  |  53<H>  ----------------------------<  83  4.5   |  29  |  1.18    Ca    9.6      26 Oct 2021 07:33    TPro  5.5<L>  /  Alb  3.2<L>  /  TBili  1.4<H>  /  DBili  x   /  AST  6<L>  /  ALT  9<L>  /  AlkPhos  114  10-25      CAPILLARY BLOOD GLUCOSE                RADIOLOGY & ADDITIONAL TESTS:    Imaging Personally Reviewed:  [x] YES  [ ] NO    Consultant(s) Notes Reviewed:  [x] YES  [ ] NO    MEDICATIONS  (STANDING):  acetaminophen   Tablet .. 650 milliGRAM(s) Oral once  Biotene Dry Mouth Oral Rinse 15 milliLiter(s) Swish and Spit three times a day  dextrose 40% Gel 15 Gram(s) Oral once  dextrose 5%. 1000 milliLiter(s) (50 mL/Hr) IV Continuous <Continuous>  dextrose 5%. 1000 milliLiter(s) (100 mL/Hr) IV Continuous <Continuous>  dextrose 50% Injectable 25 Gram(s) IV Push once  dextrose 50% Injectable 12.5 Gram(s) IV Push once  dextrose 50% Injectable 25 Gram(s) IV Push once  doxazosin 4 milliGRAM(s) Oral at bedtime  glucagon  Injectable 1 milliGRAM(s) IntraMuscular once  influenza   Vaccine 0.5 milliLiter(s) IntraMuscular once  metoprolol succinate ER 25 milliGRAM(s) Oral daily  midodrine. 5 milliGRAM(s) Oral three times a day  polyethylene glycol 3350 17 Gram(s) Oral two times a day  senna 2 Tablet(s) Oral at bedtime    MEDICATIONS  (PRN):  acetaminophen   Tablet .. 650 milliGRAM(s) Oral every 6 hours PRN Mild Pain (1 - 3)  bisacodyl 5 milliGRAM(s) Oral every 12 hours PRN Constipation      Care Discussed with Consultants/Other Providers [x] YES  [ ] NO    Vital Signs Last 24 Hrs  T(C): 36.7 (26 Oct 2021 12:40), Max: 36.7 (25 Oct 2021 17:35)  T(F): 98.1 (26 Oct 2021 12:40), Max: 98.1 (26 Oct 2021 05:35)  HR: 60 (26 Oct 2021 15:32) (60 - 74)  BP: 125/68 (26 Oct 2021 15:32) (107/65 - 142/71)  BP(mean): --  RR: 18 (26 Oct 2021 12:40) (18 - 18)  SpO2: 98% (26 Oct 2021 12:40) (95% - 99%)  I&O's Summary    25 Oct 2021 07:01  -  26 Oct 2021 07:00  --------------------------------------------------------  IN: 1685 mL / OUT: 2250 mL / NET: -565 mL    26 Oct 2021 07:01  -  26 Oct 2021 15:44  --------------------------------------------------------  IN: 100 mL / OUT: 600 mL / NET: -500 mL    PHYSICAL EXAM:  GENERAL: NAD, well-developed, comfortable  HEAD:  Atraumatic, Normocephalic  EYES: EOMI, PERRLA, conjunctiva and sclera clear  NECK: Supple, No JVD  CHEST/LUNG: Clear to auscultation bilaterally; No wheeze  HEART: Regular rate and rhythm; No murmurs, rubs, or gallops  ABDOMEN: Soft, Nontender, Nondistended; Bowel sounds present  NEURO: AAOx3, no focal weakness, 5/5 b/l extremity strength, b/l knee no arthritis, no effusion   EXTREMITIES:  2+ Peripheral Pulses, No clubbing, cyanosis, or edema  SKIN: No rashes or lesions

## 2021-11-01 NOTE — ED PROVIDER NOTE - CARE PLAN
Principal Discharge DX:	Thrombocytopenia  Secondary Diagnosis:	Anemia  Secondary Diagnosis:	Myelodysplastic syndrome   1

## 2021-11-01 NOTE — ED PROVIDER NOTE - CLINICAL SUMMARY MEDICAL DECISION MAKING FREE TEXT BOX
alexandra/pgy1: 86M with MDS with known need for transfusions - will plan 1U plt and Hbg based on blood work outpt. Discussed with heme fellow Dr Szymanski who suggested over 3hr and with lasix inbetween and after. Will likely be able to dispo after if tolerates well. alexandra/pgy1: 86M with MDS with known need for transfusions - will plan 1U plt and Hbg based on blood work outpt. Discussed with heme fellow Dr Szymanski who suggested over 3hr and with lasix inbetween and after. Will likely be able to dispo after if tolerates well.    SANJUANA Sellers, Attendin yoM, MDS, gets routine transfusions of platelets, PRBC. Some issue with logistics with current nursing home and transfusion center. Presented here instead. No falls, injuries. Does feel weak. No f/c. No headache or NV.    Looks well. Lungs CTA. Non lateralizing neuro exam. No peripheral edema.    Spoke to heme fellow. Recommending 1 unit of each PRBC and platelets and then ok for d/c. Recommending lasix and very slow infusions as pt had very significant issue with TACO during recent transfusions. Pt and partner understand and amenable to plan.

## 2021-11-01 NOTE — ED PROVIDER NOTE - PROGRESS NOTE DETAILS
alexandra/pgy1: spoke to heme upon pt arriving in ED - agreed to 1U plt and 1U PRBC with lasix inbetween. So as long as pt tolerates it in terms of breathing - no reason pt cannot dc back to rehab with f/u with Dr. Anne. Did not think repeat blood work was needed. Did not recommend 2 u plts despite # given hx of TACO

## 2021-11-01 NOTE — ED ADULT NURSE NOTE - NSIMPLEMENTINTERV_GEN_ALL_ED
Implemented All Fall Risk Interventions:  [place] to call system. Call bell, personal items and telephone within reach. Instruct patient to call for assistance. Room bathroom lighting operational. Non-slip footwear when patient is off stretcher. Physically safe environment: no spills, clutter or unnecessary equipment. Stretcher in lowest position, wheels locked, appropriate side rails in place. Provide visual cue, wrist band, yellow gown, etc. Monitor gait and stability. Monitor for mental status changes and reorient to person, place, and time. Review medications for side effects contributing to fall risk. Reinforce activity limits and safety measures with patient and family.

## 2021-11-01 NOTE — ED ADULT NURSE NOTE - OBJECTIVE STATEMENT
86y M arrived to the ED c/o abnormal lab results. Pt presents with low hemoglobin/ platelets sent in by his PCP for transfusion. Pt reports having frequent blood transfusions in the past and suppose to have platelet transfusion today but was told to come here due to "a mix up". Pt denies chest pain, HA, N/V/D, abdominal pain, fever/chills, urinary symptoms, hematuria, numbness, tinging.  Pt placed in position of comfort. Pt educated on call bell system and provided call bell. Bed in lowest position, wheels locked, appropriate side rails raised. Pt denies needs at this time.

## 2021-11-01 NOTE — ED PROVIDER NOTE - PHYSICAL EXAMINATION
CONSTITUTIONAL: NAD  SKIN: Warm dry, no petechiae   HEAD: NCAT  EYES: NL inspection, mildly pale mucosa/conjunctival  ENT: MMM  NECK: Supple; non tender.  CARD: RRR  RESP: CTAB  ABD: S/NT no R/G  EXT: no pedal edema  NEURO: Grossly unremarkable, walked to stretcher   PSYCH: Cooperative, appropriate.

## 2021-11-01 NOTE — ED PROVIDER NOTE - NS ED ROS FT
Constitutional:  See HPI  Eyes:  No visual changes  ENMT: No neck pain or stiffness  Cardiac:  No chest pain  Respiratory:  No cough or respiratory distress.   GI:  No nausea, vomiting, diarrhea or abdominal pain. no hematuria/hematochezia  MS:  No neuro deficits  Neuro:  No headache   Skin:  No easy bruising  Except as documented in the HPI,  all other systems are negative

## 2021-11-02 NOTE — ED CDU PROVIDER INITIAL DAY NOTE - PROGRESS NOTE DETAILS
CDU PROGRESS NOTE CHAYO CANNON: As per ED, As long as pt tolerates transfusions Pt to be dc back to rehab with f/u with Dr. Anne. No repeat blood work needed. CDU NOTE PA Shaheent: pt resting comfortably, feels well without complaint. NAD VSS. CDU NOTE CHAYO Minaya: spoke with pt's Hematologist Dr. Anne, reviewed pt's CBC results and trend, as per Dr. Anne will give pt another unit of PRBCs now, run slowly. then can d/c back to [facility], pt has appointment tomorrow where they will check cbc and give transfusion of platelets or PRBCs as needed.  also spoke with hem fellow- agree with premedication of lasix 20mg IV x 1 and benadryl 25mg po x 1 before unit. CDU NOTE CHAYO Minaya: pt resting comfortably, feels well without complaint. no sob/dyspnea, no weakness. NAD VSS. no events on tele.   pt tolerated transfusion well, without issue.  as per Dr. White, pt stable for d/c back to [facility].   case management Eris is helping correlate. CDU NOTE CHAYO Minaya: pt resting comfortably, feels well without complaint. NAD VSS.    Attending Christopher: pt seen and examined at bedside w/ PA. Pt resting comfortably, no distress. Feeling at his baseline state of health. Pending rpt CBC and conversation w/ heme/onc regarding disposition.

## 2021-11-02 NOTE — ED CDU PROVIDER INITIAL DAY NOTE - DETAILS
87 y/o M with MDS requiring frequent transfusions s/p TACO   Plan: 1 unit PRBC, 1 unit Plt, lasix and very slow infusions. Hematology following

## 2021-11-02 NOTE — ED CDU PROVIDER INITIAL DAY NOTE - MEDICAL DECISION MAKING DETAILS
PT w MDS here with low HB and low plt. due to hx of TACO, per hematology, pt requires very slow transfusions. pt is well appearing, hemodynamically stable, no other acute process. pt placed in CDU for transfusion of 1 u prbc and 1 u plt w lasix in between, monitoring, re-eval. if pt tolerates procedure well, pt ok to d/c with further outpt hematology f/u.

## 2021-11-02 NOTE — ED CDU PROVIDER DISPOSITION NOTE - NSFOLLOWUPINSTRUCTIONS_ED_ALL_ED_FT
1) Follow-up with your Primary Medical Doctor and Hematology. Call today / next business day for prompt follow-up.  2) Return to Emergency room for any worsening or persistent pain, weakness, fever, or any other concerning symptoms.  3) See attached instruction sheets for additional information, including information regarding signs and symptoms to look out for, reasons to seek immediate care and other important instructions.  4) Follow-up with any specialists as discussed / noted as well. 1. Stay hydrated. Rest.  2. Continue Current Home Medications per routine  3. Follow up with your Hematologist Dr. Anne at your appointment tomorrow at 12pm at [facility]. Please see attached appointments, you have appointments 3x/week with [facility] for evaluation/transfusions.   4. Return if symptoms worsen, fever, dizziness, weakness, difficulty breathing, chest pain, and all other concerns.    Darrell Anne ()  Internal Medicine  [address] [place] [id number]  Phone: [phone]  Fax: [phone]          Anemia    WHAT YOU NEED TO KNOW:    Anemia is a low number of red blood cells or a low amount of hemoglobin in your red blood cells. Hemoglobin is a protein that helps carry oxygen throughout your body. Red blood cells use iron to create hemoglobin. Anemia may develop if your body does not have enough iron. It may also develop if your body does not make enough red blood cells or they die faster than your body can make them.    DISCHARGE INSTRUCTIONS:    Call your local emergency number (522 in the ), or have someone call if:   •You lose consciousness.      •You have severe chest pain.      Return to the emergency department if:   •You have dark or bloody bowel movements.          Call your doctor if:   •Your symptoms are worse, even after treatment.      •You have questions or concerns about your condition or care.      Medicines:   •Iron or folic acid supplements help increase your red blood cell and hemoglobin levels.      •Vitamin B12 injections may help boost your red blood cell level and decrease your symptoms. Ask your healthcare provider how to inject B12.      •Take your medicine as directed. Contact your healthcare provider if you think your medicine is not helping or if you have side effects. Tell him of her if you are allergic to any medicine. Keep a list of the medicines, vitamins, and herbs you take. Include the amounts, and when and why you take them. Bring the list or the pill bottles to follow-up visits. Carry your medicine list with you in case of an emergency.      Prevent anemia: Eat healthy foods rich in iron and vitamin C. Nuts, meat, dark leafy green vegetables, and beans are high in iron and protein. Vitamin C helps your body absorb iron. Foods rich in vitamin C include oranges and other citrus fruits. Ask your healthcare provider for a list of other foods that are high in iron or vitamin C. Ask if you need to be on a special diet.    Sources of Iron       Sources of Vitamin C         Follow up with your doctor as directed: Write down your questions so you remember to ask them during your visits.        © Copyright [employer] 2021           back to top                          © Copyright [employer] 2021               Myelodysplastic Syndromes    AMBULATORY CARE:    Myelodysplastic syndromes (MDS) are a group of conditions that prevent stem cells in your bone marrow from working properly. Stem cells normally make red blood cells (RBCs), white blood cells (WBCs), and platelets. MDS cause stem cells to grow and increase in number without control or order. The RBCs, WBCs, and platelets produced are faulty and too few in number. This increases your risk for anemia (low numbers of RBCs), abnormal bleeding, infections, and leukemia. MDS usually affect people older than 70 years.    Common signs and symptoms:   •Pale skin, weakness, or fatigue      •Low blood pressure or dizziness when you stand up too quickly      •Fevers or infections that happen often      •Bruising or bleeding easily      •Blood in your vomit or bowel movements      •Weight loss      Call your local emergency number (911 in the ) if:   •You feel lightheaded, short of breath, or have chest pain.      •You cough up blood.      Seek care immediately if:   •Your arm or leg feels warm, tender, and painful. It may look swollen and red.      •You have blood in your bowel movement.      •You vomit blood.      •You have a wound that does not stop bleeding after 10 minutes of direct pressure.      •You have severe abdominal pain.      Call your doctor if:   •You have a fever.      •You are dizzy and feel like fainting.      •You have chills, a cough, or feel weak and achy.      •You have questions or concerns about your condition or care.      Treatment may include any of the following:  •Medicines may help to stop the growth of faulty stem cells. They may also help prevent normal stem cells from becoming defective. Other medicine will increase the number of RBCs, WBCs, or platelets. You may also need medicine to stop your immune system from attacking your stem cells, RBCs, WBCs, or platelets. Medicine may be given to prevent or treat an infection.      •A blood transfusion may be given to increase RBCs or other blood cells.      •A blood or bone marrow stem cell transplant is a procedure to put bone marrow or stem cells in your blood through an IV. The stem cells should go to your bone marrow and begin to make new, healthy blood cells.      Prevent infections: It is important to lower your risk for bleeding or bruising, and to prevent infections:   •Wash your hands often. Wash your hands after you use the bathroom, change a child's diapers, or sneeze. Wash your hands before you prepare or eat food. Use soap and water. Rub your soapy hands together, lacing your fingers. Wash the front and back of each hand, and in between your fingers. Use the fingers of one hand to scrub under the nails of the other hand. Rinse under running water. Dry your hands with a clean towel or a paper towel. You can use a hand  that contains alcohol if soap and water are not available.  Handwashing           •Protect yourself from sneezes and coughs. Turn your head and cover your face if you are around someone who is sneezing or coughing. You can cover your face with a tissue or use the bend of your arm.      •Avoid anyone who has a cold or the flu. Also try to avoid large groups. This will lower your risk for getting sick. Treatment for MDS can lower your ability to fight infection.      •Ask about vaccines you may need. Get a flu vaccine as soon as recommended each year, usually starting in September or October. A pneumonia vaccine may be recommended every 5 years. Your healthcare provider can tell you if you need other vaccines, and when to get them.      •Ask which activities are safe for you. Contact sports may increase your risk for bleeding or bruising.              Follow up with your doctor or hematologist-oncologist as directed: It is important for you to go to all follow-up appointments. You will need ongoing tests, and you may need more treatment. Write down your questions so you remember to ask them during your visits.       © Copyright [employer] 2021           back to top                          © Copyright [employer] 2021 1. Stay hydrated. Rest.  2. Continue Current Home Medications per routine  3. Follow up with your Hematologist Dr. Anne at your appointment tomorrow at 12pm at [facility]. Please see attached appointments, you have appointments 3x/week with [facility] for evaluation/transfusions.   follow up with your PCP in 1-2 days.  review all results printed with your Doctors. please follow up abnormalities on blood work with your Doctors.  4. Return if symptoms worsen, fever, dizziness, weakness, difficulty breathing, chest pain, and all other concerns.    Darrell Anne (DO)  Internal Medicine  [address] [place] [address]  Phone: [phone]  Fax: [phone]          Anemia    WHAT YOU NEED TO KNOW:    Anemia is a low number of red blood cells or a low amount of hemoglobin in your red blood cells. Hemoglobin is a protein that helps carry oxygen throughout your body. Red blood cells use iron to create hemoglobin. Anemia may develop if your body does not have enough iron. It may also develop if your body does not make enough red blood cells or they die faster than your body can make them.    DISCHARGE INSTRUCTIONS:    Call your local emergency number (911 in the ), or have someone call if:   •You lose consciousness.      •You have severe chest pain.      Return to the emergency department if:   •You have dark or bloody bowel movements.          Call your doctor if:   •Your symptoms are worse, even after treatment.      •You have questions or concerns about your condition or care.      Medicines:   •Iron or folic acid supplements help increase your red blood cell and hemoglobin levels.      •Vitamin B12 injections may help boost your red blood cell level and decrease your symptoms. Ask your healthcare provider how to inject B12.      •Take your medicine as directed. Contact your healthcare provider if you think your medicine is not helping or if you have side effects. Tell him of her if you are allergic to any medicine. Keep a list of the medicines, vitamins, and herbs you take. Include the amounts, and when and why you take them. Bring the list or the pill bottles to follow-up visits. Carry your medicine list with you in case of an emergency.      Prevent anemia: Eat healthy foods rich in iron and vitamin C. Nuts, meat, dark leafy green vegetables, and beans are high in iron and protein. Vitamin C helps your body absorb iron. Foods rich in vitamin C include oranges and other citrus fruits. Ask your healthcare provider for a list of other foods that are high in iron or vitamin C. Ask if you need to be on a special diet.    Sources of Iron       Sources of Vitamin C         Follow up with your doctor as directed: Write down your questions so you remember to ask them during your visits.        © Copyright [employer] 2021           back to top                          © Copyright [employer] 2021               Myelodysplastic Syndromes    AMBULATORY CARE:    Myelodysplastic syndromes (MDS) are a group of conditions that prevent stem cells in your bone marrow from working properly. Stem cells normally make red blood cells (RBCs), white blood cells (WBCs), and platelets. MDS cause stem cells to grow and increase in number without control or order. The RBCs, WBCs, and platelets produced are faulty and too few in number. This increases your risk for anemia (low numbers of RBCs), abnormal bleeding, infections, and leukemia. MDS usually affect people older than 70 years.    Common signs and symptoms:   •Pale skin, weakness, or fatigue      •Low blood pressure or dizziness when you stand up too quickly      •Fevers or infections that happen often      •Bruising or bleeding easily      •Blood in your vomit or bowel movements      •Weight loss      Call your local emergency number (911 in the ) if:   •You feel lightheaded, short of breath, or have chest pain.      •You cough up blood.      Seek care immediately if:   •Your arm or leg feels warm, tender, and painful. It may look swollen and red.      •You have blood in your bowel movement.      •You vomit blood.      •You have a wound that does not stop bleeding after 10 minutes of direct pressure.      •You have severe abdominal pain.      Call your doctor if:   •You have a fever.      •You are dizzy and feel like fainting.      •You have chills, a cough, or feel weak and achy.      •You have questions or concerns about your condition or care.      Treatment may include any of the following:  •Medicines may help to stop the growth of faulty stem cells. They may also help prevent normal stem cells from becoming defective. Other medicine will increase the number of RBCs, WBCs, or platelets. You may also need medicine to stop your immune system from attacking your stem cells, RBCs, WBCs, or platelets. Medicine may be given to prevent or treat an infection.      •A blood transfusion may be given to increase RBCs or other blood cells.      •A blood or bone marrow stem cell transplant is a procedure to put bone marrow or stem cells in your blood through an IV. The stem cells should go to your bone marrow and begin to make new, healthy blood cells.      Prevent infections: It is important to lower your risk for bleeding or bruising, and to prevent infections:   •Wash your hands often. Wash your hands after you use the bathroom, change a child's diapers, or sneeze. Wash your hands before you prepare or eat food. Use soap and water. Rub your soapy hands together, lacing your fingers. Wash the front and back of each hand, and in between your fingers. Use the fingers of one hand to scrub under the nails of the other hand. Rinse under running water. Dry your hands with a clean towel or a paper towel. You can use a hand  that contains alcohol if soap and water are not available.  Handwashing           •Protect yourself from sneezes and coughs. Turn your head and cover your face if you are around someone who is sneezing or coughing. You can cover your face with a tissue or use the bend of your arm.      •Avoid anyone who has a cold or the flu. Also try to avoid large groups. This will lower your risk for getting sick. Treatment for MDS can lower your ability to fight infection.      •Ask about vaccines you may need. Get a flu vaccine as soon as recommended each year, usually starting in September or October. A pneumonia vaccine may be recommended every 5 years. Your healthcare provider can tell you if you need other vaccines, and when to get them.      •Ask which activities are safe for you. Contact sports may increase your risk for bleeding or bruising.              Follow up with your doctor or hematologist-oncologist as directed: It is important for you to go to all follow-up appointments. You will need ongoing tests, and you may need more treatment. Write down your questions so you remember to ask them during your visits.       © Copyright [employer] 2021           back to top                          © Copyright [employer] 2021

## 2021-11-02 NOTE — ED ADULT NURSE REASSESSMENT NOTE - NS ED NURSE REASSESS COMMENT FT1
PRBC given. Consent in chart. Risks and benefits explained to patient. Patient verbalized understanding of risks and benefits. Patient aware of possible side effects. Vital signs stable. Second RN at bedside for confirmation. Pt receiving transfusion over 3 hours.
Patient resting comfortably, no complaints at this time. PRBCs transfusing, will continue to monitor.
Report received from [facility]. Pt to be moved to CDU for platelet transfusion and repeat CBC in AM. Pt ambulated to bathroom with x1 assist, steady gait noted. VS as documented. Pt instructed to use call bell if noticing any increased shortness of breath/difficulty breathing. Bed locked and lowered. Comfort and safety measures maintained.
platelets given. Consent in chart. Risks and benefits explained to patient. Patient verbalized understanding of risks and benefits. Patient aware of possible side effects. Vital signs stable. Second RN at bedside for confirmation.  PT in no acute distress, breathing nonlabored
07:55 - Patient is alert and oriented.  One assist to the bathroom.  Denies pain, chest pain, nausea, and vomiting.  Vital signs stable, continue to monitor and awaiting heme clearance.

## 2021-11-02 NOTE — ED CDU PROVIDER DISPOSITION NOTE - PATIENT PORTAL LINK FT
You can access the FollowMyHealth Patient Portal offered by [facility] by registering at the following website: [email or web address]. By joining [employer]’s FollowMyHealth portal, you will also be able to view your health information using other applications (apps) compatible with our system.

## 2021-11-02 NOTE — ED CDU PROVIDER DISPOSITION NOTE - ATTENDING CONTRIBUTION TO CARE
Attending Christopher: In brief, 85 y/o M w/ PMH of MDS requiring frequent transfusions initially presented to ED w/ anemia and thrombocytopenia, requesting transfusion. Pt w/ suspected prior transfusion reaction to platelets so was placed in CDU for slow PRBC and PLT transfusions augmented w/ benadryl and lasix. Pt received 2x PRBC and 1x PLT w/ no adverse affect. Pt feeling well post transfusion. Heme Dr. Anne OK for pt to be DC'ed and will f/u w/ pt tomorrow in office. Pt stable for discharge. Will return to [facility].

## 2021-11-02 NOTE — ED CDU PROVIDER INITIAL DAY NOTE - OBJECTIVE STATEMENT
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 Hbg 6.8 baseline 8, plts 8 baseline 20s. Was supposed to get outpt transfusion in transfusion center but per pt had some mix up and was sent from rehab here. General fatigue but not worse than usual, denies any headache, neuro sxs, SOB, CP, or bleeding. DNR/DNI. Heme: Dr. Anne  In ED, patient had laboratory significant for h/h 6.0/18.5, Plt 7. ED discussed case w/ Hematology who recommended 1 unit of each PRBC and platelets and then ok for d/c. Recommending lasix and very slow infusions as pt had very significant issue with TACO during recent transfusions. Pt signed consent and sent to CDU for further monitoring and optimization.

## 2021-11-02 NOTE — ED CDU PROVIDER DISPOSITION NOTE - CLINICAL COURSE
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 Hbg 6.8 baseline 8, plts 8 baseline 20s. Was supposed to get outpt transfusion in transfusion center but per pt had some mix up and was sent from rehab here. General fatigue but not worse than usual, denies any headache, neuro sxs, SOB, CP, or bleeding. DNR/DNI. Heme: Dr. Anne  In ED, patient had laboratory significant for h/h 6.0/18.5, Plt 7. ED discussed case w/ Hematology who recommended 1 unit of each PRBC and platelets and then ok for d/c. Recommending lasix and very slow infusions as pt had very significant issue with TACO during recent transfusions. Pt signed consent and sent to CDU for further monitoring and optimization. 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 Hbg 6.8 baseline 8, plts 8 baseline 20s. Was supposed to get outpt transfusion in transfusion center but per pt had some mix up and was sent from rehab here. General fatigue but not worse than usual, denies any headache, neuro sxs, SOB, CP, or bleeding. DNR/DNI. Heme: Dr. Anne  In ED, patient had laboratory significant for h/h 6.0/18.5, Plt 7. ED discussed case w/ Hematology who recommended 1 unit of each PRBC and platelets and then ok for d/c. Recommending lasix and very slow infusions as pt had very significant issue with TACO during recent transfusions. Pt signed consent and sent to CDU for further monitoring and optimization.  pt did well in cdu, received transfusion 1 unit of PRBCs and 1 unit platelets without reaction, levels improved to hgb 7 and platelet 17. pt then given another unit of PRBCs at request of her Hematologist Dr. Anne, pt received unit well without reaction. pt to f/up with Dr. Anne tomorrow at their appointment.

## 2021-11-02 NOTE — ED CDU PROVIDER DISPOSITION NOTE - CARE PROVIDER_API CALL
Darrell Anne (DO)  Internal Medicine  [address] [place] [address]  Phone: [phone]  Fax: [phone]  Follow Up Time:

## 2021-11-04 NOTE — HISTORY OF PRESENT ILLNESS
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 \par \par He was first diagnosed in Jan/Feb 2021. His BM bx showed -5q and he was started on Revlimid 10 mg daily. In June 2021 he was having weakness, GI issues, bloating, and went to [facility] where he received transfusions of plt and RBC and Revlimid (took for 2-3 months) was discontinued,  Of note, patient was recently admitted to [facility] from 8/19-8/26 for anemia/thrombocytopenia, after discharge he was started on promacta as a bridge to get him to a [facility] appointment Dr Shanda Corea.\par \par In September following hospital discharge, he saw another hematologist for MDS at (Dr Corea; [facility]) when his labs showed elevated peripheral blasts >20% so he was sent to the ED for further evaluation for possible leukemia. He was admitted to [facility] on 9/8/21. Upon admission he had shortness of breath. He denied any confusion, change in urine output, chest pain, obvious signs of bleeding, in the ED, he was hemodynamically stable, afebrile, saturating well on room air, labs were significant for normocytic anemia lower than baseline, thrombocytopenia at baseline, 12.6% blasts on peripheral blood, CXR showed small bilateral effusions, patient was given 40mg lasix x1 and 40mEq KCl x1 and admitted to general medicine for further management. \par \par He was admitted to the [facility] leukemia floor where he underwent bone marrow biopsy and aspirate assessment on 9/9/21which showed MDS with excess blasts (10-19%). Specifically he was found to have 17% blasts on the core biopsy specimen. He received azacitidine 52.6 mg daily on days 1-7 (9/15/21 - 9/21/21). FLT3-ITD was negative. 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 During that admission he had been managed for fluid overload related to transfusions.\par \par Hematopathology Report - 9/9/2021: Final Diagnosis. 1, 2. Bone marrow biopsy and bone marrow aspirate. - Myelodysplastic syndrome with excess blasts-2.\par \par Diagnostic note:\par The bone marrow biopsy shows overall 17% with focally greater than 20% myeloblasts.  The patient's history of MDS is noted.  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 Flow cytometry  of bone marrow aspirate shows   9% myeloblasts (positive for HLA-DR, CD34, CD33, CD13, partial   CD7, ; negative CD3, CD4, CD11b, CD15, CD19, CD56) and decreased myeloid granularity. Immunohistochemical stains   (CD34, , CD15, CD68, MPO, glycophorin A, Factor VIII, muramidase) were performed on block 1A.  There is increase\par in immaturity (CD34+, +) accounting for overall 15 to 19% of cells with focally greater than 20%.  CD15, CD68, MPO and muramidase highlight myeloid cells while glycophoring A highlights erythroid cells.  Factor VIII shows decreased megakaryocytes.\par \par Microscopic description:\par 1. Biopsy:  Sections of clot and biopsy show hypercellularity (85 to 90%) with erythroid predominance, myeloid and erythroid maturation, dyserythropoiesis, decreased megakaryocytes, increased immaturiy, mildly increased basophils, and increased iron stores.\par 2. Aspirate:  Particulate and cellular aspirate smear with decreased M:E ratio (1.2:1), erythroid predominance, myeloid and erythroid maturation, dysgranulopoiesis, dyserythropoiesis, decreased megakaryocytes, mildly increased eosinophils and basophils, and few lymphocytes.\par \par Fam Hx:\par \par \par Social Hx:\par Currently living with his girlfriend\par Never smoker\par No alcohol use\par \par Allergies:\par Multiple drug allergies:  [FreeTextEntry1] : Azacitidine 7 days each 28 day cycle. Plan for 4 cycles [de-identified] : 9/27/21: Initial visit\par \par 11/3/21: Mr Padilla was recently hospitalized for gross hematuria and dyspnea. He received his 2nd cycle of azacitidine while in the hospital which was started on 10/14/21. He was discharged to rehab on 10/26/21. He required an ED visit on 11/1 due to very low plts and inability to get to his plt check / appointment at [facility]. Today he feels some improvement in his strength but overall remains fatigued. His appetite remains poor and he has had some significant weight loss. He feels that his taste is reduced. He also has dry skin with some areas of skin breakdown. He also had a slow fall this morning at rehab, he denies injuring himself. No fevers, chills, nausea, diarrhea.\par \par ---\par A comprehensive review of systems was performed including constitutional, eyes, ENT, cardiovascular, respiratory, gastrointestinal, genitourinary, musculoskeletal, integumentary, neurological, psychiatric and hematologic / lymphatic. All pertinent positives are included in the H&P under interval history above and the remaining review of systems listed are negative.

## 2021-11-04 NOTE — ASSESSMENT
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 He started azacitidine on 9/15/21 (C1D1). He is currently on cycle 2 (started 10/14/21) day 21.\par \par He remains transfusion dependant for anemia and thrombocytopenia, however maintains normal range neutrophil counts.  He continues to have peripheral blasts, although lower than pre-treatment. He is not having any bleeding issues at present. He has experienced fluid overload related to transfusions, so will give 20 mg lasix orally with each transfusion.\par \par CBC today: WBC 2.44, Hb 8.5, Plt 13 \par \par Plan:\par - CMP, LDH, retic %, PT/PTT\par - Type and cross today\par - Plts today and plt check 3x per week for now\par - pRBC transfusion weekly\par - Lasix 20 mg PO each unit of pRBCs or plt transfusions\par - Plan for 4 cycles of azacitidine (7 days given M-F then MTues) followed by bone marrow biopsy assessment of response\par - Currently at [facility] [phone]\par - Return visit every 2 weeks\par \par \par ---\par I personally have spent a total of 45 minutes of time on the date of this encounter discussing current status, prognosis, reviewing test results, documenting findings, coordinating care and directly consulting with the patient and/or designated family member.

## 2021-11-04 NOTE — PHYSICAL EXAM
[Ambulatory and capable of all self care but unable to carry out any work activities] : Status 2- Ambulatory and capable of all self care but unable to carry out any work activities. Up and about more than 50% of waking hours [Normal] : affect appropriate [Thin] : thin [de-identified] : In a wheelchair, unsteady on his feet [de-identified] : AICD present, no murmurs, regular rate and rhythm  [de-identified] : bilateral equal 1+ pitting lower extremity edema isolated to ankles and lower distal extremities [de-identified] : areas of dryness and skin breakdown without signs of infection in lower extremity

## 2021-11-17 PROBLEM — Z51.11 ENCOUNTER FOR CHEMOTHERAPY MANAGEMENT: Status: ACTIVE | Noted: 2021-01-01

## 2021-11-18 NOTE — HISTORY OF PRESENT ILLNESS
[Treatment Protocol] : Treatment Protocol [Disease:__________________________] : Disease: [unfilled] [de-identified] : 85yo M w/ PMHx of Afib s/p ablation w/ AICD (on Warfarin but on hold due to severe thrombocytopenia), mildly reduced LVEF (45% in Sept 2021) prostate CA (Dx 2015, s/p cyberknife), MDS (Dx 1/2021 with Dr Haile (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 \par \par He was first diagnosed in Jan/Feb 2021. His BM bx showed -5q and he was started on Revlimid 10 mg daily. In June 2021 he was having weakness, GI issues, bloating, and went to [facility] where he received transfusions of plt and RBC and Revlimid (took for 2-3 months) was discontinued,  Of note, patient was recently admitted to [facility] from 8/19-8/26 for anemia/thrombocytopenia, after discharge he was started on promacta as a bridge to get him to a [facility] appointment Dr Shanda Corea.\par \par In September following hospital discharge, he saw another hematologist for MDS at (Dr Corea; [facility]) when his labs showed elevated peripheral blasts >20% so he was sent to the ED for further evaluation for possible leukemia. He was admitted to [facility] on 9/8/21. Upon admission he had shortness of breath. He denied any confusion, change in urine output, chest pain, obvious signs of bleeding, in the ED, he was hemodynamically stable, afebrile, saturating well on room air, labs were significant for normocytic anemia lower than baseline, thrombocytopenia at baseline, 12.6% blasts on peripheral blood, CXR showed small bilateral effusions, patient was given 40mg lasix x1 and 40mEq KCl x1 and admitted to general medicine for further management. \par \par He was admitted to the [facility] floor where he underwent bone marrow biopsy and aspirate assessment on 9/9/21which showed MDS with excess blasts (10-19%). Specifically he was found to have 17% blasts on the core biopsy specimen. He received azacitidine 52.6 mg daily on days 1-7 (9/15/21 - 9/21/21). FLT3-ITD was negative. 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 During that admission he had been managed for fluid overload related to transfusions.\par \par Hematopathology Report - 9/9/2021: Final Diagnosis. 1, 2. Bone marrow biopsy and bone marrow aspirate. - Myelodysplastic syndrome with excess blasts-2.\par \par Diagnostic note:\par The bone marrow biopsy shows overall 17% with focally greater than 20% myeloblasts.  The patient's history of MDS is noted.  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 Flow cytometry  of bone marrow aspirate shows   9% myeloblasts (positive for HLA-DR, CD34, CD33, CD13, partial   CD7, ; negative CD3, CD4, CD11b, CD15, CD19, CD56) and decreased myeloid granularity. Immunohistochemical stains   (CD34, , CD15, CD68, MPO, glycophorin A, Factor VIII, muramidase) were performed on block 1A.  There is increase\par in immaturity (CD34+, +) accounting for overall 15 to 19% of cells with focally greater than 20%.  CD15, CD68, MPO and muramidase highlight myeloid cells while glycophoring A highlights erythroid cells.  Factor VIII shows decreased megakaryocytes.\par \par Microscopic description:\par 1. Biopsy:  Sections of clot and biopsy show hypercellularity (85 to 90%) with erythroid predominance, myeloid and erythroid maturation, dyserythropoiesis, decreased megakaryocytes, increased immaturiy, mildly increased basophils, and increased iron stores.\par 2. Aspirate:  Particulate and cellular aspirate smear with decreased M:E ratio (1.2:1), erythroid predominance, myeloid and erythroid maturation, dysgranulopoiesis, dyserythropoiesis, decreased megakaryocytes, mildly increased eosinophils and basophils, and few lymphocytes.\par \par Fam Hx:\par \par \par Social Hx:\par Currently living with his girlfriend\par Never smoker\par No alcohol use\par \par Allergies:\par Multiple drug allergies:  [FreeTextEntry1] : Azacitidine 7 days each 28 day cycle. Plan for 4 cycles [de-identified] : 9/27/21: Initial visit\par \par 11/3/21: Mr Padilla was recently hospitalized for gross hematuria and dyspnea. He received his 2nd cycle of azacitidine while in the hospital which was started on 10/14/21. He was discharged to rehab on 10/26/21. He required an ED visit on 11/1 due to very low plts and inability to get to his plt check / appointment at [facility]. Today he feels some improvement in his strength but overall remains fatigued. His appetite remains poor and he has had some significant weight loss. He feels that his taste is reduced. He also has dry skin with some areas of skin breakdown. He also had a slow fall this morning at rehab, he denies injuring himself. No fevers, chills, nausea, diarrhea.\par \par 11/17/21:\par \par \par \par ---\par A comprehensive review of systems was performed including constitutional, eyes, ENT, cardiovascular, respiratory, gastrointestinal, genitourinary, musculoskeletal, integumentary, neurological, psychiatric and hematologic / lymphatic. All pertinent positives are included in the H&P under interval history above and the remaining review of systems listed are negative.

## 2021-11-18 NOTE — PROVIDER CONTACT NOTE (CRITICAL VALUE NOTIFICATION) - BACKGROUND
[facility] at [address], [address] [facility] [address] [facility]  Jin Guardian: [phone]  F: [phone]      Reason for Visit:   Brett Gil is a 63 y.o. male who is seen for evaluation of severe pain; hx of pancreatic cancer. Hematology/Oncology Treatment History:     Diagnosis: Pancreatic, adenocarcinoma, moderately to poorly diiferentiated     Stage: IV [[id number]]     Pathology: 11/18/2020 pancreatic body, FNA; adenocarcinoma, moderately to poorly diiferentiated  [facility] One testing: Negative for reportable alterations. MS-stable. KRAS G12D. Tumor mutational burden 1 muts/mb VKA1G2D R183W. KDM6A Q611 TPS3 [id number]*25  [employer]: Germline testing negative for BRCA1/2 and 86 other genes.      Prior Treatment: None     Current Treatment:  FOLFIRINOX every 2 weeks, 11/30/20 - current. History of Present Illness:   Brett Gil is a pleasant 63 y.o. male who was admitted on 11/15/21 for worsening abd pain and tightness. ED imaging showed large volume ascites and slight increase in size of pancreas mass, irregular thickening of the sigmoid colon/rectosigmoid junction. ED labs notable for Hgb 9.5, Ca 7.9, , , ALK phos 300. Pt states his abdomen feels full and firm, with increased and different nature of pain compared to his chronic pain from pancreas mass. He states he is unable to eat because of this. He has been having bowel movements, loose stools recently. He also has weight loss and fatigue. When we discussed his multiple missed appointments, he states that he does not want to burden his children any more with his transportation needs. He states that Medicaid transportation has been very problematic with the  going to the wrong address, etc. According to our staff, patient usually does not call us to update us of his transporation status. Rather, our team reaches out to him to ask about missed appt and he voices trouble with transportation.  He 86M with Afib s/p ablation with ICD upgrade, recently diagnosed MDS s/p Revlimid c/b colitis in June 2021, transfusion dependent understands his cancer is incurable and is interested in comfort care, but he is unsure where he can live. His daughter is getting  and he does not want to burden her. He no longer has his own place given inability to pay bills - he is working every other week still, but this does not provide enough money for rent. Interval History:     Able to eat small amts of food but then abd begins to hurt. Hoping to be able to have pain better controlled and be able to eat more. Hospice liaison at bedside. Current Facility-Administered Medications   Medication Dose Route Frequency    HYDROmorphone (DILAUDID) injection 1 mg  1 mg IntraVENous Q3H PRN    oxyCODONE ER (OxyCONTIN) tablet 10 mg  10 mg Oral Q12H    lipase-protease-amylase (CREON 24,000) capsule 1 Capsule  1 Capsule Oral PRN    lipase-protease-amylase (CREON 24,000) capsule 2 Capsule  2 Capsule Oral TID WITH MEALS    sodium chloride (NS) flush 5-40 mL  5-40 mL IntraVENous Q8H    sodium chloride (NS) flush 5-40 mL  5-40 mL IntraVENous PRN    acetaminophen (TYLENOL) tablet 650 mg  650 mg Oral Q6H PRN    polyethylene glycol (MIRALAX) packet 17 g  17 g Oral DAILY PRN    senna (SENOKOT) tablet 8.6 mg  1 Tablet Oral DAILY PRN    ondansetron (ZOFRAN) injection 4 mg  4 mg IntraVENous Q6H PRN       No Known Allergies       Review of Systems: A complete review of systems was obtained, reviewed. Pertinent findings reviewed above. Physical Exam:     Visit Vitals  BP (!) 88/58 (BP 1 Location: Right upper arm, BP Patient Position: At rest;Lying)   Pulse 67   Temp 97.4 °F (36.3 °C)   Resp 20   Ht 5' 10\" (1.778 m)   Wt 52.9 kg (116 lb 10 oz)   SpO2 97%   BMI 16.73 kg/m²     ECOG PS: 3  General: Cachetic, no acute distress  Eyes: anicteric sclerae  HENT: oropharynx clear  Neck: supple  Respiratory: normal respiratory effort  CV: no peripheral edema  GI: Slightly firm abdomen, distended with ascites, moderately tender.  Aspira cath noted; Cannot appreciate HSM given ascites. MS: Digits without clubbing or cyanosis. Decreased muscle tone  Skin: no rashes; no ecchymoses; no petechiae      Results:     Lab Results   Component Value Date/Time    WBC 5.8 11/15/2021 01:07 PM    HGB 9.5 (L) 11/15/2021 01:07 PM    HCT 28.6 (L) 11/15/2021 01:07 PM    PLATELET 309 00/07/4903 01:07 PM    MCV 96.6 11/15/2021 01:07 PM    ABS. NEUTROPHILS 3.8 11/15/2021 01:07 PM     Lab Results   Component Value Date/Time    Sodium 139 11/16/2021 12:42 AM    Potassium 3.9 11/16/2021 12:42 AM    Chloride 107 11/16/2021 12:42 AM    CO2 29 11/16/2021 12:42 AM    Glucose 98 11/16/2021 12:42 AM    BUN 10 11/16/2021 12:42 AM    Creatinine 0.77 11/16/2021 12:42 AM    GFR est AA >60 11/16/2021 12:42 AM    GFR est non-AA >60 11/16/2021 12:42 AM    Calcium 7.9 (L) 11/16/2021 12:42 AM     Lab Results   Component Value Date/Time    Bilirubin, total 0.3 11/16/2021 12:42 AM    ALT (SGPT) 128 (H) 11/16/2021 12:42 AM    Alk. phosphatase 300 (H) 11/16/2021 12:42 AM    Protein, total 5.4 (L) 11/16/2021 12:42 AM    Albumin 2.3 (L) 11/16/2021 12:42 AM    Globulin 3.1 11/16/2021 12:42 AM     Lab Results   Component Value Date/Time    Sed rate, automated 4 11/16/2017 03:56 PM    C-Reactive protein <0.29 11/16/2017 03:56 PM    TSH 3.36 11/12/2020 05:28 AM    Lipase 17 (L) 11/15/2021 01:07 PM     No results found for: IRON, FE, TIBC, IBCT, PSAT, FERR    Lab Results   Component Value Date/Time    INR 1.0 09/23/2010 03:05 AM    aPTT 28.0 09/23/2010 03:05 AM     Lab Results   Component Value Date/Time    CEA 4.9 11/13/2020 05:29 AM    Carbohydrate Antigen 19-9, (CA 19-9) 6,583 (H) 10/04/2021 08:16 AM     11/15/21 CT A/P W cont  IMPRESSION  1. Large volume ascites, new. No definite change in small submental soft tissue  densities left mid and upper abdomen. 2. Possible slight increase in ill-defined pancreatic body mass.  Slight  worsening of focal narrowing/short segment occlusion at the junction of the  superior mesenteric vein and portal vein. 3. Slightly nodular border of the liver with no discrete liver mass. 4. Irregular wall thickening of the sigmoid colon/rectosigmoid junction may be  due to a chronic infectious/inflammatory process, with small fluid collection in  the wall the sigmoid colon slightly larger than prior study. Neoplastic etiology  cannot be excluded. Assessment/Recommendations:   63 y.o. male with metastatic pancreatic cancer on palliative chemotherapy admitted with abd pain. 1. Pancreatic adenocarcinoma:   Stage IV, CANDIE, Germline and somatic genetic testing negative. Diagnosed 11/18/2020, has Stage IV disease with peritoneal carcinomatosis and retroperitoneal LAD, encasement of vasculature seen on CT. CA 19-9 5607 at diagnosis. His disease is not curable, but is treatable.d Was started on  palliative chemotherapy with FOLFIRINOX regimen. CT 7/24/21 showed mild disease progression supported by rising CA 19-9 due to pt not coming for treatment at 2 week intervals. Since July 2021, pt has only come in for treatment on 4 separate occasions out of 9 planned treatments (last received treatment 10/4/21). He has evidence of continued disease progression and CA 19-9 has doubled in recent months. Have discussed with patient that he would best be served by hospice to provide additional support at home with palliative management of his symptoms. -- Recommend hospice      2. Ascites:   2/2/ to pancreatic cancer and disease progression. I discussed this is likely to recur and recommend Aspira cath placement  -- 11/17 Paracentesis with aspira catheter placed    3. Chronic Neoplasm Pain:   Better controlled with recent adjustments to medication regimen but requiring IV pain medication. Appreciate Palliative Medicine assistance. 4. Normocytic anemia / Hypoalbuminemia:  Due to underlying malignancy.      5. Goals of care:   Disease is treatable to improve quality and duration of life, but no cure. However, pt has not attended treatments, has progression of disease and symptoms. He would best be served by transitioning to comfort care. Pt does not want CPR or intubation; agrees to DNR. -plan for transition to hospice house today for better pain control. Plan reviewed with Dr Hali Kennedy.        Signed By: Attila Pro NP

## 2021-11-18 NOTE — ASSESSMENT
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 He started azacitidine on 9/15/21 (C1D1). He is currently on cycle 2 (started 10/14/21) day 21.\par \par He remains transfusion dependant for anemia and thrombocytopenia, however maintains normal range neutrophil counts.  He continues to have peripheral blasts which peak prior to the next cycle. He is not having any bleeding issues at present. He has experienced fluid overload related to transfusions and requires diuretics with each unit transfused. He is currently complaining of urinary retention symptoms. Urine analysis and microscopic assessment today suggestive of a UTI. Reflex cultures not sent, will send tomorrow.\par \par CBC today: WBC 3.73, Hb 7.0, plt 12\par \par Plan:\par - UTI: multiple allergies to abx, Levofloxacin 750 mg daily x 7 days to start 11/18/21 after urine cx sent\par - Urinary retention: D/c midodrine given association with urinary retention, urology if no improvement\par - CMP \par - Type and cross every 72 hours\par - Plts today and plt check 3x per week\par - pRBC transfusion weekly\par - Lasix 20 mg PO each unit of pRBCs or plt transfusions\par - Plan for 4 cycles of azacitidine (7 days given M-F then MTues) followed by bone marrow biopsy assessment of response\par - Currently at [facility] [phone] with visits to home\par - Return visit every 2 weeks\par \par \par ---\par I personally have spent a total of 45 minutes of time on the date of this encounter discussing current status, prognosis, reviewing test results, documenting findings, coordinating care and directly consulting with the patient and/or designated family member.

## 2021-11-18 NOTE — PHYSICAL EXAM
[Ambulatory and capable of all self care but unable to carry out any work activities] : Status 2- Ambulatory and capable of all self care but unable to carry out any work activities. Up and about more than 50% of waking hours [Thin] : thin [Normal] : affect appropriate [de-identified] : In a wheelchair, unsteady on his feet [de-identified] : AICD present, no murmurs, regular rate and rhythm  [de-identified] : bilateral equal 1+ pitting lower extremity edema isolated to ankles and lower distal extremities [de-identified] : areas of dryness and skin breakdown without signs of infection in lower extremity

## 2021-11-26 PROBLEM — R60.0 LOWER EXTREMITY EDEMA: Status: ACTIVE | Noted: 2021-01-01

## 2021-11-29 PROBLEM — D64.9 ANEMIA, SECONDARY: Status: ACTIVE | Noted: 2021-01-01

## 2021-11-29 PROBLEM — N39.0 ACUTE UTI (URINARY TRACT INFECTION): Status: RESOLVED | Noted: 2021-01-01 | Resolved: 2021-12-18

## 2021-12-01 NOTE — H&P ADULT - HISTORY OF PRESENT ILLNESS
86M with PMH of HTN, Afib not on AC, s/p ablation, s/p AICD/PPM, prostate cancer s/p treatment, MDS with pancytopenia and transfusion dependent p/w SOB from [facility] while receiving PRBCs. History from significant other at bedside; pt is lethargic and unable to give full history. Pt is dependent on blood and platelet transfusions, receiving treatment at [facility] almost every other day. Per friend, pt has been incredibly fatigued and she noted some SOB and LE edema starting one week ago. He has received transfusions (either blood, platelets or both) on 11/26, 11/27 and 11/29. On Monday (11/29), friend noted that pts breathing appeared significantly worse, shallow and rapid and he was having very hard time going up the stairs, having to stop midway for several minutes to cath his breath. That evening was started on PO lasix by his oncologist and took total of 40mg; took 20mg BID the next day. Today, returned to [facility] for blood and platelets transfusion, and shortly after completing the PRBC, pt was noted to be very SOB with hypoxia to 70%RA. Pt was put on NRB and given lasix 40mg IVP at [facility] and EMS called; en route, due to low HR 40s (paced) pt was given atropine and solumedrol 125mg.   Per significant other, pt also has been having diarrhea for the past 2 days and has had poor PO intake; denies any recent fever, chills, cough, n/v, abd pain, urinary symptoms.      Pt placed on NC on arrival to ED with good saturation, transitioned to BiPAP when noted to be in hypercapnic RF. MICU consulted, not a candidate at this time.

## 2021-12-01 NOTE — ED ADULT NURSE NOTE - CHIEF COMPLAINT QUOTE
EMS report patient became bradycardia, tachypneic and hypotensive s/p blood transfusion at [facility]

## 2021-12-01 NOTE — CONSULT NOTE ADULT - ASSESSMENT
85 yo male with PMHx of Chf, Htn, Afib (not on AC), Prostate Ca presenting for SOB after receiving transfusion, found to be hypercapnic on VBG, most likely 2/2 TACO vs TRALI vs Acute on chronic CHF exacerbation 85 yo male with PMHx of ChF, Htn, Afib (not on AC), MDS, Prostate Ca presenting for SOB after receiving transfusion, found to have acute on chronic hypercapnic respiratory failure, with likely TACO following blood transfusion vs. acute on chronic CHF exacerbation. Patient with history of TACO and received frequent transfusions for MDS.    Recommendations:   -Not a MICU candidate at this time. Patient is hemodynamically stable at this time (T=97.6, EJ=184/63, HR=60, RR=16, 98% on BIPAP) and protecting airway with current BIPAP settings.  -Continue with BIPAP for 8 hours, In ED adjusted settings to IPAP=14, Rate=16, FiO2=40%  -Serial blood gas to monitor response to BIPAP  -Continue diuresis with IV Lasix PRN to achieve euvolemic volume status  -Follow up TTE  -Reconsult MICU if patient becomes hemodynamically unstable or concern for airway protection  -Plan discussed with attending    Marcial Cameron  PGY-2 87 yo male with PMHx of ChF, Htn, Afib (not on AC), MDS, Prostate Ca presenting for SOB after receiving transfusion, found to have acute on chronic hypercapnic respiratory failure, with likely TACO following blood transfusion vs. acute on chronic CHF exacerbation. Patient with history of TACO and received frequent transfusions for MDS. Noted elevated pBNP. CXR with R>L pleural effusions. Prior TTE Oct 2021 with EF=45% and moderate diastolic dysfunction (Stage II).    Recommendations:   -Not a MICU candidate at this time. Patient is hemodynamically stable at this time (T=97.6, ON=171/63, HR=60, RR=16, 98% on BIPAP) and protecting airway with current BIPAP settings.  -Continue with BIPAP for 8 hours, In ED adjusted settings to IPAP=14, Rate=16, FiO2=40%  -Serial blood gas to monitor response to BIPAP  -Continue diuresis with IV Lasix PRN to achieve euvolemic volume status  -Follow up TTE  -Reconsult MICU if patient becomes hemodynamically unstable or concern for airway protection  -Plan discussed with attending    Marcial Cameron  PGY-2

## 2021-12-01 NOTE — ED PROVIDER NOTE - WR ORDER DATE AND TIME 1
-d/c home   -Psych cleared for discharge with instructions, Lexapro daily and Klonipin as needed TID for anxiety   -instructions verbalized  -follow up in 1-2weeks in office  -social work clearance   -case management   -d/w dr. Marcelino 01-Dec-2021 17:18

## 2021-12-01 NOTE — ED ADULT TRIAGE NOTE - CHIEF COMPLAINT QUOTE
EMS report patient became bradycardia, tachypneic and hypotensive s/p blood transfusion at [facility]

## 2021-12-01 NOTE — H&P ADULT - NSHPPHYSICALEXAM_GEN_ALL_CORE
Vital Signs Last 24 Hrs  T(C): 36.7 (01 Dec 2021 21:58), Max: 36.7 (01 Dec 2021 21:58)  T(F): 98.1 (01 Dec 2021 21:58), Max: 98.1 (01 Dec 2021 21:58)  HR: 60 (01 Dec 2021 21:58) (60 - 80)  BP: 127/52 (01 Dec 2021 21:58) (103/62 - 127/52)  BP(mean): 73 (01 Dec 2021 18:06) (70 - 73)  RR: 16 (01 Dec 2021 21:58) (16 - 24)  SpO2: 100% (01 Dec 2021 21:58) (95% - 100%)    PHYSICAL EXAM:  GENERAL: NAD, on BiPAP, lethargic   HEAD:  Atraumatic, normocephalic  EYES: EOMI, conjunctiva and sclera clear  NECK: Supple, no JVD  CHEST/LUNG: decreased BS at R base, crackles in L lung base   HEART: Regular rate and rhythm; no murmurs  ABDOMEN: Soft, nontender, nondistended; bowel sounds present  EXTREMITIES:  2+ Peripheral Pulses, 1+ b/l LE pitting edema to lower thighs   PSYCH: calm affect, not anxious, cooperative when awake   NEUROLOGY: lethargic but arousable, non-focal, AAOx3, moving all extremities   SKIN: scattered ecchymoses; No rashes or lesions   MUSCULOSKELETAL: no back pain, moving all extremities

## 2021-12-01 NOTE — CONSULT NOTE ADULT - SUBJECTIVE AND OBJECTIVE BOX
CHIEF COMPLAINT: SOB    HPI:  85 yo male with PMHx of Chf, Htn, Afib (not on AC), Prostate Ca presenting for SOB. Patient was receving 1U PRBC transfusion at [facility] for Hb 6.9. However, patient developed SOB, weakness and hypoxia to low 70%, and bradycardia with HR 40s. Patient placed on NRB and received 40mg IV Lasix at [facility]. On route to ED, EMS gave 0.5mg atropine and 125mg solumedrol. HPI provided by spouse as patient states he feels tired. As per ED note, patient complained of LE swelling prior to transfusion. Otherwise, patient denied recent trauma, fevers, chills, headache, dizziness, nausea, vomiting, dysuria, freq, hematuria, diarrhea, constipation, chest pain, cough. Received Covid vaccination and boosters in Sept.    In the ED, patient is found to be afebrile, hemodynamically stable,normal HR and initially on NC and transitioned to BiPAP, saturating well.On labs, patient is pancytopenic, hypercapnic and has elevated BNP. CXR suggesting fluid overload and pleural effusion. MICU consulted for management of Bipap  monitoringand hypercapnia.      PAST MEDICAL & SURGICAL HISTORY:  Hypertension    Afib    Osteoarthritis    Prostate cancer  treated with cyber knife 2016    Venous stasis    Varicose veins  legs    Gout  finger a long time ago    Cellulitis  right lower leg in past    Degenerative disc disease, lumbar    Edema  right leg    S/P total hip arthroplasty  2006, right    S/P cataract extraction  bilateral    S/P hernia repair  1996    Afib  cardiac ablation 1998; several pacemaker/AICD insertion and replacements    S/P tonsillectomy    S/P cholecystectomy  12/17      FAMILY HISTORY:  Family history of Alzheimer&#x27;s disease (Father)    Family history of essential hypertension (Father)    Family history of breast cancer (Mother)    Family history of prostate cancer (Sibling)      Allergies    adhesives (Rash)  clonidine (Swelling; Rash)  felodipine (Rash)  penicillin (Rash)  sulfa drugs (Rash)    Intolerances        HOME MEDICATIONS:    REVIEW OF SYSTEMS:  [X] All other systems negative unless mentioned above      OBJECTIVE:  ICU Vital Signs Last 24 Hrs  T(C): 36.5 (01 Dec 2021 19:15), Max: 36.6 (01 Dec 2021 13:49)  T(F): 97.7 (01 Dec 2021 19:15), Max: 97.8 (01 Dec 2021 13:49)  HR: 65 (01 Dec 2021 19:15) (61 - 80)  BP: 112/62 (01 Dec 2021 19:15) (103/62 - 119/59)  BP(mean): 73 (01 Dec 2021 18:06) (70 - 73)  ABP: --  ABP(mean): --  RR: 19 (01 Dec 2021 19:15) (18 - 24)  SpO2: 99% (01 Dec 2021 19:15) (95% - 100%)    CAPILLARY BLOOD GLUCOSE      GENERAL: NAD, lying in bed comfortably  HEAD:  Atraumatic, Normocephalic  EYES: EOMI, PERRLA, conjunctiva and sclera clear  ENT: Moist mucous membranes  NECK: Supple, No JVD  CHEST/LUNG: Clear to auscultation bilaterally; No rales, rhonchi, wheezing, or rubs. Unlabored respirations  HEART: Regular rate and rhythm; No murmurs, rubs, or gallops  ABDOMEN: BSx4; Soft, nontender, nondistended  EXTREMITIES:  2+ Peripheral Pulses, brisk capillary refill. No clubbing, cyanosis, or edema  NERVOUS SYSTEM:  A&Ox3, no focal deficits   SKIN: No rashes or lesions    HOSPITAL MEDICATIONS:  MEDICATIONS  (STANDING):    MEDICATIONS  (PRN):      LABS:                        7.6    3.37  )-----------( 7        ( 01 Dec 2021 17:23 )             24.8     12-01    148<H>  |  111<H>  |  44<H>  ----------------------------<  129<H>  4.4   |  29  |  1.29    Ca    8.8      01 Dec 2021 17:23  Phos  5.9     12-01  Mg     2.2     12-01    TPro  5.9<L>  /  Alb  3.5  /  TBili  2.0<H>  /  DBili  x   /  AST  7<L>  /  ALT  8<L>  /  AlkPhos  98  12-01    Venous Blood Gas:  12-01 @ 19:07  7.22/80/34/33/54.6  VBG Lactate: 0.7  Venous Blood Gas:  12-01 @ 17:23  7.21/81/28/32/43.8  VBG Lactate: 0.6      MICROBIOLOGY:     RADIOLOGY: CHIEF COMPLAINT: SOB    HPI:  87 yo male with PMHx of Chf, Htn, Afib (not on AC), Prostate Ca presenting for SOB. Patient was receving 1U PRBC transfusion at [facility] for Hb 6.9. However, patient developed SOB, weakness and hypoxia to low 70%, and bradycardia with HR 40s. Patient placed on NRB and received 40mg IV Lasix at [facility]. On route to ED, EMS gave 0.5mg atropine and 125mg solumedrol. HPI provided by spouse as patient states he feels tired. As per ED note, patient complained of LE swelling prior to transfusion. Otherwise, patient denied recent trauma, fevers, chills, headache, dizziness, nausea, vomiting, dysuria, freq, hematuria, diarrhea, constipation, chest pain, cough. Received Covid vaccination and boosters in Sept.    In the ED, patient is found to be afebrile, hemodynamically stable,normal HR and initially on NC and transitioned to BiPAP, saturating well.On labs, patient is pancytopenic, hypercapnic and has elevated BNP. CXR suggesting fluid overload and pleural effusion. MICU consulted for management of Bipap  monitoring and hypercapnia.      PAST MEDICAL & SURGICAL HISTORY:  Hypertension    Afib    Osteoarthritis    Prostate cancer  treated with cyber knife 2016    Venous stasis    Varicose veins  legs    Gout  finger a long time ago    Cellulitis  right lower leg in past    Degenerative disc disease, lumbar    Edema  right leg    S/P total hip arthroplasty  2006, right    S/P cataract extraction  bilateral    S/P hernia repair  1996    Afib  cardiac ablation 1998; several pacemaker/AICD insertion and replacements    S/P tonsillectomy    S/P cholecystectomy  12/17      FAMILY HISTORY:  Family history of Alzheimer&#x27;s disease (Father)    Family history of essential hypertension (Father)    Family history of breast cancer (Mother)    Family history of prostate cancer (Sibling)      Allergies    adhesives (Rash)  clonidine (Swelling; Rash)  felodipine (Rash)  penicillin (Rash)  sulfa drugs (Rash)    Intolerances        HOME MEDICATIONS:    REVIEW OF SYSTEMS:  [X] All other systems negative unless mentioned above      OBJECTIVE:  ICU Vital Signs Last 24 Hrs  T(C): 36.5 (01 Dec 2021 19:15), Max: 36.6 (01 Dec 2021 13:49)  T(F): 97.7 (01 Dec 2021 19:15), Max: 97.8 (01 Dec 2021 13:49)  HR: 65 (01 Dec 2021 19:15) (61 - 80)  BP: 112/62 (01 Dec 2021 19:15) (103/62 - 119/59)  BP(mean): 73 (01 Dec 2021 18:06) (70 - 73)  ABP: --  ABP(mean): --  RR: 19 (01 Dec 2021 19:15) (18 - 24)  SpO2: 99% (01 Dec 2021 19:15) (95% - 100%)    CAPILLARY BLOOD GLUCOSE      GENERAL: NAD, appears lethargic, resting in bed   HEAD:  Atraumatic, Normocephalic  EYES: EOMI, PERRLA, conjunctiva and sclera clear  ENT: Moist mucous membranes  NECK: Supple, No JVD  CHEST/LUNG: +mild crackles bi/l  HEART: Regular rate and rhythm; +S1/S2  ABDOMEN: BSx4; Soft, nontender, nondistended  EXTREMITIES:  2+ Peripheral Pulses, brisk capillary refill. 1+ LE pitting edema bilaterally  NERVOUS SYSTEM:  A&Ox2 no focal deficits   SKIN: No rashes or lesions    HOSPITAL MEDICATIONS:  MEDICATIONS  (STANDING):    MEDICATIONS  (PRN):      LABS:                        7.6    3.37  )-----------( 7        ( 01 Dec 2021 17:23 )             24.8     12-01    148<H>  |  111<H>  |  44<H>  ----------------------------<  129<H>  4.4   |  29  |  1.29    Ca    8.8      01 Dec 2021 17:23  Phos  5.9     12-01  Mg     2.2     12-01    TPro  5.9<L>  /  Alb  3.5  /  TBili  2.0<H>  /  DBili  x   /  AST  7<L>  /  ALT  8<L>  /  AlkPhos  98  12-01    Venous Blood Gas:  12-01 @ 19:07  7.22/80/34/33/54.6  VBG Lactate: 0.7  Venous Blood Gas:  12-01 @ 17:23  7.21/81/28/32/43.8  VBG Lactate: 0.6      MICROBIOLOGY:     RADIOLOGY:

## 2021-12-01 NOTE — ED ADULT NURSE REASSESSMENT NOTE - NS ED NURSE REASSESS COMMENT FT1
Received report from AIM Coffey. Pt sleeping comfortably in stretcher. Wife at bedside. Pt remains on BiPAP; FiO2 40%, Inspiratory 14 and expiratory 7. Safety and comfort measures maintained

## 2021-12-01 NOTE — H&P ADULT - NSHPLABSRESULTS_GEN_ALL_CORE
Labs, imaging and EKG personally reviewed and interpreted by me.   labs notable for Hb 7.6, plt 7, Na 148, Cr 1.29, BNP ~6200  Imaging personally reviewed and interpreted by me - CXR: R>L b/l pleural effusions   EKG personally reviewed and interpreted by me - V paced, no acute ischemic changes.                           7.6    3.37  )-----------( 7        ( 01 Dec 2021 17:23 )             24.8     12-01    148<H>  |  111<H>  |  44<H>  ----------------------------<  129<H>  4.4   |  29  |  1.29    Ca    8.8      01 Dec 2021 17:23  Phos  5.9     12-01  Mg     2.2     12-01    TPro  5.9<L>  /  Alb  3.5  /  TBili  2.0<H>  /  DBili  x   /  AST  7<L>  /  ALT  8<L>  /  AlkPhos  98  12-01    CARDIAC MARKERS ( 01 Dec 2021 18:24 )  x     / x     / 19 U/L / x     / 2.9 ng/mL          < from: Xray Chest 1 View- PORTABLE-Urgent (Xray Chest 1 View- PORTABLE-Urgent .) (12.01.21 @ 17:45) >    ******PRELIMINARY REPORT******              INTERPRETATION:  right greater than left pleural effusions    < end of copied text >

## 2021-12-01 NOTE — H&P ADULT - PROBLEM SELECTOR PLAN 4
hyperNa to 148, likely 2/2 intravascular depletion from poor PO and diarrhea x 48hrs  however, will need to defer IVF 2/2 hypoxia 2/2 pleural effusions/pulm edema   c/t monitor and trend, encourage PO when able to tolerate

## 2021-12-01 NOTE — ED PROVIDER NOTE - CARDIAC, MLM
bradycardic rate, regular rhythm.  Heart sounds S1, S2.  No murmurs, rubs or gallops. 2+ pitting edema BL LE

## 2021-12-01 NOTE — ED PROVIDER NOTE - OBJECTIVE STATEMENT
87 yo male with pmhx htn, afib (not on AC), prostate ca, presenting for SOB and hypoxia today. Patient was receiving prbc transfusion at [facility] for Hb 6.9, during transfusion, became SOB, hypoxic in low 70%, bradycardic in 40s. Given 40mg IV Lasix at [facility]. EMS gave 0.5mg atropine and 125mg solumedrol. Patient states he feels "weak". Endorses having LE edema previous to this transfusion.    Denies CP, LOC, N/V, fevers.

## 2021-12-01 NOTE — ED ADULT NURSE REASSESSMENT NOTE - NS ED NURSE REASSESS COMMENT FT1
Respiratory therapist removed BiPAP for transport. Pt tolerating 2L NC sating 99%. Respiratory therapist accompanied pt during transport to set up BiPAP on floor.

## 2021-12-01 NOTE — H&P ADULT - PROBLEM SELECTOR PLAN 5
MDS with pancytopenia, transfusion dependent  s/p 1u PRBC at [facility] with hb 6.9-->7.6  platelets low at 7  - defer further PRBC or platelets transfusion given hypervolemia   - once more euvolemic, transfuse for Hb <7 and plt <10   - trend CBC closely   - hematology consult in AM  - defer pharm AC

## 2021-12-01 NOTE — ED ADULT NURSE NOTE - PRIMARY CARE PROVIDER
An order for electrolyte replacement menu has been fulfilled and submitted from this work station at the request of the bedside nursing staff.     Bedside [id number] regarding the above pending at this time.    Neri Marte MD [facility]      
Neuro Interventional Focus Note:    Subjective/Objective: patient remains in ICU; continues with left sided weakness/numbness. 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 Medical management per stroke/critical care  · Stroke protocol  2. Groin site without hematoma, evolving ecchymosis    Please page our service with any neuro/groin site concerns or questions. Dr. Marti is on call at [phone].    Discussed with Dr. Marti.    3/29/2018  ANGUS Champion  Neuro Interventional (Filipe Moody and Kaia)  [facility]  Pager [phone]  Answering service: [phone]    
Stroke Service Focus Note    Right MCA territory ischemic stroke; right M2 thrombus with spontaneous recanalization.  Etiology is cardioembolic- Xarelto failure.     Pt switched to coumadin with Xarelto bridge.  D/C Xarelto when INR 2.0.  Continue with bASA and Coumadin.       Rounded on this AM.  Neuro exam unchanged- dense left hemiplegia, sensory neglect, dysarthria, facial droop.  Awaiting IPR eval.  Will see again when in IPR if accepted.        ANGUS Lucia  Stroke Nurse Practitioner  [facility]      For questions or concerns please page the on-call stroke SUDHAKAR at [phone] from [phone] and contact the on-call acute stroke neurologist from [phone].            
See provider note

## 2021-12-01 NOTE — ED ADULT NURSE NOTE - OBJECTIVE STATEMENT
86y Male PMHx HTN, Afib, prostate ca and pacemaker BIBEMS for SOB, hypoxia, hypotension and bradycardia s/p blood transfusion from [facility]. Pt received 1U PRBC for Hgb 6.9, during transfusion pt became SOB, hypoxic in 70s, and bradycardic in 40s. At [facility] pt received 40mg IVP lasix and Benadryl. En route, EMS gave pt 0.5mg Atropine and 125mg Solumedrol. Pt states he feels weak, pt is visibly uncomfortable and SOB. Pt is using abdomen to breathe, placed on 6L NC sating 100%, Pt answering questions in short phrases and one-word responses. Pt has +1 pitting edema in b/l LE, pt endorses having prior to transfusion. Pt is lethargic but arouses to voice. IV placed, labs drawn and sent, seen and eval by MD.

## 2021-12-01 NOTE — H&P ADULT - PROBLEM SELECTOR PLAN 6
currently V paced in 60s, low as 40s at [facility]   holding BB for now  not on AC at this time

## 2021-12-01 NOTE — ED PROCEDURE NOTE - PROCEDURE ADDITIONAL DETAILS
Emergency Department Focused Ultrasound performed at patient's bedside for educational purposes.     The study will have a follow up study performed or was performed in the direct supervision of an ultrasound trained attending.    ~Pedro Roberts D.O. -ED Attending

## 2021-12-01 NOTE — H&P ADULT - PROBLEM SELECTOR PLAN 3
p/w hypoxia/hypercapnea, has h/o HF with last TTE (10/6/21) with EF 45% and moderate diastolic dysfunction (Stage II)  - c/w diureses and BiPAP as above   - no need to repeat TTE at this time

## 2021-12-01 NOTE — ED PROVIDER NOTE - HIV OFFER
Mother stating had not received a call back, please call mother in [address]  Previously Declined (within the last year)

## 2021-12-01 NOTE — ED CLERICAL - NS ED CLERK NOTE PRE-ARRIVAL INFORMATION; ADDITIONAL PRE-ARRIVAL INFORMATION
CC/Reason For referral: Patient at [facility] , has hx chf, received 1 unit PRBCs  became SOB, sats decreased . Placed on NRB and given lasix 40mgs .  Also became bradycardic to 40 ( has PPM) given atropine by ems  Preferred Consultant(if applicable): heme onc  Who admits for you (if needed): na  Do you have documents you would like to fax over? no  Would you still like to speak to an ED attending? yes

## 2021-12-01 NOTE — ED ADULT NURSE REASSESSMENT NOTE - NS ED NURSE REASSESS COMMENT FT1
Bladder scan performed at bedside. Scan showed 417mL of urine. MD aware. Pt states he will try to urinate again and we will re-scan bladder in two hrs. Plan for possible yost if pt is unable to urinate. Pt aware of plan

## 2021-12-01 NOTE — H&P ADULT - ASSESSMENT
86M with PMH of HTN, Afib not on AC, s/p ablation, s/p AICD/PPM, prostate cancer s/p treatment, MDS with pancytopenia and transfusion dependent p/w SOB from [facility] while receiving PRBCs, a/w hypoxic and hypercapnic respiratory failure 2/2 TACO vs HF exacerbation.

## 2021-12-01 NOTE — H&P ADULT - PROBLEM SELECTOR PLAN 1
pt with acute hypoxic and hypercapnic RF in setting of hypervolemia from TACO vs HF exacerbation. CXR showing b/l pleural effusion R>L and PCOUS with B lines    - VBG shows resp acidosis with pH 7.2 and PCO2 80; hypoxic to 70%RA at [facility].   - Currently on BiPAP 14/7 at 40FiO2, pulling good TV - will recheck VBG again overnight to assess improvement   - s/p lasix 40 IVP at [facility] prior to transfer, c/w lasix 40 IV BID, monitor electrolytes and Cr closely   - strict I/Os - pt has not made urine yet, c/w bladder scan and will consider yost if pt unable to void   - defer further transfusion for now  - MICU consulted, not a candidate at this time, but pt is full code per ED

## 2021-12-01 NOTE — ED PROVIDER NOTE - CLINICAL SUMMARY MEDICAL DECISION MAKING FREE TEXT BOX
85 yo male with pmhx htn, afib (not on AC), prostate ca, presenting for SOB and hypoxia today. suggestive of transfusion rxn, possibly TACO vs TRALI. will eval with ekg, cxr, labs, will possible give more diuresis, will reassess.

## 2021-12-01 NOTE — H&P ADULT - PROBLEM SELECTOR PLAN 2
pt with worsening hypervolemia x 1 week and acute SOB x 2 days worsening today with hypoxia after PRBC c/f TACO; pt with h/o possible TACO on prior admission after receiving plt transfusion   - c/w lasix as above   - c/w BiPAP

## 2021-12-01 NOTE — ED PROVIDER NOTE - PROGRESS NOTE DETAILS
tripp attending- attempted to called dr. harrell as number on chart, no response, general answering service for after hours call reached but no direct doc to doctor available at this time izabela nicolas pgy3: pt signed out to me on bipap 10/5 pending rpt vbg. with b/l pleural effusions, sob, hypoxic on RA to the 70% s/p 1u pRBC transfusion at [facility] for MDS. given atropine for bradycardia in the 40s, 40mg lasix and solumedrol. pt initially hypercapneic CO2 80s prior to bipap. on rpt vbg 1hr following bipap pt without change in vbg still acidotic 7.22 with CO2 of 80. increased bipap setting to 14/7, discussed with MICU, will evaluate pt. izabela nicolas pgy3: pt seen by MICU, not a micu candidate at this time, can go to floor likely 6-8hrs on bipap and can be weened off. will page [facility] hospitalist at this time.

## 2021-12-01 NOTE — ED PROVIDER NOTE - ATTENDING CONTRIBUTION TO CARE
I, Pedro Roberts, performed a history and physical exam of the patient and discussed their management with the resident and/or advanced care provider. I reviewed the resident and/or advanced care provider's note and agree with the documented findings and plan of care. I was present and available for all procedures.    85yo M with PMH of Afib s/p PPM, prostate CA, MDS with multiple transfusions s/p TACO on recent admit with plts presents to ED for shortness of breath and weakness sp transfusion. wife at bedside provide hpi as patient very weak. he does chime in but very fatigued and has trouble answering questions because of the fatigue. reports having a xfusion today opnt facility  received 1 unit PRBCs  became SOB, sats decreased. Placed on NRB and given lasix 40mgs .  Also became bradycardic to 40 ( has PPM) given atropine by ems, was paced the entire event. no loc. Denies recent trauma, fevers, chills, headache, dizziness, nausea, vomiting, dysuria, freq, hematuria, diarrhea, constipation, chest pain, cough. the patient reports they have completed their covid-19 vaccination series w/ booster in sept    onc: dr. Anne    very fatigued appearing, head normal appearing atraumatic, trachea midline, tachypnea with mild respiratory distress, lungs with decreased insp effort, dense at bases bilaterally, rrr no murmurs, soft NT ND abdomen, no visible extremity deformities, Alert and oriented, non focal neuro exam, skin warm and dry, normal affect and mood, mild +1 bilateral le edema pretibial regions, no calf ttp     concerning story for possible TACO during xfusion today, here today with acute on chronic chf fluid retention with blines pleural effusions pc effusion no signs of tamponade but will obtain comprehensive  echo unlikely pe, otherwise unlikely TRALI, no fever, chills, other infectious etiologies. will eval with screening labs, had lasix en route will give more diuresis prn, otherwise will trial bipap. wife at bedside agreed with plan and wants to continue full measures at this time, and remain full code. unlikely ischemic event at this time or dissection.

## 2021-12-02 NOTE — CONSULT NOTE ADULT - ASSESSMENT
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 Started on azacitidine. Plan is to continue 4C of azacitidine followed by BMBx  - recommend transfusion to Hgb >8, Plt >10. 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 Started on azacitidine. Plan is to continue 4C of azacitidine followed by BMBx  - recommend transfusion to Hgb >8, Plt >10. Recommend slow transfusion given previous hx of TACO; also recommend patient be diuresed after each transfusion  -will discuss further chemo plans with his primary hematologist  -Levaquin ppx

## 2021-12-02 NOTE — PROGRESS NOTE ADULT - PROBLEM SELECTOR PLAN 6
currently V paced in 60s, low as 40s at [facility]   holding BB for now  not on AC at this time

## 2021-12-02 NOTE — CONSULT NOTE ADULT - SUBJECTIVE AND OBJECTIVE BOX
PULMONARY CONSULT  Oral Cortes MD  [phone]    Initial HPI on admission:  HPI:  86M with PMH of HTN, Afib not on AC, s/p ablation, s/p AICD/PPM, prostate cancer s/p treatment, MDS with pancytopenia and transfusion dependent p/w SOB from [facility] while receiving PRBCs. History from significant other at bedside; pt is lethargic and unable to give full history. Pt is dependent on blood and platelet transfusions, receiving treatment at [facility] almost every other day. Per friend, pt has been incredibly fatigued and she noted some SOB and LE edema starting one week ago. He has received transfusions (either blood, platelets or both) on 11/26, 11/27 and 11/29. On Monday (11/29), friend noted that pts breathing appeared significantly worse, shallow and rapid and he was having very hard time going up the stairs, having to stop midway for several minutes to cath his breath. That evening was started on PO lasix by his oncologist and took total of 40mg; took 20mg BID the next day. Today, returned to [facility] for blood and platelets transfusion, and shortly after completing the PRBC, pt was noted to be very SOB with hypoxia to 70%RA. Pt was put on NRB and given lasix 40mg IVP at [facility] and EMS called; en route, due to low HR 40s (paced) pt was given atropine and solumedrol 125mg. Per significant other, pt also has been having diarrhea for the past 2 days and has had poor PO intake; denies any recent fever, chills, cough, n/v, abd pain, urinary symptoms. Pt placed on NC on arrival to ED with good saturation, transitioned to BiPAP when noted to be in hypercapnic RF. MICU consulted, not a candidate at this time.               PAST MEDICAL & SURGICAL HISTORY:  Hypertension    Afib    Osteoarthritis    Prostate cancer  treated with cyber knife 2016    Venous stasis    Varicose veins  legs    Gout  finger a long time ago    Cellulitis  right lower leg in past    Degenerative disc disease, lumbar    Edema  right leg    S/P total hip arthroplasty  2006, right    S/P cataract extraction  bilateral    S/P hernia repair  1996    Afib  cardiac ablation 1998; several pacemaker/AICD insertion and replacements    S/P tonsillectomy    S/P cholecystectomy  12/17      Allergies    adhesives (Rash)  clonidine (Swelling; Rash)  felodipine (Rash)  penicillin (Rash)  sulfa drugs (Rash)    Intolerances      FAMILY HISTORY:  Family history of Alzheimer&#x27;s disease (Father)    Family history of essential hypertension (Father)    Family history of breast cancer (Mother)    Family history of prostate cancer (Sibling)  	  Social history:         Medications:  MEDICATIONS  (STANDING):  allopurinol 200 milliGRAM(s) Oral daily  doxazosin 4 milliGRAM(s) Oral at bedtime  furosemide   Injectable 40 milliGRAM(s) IV Push two times a day    MEDICATIONS  (PRN):  acetaminophen     Tablet .. 650 milliGRAM(s) Oral every 6 hours PRN Temp greater or equal to 38C (100.4F), Mild Pain (1 - 3)  aluminum hydroxide/magnesium hydroxide/simethicone Suspension 30 milliLiter(s) Oral every 4 hours PRN Dyspepsia  melatonin 3 milliGRAM(s) Oral at bedtime PRN Insomnia  ondansetron Injectable 4 milliGRAM(s) IV Push every 8 hours PRN Nausea and/or Vomiting    Vital Signs Last 24 Hrs  T(C): 36.5 (02 Dec 2021 03:59), Max: 36.8 (01 Dec 2021 22:34)  T(F): 97.7 (02 Dec 2021 03:59), Max: 98.3 (01 Dec 2021 22:34)  HR: 79 (02 Dec 2021 10:10) (60 - 80)  BP: 110/63 (02 Dec 2021 06:55) (98/59 - 127/52)  BP(mean): 74 (01 Dec 2021 22:34) (70 - 74)  RR: 18 (02 Dec 2021 10:10) (15 - 26)  SpO2: 100% (02 Dec 2021 10:10) (95% - 100%)    ABG - ( 02 Dec 2021 11:00 )  pH, Arterial: 7.34  pH, Blood: x     /  pCO2: 57    /  pO2: 91    / HCO3: 31    / Base Excess: 3.5   /  SaO2: 98.8      12-01 @ 07:01  -  12-02 @ 07:00  --------------------------------------------------------  IN: 0 mL / OUT: 650 mL / NET: -650 mL      LABS:                        7.2    1.98  )-----------( 5        ( 02 Dec 2021 06:47 )             23.9     12-02    149<H>  |  112<H>  |  52<H>  ----------------------------<  131<H>  5.1   |  27  |  1.31<H>    Ca    8.7      02 Dec 2021 06:47  Phos  6.6     12-02  Mg     2.2     12-02    TPro  5.4<L>  /  Alb  3.1<L>  /  TBili  1.2  /  DBili  0.4<H>  /  AST  <5<L>  /  ALT  7<L>  /  AlkPhos  88  12-02    Serum Pro-Brain Natriuretic Peptide: 6278 pg/mL (12-01-21 @ 17:23)      Physical Examination:    General: No acute distress.      HEENT: Pupils equal, reactive to light.  Symmetric.    PULM: Clear to auscultation bilaterally, no significant sputum production    CVS: Regular rate and rhythm, no murmurs, rubs, or gallops    ABD: Soft, nondistended, nontender, normoactive bowel sounds, no masses    EXT: No edema, nontender    SKIN: Warm and well perfused, no rashes noted.    NEURO: Alert, oriented, interactive, nonfocal    RADIOLOGY REVIEWED PERSONALLY  CXR:    EXAM:  XR CHEST PORTABLE URGENT 1V                            PROCEDURE DATE:  12/01/2021        INTERPRETATION:  EXAMINATION: XR CHEST URGENT    CLINICAL INDICATION: Shortness of breath and hypoxia. Status post transfusion.    TECHNIQUE: Single frontal, portable view of the chest was obtained.    COMPARISON: Chest x-ray 10/5/2021.    FINDINGS:  Left chest wall multilead AICD/pacemaker with leads in similar position compared prior.  Transcutaneous pacing pad overlies and obscures part of the the mid thorax.  Enlarged cardiac silhouette  Right-sided interstitial pulmonary edema and left-sided pulmonary vascular redistribution/congestion, not significantly changed.  Increased hazy opacity now occupying the lower half of the right hemithorax.  New ill-defined focal left mid to lower lung opacity.  Continued left basilar/retrocardiac opacity with obscuration of left hemidiaphragm.  Skin folds project over right chest. There is no pneumothorax.  No acute bony abnormality.    IMPRESSION:  Enlarged cardiac silhouette.    Right-sided interstitial pulmonary edema and left-sided pulmonary vascular redistribution/congestion, not significantly changed.    Increased hazy opacity now occupying the lower half of the right hemithorax, possibly an increasing layering right pleural effusion with associated passive atelectasis. Atelectasis of other cause and/or pneumonia is not excluded.    New ill-defined focal left mid to lower lung opacity, possibly subsegmental atelectasis, pleural fluid within the major fissure en face, pneumonia.    Continued left basilar and retrocardiac opacity which may be due to a left pleural effusion with passive atelectasis, atelectasis of other cause, and/or pneumonia.    CT chest:    TTE:  < from: Transthoracic Echocardiogram (10.06.21 @ 10:21) >  Patient name: RUBÉN GOLDBERG  YOB: 1935   Age: 86 (M)   MR#: [id number]  Study Date: 10/6/2021  [facility]  2nd Sonographer: Gloria Elena FRANNIE  Study quality: Technically fair  Referring Physician: Denice Flower MD  Blood Pressure: 123/63 mmHg  Height: 185 cm  Weight: 88 kg  BSA: 2.1 m2  ------------------------------------------------------------------------  PROCEDURE: Transthoracic echocardiogram with 2-D, M-Mode  and complete spectraland color flow Doppler.  INDICATION: Dyspnea, unspecified (R06.00)  ------------------------------------------------------------------------  Dimensions:    Normal Values:  LA:     5.0    2.0 - 4.0 cm  Ao:     3.1    2.0 - 3.8 cm  SEPTUM: 1.1    0.6 - 1.2 cm  PWT:    1.2    0.6 - 1.1 cm  LVIDd:  5.2    3.0 - 5.6 cm  LVIDs:         1.8 - 4.0 cm  Derived variables:  LVMI: 110 g/m2  RWT: 0.46  EF (Visual Estimate): 45 %  Doppler Peak Velocity (m/sec): AoV=1.4  ------------------------------------------------------------------------  Observations:  Mitral Valve: Mitral annular calcification.  Thickened  mitral valve. Mild mitral regurgitation.  Aortic Valve/Aorta: Calcified trileaflet aortic valve  Peak  transaortic valve gradient equals 8 mm Hg,estimated aortic  valve area equals 2.1 sqcm. Moderate aortic regurgitation.  Peak left ventricular outflow tract gradient equals 4 mm  Hg.  Aortic Root: 3.1 cm.  LVOT diameter: 2 cm.  Left Atrium: Severely dilated left atrium.  LA volume index  = 72cc/m2.  Left Ventricle: Mild global left ventricular systolic  dysfunction. Increased relative wall thickness with normal  left ventricular mass index, consistent with concentric  left ventricular remodeling. Moderate diastolic dysfunction  (Stage II).  Right Heart: Mild right atrial enlargement. A device wire  is noted in the right heart. Right ventricular enlargement  with decreased right ventricular systolic function. Normal  tricuspid valve. Mild tricuspid regurgitation. Normal  pulmonic valve. Minimal pulmonic regurgitation.  Pericardium/Pleura: Small pericardial effusion.  Bilateral pleural effusions.  Hemodynamic: Estimated right atrial pressure is 8 mm Hg.  Estimated right ventricular systolic pressure equals 46 mm  Hg, assuming rightatrial pressure equals 8 mm Hg,  consistent with mild pulmonary hypertension.  ------------------------------------------------------------------------  Conclusions:  1. Calcified trileaflet aortic valve  Moderate aortic  regurgitation.  2. Mild global left ventricular systolic dysfunction.  3. Moderate diastolic dysfunction (Stage II).  4. A device wire is noted in the right heart. Right  ventricular enlargement with decreased right ventricular  systolic function.  5. Normal tricuspid valve. Mild tricuspid regurgitation.  6. Small pericardial effusion.  7. Bilateral pleural effusions.  8. Echo-free space is noted in the liver consistent with  cyst, however, dedicated imaging recommended for further  evaluation if clinically indicated.         PULMONARY CONSULT  Oral Cortes MD  [phone]    Initial HPI on admission:  HPI:  86M with PMH of HTN, Afib not on AC, s/p ablation, s/p AICD/PPM, prostate cancer s/p treatment, MDS with pancytopenia and transfusion dependent p/w SOB from [facility] while receiving PRBCs. History from significant other at bedside; pt is lethargic and unable to give full history. Pt is dependent on blood and platelet transfusions, receiving treatment at [facility] almost every other day. Per friend, pt has been incredibly fatigued and she noted some SOB and LE edema starting one week ago. He has received transfusions (either blood, platelets or both) on 11/26, 11/27 and 11/29. On Monday (11/29), friend noted that pts breathing appeared significantly worse, shallow and rapid and he was having very hard time going up the stairs, having to stop midway for several minutes to cath his breath. That evening was started on PO lasix by his oncologist and took total of 40mg; took 20mg BID the next day. Today, returned to [facility] for blood and platelets transfusion, and shortly after completing the PRBC, pt was noted to be very SOB with hypoxia to 70%RA. Pt was put on NRB and given lasix 40mg IVP at [facility] and EMS called; en route, due to low HR 40s (paced) pt was given atropine and solumedrol 125mg. Per significant other, pt also has been having diarrhea for the past 2 days and has had poor PO intake; denies any recent fever, chills, cough, n/v, abd pain, urinary symptoms. Pt placed on NC on arrival to ED with good saturation, transitioned to BiPAP when noted to be in hypercapnic RF. MICU consulted, not a candidate at this time.       Patient was breathing comfortably at time of visit on nasal cannula  History of prior episodes pulmonary edema associated with blood product transfusions  Non smoker: denies history of COPD or asthma      PAST MEDICAL & SURGICAL HISTORY:  Hypertension    Afib    Osteoarthritis    Prostate cancer  treated with cyber knife 2016    Venous stasis    Varicose veins  legs    Gout  finger a long time ago    Cellulitis  right lower leg in past    Degenerative disc disease, lumbar    Edema  right leg    S/P total hip arthroplasty  2006, right    S/P cataract extraction  bilateral    S/P hernia repair  1996    Afib  cardiac ablation 1998; several pacemaker/AICD insertion and replacements    S/P tonsillectomy    S/P cholecystectomy  12/17      Allergies    adhesives (Rash)  clonidine (Swelling; Rash)  felodipine (Rash)  penicillin (Rash)  sulfa drugs (Rash)    Intolerances      FAMILY HISTORY:  Family history of Alzheimer&#x27;s disease (Father)    Family history of essential hypertension (Father)    Family history of breast cancer (Mother)    Family history of prostate cancer (Sibling)  	  Social history:         Medications:  MEDICATIONS  (STANDING):  allopurinol 200 milliGRAM(s) Oral daily  doxazosin 4 milliGRAM(s) Oral at bedtime  furosemide   Injectable 40 milliGRAM(s) IV Push two times a day    MEDICATIONS  (PRN):  acetaminophen     Tablet .. 650 milliGRAM(s) Oral every 6 hours PRN Temp greater or equal to 38C (100.4F), Mild Pain (1 - 3)  aluminum hydroxide/magnesium hydroxide/simethicone Suspension 30 milliLiter(s) Oral every 4 hours PRN Dyspepsia  melatonin 3 milliGRAM(s) Oral at bedtime PRN Insomnia  ondansetron Injectable 4 milliGRAM(s) IV Push every 8 hours PRN Nausea and/or Vomiting    Vital Signs Last 24 Hrs  T(C): 36.5 (02 Dec 2021 03:59), Max: 36.8 (01 Dec 2021 22:34)  T(F): 97.7 (02 Dec 2021 03:59), Max: 98.3 (01 Dec 2021 22:34)  HR: 79 (02 Dec 2021 10:10) (60 - 80)  BP: 110/63 (02 Dec 2021 06:55) (98/59 - 127/52)  BP(mean): 74 (01 Dec 2021 22:34) (70 - 74)  RR: 18 (02 Dec 2021 10:10) (15 - 26)  SpO2: 100% (02 Dec 2021 10:10) (95% - 100%)    ABG - ( 02 Dec 2021 11:00 )  pH, Arterial: 7.34  pH, Blood: x     /  pCO2: 57    /  pO2: 91    / HCO3: 31    / Base Excess: 3.5   /  SaO2: 98.8      12-01 @ 07:01  -  12-02 @ 07:00  --------------------------------------------------------  IN: 0 mL / OUT: 650 mL / NET: -650 mL      LABS:                        7.2    1.98  )-----------( 5        ( 02 Dec 2021 06:47 )             23.9     12-02    149<H>  |  112<H>  |  52<H>  ----------------------------<  131<H>  5.1   |  27  |  1.31<H>    Ca    8.7      02 Dec 2021 06:47  Phos  6.6     12-02  Mg     2.2     12-02    TPro  5.4<L>  /  Alb  3.1<L>  /  TBili  1.2  /  DBili  0.4<H>  /  AST  <5<L>  /  ALT  7<L>  /  AlkPhos  88  12-02    Serum Pro-Brain Natriuretic Peptide: 6278 pg/mL (12-01-21 @ 17:23)      Physical Examination:    General: Non toxic, No acute distress. Breathing comfortably on nasal cannula     HEENT: Pupils equal, reactive to light.  Symmetric.    PULM: Few scattered rhonchi; diminished R basilar BS    CVS: Regular rate and rhythm, no murmurs, rubs, or gallops    ABD: Soft, nondistended, nontender, normoactive bowel sounds, no masses    EXT: 1+ bilateral LE edema    SKIN: Warm and well perfused, no rashes noted.    NEURO: Alert, oriented, interactive, nonfocal    RADIOLOGY REVIEWED PERSONALLY  CXR:    EXAM:  XR CHEST PORTABLE URGENT 1V                            PROCEDURE DATE:  12/01/2021        INTERPRETATION:  EXAMINATION: XR CHEST URGENT    CLINICAL INDICATION: Shortness of breath and hypoxia. Status post transfusion.    TECHNIQUE: Single frontal, portable view of the chest was obtained.    COMPARISON: Chest x-ray 10/5/2021.    FINDINGS:  Left chest wall multilead AICD/pacemaker with leads in similar position compared prior.  Transcutaneous pacing pad overlies and obscures part of the the mid thorax.  Enlarged cardiac silhouette  Right-sided interstitial pulmonary edema and left-sided pulmonary vascular redistribution/congestion, not significantly changed.  Increased hazy opacity now occupying the lower half of the right hemithorax.  New ill-defined focal left mid to lower lung opacity.  Continued left basilar/retrocardiac opacity with obscuration of left hemidiaphragm.  Skin folds project over right chest. There is no pneumothorax.  No acute bony abnormality.    IMPRESSION:  Enlarged cardiac silhouette.    Right-sided interstitial pulmonary edema and left-sided pulmonary vascular redistribution/congestion, not significantly changed.    Increased hazy opacity now occupying the lower half of the right hemithorax, possibly an increasing layering right pleural effusion with associated passive atelectasis. Atelectasis of other cause and/or pneumonia is not excluded.    New ill-defined focal left mid to lower lung opacity, possibly subsegmental atelectasis, pleural fluid within the major fissure en face, pneumonia.    Continued left basilar and retrocardiac opacity which may be due to a left pleural effusion with passive atelectasis, atelectasis of other cause, and/or pneumonia.    CT chest:    TTE:    Patient name: RUBÉN GOLDBERG  YOB: 1935   Age: 86 (M)   MR#: [id number]  Study Date: 10/6/2021  [facility] Mahesh House RDCS  [facility] Sonographer: Gloria Elena RDCS  Study quality: Technically fair  Referring Physician: Denice Flower MD  Blood Pressure: 123/63 mmHg  Height: 185 cm  Weight: 88 kg  BSA: 2.1 m2  ------------------------------------------------------------------------  PROCEDURE: Transthoracic echocardiogram with 2-D, M-Mode  and complete spectraland color flow Doppler.  INDICATION: Dyspnea, unspecified (R06.00)  ------------------------------------------------------------------------  Dimensions:    Normal Values:  LA:     5.0    2.0 - 4.0 cm  Ao:     3.1    2.0 - 3.8 cm  SEPTUM: 1.1    0.6 - 1.2 cm  PWT:    1.2    0.6 - 1.1 cm  LVIDd:  5.2    3.0 - 5.6 cm  LVIDs:         1.8 - 4.0 cm  Derived variables:  LVMI: 110 g/m2  RWT: 0.46  EF (Visual Estimate): 45 %  Doppler Peak Velocity (m/sec): AoV=1.4  ------------------------------------------------------------------------  Observations:  Mitral Valve: Mitral annular calcification.  Thickened  mitral valve. Mild mitral regurgitation.  Aortic Valve/Aorta: Calcified trileaflet aortic valve  Peak  transaortic valve gradient equals 8 mm Hg,estimated aortic  valve area equals 2.1 sqcm. Moderate aortic regurgitation.  Peak left ventricular outflow tract gradient equals 4 mm  Hg.  Aortic Root: 3.1 cm.  LVOT diameter: 2 cm.  Left Atrium: Severely dilated left atrium.  LA volume index  = 72cc/m2.  Left Ventricle: Mild global left ventricular systolic  dysfunction. Increased relative wall thickness with normal  left ventricular mass index, consistent with concentric  left ventricular remodeling. Moderate diastolic dysfunction  (Stage II).  Right Heart: Mild right atrial enlargement. A device wire  is noted in the right heart. Right ventricular enlargement  with decreased right ventricular systolic function. Normal  tricuspid valve. Mild tricuspid regurgitation. Normal  pulmonic valve. Minimal pulmonic regurgitation.  Pericardium/Pleura: Small pericardial effusion.  Bilateral pleural effusions.  Hemodynamic: Estimated right atrial pressure is 8 mm Hg.  Estimated right ventricular systolic pressure equals 46 mm  Hg, assuming rightatrial pressure equals 8 mm Hg,  consistent with mild pulmonary hypertension.  ------------------------------------------------------------------------  Conclusions:  1. Calcified trileaflet aortic valve  Moderate aortic  regurgitation.  2. Mild global left ventricular systolic dysfunction.  3. Moderate diastolic dysfunction (Stage II).  4. A device wire is noted in the right heart. Right  ventricular enlargement with decreased right ventricular  systolic function.  5. Normal tricuspid valve. Mild tricuspid regurgitation.  6. Small pericardial effusion.  7. Bilateral pleural effusions.  8. Echo-free space is noted in the liver consistent with  cyst, however, dedicated imaging recommended for further  evaluation if clinically indicated.

## 2021-12-02 NOTE — PATIENT PROFILE ADULT - FALL HARM RISK - HARM RISK INTERVENTIONS
Assistance with ambulation/Assistance OOB with selected safe patient handling equipment/Communicate Risk of Fall with Harm to all staff/Discuss with provider need for PT consult/Monitor gait and stability/Reinforce activity limits and safety measures with patient and family/Tailored Fall Risk Interventions/Visual Cue: Yellow wristband and red socks/Bed in lowest position, wheels locked, appropriate side rails in place/Call bell, personal items and telephone in reach/Instruct patient to call for assistance before getting out of bed or chair/Non-slip footwear when patient is out of bed/[place] to call system/Physically safe environment - no spills, clutter or unnecessary equipment/Purposeful Proactive Rounding/Room/bathroom lighting operational, light cord in reach

## 2021-12-02 NOTE — PROGRESS NOTE ADULT - PROBLEM SELECTOR PLAN 2
pt with h/o possible TACO on prior admission after receiving plt transfusion   - c/w lasix as above   - c/w BiPAP

## 2021-12-02 NOTE — CHART NOTE - NSCHARTNOTEFT_GEN_A_CORE
RUBÉN GOLDBERG    Notified by RN patient with critical lab result: Hgb 6.7 this evening.       Interventions taken     PRBC 1 unit stat and Lasix 40mg iv after blood transfusion  recheck CBC to keep Hgb >7 and plt >10  cont assess and monitor  f/u with am team.                    Terry Noriega ANP-BC  Spectralink #[id number]

## 2021-12-02 NOTE — CONSULT NOTE ADULT - ASSESSMENT
86M admitted after episode of pulmonary edema post outpatient blood transfusion at [facility]. 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 ABG with mild acidosis, elevated HCO3, c/w acute on chronic hypercapnea. Pt required BIPAP in ER and overnight: currently breathng comfortably on nasal cannula, alert and interactive.   TTE with moderate diastolic dysfunctin and mild systolic dysfunction. CBC on admission with pancytopenia including platelet count 5. Hypoxemic resp failure improved with diuresis. Patient with peripheral LE edema    REC    DC BIPAP and continue nasal cannula: target sat 90-92%  CXR ordered to document clearing of pulmonary edema  Na 149, though patient with edema - negative balance with diuretics as tolerted  f/u ABG in few days  Follow up platelets post transfusion  Heme-Onc f/u re: pancytopenia manament

## 2021-12-02 NOTE — PROGRESS NOTE ADULT - SUBJECTIVE AND OBJECTIVE BOX
Patient is a 86y old  Male who presents with a chief complaint of SOB (02 Dec 2021 16:51)      INTERVAL HPI/OVERNIGHT EVENTS: noted  pt seen and examined this am  events noted  feels well  off bipap, sating well      Vital Signs Last 24 Hrs  T(C): 36.4 (02 Dec 2021 14:30), Max: 36.8 (01 Dec 2021 22:34)  T(F): 97.5 (02 Dec 2021 14:30), Max: 98.3 (01 Dec 2021 22:34)  HR: 60 (02 Dec 2021 18:40) (60 - 79)  BP: 108/58 (02 Dec 2021 14:30) (98/59 - 127/52)  BP(mean): 74 (01 Dec 2021 22:34) (74 - 74)  RR: 19 (02 Dec 2021 14:30) (15 - 26)  SpO2: 90% (02 Dec 2021 18:40) (90% - 100%)    acetaminophen     Tablet .. 650 milliGRAM(s) Oral every 6 hours PRN  allopurinol 200 milliGRAM(s) Oral daily  aluminum hydroxide/magnesium hydroxide/simethicone Suspension 30 milliLiter(s) Oral every 4 hours PRN  doxazosin 4 milliGRAM(s) Oral at bedtime  furosemide   Injectable 40 milliGRAM(s) IV Push two times a day  melatonin 3 milliGRAM(s) Oral at bedtime PRN  ondansetron Injectable 4 milliGRAM(s) IV Push every 8 hours PRN      PHYSICAL EXAM:  GENERAL: NAD,   EYES: conjunctiva and sclera clear  ENMT: Moist mucous membranes  NECK: Supple, No JVD, Normal thyroid  CHEST/LUNG: non labored, cta b/l  HEART: Regular rate and rhythm; No murmurs, rubs, or gallops  ABDOMEN: Soft, Nontender, Nondistended; Bowel sounds present  EXTREMITIES:  2+ Peripheral Pulses, No clubbing, cyanosis, or edema  LYMPH: No lymphadenopathy noted  SKIN: No rashes or lesions    Consultant(s) Notes Reviewed:  [x ] YES  [ ] NO  Care Discussed with Consultants/Other Providers [ x] YES  [ ] NO    LABS:                        6.7    2.43  )-----------( 15       ( 02 Dec 2021 18:39 )             22.6     12-02    149<H>  |  112<H>  |  52<H>  ----------------------------<  131<H>  5.1   |  27  |  1.31<H>    Ca    8.7      02 Dec 2021 06:47  Phos  6.6     12-02  Mg     2.2     12-02    TPro  5.4<L>  /  Alb  3.1<L>  /  TBili  1.2  /  DBili  0.4<H>  /  AST  <5<L>  /  ALT  7<L>  /  AlkPhos  88  12-02        CAPILLARY BLOOD GLUCOSE          ABG - ( 02 Dec 2021 11:00 )  pH, Arterial: 7.34  pH, Blood: x     /  pCO2: 57    /  pO2: 91    / HCO3: 31    / Base Excess: 3.5   /  SaO2: 98.8                    RADIOLOGY & ADDITIONAL TESTS:    Imaging Personally Reviewed:  [x ] YES  [ ] NO

## 2021-12-02 NOTE — PROGRESS NOTE ADULT - PROBLEM SELECTOR PLAN 1
pt with acute hypoxic and hypercapnic RF in setting of hypervolemia from TACO vs HF exacerbation. CXR showing b/l pleural effusion R>L and PCOUS with B lines    - VBG shows resp acidosis with pH 7.2 and PCO2 80; hypoxic to 70%RA at [facility].   - sp bipap and IV lasix-with good clinical improvement  - strict I/Os -   - MICU consulted, not a candidate at this time, but pt is full code per ED  cards cs  pulm cs

## 2021-12-02 NOTE — CONSULT NOTE ADULT - SUBJECTIVE AND OBJECTIVE BOX
HPI as per admitting team:   86M with PMH of HTN, Afib not on AC, s/p ablation, s/p AICD/PPM, prostate cancer s/p treatment, MDS with pancytopenia and transfusion dependent p/w SOB from [facility] while receiving PRBCs. History from significant other at bedside; pt is lethargic and unable to give full history. Pt is dependent on blood and platelet transfusions, receiving treatment at [facility] almost every other day. Per friend, pt has been incredibly fatigued and she noted some SOB and LE edema starting one week ago. He has received transfusions (either blood, platelets or both) on 11/26, 11/27 and 11/29. On Monday (11/29), friend noted that pts breathing appeared significantly worse, shallow and rapid and he was having very hard time going up the stairs, having to stop midway for several minutes to cath his breath. That evening was started on PO lasix by his oncologist and took total of 40mg; took 20mg BID the next day. Today, returned to [facility] for blood and platelets transfusion, and shortly after completing the PRBC, pt was noted to be very SOB with hypoxia to 70%RA. Pt was put on NRB and given lasix 40mg IVP at [facility] and EMS called; en route, due to low HR 40s (paced) pt was given atropine and solumedrol 125mg.   Per significant other, pt also has been having diarrhea for the past 2 days and has had poor PO intake; denies any recent fever, chills, cough, n/v, abd pain, urinary symptoms.      Pt placed on NC on arrival to ED with good saturation, transitioned to BiPAP when noted to be in hypercapnic RF. MICU consulted, not a candidate at this time.      (01 Dec 2021 22:19)        REVIEW OF SYSTEMS:  CONSTITUTIONAL: No weakness, fevers or chills  EYES/ENT: No visual changes;  No vertigo or throat pain   NECK: No pain or stiffness  RESPIRATORY: No cough, wheezing, hemoptysis; No subjective shortness of breath  CARDIOVASCULAR: No chest pain or palpitations  GASTROINTESTINAL: No abdominal or epigastric pain. No nausea, vomiting, or hematemesis; No diarrhea or constipation. No melena or hematochezia.  GENITOURINARY: No dysuria, frequency or hematuria  NEUROLOGICAL: No numbness or weakness  SKIN: No itching, burning, rashes, or lesions   All other review of systems is negative unless indicated above.    PAST MEDICAL & SURGICAL HISTORY:  Hypertension    Afib    Osteoarthritis    Prostate cancer  treated with cyber knife 2016    Venous stasis    Varicose veins  legs    Gout  finger a long time ago    Cellulitis  right lower leg in past    Degenerative disc disease, lumbar    Edema  right leg    S/P total hip arthroplasty  2006, right    S/P cataract extraction  bilateral    S/P hernia repair  1996    Afib  cardiac ablation 1998; several pacemaker/AICD insertion and replacements    S/P tonsillectomy    S/P cholecystectomy  12/17        FAMILY HISTORY:  Family history of Alzheimer&#x27;s disease (Father)    Family history of essential hypertension (Father)    Family history of breast cancer (Mother)    Family history of prostate cancer (Sibling)        SOCIAL HISTORY:     Allergies    adhesives (Rash)  clonidine (Swelling; Rash)  felodipine (Rash)  penicillin (Rash)  sulfa drugs (Rash)    Intolerances        MEDICATIONS  (STANDING):  allopurinol 200 milliGRAM(s) Oral daily  doxazosin 4 milliGRAM(s) Oral at bedtime  furosemide   Injectable 40 milliGRAM(s) IV Push two times a day    MEDICATIONS  (PRN):  acetaminophen     Tablet .. 650 milliGRAM(s) Oral every 6 hours PRN Temp greater or equal to 38C (100.4F), Mild Pain (1 - 3)  aluminum hydroxide/magnesium hydroxide/simethicone Suspension 30 milliLiter(s) Oral every 4 hours PRN Dyspepsia  melatonin 3 milliGRAM(s) Oral at bedtime PRN Insomnia  ondansetron Injectable 4 milliGRAM(s) IV Push every 8 hours PRN Nausea and/or Vomiting      OBJECTIVE   Height (cm): 185.4 (12-01 @ 17:01)    T(F): 97.7 (12-02-21 @ 03:59), Max: 98.3 (12-01-21 @ 22:34)  HR: 79 (12-02-21 @ 10:10)  BP: 110/63 (12-02-21 @ 06:55)  RR: 18 (12-02-21 @ 10:10)  SpO2: 100% (12-02-21 @ 10:10)  Wt(kg): --    PHYSICAL EXAM  GENERAL: NAD, on BiPAP, lethargic   HEAD:  Atraumatic, normocephalic  EYES: EOMI, conjunctiva and sclera clear  NECK: Supple, no JVD  CHEST/LUNG: decreased BS at R base, crackles in L lung base   HEART: Regular rate and rhythm; no murmurs  ABDOMEN: Soft, nontender, nondistended; bowel sounds present  EXTREMITIES:  2+ Peripheral Pulses, 1+ b/l LE pitting edema to lower thighs   PSYCH: calm affect, not anxious, cooperative when awake   NEUROLOGY:  non-focal, AAOx3, moving all extremities   SKIN: scattered ecchymoses; No rashes or lesions   MUSCULOSKELETAL: no back pain, moving all extremities                          7.2    1.98  )-----------( 5        ( 02 Dec 2021 06:47 )             23.9       12-02    149<H>  |  112<H>  |  52<H>  ----------------------------<  131<H>  5.1   |  27  |  1.31<H>    Ca    8.7      02 Dec 2021 06:47  Phos  6.6     12-02  Mg     2.2     12-02    TPro  5.4<L>  /  Alb  3.1<L>  /  TBili  1.2  /  DBili  0.4<H>  /  AST  <5<L>  /  ALT  7<L>  /  AlkPhos  88  12-02      Magnesium, Serum: 2.2 mg/dL (12-02 @ 06:47)  Phosphorus Level, Serum: 6.6 mg/dL (12-02 @ 06:47)  Magnesium, Serum: 2.2 mg/dL (12-01 @ 18:24)  Phosphorus Level, Serum: 5.9 mg/dL (12-01 @ 18:24)

## 2021-12-02 NOTE — PROGRESS NOTE ADULT - PROBLEM SELECTOR PLAN 5
MDS with pancytopenia, transfusion dependent  s/p 1u PRBC at [facility] with hb 6.9-->7.6  platelets low at 7  - defer further PRBC or platelets transfusion given hypervolemia   - once more euvolemic, transfuse for Hb <7 and plt <10   - trend CBC closely   - hematology consult fu  - defer pharm AC

## 2021-12-03 NOTE — PROGRESS NOTE ADULT - PROBLEM SELECTOR PLAN 6
currently V paced in 60s, low as 40s at [facility]   holding BB for now  not on AC at this time

## 2021-12-03 NOTE — CONSULT NOTE ADULT - ASSESSMENT
86M with PMH of HTN, Afib not on AC, s/p ablation, s/p AICD/PPM, prostate cancer s/p treatment, MDS with pancytopenia and transfusion dependent p/w SOB from [facility] while receiving PRBCs.     TTE 10/2021  Moderate aortic regurgitation.  Mild global left ventricular systolic dysfunction.  Moderate diastolic dysfunction (Stage II).  Mild tricuspid regurgitation.  Small pericardial effusion.

## 2021-12-03 NOTE — PROGRESS NOTE ADULT - PROBLEM SELECTOR PLAN 1
pt with acute hypoxic and hypercapnic RF in setting of hypervolemia from TACO vs HF exacerbation. CXR showing b/l pleural effusion R>L and PCOUS with B lines    - VBG shows resp acidosis with pH 7.2 and PCO2 80; hypoxic to 70%RA at [facility].   - sp bipap and IV lasix-with good clinical improvement, cont lasix 40iv bid  - strict I/Os - daily wts  - MICU consulted, not a candidate at this time, but pt is full code   cards cs appreciated  pulm cs appreciated

## 2021-12-03 NOTE — PROGRESS NOTE ADULT - SUBJECTIVE AND OBJECTIVE BOX
Follow-up Pulm Progress Note  Oral Cortes MD  [phone]    Heme-Onc evaluation noted: On Vidaza with AML. pancytopenia  Dyspnea largely resolved  O2 sat on 4 liters nasal cannula 97%  CXR with partial clearing of plmonary edema, some residual effusion    Medications:  Vital Signs Last 24 Hrs  T(C): 36.3 (03 Dec 2021 05:22), Max: 36.7 (03 Dec 2021 03:00)  T(F): 97.3 (03 Dec 2021 05:22), Max: 98 (03 Dec 2021 03:00)  HR: 70 (03 Dec 2021 08:42) (59 - 70)  BP: 120/63 (03 Dec 2021 05:22) (106/57 - 120/63)  BP(mean): --  RR: 19 (03 Dec 2021 05:22) (18 - 19)  SpO2: 94% (03 Dec 2021 08:42) (90% - 100%)  ABG - ( 02 Dec 2021 11:00 )  pH, Arterial: 7.34  pH, Blood: x     /  pCO2: 57    /  pO2: 91    / HCO3: 31    / Base Excess: 3.5   /  SaO2: 98.8      12-02 @ 07:01  -  12-03 @ 07:00  --------------------------------------------------------  IN: 600 mL / OUT: 1000 mL / NET: -400 mL        LABS:                        6.7    2.43  )-----------( 15       ( 02 Dec 2021 18:39 )             22.6     12-02    149<H>  |  112<H>  |  52<H>  ----------------------------<  131<H>  5.1   |  27  |  1.31<H>    Ca    8.7      02 Dec 2021 06:47  Phos  6.6     12-02  Mg     2.2     12-02    TPro  5.4<L>  /  Alb  3.1<L>  /  TBili  1.2  /  DBili  0.4<H>  /  AST  <5<L>  /  ALT  7<L>  /  AlkPhos  88  12-02    Serum Pro-Brain Natriuretic Peptide: 6278 pg/mL (12-01-21 @ 17:23)      Physical Examination:  PULM: Dullness R base; few basilar crackles  CVS: Regular rate and rhythm, no murmurs, rubs, or gallops  ABD: Soft, non-tender  EXT:  1+ bilateral LE edema    RADIOLOGY REVIEWED  CXR:    EXAM:  XR CHEST PORTABLE URGENT 1V                            PROCEDURE DATE:  12/02/2021        INTERPRETATION:  DATE OF STUDY: 12/2/21    PRIOR:12/1/21    CLINICAL INDICATION: Pulmonary edema, post transfusion.    TECHNIQUE: portable chest - done erect.    FINDINGS/  IMPRESSION:  Stable cardiomegaly.  Left-sided AICD in place.  Interval improvement in pulmonary edema changes with mild decrease in right pleural effusion. Stable small left pleural effusion.  No new consolidations. No pneumothorax.    CT chest:    TTE:

## 2021-12-03 NOTE — PROGRESS NOTE ADULT - ASSESSMENT
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

## 2021-12-03 NOTE — CONSULT NOTE ADULT - SUBJECTIVE AND OBJECTIVE BOX
[facility] Cardiology Consult  _________________________    Patient is a 86y old  Male who presents with a chief complaint of SOB (02 Dec 2021 16:51)      HPI:  ****History obtained from chart and partially from patient as he is short of breath****    86M with PMH of HTN, Afib not on AC, s/p ablation, s/p AICD/PPM, prostate cancer s/p treatment, MDS with pancytopenia and transfusion dependent p/w SOB from [facility] while receiving PRBCs. Pt is dependent on blood and platelet transfusions, receiving treatment at [facility] almost every other day. Per friend, pt has been incredibly fatigued and she noted some SOB and LE edema starting one week ago. He has received transfusions (either blood, platelets or both) on 11/26, 11/27 and 11/29. On Monday (11/29), friend noted that pts breathing appeared significantly worse, shallow and rapid and he was having very hard time going up the stairs, having to stop midway for several minutes to cath his breath. That evening was started on PO lasix by his oncologist and took total of 40mg; took 20mg BID the next day. Today, returned to [facility] for blood and platelets transfusion, and shortly after completing the PRBC, pt was noted to be very SOB with hypoxia to 70%RA. Pt was put on NRB and given lasix 40mg IVP at [facility] and EMS called; en route, due to low HR 40s (paced) pt was given atropine and solumedrol 125mg.   Per significant other, pt also has been having diarrhea for the past 2 days and has had poor PO intake; denies any recent fever, chills, cough, n/v, abd pain, urinary symptoms.      PAST MEDICAL & SURGICAL HISTORY:  Hypertension    Afib    Osteoarthritis    Prostate cancer  treated with cyber knife 2016    Venous stasis    Varicose veins  legs    Gout  finger a long time ago    Cellulitis  right lower leg in past    Degenerative disc disease, lumbar    Edema  right leg    S/P total hip arthroplasty  2006, right    S/P cataract extraction  bilateral    S/P hernia repair  1996    Afib  cardiac ablation 1998; several pacemaker/AICD insertion and replacements    S/P tonsillectomy    S/P cholecystectomy  12/17        MEDICATIONS  (STANDING):  allopurinol 200 milliGRAM(s) Oral daily  doxazosin 4 milliGRAM(s) Oral at bedtime  furosemide   Injectable 40 milliGRAM(s) IV Push two times a day  influenza  Vaccine (HIGH DOSE) 0.7 milliLiter(s) IntraMuscular once    MEDICATIONS  (PRN):  acetaminophen     Tablet .. 650 milliGRAM(s) Oral every 6 hours PRN Temp greater or equal to 38C (100.4F), Mild Pain (1 - 3)  aluminum hydroxide/magnesium hydroxide/simethicone Suspension 30 milliLiter(s) Oral every 4 hours PRN Dyspepsia  melatonin 3 milliGRAM(s) Oral at bedtime PRN Insomnia  ondansetron Injectable 4 milliGRAM(s) IV Push every 8 hours PRN Nausea and/or Vomiting      Allergies    adhesives (Rash)  clonidine (Swelling; Rash)  felodipine (Rash)  penicillin (Rash)  sulfa drugs (Rash)    Intolerances        Social Histroy: Tobacco- , ETOH-, Illicit Drugs-    T(C): 36.3 (12-03-21 @ 05:22), Max: 36.8 (12-02-21 @ 09:37)  HR: 70 (12-03-21 @ 08:42) (59 - 79)  BP: 120/63 (12-03-21 @ 05:22) (106/57 - 120/63)  RR: 19 (12-03-21 @ 05:22) (18 - 23)  SpO2: 94% (12-03-21 @ 08:42) (90% - 100%)  I&O's Summary    02 Dec 2021 07:01  -  03 Dec 2021 07:00  --------------------------------------------------------  IN: 600 mL / OUT: 1000 mL / NET: -400 mL    03 Dec 2021 07:01  -  03 Dec 2021 09:07  --------------------------------------------------------  IN: 0 mL / OUT: 300 mL / NET: -300 mL        Review of Systems:  Constitutional: [ ] Fever [ ] Chills [x ] Fatigue [ ] Weight change   HEENT: [ ] Blurred vision [ ] Eye Pain [ ] Headache [ ] Runny nose [ ] Sore Throat   Respiratory: [ ] Cough [ ] Wheezing [x] Shortness of breath  Cardiovascular: [ ] Chest Pain [ ] Palpitations [ ] STEINER [ ] PND [ ] Orthopnea  Gastrointestinal: [ ] Abdominal Pain [ ] Diarrhea [ ] Constipation [ ] Hemorrhoids [ ] Nausea [ ] Vomiting  Genitourinary: [ ] Nocturia [ ] Dysuria [ ] Incontinence  Extremities: [ ] Swelling [ ] Joint Pain  Neurologic: [ ] Focal deficit [ ] Paresthesias [ ] Syncope  Lymphatic: [ ] Swelling [ ] Lymphadenopathy   Skin: [ ] Rash [ ] Ecchymoses [ ] Wounds [ ] Lesions  Psychiatry: [ ] Depression [ ] Suicidal/Homicidal Ideation [ ] Anxiety [ ] Sleep Disturbances  [x ] 10 point review of systems is otherwise negative except as mentioned above            [ ]Unable to obtain    PHYSICAL EXAM:  GENERAL: Alert, NAD  NECK: Supple.  CHEST/LUNG: decreased breath sounds bibasilarly.   HEART: S1 S2 normal, RRR,  No murmurs, rubs, or gallops  ABDOMEN: Soft, Nondistended  EXTREMITIES:  No LE edema.      LABS:                        6.7    2.43  )-----------( 15       ( 02 Dec 2021 18:39 )             22.6     12-02    149<H>  |  112<H>  |  52<H>  ----------------------------<  131<H>  5.1   |  27  |  1.31<H>    Ca    8.7      02 Dec 2021 06:47  Phos  6.6     12-02  Mg     2.2     12-02    TPro  5.4<L>  /  Alb  3.1<L>  /  TBili  1.2  /  DBili  0.4<H>  /  AST  <5<L>  /  ALT  7<L>  /  AlkPhos  88  12-02      CARDIAC MARKERS ( 01 Dec 2021 18:24 )  x     / x     / 19 U/L / x     / 2.9 ng/mL              MEDICATIONS  (STANDING):  allopurinol 200 milliGRAM(s) Oral daily  doxazosin 4 milliGRAM(s) Oral at bedtime  furosemide   Injectable 40 milliGRAM(s) IV Push two times a day  influenza  Vaccine (HIGH DOSE) 0.7 milliLiter(s) IntraMuscular once    MEDICATIONS  (PRN):  acetaminophen     Tablet .. 650 milliGRAM(s) Oral every 6 hours PRN Temp greater or equal to 38C (100.4F), Mild Pain (1 - 3)  aluminum hydroxide/magnesium hydroxide/simethicone Suspension 30 milliLiter(s) Oral every 4 hours PRN Dyspepsia  melatonin 3 milliGRAM(s) Oral at bedtime PRN Insomnia  ondansetron Injectable 4 milliGRAM(s) IV Push every 8 hours PRN Nausea and/or Vomiting          RADIOLOGY & ADDITIONAL TESTS:    Cardiology testing:  EKG: paced rhythm    Telemetry: vpaced 60-80s, pvcs

## 2021-12-03 NOTE — PROGRESS NOTE ADULT - PROBLEM SELECTOR PLAN 5
MDS with pancytopenia, transfusion dependent  s/p 1u PRBC at [facility] with hb 6.9-->7.6  platelets low at 7  - defer further PRBC or platelets transfusion given hypervolemia   - once more euvolemic, transfuse for Hb <7 and plt <10   - trend CBC closely   - hematology consult fu  - defer pharm AC

## 2021-12-03 NOTE — PROGRESS NOTE ADULT - SUBJECTIVE AND OBJECTIVE BOX
Patient is a 86y old  Male who presents with a chief complaint of SOB (03 Dec 2021 12:22)      INTERVAL HPI/OVERNIGHT EVENTS: noted  pt seen and examined this am   events noted  mild respiratory distress  tolerating bipap at nights      Vital Signs Last 24 Hrs  T(C): 36.6 (03 Dec 2021 11:36), Max: 36.7 (03 Dec 2021 03:00)  T(F): 97.9 (03 Dec 2021 11:36), Max: 98 (03 Dec 2021 03:00)  HR: 58 (03 Dec 2021 11:36) (58 - 70)  BP: 121/55 (03 Dec 2021 11:36) (106/57 - 121/55)  BP(mean): --  RR: 18 (03 Dec 2021 11:36) (18 - 19)  SpO2: 96% (03 Dec 2021 11:36) (90% - 100%)    acetaminophen     Tablet .. 650 milliGRAM(s) Oral every 6 hours PRN  allopurinol 200 milliGRAM(s) Oral daily  aluminum hydroxide/magnesium hydroxide/simethicone Suspension 30 milliLiter(s) Oral every 4 hours PRN  doxazosin 4 milliGRAM(s) Oral at bedtime  furosemide   Injectable 40 milliGRAM(s) IV Push two times a day  influenza  Vaccine (HIGH DOSE) 0.7 milliLiter(s) IntraMuscular once  levoFLOXacin  Tablet 750 milliGRAM(s) Oral every 48 hours  melatonin 3 milliGRAM(s) Oral at bedtime PRN  ondansetron Injectable 4 milliGRAM(s) IV Push every 8 hours PRN      PHYSICAL EXAM:  GENERAL: NAD, mild respi distress  EYES: conjunctiva and sclera clear  ENMT: Moist mucous membranes  NECK: Supple, No JVD, Normal thyroid  CHEST/LUNG: dec BS at abses  HEART: Regular rate and rhythm; No murmurs, rubs, or gallops  ABDOMEN: Soft, Nontender, Nondistended; Bowel sounds present  EXTREMITIES:  2+ Peripheral Pulses, No clubbing, cyanosis, or edema  LYMPH: No lymphadenopathy noted  SKIN: No rashes or lesions    Consultant(s) Notes Reviewed:  [x ] YES  [ ] NO  Care Discussed with Consultants/Other Providers [ x] YES  [ ] NO    LABS:                        6.7    2.43  )-----------( 15       ( 02 Dec 2021 18:39 )             22.6     12-02    149<H>  |  112<H>  |  52<H>  ----------------------------<  131<H>  5.1   |  27  |  1.31<H>    Ca    8.7      02 Dec 2021 06:47  Phos  6.6     12-02  Mg     2.2     12-02    TPro  5.4<L>  /  Alb  3.1<L>  /  TBili  1.2  /  DBili  0.4<H>  /  AST  <5<L>  /  ALT  7<L>  /  AlkPhos  88  12-02        CAPILLARY BLOOD GLUCOSE          ABG - ( 02 Dec 2021 11:00 )  pH, Arterial: 7.34  pH, Blood: x     /  pCO2: 57    /  pO2: 91    / HCO3: 31    / Base Excess: 3.5   /  SaO2: 98.8                    RADIOLOGY & ADDITIONAL TESTS:    Imaging Personally Reviewed:  [x ] YES  [ ] NO

## 2021-12-04 NOTE — PROGRESS NOTE ADULT - PROBLEM SELECTOR PLAN 2
- not a candidate for anticoagulation due to profound pancytopenia requiring transfusions  -rates stable on telemetry  -resume toprol xl 25 mg daily if remains hemodynamically stable.     follows with cardiologist dr meredith burrell

## 2021-12-04 NOTE — PROGRESS NOTE ADULT - PROBLEM SELECTOR PLAN 5
MDS with pancytopenia, transfusion dependent  s/p 1u PRBC at [facility] with hb 6.9-->7.6  platelets low at 7  - defer further PRBC or platelets transfusion given hypervolemia   - once more euvolemic, transfuse for Hb <7 and plt <10   - trend CBC closely   - hematology consult fu  - defer pharm AC  hematuria- partly contributing to anemia   monitor ,  cs

## 2021-12-04 NOTE — PROGRESS NOTE ADULT - PROBLEM SELECTOR PLAN 6
currently V paced in 60s, low as 40s at [facility]   holding BB for now  not on AC at this time

## 2021-12-04 NOTE — PROGRESS NOTE ADULT - SUBJECTIVE AND OBJECTIVE BOX
Cardiology Dr. Echeverria [phone]    SUBJECTIVE / OVERNIGHT EVENTS:    Patient seen and examined, no events overnight    T(C): 36.3 (12-03-21 @ 21:40), Max: 36.6 (12-03-21 @ 11:36)  HR: 61 (12-04-21 @ 04:15) (58 - 61)  BP: 122/65 (12-04-21 @ 04:15) (121/55 - 126/64)  RR: 18 (12-04-21 @ 04:15) (18 - 18)  SpO2: 95% (12-04-21 @ 04:15) (95% - 96%)  Wt(kg): --  Daily     Daily   I&O's Summary    03 Dec 2021 07:01  -  04 Dec 2021 07:00  --------------------------------------------------------  IN: 840 mL / OUT: 303 mL / NET: 537 mL        Telemetry: Paced    PHYSICAL EXAM:  GENERAL: A+Ox3,   HEAD:  Atraumatic, Normocephalic  NECK: Supple, No JVD  CHEST/LUNG: decreased BS at b/l bases  HEART: S1S2, RR, 3/6  ABDOMEN: Soft, Nontender, Nondistended; Bowel sounds present  EXTREMITIES: trace pedal edema  NEUROLOGY: non-focal      LABS:                        7.2    2.01  )-----------( 6        ( 04 Dec 2021 07:01 )             23.7     12-04    149<H>  |  109<H>  |  57<H>  ----------------------------<  95  4.7   |  32<H>  |  1.23    Ca    8.9      04 Dec 2021 07:01        CARDIAC MARKERS ( 01 Dec 2021 18:24 )  x     / x     / 19 U/L / x     / 2.9 ng/mL          MEDICATIONS  (STANDING):  allopurinol 200 milliGRAM(s) Oral daily  doxazosin 4 milliGRAM(s) Oral at bedtime  furosemide   Injectable 40 milliGRAM(s) IV Push two times a day  furosemide   Injectable 40 milliGRAM(s) IV Push once  influenza  Vaccine (HIGH DOSE) 0.7 milliLiter(s) IntraMuscular once  levoFLOXacin  Tablet 750 milliGRAM(s) Oral every 48 hours    MEDICATIONS  (PRN):  acetaminophen     Tablet .. 650 milliGRAM(s) Oral every 6 hours PRN Temp greater or equal to 38C (100.4F), Mild Pain (1 - 3)  aluminum hydroxide/magnesium hydroxide/simethicone Suspension 30 milliLiter(s) Oral every 4 hours PRN Dyspepsia  melatonin 3 milliGRAM(s) Oral at bedtime PRN Insomnia  ondansetron Injectable 4 milliGRAM(s) IV Push every 8 hours PRN Nausea and/or Vomiting      RADIOLOGY & ADDITIONAL TESTS:

## 2021-12-04 NOTE — CHART NOTE - NSCHARTNOTEFT_GEN_A_CORE
Urology team paged earlier regarding hematuria.  No urine available at that time for evaluation.     RN collect urine.  At time of evaluation, urine was clear vaughn yellow.   No acute intervention by urology at this time.     Full consult to follow.  Patient to be evaluated by private urologist Dr. De La Cruz tomorrow (already notified).      Ryan DOMINGO  Urology  [phone]

## 2021-12-04 NOTE — PROGRESS NOTE ADULT - SUBJECTIVE AND OBJECTIVE BOX
Patient is a 86y old  Male who presents with a chief complaint of SOB (04 Dec 2021 09:30)      INTERVAL HPI/OVERNIGHT EVENTS: noted  pt seen and examined this am   events noted  +hematuria      Vital Signs Last 24 Hrs  T(C): 36.5 (04 Dec 2021 11:42), Max: 36.5 (04 Dec 2021 11:42)  T(F): 97.7 (04 Dec 2021 11:42), Max: 97.7 (04 Dec 2021 11:42)  HR: 58 (04 Dec 2021 11:42) (58 - 61)  BP: 118/67 (04 Dec 2021 11:42) (118/67 - 126/64)  BP(mean): --  RR: 18 (04 Dec 2021 11:42) (18 - 18)  SpO2: 96% (04 Dec 2021 11:42) (95% - 96%)    acetaminophen     Tablet .. 650 milliGRAM(s) Oral every 6 hours PRN  allopurinol 200 milliGRAM(s) Oral daily  aluminum hydroxide/magnesium hydroxide/simethicone Suspension 30 milliLiter(s) Oral every 4 hours PRN  doxazosin 4 milliGRAM(s) Oral at bedtime  furosemide   Injectable 40 milliGRAM(s) IV Push two times a day  furosemide   Injectable 40 milliGRAM(s) IV Push once  influenza  Vaccine (HIGH DOSE) 0.7 milliLiter(s) IntraMuscular once  levoFLOXacin  Tablet 750 milliGRAM(s) Oral every 48 hours  melatonin 3 milliGRAM(s) Oral at bedtime PRN  ondansetron Injectable 4 milliGRAM(s) IV Push every 8 hours PRN      PHYSICAL EXAM:  GENERAL: NAD,   EYES: conjunctiva and sclera clear  ENMT: Moist mucous membranes  NECK: Supple, No JVD, Normal thyroid  CHEST/LUNG: non labored, cta b/l  HEART: Regular rate and rhythm; No murmurs, rubs, or gallops  ABDOMEN: Soft, Nontender, Nondistended; Bowel sounds present  EXTREMITIES:  2+ Peripheral Pulses, No clubbing, cyanosis, or edema  LYMPH: No lymphadenopathy noted  SKIN: No rashes or lesions    Consultant(s) Notes Reviewed:  [x ] YES  [ ] NO  Care Discussed with Consultants/Other Providers [ x] YES  [ ] NO    LABS:                        7.2    2.01  )-----------( 6        ( 04 Dec 2021 07:01 )             23.7     12-04    149<H>  |  109<H>  |  57<H>  ----------------------------<  95  4.7   |  32<H>  |  1.23    Ca    8.9      04 Dec 2021 07:01          CAPILLARY BLOOD GLUCOSE                  RADIOLOGY & ADDITIONAL TESTS:    Imaging Personally Reviewed:  [x ] YES  [ ] NO

## 2021-12-04 NOTE — PROGRESS NOTE ADULT - PROBLEM SELECTOR PLAN 1
-  -acute combined chf in the setting of recent transfusions  -continue with lasix 40 mg iv bid.   - agree with lasix with transfusion  -Continue daily weights unable to accurately record I+Os  -trend cr   -keep potassium and mag above 4 and 2 respectively  -wean O2 as tolerated  -resume toprol xl 25 mg daily.   -tte from october as above.

## 2021-12-05 NOTE — PROGRESS NOTE ADULT - PROBLEM SELECTOR PLAN 1
-  - May be secondary to recent transfusion vs high outpt failure in setting of anemia  - Echo 12/3/21 showing nml LVEF  - Pt given IV lasix this AM would dose daily as need not significantly overloaded clinically  - If requires transfusion may need extra dose of Lasix post transfusion  -Continue daily weights unable to accurately record I+Os  -Trend cr   -Keep potassium and mag above 4 and 2 respectively  -Wean O2 as tolerated  -Resume toprol xl 25 mg daily if remains hemodynamically stable

## 2021-12-05 NOTE — DIETITIAN INITIAL EVALUATION ADULT. - PERTINENT MEDS FT
MEDICATIONS  (STANDING):  allopurinol 200 milliGRAM(s) Oral daily  dextrose 5%. 1000 milliLiter(s) (50 mL/Hr) IV Continuous <Continuous>  doxazosin 4 milliGRAM(s) Oral at bedtime  influenza  Vaccine (HIGH DOSE) 0.7 milliLiter(s) IntraMuscular once  levoFLOXacin  Tablet 750 milliGRAM(s) Oral every 48 hours    MEDICATIONS  (PRN):  acetaminophen     Tablet .. 650 milliGRAM(s) Oral every 6 hours PRN Temp greater or equal to 38C (100.4F), Mild Pain (1 - 3)  aluminum hydroxide/magnesium hydroxide/simethicone Suspension 30 milliLiter(s) Oral every 4 hours PRN Dyspepsia  melatonin 3 milliGRAM(s) Oral at bedtime PRN Insomnia  ondansetron Injectable 4 milliGRAM(s) IV Push every 8 hours PRN Nausea and/or Vomiting

## 2021-12-05 NOTE — PROGRESS NOTE ADULT - PROBLEM SELECTOR PLAN 5
MDS with pancytopenia, transfusion dependent  s/p 1u PRBC at [facility] with hb 6.9-->7.6  platelets low at 7  -diuresis w PRBC or platelets transfusion given hypervolemia   - transfuse for Hb <7 and plt <10   - trend CBC closely   - hematology consult fu  - defer pharm AC  hematuria- partly contributing to anemia -resolved  monitor ,  cs

## 2021-12-05 NOTE — DIETITIAN INITIAL EVALUATION ADULT. - ADD RECOMMEND
Add Ensure x3 daily; Reinforce diet adherence/diet education.; change diet to liberalize menu while maintaining Low sodium; assist with menus, feeding;; monitor weight, skin, labs.

## 2021-12-05 NOTE — DIETITIAN INITIAL EVALUATION ADULT. - SIGNS/SYMPTOMS
weight loss , nutrient intake ,75% > 3 months weight loss >12% in 10months, , nutrient intake<75% > 3 months

## 2021-12-05 NOTE — PROGRESS NOTE ADULT - ASSESSMENT
86M with PMH of HTN, Afib not on AC, s/p ablation, s/p AICD/PPM, prostate cancer s/p treatment, MDS with pancytopenia and transfusion dependent p/w SOB from [facility] while receiving PRBCs.     TTE 10/2021  Moderate aortic regurgitation.  Mild global left ventricular systolic dysfunction.  Moderate diastolic dysfunction (Stage II).  Mild tricuspid regurgitation.  Small pericardial effusion.    TTE 12/3/21  Nml LVEF  Mod biatrial enlargent  Estimated pulmonary artery systolic pressure equals 53  mm Hg, assuming right atrial pressure equals 12 mm Hg,  consistent with moderate pulmonary pressures.  Small pericardial effusion posterior to the left  ventricle.   Thickened pericardium.  Moderate pericardial effusion anterior to the right  ventricle.   The pericardial effusion measures  1.98  cm  adjacent to the right ventricle as seen in the subcostal  view.   No echocardiographic evidence of pericardial  tamponade.  Bilateral pleural effusions.

## 2021-12-05 NOTE — DIETITIAN NUTRITION RISK NOTIFICATION - TREATMENT: THE FOLLOWING DIET HAS BEEN RECOMMENDED
Diet, Regular:   Low Sodium  Supplement Feeding Modality:  Oral  Ensure Enlive Cans or Servings Per Day:  3       Frequency:  Daily (12-05-21 @ 18:30) [Pending Verification By Attending]  Diet, Regular (12-05-21 @ 18:30) [Pending Verification By Attending]  Diet, DASH/TLC:   Sodium & Cholesterol Restricted  Supplement Feeding Modality:  Oral  Ensure Enlive Cans or Servings Per Day:  3       Frequency:  Daily (12-05-21 @ 17:45) [Active]

## 2021-12-05 NOTE — DIETITIAN INITIAL EVALUATION ADULT. - PROBLEM SELECTOR PLAN 6
currently V paced in 60s, low as 40s at [facility]   holding BB for now  not on AC at this time

## 2021-12-05 NOTE — PROGRESS NOTE ADULT - PROBLEM SELECTOR PLAN 3
-  - Small to moderate without clinical or echocardiographic evidence of tamponade  - Avoid over diuresis    follows with cardiologist dr meredith burrell

## 2021-12-05 NOTE — PROGRESS NOTE ADULT - SUBJECTIVE AND OBJECTIVE BOX
Cardiology Dr. Echeverria [phone]    SUBJECTIVE / OVERNIGHT EVENTS:    Patient seen and examined, lying comfortably flat in bed    T(C): 36.7 (21 @ 04:20), Max: 36.7 (21 @ 04:20)  HR: 60 (21 @ 04:20) (55 - 60)  BP: 126/62 (21 @ 04:20) (118/67 - 136/56)  RR: 18 (21 @ 04:20) (18 - 18)  SpO2: 95% (21 @ 04:20) (95% - 99%)  Wt(kg): --  Daily     Daily Weight in k.1 (05 Dec 2021 04:20)  I&O's Summary    04 Dec 2021 07:01  -  05 Dec 2021 07:00  --------------------------------------------------------  IN: 720 mL / OUT: 2 mL / NET: 718 mL        Telemetry: aflutter/paced, PVCs    PHYSICAL EXAM:  GENERAL: A+Ox3, NAD  HEAD:  Atraumatic, Normocephalic  NECK: Supple, No JVD  CHEST/LUNG: decreased bs at base  HEART: S1S2, RR, No murmurs, rubs, or gallops  ABDOMEN: Soft, Nontender, Nondistended; Bowel sounds present  EXTREMITIES: No clubbing, cyanosis, or edema  NEUROLOGY: non-focal      LABS:                        8.0    2.52  )-----------( 11       ( 05 Dec 2021 06:20 )             25.5     12-05    150<H>  |  107  |  48<H>  ----------------------------<  111<H>  4.3   |  36<H>  |  1.07    Ca    9.0      05 Dec 2021 06:20  Phos  4.1     12-05  Mg     2.1     12-05        CARDIAC MARKERS ( 01 Dec 2021 18:24 )  x     / x     / 19 U/L / x     / 2.9 ng/mL      Urinalysis Basic - ( 04 Dec 2021 16:46 )    Color: Light Orange / Appearance: Clear / S.014 / pH: x  Gluc: x / Ketone: Negative  / Bili: Negative / Urobili: Negative   Blood: x / Protein: Trace / Nitrite: Negative   Leuk Esterase: Negative / RBC: 561 /hpf / WBC 2 /HPF   Sq Epi: x / Non Sq Epi: 0 /hpf / Bacteria: Negative        MEDICATIONS  (STANDING):  allopurinol 200 milliGRAM(s) Oral daily  doxazosin 4 milliGRAM(s) Oral at bedtime  influenza  Vaccine (HIGH DOSE) 0.7 milliLiter(s) IntraMuscular once  levoFLOXacin  Tablet 750 milliGRAM(s) Oral every 48 hours    MEDICATIONS  (PRN):  acetaminophen     Tablet .. 650 milliGRAM(s) Oral every 6 hours PRN Temp greater or equal to 38C (100.4F), Mild Pain (1 - 3)  aluminum hydroxide/magnesium hydroxide/simethicone Suspension 30 milliLiter(s) Oral every 4 hours PRN Dyspepsia  melatonin 3 milliGRAM(s) Oral at bedtime PRN Insomnia  ondansetron Injectable 4 milliGRAM(s) IV Push every 8 hours PRN Nausea and/or Vomiting      RADIOLOGY & ADDITIONAL TESTS:

## 2021-12-05 NOTE — DIETITIAN INITIAL EVALUATION ADULT. - PROBLEM SELECTOR PLAN 1
pt with acute hypoxic and hypercapnic RF in setting of hypervolemia from TACO vs HF exacerbation. CXR showing b/l pleural effusion R>L and PCOUS with B lines    - VBG shows resp acidosis with pH 7.2 and PCO2 80; hypoxic to 70%RA at [facility].   - Currently on BiPAP 14/7 at 40FiO2, pulling good TV - will recheck VBG again overnight to assess improvement   - s/p lasix 40 IVP at [facility] prior to transfer, c/w lasix 40 IV BID, monitor electrolytes and Cr closely   - strict I/Os - pt has not made urine yet, c/w bladder scan and will consider yost if pt unable to void   - defer further transfusion for now  - MICU consulted, not a candidate at this time, but pt is full code per ED

## 2021-12-05 NOTE — DIETITIAN INITIAL EVALUATION ADULT. - ORAL INTAKE PTA/DIET HISTORY
patient prefers for his significant other  to provide history as he has SOB with fatigue, patient has been ill with MDS since Feb 2021 with significant weight loss. Pt has been too tired to eat, has loss of appetite, and breathing difficulty, pt  drinks ensure x3 daily at home, requests same. Also requests diet change to allow hamburger. pt is being spoon fed by significant other due to fatigue and SOB, pt receiving lasx for fluid retention. pt has been hospitalized 6x in past 10months

## 2021-12-05 NOTE — DIETITIAN INITIAL EVALUATION ADULT. - FACTORS AFF FOOD INTAKE
breathing difficulty, fluid retention breathing difficulty, fluid retention/persistent lack of appetite

## 2021-12-05 NOTE — DIETITIAN INITIAL EVALUATION ADULT. - OTHER INFO
Pt requests hamburger which is not permitted on DASH diet. Discussed with NP, recommend change diet to Low sodium

## 2021-12-05 NOTE — CONSULT NOTE ADULT - SUBJECTIVE AND OBJECTIVE BOX
Mr. Padilla is known to me with a history of Prostate ca tx with SBRT some years ago. He has been w/o evidence of recurrence. LUTS have not been bothersome. He has a hx of MDS, developed painless, gross hematuria. No resolved. no N/V. No flank pain. No dysuria.     On PE he is A+O X 3  Abd: soft, NT, ND No mass  Normal phallus, testes and scrotum    A/P: Painless gross hematuria in a pt with myelodysplastic syndrome, thrombocytopenia. Urine now clear. Hematuria likely due to hx RT combined with thrombocytopenia. U/A, UCx, cytology. Since hematuria resolved may f/u as outpt. for office cystoscopy.

## 2021-12-05 NOTE — PROGRESS NOTE ADULT - SUBJECTIVE AND OBJECTIVE BOX
Patient is a 86y old  Male who presents with a chief complaint of SOB (05 Dec 2021 11:39)      INTERVAL HPI/OVERNIGHT EVENTS: noted  pt seen and examined this am   events noted  dry mouth      Vital Signs Last 24 Hrs  T(C): 36.4 (05 Dec 2021 11:16), Max: 36.7 (05 Dec 2021 04:20)  T(F): 97.6 (05 Dec 2021 11:16), Max: 98 (05 Dec 2021 04:20)  HR: 60 (05 Dec 2021 11:16) (55 - 60)  BP: 120/61 (05 Dec 2021 11:16) (120/61 - 136/56)  BP(mean): --  RR: 19 (05 Dec 2021 11:16) (18 - 19)  SpO2: 94% (05 Dec 2021 11:16) (94% - 99%)    acetaminophen     Tablet .. 650 milliGRAM(s) Oral every 6 hours PRN  allopurinol 200 milliGRAM(s) Oral daily  aluminum hydroxide/magnesium hydroxide/simethicone Suspension 30 milliLiter(s) Oral every 4 hours PRN  dextrose 5%. 1000 milliLiter(s) IV Continuous <Continuous>  doxazosin 4 milliGRAM(s) Oral at bedtime  influenza  Vaccine (HIGH DOSE) 0.7 milliLiter(s) IntraMuscular once  levoFLOXacin  Tablet 750 milliGRAM(s) Oral every 48 hours  melatonin 3 milliGRAM(s) Oral at bedtime PRN  ondansetron Injectable 4 milliGRAM(s) IV Push every 8 hours PRN      PHYSICAL EXAM:  GENERAL: NAD,   EYES: conjunctiva and sclera clear  ENMT: Moist mucous membranes  NECK: Supple, No JVD, Normal thyroid  CHEST/LUNG: non labored, cta b/l  HEART: Regular rate and rhythm; No murmurs, rubs, or gallops  ABDOMEN: Soft, Nontender, Nondistended; Bowel sounds present  EXTREMITIES:  2+ Peripheral Pulses, No clubbing, cyanosis, or edema  LYMPH: No lymphadenopathy noted  SKIN: No rashes or lesions    Consultant(s) Notes Reviewed:  [x ] YES  [ ] NO  Care Discussed with Consultants/Other Providers [ x] YES  [ ] NO    LABS:                        8.0    2.52  )-----------( 11       ( 05 Dec 2021 06:20 )             25.5     12-05    150<H>  |  107  |  48<H>  ----------------------------<  111<H>  4.3   |  36<H>  |  1.07    Ca    9.0      05 Dec 2021 06:20  Phos  4.1       Mg     2.1             Urinalysis Basic - ( 04 Dec 2021 16:46 )    Color: Light Orange / Appearance: Clear / S.014 / pH: x  Gluc: x / Ketone: Negative  / Bili: Negative / Urobili: Negative   Blood: x / Protein: Trace / Nitrite: Negative   Leuk Esterase: Negative / RBC: 561 /hpf / WBC 2 /HPF   Sq Epi: x / Non Sq Epi: 0 /hpf / Bacteria: Negative      CAPILLARY BLOOD GLUCOSE            Urinalysis Basic - ( 04 Dec 2021 16:46 )    Color: Light Orange / Appearance: Clear / S.014 / pH: x  Gluc: x / Ketone: Negative  / Bili: Negative / Urobili: Negative   Blood: x / Protein: Trace / Nitrite: Negative   Leuk Esterase: Negative / RBC: 561 /hpf / WBC 2 /HPF   Sq Epi: x / Non Sq Epi: 0 /hpf / Bacteria: Negative          RADIOLOGY & ADDITIONAL TESTS:    Imaging Personally Reviewed:  [x ] YES  [ ] NO

## 2021-12-05 NOTE — PROGRESS NOTE ADULT - PROBLEM SELECTOR PLAN 1
sp acute hypoxic and hypercapnic RF in setting of hypervolemia from TACO vs HF exacerbation. CXR showing b/l pleural effusion R>L and PCOUS with B lines    - VBG shows resp acidosis with pH 7.2 and PCO2 80; hypoxic to 70%RA at [facility].   - sp bipap and IV lasix-with good clinical improvement,   - strict I/Os - daily wts   cards cs appreciated  pulm cs appreciated

## 2021-12-05 NOTE — DIETITIAN INITIAL EVALUATION ADULT. - REASON INDICATOR FOR ASSESSMENT
Nutritional supplement. "86M w/ PMH of HTN, Afib not on AC, s/p ablation, s/p AICD/PPM, prostate cancer s/p treatment, MDS w/ pancytopenia and transfusion dependent p/w SOB from [facility] while receiving PRBCs, a/w hypoxic and hypercapnic respiratory failure 2/2 TACO vs HF exacerbation. "

## 2021-12-05 NOTE — DIETITIAN INITIAL EVALUATION ADULT. - PROBLEM SELECTOR PLAN 5
MDS with pancytopenia, transfusion dependent  s/p 1u PRBC at [facility] with hb 6.9-->7.6  platelets low at 7  - defer further PRBC or platelets transfusion given hypervolemia   - once more euvolemic, transfuse for Hb <7 and plt <10   - trend CBC closely   - hematology consult in AM  - defer pharm AC

## 2021-12-05 NOTE — PROGRESS NOTE ADULT - PROBLEM SELECTOR PLAN 2
-   - Not a candidate for anticoagulation due to profound pancytopenia requiring transfusions  - Rates stable on telemetry  - Resume toprol xl 25 mg daily if remains hemodynamically stable.

## 2021-12-06 NOTE — PROGRESS NOTE ADULT - PROBLEM SELECTOR PLAN 2
-   - Not a candidate for anticoagulation due to profound pancytopenia requiring transfusions  - Rates stable on telemetry  - Resume toprol xl 25 mg daily

## 2021-12-06 NOTE — CONSULT NOTE ADULT - ASSESSMENT
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 course complicated by hypernatremia  Now likely to be intubated for obtundation secondary to mixed acid base disturbance    MICU consulted for worsening hypercapnic/hypoxic respiratory failure possibly requiring intubation given worsening mental status    now made DNR/DNI/no IV pressors and would like comfort care    #recommendations   not a MICU candidate  patient's significant other Fritz  is health care proxy  after discussion, respiratory failure and altered mental status cause is unclear   furthermore prognosis with currently oncological condition is very poor   finally s/o speaks to the fact that patient's quality of life has greatly decreased as now multiple hospitalizations with transfusion dependency   MICU admission would most likely not benefit patient in long term and after discussion now made DNR /DNI to focus on comfort  please consult palliative care for hospice eval  heme/onc to decide regarding further transfusions  filled out terrance Lewis PGY3

## 2021-12-06 NOTE — PROGRESS NOTE ADULT - PROBLEM SELECTOR PLAN 1
-  - May be secondary to recent transfusion vs high outpt failure in setting of anemia  - Echo 12/3/21 showing nml LVEF  - Pt given IV lasix yesterday would dose daily as need not significantly overloaded clinically.   - If requires transfusion may need extra dose of Lasix post transfusion  -Continue daily weights unable to accurately record I+Os  -Trend cr   -Keep potassium and mag above 4 and 2 respectively  -Wean O2 as tolerated  -Resume toprol xl 25 mg daily

## 2021-12-06 NOTE — PROGRESS NOTE ADULT - ASSESSMENT
85 y/o M with hx of AFib w/AICD 5q deletion MDS w/ blast progression to AML, currently receiving Vidaza (most recent dose 11/17/21), admitted dyspnea after blood transfusion at [facility], concerning for TACO vs ADHF    #AML/Pancytopenia  -pancytopenia in the setting of AML and chemotherapy  - h/o MDS w EB2 Dx 1/2021, -5q deletion was treated with Revlimid, in summer of 2021 found to have increased blasts >20% confirming Dx of AML. Started on azacitidine. Plan is to continue 4C of azacitidine followed by BMBx  -GOC discussed today with patient and patient's HCP at the bedside. Given that he has had difficulty tolerating tranfusion support and has had decrease QOL as a result of recurrent hospitalizations, he is considering to de-escalate care to comfort care only  -can continue to transfuse as needed if patient is symptomatically anemic if in keeping with his goals of care; he can also refuse transfusion. If transfusing blood, would give split units and transfuse slowly with diuretic support after each transfusion given hx of volume overload.  originally planned on outpatient bone marrow biopsy given suspicion for persistent AML and possible escalation to dacogen + venetoclax. If patient is wishing to pursue comfort care only and hospice, will hold off on bone marrow biopsy  -supportive care per primary team  -Levaquin ppx    Please call with any questions.    Fabiano Hill MD, MPH  Hematology / Oncology Fellow, PGY7  p   After hours or on weekend, please call on call fellow.

## 2021-12-06 NOTE — RAPID RESPONSE TEAM SUMMARY - NSADDTLFINDINGSRRT_GEN_ALL_CORE
Vitals- BP ~120/60, HR-60, RR-22, Sat 95% on 2L NC. Extremities warm. Tele showing aflutter, Vpaced with occasional PVC's. CXR performed w/ worsening opacities.  Vitals- BP ~120/60, HR-60, RR-22, Sat 95% on 2L NC. Extremities warm. Tele showing aflutter, Vpaced with occasional PVC's. CXR performed w/ subjective worsening opacities.

## 2021-12-06 NOTE — CONSULT NOTE ADULT - SUBJECTIVE AND OBJECTIVE BOX
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 course complicated by hypernatremia  Now likely to be intubated for obtundation secondary to mixed acid base disturbance    At time of exam patient is ao0, grunts appropriately can intermittently follow commands with the upper extremity. Patient is on fifth admission since  with subsequent decompensation. Patient  has significant other Fritz  who is the official HCP mentations that patient had an official DNR however would have liked to have rescinded it if there was a easily reversible cause.     On subsequent exam patient able to arouse and follow commands. Spoke at length with significant other regarding overall prognosis and course of action to be taken from here on out. Patient carried himself well with great dignity and since  has been decompensating in a terrible way. Even when able to stay outside the hospital still reliant on transfusions which has been negatively impacting his life. At this point would not want to intubate this patient and would like to focus on comfort care.     PAST MEDICAL & SURGICAL HISTORY:  Hypertension    Afib    Osteoarthritis    Prostate cancer  treated with cyber knife     Venous stasis    Varicose veins  legs    Gout  finger a long time ago    Cellulitis  right lower leg in past    Degenerative disc disease, lumbar    Edema  right leg    S/P total hip arthroplasty  , right    S/P cataract extraction  bilateral    S/P hernia repair      Afib  cardiac ablation ; several pacemaker/AICD insertion and replacements    S/P tonsillectomy    S/P cholecystectomy          FAMILY HISTORY:  Family history of Alzheimer&#x27;s disease (Father)    Family history of essential hypertension (Father)    Family history of breast cancer (Mother)    Family history of prostate cancer (Sibling)        SOCIAL HISTORY:  no smoking, no etoh, no drugs   currently living with significant other    Allergies    adhesives (Rash)  clonidine (Swelling; Rash)  felodipine (Rash)  penicillin (Rash)  sulfa drugs (Rash)    Intolerances        HOME MEDICATIONS:    REVIEW OF SYSTEMS:  unable to assess     OBJECTIVE:  ICU Vital Signs Last 24 Hrs  T(C): 36.3 (06 Dec 2021 11:02), Max: 36.7 (06 Dec 2021 04:12)  T(F): 97.4 (06 Dec 2021 11:02), Max: 98 (06 Dec 2021 04:12)  HR: 63 (06 Dec 2021 15:03) (60 - 66)  BP: 119/55 (06 Dec 2021 11:02) (119/55 - 129/61)  BP(mean): --  ABP: --  ABP(mean): --  RR: 22 (06 Dec 2021 11:02) (18 - 22)  SpO2: 99% (06 Dec 2021 15:03) (92% - 99%)         @ 07:01  -   @ 07:00  --------------------------------------------------------  IN: 660 mL / OUT: 1 mL / NET: 659 mL     @ 07:01  -   @ 16:34  --------------------------------------------------------  IN: 120 mL / OUT: 0 mL / NET: 120 mL      CAPILLARY BLOOD GLUCOSE      POCT Blood Glucose.: 144 mg/dL (06 Dec 2021 11:11)      PHYSICAL EXAM:  GENERAL: NAD, lying in bed   HEAD:  Atraumatic, Normocephalic  EYES: EOMI b/l, PERRLA b/l, conjunctiva and sclera clear  NECK: Supple, No JVD, No LAD   CHEST/LUNG: Clear to auscultation bilaterally, small breaths ; No wheeze or ronchi  HEART: Regular rate and rhythm; S1 and S2 present, No murmurs, rubs, or gallops  ABDOMEN: Soft, Nontender, Nondistended; Bowel sounds present  EXTREMITIES:  2+ Peripheral Pulses, No clubbing, cyanosis, or edema  NEURO: AAOx0 non-focal, intermittently follows commands   SKIN: No rashes or lesions    HOSPITAL MEDICATIONS:  MEDICATIONS  (STANDING):  allopurinol 200 milliGRAM(s) Oral daily  doxazosin 4 milliGRAM(s) Oral at bedtime  furosemide   Injectable 40 milliGRAM(s) IV Push once  furosemide   Injectable 40 milliGRAM(s) IV Push once  influenza  Vaccine (HIGH DOSE) 0.7 milliLiter(s) IntraMuscular once  levoFLOXacin  Tablet 750 milliGRAM(s) Oral every 48 hours  metoprolol succinate ER 25 milliGRAM(s) Oral daily    MEDICATIONS  (PRN):  acetaminophen     Tablet .. 650 milliGRAM(s) Oral every 6 hours PRN Temp greater or equal to 38C (100.4F), Mild Pain (1 - 3)  aluminum hydroxide/magnesium hydroxide/simethicone Suspension 30 milliLiter(s) Oral every 4 hours PRN Dyspepsia  melatonin 3 milliGRAM(s) Oral at bedtime PRN Insomnia  ondansetron Injectable 4 milliGRAM(s) IV Push every 8 hours PRN Nausea and/or Vomiting      LABS:                        7.9    1.91  )-----------( 6        ( 06 Dec 2021 11:51 )             26.7     12-    147<H>  |  103  |  42<H>  ----------------------------<  128<H>  4.7   |  41<H>  |  1.03    Ca    9.2      06 Dec 2021 11:51  Phos  3.7     12  Mg     2.2     -    TPro  5.6<L>  /  Alb  3.2<L>  /  TBili  1.4<H>  /  DBili  x   /  AST  6<L>  /  ALT  8<L>  /  AlkPhos  90  12-      Urinalysis Basic - ( 04 Dec 2021 16:46 )    Color: Light Orange / Appearance: Clear / S.014 / pH: x  Gluc: x / Ketone: Negative  / Bili: Negative / Urobili: Negative   Blood: x / Protein: Trace / Nitrite: Negative   Leuk Esterase: Negative / RBC: 561 /hpf / WBC 2 /HPF   Sq Epi: x / Non Sq Epi: 0 /hpf / Bacteria: Negative      Arterial Blood Gas:   @ 11:44  7.38/74/105/44/97.9/16.6  ABG lactate: --        MICROBIOLOGY:     RADIOLOGY:  [ ] Reviewed and interpreted by me    EKG: Received report form Trish CHAN, pt resting in stretcher comfortably , VSS. Pt seen by urology, states "feeling better" at this time.

## 2021-12-06 NOTE — PROGRESS NOTE ADULT - PROBLEM SELECTOR PLAN 5
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

## 2021-12-06 NOTE — PROGRESS NOTE ADULT - SUBJECTIVE AND OBJECTIVE BOX
Patient is a 86y old  Male who presents with a chief complaint of SOB (06 Dec 2021 08:30)      INTERVAL HPI/OVERNIGHT EVENTS: noted  pt seen and examined this am   events noted pt in respi distress, unable to talk full sentences, lethargic  RRT called   abg-hypercapnea, placed on bipap      Vital Signs Last 24 Hrs  T(C): 36.3 (06 Dec 2021 11:02), Max: 36.7 (06 Dec 2021 04:12)  T(F): 97.4 (06 Dec 2021 11:02), Max: 98 (06 Dec 2021 04:12)  HR: 61 (06 Dec 2021 11:50) (60 - 66)  BP: 119/55 (06 Dec 2021 11:02) (119/55 - 129/61)  BP(mean): --  RR: 22 (06 Dec 2021 11:02) (18 - 22)  SpO2: 99% (06 Dec 2021 11:50) (92% - 99%)    acetaminophen     Tablet .. 650 milliGRAM(s) Oral every 6 hours PRN  allopurinol 200 milliGRAM(s) Oral daily  aluminum hydroxide/magnesium hydroxide/simethicone Suspension 30 milliLiter(s) Oral every 4 hours PRN  doxazosin 4 milliGRAM(s) Oral at bedtime  furosemide   Injectable 40 milliGRAM(s) IV Push every 4 hours PRN  influenza  Vaccine (HIGH DOSE) 0.7 milliLiter(s) IntraMuscular once  levoFLOXacin  Tablet 750 milliGRAM(s) Oral every 48 hours  melatonin 3 milliGRAM(s) Oral at bedtime PRN  metoprolol succinate ER 25 milliGRAM(s) Oral daily  ondansetron Injectable 4 milliGRAM(s) IV Push every 8 hours PRN      PHYSICAL EXAM:  GENERAL: NAD,   EYES: conjunctiva and sclera clear  ENMT: Moist mucous membranes  NECK: Supple, No JVD, Normal thyroid  CHEST/LUNG: non labored, cta b/l  HEART: Regular rate and rhythm; No murmurs, rubs, or gallops  ABDOMEN: Soft, Nontender, Nondistended; Bowel sounds present  EXTREMITIES:  2+ Peripheral Pulses, No clubbing, cyanosis, or edema  LYMPH: No lymphadenopathy noted  SKIN: No rashes or lesions    Consultant(s) Notes Reviewed:  [x ] YES  [ ] NO  Care Discussed with Consultants/Other Providers [ x] YES  [ ] NO    LABS:                        7.9    1.91  )-----------( 6        ( 06 Dec 2021 11:51 )             26.7     12-    147<H>  |  103  |  42<H>  ----------------------------<  128<H>  4.7   |  41<H>  |  1.03    Ca    9.2      06 Dec 2021 11:51  Phos  3.7     12-  Mg     2.2     -    TPro  5.6<L>  /  Alb  3.2<L>  /  TBili  1.4<H>  /  DBili  x   /  AST  6<L>  /  ALT  8<L>  /  AlkPhos  90        Urinalysis Basic - ( 04 Dec 2021 16:46 )    Color: Light Orange / Appearance: Clear / S.014 / pH: x  Gluc: x / Ketone: Negative  / Bili: Negative / Urobili: Negative   Blood: x / Protein: Trace / Nitrite: Negative   Leuk Esterase: Negative / RBC: 561 /hpf / WBC 2 /HPF   Sq Epi: x / Non Sq Epi: 0 /hpf / Bacteria: Negative      CAPILLARY BLOOD GLUCOSE      POCT Blood Glucose.: 144 mg/dL (06 Dec 2021 11:11)      ABG - ( 06 Dec 2021 11:44 )  pH, Arterial: 7.38  pH, Blood: x     /  pCO2: 74    /  pO2: 105   / HCO3: 44    / Base Excess: 16.6  /  SaO2: 97.9              Urinalysis Basic - ( 04 Dec 2021 16:46 )    Color: Light Orange / Appearance: Clear / S.014 / pH: x  Gluc: x / Ketone: Negative  / Bili: Negative / Urobili: Negative   Blood: x / Protein: Trace / Nitrite: Negative   Leuk Esterase: Negative / RBC: 561 /hpf / WBC 2 /HPF   Sq Epi: x / Non Sq Epi: 0 /hpf / Bacteria: Negative          RADIOLOGY & ADDITIONAL TESTS:    Imaging Personally Reviewed:  [x ] YES  [ ] NO

## 2021-12-06 NOTE — RAPID RESPONSE TEAM SUMMARY - NSOTHERINTERVENTIONSRRT_GEN_ALL_CORE
Adjust Bipap, spoke to CHAYO Del Toro and instructed to f/u lytes and blood gas and adjust vent settings.  Start Bipap (15/5/40%), spoke to CHAYO Del Toro and instructed to f/u lytes and blood gas.  Spoke to Katey- patient is sleeping comfortably, they will give blood and platelets, recommended diuresis and repeating blood gas +/- CT head if patient mentation not improving

## 2021-12-06 NOTE — PROGRESS NOTE ADULT - ASSESSMENT
· Assessment	  85 y/o M with hx of AFib w/AICD 5q deletion MDS w/ blast progression to AML, currently receiving Vidaza (most recent dose 11/17/21), admitted dyspnea after blood transfusion at [facility], concerning for TACO vs ADHF    #AML/Pancytopenia  -pancytopenia in the setting of AML and chemotherapy  - h/o MDS w EB2 Dx 1/2021, -5q deletion was treated with Revlimid, in summer of 2021 found to have increased blasts >20% confirming Dx of AML. Started on azacitidine. Plan is to continue 4C of azacitidine followed by BMBx  -GOC discussed today with patient and patient's HCP at the bedside. Given that he has had difficulty tolerating tranfusion support and has had decrease QOL as a result of recurrent hospitalizations, he is considering to de-escalate care to comfort care only  -can continue to transfuse as needed if patient is symptomatically anemic if in keeping with his goals of care; he can also refuse transfusion. If transfusing blood, would give split units and transfuse slowly with diuretic support after each transfusion given hx of volume overload.  originally planned on outpatient bone marrow biopsy given suspicion for persistent AML and possible escalation to dacogen + venetoclax. If patient is wishing to pursue comfort care only and hospice, will hold off on bone marrow biopsy  -supportive care per primary team  -Levaquin ppx

## 2021-12-06 NOTE — PROGRESS NOTE ADULT - SUBJECTIVE AND OBJECTIVE BOX
Hematology Follow-up    INTERVAL HPI/OVERNIGHT EVENTS:  Has progressive shortness of breath, on BiPAP for respiratory failure  Made DNR/DNI; patient and patient's HCP met with MICU [facility] ongoing    Review of Systems: Unable to provide additional ROS at this time    VITAL SIGNS:  T(F): 97.8 (12-06-21 @ 17:13)  HR: 60 (12-06-21 @ 17:13)  BP: 110/63 (12-06-21 @ 17:13)  RR: 18 (12-06-21 @ 17:13)  SpO2: 100% (12-06-21 @ 17:13)  Wt(kg): --    12-05-21 @ 07:01  -  12-06-21 @ 07:00  --------------------------------------------------------  IN: 660 mL / OUT: 1 mL / NET: 659 mL    12-06-21 @ 07:01  -  12-06-21 @ 17:38  --------------------------------------------------------  IN: 120 mL / OUT: 0 mL / NET: 120 mL    PHYSICAL EXAM:  GENERAL: on BiPAP, in respiratory failure, minimally verbal during conversation during rounds  PULM: Dullness R base; few basilar crackles  CVS: Regular rate and rhythm, no murmurs, rubs, or gallops  ABD: Soft, non-tender  EXT:  1+ bilateral LE edema    MEDICATIONS  (STANDING):  allopurinol 200 milliGRAM(s) Oral daily  doxazosin 4 milliGRAM(s) Oral at bedtime  furosemide   Injectable 40 milliGRAM(s) IV Push once  influenza  Vaccine (HIGH DOSE) 0.7 milliLiter(s) IntraMuscular once  levoFLOXacin  Tablet 750 milliGRAM(s) Oral every 48 hours  metoprolol succinate ER 25 milliGRAM(s) Oral daily    MEDICATIONS  (PRN):  acetaminophen     Tablet .. 650 milliGRAM(s) Oral every 6 hours PRN Temp greater or equal to 38C (100.4F), Mild Pain (1 - 3)  aluminum hydroxide/magnesium hydroxide/simethicone Suspension 30 milliLiter(s) Oral every 4 hours PRN Dyspepsia  melatonin 3 milliGRAM(s) Oral at bedtime PRN Insomnia  ondansetron Injectable 4 milliGRAM(s) IV Push every 8 hours PRN Nausea and/or Vomiting      adhesives (Rash)  clonidine (Swelling; Rash)  felodipine (Rash)  penicillin (Rash)  sulfa drugs (Rash)      LABS:                        7.9    1.91  )-----------( 6        ( 06 Dec 2021 11:51 )             26.7     12-06    147<H>  |  103  |  42<H>  ----------------------------<  128<H>  4.7   |  41<H>  |  1.03    Ca    9.2      06 Dec 2021 11:51  Phos  3.7     12-06  Mg     2.2     12-06    TPro  5.6<L>  /  Alb  3.2<L>  /  TBili  1.4<H>  /  DBili  x   /  AST  6<L>  /  ALT  8<L>  /  AlkPhos  90  12-06    RADIOLOGY & ADDITIONAL TESTS:  Studies reviewed.    < from: Xray Chest 1 View AP/PA (12.06.21 @ 11:33) >    EXAM:  XR CHEST AP OR PA 1V                          PROCEDURE DATE:  12/06/2021      INTERPRETATION:  EXAMINATION: XR CHEST    CLINICAL INDICATION: PORTABLE-Urgent. Rapid response team. Hypoxia.    TECHNIQUE: Single frontal, portable view of the chest was obtained.    COMPARISON: Chest x-ray 12/2/2021.    FINDINGS:  Left-sided AICD with leads in the right atrium and ventricle.    The heart size is not accurately assessed on this projection.  Redemonstration of bilateral mid and lower lung field hazy opacities, unchanged from 12/2/2021. Low lung volumes.  There is no pneumothorax. Small bilateral pleural effusions are unchanged.    IMPRESSION:  Redemonstration of bilateral mid and lower lung field hazy opacities, unchanged, suggestive atelectasis.  Small bilateral pleural effusions.    Discussed with CHAYO Simpson by Dr. Gaffney on 12/6/2021 11:51 AM with readback confirmation.    --- End of Report ---    SHARON GAFFNEY MD; Resident Radiologist  This document has been electronically signed.  CHARLIE PISANO MD; Attending Radiologist  This document has been electronically signed. Dec  6 2021 12:17PM    < end of copied text >

## 2021-12-06 NOTE — PROGRESS NOTE ADULT - PROBLEM SELECTOR PLAN 2
pt with h/o possible TACO on prior admission after receiving plt transfusion   - c/w lasix as with transfusions  lasix 40iv extra dose with every unit of bld transfusion

## 2021-12-06 NOTE — PROGRESS NOTE ADULT - PROBLEM SELECTOR PLAN 1
sp acute hypoxic and hypercapnic RF in setting of hypervolemia from TACO vs HF exacerbation.   CXR showing b/l pleural effusion R>L and PCOUS with B lines    - sp bipap and IV lasix-with good clinical improvement,   - strict I/Os - daily wts   back to acute hypercapnic respi failure- worsening fluid overload  Bipap to cont, lasix 40iv extra dose with every unit of bld transfusion  close monitoring  low threshold for MICU  pt full code  discussed GOC-wish aggressive measures

## 2021-12-06 NOTE — PROGRESS NOTE ADULT - SUBJECTIVE AND OBJECTIVE BOX
****************************************  Jalen Ruiz PGY4  Hematology/Oncology  [phone]  ****************************************  SUBJECTIVE  Patient seen and examined at bedside. RRT called today for AMS and worsening respiratory status   Denied HA, CP, SOB, n/v/d/c , fevers, chills    OBJECTIVE     Vital Signs Last 24 Hrs  T(C): 36.3 (06 Dec 2021 11:02), Max: 36.7 (06 Dec 2021 04:12)  T(F): 97.4 (06 Dec 2021 11:02), Max: 98 (06 Dec 2021 04:12)  HR: 61 (06 Dec 2021 11:50) (60 - 66)  BP: 119/55 (06 Dec 2021 11:02) (119/55 - 129/61)  BP(mean): --  RR: 22 (06 Dec 2021 11:02) (18 - 22)  SpO2: 99% (06 Dec 2021 11:50) (92% - 99%)    21 @ 07:01  -  21 @ 07:00  --------------------------------------------------------  IN: 660 mL / OUT: 1 mL / NET: 659 mL    21 @ 07:01  -  21 @ 14:23  --------------------------------------------------------  IN: 120 mL / OUT: 0 mL / NET: 120 mL      PHYSICAL EXAM:  Constitutional: non-toxic, no distress  HEAD/EYES: anicteric, no conjunctival injection  ENT:  supple, no thrush  Cardiovascular:   normal S1, S2, no murmur, no edema  Respiratory:  clear BS bilaterally, no wheezes, no rales  GI:  soft, non-tender, normal bowel sounds  :  no yost, no CVA tenderness  Musculoskeletal:  no synovitis, normal ROM  Neurologic: awake and alert, normal strength, no focal findings  Skin:  no rash, no erythema, no phlebitis  Heme/Onc: no lymphadenopathy   Psychiatric:  awake, alert, appropriate mood          LABS                        7.9    1.91  )-----------( 6        ( 06 Dec 2021 11:51 )             26.7       12-06    147<H>  |  103  |  42<H>  ----------------------------<  128<H>  4.7   |  41<H>  |  1.03    Ca    9.2      06 Dec 2021 11:51  Phos  3.7     12-  Mg     2.2     12-    TPro  5.6<L>  /  Alb  3.2<L>  /  TBili  1.4<H>  /  DBili  x   /  AST  6<L>  /  ALT  8<L>  /  AlkPhos  90  12-06      Urinalysis Basic - ( 04 Dec 2021 16:46 )    Color: Light Orange / Appearance: Clear / S.014 / pH: x  Gluc: x / Ketone: Negative  / Bili: Negative / Urobili: Negative   Blood: x / Protein: Trace / Nitrite: Negative   Leuk Esterase: Negative / RBC: 561 /hpf / WBC 2 /HPF   Sq Epi: x / Non Sq Epi: 0 /hpf / Bacteria: Negative        Follow Up:      RADIOLOGY:   INTERVAL HPI/OVERNIGHT EVENTS:  Has progressive shortness of breath, on BiPAP for respiratory failure  Made DNR/DNI; patient and patient's HCP met with MICU, [facility] ongoing    Review of Systems: Unable to provide additional ROS at this time    VITAL SIGNS:  T(F): 97.8 (12-06-21 @ 17:13)  HR: 60 (12-06-21 @ 17:13)  BP: 110/63 (12-06-21 @ 17:13)  RR: 18 (12-06-21 @ 17:13)  SpO2: 100% (12-06-21 @ 17:13)  Wt(kg): --    12-05-21 @ 07:01  -  12-06-21 @ 07:00  --------------------------------------------------------  IN: 660 mL / OUT: 1 mL / NET: 659 mL    12-06-21 @ 07:01  -  12-06-21 @ 17:38  --------------------------------------------------------  IN: 120 mL / OUT: 0 mL / NET: 120 mL    PHYSICAL EXAM:  GENERAL: on BiPAP, in respiratory failure, minimally verbal during conversation during rounds  PULM: Dullness R base; few basilar crackles  CVS: Regular rate and rhythm, no murmurs, rubs, or gallops  ABD: Soft, non-tender  EXT:  1+ bilateral LE edema    MEDICATIONS  (STANDING):  allopurinol 200 milliGRAM(s) Oral daily  doxazosin 4 milliGRAM(s) Oral at bedtime  furosemide   Injectable 40 milliGRAM(s) IV Push once  influenza  Vaccine (HIGH DOSE) 0.7 milliLiter(s) IntraMuscular once  levoFLOXacin  Tablet 750 milliGRAM(s) Oral every 48 hours  metoprolol succinate ER 25 milliGRAM(s) Oral daily    MEDICATIONS  (PRN):  acetaminophen     Tablet .. 650 milliGRAM(s) Oral every 6 hours PRN Temp greater or equal to 38C (100.4F), Mild Pain (1 - 3)  aluminum hydroxide/magnesium hydroxide/simethicone Suspension 30 milliLiter(s) Oral every 4 hours PRN Dyspepsia  melatonin 3 milliGRAM(s) Oral at bedtime PRN Insomnia  ondansetron Injectable 4 milliGRAM(s) IV Push every 8 hours PRN Nausea and/or Vomiting      adhesives (Rash)  clonidine (Swelling; Rash)  felodipine (Rash)  penicillin (Rash)  sulfa drugs (Rash)      LABS:                        7.9    1.91  )-----------( 6        ( 06 Dec 2021 11:51 )             26.7     12-06    147<H>  |  103  |  42<H>  ----------------------------<  128<H>  4.7   |  41<H>  |  1.03    Ca    9.2      06 Dec 2021 11:51  Phos  3.7     12-06  Mg     2.2     12-06    TPro  5.6<L>  /  Alb  3.2<L>  /  TBili  1.4<H>  /  DBili  x   /  AST  6<L>  /  ALT  8<L>  /  AlkPhos  90  12-06    RADIOLOGY & ADDITIONAL TESTS:  Studies reviewed.    < from: Xray Chest 1 View AP/PA (12.06.21 @ 11:33) >    EXAM:  XR CHEST AP OR PA 1V                          PROCEDURE DATE:  12/06/2021      INTERPRETATION:  EXAMINATION: XR CHEST    CLINICAL INDICATION: PORTABLE-Urgent. Rapid response team. Hypoxia.    TECHNIQUE: Single frontal, portable view of the chest was obtained.    COMPARISON: Chest x-ray 12/2/2021.    FINDINGS:  Left-sided AICD with leads in the right atrium and ventricle.    The heart size is not accurately assessed on this projection.  Redemonstration of bilateral mid and lower lung field hazy opacities, unchanged from 12/2/2021. Low lung volumes.  There is no pneumothorax. Small bilateral pleural effusions are unchanged.    IMPRESSION:  Redemonstration of bilateral mid and lower lung field hazy opacities, unchanged, suggestive atelectasis.  Small bilateral pleural effusions.    Discussed with CHAYO Simpson by Dr. Gaffney on 12/6/2021 11:51 AM with readback confirmation.    --- End of Report ---    SHARON GAFFNEY MD; Resident Radiologist  This document has been electronically signed.  CHARLIE PISANO MD; Attending Radiologist  This document has been electronically signed. Dec  6 2021 12:17PM    < end of copied text >

## 2021-12-06 NOTE — CONSULT NOTE ADULT - ATTENDING COMMENTS
Pt examined and case reviewed in detail on bedside rounds  Pt has increasing obtundation with hypercapnea D/W family re goals of care Pt now DNI/DNR
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 Management as per primary team. Neutropenic precaution, supportive care.
86M Hx MDS on Multiple Transfusions (3-5 X per week), Prostate CA s/p CyberKnife 2016, Chronic A.Fib Ablation '98, HFrEF 45%,s/p AICD, Recent Transfusion associated TACO Admission p/w ED SOB and STEINER after receiving 1 PRBC transfusion for Hb 6.9 at [facility] today. He was given IV Lasix for Diuresis and BiPAP Support with improved symptoms.   - Awake and co-operative to examination   - BiPAP adjusted and setting changed to RR 16, 14/5, FiO2 40%   - Hemodynamically stable MAP >70-74 and SpO2 98%   - Known Hx of similar TACO episodes as per wife at bedside   - May wean off BiPAP in next 6-8 Hr     Patient seen and examined with ICU Resident/Fellow at bedside after lab data, medical records and radiology reports reviewed. I have read and agreeable in general with resident's Documented Note, Assessment and Management Plan which reflected my opinions from bedside round and discussion.

## 2021-12-06 NOTE — RAPID RESPONSE TEAM SUMMARY - NSSITUATIONBACKGROUNDRRT_GEN_ALL_CORE
86M with hx HTN, Afib not on AC, s/p ablation, s/p AICD/PPM, prostate cancer s/p treatment, MDS with pancytopenia and transfusion dependent p/w SOB from Meli while receiving PRBCs, a/w hypoxic and hypercapnic respiratory failure 2/2 TACO vs HF exacerbation. RRT called for increased work of breathing. Patient when seen is responding to commands but more lethargic than prior (AOx4 at baseline).

## 2021-12-06 NOTE — CHART NOTE - NSCHARTNOTEFT_GEN_A_CORE
RRT activated for increased work of breathing with AMS. CXR ordered. Placed on BiPAP and IV Lasix 40 x 1 given.   CBC shows Hgb 7.9 and Plt 6. Appreciate Heme recs. To maintain Hgb > 8 and Plt > 10.  Given Hx of TACO and HF, will transfuse very slowly and cautiously.  Discussed the clinical status with Dr. Flwoer, who advised to give 1 unit PRBC and 1 unit of Plt, IV Lasix 40 in between.  Will split the blood transfusion, 1/2 units over 3 hours followed by IV Lasix 40 x 1, and give the rest of 1/2 unit over 3 hours again.  Will continue to monitor closely  HD stable    Addendum   Patient breathing seems improving on BiPAP, Continue with BiPAP with CO2 74 noted on ABG    Katey Simpson PA-C RRT activated for increased work of breathing with AMS. CXR ordered. Placed on BiPAP and IV Lasix 40 x 1 given.   CBC shows Hgb 7.9 and Plt 6. Appreciate Heme recs. To maintain Hgb > 8 and Plt > 10.  Given Hx of TACO and HF, will transfuse very slowly and cautiously.  Discussed the clinical status with Dr. Flower, who advised to give 1 unit PRBC and 1 unit of Plt, IV Lasix 40 in between.  Will split the blood transfusion, 1/2 units over 3 hours followed by IV Lasix 40 x 1, and give the rest of 1/2 unit over 3 hours again.  Will continue to monitor closely  HD stable    Patient still lethargic, slightly improved but still with WOB on BiPAP. Seen by Pulm. MICU consult placed - awaiting recs     Katey Simpson PA-C RRT activated for increased work of breathing with AMS. CXR ordered. Placed on BiPAP and IV Lasix 40 x 1 given.   CBC shows Hgb 7.9 and Plt 6. Appreciate Heme recs. To maintain Hgb > 8 and Plt > 10.  Given Hx of TACO and HF, will transfuse very slowly and cautiously.  Discussed the clinical status with Dr. Flower, who advised to give 1 unit PRBC and 1 unit of Plt, IV Lasix 40 in between.  Will split the blood transfusion, 1/2 units over 3 hours followed by IV Lasix 40 x 1, and give the rest of 1/2 unit over 3 hours again.  Will continue to monitor closely  HD stable    Patient still lethargic, slightly improved but still with WOB on BiPAP. Seen by Pulm. MICU consult placed - awaiting recs     Addendum @ 4:30 PM   - Appreciate MICU and Heme recs. HCP opted for palliative, comfort measures. Patient made DNR/DNI. MOLST in the chart.    - Conversation between HCP and Heme, decided to give 1 unit Plt over 1hour, PRBC order discontinued.    Katey Simpson PA-C

## 2021-12-06 NOTE — PROGRESS NOTE ADULT - ASSESSMENT
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 CXR unchanged. Concern for sepsis, CNS event    REC    Transufuse platelets per heme-onc/primary team  Continue BIPAP for now  MICU evaluatin  Will withold diamox correction given tachypnea/obtundation  CT head when stable unless mental status improves normalizes  Consider empiric abx pending cultures  Heme-onc team notified re: worsennig status

## 2021-12-06 NOTE — PROGRESS NOTE ADULT - SUBJECTIVE AND OBJECTIVE BOX
[facility] Cardiology Progress Note  _______________________________    Pt. seen and examined. No new cardiac-related complaints.    Telemetry -v paced, atrial flutter 60-70s    T(C): 36.7 (21 @ 04:12), Max: 36.7 (21 @ 04:12)  HR: 66 (21 @ 04:12) (60 - 66)  BP: 122/60 (21 @ 04:12) (120/61 - 129/61)  RR: 18 (21 @ 04:12) (18 - 19)  SpO2: 96% (21 @ 04:12) (94% - 96%)  I&O's Summary    05 Dec 2021 07:01  -  06 Dec 2021 07:00  --------------------------------------------------------  IN: 660 mL / OUT: 1 mL / NET: 659 mL        PHYSICAL EXAM:  GENERAL: Alert, NAD.  NECK: Supple.  CHEST/LUNG: Clear to auscultation bilaterally; No wheezes, rales, or rhonchi.  HEART: S1 S2 normal, RRR; No murmurs, rubs, or gallops  ABDOMEN: Soft, Nondistended  EXTREMITIES:  No LE edema.      LABS:                        8.0    2.13  )-----------( 14       ( 06 Dec 2021 07:15 )             26.2     12-    147<H>  |  103  |  45<H>  ----------------------------<  113<H>  4.6   |  36<H>  |  0.97    Ca    9.5      06 Dec 2021 07:13  Phos  4.1     12-05  Mg     2.1     12-05        CARDIAC MARKERS ( 01 Dec 2021 18:24 )  x     / x     / 19 U/L / x     / 2.9 ng/mL          Urinalysis Basic - ( 04 Dec 2021 16:46 )    Color: Light Orange / Appearance: Clear / S.014 / pH: x  Gluc: x / Ketone: Negative  / Bili: Negative / Urobili: Negative   Blood: x / Protein: Trace / Nitrite: Negative   Leuk Esterase: Negative / RBC: 561 /hpf / WBC 2 /HPF   Sq Epi: x / Non Sq Epi: 0 /hpf / Bacteria: Negative        MEDICATIONS  (STANDING):  allopurinol 200 milliGRAM(s) Oral daily  dextrose 5%. 1000 milliLiter(s) (50 mL/Hr) IV Continuous <Continuous>  doxazosin 4 milliGRAM(s) Oral at bedtime  influenza  Vaccine (HIGH DOSE) 0.7 milliLiter(s) IntraMuscular once  levoFLOXacin  Tablet 750 milliGRAM(s) Oral every 48 hours    MEDICATIONS  (PRN):  acetaminophen     Tablet .. 650 milliGRAM(s) Oral every 6 hours PRN Temp greater or equal to 38C (100.4F), Mild Pain (1 - 3)  aluminum hydroxide/magnesium hydroxide/simethicone Suspension 30 milliLiter(s) Oral every 4 hours PRN Dyspepsia  melatonin 3 milliGRAM(s) Oral at bedtime PRN Insomnia  ondansetron Injectable 4 milliGRAM(s) IV Push every 8 hours PRN Nausea and/or Vomiting        RADIOLOGY & ADDITIONAL TESTS:

## 2021-12-06 NOTE — PROGRESS NOTE ADULT - SUBJECTIVE AND OBJECTIVE BOX
Follow-up Pulm Progress Note  Oral Cortes MD  [phone]    Events noted  Seen post RRT for Tachypnea/Lethargy: place on BIPAP 14/4 at time of visit  Patient obtuned/lethargic: opens eyes and moans with stimulation  Tachynea noted on BIPAP with paradoxical breathing c/w insp muscle fatigue  [place] 6 this AM  Patient afebrile orally  ABG this AM: 7.38/74/105  HCO3 increased from 31 to 44 over past few days with diuresis  CXR at time of RRT reviewed: Tech limited AP portable, Hazy R opacity/basilar L unchanged from 12/2: c/w layering effusion seen on admission      Vital Signs Last 24 Hrs  T(C): 36.3 (06 Dec 2021 11:02), Max: 36.7 (06 Dec 2021 04:12)  T(F): 97.4 (06 Dec 2021 11:02), Max: 98 (06 Dec 2021 04:12)  HR: 61 (06 Dec 2021 11:50) (60 - 66)  BP: 119/55 (06 Dec 2021 11:02) (119/55 - 129/61)  BP(mean): --  RR: 22 (06 Dec 2021 11:02) (18 - 22)  SpO2: 99% (06 Dec 2021 11:50) (92% - 99%)    ABG - ( 06 Dec 2021 11:44 )  pH, Arterial: 7.38  pH, Blood: x     /  pCO2: 74    /  pO2: 105   / HCO3: 44    / Base Excess: 16.6  /  SaO2: 97.9                          7.9    1.91  )-----------( 6        ( 06 Dec 2021 11:51 )             26.7       12-06    147<H>  |  103  |  42<H>  ----------------------------<  128<H>  4.7   |  41<H>  |  1.03    Ca    9.2      06 Dec 2021 11:51  Phos  3.7     12-06  Mg     2.2     12-06    TPro  5.6<L>  /  Alb  3.2<L>  /  TBili  1.4<H>  /  DBili  x   /  AST  6<L>  /  ALT  8<L>  /  AlkPhos  90  12-06    Physical Examination:  PULM: Dullness R base; few basilar crackles  CVS: Regular rate and rhythm, no murmurs, rubs, or gallops  ABD: Soft, non-tender  EXT:  1+ bilateral LE edema    RADIOLOGY REVIEWED  CXR:    EXAM:  XR CHEST PORTABLE URGENT 1V                            PROCEDURE DATE:  12/02/2021        INTERPRETATION:  DATE OF STUDY: 12/2/21    PRIOR:12/1/21    CLINICAL INDICATION: Pulmonary edema, post transfusion.    TECHNIQUE: portable chest - done erect.    FINDINGS/  IMPRESSION:  Stable cardiomegaly.  Left-sided AICD in place.  Interval improvement in pulmonary edema changes with mild decrease in right pleural effusion. Stable small left pleural effusion.  No new consolidations. No pneumothorax.    CT chest:    TTE:

## 2021-12-06 NOTE — CHART NOTE - NSCHARTNOTEFT_GEN_A_CORE
: Jose Antonio Ge    INDICATION: shock    PROCEDURE:  [x ] LIMITED ECHO  [x ] LIMITED CHEST  [ ] LIMITED RETROPERITONEAL  [ ] LIMITED ABDOMINAL  [ ] LIMITED DVT  [ ] NEEDLE GUIDANCE VASCULAR  [ ] NEEDLE GUIDANCE THORACENTESIS  [ ] NEEDLE GUIDANCE PARACENTESIS  [ ] NEEDLE GUIDANCE PERICARDIOCENTESIS  [ ] OTHER    FINDINGS:  A-line predominance with focal B-lines. Moderate R-sided pleff comprised of anechoic material.   Normal LV systolic function; no RV dilation. Pericardial effusion present; clearly demarcated from pleff.     INTERPRETATION:  Normal aeration pattern with moderate simple R pleff.   No cardiac limitation. : Jose Antonio Ge    INDICATION: shock    PROCEDURE:  [x ] LIMITED ECHO  [x ] LIMITED CHEST  [ ] LIMITED RETROPERITONEAL  [ ] LIMITED ABDOMINAL  [ ] LIMITED DVT  [ ] NEEDLE GUIDANCE VASCULAR  [ ] NEEDLE GUIDANCE THORACENTESIS  [ ] NEEDLE GUIDANCE PARACENTESIS  [ ] NEEDLE GUIDANCE PERICARDIOCENTESIS  [ ] OTHER    FINDINGS:  A-line predominance with focal B-lines. Moderate R-sided pleff comprised of anechoic material.   Normal LV systolic function; no RV dilation. Pericardial effusion present; clearly demarcated from pleff.     INTERPRETATION:  Normal aeration pattern with moderate simple R pleff.   No cardiac limitation.    I was present at bedside throughout the procedure

## 2021-12-07 NOTE — CONSULT NOTE ADULT - PROBLEM SELECTOR RECOMMENDATION 9
-  -acute combined chf in the setting of recent transfusions  -continue with lasix 40 mg iv bid. consider lasix drip if no improvement.   -i/os  -daily weights  -trend cr   -keep potassium and mag above 4 and 2 respectively  -bipap as needed  -resume toprol xl 25 mg daily.   -tte from october as above.
Dx 1.2021 treated with Revlimid  Now with progression to AML  Plan was to continue 4 cycles of Azacitidine but patient having difficulty tolerating transfusion support and now quality of life greatly diminished   Meeting today with SO and daughters Roselia and Olinda to discuss next steps

## 2021-12-07 NOTE — PHYSICAL THERAPY INITIAL EVALUATION ADULT - GENERAL OBSERVATIONS, REHAB EVAL
PT spoke with Cayla NP prior to session instruct to await input from Palliative meeting today and pt H/H low

## 2021-12-07 NOTE — PROGRESS NOTE ADULT - PROBLEM SELECTOR PLAN 1
sp acute hypoxic and hypercapnic RF in setting of hypervolemia from TACO vs HF exacerbation.   CXR showing b/l pleural effusions  - sp bipap and IV lasix-with good clinical improvement,   - strict I/Os - daily wts   back to acute hypercapnic respi failure- worsening fluid overload  Bipap to cont, lasix 40iv bid, lasix 40iv extra dose with every unit of bld transfusion  close monitoring  low threshold for MICU  pt DNR- d/w palliative  discussed GOC-wish aggressive measures

## 2021-12-07 NOTE — PHYSICAL THERAPY INITIAL EVALUATION ADULT - PRECAUTIONS/LIMITATIONS, REHAB EVAL
86M w/PMH of HTN, Afib not on AC, s/p ablation, s/p AICD/PPM, prostate cancer s/p treatment, MDS w/pancytopenia and transfusion dependent(now transition to AML)p/w SOB from [facility] while receiving PRBCs. Hx from significant other Ashley per chart;pt is lethargic and unable to give full history. Pt is dependent on blood and platelet transfusions, receiving treatment at [facility] almost every other day.pt has been incredibly fatigued and she noted some SOB and LE edema starting one week ago. He has received transfusions (either blood, platelets or both) on 11/26, 11/27 and 11/29. On Monday (11/29), friend noted that pts breathing appeared significantly worse, shallow and rapid and he was having very hard time going up the stairs,having to stop midway for several minutes to cacth his breath. That evening was started on PO lasix by his oncologist and took total of 40mg;took 20mg BID the next day. Today, returned to [facility] for blood and platelets transfusion, and shortly after completing the PRBC, pt was noted to be very SOB with hypoxia to 70%RA. Pt was put on NRB and given lasix 40mg IVP at [facility] and EMS called; en route, due to low HR 40s (paced) pt was given atropine and solumedrol 125mg. Per significant other, pt also has been having diarrhea for the past 2 days and has had poor PO intake; denies any recent fever, chills, cough, n/v, abd pain, urinary symptoms.Pt placed on NC on arrival to ED with good saturation, transitioned to BiPAP when noted to be in hypercapnic RF. MICU consulted, not a candidate at this time.H/H 7/0/23.2 plt 11  ; Palliative care meeting today at 1 pm w/ family per chart,/fall precautions

## 2021-12-07 NOTE — CONSULT NOTE ADULT - CONVERSATION DETAILS
Met face to face with HCP/SO Ashley Dial, patients daughters Olinda and Roselia Padilla for greater than 65 minutes.  Family are well informed regarding patients advance disease, respiratory compromise and dependency of blood products to sustain his life.   They are all in agreement that patient would want to continue to fight in the hope of some level of recovery. Family want to honor that wish with continued attempts to optimize medical status, maximize diuresis, in the hope of improving respiratory status and potentially to be strong enough for the next round of chemotherapy offered by Hem/Onc.     Discussed meeting with Dr Flower .  Cardiology already on board.    Patient filled out MOLST back in October.  Copies made and placed in chart.  DNR/DNI/NO PEG.  HCP documents copied and placed in chart naming SO Ashley Dial as HCP   Please reconsult with any acute needs, no further role for Palliative care.

## 2021-12-07 NOTE — PHYSICAL THERAPY INITIAL EVALUATION ADULT - REFERRING PHYSICIAN, REHAB EVAL
Radha Flower MD ORDER PT EVAL and TREAT , amb with assist [NOTE RRT 12/6 for resp failure & tachy, obtunded }

## 2021-12-07 NOTE — CONSULT NOTE ADULT - PROBLEM SELECTOR RECOMMENDATION 5
Spoke with Ashley Dial, 20+ year significant other and identifies herself as HCP.  Documentation is forthcoming  Goals of care meeting set up for today, 1 pm to discuss goals  MOLST invalid/ voided/ redone and placed in chart by this writer after phone call verification with HCP Olinda Spoke with Ashley Dial, 20+ year significant other and identifies herself as HCP.   Document copied and placed in chart  See goals of care

## 2021-12-07 NOTE — PROGRESS NOTE ADULT - PROBLEM SELECTOR PLAN 5
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

## 2021-12-07 NOTE — PHYSICAL THERAPY INITIAL EVALUATION ADULT - PERTINENT HX OF CURRENT PROBLEM, REHAB EVAL
TTE 12/3/21: Moderately dilated left atrium.Moderate TITO;Estimated pulmonary artery systolic pressure equals 53mm Hg, assuming right atrial pressure equals 12 mm Hg,; OCVID (-) 12/1/21

## 2021-12-07 NOTE — PHYSICAL THERAPY INITIAL EVALUATION ADULT - ADDITIONAL COMMENTS
pt lives in home alone with use of std cane independent and adls indep PTA ;pt was at [facility] discharged a few weeks ago and has been staying with significant other Ashley Dial who has 15 steps to enter residence with HR ; Ashley Dial ID as [phone]

## 2021-12-07 NOTE — PROGRESS NOTE ADULT - SUBJECTIVE AND OBJECTIVE BOX
Follow-up Pulm Progress Note  Oral Cortes MD  [phone]    Events noted  Improved respiratory and mental status today: responisve, comfortable on nasal cannula yet with some discoordinate breathing ( not full paradox)  Platelets 11, no active bleeding  HCO3 43  PBNP 4076  AB.38/74 PCO2 vs. 7.34/57 on   Pericardial thickening note on TTE with small ant effusion; no tamponade        Physical Examination:  PULM: Dullness R base; few basilar crackles; discoordinate breathing with poor inspiration  CVS: Regular rate and rhythm, no murmurs, rubs, or gallops  ABD: Soft, non-tender  EXT:  1+ bilateral LE edema    RADIOLOGY REVIEWED  CXR:    EXAM:  XR CHEST PORTABLE URGENT 1V                            PROCEDURE DATE:  2021        INTERPRETATION:  DATE OF STUDY: 21    PRIOR:21    CLINICAL INDICATION: Pulmonary edema, post transfusion.    TECHNIQUE: portable chest - done erect.    FINDINGS/  IMPRESSION:  Stable cardiomegaly.  Left-sided AICD in place.  Interval improvement in pulmonary edema changes with mild decrease in right pleural effusion. Stable small left pleural effusion.  No new consolidations. No pneumothorax.    CT chest:    TTE:

## 2021-12-07 NOTE — PROGRESS NOTE ADULT - SUBJECTIVE AND OBJECTIVE BOX
Patient is a 86y old  Male who presents with a chief complaint of SOB (07 Dec 2021 11:29)      INTERVAL HPI/OVERNIGHT EVENTS: noted  pt seen and examined this am   events noted  sob improved, confused at times  pulling out bipap mask -placed on enhanced care       Vital Signs Last 24 Hrs  T(C): 37.1 (07 Dec 2021 12:54), Max: 37.1 (07 Dec 2021 12:54)  T(F): 98.8 (07 Dec 2021 12:54), Max: 98.8 (07 Dec 2021 12:54)  HR: 65 (07 Dec 2021 12:54) (60 - 65)  BP: 108/57 (07 Dec 2021 12:54) (108/57 - 121/78)  BP(mean): --  RR: 20 (07 Dec 2021 12:54) (18 - 22)  SpO2: 97% (07 Dec 2021 12:54) (96% - 100%)    acetaminophen     Tablet .. 650 milliGRAM(s) Oral every 6 hours PRN  allopurinol 200 milliGRAM(s) Oral daily  aluminum hydroxide/magnesium hydroxide/simethicone Suspension 30 milliLiter(s) Oral every 4 hours PRN  doxazosin 4 milliGRAM(s) Oral at bedtime  influenza  Vaccine (HIGH DOSE) 0.7 milliLiter(s) IntraMuscular once  levoFLOXacin  Tablet 750 milliGRAM(s) Oral every 48 hours  melatonin 3 milliGRAM(s) Oral at bedtime PRN  metoprolol succinate ER 25 milliGRAM(s) Oral daily  ondansetron Injectable 4 milliGRAM(s) IV Push every 8 hours PRN      PHYSICAL EXAM:  GENERAL: NAD,   EYES: conjunctiva and sclera clear  ENMT: Moist mucous membranes  NECK: Supple, No JVD, Normal thyroid  CHEST/LUNG: non labored, cta b/l  HEART: Regular rate and rhythm; No murmurs, rubs, or gallops  ABDOMEN: Soft, Nontender, Nondistended; Bowel sounds present  EXTREMITIES:  2+ Peripheral Pulses, No clubbing, cyanosis, or edema  LYMPH: No lymphadenopathy noted  SKIN: No rashes or lesions    Consultant(s) Notes Reviewed:  [x ] YES  [ ] NO  Care Discussed with Consultants/Other Providers [ x] YES  [ ] NO    LABS:                        7.0    2.07  )-----------( 11       ( 07 Dec 2021 06:40 )             23.2     12-07    152<H>  |  105  |  48<H>  ----------------------------<  81  4.7   |  43<H>  |  1.05    Ca    9.4      07 Dec 2021 06:43  Phos  3.7     12-06  Mg     2.2     12-06    TPro  5.6<L>  /  Alb  3.2<L>  /  TBili  1.4<H>  /  DBili  x   /  AST  6<L>  /  ALT  8<L>  /  AlkPhos  90  12-06        CAPILLARY BLOOD GLUCOSE          ABG - ( 06 Dec 2021 11:44 )  pH, Arterial: 7.38  pH, Blood: x     /  pCO2: 74    /  pO2: 105   / HCO3: 44    / Base Excess: 16.6  /  SaO2: 97.9                    RADIOLOGY & ADDITIONAL TESTS:    Imaging Personally Reviewed:  [x ] YES  [ ] NO

## 2021-12-07 NOTE — PROGRESS NOTE ADULT - ASSESSMENT
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 CXR unchanged. Concern for sepsis, CNS event  12/7: DNR/DNI; howver family wants ongoing treatment as tolerated. ? restrictive physiology contributing to cardiac dysfunction. ACute hypoxemic and hypercapneic resp failure noted.     REC    Transfusions held per Heme-Onc unless overt bleeding  DC lasix  Diamox 250 mg IV q8 X 4 doses ordered  Decrease BIPAP to 12/5 overnight and prn day for increased work of breathing; nasal cannula day  Repeat ABG in AM  CT chest NC when stable

## 2021-12-07 NOTE — PROGRESS NOTE ADULT - PROBLEM SELECTOR PLAN 7
holding metoprolol for now, currently paced in 60s    Dr Varner will be covering  starting 12/08/21  please call [employer] @ [phone] for questions or concerns

## 2021-12-07 NOTE — CHART NOTE - NSCHARTNOTEFT_GEN_A_CORE
87 y/o M with hx of AFib w/AICD 5q deletion MDS w/ blast progression to AML, currently receiving Vidaza (most recent dose 11/17/21), admitted dyspnea after blood transfusion at [facility], concerning for TACO vs ADHF    #AML/Pancytopenia  -pancytopenia in the setting of AML and chemotherapy  - h/o MDS w EB2 Dx 1/2021, -5q deletion was treated with Revlimid, in summer of 2021 found to have increased blasts >20% confirming Dx of AML. Started on azacitidine. Plan is to continue 4C of azacitidine followed by BMBx  -Ongoing goals of care discussion. Clarified treatment goals with patient's primary hematologist Dr. Anne (who also had an in person discussion with the patient's family today). The recommendation is to hold any further transfusions at this time despite CBC findings, unless patient with active bleeding. Also recommends ongoing diuresis while holding transfusion with assessment for improvement in respiratory status. Will finalize treatment goals and revisit GOC discussion with patient/family after a few days on implementing this plan.

## 2021-12-07 NOTE — CONSULT NOTE ADULT - SUBJECTIVE AND OBJECTIVE BOX
HPI:  86M with PMH of HTN, Afib not on AC, s/p ablation, s/p AICD/PPM, prostate cancer s/p treatment, MDS with pancytopenia and transfusion dependent p/w SOB from [facility] while receiving PRBCs. History from significant other at bedside; pt is lethargic and unable to give full history. Pt is dependent on blood and platelet transfusions, receiving treatment at [facility] almost every other day. Per friend, pt has been incredibly fatigued and she noted some SOB and LE edema starting one week ago. He has received transfusions (either blood, platelets or both) on 11/26, 11/27 and 11/29. On Monday (11/29), friend noted that pts breathing appeared significantly worse, shallow and rapid and he was having very hard time going up the stairs, having to stop midway for several minutes to cath his breath. That evening was started on PO lasix by his oncologist and took total of 40mg; took 20mg BID the next day. Today, returned to [facility] for blood and platelets transfusion, and shortly after completing the PRBC, pt was noted to be very SOB with hypoxia to 70%RA. Pt was put on NRB and given lasix 40mg IVP at [facility] and EMS called; en route, due to low HR 40s (paced) pt was given atropine and solumedrol 125mg.   Per significant other, pt also has been having diarrhea for the past 2 days and has had poor PO intake; denies any recent fever, chills, cough, n/v, abd pain, urinary symptoms.      Pt placed on NC on arrival to ED with good saturation, transitioned to BiPAP when noted to be in hypercapnic RF. MICU consulted, not a candidate at this time.      (01 Dec 2021 22:19)    PERTINENT PM/SXH:   Hypertension    Afib    Osteoarthritis    Prostate cancer    Venous stasis    Varicose veins    Gout    Cellulitis    Degenerative disc disease, lumbar    Edema      S/P total hip arthroplasty    S/P cataract extraction    S/P hernia repair    Afib    S/P tonsillectomy    S/P cholecystectomy      FAMILY HISTORY:  Family history of Alzheimer&#x27;s disease (Father)    Family history of essential hypertension (Father)    Family history of breast cancer (Mother)    Family history of prostate cancer (Sibling)      ITEMS NOT CHECKED ARE NOT PRESENT    SOCIAL HISTORY:   Significant other/partner[ ]  Children[x ]  Latter-day/Spirituality: Cheondoism  Substance hx:  [ ]   Tobacco hx:  [ ]   Alcohol hx: [ ]   Home Opioid hx:  [ ] I-Stop Reference No:  Living Situation: [ ]Home  [ ]Long term care  [ ]Rehab [ ]Other    ADVANCE DIRECTIVES:    DNR  MOLST  [ x]  Living Will  [ ]   DECISION MAKER(s):  [x ] Health Care Proxy(s)  [ ] Surrogate(s)  [ ] Guardian           Name(s): Phone Number(s):  Ashley Dial 20+ year significant other , HCP document forthcoming   BASELINE (I)ADL(s) (prior to admission):  [place]: [ ]Total  [x ] Moderate [ ]Dependent    Allergies    adhesives (Rash)  clonidine (Swelling; Rash)  felodipine (Rash)  penicillin (Rash)  sulfa drugs (Rash)    Intolerances    MEDICATIONS  (STANDING):  allopurinol 200 milliGRAM(s) Oral daily  doxazosin 4 milliGRAM(s) Oral at bedtime  influenza  Vaccine (HIGH DOSE) 0.7 milliLiter(s) IntraMuscular once  levoFLOXacin  Tablet 750 milliGRAM(s) Oral every 48 hours  metoprolol succinate ER 25 milliGRAM(s) Oral daily    MEDICATIONS  (PRN):  acetaminophen     Tablet .. 650 milliGRAM(s) Oral every 6 hours PRN Temp greater or equal to 38C (100.4F), Mild Pain (1 - 3)  aluminum hydroxide/magnesium hydroxide/simethicone Suspension 30 milliLiter(s) Oral every 4 hours PRN Dyspepsia  melatonin 3 milliGRAM(s) Oral at bedtime PRN Insomnia  ondansetron Injectable 4 milliGRAM(s) IV Push every 8 hours PRN Nausea and/or Vomiting    PRESENT SYMPTOMS: [ ]Unable to obtain due to poor mentation   Source if other than patient:  [ ]Family   [ ]Team     Pain: [ ]yes [ x]no  QOL impact -   Location -                    Aggravating factors -  Quality -  Radiation -  Timing-  Severity (0-10 scale):  Minimal acceptable level (0-10 scale):     PAIN AD Score:     [email or web address] (press ctrl +  left click to view)    Dyspnea:                           [ ]Mild [ x]Moderate [ ]Severe  Anxiety:                             [ ]Mild [ ]xModerate [ ]Severe  Fatigue:                             [ ]Mild [x ]Moderate [ ]Severe  Nausea:                             [ ]Mild [ ]Moderate [ ]Severe  Loss of appetite:              [ ]Mild [ x]Moderate [ ]Severe  Constipation:                    [ ]Mild [ ]Moderate [ ]Severe    Other Symptoms:  [x ]All other review of systems negative     Palliative Performance Status Version 2:      30   %    [email or web address]/files/[email or web address]_performance_scale_[email or web address].pdf  PHYSICAL EXAM:  Vital Signs Last 24 Hrs  T(C): 36.4 (07 Dec 2021 05:11), Max: 36.8 (06 Dec 2021 21:01)  T(F): 97.6 (07 Dec 2021 05:11), Max: 98.3 (06 Dec 2021 21:01)  HR: 60 (07 Dec 2021 09:34) (60 - 63)  BP: 116/62 (07 Dec 2021 05:11) (110/63 - 121/78)  BP(mean): --  RR: 20 (07 Dec 2021 05:11) (18 - 22)  SpO2: 97% (07 Dec 2021 09:34) (96% - 100%) I&O's Summary    06 Dec 2021 07:01  -  07 Dec 2021 07:00  --------------------------------------------------------  IN: 350 mL / OUT: 400 mL / NET: -50 mL    07 Dec 2021 07:01  -  07 Dec 2021 11:30  --------------------------------------------------------  IN: 100 mL / OUT: 0 mL / NET: 100 mL      GENERAL:  [x ]Alert  [ x]Oriented x 1-2  [ ]Lethargic  [ ]Cachexia  [ ]Unarousable  [ x]Verbal  [ ]Non-Verbal  Behavioral:   [ ] Anxiety  [ ] Delirium [ ] Agitation [ ] Other  HEENT:  [ ]Normal   [x ]Dry mouth   [ ]ET Tube/Trach  [ ]Oral lesions  PULMONARY:   [ ]Clear [ x]Tachypnea  [ ]Audible excessive secretions   [ ]Rhonchi        [ ]Right [ ]Left [ ]Bilateral  [ ]Crackles        [ ]Right [ ]Left [ ]Bilateral  [ ]Wheezing     [ ]Right [ ]Left [ ]Bilatera  [ ]Diminished breath sounds [ ]right [ ]left [ ]bilateral  CARDIOVASCULAR:    [ ]Regular [x ]Irregular [ ]Tachy  [ ]Jay [ ]Murmur [ ]Other  GASTROINTESTINAL:  [x ]Soft  [ ]Distended   x[ ]+BS  [x ]Non tender [ ]Tender  [ ]PEG [ ]OGT/ NGT  Last BM:   GENITOURINARY:  [ ]Normal [x ] Incontinent   [ ]Oliguria/Anuria   [ ]Broussard  MUSCULOSKELETAL:   [ ]Normal   [x ]Weakness  [x ]Bed/Wheelchair bound [ ]Edema  NEUROLOGIC:   [ ]No focal deficits  [ x]Cognitive impairment  [ ]Dysphagia [ ]Dysarthria [ ]Paresis [ ]Other   SKIN:   [ ]Normal    [ ]Rash  [ ]Pressure ulcer(s)       Present on admission [ ]y [ ]n  Multiple areas of ecchymosis upper extremities     CRITICAL CARE:  [ ] Shock Present  [ ]Septic [ ]Cardiogenic [ ]Neurologic [ ]Hypovolemic  [ ]  Vasopressors [ ]  Inotropes   [ ]Respiratory failure present [ ]Mechanical ventilation [ ]Non-invasive ventilatory support [ ]High flow  [ ]Acute  [ ]Chronic [ ]Hypoxic  [ ]Hypercarbic [ ]Other  [ ]Other organ failure     LABS:                        7.0    2.07  )-----------( 11       ( 07 Dec 2021 06:40 )             23.2   12-07    152<H>  |  105  |  48<H>  ----------------------------<  81  4.7   |  43<H>  |  1.05    Ca    9.4      07 Dec 2021 06:43  Phos  3.7     12-06  Mg     2.2     12-06    TPro  5.6<L>  /  Alb  3.2<L>  /  TBili  1.4<H>  /  DBili  x   /  AST  6<L>  /  ALT  8<L>  /  AlkPhos  90  12-06        RADIOLOGY & ADDITIONAL STUDIES:    < from: Xray Chest 1 View AP/PA (12.06.21 @ 11:33) >          INTERPRETATION:  EXAMINATION: XR CHEST    CLINICAL INDICATION: PORTABLE-Urgent. Rapid response team. Hypoxia.    TECHNIQUE: Single frontal, portable view of the chest was obtained.    COMPARISON: Chest x-ray 12/2/2021.    FINDINGS:  Left-sided AICD with leads in the right atrium and ventricle.    The heart size is not accurately assessed on this projection.  Redemonstration of bilateral mid and lower lung field hazy opacities, unchanged from 12/2/2021. Low lung volumes.  There is no pneumothorax. Small bilateral pleural effusions are unchanged.    IMPRESSION:  Redemonstration of bilateral mid and lower lung field hazy opacities, unchanged, suggestive atelectasis.  Small bilateral pleural effusions.    Discussed with CHAYO Simpson by Dr. Blackman on 12/6/2021 11:51 AM with readback confirmation.    --- End of Report ---    < end of copied text >      PROTEIN CALORIE MALNUTRITION PRESENT: [ ]mild [x ]moderate [ ]severe [ ]underweight [ ]morbid obesity  https://www.andeal.org/vault/2440/web/files/ONC/Table_Clinical%20Characteristics%20to%20Document%20Malnutrition-White%20JV%20et%20al%656873.pdf    Height (cm): 185.4 (12-03-21 @ 07:00), 180 (11-15-21 @ 11:00), 185.4 (11-01-21 @ 19:57)  Weight (kg): 80.7 (12-03-21 @ 07:00), 82.9983 (11-17-21 @ 15:00), 79.4 (11-01-21 @ 19:57)  BMI (kg/m2): 23.5 (12-03-21 @ 07:00), 25.6 (11-17-21 @ 15:00), 24.5 (11-15-21 @ 11:00)    [ ]PPSV2 < or = to 30% [ ]significant weight loss  [ x]poor nutritional intake  [ ]anasarca      [ ]Artificial Nutrition      REFERRALS:   [ ]Chaplaincy  [ ]Hospice  [ ]Child Life  [ ]Social Work  [ x]Case management [ ]Holistic Therapy     Goals of Care Document:

## 2021-12-07 NOTE — PROGRESS NOTE ADULT - PROBLEM SELECTOR PLAN 2
pt with h/o possible TACO on prior admission after receiving plt transfusion   cont lasix 40 iv bid  - c/w lasix as with transfusions  lasix 40iv extra dose with every unit of bld transfusion

## 2021-12-07 NOTE — CONSULT NOTE ADULT - PROBLEM SELECTOR RECOMMENDATION 2
Progressive SOB, utilizing accessory muscles  Off Bipap at time of visit saturating well with NC  Transfusion associated circulatory overload vs  ADHF exacerbation
-  -not a candidate for anticoagulation due to profound pancytopenia requiring transfusions  -rates stable on telemetry  -resume toprol xl 25 mg daily.     follows with cardiologist dr meredith Agee, D.O.  [phone]

## 2021-12-07 NOTE — CONSULT NOTE ADULT - ASSESSMENT
85 y/o male, PMH as previously described, hx of MDS with AML progression, now transfusion and platelet dependent.  Palliative called for advance care planning, goals of care

## 2021-12-08 NOTE — PROGRESS NOTE ADULT - SUBJECTIVE AND OBJECTIVE BOX
SUBJECTIVE / OVERNIGHT EVENTS:    INCOMPLETE NOTE      --------------------------------------------------------------------------------------------  LABS:                        7.0    1.91  )-----------( 7        ( 08 Dec 2021 06:37 )             23.3     12-08    153<H>  |  106  |  53<H>  ----------------------------<  94  4.4   |  40<H>  |  1.11    Ca    9.6      08 Dec 2021 06:37  Phos  3.7     12-06  Mg     2.2     12-06    TPro  5.6<L>  /  Alb  3.2<L>  /  TBili  1.4<H>  /  DBili  x   /  AST  6<L>  /  ALT  8<L>  /  AlkPhos  90  12-06      CAPILLARY BLOOD GLUCOSE                RADIOLOGY & ADDITIONAL TESTS:    Imaging Personally Reviewed:  [x] YES  [ ] NO    Consultant(s) Notes Reviewed:  [x] YES  [ ] NO    MEDICATIONS  (STANDING):  acetaZOLAMIDE Injectable 250 milliGRAM(s) IV Push every 8 hours  allopurinol 200 milliGRAM(s) Oral daily  doxazosin 4 milliGRAM(s) Oral at bedtime  influenza  Vaccine (HIGH DOSE) 0.7 milliLiter(s) IntraMuscular once  levoFLOXacin  Tablet 750 milliGRAM(s) Oral every 48 hours  metoprolol succinate ER 25 milliGRAM(s) Oral daily    MEDICATIONS  (PRN):  acetaminophen     Tablet .. 650 milliGRAM(s) Oral every 6 hours PRN Temp greater or equal to 38C (100.4F), Mild Pain (1 - 3)  aluminum hydroxide/magnesium hydroxide/simethicone Suspension 30 milliLiter(s) Oral every 4 hours PRN Dyspepsia  melatonin 3 milliGRAM(s) Oral at bedtime PRN Insomnia  ondansetron Injectable 4 milliGRAM(s) IV Push every 8 hours PRN Nausea and/or Vomiting      Care Discussed with Consultants/Other Providers [x] YES  [ ] NO    Vital Signs Last 24 Hrs  T(C): 36.6 (08 Dec 2021 06:17), Max: 37.1 (07 Dec 2021 12:54)  T(F): 97.9 (08 Dec 2021 06:17), Max: 98.8 (07 Dec 2021 12:54)  HR: 60 (08 Dec 2021 06:17) (59 - 65)  BP: 110/54 (08 Dec 2021 06:17) (108/57 - 126/63)  BP(mean): --  RR: 20 (08 Dec 2021 06:17) (20 - 20)  SpO2: 97% (08 Dec 2021 06:17) (95% - 98%)  I&O's Summary    07 Dec 2021 07:01  -  08 Dec 2021 07:00  --------------------------------------------------------  IN: 280 mL / OUT: 1550 mL / NET: -1270 mL           SUBJECTIVE / OVERNIGHT EVENTS:    lethargic but arousable to pain. cannot provide ROS. dtr at bedside.       --------------------------------------------------------------------------------------------  LABS:                        7.0    1.91  )-----------( 7        ( 08 Dec 2021 06:37 )             23.3     12-08    153<H>  |  106  |  53<H>  ----------------------------<  94  4.4   |  40<H>  |  1.11    Ca    9.6      08 Dec 2021 06:37  Phos  3.7     12-06  Mg     2.2     12-06    TPro  5.6<L>  /  Alb  3.2<L>  /  TBili  1.4<H>  /  DBili  x   /  AST  6<L>  /  ALT  8<L>  /  AlkPhos  90  12-06      CAPILLARY BLOOD GLUCOSE                RADIOLOGY & ADDITIONAL TESTS:    Imaging Personally Reviewed:  [x] YES  [ ] NO    Consultant(s) Notes Reviewed:  [x] YES  [ ] NO    MEDICATIONS  (STANDING):  acetaZOLAMIDE Injectable 250 milliGRAM(s) IV Push every 8 hours  allopurinol 200 milliGRAM(s) Oral daily  doxazosin 4 milliGRAM(s) Oral at bedtime  influenza  Vaccine (HIGH DOSE) 0.7 milliLiter(s) IntraMuscular once  levoFLOXacin  Tablet 750 milliGRAM(s) Oral every 48 hours  metoprolol succinate ER 25 milliGRAM(s) Oral daily    MEDICATIONS  (PRN):  acetaminophen     Tablet .. 650 milliGRAM(s) Oral every 6 hours PRN Temp greater or equal to 38C (100.4F), Mild Pain (1 - 3)  aluminum hydroxide/magnesium hydroxide/simethicone Suspension 30 milliLiter(s) Oral every 4 hours PRN Dyspepsia  melatonin 3 milliGRAM(s) Oral at bedtime PRN Insomnia  ondansetron Injectable 4 milliGRAM(s) IV Push every 8 hours PRN Nausea and/or Vomiting      Care Discussed with Consultants/Other Providers [x] YES  [ ] NO    Vital Signs Last 24 Hrs  T(C): 36.6 (08 Dec 2021 06:17), Max: 37.1 (07 Dec 2021 12:54)  T(F): 97.9 (08 Dec 2021 06:17), Max: 98.8 (07 Dec 2021 12:54)  HR: 60 (08 Dec 2021 06:17) (59 - 65)  BP: 110/54 (08 Dec 2021 06:17) (108/57 - 126/63)  BP(mean): --  RR: 20 (08 Dec 2021 06:17) (20 - 20)  SpO2: 97% (08 Dec 2021 06:17) (95% - 98%)  I&O's Summary    07 Dec 2021 07:01  -  08 Dec 2021 07:00  --------------------------------------------------------  IN: 280 mL / OUT: 1550 mL / NET: -1270 mL      GENERAL: NAD, lethargic, not oriented, pale  HEAD:  Atraumatic, Normocephalic  CHEST/LUNG: CTABL, No wheeze  HEART: Regular rate and rhythm; No murmurs, rubs, or gallops  ABDOMEN: Soft, Nontender, Nondistended; Bowel sounds present  EXTREMITIES:  2+ Peripheral Pulses, No clubbing, cyanosis, or edema

## 2021-12-08 NOTE — PROGRESS NOTE ADULT - PROBLEM SELECTOR PLAN 3
p/w hypoxia/hypercapnea, has h/o HF with last TTE (10/6/21) with EF 45% and moderate diastolic dysfunction (Stage II)  - c/w diureses and BiPAP as above   - no need to repeat TTE at this time p/w hypoxia/hypercapnea, has h/o HF with last TTE (10/6/21) with EF 45% and moderate diastolic dysfunction (Stage II)  BiPAP as above  diuresis on hold

## 2021-12-08 NOTE — PROGRESS NOTE ADULT - PROBLEM SELECTOR PLAN 2
pt with h/o possible TACO on prior admission after receiving plt transfusion   cont lasix 40 iv bid  - c/w lasix as with transfusions  lasix 40iv extra dose with every unit of bld transfusion pt with h/o possible TACO on prior admission after receiving plt transfusion   holding lasix  holding transfusions unless blding

## 2021-12-08 NOTE — PROGRESS NOTE ADULT - PROBLEM SELECTOR PLAN 1
sp acute hypoxic and hypercapnic RF in setting of hypervolemia from TACO vs HF exacerbation.   CXR showing b/l pleural effusions  - sp bipap and IV lasix-with good clinical improvement,   - strict I/Os - daily wts   back to acute hypercapnic respi failure- worsening fluid overload  Bipap to cont, lasix 40iv bid, lasix 40iv extra dose with every unit of bld transfusion  close monitoring  low threshold for MICU  pt DNR- d/w palliative  discussed GOC-wish aggressive measures 2/2 transfusions  holding lasix for metabolic alkalosis  cont diamox 4 doses  pulm and cardio following

## 2021-12-08 NOTE — CHART NOTE - NSCHARTNOTEFT_GEN_A_CORE
Platelet count of 7 noted - d/w Heme Fellow - no transfusion recommended as no active bleeding.    Cayla Spann NP  [phone]

## 2021-12-08 NOTE — PROGRESS NOTE ADULT - ASSESSMENT
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 CXR unchanged. Concern for sepsis, CNS event  12/7: DNR/DNI; howver family wants ongoing treatment as tolerated. ? restrictive physiology contributing to cardiac dysfunction. ACute hypoxemic and hypercapneic resp failure noted.   12/8: Improved, tolerating nasal cannula. 7.45/68 PCO2 c/w contaction alkalosis and hypercapnea; however, still with discoordinate breathing    REC    Transfusions held per Heme-Onc unless overt bleeding  Renew Diamox 250 mg IV X 3 doses q8 to begin 12/9 AM  Continue nocturnal BIPAP 12/5 and prn day for increased work of breathing; otherwise  nasal cannula day  Of note, poor candidate for thorocentesis:   Holosystolic murmer again noted on PE - ? MR  Repeat ABG in AM  CT chest NC when stable

## 2021-12-08 NOTE — CONSULT NOTE ADULT - CONSULT REASON
HYoxemic Resp Failuer/BIPAP Managment
obtundation due to respiratory failure
gross hematuria
BiPAP monitoring
Hypernatremia
Pancytopenia
sob
goals of care

## 2021-12-08 NOTE — CHART NOTE - NSCHARTNOTEFT_GEN_A_CORE
Notified by RN of patient's ABG results.     Blood Gas Hemoglobin/Hematocrit (12.08.21 @ 05:43)   Total Hemoglobin, Calculated: 6.7 g/dL   Hematocrit, Calculated: 20.0 %     Per heme/onc's note,  the recommendation is to hold any further transfusions at this time despite CBC findings, unless patient with active bleeding. Pt has no active bleeding presently. No signs of hematuria. No episodes of hematemesis.  Will continue to monitor patient's vitals closely overnight   Endorse/sign out to day team on overnight events   RN aware of management     Bette Lizama PA-C   Dept of Medicine   [phone]

## 2021-12-08 NOTE — PROGRESS NOTE ADULT - ASSESSMENT
86M with PMH of HTN, Afib not on AC, s/p ablation, s/p AICD/PPM, prostate cancer s/p treatment, MDS with pancytopenia and transfusion dependent p/w SOB from [place] while receiving PRBCs.     TTE 10/2021  Moderate aortic regurgitation.  Mild global left ventricular systolic dysfunction.  Moderate diastolic dysfunction (Stage II).  Mild tricuspid regurgitation.  Small pericardial effusion.    TTE 12/2021  Nml LVEF  Mod biatrial enlargent  Estimated pulmonary artery systolic pressure equals 53  mm Hg, assuming right atrial pressure equals 12 mm Hg,  consistent with moderate pulmonary pressures.  Small pericardial effusion posterior to the left  ventricle.   Thickened pericardium.  Moderate pericardial effusion anterior to the right  ventricle.   The pericardial effusion measures  1.98  cm  adjacent to the right ventricle as seen in the subcostal  view.   No echocardiographic evidence of pericardial  tamponade.  Bilateral pleural effusions.

## 2021-12-08 NOTE — PROGRESS NOTE ADULT - SUBJECTIVE AND OBJECTIVE BOX
Follow-up Pulm Progress Note  Oral Cortes MD  [phone]    More alert today: appears comfortable on nasal cannula  ABG post 3 doses diamox: 7.45/68 PCO2        Vital Signs Last 24 Hrs  T(C): 36.3 (08 Dec 2021 11:49), Max: 36.6 (08 Dec 2021 06:17)  T(F): 97.4 (08 Dec 2021 11:49), Max: 97.9 (08 Dec 2021 06:17)  HR: 60 (08 Dec 2021 11:49) (59 - 63)  BP: 104/56 (08 Dec 2021 11:49) (104/56 - 126/63)  BP(mean): --  RR: 19 (08 Dec 2021 11:49) (19 - 20)  SpO2: 97% (08 Dec 2021 11:49) (95% - 98%)    ABG - ( 08 Dec 2021 05:43 )  pH, Arterial: 7.45  pH, Blood: x     /  pCO2: 68    /  pO2: 110   / HCO3: 47    / Base Excess: 21.2  /  SaO2: 98.9                              7.0    1.91  )-----------( 7        ( 08 Dec 2021 06:37 )             23.3       12-08    153<H>  |  106  |  53<H>  ----------------------------<  94  4.4   |  40<H>  |  1.11    Ca    9.6      08 Dec 2021 06:37      Physical Examination:  PULM: Dullness R base; few basilar crackles; discoordinate breathing with poor inspiration  CVS: Regular rate and rhythm, no murmurs, rubs, or gallops  ABD: Soft, non-tender  EXT:  1+ bilateral LE edema    RADIOLOGY REVIEWED  CXR:    EXAM:  XR CHEST PORTABLE URGENT 1V                            PROCEDURE DATE:  12/02/2021        INTERPRETATION:  DATE OF STUDY: 12/2/21    PRIOR:12/1/21    CLINICAL INDICATION: Pulmonary edema, post transfusion.    TECHNIQUE: portable chest - done erect.    FINDINGS/  IMPRESSION:  Stable cardiomegaly.  Left-sided AICD in place.  Interval improvement in pulmonary edema changes with mild decrease in right pleural effusion. Stable small left pleural effusion.  No new consolidations. No pneumothorax.    CT chest:    TTE:

## 2021-12-08 NOTE — CONSULT NOTE ADULT - SUBJECTIVE AND OBJECTIVE BOX
[facility] [place] [facility]   MD VIOLA BAHENA MD, PA INJMARILYN LEN NP    TEL:  OFFICE: [phone]  DR DAY CELL: [phone]  DR. COON CELL: [phone]  ALANA NESBITT CELL: [phone]  PAUL LEN CELL :[phone]  From 5pm-7am answering service [phone]    --- INITIAL RENAL CONSULT NOTE ---date of service 21 @ 17:44    HPI:  86M with PMH of HTN, Afib not on AC, s/p ablation, s/p AICD/PPM, prostate cancer s/p treatment, MDS with pancytopenia and transfusion dependent p/w SOB from [facility] while receiving PRBCs. History from significant other at bedside; pt is lethargic and unable to give full history. Pt is dependent on blood and platelet transfusions, receiving treatment at [facility] almost every other day. Per friend, pt has been incredibly fatigued and she noted some SOB and LE edema starting one week ago. He has received transfusions (either blood, platelets or both) on ,  and . On Monday (), friend noted that pts breathing appeared significantly worse, shallow and rapid and he was having very hard time going up the stairs, having to stop midway for several minutes to cath his breath. That evening was started on PO lasix by his oncologist and took total of 40mg; took 20mg BID the next day. Today, returned to [facility] for blood and platelets transfusion, and shortly after completing the PRBC, pt was noted to be very SOB with hypoxia to 70%RA. Pt was put on NRB and given lasix 40mg IVP at [facility] and EMS called; en route, due to low HR 40s (paced) pt was given atropine and solumedrol 125mg.   Per significant other, pt also has been having diarrhea for the past 2 days and has had poor PO intake; denies any recent fever, chills, cough, n/v, abd pain, urinary symptoms.      Pt placed on NC on arrival to ED with good saturation, transitioned to BiPAP when noted to be in hypercapnic RF. MICU consulted, not a candidate at this time.      (01 Dec 2021 22:19)        Allergies:  adhesives (Rash)  clonidine (Swelling; Rash)  felodipine (Rash)  penicillin (Rash)  sulfa drugs (Rash)      PAST MEDICAL & SURGICAL HISTORY:  Hypertension    Afib    Osteoarthritis    Prostate cancer  treated with cyber knife 2016    Venous stasis    Varicose veins  legs    Gout  finger a long time ago    Cellulitis  right lower leg in past    Degenerative disc disease, lumbar    Edema  right leg    S/P total hip arthroplasty  , right    S/P cataract extraction  bilateral    S/P hernia repair      Afib  cardiac ablation ; several pacemaker/AICD insertion and replacements    S/P tonsillectomy    S/P cholecystectomy          Home Medications Reviewed    Hospital Medications:   MEDICATIONS  (STANDING):  acetaZOLAMIDE Injectable 250 milliGRAM(s) IV Push every 8 hours  allopurinol 200 milliGRAM(s) Oral daily  doxazosin 4 milliGRAM(s) Oral at bedtime  influenza  Vaccine (HIGH DOSE) 0.7 milliLiter(s) IntraMuscular once  levoFLOXacin  Tablet 750 milliGRAM(s) Oral every 48 hours  metoprolol succinate ER 25 milliGRAM(s) Oral daily      SOCIAL HISTORY:  Denies ETOh, Smoking,     FAMILY HISTORY:  Family history of Alzheimer&#x27;s disease (Father)    Family history of essential hypertension (Father)    Family history of breast cancer (Mother)    Family history of prostate cancer (Sibling)        REVIEW OF SYSTEMS:  CONSTITUTIONAL: No weakness, fevers or chills  EYES/ENT: No visual changes;  No vertigo or throat pain   NECK: No pain or stiffness  RESPIRATORY: No cough, wheezing, hemoptysis; No shortness of breath  CARDIOVASCULAR: No chest pain or palpitations.  GASTROINTESTINAL: No abdominal or epigastric pain. No nausea, vomiting, or hematemesis; No diarrhea or constipation. No melena or hematochezia.  GENITOURINARY: No dysuria, frequency, foamy urine, urinary urgency, incontinence or hematuria  NEUROLOGICAL: No numbness or weakness  SKIN: No itching, burning, rashes, or lesions   VASCULAR: No bilateral lower extremity edema.   All other review of systems is negative unless indicated above.    VITALS:  T(F): 97.4 (21 @ 11:49), Max: 97.9 (21 @ 06:17)  HR: 60 (21 @ 11:49)  BP: 104/56 (12-08-21 @ 11:49)  RR: 19 (21 @ 11:49)  SpO2: 97% (21 @ 11:49)  Wt(kg): --     @ 07:01  -   @ 07:00  --------------------------------------------------------  IN: 280 mL / OUT: 1550 mL / NET: -1270 mL     @ 07:01  -   @ 17:44  --------------------------------------------------------  IN: 80 mL / OUT: 0 mL / NET: 80 mL          PHYSICAL EXAM:  Constitutional: NAD  HEENT: anicteric sclera, oropharynx clear, MMM  Neck: No JVD  Respiratory: CTAB, no wheezes, rales or rhonchi  Cardiovascular: S1, S2, RRR  Gastrointestinal: BS+, soft, NT/ND  Extremities: No cyanosis or clubbing. No peripheral edema  Neurological: A/O x 3, no focal deficits  Psychiatric: Normal mood, normal affect  : No CVA tenderness. No yost.   Skin: No rashes  Vascular Access:    LABS:      153<H>  |  106  |  53<H>  ----------------------------<  94  4.4   |  40<H>  |  1.11    Ca    9.6      08 Dec 2021 06:37      Creatinine Trend: 1.11 <--, 1.05 <--, 1.03 <--, 0.97 <--, 1.07 <--, 1.23 <--, 1.43 <--, 1.31 <--                        7.0    1.91  )-----------( 7        ( 08 Dec 2021 06:37 )             23.3     Urine Studies:  Urinalysis Basic - ( 04 Dec 2021 16:46 )    Color: Light Orange / Appearance: Clear / S.014 / pH:   Gluc:  / Ketone: Negative  / Bili: Negative / Urobili: Negative   Blood:  / Protein: Trace / Nitrite: Negative   Leuk Esterase: Negative / RBC: 561 /hpf / WBC 2 /HPF   Sq Epi:  / Non Sq Epi: 0 /hpf / Bacteria: Negative          RADIOLOGY & ADDITIONAL STUDIES:                 [facility] [place] [facility]   MD VIOLA BAHENA MD, PA INJUNG KO NP    TEL:  OFFICE: [phone]  DR DAY CELL: [phone]  DR. COON CELL: [phone]  ALANA NESBITT CELL: [phone]  PAUL HARRINGTON CELL :[phone]  From 5pm-7am answering service [phone]    --- INITIAL RENAL CONSULT NOTE ---date of service 21 @ 17:44    HPI:  86M with PMH of HTN, Afib not on AC, s/p ablation, s/p AICD/PPM, prostate cancer s/p treatment, MDS with pancytopenia and transfusion dependent p/w SOB from [facility] while receiving PRBCs. We got involved for hypernatremia       Allergies:  adhesives (Rash)  clonidine (Swelling; Rash)  felodipine (Rash)  penicillin (Rash)  sulfa drugs (Rash)      PAST MEDICAL & SURGICAL HISTORY:  Hypertension    Afib    Osteoarthritis    Prostate cancer  treated with cyber knife     Venous stasis    Varicose veins  legs    Gout  finger a long time ago    Cellulitis  right lower leg in past    Degenerative disc disease, lumbar    Edema  right leg    S/P total hip arthroplasty  , right    S/P cataract extraction  bilateral    S/P hernia repair      Afib  cardiac ablation ; several pacemaker/AICD insertion and replacements    S/P tonsillectomy    S/P cholecystectomy          Home Medications Reviewed    Hospital Medications:   MEDICATIONS  (STANDING):  acetaZOLAMIDE Injectable 250 milliGRAM(s) IV Push every 8 hours  allopurinol 200 milliGRAM(s) Oral daily  doxazosin 4 milliGRAM(s) Oral at bedtime  influenza  Vaccine (HIGH DOSE) 0.7 milliLiter(s) IntraMuscular once  levoFLOXacin  Tablet 750 milliGRAM(s) Oral every 48 hours  metoprolol succinate ER 25 milliGRAM(s) Oral daily      SOCIAL HISTORY:  Denies ETOh, Smoking,     FAMILY HISTORY:  Family history of Alzheimer&#x27;s disease (Father)    Family history of essential hypertension (Father)    Family history of breast cancer (Mother)    Family history of prostate cancer (Sibling)        REVIEW OF SYSTEMS:  CONSTITUTIONAL: has weakness, no fevers or chills  EYES/ENT: No visual changes;  No vertigo or throat pain   NECK: No pain or stiffness  RESPIRATORY: No cough, wheezing, hemoptysis; No shortness of breath  CARDIOVASCULAR: No chest pain or palpitations.  GASTROINTESTINAL: No abdominal or epigastric pain. No nausea, vomiting, or hematemesis; No diarrhea or constipation. No melena or hematochezia.  GENITOURINARY: No dysuria, frequency, foamy urine, urinary urgency, incontinence or hematuria  NEUROLOGICAL: No numbness or weakness  SKIN: No itching, burning, rashes, or lesions   VASCULAR: No bilateral lower extremity edema.   All other review of systems is negative unless indicated above.    VITALS:  T(F): 97.4 (21 @ 11:49), Max: 97.9 (21 @ 06:17)  HR: 60 (21 @ 11:49)  BP: 104/56 (21 @ 11:49)  RR: 19 (21 @ 11:49)  SpO2: 97% (21 @ 11:49)  Wt(kg): --     @ 07:01  -   @ 07:00  --------------------------------------------------------  IN: 280 mL / OUT: 1550 mL / NET: -1270 mL     @ 07:01  -   @ 17:44  --------------------------------------------------------  IN: 80 mL / OUT: 0 mL / NET: 80 mL          PHYSICAL EXAM:  Constitutional: NAD  HEENT: anicteric sclera, oropharynx clear, MMM  Neck: No JVD  Respiratory: CTAB, no wheezes, rales or rhonchi  Cardiovascular: S1, S2, RRR  Gastrointestinal: BS+, soft, NT/ND  Extremities: No cyanosis or clubbing. No peripheral edema  Neurological: Alert, no focal deficits  Psychiatric: Normal mood, normal affect  : No CVA tenderness. No yost.   Skin: No rashes       LABS:  12-08    153<H>  |  106  |  53<H>  ----------------------------<  94  4.4   |  40<H>  |  1.11    Ca    9.6      08 Dec 2021 06:37      Creatinine Trend: 1.11 <--, 1.05 <--, 1.03 <--, 0.97 <--, 1.07 <--, 1.23 <--, 1.43 <--, 1.31 <--                        7.0    1.91  )-----------( 7        ( 08 Dec 2021 06:37 )             23.3     Urine Studies:  Urinalysis Basic - ( 04 Dec 2021 16:46 )    Color: Light Orange / Appearance: Clear / S.014 / pH:   Gluc:  / Ketone: Negative  / Bili: Negative / Urobili: Negative   Blood:  / Protein: Trace / Nitrite: Negative   Leuk Esterase: Negative / RBC: 561 /hpf / WBC 2 /HPF   Sq Epi:  / Non Sq Epi: 0 /hpf / Bacteria: Negative          RADIOLOGY & ADDITIONAL STUDIES:

## 2021-12-08 NOTE — CHART NOTE - NSCHARTNOTEFT_GEN_A_CORE
Nephrology input appreciated - case d/w Dr. Melgar - no objection to 1 liter of D5 to be infused at a rate of 50mL/hr.    Cyala Spann NP  [phone]

## 2021-12-08 NOTE — CONSULT NOTE ADULT - CONSULT REQUESTED DATE/TIME
02-Dec-2021 16:51
05-Dec-2021
01-Dec-2021 19:45
06-Dec-2021 16:32
03-Dec-2021 09:07
02-Dec-2021 11:14
08-Dec-2021 17:43
07-Dec-2021 11:00

## 2021-12-08 NOTE — PROGRESS NOTE ADULT - PROBLEM SELECTOR PLAN 2
-  - Small to moderate without clinical or echocardiographic evidence of tamponade  - Avoid over diuresis

## 2021-12-08 NOTE — PROGRESS NOTE ADULT - SUBJECTIVE AND OBJECTIVE BOX
[facility] Cardiology Progress Note  _______________________________    Pt. seen and examined. No new cardiac-related complaints.    Telemetry -v paced, atrial flutter 60s    T(C): 36.6 (12-08-21 @ 06:17), Max: 37.1 (12-07-21 @ 12:54)  HR: 60 (12-08-21 @ 06:17) (59 - 65)  BP: 110/54 (12-08-21 @ 06:17) (108/57 - 126/63)  RR: 20 (12-08-21 @ 06:17) (20 - 20)  SpO2: 97% (12-08-21 @ 06:17) (95% - 98%)  I&O's Summary    07 Dec 2021 07:01  -  08 Dec 2021 07:00  --------------------------------------------------------  IN: 280 mL / OUT: 1550 mL / NET: -1270 mL        PHYSICAL EXAM:  GENERAL: Alert, NAD.  NECK: Supple.  CHEST/LUNG: Clear to anterior auscultation bilaterally; No significant wheezes, rales, or rhonchi.  HEART: S1 S2 normal, RRR; No murmurs, rubs, or gallops  ABDOMEN: Soft, Nondistended  EXTREMITIES:  No LE edema.      LABS:                        7.0    1.91  )-----------( 7        ( 08 Dec 2021 06:37 )             23.3     12-08    153<H>  |  106  |  53<H>  ----------------------------<  94  4.4   |  40<H>  |  1.11    Ca    9.6      08 Dec 2021 06:37  Phos  3.7     12-06  Mg     2.2     12-06    TPro  5.6<L>  /  Alb  3.2<L>  /  TBili  1.4<H>  /  DBili  x   /  AST  6<L>  /  ALT  8<L>  /  AlkPhos  90  12-06      CARDIAC MARKERS ( 01 Dec 2021 18:24 )  x     / x     / 19 U/L / x     / 2.9 ng/mL              MEDICATIONS  (STANDING):  acetaZOLAMIDE Injectable 250 milliGRAM(s) IV Push every 8 hours  allopurinol 200 milliGRAM(s) Oral daily  doxazosin 4 milliGRAM(s) Oral at bedtime  influenza  Vaccine (HIGH DOSE) 0.7 milliLiter(s) IntraMuscular once  levoFLOXacin  Tablet 750 milliGRAM(s) Oral every 48 hours  metoprolol succinate ER 25 milliGRAM(s) Oral daily    MEDICATIONS  (PRN):  acetaminophen     Tablet .. 650 milliGRAM(s) Oral every 6 hours PRN Temp greater or equal to 38C (100.4F), Mild Pain (1 - 3)  aluminum hydroxide/magnesium hydroxide/simethicone Suspension 30 milliLiter(s) Oral every 4 hours PRN Dyspepsia  melatonin 3 milliGRAM(s) Oral at bedtime PRN Insomnia  ondansetron Injectable 4 milliGRAM(s) IV Push every 8 hours PRN Nausea and/or Vomiting        RADIOLOGY & ADDITIONAL TESTS:

## 2021-12-08 NOTE — CHART NOTE - NSCHARTNOTEFT_GEN_A_CORE
Nutrition Follow Up Note  Patient seen for: malnutrition follow up     Chart reviewed, events noted.  IMM **STAR CHF** volume overload 2' transfusion- Lasix on hold; pancytopenia- transfusion dependent; pall care following; PT following    Source: [] Patient       [x] EMR        [] RN        [] Family at bedside       [] Other:    -If unable to interview patient: [] Trach/Vent/BiPAP  [x] Disoriented/confused/inappropriate to interview-pt in enhanced room    Diet Order:   Diet, Regular (21)    - Is current order appropriate/adequate? [] Yes  [x]  No: PCA reports pt having difficulty swallowing breakfast this morning -nurse aware    - PO intake meals :   [] >75%  Adequate    [] 50-75%  Fair       [x] <50%  Poor:0-25% as per flow sheets  - PO intake of supplements if pt receiving: []>75% []50% []25%   as per   [x]flow sheet  []patient  []family/aide  []PCA  []Nurse  []RD observation    - Nutrition-related concerns: pt having difficulty with swallowing-discussed with nurse-pt in need of swallow eval-nurse made NP aware    pt seen by SLP []Yes [x]No    GI:  Last BM ___.   Bowel Regimen? [] Yes   [x] No      Weights:   Daily Weight in k.6 (), Weight in k.8 (), Weight in k.1 (), Weight in k.7 ()    Nutritionally Pertinent MEDICATIONS  (STANDING):  acetaZOLAMIDE Injectable  allopurinol  doxazosin  levoFLOXacin  Tablet  metoprolol succinate ER    Pertinent Labs:  @ 06:37: Na 153<H>, BUN 53<H>, Cr 1.11, BG 94, K+ 4.4, Phos --, Mg --, Alk Phos --, ALT/SGPT --, AST/SGOT --, HbA1c --    A1C with Estimated Average Glucose Result: 5.6 % (10-06-21 @ 10:51)        Pressure Injuries as per nursing documentation: none  Edema: +1 bilateral leg    Estimated Needs:   [x] no change since previous assessment  [] recalculated:     Previous Nutrition Diagnosis: severe malnutrition  Nutrition Diagnosis is: [x] ongoing  [] resolved [] not applicable     New Nutrition Diagnosis: [x] Not applicable    Nutrition Care Plan:  [x] In Progress  [] Achieved  [] Not applicable    Nutrition Interventions:     Education Provided: STAR education provided, written ed left at bedside for family-PCA made aware      [x] Yes:  [] No:   pt was educated on the importance of supplements to increase calorie and protein intake in light of RD's nutritional findings []Yes [x]N/A-pt confused and disoriented         Recommendations:         [] Continue current diet order     [x] Change diet to: low sodium, Pureed, Moderately Thick Liquids     [x] add oral nutrition supplement: ensure enlive x 3 daily        [] Add micronutrient supplementation:      [] Continue current micronutrient supplementation:        [x]Discussed recommendations with provider     [x] Needed to escalate to provider     [x]Placed pending verification with NP/PA      []Placed pending verification with Team      []Placed sticker (malnutrition/BMI/underweight)     [x]Recommend swallow evaluation     [] monitor need for diet ed reinforcement     [] Other:     Monitoring and Evaluation:   Continue to monitor nutritional intake, tolerance to diet prescription, weights, labs, skin integrity      RD remains available upon request and will follow up per protocol  Shani Dent MA, RD, CDN #[phone]

## 2021-12-08 NOTE — PROGRESS NOTE ADULT - PROBLEM SELECTOR PLAN 5
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

## 2021-12-08 NOTE — PROGRESS NOTE ADULT - PROBLEM SELECTOR PLAN 3
-  - May be secondary to recent transfusion vs high outpt failure in setting of anemia  - Echo showing nml LVEF  - Pt getting IV diamox. will need to be on standing diuretic regimen.   - If requires transfusion may need extra dose of Lasix post transfusion  -Continue daily weights unable to accurately record I+Os  -Trend cr   -Keep potassium and mag above 4 and 2 respectively  -Wean O2 as tolerated  -Resume toprol xl 25 mg daily    follows with cardiologist dr meredith burrell

## 2021-12-08 NOTE — CONSULT NOTE ADULT - ASSESSMENT
86M with PMH of HTN, Afib not on AC, s/p ablation, s/p AICD/PPM, prostate cancer s/p treatment, MDS with pancytopenia and transfusion dependent p/w SOB from [facility] while receiving PRBCs.     1. Hypernatremia   -Euvolemia   -D5 50cc/hr for 1L   - Repeat Urin sodium, urin osmolarity     2. Anemia s/s MDS   - followed by hematology     3. Hematuria   -positive hematuria 12/04/21  -Repeat UA    86M with PMH of HTN, Afib not on AC, s/p ablation, s/p AICD/PPM, prostate cancer s/p treatment, MDS with pancytopenia and transfusion dependent p/w SOB from [facility] while receiving PRBCs.     A/P:  1. Hypernatremia   -Clinically Euvolemic   -D5 50cc/hr for 1L   -Get Urine sodium, urine osmolality  - Next Na level in AM  - Na level should not change more than 6-8meq/L in 24 hrs     2. Anemia s/s MDS   - followed by hematology     3. Hematuria   -positive hematuria 12/04/21  -Repeat UA, if persistent,  needs kidney/bladder US   86M with PMH of HTN, Afib not on AC, s/p ablation, s/p AICD/PPM, prostate cancer s/p treatment, MDS with pancytopenia and transfusion dependent p/w SOB from [facility] while receiving PRBCs.     A/P:  1. Hypernatremia   -likely sec to dehydration   -D5 50cc/hr for 1L   -Get Urine sodium, urine osmolality  - Next Na level in AM  - Na level should not change more than 6-8meq/L in 24 hrs     2. Anemia s/s MDS   - followed by hematology     3. Hematuria   -positive hematuria 12/04/21  -Repeat UA, if persistent,  needs kidney/bladder US   86M with PMH of HTN, Afib not on AC, s/p ablation, s/p AICD/PPM, prostate cancer s/p treatment, MDS with pancytopenia and transfusion dependent p/w SOB from [facility] while receiving PRBCs.     A/P:  1. Hypernatremia   - with CHF, getting diamox  - Will hydrate slowly with hypotonic fluid  - D5 50cc/hr for 1L   - Monitor for fluid overload. Lasix PRN  - Get Urine sodium, urine osmolality  - Next Na level in AM  - Na level should not change more than 6-8meq/L in 24 hrs     2. Anemia s/s MDS   - followed by hematology     3. Hematuria   -positive hematuria 12/04/21  -Repeat UA, if persistent,  needs kidney/bladder US

## 2021-12-09 NOTE — PROGRESS NOTE ADULT - PROBLEM SELECTOR PLAN 1
-   - Not a candidate for anticoagulation due to profound pancytopenia requiring transfusions  - Rates stable on telemetry  - toprol xl 25 mg daily

## 2021-12-09 NOTE — PROGRESS NOTE ADULT - ASSESSMENT
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 CXR unchanged. Concern for sepsis, CNS event  12/7: DNR/DNI; howver family wants ongoing treatment as tolerated. ? restrictive physiology contributing to cardiac dysfunction. ACute hypoxemic and hypercapneic resp failure noted.   12/8: Improved, tolerating nasal cannula. 7.45/68 PCO2 c/w contaction alkalosis and hypercapnea; however, still with discoordinate breathing  12/9: HCO3 now 38 - post Diamox X 3 Hypoxemic and hypercapneic resp failure      REC    Transfusions per primary team/heme-onc  Decrease BIPAP to 12/5  ABG later on nasal cannula   Further Diamox per ABG  Continue nocturnal BIPAP 12/5 and prn day for increased work of breathing; otherwise  nasal cannula day  Of note, poor candidate for thorocentesis:   Holosystolic murmer again noted on PE - ? MR  Repeat ABG in AM  CT chest NC when stable

## 2021-12-09 NOTE — PROGRESS NOTE ADULT - SUBJECTIVE AND OBJECTIVE BOX
[facility] – [place], [place] NEPHROLOGY PRACTICE   MD VIOLA BAHENA MD, PA INJUNG KO NP    TEL:  OFFICE: [phone]  DR DAY CELL: [phone]  DR. COON CELL: [phone]  ALANA NESBITT CELL: [phone]  PAUL HARRINGTON CELL :[phone]    From 5pm-7am Answering Service [phone]    -- RENAL FOLLOW UP NOTE ---Date of Service 12-09-21 @ 12:50    Patient is a 86y old  Male who presents with a chief complaint of SOB   Patient seen and examined at bedside.     VITALS:  T(F): 97.7 (12-09-21 @ 12:46), Max: 98 (12-08-21 @ 21:34)  HR: 60 (12-09-21 @ 12:46)  BP: 97/56 (12-09-21 @ 12:46)  RR: 19 (12-09-21 @ 12:46)  SpO2: 100% (12-09-21 @ 12:46)  Wt(kg): --    12-08 @ 07:01  -  12-09 @ 07:00  --------------------------------------------------------  IN: 740 mL / OUT: 375 mL / NET: 365 mL    PHYSICAL EXAM:  Constitutional: NAD  Neck: No JVD  Respiratory: CTAB, no wheezes, rales or rhonchi  Cardiovascular: S1, S2, RRR  Gastrointestinal: BS+, soft, NT/ND  Extremities: No peripheral edema    Hospital Medications:   MEDICATIONS  (STANDING):  acetaZOLAMIDE Injectable 250 milliGRAM(s) IV Push every 8 hours  allopurinol 200 milliGRAM(s) Oral daily  dextrose 5%. 1000 milliLiter(s) (50 mL/Hr) IV Continuous <Continuous>  doxazosin 4 milliGRAM(s) Oral at bedtime  influenza  Vaccine (HIGH DOSE) 0.7 milliLiter(s) IntraMuscular once  levoFLOXacin  Tablet 750 milliGRAM(s) Oral every 48 hours  metoprolol succinate ER 25 milliGRAM(s) Oral daily      LABS:  12-09    156<H>  |  109<H>  |  53<H>  ----------------------------<  106<H>  4.2   |  38<H>  |  1.12    Ca    9.1      09 Dec 2021 06:48      Creatinine Trend: 1.12 <--, 1.11 <--, 1.05 <--, 1.03 <--, 0.97 <--, 1.07 <--, 1.23 <--, 1.43 <--                                6.7    2.10  )-----------( 4        ( 09 Dec 2021 06:49 )             22.6     Urine Studies:  Urinalysis - [12-08-21 @ 20:09]      Color Light Yellow / Appearance Clear / SG 1.014 / pH 8.0      Gluc Negative / Ketone Negative  / Bili Negative / Urobili Negative       Blood Negative / Protein Trace / Leuk Est Negative / Nitrite Negative      RBC 1 / WBC 0 / Hyaline  / Gran  / Sq Epi  / Non Sq Epi 0 / Bacteria Negative    Urine Sodium 98      [12-08-21 @ 20:11]  Urine Osmolality 467      [12-08-21 @ 20:11]          RADIOLOGY & ADDITIONAL STUDIES:   [facility] [facility] – [place]OLOGY [facility]ACT[facility]   MD VIOLA BAHENA MD, PA INJUNG KO NP    TEL:  OFFICE: [phone]  DR DAY CELL: [phone]  DR. COON CELL: [phone]  ALANA NESBITT CELL: [phone]  PAUL HARRINGTON CELL :[phone]    From 5pm-7am Answering Service [phone]    -- RENAL FOLLOW UP NOTE ---Date of Service 12-09-21 @ 12:50    Patient is a 86y old  Male who presents with a chief complaint of SOB   Patient seen and examined at bedside. no apparent distress, not communicating    VITALS:  T(F): 97.7 (12-09-21 @ 12:46), Max: 98 (12-08-21 @ 21:34)  HR: 60 (12-09-21 @ 12:46)  BP: 97/56 (12-09-21 @ 12:46)  RR: 19 (12-09-21 @ 12:46)  SpO2: 100% (12-09-21 @ 12:46)  Wt(kg): --    12-08 @ 07:01  -  12-09 @ 07:00  --------------------------------------------------------  IN: 740 mL / OUT: 375 mL / NET: 365 mL    PHYSICAL EXAM:  Constitutional: NAD  Neck: No JVD  Respiratory: CTAB, no wheezes, rales or rhonchi  Cardiovascular: S1, S2, RRR  Gastrointestinal: BS+, soft, NT/ND  Extremities: No peripheral edema    Hospital Medications:   MEDICATIONS  (STANDING):  acetaZOLAMIDE Injectable 250 milliGRAM(s) IV Push every 8 hours  allopurinol 200 milliGRAM(s) Oral daily  dextrose 5%. 1000 milliLiter(s) (50 mL/Hr) IV Continuous <Continuous>  doxazosin 4 milliGRAM(s) Oral at bedtime  influenza  Vaccine (HIGH DOSE) 0.7 milliLiter(s) IntraMuscular once  levoFLOXacin  Tablet 750 milliGRAM(s) Oral every 48 hours  metoprolol succinate ER 25 milliGRAM(s) Oral daily      LABS:  12-09    156<H>  |  109<H>  |  53<H>  ----------------------------<  106<H>  4.2   |  38<H>  |  1.12    Ca    9.1      09 Dec 2021 06:48      Creatinine Trend: 1.12 <--, 1.11 <--, 1.05 <--, 1.03 <--, 0.97 <--, 1.07 <--, 1.23 <--, 1.43 <--                                6.7    2.10  )-----------( 4        ( 09 Dec 2021 06:49 )             22.6     Urine Studies:  Urinalysis - [12-08-21 @ 20:09]      Color Light Yellow / Appearance Clear / SG 1.014 / pH 8.0      Gluc Negative / Ketone Negative  / Bili Negative / Urobili Negative       Blood Negative / Protein Trace / Leuk Est Negative / Nitrite Negative      RBC 1 / WBC 0 / Hyaline  / Gran  / Sq Epi  / Non Sq Epi 0 / Bacteria Negative    Urine Sodium 98      [12-08-21 @ 20:11]  Urine Osmolality 467      [12-08-21 @ 20:11]          RADIOLOGY & ADDITIONAL STUDIES:

## 2021-12-09 NOTE — PROGRESS NOTE ADULT - PROBLEM SELECTOR PLAN 2
pt with h/o possible TACO on prior admission after receiving plt transfusion   holding lasix  holding transfusions unless blding

## 2021-12-09 NOTE — PROGRESS NOTE ADULT - PROBLEM SELECTOR PLAN 5
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

## 2021-12-09 NOTE — PROGRESS NOTE ADULT - PROBLEM SELECTOR PLAN 3
-  - May be secondary to recent transfusion vs high outpt failure in setting of anemia  - Echo showing nml LVEF  - Pt getting IV diamox.    -Continue daily weights unable to accurately record I+Os  -Trend cr   -Keep potassium and mag above 4 and 2 respectively  -Wean O2 as tolerated  -toprol xl 25 mg daily    follows with cardiologist dr meredith burrell

## 2021-12-09 NOTE — PROGRESS NOTE ADULT - ASSESSMENT
86M with PMH of HTN, Afib not on AC, s/p ablation, s/p AICD/PPM, prostate cancer s/p treatment, MDS with pancytopenia and transfusion dependent p/w SOB from [facility] while receiving PRBCs.     A/P:  1. Hypervolemic hypernatremia   - worsening, latest Na 156 (12/09/21)  - with CHF, getting diamox  - urine osmolality 467, Urine Na 98  - Will hydrate slowly with hypotonic fluid  - Increase D5 75cc/hr for 2L    - Monitor for fluid overload. Lasix PRN  - Next Na level in AM  - Na level should not change more than 6-8meq/L in 24 hrs     2. Anemia s/s MDS   - followed by hematology     3. Hematuria   -positive hematuria 12/04/21  -Repeat UA, if persistent,  needs kidney/bladder US   86M with PMH of HTN, Afib not on AC, s/p ablation, s/p AICD/PPM, prostate cancer s/p treatment, MDS with pancytopenia and transfusion dependent p/w SOB from [facility] while receiving PRBCs.     A/P:  1. Hypervolemic hypernatremia   - worsening, latest Na 156 (12/09/21)  - with CHF, getting diamox  - urine osmolality 467, Urine Na 98  -clinically euvolemic at present  - Will hydrate slowly with hypotonic fluid  - Increase D5 75cc/hr for 2L    - Monitor for fluid overload. Lasix PRN  - Next Na level at 5PM, call Dr. Troy with result  - Na level should not change more than 6-8meq/L in 24 hrs     2. Anemia s/s MDS   - followed by hematology     3. Hematuria   -positive hematuria 12/04/21  -Repeat UA is negative

## 2021-12-09 NOTE — PROGRESS NOTE ADULT - PROBLEM SELECTOR PLAN 1
2/2 transfusions  holding lasix for metabolic alkalosis  cont diamox 4 doses  pulm and cardio following holding lasix for metabolic alkalosis  cont diamox per pulm  pulm and cardio following  lethargy may be 2/2 hypercarbia, however, would check CT head ro bleed given thrombocytopenia  pt non compliant with bipap per nursing

## 2021-12-09 NOTE — PROGRESS NOTE ADULT - PROBLEM SELECTOR PLAN 3
p/w hypoxia/hypercapnea, has h/o HF with last TTE (10/6/21) with EF 45% and moderate diastolic dysfunction (Stage II)  BiPAP as above  diuresis on hold

## 2021-12-09 NOTE — PROGRESS NOTE ADULT - SUBJECTIVE AND OBJECTIVE BOX
Follow-up Pulm Progress Note  Oral Cortes MD  [phone]    Slept poorly: pulled off BIPAP  Sleeping, rousable at time of visit: normal RR on BIPAP 15/5  HCO3 38 today  Hb 6.7 and platelets 4      Vital Signs Last 24 Hrs  T(C): 36.5 (09 Dec 2021 12:46), Max: 36.7 (08 Dec 2021 21:34)  T(F): 97.7 (09 Dec 2021 12:46), Max: 98 (08 Dec 2021 21:34)  HR: 60 (09 Dec 2021 12:46) (59 - 71)  BP: 97/56 (09 Dec 2021 12:46) (95/57 - 115/53)  BP(mean): --  RR: 19 (09 Dec 2021 12:46) (19 - 20)  SpO2: 100% (09 Dec 2021 12:46) (94% - 100%)    ABG - ( 09 Dec 2021 07:17 )  pH, Arterial: 7.37  pH, Blood: x     /  pCO2: 75    /  pO2: 138   / HCO3: 43    / Base Excess: 14.4  /  SaO2: 99.6                            6.7    2.10  )-----------( 4        ( 09 Dec 2021 06:49 )             22.6     12-09    156<H>  |  109<H>  |  53<H>  ----------------------------<  106<H>  4.2   |  38<H>  |  1.12    Ca    9.1      09 Dec 2021 06:48      Physical Examination:  PULM: Dullness R base; few basilar crackles; discoordinate breathing with poor inspiration  CVS: Regular rate and rhythm, no murmurs, rubs, or gallops  ABD: Soft, non-tender  EXT:  1+ bilateral LE edema    RADIOLOGY REVIEWED  CXR:    EXAM:  XR CHEST PORTABLE URGENT 1V                            PROCEDURE DATE:  12/02/2021        INTERPRETATION:  DATE OF STUDY: 12/2/21    PRIOR:12/1/21    CLINICAL INDICATION: Pulmonary edema, post transfusion.    TECHNIQUE: portable chest - done erect.    FINDINGS/  IMPRESSION:  Stable cardiomegaly.  Left-sided AICD in place.  Interval improvement in pulmonary edema changes with mild decrease in right pleural effusion. Stable small left pleural effusion.  No new consolidations. No pneumothorax.    CT chest:    TTE:

## 2021-12-09 NOTE — PROGRESS NOTE ADULT - SUBJECTIVE AND OBJECTIVE BOX
SUBJECTIVE / OVERNIGHT EVENTS:    INCOMPLETE NOTE      --------------------------------------------------------------------------------------------  LABS:                        6.7    2.10  )-----------( 4        ( 09 Dec 2021 06:49 )             22.6         156<H>  |  109<H>  |  53<H>  ----------------------------<  106<H>  4.2   |  38<H>  |  1.12    Ca    9.1      09 Dec 2021 06:48        CAPILLARY BLOOD GLUCOSE            Urinalysis Basic - ( 08 Dec 2021 20:09 )    Color: Light Yellow / Appearance: Clear / S.014 / pH: x  Gluc: x / Ketone: Negative  / Bili: Negative / Urobili: Negative   Blood: x / Protein: Trace / Nitrite: Negative   Leuk Esterase: Negative / RBC: 1 /hpf / WBC 0 /HPF   Sq Epi: x / Non Sq Epi: 0 /hpf / Bacteria: Negative        RADIOLOGY & ADDITIONAL TESTS:    Imaging Personally Reviewed:  [x] YES  [ ] NO    Consultant(s) Notes Reviewed:  [x] YES  [ ] NO    MEDICATIONS  (STANDING):  acetaZOLAMIDE Injectable 250 milliGRAM(s) IV Push every 8 hours  allopurinol 200 milliGRAM(s) Oral daily  dextrose 5%. 1000 milliLiter(s) (50 mL/Hr) IV Continuous <Continuous>  doxazosin 4 milliGRAM(s) Oral at bedtime  influenza  Vaccine (HIGH DOSE) 0.7 milliLiter(s) IntraMuscular once  levoFLOXacin  Tablet 750 milliGRAM(s) Oral every 48 hours  metoprolol succinate ER 25 milliGRAM(s) Oral daily    MEDICATIONS  (PRN):  acetaminophen     Tablet .. 650 milliGRAM(s) Oral every 6 hours PRN Temp greater or equal to 38C (100.4F), Mild Pain (1 - 3)  aluminum hydroxide/magnesium hydroxide/simethicone Suspension 30 milliLiter(s) Oral every 4 hours PRN Dyspepsia  melatonin 3 milliGRAM(s) Oral at bedtime PRN Insomnia  ondansetron Injectable 4 milliGRAM(s) IV Push every 8 hours PRN Nausea and/or Vomiting      Care Discussed with Consultants/Other Providers [x] YES  [ ] NO    Vital Signs Last 24 Hrs  T(C): 36.4 (09 Dec 2021 05:03), Max: 36.7 (08 Dec 2021 21:34)  T(F): 97.5 (09 Dec 2021 05:03), Max: 98 (08 Dec 2021 21:34)  HR: 59 (09 Dec 2021 10:52) (59 - 71)  BP: 95/57 (09 Dec 2021 05:03) (95/57 - 115/53)  BP(mean): --  RR: 19 (09 Dec 2021 05:03) (19 - 20)  SpO2: 94% (09 Dec 2021 10:52) (94% - 100%)  I&O's Summary    08 Dec 2021 07:01  -  09 Dec 2021 07:00  --------------------------------------------------------  IN: 740 mL / OUT: 375 mL / NET: 365 mL    PE:  GENERAL: NAD, lethargic, not oriented, pale  HEAD:  Atraumatic, Normocephalic  CHEST/LUNG: CTABL, No wheeze  HEART: Regular rate and rhythm; No murmurs, rubs, or gallops  ABDOMEN: Soft, Nontender, Nondistended; Bowel sounds present  EXTREMITIES:  2+ Peripheral Pulses, No clubbing, cyanosis, or edema       SUBJECTIVE / OVERNIGHT EVENTS:    lethargic but arousable. cannot provide ros.      --------------------------------------------------------------------------------------------  LABS:                        6.7    2.10  )-----------( 4        ( 09 Dec 2021 06:49 )             22.6     12-    156<H>  |  109<H>  |  53<H>  ----------------------------<  106<H>  4.2   |  38<H>  |  1.12    Ca    9.1      09 Dec 2021 06:48        CAPILLARY BLOOD GLUCOSE            Urinalysis Basic - ( 08 Dec 2021 20:09 )    Color: Light Yellow / Appearance: Clear / S.014 / pH: x  Gluc: x / Ketone: Negative  / Bili: Negative / Urobili: Negative   Blood: x / Protein: Trace / Nitrite: Negative   Leuk Esterase: Negative / RBC: 1 /hpf / WBC 0 /HPF   Sq Epi: x / Non Sq Epi: 0 /hpf / Bacteria: Negative        RADIOLOGY & ADDITIONAL TESTS:    Imaging Personally Reviewed:  [x] YES  [ ] NO    Consultant(s) Notes Reviewed:  [x] YES  [ ] NO    MEDICATIONS  (STANDING):  acetaZOLAMIDE Injectable 250 milliGRAM(s) IV Push every 8 hours  allopurinol 200 milliGRAM(s) Oral daily  dextrose 5%. 1000 milliLiter(s) (50 mL/Hr) IV Continuous <Continuous>  doxazosin 4 milliGRAM(s) Oral at bedtime  influenza  Vaccine (HIGH DOSE) 0.7 milliLiter(s) IntraMuscular once  levoFLOXacin  Tablet 750 milliGRAM(s) Oral every 48 hours  metoprolol succinate ER 25 milliGRAM(s) Oral daily    MEDICATIONS  (PRN):  acetaminophen     Tablet .. 650 milliGRAM(s) Oral every 6 hours PRN Temp greater or equal to 38C (100.4F), Mild Pain (1 - 3)  aluminum hydroxide/magnesium hydroxide/simethicone Suspension 30 milliLiter(s) Oral every 4 hours PRN Dyspepsia  melatonin 3 milliGRAM(s) Oral at bedtime PRN Insomnia  ondansetron Injectable 4 milliGRAM(s) IV Push every 8 hours PRN Nausea and/or Vomiting      Care Discussed with Consultants/Other Providers [x] YES  [ ] NO    Vital Signs Last 24 Hrs  T(C): 36.4 (09 Dec 2021 05:03), Max: 36.7 (08 Dec 2021 21:34)  T(F): 97.5 (09 Dec 2021 05:03), Max: 98 (08 Dec 2021 21:34)  HR: 59 (09 Dec 2021 10:52) (59 - 71)  BP: 95/57 (09 Dec 2021 05:03) (95/57 - 115/53)  BP(mean): --  RR: 19 (09 Dec 2021 05:03) (19 - 20)  SpO2: 94% (09 Dec 2021 10:52) (94% - 100%)  I&O's Summary    08 Dec 2021 07:01  -  09 Dec 2021 07:00  --------------------------------------------------------  IN: 740 mL / OUT: 375 mL / NET: 365 mL    PE:  GENERAL: NAD, lethargic, not oriented, pale  HEAD:  Atraumatic, Normocephalic  CHEST/LUNG: decreased bs  HEART: RRR No murmurs, rubs, or gallops  ABDOMEN: Soft, Nontender, Nondistended; Bowel sounds present  EXTREMITIES:  2+ Peripheral Pulses, no le edema

## 2021-12-09 NOTE — PROGRESS NOTE ADULT - SUBJECTIVE AND OBJECTIVE BOX
[facility] Cardiology Progress Note  _______________________________    Pt. seen and examined. No new cardiac-related complaints.    Telemetry -v paced 60s    T(C): 36.4 (21 @ 05:03), Max: 36.7 (21 @ 21:34)  HR: 61 (21 @ 05:03) (59 - 71)  BP: 95/57 (21 @ 05:03) (95/57 - 115/53)  RR: 19 (21 @ 05:03) (19 - 20)  SpO2: 98% (21 @ 05:03) (95% - 100%)  I&O's Summary    08 Dec 2021 07:01  -  09 Dec 2021 07:00  --------------------------------------------------------  IN: 740 mL / OUT: 375 mL / NET: 365 mL        PHYSICAL EXAM:  GENERAL: Alert, NAD.  NECK: Supple.  CHEST/LUNG: bibasilar crackles.  HEART: S1 S2 normal, RRR; No murmurs, rubs, or gallops  ABDOMEN: Soft, Nondistended  EXTREMITIES:  No LE edema.      LABS:                        6.7    2.10  )-----------( 4        ( 09 Dec 2021 06:49 )             22.6         156<H>  |  109<H>  |  53<H>  ----------------------------<  106<H>  4.2   |  38<H>  |  1.12    Ca    9.1      09 Dec 2021 06:48                Urinalysis Basic - ( 08 Dec 2021 20:09 )    Color: Light Yellow / Appearance: Clear / S.014 / pH: x  Gluc: x / Ketone: Negative  / Bili: Negative / Urobili: Negative   Blood: x / Protein: Trace / Nitrite: Negative   Leuk Esterase: Negative / RBC: 1 /hpf / WBC 0 /HPF   Sq Epi: x / Non Sq Epi: 0 /hpf / Bacteria: Negative        MEDICATIONS  (STANDING):  acetaZOLAMIDE Injectable 250 milliGRAM(s) IV Push every 8 hours  allopurinol 200 milliGRAM(s) Oral daily  dextrose 5%. 1000 milliLiter(s) (50 mL/Hr) IV Continuous <Continuous>  doxazosin 4 milliGRAM(s) Oral at bedtime  influenza  Vaccine (HIGH DOSE) 0.7 milliLiter(s) IntraMuscular once  levoFLOXacin  Tablet 750 milliGRAM(s) Oral every 48 hours  metoprolol succinate ER 25 milliGRAM(s) Oral daily    MEDICATIONS  (PRN):  acetaminophen     Tablet .. 650 milliGRAM(s) Oral every 6 hours PRN Temp greater or equal to 38C (100.4F), Mild Pain (1 - 3)  aluminum hydroxide/magnesium hydroxide/simethicone Suspension 30 milliLiter(s) Oral every 4 hours PRN Dyspepsia  melatonin 3 milliGRAM(s) Oral at bedtime PRN Insomnia  ondansetron Injectable 4 milliGRAM(s) IV Push every 8 hours PRN Nausea and/or Vomiting        RADIOLOGY & ADDITIONAL TESTS:

## 2021-12-10 PROBLEM — D46.9 MDS (MYELODYSPLASTIC SYNDROME): Status: ACTIVE | Noted: 2021-01-01

## 2021-12-10 PROBLEM — I50.9 ACUTE EXACERBATION OF CHF (CONGESTIVE HEART FAILURE): Status: ACTIVE | Noted: 2021-01-01

## 2021-12-10 NOTE — PROGRESS NOTE ADULT - NSPROGADDITIONALINFOA_GEN_ALL_CORE
HCP Ashley Dial  DNR/DNI/NO PEG.  appreciate palliative care mtg - family wishes to optimize medical status, maximize diuresis, in the hope of improving respiratory status and potentially to be strong enough for the next round of chemotherapy offered by Hem/Onc.
dw dtr at bedside    DNR/DNI no PEG but does want to continue medical treatment in order to make it to next chemo
goals of care  DNR/DNI/NO PEG.  Ashley Dial HCP   to continue medical treatment in hopes to receive next round chemo

## 2021-12-10 NOTE — PROGRESS NOTE ADULT - ASSESSMENT
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 CXR unchanged. Concern for sepsis, CNS event  12/7: DNR/DNI; howver family wants ongoing treatment as tolerated. ? restrictive physiology contributing to cardiac dysfunction. ACute hypoxemic and hypercapneic resp failure noted.   12/8: Improved, tolerating nasal cannula. 7.45/68 PCO2 c/w contaction alkalosis and hypercapnea; however, still with discoordinate breathing  12/9: HCO3 now 38 - post Diamox X 3 Hypoxemic and hypercapneic resp failure  12/10: Refractory pancuptopenia with worsening hypercapnea: patient refusing BIPAP. Wife understands patient is pre-terminal and has stated goal is comfort.    REC    DC BIPAP  Continue nasal cannula  Pancytopenia management per primary team/oncolog  Patient DNR/DNI  Agree with plan for comfort measures only at this time  D/W medical team, wife, oncology

## 2021-12-10 NOTE — PROGRESS NOTE ADULT - PROBLEM SELECTOR PLAN 5
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

## 2021-12-10 NOTE — PROGRESS NOTE ADULT - SUBJECTIVE AND OBJECTIVE BOX
[facility] [place]   MD VIOLA BAHENA MD, PA INJUNG KO NP    TEL:  OFFICE: [phone]  DR DAY CELL: [phone]  DR. COON CELL: [phone]  ALANA NESBITT CELL: [phone]  PAUL HARRINGTON CELL :[phone]    From 5pm-7am Answering Service [phone]    -- RENAL FOLLOW UP NOTE ---Date of Service 12-10-21 @ 14:06    Patient is a 86y old  Male who presents with a chief complaint of SOB (10 Dec 2021 11:19)    Patient seen and examined at bedside. No chest pain/sob    VITALS:  T(F): 98 (12-10-21 @ 11:40), Max: 98 (12-10-21 @ 11:40)  HR: 62 (12-10-21 @ 11:40)  BP: 93/59 (12-10-21 @ 11:40)  RR: 19 (12-10-21 @ 11:40)  SpO2: 99% (12-10-21 @ 11:40)  Wt(kg): --    12-09 @ 07:01  -  12-10 @ 07:00  --------------------------------------------------------  IN: 1400 mL / OUT: 200 mL / NET: 1200 mL    12-10 @ 07:01  -  12-10 @ 14:06  --------------------------------------------------------  IN: 25 mL / OUT: 0 mL / NET: 25 mL          PHYSICAL EXAM:  Constitutional: NAD  Neck: No JVD  Respiratory: CTAB, no wheezes, rales or rhonchi  Cardiovascular: S1, S2, RRR  Gastrointestinal: BS+, soft, NT/ND  Extremities: No peripheral edema    Hospital Medications:   MEDICATIONS  (STANDING):  allopurinol 200 milliGRAM(s) Oral daily  doxazosin 4 milliGRAM(s) Oral at bedtime  influenza  Vaccine (HIGH DOSE) 0.7 milliLiter(s) IntraMuscular once  levoFLOXacin  Tablet 750 milliGRAM(s) Oral every 48 hours  metoprolol succinate ER 25 milliGRAM(s) Oral daily      LABS:  12-10    145  |  104  |  49<H>  ----------------------------<  250<H>  3.9   |  36<H>  |  1.11    Ca    8.4      10 Dec 2021 09:37      Creatinine Trend: 1.11 <--, 1.18 <--, 1.12 <--, 1.11 <--, 1.05 <--, 1.03 <--, 0.97 <--, 1.07 <--, 1.23 <--, 1.43 <--                                5.4    1.54  )-----------( 2        ( 10 Dec 2021 09:37 )             18.4     Urine Studies:  Urinalysis - [12-08-21 @ 20:09]      Color Light Yellow / Appearance Clear / SG 1.014 / pH 8.0      Gluc Negative / Ketone Negative  / Bili Negative / Urobili Negative       Blood Negative / Protein Trace / Leuk Est Negative / Nitrite Negative      RBC 1 / WBC 0 / Hyaline  / Gran  / Sq Epi  / Non Sq Epi 0 / Bacteria Negative    Urine Sodium 98      [12-08-21 @ 20:11]  Urine Osmolality 467      [12-08-21 @ 20:11]          RADIOLOGY & ADDITIONAL STUDIES:

## 2021-12-10 NOTE — PROGRESS NOTE ADULT - SUBJECTIVE AND OBJECTIVE BOX
Follow-up Pulm Progress Note  Oral Cortes MD  [phone]    Not tolerating BIPAP - lethargic, sleeping, on nasal cannula at time of visit  Worsening resp acidosis: 7.32 86/146 12/9  Hb 5.4 and pltelets 2  Patient seen with Heme-oncology team and wife at bedside      Vital Signs Last 24 Hrs  T(C): 36.7 (10 Dec 2021 11:40), Max: 36.7 (10 Dec 2021 11:40)  T(F): 98 (10 Dec 2021 11:40), Max: 98 (10 Dec 2021 11:40)  HR: 62 (10 Dec 2021 11:40) (59 - 65)  BP: 93/59 (10 Dec 2021 11:40) (93/59 - 106/61)  BP(mean): --  RR: 19 (10 Dec 2021 11:40) (18 - 23)  SpO2: 99% (10 Dec 2021 11:40) (98% - 100%)    ABG - ( 09 Dec 2021 18:01 )  pH, Arterial: 7.32  pH, Blood: x     /  pCO2: 86    /  pO2: 146   / HCO3: 44    / Base Excess: 14.0  /  SaO2: 99.5                           5.4    1.54  )-----------( 2        ( 10 Dec 2021 09:37 )             18.4     12-10    145  |  104  |  49<H>  ----------------------------<  250<H>  3.9   |  36<H>  |  1.11    Ca    8.4      10 Dec 2021 09:37      Physical Examination:  PULM: Dullness R base; few basilar crackles; discoordinate breathing with poor inspiration  CVS: Regular rate and rhythm, no murmurs, rubs, or gallops  ABD: Soft, non-tender  EXT:  1+ bilateral LE edema    RADIOLOGY REVIEWED  CXR:    EXAM:  XR CHEST PORTABLE URGENT 1V                            PROCEDURE DATE:  12/02/2021        INTERPRETATION:  DATE OF STUDY: 12/2/21    PRIOR:12/1/21    CLINICAL INDICATION: Pulmonary edema, post transfusion.    TECHNIQUE: portable chest - done erect.    FINDINGS/  IMPRESSION:  Stable cardiomegaly.  Left-sided AICD in place.  Interval improvement in pulmonary edema changes with mild decrease in right pleural effusion. Stable small left pleural effusion.  No new consolidations. No pneumothorax.    CT chest:    TTE:

## 2021-12-10 NOTE — PROGRESS NOTE ADULT - ATTENDING COMMENTS
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 He is bed bound ECOG PS 4 on high flow nasal oxygen. I agree with palliative care as discussed with the patient and his care giver.  Prognosis is guarded and do not resuscitate status is noted
sosa is in stuporous condition receiving platelet transfusion. Use of nasal oxygen discussed with friend care giver who wishes that only supportive care be given. Patient should be referred for hospice service given the poor performance status and incurability of disease.

## 2021-12-10 NOTE — PROGRESS NOTE ADULT - ASSESSMENT
86M with PMH of HTN, Afib not on AC, s/p ablation, s/p AICD/PPM, prostate cancer s/p treatment, MDS with pancytopenia and transfusion dependent p/w SOB from Meli while receiving PRBCs.     A/P:  1. Hypervolemic hypernatremia   - worsening mostly likely due to low Hgb 5.4 ; on transfusion   - with CHF, getting diamox  - urine osmolality 467, Urine Na 98  -clinically euvolemic at present  - D/C D5 75cc/hr for 2L    - Monitor for fluid overload. Lasix PRN  - Na level should not change more than 6-8meq/L in 24 hrs   - will follow up labs     2. Anemia s/s MDS   - followed by hematology   - On blood transfusion     3. Hematuria   -positive hematuria 12/04/21  -Repeat UA is negative  - will monitor  86M with PMH of HTN, Afib not on AC, s/p ablation, s/p AICD/PPM, prostate cancer s/p treatment, MDS with pancytopenia and transfusion dependent p/w SOB from Meli while receiving PRBCs.     A/P:  1. Hypervolemic hypernatremia   - with CHF,  - urine osmolality 467-suggest dehydartion  -clinically euvolemic at present  -s/p D5 75cc/hr for 1L  - Na level improved-no more IVF at present  - Monitor for fluid overload. Lasix PRN  - Na level should not change more than 6-8meq/L in 24 hrs   - will follow up labs     2. Anemia s/s MDS   - followed by hematology   - getting blood transfusion     3. Hematuria   -positive hematuria 12/04/21  -Repeat UA is negative  - will monitor

## 2021-12-10 NOTE — PROGRESS NOTE ADULT - SUBJECTIVE AND OBJECTIVE BOX
SUBJECTIVE / OVERNIGHT EVENTS:    INCOMPLETE NOTE      --------------------------------------------------------------------------------------------  LABS:                        5.4    1.54  )-----------( 2        ( 10 Dec 2021 09:37 )             18.4     12-10    145  |  104  |  49<H>  ----------------------------<  250<H>  3.9   |  36<H>  |  1.11    Ca    8.4      10 Dec 2021 09:37        CAPILLARY BLOOD GLUCOSE            Urinalysis Basic - ( 08 Dec 2021 20:09 )    Color: Light Yellow / Appearance: Clear / S.014 / pH: x  Gluc: x / Ketone: Negative  / Bili: Negative / Urobili: Negative   Blood: x / Protein: Trace / Nitrite: Negative   Leuk Esterase: Negative / RBC: 1 /hpf / WBC 0 /HPF   Sq Epi: x / Non Sq Epi: 0 /hpf / Bacteria: Negative        RADIOLOGY & ADDITIONAL TESTS:    Imaging Personally Reviewed:  [x] YES  [ ] NO    Consultant(s) Notes Reviewed:  [x] YES  [ ] NO    MEDICATIONS  (STANDING):  allopurinol 200 milliGRAM(s) Oral daily  dextrose 5%. 1000 milliLiter(s) (75 mL/Hr) IV Continuous <Continuous>  doxazosin 4 milliGRAM(s) Oral at bedtime  influenza  Vaccine (HIGH DOSE) 0.7 milliLiter(s) IntraMuscular once  levoFLOXacin  Tablet 750 milliGRAM(s) Oral every 48 hours  metoprolol succinate ER 25 milliGRAM(s) Oral daily    MEDICATIONS  (PRN):  acetaminophen     Tablet .. 650 milliGRAM(s) Oral every 6 hours PRN Temp greater or equal to 38C (100.4F), Mild Pain (1 - 3)  aluminum hydroxide/magnesium hydroxide/simethicone Suspension 30 milliLiter(s) Oral every 4 hours PRN Dyspepsia  melatonin 3 milliGRAM(s) Oral at bedtime PRN Insomnia  ondansetron Injectable 4 milliGRAM(s) IV Push every 8 hours PRN Nausea and/or Vomiting      Care Discussed with Consultants/Other Providers [x] YES  [ ] NO    Vital Signs Last 24 Hrs  T(C): 36.6 (10 Dec 2021 10:57), Max: 36.6 (10 Dec 2021 10:57)  T(F): 97.8 (10 Dec 2021 10:57), Max: 97.8 (10 Dec 2021 10:57)  HR: 65 (10 Dec 2021 10:57) (59 - 65)  BP: 95/55 (10 Dec 2021 10:57) (95/55 - 106/61)  BP(mean): --  RR: 19 (10 Dec 2021 10:57) (18 - 23)  SpO2: 98% (10 Dec 2021 10:57) (98% - 100%)  I&O's Summary    09 Dec 2021 07:01  -  10 Dec 2021 07:00  --------------------------------------------------------  IN: 1400 mL / OUT: 200 mL / NET: 1200 mL    PE:  GENERAL: NAD, lethargic, not oriented, pale  HEAD:  Atraumatic, Normocephalic  CHEST/LUNG: decreased bs  HEART: RRR No murmurs, rubs, or gallops  ABDOMEN: Soft, Nontender, Nondistended; Bowel sounds present  EXTREMITIES:  2+ Peripheral Pulses, no le edema       SUBJECTIVE / OVERNIGHT EVENTS:    lethargic. just received IV benadryl for pre medication. per RN, was agitate and climbing out of bed earlier.       --------------------------------------------------------------------------------------------  LABS:                        5.4    1.54  )-----------( 2        ( 10 Dec 2021 09:37 )             18.4     12-10    145  |  104  |  49<H>  ----------------------------<  250<H>  3.9   |  36<H>  |  1.11    Ca    8.4      10 Dec 2021 09:37        CAPILLARY BLOOD GLUCOSE            Urinalysis Basic - ( 08 Dec 2021 20:09 )    Color: Light Yellow / Appearance: Clear / S.014 / pH: x  Gluc: x / Ketone: Negative  / Bili: Negative / Urobili: Negative   Blood: x / Protein: Trace / Nitrite: Negative   Leuk Esterase: Negative / RBC: 1 /hpf / WBC 0 /HPF   Sq Epi: x / Non Sq Epi: 0 /hpf / Bacteria: Negative        RADIOLOGY & ADDITIONAL TESTS:    Imaging Personally Reviewed:  [x] YES  [ ] NO    Consultant(s) Notes Reviewed:  [x] YES  [ ] NO    MEDICATIONS  (STANDING):  allopurinol 200 milliGRAM(s) Oral daily  dextrose 5%. 1000 milliLiter(s) (75 mL/Hr) IV Continuous <Continuous>  doxazosin 4 milliGRAM(s) Oral at bedtime  influenza  Vaccine (HIGH DOSE) 0.7 milliLiter(s) IntraMuscular once  levoFLOXacin  Tablet 750 milliGRAM(s) Oral every 48 hours  metoprolol succinate ER 25 milliGRAM(s) Oral daily    MEDICATIONS  (PRN):  acetaminophen     Tablet .. 650 milliGRAM(s) Oral every 6 hours PRN Temp greater or equal to 38C (100.4F), Mild Pain (1 - 3)  aluminum hydroxide/magnesium hydroxide/simethicone Suspension 30 milliLiter(s) Oral every 4 hours PRN Dyspepsia  melatonin 3 milliGRAM(s) Oral at bedtime PRN Insomnia  ondansetron Injectable 4 milliGRAM(s) IV Push every 8 hours PRN Nausea and/or Vomiting      Care Discussed with Consultants/Other Providers [x] YES  [ ] NO    Vital Signs Last 24 Hrs  T(C): 36.6 (10 Dec 2021 10:57), Max: 36.6 (10 Dec 2021 10:57)  T(F): 97.8 (10 Dec 2021 10:57), Max: 97.8 (10 Dec 2021 10:57)  HR: 65 (10 Dec 2021 10:57) (59 - 65)  BP: 95/55 (10 Dec 2021 10:57) (95/55 - 106/61)  BP(mean): --  RR: 19 (10 Dec 2021 10:57) (18 - 23)  SpO2: 98% (10 Dec 2021 10:57) (98% - 100%)  I&O's Summary    09 Dec 2021 07:01  -  10 Dec 2021 07:00  --------------------------------------------------------  IN: 1400 mL / OUT: 200 mL / NET: 1200 mL    PE:  GENERAL: NAD, lethargic  HEAD:  Atraumatic, Normocephalic  CHEST/LUNG: decreased bs  HEART: RRR No murmurs, rubs, or gallops  ABDOMEN: Soft, Nontender, Nondistended; Bowel sounds present  EXTREMITIES:  2+ Peripheral Pulses, no le edema  neuro: lethargic unable to assess

## 2021-12-10 NOTE — PROGRESS NOTE ADULT - ASSESSMENT
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 Started on azacitidine. Plan is to continue 4C of azacitidine followed by [facility]  -supportive care per primary team   -Levaquin ppx    Goals of Care   -GOC re-addressed with patient's HCP, and pulmonology at the bedside. Given that he has had difficulty tolerating transfusion support and has had decrease QOL as a result of recurrent hospitalizations, now complicated by altered mental status and hypoxemic/hypercarbic respirator distress the decision has been made to focus on the patient's comfort. On prior discussions when patient was alert/awake he himself had stated that he would like to focus on his comfort . Per his significant other, the patient had a lucid interval earlier today during which he stated he does not want BIPAP and that he basically just wants to focus on comfort. Given overall clinical picture and poor prognosis, not opposed to pursuing comfort measures. Can transfuse as needed/tolerated if patient is symptomatically anemic if in keeping with his goals of care; can also refuse transfusion. If transfusing blood, would give split units and transfuse slowly with diuretic support after each transfusion given hx of volume overload. Significant other would like to hold of further blood products at this time

## 2021-12-10 NOTE — PROGRESS NOTE ADULT - PROBLEM SELECTOR PLAN 1
holding lasix for metabolic alkalosis  cont diamox per pulm  pulm and cardio following  lethargy may be 2/2 hypercarbia, however, would check CT head ro bleed given thrombocytopenia  pt non compliant with bipap per nursing holding lasix for metabolic alkalosis  cont diamox per pulm  pulm and cardio following  lethargy may be 2/2 hypercarbia  CT head neg for bleed  poor prognosis

## 2021-12-10 NOTE — PROGRESS NOTE ADULT - SUBJECTIVE AND OBJECTIVE BOX
****************************************  Jalen Ruiz   Hematology/Oncology  [phone]  ****************************************  SUBJECTIVE  Patient seen and examined at bedside. No acute events overnight  On nasal cannula. Somnolent. Unable to obtain ROS  Significant other present at bedside    OBJECTIVE   Vital Signs Last 24 Hrs  T(C): 36.7 (10 Dec 2021 11:40), Max: 36.7 (10 Dec 2021 11:40)  T(F): 98 (10 Dec 2021 11:40), Max: 98 (10 Dec 2021 11:40)  HR: 60 (10 Dec 2021 15:31) (59 - 65)  BP: 93/59 (10 Dec 2021 11:40) (93/59 - 106/61)  BP(mean): --  RR: 19 (10 Dec 2021 11:40) (18 - 23)  SpO2: 98% (10 Dec 2021 15:31) (98% - 100%)    21 @ 07:  -  12-10-21 @ 07:00  --------------------------------------------------------  IN: 1400 mL / OUT: 200 mL / NET: 1200 mL    12-10-21 @ 07:01  -  12-10-21 @ 15:50  --------------------------------------------------------  IN: 25 mL / OUT: 0 mL / NET: 25 mL      PHYSICAL EXAM:  Constitutional: NAD, somnolent  ENT:  supple  Cardiovascular:   normal rate and rhythm   Respiratory:  on nasal cannula, decreased breath sounds   GI:  non-distended   Neurologic: somnolent, moves extremities spontaneously           LABS                        5.4    1.54  )-----------( 2        ( 10 Dec 2021 09:37 )             18.4       12-10    145  |  104  |  49<H>  ----------------------------<  250<H>  3.9   |  36<H>  |  1.11    Ca    8.4      10 Dec 2021 09:37        Urinalysis Basic - ( 08 Dec 2021 20:09 )    Color: Light Yellow / Appearance: Clear / S.014 / pH: x  Gluc: x / Ketone: Negative  / Bili: Negative / Urobili: Negative   Blood: x / Protein: Trace / Nitrite: Negative   Leuk Esterase: Negative / RBC: 1 /hpf / WBC 0 /HPF   Sq Epi: x / Non Sq Epi: 0 /hpf / Bacteria: Negative        Follow Up:      RADIOLOGY:

## 2021-12-10 NOTE — PROGRESS NOTE ADULT - SUBJECTIVE AND OBJECTIVE BOX
[facility] Cardiology Progress Note  _______________________________    Pt. seen and examined. sleeping    Telemetry -jonathan, vpaced 60s    T(C): 36.4 (12-10-21 @ 04:15), Max: 36.5 (21 @ 12:46)  HR: 61 (12-10-21 @ 04:15) (59 - 61)  BP: 106/61 (12-10-21 @ 04:15) (97/56 - 106/61)  RR: 20 (12-10-21 @ 04:15) (18 - 23)  SpO2: 100% (12-10-21 @ 04:15) (94% - 100%)  I&O's Summary    09 Dec 2021 07:01  -  10 Dec 2021 07:00  --------------------------------------------------------  IN: 1400 mL / OUT: 200 mL / NET: 1200 mL        PHYSICAL EXAM:  GENERAL: NAD.  NECK: Supple.  CHEST/LUNG: Clear to anterior auscultation bilaterally; No significant wheezes, rales, or rhonchi.  HEART: S1 S2 normal, RRR; No murmurs, rubs, or gallops  ABDOMEN: Soft, Nondistended  EXTREMITIES:  No LE edema.      LABS:                        6.7    2.10  )-----------( 4        ( 09 Dec 2021 06:49 )             22.6         154<H>  |  109<H>  |  53<H>  ----------------------------<  108<H>  4.4   |  38<H>  |  1.18    Ca    9.4      09 Dec 2021 17:12                Urinalysis Basic - ( 08 Dec 2021 20:09 )    Color: Light Yellow / Appearance: Clear / S.014 / pH: x  Gluc: x / Ketone: Negative  / Bili: Negative / Urobili: Negative   Blood: x / Protein: Trace / Nitrite: Negative   Leuk Esterase: Negative / RBC: 1 /hpf / WBC 0 /HPF   Sq Epi: x / Non Sq Epi: 0 /hpf / Bacteria: Negative        MEDICATIONS  (STANDING):  allopurinol 200 milliGRAM(s) Oral daily  dextrose 5%. 1000 milliLiter(s) (75 mL/Hr) IV Continuous <Continuous>  doxazosin 4 milliGRAM(s) Oral at bedtime  influenza  Vaccine (HIGH DOSE) 0.7 milliLiter(s) IntraMuscular once  levoFLOXacin  Tablet 750 milliGRAM(s) Oral every 48 hours  metoprolol succinate ER 25 milliGRAM(s) Oral daily    MEDICATIONS  (PRN):  acetaminophen     Tablet .. 650 milliGRAM(s) Oral every 6 hours PRN Temp greater or equal to 38C (100.4F), Mild Pain (1 - 3)  aluminum hydroxide/magnesium hydroxide/simethicone Suspension 30 milliLiter(s) Oral every 4 hours PRN Dyspepsia  melatonin 3 milliGRAM(s) Oral at bedtime PRN Insomnia  ondansetron Injectable 4 milliGRAM(s) IV Push every 8 hours PRN Nausea and/or Vomiting        RADIOLOGY & ADDITIONAL TESTS:

## 2021-12-10 NOTE — PROGRESS NOTE ADULT - PROBLEM SELECTOR PLAN 2
pt with h/o possible TACO on prior admission after receiving plt transfusion   holding lasix  holding transfusions unless blding pt with h/o possible TACO on prior admission after receiving plt transfusion   holding lasix

## 2021-12-11 NOTE — PROGRESS NOTE ADULT - ASSESSMENT
86M with PMH of HTN, Afib not on AC, s/p ablation, s/p AICD/PPM, prostate cancer s/p treatment, MDS with pancytopenia and transfusion dependent p/w SOB from Meli while receiving PRBCs.     A/P:  1. Hypervolemic hypernatremia   - latest Na 154 (12/11/21)  - with CHF, getting diamox  - urine osmolality 467, Urine Na 98  - Continue D5 75cc/hr for 1 L  - Monitor for fluid overload. Lasix PRN  - Monitor Na    2. Anemia s/s MDS   - followed by hematology   - Hgb increased to 6.7 (12/11) from 5.4 (11/10) after receiving PRBCs    3. Hematuria   -positive hematuria 12/04/21  -Repeat UA is negative 86M with PMH of HTN, Afib not on AC, s/p ablation, s/p AICD/PPM, prostate cancer s/p treatment, MDS with pancytopenia and transfusion dependent p/w SOB from Meli while receiving PRBCs.     A/P:  1. Hypervolemic hypernatremia   - latest Na 154 (12/11/21)  - with CHF, getting diamox  - urine osmolality 467, Urine Na 98  - IVF D5 75cc/hr for 1 L  - Monitor for fluid overload. Lasix PRN  - Monitor Na    2. Anemia s/s MDS   - followed by hematology   - Hgb increased to 6.7 (12/11) from 5.4 (11/10) after receiving PRBCs    3. Hematuria   -positive hematuria 12/04/21  -Repeat UA is negative

## 2021-12-11 NOTE — PROGRESS NOTE ADULT - SUBJECTIVE AND OBJECTIVE BOX
Patient is a 86y old  Male who presents with a chief complaint of SOB (10 Dec 2021 15:50)      SUBJECTIVE / OVERNIGHT EVENTS:    Patient seen and examined. calm. eyes open. cannot provide ros 2/2 confusion.       Vital Signs Last 24 Hrs  T(C): 36.9 (11 Dec 2021 12:08), Max: 36.9 (11 Dec 2021 03:04)  T(F): 98.4 (11 Dec 2021 12:08), Max: 98.5 (11 Dec 2021 05:22)  HR: 59 (11 Dec 2021 12:08) (59 - 65)  BP: 98/48 (11 Dec 2021 12:08) (91/53 - 105/56)  BP(mean): --  RR: 20 (11 Dec 2021 12:08) (18 - 20)  SpO2: 96% (11 Dec 2021 05:22) (96% - 99%)  I&O's Summary    10 Dec 2021 07:01  -  11 Dec 2021 07:00  --------------------------------------------------------  IN: 665 mL / OUT: 100 mL / NET: 565 mL        PE:  GENERAL: NAD, lethargic but arousable  HEAD:  Atraumatic, Normocephalic  CHEST/LUNG: bl ronchi  HEART: RRR No murmurs, rubs, or gallops  ABDOMEN: Soft, Nontender, Nondistended; Bowel sounds present  EXTREMITIES:  2+ Peripheral Pulses, no le edema    LABS:                        6.7    3.04  )-----------( 9        ( 11 Dec 2021 05:09 )             22.3     12-11    154<H>  |  111<H>  |  56<H>  ----------------------------<  89  4.3   |  35<H>  |  1.31<H>    Ca    9.4      11 Dec 2021 05:09        CAPILLARY BLOOD GLUCOSE                RADIOLOGY & ADDITIONAL TESTS:    Imaging Personally Reviewed:  [x] YES  [ ] NO    Consultant(s) Notes Reviewed:  [x] YES  [ ] NO    MEDICATIONS  (STANDING):  allopurinol 200 milliGRAM(s) Oral daily  dextrose 5%. 1000 milliLiter(s) (60 mL/Hr) IV Continuous <Continuous>  doxazosin 4 milliGRAM(s) Oral at bedtime  influenza  Vaccine (HIGH DOSE) 0.7 milliLiter(s) IntraMuscular once  levoFLOXacin  Tablet 750 milliGRAM(s) Oral every 48 hours  metoprolol succinate ER 25 milliGRAM(s) Oral daily    MEDICATIONS  (PRN):  acetaminophen     Tablet .. 650 milliGRAM(s) Oral every 6 hours PRN Temp greater or equal to 38C (100.4F), Mild Pain (1 - 3)  aluminum hydroxide/magnesium hydroxide/simethicone Suspension 30 milliLiter(s) Oral every 4 hours PRN Dyspepsia  melatonin 3 milliGRAM(s) Oral at bedtime PRN Insomnia  ondansetron Injectable 4 milliGRAM(s) IV Push every 8 hours PRN Nausea and/or Vomiting      Care Discussed with Consultants/Other Providers [x] YES  [ ] NO    HEALTH ISSUES - PROBLEM Dx:  Acute respiratory failure with hypoxia and hypercapnia    Transfusion associated circulatory overload    Acute on chronic systolic heart failure    Hypernatremia    MDS (myelodysplastic syndrome)    Chronic atrial fibrillation    HTN (hypertension)    Suspected pulmonary embolism    Suspected deep vein thrombosis (DVT)    Pericardial effusion    Other encephalopathy    Quadriplegia, functional    ACP (advance care planning)    Encounter for palliative care    Acute on chronic diastolic congestive heart failure

## 2021-12-11 NOTE — PROVIDER CONTACT NOTE (CRITICAL VALUE NOTIFICATION) - SITUATION
PLT=6
Platelet count 10
hgb 6.7, platelet 9
Zaid rutherford  to follow up
ABG results: Bicarb 47, H/H 6.7/20

## 2021-12-11 NOTE — PROGRESS NOTE ADULT - ASSESSMENT
86M with PMH of HTN, Afib not on AC, s/p ablation, s/p AICD/PPM, prostate cancer s/p treatment, MDS with pancytopenia and transfusion dependent p/w SOB from Meli while receiving PRBCs, a/w hypoxic and hypercapnic respiratory failure 2/2 TACO vs HF exacerbation.     # Acute respiratory failure with hypoxia and hypercapnia  # Transfusion associated circulatory overload  # Acute on chronic combined heart failure  holding lasix for metabolic alkalosis  CT head neg for bleed  poor prognosissupportive care    # Hypernatremia  D5 per renal    # MDS (myelodysplastic syndrome)  # AML  # pancytopenia  poor prognosishold further transfusions  comfort care    # Chronic atrial fibrillation  toprol XL  no AC    # HTN  cont BB    goals of care:  palliative fu, no more transfusions, comfort care  DNR/DNI/NO PEG.  Ashley Dial HCP

## 2021-12-11 NOTE — PROGRESS NOTE ADULT - SUBJECTIVE AND OBJECTIVE BOX
[facility] [place] [facility]   MD VIOLA BAHENA PA MIJUNG SHIN NP     TEL:  OFFICE: [phone]  DR DAY CELL: [phone]  DR. COON CELL: [phone]  ALANA NESBITT CELL: [phone]  NP Kathrine Simpson CELL: [phone]    From 5pm-7am Answering Service [phone]    -- RENAL FOLLOW UP NOTE ---Date of Service 12-11-21 @ 14:23    Patient is a 86y old  Male who presents with a chief complaint of SOB (11 Dec 2021 14:06)      Patient seen and examined at bedside. No chest pain, no shortness of breath    VITALS:  T(F): 98.4 (12-11-21 @ 12:08), Max: 98.5 (12-11-21 @ 05:22)  HR: 59 (12-11-21 @ 12:08)  BP: 98/48 (12-11-21 @ 12:08)  RR: 20 (12-11-21 @ 12:08)  SpO2: 96% (12-11-21 @ 05:22)  Wt(kg): --    12-10 @ 07:01  -  12-11 @ 07:00  --------------------------------------------------------  IN: 665 mL / OUT: 100 mL / NET: 565 mL          PHYSICAL EXAM:  Constitutional: NAD  Neck: No JVD, supple  Respiratory: CTAB, no wheezes, rales or rhonchi  Cardiovascular: S1, S2, RRR  Gastrointestinal: BS+, soft, NT/ND  Skin: intact  Extremities: No peripheral edema, no calf tenderness  Neurology: no focal deficit, aox3    Hospital Medications:   MEDICATIONS  (STANDING):  allopurinol 200 milliGRAM(s) Oral daily  dextrose 5%. 1000 milliLiter(s) (60 mL/Hr) IV Continuous <Continuous>  doxazosin 4 milliGRAM(s) Oral at bedtime  influenza  Vaccine (HIGH DOSE) 0.7 milliLiter(s) IntraMuscular once  levoFLOXacin  Tablet 750 milliGRAM(s) Oral every 48 hours  metoprolol succinate ER 25 milliGRAM(s) Oral daily      LABS:  12-11    154<H>  |  111<H>  |  56<H>  ----------------------------<  89  4.3   |  35<H>  |  1.31<H>    Ca    9.4      11 Dec 2021 05:09      Creatinine Trend: 1.31 <--, 1.11 <--, 1.18 <--, 1.12 <--, 1.11 <--, 1.05 <--, 1.03 <--, 0.97 <--, 1.07 <--                                6.7    3.04  )-----------( 9        ( 11 Dec 2021 05:09 )             22.3     Urine Studies:  Urinalysis - [12-08-21 @ 20:09]      Color Light Yellow / Appearance Clear / SG 1.014 / pH 8.0      Gluc Negative / Ketone Negative  / Bili Negative / Urobili Negative       Blood Negative / Protein Trace / Leuk Est Negative / Nitrite Negative      RBC 1 / WBC 0 / Hyaline  / Gran  / Sq Epi  / Non Sq Epi 0 / Bacteria Negative    Urine Sodium 98      [12-08-21 @ 20:11]  Urine Osmolality 467      [12-08-21 @ 20:11]          RADIOLOGY & ADDITIONAL STUDIES:

## 2021-12-11 NOTE — PROVIDER CONTACT NOTE (CRITICAL VALUE NOTIFICATION) - BACKGROUND
Pt admitted for hypervolemia from TACO vs CHF exacerbation, PMH of MDS
Pt admitted with SOB, CHF. Hx of low PLT
86M w/ PMH of HTN, Afib not on AC, s/p ablation, s/p AICD/PPM, prostate cancer s/p treatment, MDS w/ pancytopenia and transfusion dependent p/w SOB from Meli while receiving PRBCs, a/w hypoxic and hypercapnic respiratory failure 2/2 TACO vs HF exacerbation.
Patient admitted for SOB, hypoxia, respiratory failure, TACO.  PMH of MDS with pancytopenia, AFib, HTN, prostate cancer s/p treatment
Hypoxia

## 2021-12-11 NOTE — PROVIDER CONTACT NOTE (CRITICAL VALUE NOTIFICATION) - ACTION/TREATMENT ORDERED:
NP aware and to order PLT and blood
Will continue to monitor
to follow up with PA/NP
PA notified and aware.  No new orders at this time.  Will endorse to primary team.  Continue to monitor pt
Provider notified. Labs reviewed. will continue to monitor.

## 2021-12-11 NOTE — PROVIDER CONTACT NOTE (CRITICAL VALUE NOTIFICATION) - ASSESSMENT
Pt stable. VSS. No S+S of bleeding
pt A&Ox1-2, received 1 unit PRBS during shift, VSS.
A&O x2-3.  No active s/s of bleeding at this time.  Patient DNR/DNI.  Tolerated BiPAP overnight and currently on 4L N/C
Patient A&Ox4 Resting comfortably in bed.
alert and confused on 4 L nasal  , uncooperative, removes nasal cannula

## 2021-12-12 NOTE — CHART NOTE - NSCHARTNOTESELECT_GEN_ALL_CORE
Event Note
Event Note
Hematuria/Event Note
Heme/Event Note
-  Thrombocytopenia/Event Note
Event Note
Event Note
Nutrition Services
POCUS
Post RRT note/Event Note

## 2021-12-12 NOTE — PROGRESS NOTE ADULT - ASSESSMENT
86M with PMH of HTN, Afib not on AC, s/p ablation, s/p AICD/PPM, prostate cancer s/p treatment, MDS with pancytopenia and transfusion dependent     A/P:  MIMI  Baseline Scr  1.40 on 12/12, elevating  monitor BMP closely    Hypotension due to MIMI  on holding BP meds  monitor BP    Anemia s/s MDS   followed by hematology   getting blood transfusion   86M with PMH of HTN, Afib not on AC, s/p ablation, s/p AICD/PPM, prostate cancer s/p treatment, MDS with pancytopenia and transfusion dependent p/w SOB from Meli while receiving PRBCs.     A/P:  1. Hypervolemic hypernatremia   - with CHF, currently off diuretics  - urine osmolality 467, Urine Na 98  - IVF D5 75cc/hr for 2 L  - repeat Na level at 4PM  - Monitor for fluid overload. Lasix PRN  - Monitor Na    2. Anemia s/s MDS   - followed by hematology   - s/p  PRBCs    3. Hematuria   -positive hematuria 12/04/21  -Repeat UA is negative

## 2021-12-12 NOTE — PROGRESS NOTE ADULT - SUBJECTIVE AND OBJECTIVE BOX
[facility]   MD VIOLA BAHENA PA MIJUNG SHIN NP     TEL:  OFFICE: [phone]  DR DAY CELL: [phone]  DR. COON CELL: [phone]  ALANA NESBITT CELL: [phone]  NP Kathrine Simpson CELL: [phone]    From 5pm-7am Answering Service [phone]    -- RENAL FOLLOW UP NOTE ---Date of Service 12-12-21 @ 12:14    Patient is a 86y old  Male who presents with a chief complaint of SOB.      Patient seen and examined at bedside. No chest pain/sob    VITALS:  T(F): 98.2 (12-12-21 @ 05:08), Max: 98.2 (12-12-21 @ 05:08)  HR: 80 (12-12-21 @ 05:08)  BP: 103/47 (12-12-21 @ 05:08)  RR: 20 (12-12-21 @ 05:08)  SpO2: 96% (12-12-21 @ 05:08)  Wt(kg): --    12-11 @ 07:01  -  12-12 @ 07:00  --------------------------------------------------------  IN: 120 mL / OUT: 0 mL / NET: 120 mL    12-12 @ 07:01  -  12-12 @ 12:14  --------------------------------------------------------  IN: 110 mL / OUT: 0 mL / NET: 110 mL          PHYSICAL EXAM:  Constitutional: NAD  Neck: No JVD  Respiratory: CTAB, no wheezes, rales or rhonchi  Cardiovascular: S1, S2, RRR  Gastrointestinal: BS+, soft, NT/ND  Extremities: No peripheral edema    Hospital Medications:   MEDICATIONS  (STANDING):  allopurinol 200 milliGRAM(s) Oral daily  dextrose 5%. 1000 milliLiter(s) (60 mL/Hr) IV Continuous <Continuous>  dextrose 5%. 1000 milliLiter(s) (75 mL/Hr) IV Continuous <Continuous>  doxazosin 4 milliGRAM(s) Oral at bedtime  influenza  Vaccine (HIGH DOSE) 0.7 milliLiter(s) IntraMuscular once  levoFLOXacin  Tablet 750 milliGRAM(s) Oral every 48 hours  metoprolol tartrate 12.5 milliGRAM(s) Oral two times a day      LABS:  12-12    155<H>  |  108  |  57<H>  ----------------------------<  99  4.1   |  38<H>  |  1.40<H>    Ca    9.0      12 Dec 2021 06:46      Creatinine Trend: 1.40 <--, 1.31 <--, 1.11 <--, 1.18 <--, 1.12 <--, 1.11 <--, 1.05 <--, 1.03 <--, 0.97 <--                                6.7    2.83  )-----------( 7        ( 12 Dec 2021 06:46 )             22.5     Urine Studies:  Urinalysis - [12-08-21 @ 20:09]      Color Light Yellow / Appearance Clear / SG 1.014 / pH 8.0      Gluc Negative / Ketone Negative  / Bili Negative / Urobili Negative       Blood Negative / Protein Trace / Leuk Est Negative / Nitrite Negative      RBC 1 / WBC 0 / Hyaline  / Gran  / Sq Epi  / Non Sq Epi 0 / Bacteria Negative    Urine Sodium 98      [12-08-21 @ 20:11]  Urine Osmolality 467      [12-08-21 @ 20:11]          RADIOLOGY & ADDITIONAL STUDIES:

## 2021-12-12 NOTE — PROGRESS NOTE ADULT - ASSESSMENT
86M with PMH of HTN, Afib not on AC, s/p ablation, s/p AICD/PPM, prostate cancer s/p treatment, MDS with pancytopenia and transfusion dependent p/w SOB from Meli while receiving PRBCs, a/w hypoxic and hypercapnic respiratory failure 2/2 TACO vs HF exacerbation.     # Acute respiratory failure with hypoxia and hypercapnia  # Transfusion associated circulatory overload  # Acute on chronic combined heart failure  # Hypernatremia  # MDS (myelodysplastic syndrome)  # AML  # pancytopenia  # Chronic atrial fibrillation  # HTN  poor prognosis  palliative care fu  D5  ativan  toprol XL    goals of care:  palliative fu, no more transfusions, comfort care  DNR/DNI/NO PEG.  Ashley Dial HCP

## 2021-12-12 NOTE — PROGRESS NOTE ADULT - SUBJECTIVE AND OBJECTIVE BOX
Patient is a 86y old  Male who presents with a chief complaint of SOB (12 Dec 2021 12:14)      SUBJECTIVE / OVERNIGHT EVENTS:    Patient seen and examined. restless and moving extremities. awake no oriented. denies pain when asked.      Vital Signs Last 24 Hrs  T(C): 36.8 (12 Dec 2021 12:36), Max: 36.8 (12 Dec 2021 05:08)  T(F): 98.2 (12 Dec 2021 12:36), Max: 98.2 (12 Dec 2021 05:08)  HR: 77 (12 Dec 2021 12:36) (60 - 80)  BP: 108/53 (12 Dec 2021 12:36) (96/45 - 108/53)  BP(mean): --  RR: 18 (12 Dec 2021 12:36) (18 - 20)  SpO2: 94% (12 Dec 2021 12:36) (94% - 96%)  I&O's Summary    11 Dec 2021 07:01  -  12 Dec 2021 07:00  --------------------------------------------------------  IN: 120 mL / OUT: 0 mL / NET: 120 mL    12 Dec 2021 07:01  -  12 Dec 2021 14:09  --------------------------------------------------------  IN: 110 mL / OUT: 0 mL / NET: 110 mL        PE:  GENERAL: awake restless  HEAD:  Atraumatic, Normocephalic  CHEST/LUNG: bl ronchi  HEART: RRR No murmurs, rubs, or gallops  ABDOMEN: Soft, Nontender, Nondistended; Bowel sounds present  EXTREMITIES:  2+ Peripheral Pulses, no le edema    LABS:                        6.7    2.83  )-----------( 7        ( 12 Dec 2021 06:46 )             22.5     12-12    155<H>  |  108  |  57<H>  ----------------------------<  99  4.1   |  38<H>  |  1.40<H>    Ca    9.0      12 Dec 2021 06:46        CAPILLARY BLOOD GLUCOSE                RADIOLOGY & ADDITIONAL TESTS:    Imaging Personally Reviewed:  [x] YES  [ ] NO    Consultant(s) Notes Reviewed:  [x] YES  [ ] NO    MEDICATIONS  (STANDING):  allopurinol 200 milliGRAM(s) Oral daily  dextrose 5%. 1000 milliLiter(s) (60 mL/Hr) IV Continuous <Continuous>  dextrose 5%. 1000 milliLiter(s) (75 mL/Hr) IV Continuous <Continuous>  doxazosin 4 milliGRAM(s) Oral at bedtime  influenza  Vaccine (HIGH DOSE) 0.7 milliLiter(s) IntraMuscular once  levoFLOXacin  Tablet 750 milliGRAM(s) Oral every 48 hours  metoprolol tartrate 12.5 milliGRAM(s) Oral two times a day    MEDICATIONS  (PRN):  acetaminophen     Tablet .. 650 milliGRAM(s) Oral every 6 hours PRN Temp greater or equal to 38C (100.4F), Mild Pain (1 - 3)  aluminum hydroxide/magnesium hydroxide/simethicone Suspension 30 milliLiter(s) Oral every 4 hours PRN Dyspepsia  LORazepam   Injectable 0.5 milliGRAM(s) IV Push every 6 hours PRN Agitation  melatonin 3 milliGRAM(s) Oral at bedtime PRN Insomnia  ondansetron Injectable 4 milliGRAM(s) IV Push every 8 hours PRN Nausea and/or Vomiting      Care Discussed with Consultants/Other Providers [x] YES  [ ] NO    HEALTH ISSUES - PROBLEM Dx:  Acute respiratory failure with hypoxia and hypercapnia    Transfusion associated circulatory overload    Acute on chronic systolic heart failure    Hypernatremia    MDS (myelodysplastic syndrome)    Chronic atrial fibrillation    HTN (hypertension)    Suspected pulmonary embolism    Suspected deep vein thrombosis (DVT)    Pericardial effusion    Other encephalopathy    Quadriplegia, functional    ACP (advance care planning)    Encounter for palliative care    Acute on chronic diastolic congestive heart failure

## 2021-12-12 NOTE — CHART NOTE - NSCHARTNOTEFT_GEN_A_CORE
Paged by primary team requesting a Palliative Care Follow up and potential PCU evaluation. Per team, patient appears to have some aggitation. However, no medications have been tried to control.    Please start Ativan 0.5 mg IV q 4 hrs prn aggitation. If patient has developing or evolving symptoms, please page the Palliative Care Fellow on call. Formal follow up to follow.    Willa Sahni  PGY-4 Palliative and Hospice Fellow  Pager: [phone]

## 2021-12-13 NOTE — PROGRESS NOTE ADULT - CONVERSATION/DISCUSSION
Diagnosis/Prognosis/MOLST Discussed/Treatment Options
Prognosis/MOLST Discussed/Palliative Care Referral

## 2021-12-13 NOTE — DISCHARGE NOTE FOR THE EXPIRED PATIENT - HOSPITAL COURSE
86M with PMH of HTN, Afib not on AC, s/p ablation, s/p AICD/PPM, prostate cancer s/p treatment, MDS with pancytopenia and transfusion dependent p/w SOB from Meli while receiving PRBCs, a/w hypoxic and hypercapnic respiratory failure 2/2 TACO vs HF exacerbation.  Pt was transferred to the PCU for end of life symptom management. He passed away peacefully on 12/13/2021. 86M with PMH of HTN, Afib not on AC, s/p ablation, s/p AICD/PPM, prostate cancer s/p treatment, MDS with pancytopenia and transfusion dependent p/w SOB from Meli while receiving PRBCs, a/w hypoxic and hypercapnic respiratory failure 2/2 TACO vs HF exacerbation.  Based on goals of care discussion pt was transferred to the PCU for end of life symptom management. He passed away peacefully on 12/13/2021.

## 2021-12-13 NOTE — PROGRESS NOTE ADULT - NUTRITIONAL ASSESSMENT
This patient has been assessed with a concern for Malnutrition and has been determined to have a diagnosis/diagnoses of Severe protein-calorie malnutrition.    This patient is being managed with:   Diet Pureed-  Moderately Thick Liquids (MODTHICKLIQS)  Low Sodium  Supplement Feeding Modality:  Oral  Ensure Enlive Cans or Servings Per Day:  3       Frequency:  Daily  Entered: Dec  8 2021 12:18PM    
This patient has been assessed with a concern for Malnutrition and has been determined to have a diagnosis/diagnoses of Severe protein-calorie malnutrition.    This patient is being managed with:   Diet Regular-  Entered: Dec  5 2021  6:29PM    
This patient has been assessed with a concern for Malnutrition and has been determined to have a diagnosis/diagnoses of Severe protein-calorie malnutrition.    This patient is being managed with:   Diet Regular-  Entered: Dec  5 2021  6:29PM    
This patient has been assessed with a concern for Malnutrition and has been determined to have a diagnosis/diagnoses of Severe protein-calorie malnutrition.    This patient is being managed with:   Diet Pureed-  Moderately Thick Liquids (MODTHICKLIQS)  Low Sodium  Supplement Feeding Modality:  Oral  Ensure Enlive Cans or Servings Per Day:  3       Frequency:  Daily  Entered: Dec  8 2021 12:18PM    
This patient has been assessed with a concern for Malnutrition and has been determined to have a diagnosis/diagnoses of Severe protein-calorie malnutrition.    This patient is being managed with:   Diet Regular-  Low Sodium  Supplement Feeding Modality:  Oral  Ensure Enlive Cans or Servings Per Day:  3       Frequency:  Daily  Entered: Dec  5 2021  6:30PM    Diet Regular-  Entered: Dec  5 2021  6:29PM    The following pending diet order is being considered for treatment of Severe protein-calorie malnutrition:null
This patient has been assessed with a concern for Malnutrition and has been determined to have a diagnosis/diagnoses of Severe protein-calorie malnutrition.    This patient is being managed with:   Diet Pureed-  Moderately Thick Liquids (MODTHICKLIQS)  Low Sodium  Supplement Feeding Modality:  Oral  Ensure Enlive Cans or Servings Per Day:  3       Frequency:  Daily  Entered: Dec  8 2021 12:18PM

## 2021-12-13 NOTE — PROGRESS NOTE ADULT - PROBLEM SELECTOR PROBLEM 5
MDS (myelodysplastic syndrome)
MDS (myelodysplastic syndrome)
ACP (advance care planning)
MDS (myelodysplastic syndrome)

## 2021-12-13 NOTE — PROGRESS NOTE ADULT - SUBJECTIVE AND OBJECTIVE BOX
Follow-up Pulm Progress Note  Oral Cortes MD  [phone]    Lethargic with discoordinate breathing on nasal cannula  Hb 6.6      Vital Signs Last 24 Hrs  T(C): 36.6 (13 Dec 2021 11:58), Max: 36.8 (12 Dec 2021 21:02)  T(F): 97.9 (13 Dec 2021 11:58), Max: 98.2 (12 Dec 2021 21:02)  HR: 62 (13 Dec 2021 11:58) (60 - 72)  BP: 104/65 (13 Dec 2021 11:58) (99/59 - 117/61)  BP(mean): --  RR: 18 (13 Dec 2021 11:58) (18 - 18)  SpO2: 94% (13 Dec 2021 11:58) (90% - 95%)                        6.4    2.43  )-----------( 4        ( 13 Dec 2021 06:15 )             21.5     12-13    152<H>  |  108  |  60<H>  ----------------------------<  134<H>  3.9   |  35<H>  |  1.33<H>    Ca    9.3      13 Dec 2021 06:14      Physical Examination:  PULM: Dullness R base; few basilar crackles; discoordinate breathing with poor inspiration  CVS: Regular rate and rhythm, no murmurs, rubs, or gallops  ABD: Soft, non-tender  EXT:  1+ bilateral LE edema    RADIOLOGY REVIEWED  CXR:    EXAM:  XR CHEST PORTABLE URGENT 1V                            PROCEDURE DATE:  12/02/2021        INTERPRETATION:  DATE OF STUDY: 12/2/21    PRIOR:12/1/21    CLINICAL INDICATION: Pulmonary edema, post transfusion.    TECHNIQUE: portable chest - done erect.    FINDINGS/  IMPRESSION:  Stable cardiomegaly.  Left-sided AICD in place.  Interval improvement in pulmonary edema changes with mild decrease in right pleural effusion. Stable small left pleural effusion.  No new consolidations. No pneumothorax.    CT chest:    TTE:

## 2021-12-13 NOTE — PROGRESS NOTE ADULT - CONVERSATION DETAILS
Patient with noted deterioration since my last visit ,  dyspneic , uncomfortable appearing utilizing accessory muscles ,  unable to verbalize needs, lethargic but arousable.    Spoke with HCP, SO  Ashley who agrees with transfer to PCU when bed available.  Additionally is in agreement with symptom mediated care. Ashley will be at the hospital around 2:30 pm
d/w pt who was on the phone with his significant other Ms Hawkinssteffi Ashley   addressed goc and advance directives  pt clearly expressed all aggressive measures to continue for now  spent 20 min

## 2021-12-13 NOTE — PROGRESS NOTE ADULT - SUBJECTIVE AND OBJECTIVE BOX
SUBJECTIVE AND OBJECTIVE:  INTERVAL HPI/OVERNIGHT EVENTS: Lethargic but arousable , minimally verbal, dyspnic , utilizing accessory muscles     DNR on chart:   Allergies    adhesives (Rash)  clonidine (Swelling; Rash)  felodipine (Rash)  penicillin (Rash)  sulfa drugs (Rash)    Intolerances    MEDICATIONS  (STANDING):  allopurinol 200 milliGRAM(s) Oral daily  dextrose 5%. 1000 milliLiter(s) (75 mL/Hr) IV Continuous <Continuous>  doxazosin 4 milliGRAM(s) Oral at bedtime  influenza  Vaccine (HIGH DOSE) 0.7 milliLiter(s) IntraMuscular once  levoFLOXacin  Tablet 750 milliGRAM(s) Oral every 48 hours  metoprolol tartrate 12.5 milliGRAM(s) Oral two times a day    MEDICATIONS  (PRN):  acetaminophen     Tablet .. 650 milliGRAM(s) Oral every 6 hours PRN Temp greater or equal to 38C (100.4F), Mild Pain (1 - 3)  aluminum hydroxide/magnesium hydroxide/simethicone Suspension 30 milliLiter(s) Oral every 4 hours PRN Dyspepsia  LORazepam   Injectable 0.5 milliGRAM(s) IV Push every 6 hours PRN Agitation  melatonin 3 milliGRAM(s) Oral at bedtime PRN Insomnia  ondansetron Injectable 4 milliGRAM(s) IV Push every 8 hours PRN Nausea and/or Vomiting      ITEMS UNCHECKED ARE NOT PRESENT    PRESENT SYMPTOMS: [ ]Unable to obtain due to poor mentation   Source if other than patient:  [ ]Family   [ ]Team     Pain:  [ ]yes [ x]no  QOL impact -   Location -                    Aggravating factors -  Quality -  Radiation -  Timing-  Severity (0-10 scale):  Minimal acceptable level (0-10 scale):     Dyspnea:                           [ ]Mild [ ]Moderate [x ]Severe  Anxiety:                             [ ]Mild [ x]Moderate [ ]Severe  Fatigue:                             [ ]Mild [ x]Moderate [ ]Severe  Nausea:                             [ ]Mild [ ]Moderate [ ]Severe  Loss of appetite:              [ ]Mild [ ]xModerate [ ]Severe  Constipation:                    [ ]Mild [ ]Moderate [ ]Severe    PAIN AD Score:	  http://geriatrictoolkit.[place].[email or web address]/cog/painad.pdf (Ctrl + left click to view)    Other Symptoms:  [ ]All other review of systems negative     Palliative Performance Status Version 2:    20     %      http://npcrc.org/files/news/palliative_performance_scale_ppsv2.pdf  PHYSICAL EXAM:  Vital Signs Last 24 Hrs  T(C): 36.3 (13 Dec 2021 05:32), Max: 36.8 (12 Dec 2021 12:36)  T(F): 97.4 (13 Dec 2021 05:32), Max: 98.2 (12 Dec 2021 12:36)  HR: 72 (13 Dec 2021 08:30) (60 - 77)  BP: 99/59 (13 Dec 2021 08:30) (99/59 - 117/61)  BP(mean): --  RR: 18 (13 Dec 2021 05:36) (18 - 18)  SpO2: 95% (13 Dec 2021 05:36) (90% - 95%) I&O's Summary    12 Dec 2021 07:01  -  13 Dec 2021 07:00  --------------------------------------------------------  IN: 1488 mL / OUT: 0 mL / NET: 1488 mL       GENERAL:  [ ]Alert  [ ]Oriented x   [ x]Lethargic  [x ]Cachexia  [ ]Unarousable  [ x]Verbal  [ ]Non-Verbal  Behavioral:   [ ]Anxiety  [ ]Delirium [ ]Agitation [ ]Other  HEENT:  [ ]Normal   [ x]Dry mouth   [ ]ET Tube/Trach  [ ]Oral lesions  PULMONARY:   [ ]Clear [ ]Tachypnea  [ ]Audible excessive secretions   [ ]Rhonchi        [ ]Right [ ]Left [ ]Bilateral  [ ]Crackles        [ ]Right [ ]Left [ ]Bilateral  [ ]Wheezing     [ ]Right [ ]Left [ ]Bilateral  [x ]Diminished BS [ ] Right [ ]Left [ ]Bilateral  CARDIOVASCULAR:    [ ]Regular [ x]Irregular [ ]Tachy  [ ]Jay [ ]Murmur [ ]Other  GASTROINTESTINAL:  [ ]Soft  [x ]Distended   [ x]+BS  [ ]Non tender [ ]Tender  [ ]PEG [ ]OGT/ NGT   Last BM:    GENITOURINARY:  [ ]Normal [x ]Incontinent   [ ]Oliguria/Anuria   [ ]Broussard  MUSCULOSKELETAL:   [ ]Normal   [ ]Weakness  [x ]Bed/Wheelchair bound [ ]Edema  NEUROLOGIC:   [ ]No focal deficits  [ ] Cognitive impairment  [ ] Dysphagia [ ]Dysarthria [ ] Paresis [ ]Other   SKIN:   [x ]Normal  [ ]Rash   [ ]Pressure ulcer(s) [ ]y [ ]n present on admission    CRITICAL CARE:  [ ]Shock Present  [ ]Septic [ ]Cardiogenic [ ]Neurologic [ ]Hypovolemic  [ ]Vasopressors [ ]Inotrope   ]Respiratory failure present [ ]Mechanical Ventilation [ ]Non-invasive ventilatory support [ ]High-Flow  [ ]Acute  [ ]Chronic [ ]Hypoxic  [ ]Hypercarbic [ ]Other  [ ]Other organ failure     LABS:                        6.4    2.43  )-----------( 4        ( 13 Dec 2021 06:15 )             21.5   12-13    152<H>  |  108  |  60<H>  ----------------------------<  134<H>  3.9   |  35<H>  |  1.33<H>    Ca    9.3      13 Dec 2021 06:14            INTERPRETATION:  INDICATIONS: mental status change    TECHNIQUE:  Serial axial images were obtained from the skull base to the   vertex without intravenous contrast. Sagittal and Coronal reformats were   performed    COMPARISON EXAMINATION: None.    FINDINGS:  VENTRICLES AND SULCI: Subtle involutional changes given the patient's age  INTRA-AXIAL: Microvascular ischemic changes involving the periventricular   and subcortical white  EXTRA-AXIAL:  No mass or collection is seen.  VISUALIZED SINUSES:  Clear.  VISUALIZED MASTOIDS:  Clear.  CALVARIUM:  Normal.  MISCELLANEOUS:  Bilateral cataract surgery    IMPRESSION:  Subtle involutional changes given the patient's age.   Microvascular ischemic changes. No evidence of large vessel occlusion. No   evidence of intracranial hemorrhage    --- End of Report ---          ra      Protein Calorie Malnutrition Present: [ ]mild [ ]moderate [x ]severe [ ]underweight [ ]morbid obesity  https://www.andeal.org/vault/8510/web/files/ONC/Table_Clinical%20Characteristics%20to%20Document%20Malnutrition-White%20JV%20et%20al%202012.pdf    Height (cm): 185.4 (12-03-21 @ 07:00), 180 (11-15-21 @ 11:00), 185.4 (11-01-21 @ 19:57)  Weight (kg): 80.7 (12-03-21 @ 07:00), 82.9983 (11-17-21 @ 15:00), 79.4 (11-01-21 @ 19:57)  BMI (kg/m2): 23.5 (12-03-21 @ 07:00), 25.6 (11-17-21 @ 15:00), 24.5 (11-15-21 @ 11:00)    [ ]PPSV2 < or = 30%  [ ]significant weight loss [ ]poor nutritional intake [ ]anasarca    [ ]Artificial Nutrition    REFERRALS:   [ ]Chaplaincy  [ ]Hospice  [ ]Child Life  [ ]Social Work  [x ]Case management [ ]Holistic Therapy     Goals of Care Document:

## 2021-12-13 NOTE — PROGRESS NOTE ADULT - ASSESSMENT
86M with PMH of HTN, Afib not on AC, s/p ablation, s/p AICD/PPM, prostate cancer s/p treatment, MDS with pancytopenia and transfusion dependent p/w SOB from Meli while receiving PRBCs.     A/P:  1. Hypervolemic hypernatremia   - with CHF, currently off diuretics  - urine osmolality 467, Urine Na 98  -Continue  IVF D5 75cc/hr for today   - repeat Na level at 5 PM and call Dr Metzger with results   - Monitor for fluid overload. Lasix PRN  - Monitor Na    2. Anemia s/s MDS   - followed by hematology   - s/p  PRBCs    3. Hematuria   -positive hematuria 12/04/21  -Repeat UA is negative

## 2021-12-13 NOTE — PROGRESS NOTE ADULT - PROBLEM SELECTOR PROBLEM 6
Chronic atrial fibrillation
Encounter for palliative care
Chronic atrial fibrillation
Chronic atrial fibrillation

## 2021-12-13 NOTE — PROGRESS NOTE ADULT - PROBLEM SELECTOR PROBLEM 3
Acute on chronic systolic heart failure
Other encephalopathy
Pericardial effusion
Acute on chronic diastolic congestive heart failure
Acute on chronic systolic heart failure
Acute on chronic diastolic congestive heart failure
Acute on chronic diastolic congestive heart failure
Acute on chronic systolic heart failure
Acute on chronic systolic heart failure
Pericardial effusion
Acute on chronic systolic heart failure

## 2021-12-13 NOTE — PROGRESS NOTE ADULT - SUBJECTIVE AND OBJECTIVE BOX
[facility]   MD VIOLA BAHENA MD RUORU WONG, PA    TEL:  OFFICE: [phone]  DR DAY CELL: [phone]  ZAHRA NESBITT CELL: [phone]  DR. COON CELL: [phone]      FROM 5 PM - 7 AM PLEASE CALL ANSWERING SERVICE: [phone]    RENAL FOLLOW UP NOTE--Date of Service 12-13-21 @ 08:58  --------------------------------------------------------------------------------  HPI:      Pt seen and examined at bedside.       PAST HISTORY  --------------------------------------------------------------------------------  No significant changes to PMH, PSH, FHx, SHx, unless otherwise noted    ALLERGIES & MEDICATIONS  --------------------------------------------------------------------------------  Allergies    adhesives (Rash)  clonidine (Swelling; Rash)  felodipine (Rash)  penicillin (Rash)  sulfa drugs (Rash)    Intolerances      Standing Inpatient Medications  allopurinol 200 milliGRAM(s) Oral daily  doxazosin 4 milliGRAM(s) Oral at bedtime  influenza  Vaccine (HIGH DOSE) 0.7 milliLiter(s) IntraMuscular once  levoFLOXacin  Tablet 750 milliGRAM(s) Oral every 48 hours  metoprolol tartrate 12.5 milliGRAM(s) Oral two times a day    PRN Inpatient Medications  acetaminophen     Tablet .. 650 milliGRAM(s) Oral every 6 hours PRN  aluminum hydroxide/magnesium hydroxide/simethicone Suspension 30 milliLiter(s) Oral every 4 hours PRN  LORazepam   Injectable 0.5 milliGRAM(s) IV Push every 6 hours PRN  melatonin 3 milliGRAM(s) Oral at bedtime PRN  ondansetron Injectable 4 milliGRAM(s) IV Push every 8 hours PRN      REVIEW OF SYSTEMS  --------------------------------------------------------------------------------  General: no fever  MSK: no edema     VITALS/PHYSICAL EXAM  --------------------------------------------------------------------------------  T(C): 36.3 (12-13-21 @ 05:32), Max: 36.8 (12-12-21 @ 12:36)  HR: 60 (12-13-21 @ 05:32) (60 - 77)  BP: 117/61 (12-13-21 @ 05:32) (108/53 - 117/61)  RR: 18 (12-13-21 @ 05:36) (18 - 18)  SpO2: 95% (12-13-21 @ 05:36) (90% - 95%)  Wt(kg): --        12-12-21 @ 07:01  -  12-13-21 @ 07:00  --------------------------------------------------------  IN: 1488 mL / OUT: 0 mL / NET: 1488 mL      Physical Exam:  	Gen: NAD  	HEENT: MMM  	Pulm: CTA B/L  	CV: S1S2  	Abd: Soft, +BS  	Ext: No LE edema B/L                      Neuro: Awake   	Skin: Warm and Dry   	Vascular access: NO HD catheter            no  priyank  LABS/STUDIES  --------------------------------------------------------------------------------              6.4    2.43  >-----------<  4        [12-13-21 @ 06:15]              21.5     152  |  108  |  60  ----------------------------<  134      [12-13-21 @ 06:14]  3.9   |  35  |  1.33        Ca     9.3     [12-13-21 @ 06:14]            Creatinine Trend:  SCr 1.33 [12-13 @ 06:14]  SCr 1.40 [12-12 @ 06:46]  SCr 1.31 [12-11 @ 05:09]  SCr 1.11 [12-10 @ 09:37]  SCr 1.18 [12-09 @ 17:12]    Urinalysis - [12-08-21 @ 20:09]      Color Light Yellow / Appearance Clear / SG 1.014 / pH 8.0      Gluc Negative / Ketone Negative  / Bili Negative / Urobili Negative       Blood Negative / Protein Trace / Leuk Est Negative / Nitrite Negative      RBC 1 / WBC 0 / Hyaline  / Gran  / Sq Epi  / Non Sq Epi 0 / Bacteria Negative    Urine Sodium 98      [12-08-21 @ 20:11]  Urine Osmolality 467      [12-08-21 @ 20:11]

## 2021-12-13 NOTE — PROGRESS NOTE ADULT - PROBLEM SELECTOR PROBLEM 4
Hypernatremia
Quadriplegia, functional
Hypernatremia

## 2021-12-13 NOTE — PROGRESS NOTE ADULT - SUBJECTIVE AND OBJECTIVE BOX
Patient is a 86y old  Male who presents with a chief complaint of SOB (13 Dec 2021 13:10)      SUBJECTIVE / OVERNIGHT EVENTS:    Patient seen and examined. agonal breathing with belly breathing.   cannot provide ros.    Vital Signs Last 24 Hrs  T(C): 36.6 (13 Dec 2021 11:58), Max: 36.8 (12 Dec 2021 21:02)  T(F): 97.9 (13 Dec 2021 11:58), Max: 98.2 (12 Dec 2021 21:02)  HR: 62 (13 Dec 2021 11:58) (60 - 72)  BP: 104/65 (13 Dec 2021 11:58) (99/59 - 117/61)  BP(mean): --  RR: 18 (13 Dec 2021 11:58) (18 - 18)  SpO2: 94% (13 Dec 2021 11:58) (90% - 95%)  I&O's Summary    12 Dec 2021 07:01  -  13 Dec 2021 07:00  --------------------------------------------------------  IN: 1488 mL / OUT: 0 mL / NET: 1488 mL    13 Dec 2021 07:01  -  13 Dec 2021 15:07  --------------------------------------------------------  IN: 480 mL / OUT: 0 mL / NET: 480 mL        PE:  GENERAL: awake belly breathing agonal breathing  HEAD:  Atraumatic, Normocephalic  CHEST/LUNG: bl ronchi  HEART: RRR No murmurs, rubs, or gallops  ABDOMEN: Soft, Nontender, Nondistended; Bowel sounds present  EXTREMITIES:  2+ Peripheral Pulses, no le edema    LABS:                        6.4    2.43  )-----------( 4        ( 13 Dec 2021 06:15 )             21.5     12-13    152<H>  |  108  |  60<H>  ----------------------------<  134<H>  3.9   |  35<H>  |  1.33<H>    Ca    9.3      13 Dec 2021 06:14        CAPILLARY BLOOD GLUCOSE                RADIOLOGY & ADDITIONAL TESTS:    Imaging Personally Reviewed:  [x] YES  [ ] NO    Consultant(s) Notes Reviewed:  [x] YES  [ ] NO    MEDICATIONS  (STANDING):  allopurinol 200 milliGRAM(s) Oral daily  dextrose 5%. 1000 milliLiter(s) (75 mL/Hr) IV Continuous <Continuous>  doxazosin 4 milliGRAM(s) Oral at bedtime  influenza  Vaccine (HIGH DOSE) 0.7 milliLiter(s) IntraMuscular once  levoFLOXacin  Tablet 750 milliGRAM(s) Oral every 48 hours  metoprolol tartrate 12.5 milliGRAM(s) Oral two times a day    MEDICATIONS  (PRN):  acetaminophen     Tablet .. 650 milliGRAM(s) Oral every 6 hours PRN Temp greater or equal to 38C (100.4F), Mild Pain (1 - 3)  aluminum hydroxide/magnesium hydroxide/simethicone Suspension 30 milliLiter(s) Oral every 4 hours PRN Dyspepsia  HYDROmorphone  Injectable 0.3 milliGRAM(s) IV Push every 2 hours PRN dyspnea  LORazepam   Injectable 0.5 milliGRAM(s) IV Push every 6 hours PRN Agitation  melatonin 3 milliGRAM(s) Oral at bedtime PRN Insomnia  ondansetron Injectable 4 milliGRAM(s) IV Push every 8 hours PRN Nausea and/or Vomiting      Care Discussed with Consultants/Other Providers [x] YES  [ ] NO    HEALTH ISSUES - PROBLEM Dx:  Acute respiratory failure with hypoxia and hypercapnia    Transfusion associated circulatory overload    Acute on chronic systolic heart failure    Hypernatremia    MDS (myelodysplastic syndrome)    Chronic atrial fibrillation    HTN (hypertension)    Suspected pulmonary embolism    Suspected deep vein thrombosis (DVT)    Pericardial effusion    Other encephalopathy    Quadriplegia, functional    ACP (advance care planning)    Encounter for palliative care    Acute on chronic diastolic congestive heart failure         Patient is a 86y old  Male who presents with a chief complaint of SOB (13 Dec 2021 13:10)      SUBJECTIVE / OVERNIGHT EVENTS:    Patient seen and examined. agonal breathing with belly breathing.   cannot provide ros.    Vital Signs Last 24 Hrs  T(C): 36.6 (13 Dec 2021 11:58), Max: 36.8 (12 Dec 2021 21:02)  T(F): 97.9 (13 Dec 2021 11:58), Max: 98.2 (12 Dec 2021 21:02)  HR: 62 (13 Dec 2021 11:58) (60 - 72)  BP: 104/65 (13 Dec 2021 11:58) (99/59 - 117/61)  BP(mean): --  RR: 18 (13 Dec 2021 11:58) (18 - 18)  SpO2: 94% (13 Dec 2021 11:58) (90% - 95%)  I&O's Summary    12 Dec 2021 07:01  -  13 Dec 2021 07:00  --------------------------------------------------------  IN: 1488 mL / OUT: 0 mL / NET: 1488 mL    13 Dec 2021 07:01  -  13 Dec 2021 15:07  --------------------------------------------------------  IN: 480 mL / OUT: 0 mL / NET: 480 mL        PE:  GENERAL: awake belly breathing agonal breathing, dried blood in mouth  HEAD:  Atraumatic, Normocephalic  CHEST/LUNG: bl ronchi  HEART: RRR No murmurs, rubs, or gallops  ABDOMEN: Soft, Nontender, Nondistended; Bowel sounds present  EXTREMITIES:  2+ Peripheral Pulses, no le edema    LABS:                        6.4    2.43  )-----------( 4        ( 13 Dec 2021 06:15 )             21.5     12-13    152<H>  |  108  |  60<H>  ----------------------------<  134<H>  3.9   |  35<H>  |  1.33<H>    Ca    9.3      13 Dec 2021 06:14        CAPILLARY BLOOD GLUCOSE                RADIOLOGY & ADDITIONAL TESTS:    Imaging Personally Reviewed:  [x] YES  [ ] NO    Consultant(s) Notes Reviewed:  [x] YES  [ ] NO    MEDICATIONS  (STANDING):  allopurinol 200 milliGRAM(s) Oral daily  dextrose 5%. 1000 milliLiter(s) (75 mL/Hr) IV Continuous <Continuous>  doxazosin 4 milliGRAM(s) Oral at bedtime  influenza  Vaccine (HIGH DOSE) 0.7 milliLiter(s) IntraMuscular once  levoFLOXacin  Tablet 750 milliGRAM(s) Oral every 48 hours  metoprolol tartrate 12.5 milliGRAM(s) Oral two times a day    MEDICATIONS  (PRN):  acetaminophen     Tablet .. 650 milliGRAM(s) Oral every 6 hours PRN Temp greater or equal to 38C (100.4F), Mild Pain (1 - 3)  aluminum hydroxide/magnesium hydroxide/simethicone Suspension 30 milliLiter(s) Oral every 4 hours PRN Dyspepsia  HYDROmorphone  Injectable 0.3 milliGRAM(s) IV Push every 2 hours PRN dyspnea  LORazepam   Injectable 0.5 milliGRAM(s) IV Push every 6 hours PRN Agitation  melatonin 3 milliGRAM(s) Oral at bedtime PRN Insomnia  ondansetron Injectable 4 milliGRAM(s) IV Push every 8 hours PRN Nausea and/or Vomiting      Care Discussed with Consultants/Other Providers [x] YES  [ ] NO    HEALTH ISSUES - PROBLEM Dx:  Acute respiratory failure with hypoxia and hypercapnia    Transfusion associated circulatory overload    Acute on chronic systolic heart failure    Hypernatremia    MDS (myelodysplastic syndrome)    Chronic atrial fibrillation    HTN (hypertension)    Suspected pulmonary embolism    Suspected deep vein thrombosis (DVT)    Pericardial effusion    Other encephalopathy    Quadriplegia, functional    ACP (advance care planning)    Encounter for palliative care    Acute on chronic diastolic congestive heart failure

## 2021-12-13 NOTE — PROGRESS NOTE ADULT - ASSESSMENT
87 y/o male, PMH as previously described, hx of MDS with AML progression, now transfusion and platelet dependent.  Palliative called for advance care planning, goals of care

## 2021-12-13 NOTE — PROGRESS NOTE ADULT - PROBLEM SELECTOR PROBLEM 2
Transfusion associated circulatory overload
Pericardial effusion
Transfusion associated circulatory overload
Acute respiratory failure with hypoxia and hypercapnia
Chronic atrial fibrillation
Chronic atrial fibrillation
Transfusion associated circulatory overload
Transfusion associated circulatory overload
Pericardial effusion
Pericardial effusion
Chronic atrial fibrillation
Transfusion associated circulatory overload
Transfusion associated circulatory overload

## 2021-12-13 NOTE — PROGRESS NOTE ADULT - ASSESSMENT
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 CXR unchanged. Concern for sepsis, CNS event  12/7: DNR/DNI; howver family wants ongoing treatment as tolerated. ? restrictive physiology contributing to cardiac dysfunction. ACute hypoxemic and hypercapneic resp failure noted.   12/8: Improved, tolerating nasal cannula. 7.45/68 PCO2 c/w contaction alkalosis and hypercapnea; however, still with discoordinate breathing  12/9: HCO3 now 38 - post Diamox X 3 Hypoxemic and hypercapneic resp failure  12/10: Refractory pancuptopenia with worsening hypercapnea: patient refusing BIPAP. Wife understands patient is pre-terminal and has stated goal is comfort.  12/13: Lethargic on nasal cannula    REC    Transufsions held  Continue supportive care with goal of symptom control  PC f/u pending today: possible transfer to PCU

## 2021-12-13 NOTE — PROGRESS NOTE ADULT - PROBLEM SELECTOR PLAN 1
Hem Onc input appreciated  No further DMT  Patient and family wish to pursue symptom mediated approach

## 2021-12-13 NOTE — PROGRESS NOTE ADULT - PROVIDER SPECIALTY LIST ADULT
Heme/Onc
Internal Medicine
Internal Medicine
Cardiology
Heme/Onc
Nephrology
Nephrology
Pulmonology
Pulmonology
Heme/Onc
Internal Medicine
Nephrology
Pulmonology
Cardiology
Cardiology
Internal Medicine
Internal Medicine
Nephrology
Internal Medicine
Internal Medicine
Nephrology
Cardiology
Internal Medicine
Cardiology
Cardiology
Internal Medicine
Internal Medicine
Palliative Care
Internal Medicine
Internal Medicine

## 2021-12-13 NOTE — PROGRESS NOTE ADULT - ASSESSMENT
86M with PMH of HTN, Afib not on AC, s/p ablation, s/p AICD/PPM, prostate cancer s/p treatment, MDS with pancytopenia and transfusion dependent p/w SOB from [facility] while receiving PRBCs, a/w hypoxic and hypercapnic respiratory failure 2/2 TACO vs HF exacerbation.     # Acute respiratory failure with hypoxia and hypercapnia  # Transfusion associated circulatory overload  # Acute on chronic combined heart failure  # Hypernatremia  # MDS (myelodysplastic syndrome)  # AML  # pancytopenia  # Chronic atrial fibrillation  # HTN  poor prognosis  palliative care fu  D5  ativan  dilaudid  toprol XL  consider stopping blood work as not plans for transfusions    goals of care:  palliative fu, no more transfusions, comfort care  DNR/DNI/NO PEG.  Ashley Dial HCP

## 2021-12-13 NOTE — PROGRESS NOTE ADULT - PROBLEM SELECTOR PLAN 2
Noted dyspnea  Utilizing accessory muscles, unable to form words  Start Dilaudid 0.3mg IVP q 2 hours prn dyspnea  First dose now  Low tolerance for ATC

## 2021-12-13 NOTE — PROGRESS NOTE ADULT - PROBLEM SELECTOR PROBLEM 1
Chronic atrial fibrillation
Acute respiratory failure with hypoxia and hypercapnia
Acute on chronic systolic heart failure
Chronic atrial fibrillation
MDS (myelodysplastic syndrome)
Acute on chronic systolic heart failure
Acute respiratory failure with hypoxia and hypercapnia
Chronic atrial fibrillation
Acute on chronic systolic heart failure
Acute respiratory failure with hypoxia and hypercapnia

## 2021-12-13 NOTE — PHARMACOTHERAPY INTERVENTION NOTE - COMMENTS
[facility] CHF Medication Review    Pt not alert upon interview  Patient currently off diuretics for management of CHF  Being followed by cardiology - adjusting volume status as necessary

## 2021-12-15 ENCOUNTER — APPOINTMENT (OUTPATIENT)
Dept: INFUSION THERAPY | Facility: HOSPITAL | Age: 86
End: 2021-12-15

## 2021-12-17 ENCOUNTER — APPOINTMENT (OUTPATIENT)
Dept: INFUSION THERAPY | Facility: HOSPITAL | Age: 86
End: 2021-12-17

## 2021-12-20 ENCOUNTER — APPOINTMENT (OUTPATIENT)
Dept: INFUSION THERAPY | Facility: HOSPITAL | Age: 86
End: 2021-12-20

## 2021-12-22 ENCOUNTER — APPOINTMENT (OUTPATIENT)
Dept: INFUSION THERAPY | Facility: HOSPITAL | Age: 86
End: 2021-12-22

## 2021-12-27 ENCOUNTER — APPOINTMENT (OUTPATIENT)
Dept: INFUSION THERAPY | Facility: HOSPITAL | Age: 86
End: 2021-12-27

## 2021-12-29 ENCOUNTER — APPOINTMENT (OUTPATIENT)
Dept: INFUSION THERAPY | Facility: HOSPITAL | Age: 86
End: 2021-12-29

## 2021-12-30 PROCEDURE — 36000 PLACE NEEDLE IN VEIN: CPT

## 2021-12-30 PROCEDURE — P9011: CPT

## 2021-12-30 PROCEDURE — 82803 BLOOD GASES ANY COMBINATION: CPT

## 2021-12-30 PROCEDURE — 80048 BASIC METABOLIC PNL TOTAL CA: CPT

## 2021-12-30 PROCEDURE — U0003: CPT

## 2021-12-30 PROCEDURE — 84295 ASSAY OF SERUM SODIUM: CPT

## 2021-12-30 PROCEDURE — P9037: CPT

## 2021-12-30 PROCEDURE — 86901 BLOOD TYPING SEROLOGIC RH(D): CPT

## 2021-12-30 PROCEDURE — 36600 WITHDRAWAL OF ARTERIAL BLOOD: CPT

## 2021-12-30 PROCEDURE — 82435 ASSAY OF BLOOD CHLORIDE: CPT

## 2021-12-30 PROCEDURE — 94660 CPAP INITIATION&MGMT: CPT

## 2021-12-30 PROCEDURE — 86850 RBC ANTIBODY SCREEN: CPT

## 2021-12-30 PROCEDURE — 82947 ASSAY GLUCOSE BLOOD QUANT: CPT

## 2021-12-30 PROCEDURE — U0005: CPT

## 2021-12-30 PROCEDURE — 82553 CREATINE MB FRACTION: CPT

## 2021-12-30 PROCEDURE — 84132 ASSAY OF SERUM POTASSIUM: CPT

## 2021-12-30 PROCEDURE — 83605 ASSAY OF LACTIC ACID: CPT

## 2021-12-30 PROCEDURE — 84300 ASSAY OF URINE SODIUM: CPT

## 2021-12-30 PROCEDURE — 83880 ASSAY OF NATRIURETIC PEPTIDE: CPT

## 2021-12-30 PROCEDURE — 80076 HEPATIC FUNCTION PANEL: CPT

## 2021-12-30 PROCEDURE — 83735 ASSAY OF MAGNESIUM: CPT

## 2021-12-30 PROCEDURE — 82962 GLUCOSE BLOOD TEST: CPT

## 2021-12-30 PROCEDURE — 85018 HEMOGLOBIN: CPT

## 2021-12-30 PROCEDURE — P9040: CPT

## 2021-12-30 PROCEDURE — 71045 X-RAY EXAM CHEST 1 VIEW: CPT

## 2021-12-30 PROCEDURE — 94640 AIRWAY INHALATION TREATMENT: CPT

## 2021-12-30 PROCEDURE — 86880 COOMBS TEST DIRECT: CPT

## 2021-12-30 PROCEDURE — 80053 COMPREHEN METABOLIC PANEL: CPT

## 2021-12-30 PROCEDURE — 99291 CRITICAL CARE FIRST HOUR: CPT

## 2021-12-30 PROCEDURE — 82330 ASSAY OF CALCIUM: CPT

## 2021-12-30 PROCEDURE — 84100 ASSAY OF PHOSPHORUS: CPT

## 2021-12-30 PROCEDURE — 81001 URINALYSIS AUTO W/SCOPE: CPT

## 2021-12-30 PROCEDURE — 83935 ASSAY OF URINE OSMOLALITY: CPT

## 2021-12-30 PROCEDURE — 36430 TRANSFUSION BLD/BLD COMPNT: CPT

## 2021-12-30 PROCEDURE — 86923 COMPATIBILITY TEST ELECTRIC: CPT

## 2021-12-30 PROCEDURE — 86985 SPLIT BLOOD OR PRODUCTS: CPT

## 2021-12-30 PROCEDURE — 93306 TTE W/DOPPLER COMPLETE: CPT

## 2021-12-30 PROCEDURE — 70450 CT HEAD/BRAIN W/O DYE: CPT

## 2021-12-30 PROCEDURE — 84484 ASSAY OF TROPONIN QUANT: CPT

## 2021-12-30 PROCEDURE — 82550 ASSAY OF CK (CPK): CPT

## 2021-12-30 PROCEDURE — 86900 BLOOD TYPING SEROLOGIC ABO: CPT

## 2021-12-30 PROCEDURE — 36415 COLL VENOUS BLD VENIPUNCTURE: CPT

## 2021-12-30 PROCEDURE — 85027 COMPLETE CBC AUTOMATED: CPT

## 2021-12-30 PROCEDURE — 85014 HEMATOCRIT: CPT

## 2021-12-30 PROCEDURE — 86922 COMPATIBILITY TEST ANTIGLOB: CPT

## 2021-12-30 PROCEDURE — 85025 COMPLETE CBC W/AUTO DIFF WBC: CPT

## 2021-12-30 PROCEDURE — 93005 ELECTROCARDIOGRAM TRACING: CPT

## 2022-01-27 NOTE — ED PROVIDER NOTE - IV ALTEPLASE DOOR HIDDEN
[facility] APPOINTMENT CONFIRMATION    Date: 01/28/2022    Time: 11:30 am    Location: [facility] [address] [address]    Provider name:      Type: In-office visit    Zoom link sent (if applicable): No    Is patient currently in a facility? No    Alternative contact information: n/a     Appointment confirmed: Yes    \"At this time only one visitor is allowed with the patient at the time of the visit.\"     Please ask if patient and visitor are able to keep their mask on at all time. If they are unable to keep their mask on then the appointment will need to be virtual.     COVID Universal Screening Questions    Do you, your visitor or any of your household members have any of the following symptoms?    • Temperature: Fever >100F or >37.8C Yes/No: No     • Respiratory Symptoms: New or worsening cough, shortness of breath, difficulty breathing, or sore throat Yes/No: No     • GI symptoms: New onset nausea, vomiting, or diarrhea Yes/No: No     • Miscellaneous: New onset chills, congestion, running nose, muscle or body aches, headache, fatigue or loss of taste or smell Yes/No: No     Have you or your visitor had exposure in the past 14 days to someone that has tested positive for COVID-19? Yes/No: No     Have you, your visitor or a household member tested positive for COVID-19 within the last 5 days? Yes/No: No     Covid-19 Screening questionnaire complete: Yes/No/NA: Yes    \"We do have free parking available in the main hospital parking lot. However, parking can be difficult to find so we ask that you arrive 40 minutes prior to your appointment.\"     show

## 2022-03-21 NOTE — DISCHARGE NOTE ADULT - IMPORTANT THAT YOU TELL YOUR HEALTH CARE PROVIDER ABOUT ALL OTHER MEDICINES YOU ARE TAKING INCLUDING OVER-THE-COUNTER MEDICATIONS, HERBS, DIET SUPPLEMENTS, OR PRODUCTS CONTAINING VITAMIN K. CALL YOUR
54 yo F with PMHx of Psoriasis, inc GUERLINE presents with intermittent bilateral eye plain, associated with nausea, headache, diplopia.    #Bilateral eye pain, diplopia - rule out optic neuritis  - Progressively worsening bilateral eye pain over last two months  - Sees outpt Optho and was started on eye drops, initially improved and now worsening  - MRI Orbit attempted, pt needs anesthesia  - Per IR, scheduled for today for MRI orbit and brain w/wo contrast with anesthesia, keep NPO  - C/w Depakote 500 mg IV q8 x3  - May require an LP as well, f/u further neuro recs  - Optho c/s pending    DVT ppx: on hold for potential LP  Diet: NPO for MRI with anesthesia  Activity: IAT  Full Code  Dispo: acute 54 yo F with PMHx of Psoriasis, inc GUERLINE presents with intermittent bilateral eye plain, associated with nausea, headache, diplopia.    #Bilateral eye pain, diplopia - rule out optic neuritis  - Progressively worsening bilateral eye pain over last two months  - Sees outpt Optho and was started on eye drops, initially improved and now worsening  - MRI Orbit attempted, pt needs anesthesia  - Per IR, scheduled for today for MRI orbit and brain w/wo contrast with anesthesia, keep NPO  - C/w Depakote 500 mg IV q8 x3  - May require an LP as well, f/u further neuro recs  - Optho c/s pending (recalled 3/21)    DVT ppx: on hold for potential LP  Diet: NPO for MRI with anesthesia  Activity: IAT  Full Code  Dispo: acute Statement Selected

## 2022-06-14 NOTE — PATIENT PROFILE ADULT - PACKS YRS CALCULATION
`[facility]  Admission Note     Admission Type:   Admission Type: Involuntary    Reason for admission:  Reason for Admission: suicidal ideation to cut self or walk into traffic. Reports feeling homicidal towards staff, but would not actually do anything. patient having increased depression and suicidal thoughts over the past few weeks, reports medication changes    PATIENT STRENGTHS:       Patient Strengths and Limitations:       Addictive Behavior:   Addictive Behavior  In the Past 3 Months, Have You Felt or Has Someone Told You That You Have a Problem With  : None    Medical Problems:   Past Medical History:   Diagnosis Date    ADHD (attention deficit hyperactivity disorder)     Asthma     Autism     Behavior problem in child     Bipolar 1 disorder ([address]     Connective tissue disease ([address]     COVID-19 10/2020    GERD (gastroesophageal reflux disease)     Headache     History of echocardiogram 01/2021    History of migraine headaches     Hypertension     Intellectual disability     Intermittent explosive disorder     LVH (left ventricular hypertrophy)     Mitral valve prolapse     Multiple food allergies     Murmur     Obesity     Oppositional defiant disorder     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 Marj Iqbal - LAST VISIT 1/2022    Under care of team 05/10/2022    UROLOGY - DR. FRAUSTO - LAST VISIT 4/2022    Under care of team 05/10/2022    NEUROLOGY -   [facility] - LAST VISIT 11/2021    Under care of team 05/10/2022    OB/GYN - DR. Jan Hill - LAST VISIT 1/2022    Urinary incontinence        Status EXAM:  Mental Status and Behavioral Exam  Normal: No  Level of Assistance: Independent/Self  Facial Expression: Sad,Flat  Affect: Blunt  Level of Consciousness: Alert  Frequency of Checks: 4 times per hour, close  Mood:Normal: No  Mood: Depressed  Motor Activity:Normal: No  Motor Activity: Decreased  Eye Contact: Poor  Observed Behavior: Withdrawn  Sexual Misconduct History: Current - no  Preception: [place] to situation,[place] to place,[place] to time,[place] to person  Attention:Normal: No  Attention: Distractible  Thought Processes: Blocking  Thought Content:Normal: Yes  Depression Symptoms: Isolative,Feelings of worthlessness,Feelings of hopelessess,Feelings of helplessness  Anxiety Symptoms: Generalized  Samantha Symptoms: No problems reported or observed. Hallucinations: None  Delusions: No  Memory:Normal: Yes  Insight and Judgment: No  Insight and Judgment: Poor judgment,Poor insight,Unmotivated    Tobacco Screening:  Practical Counseling, on admission, katia X, if applicable and completed (first 3 are required if patient doesn't refuse):            ( )  Recognizing danger situations (included triggers and roadblocks)                    ( )  Coping skills (new ways to manage stress, exercise, relaxation techniques, changing routine, distraction)                                                           ( )  Basic information about quitting (benefits of quitting, techniques in how to quit, available resources  ( ) Referral for counseling faxed to [facility]                                           ( ) Patient refused counseling  (x ) Patient has not smoked in the last 30 days    Metabolic Screening:    Lab Results   Component Value Date    LABA1C 4.5 12/20/2017       No results for input(s): CHOL, TRIG, HDL, LDLCALC, LABVLDL in the last 72 hours. Body mass index is 44.1 kg/m². BP Readings from Last 2 Encounters:   06/13/22 134/84   06/01/22 122/84           Pt admitted with followings belongings:  Dental Appliances: None  Vision - Corrective Lenses: Eyeglasses  Hearing Aid: None     . Valuables placed in safe in security envelope, number:  [id number]. Patient's home medications were reviewed.   Patient oriented to surroundings and program expectations and copy of patient rights given. Received admission packet:  yes. Consents reviewed, signed Legal guardian per TONJA guardian was called and explained where she was. Patient verbalize understanding:  yes. Patient education on precautions: yes. Patient was offered food and beverages on admission. Patient was given cell phone to retrieve her numbers she would need out of her phone. Patient oriented to the unit. Patient explained rules of unit. Patient wanded for security.    Chel Murillo RN 0

## 2022-08-06 NOTE — H&P ADULT - LV FUNCTION ASSESSMENT
unknown Sski Pregnancy And Lactation Text: This medication is Pregnancy Category D and isn't considered safe during pregnancy. It is excreted in breast milk.

## 2022-09-23 NOTE — ED ADULT NURSE NOTE - TEMPLATE
[FreeTextEntry1] : # Health Maintenance\par - BP: 104/60, RUE, Sitting\par - Labs drawn in office today\par - C/w practicing healthy eating habits \par - C/w partaking in a consistent physical activity regimen \par \par # Muscle Spasm\par - Renewal on Cyclobenzaprine HCl 5 MG \par \par # Thyroid Nodule\par - US Thyroid script provided\par \par # Flu Vaccine Need\par - Administered today  General

## 2022-10-25 NOTE — PHYSICAL THERAPY INITIAL EVALUATION ADULT - ADDITIONAL COMMENTS
FAMILY HISTORY:  No pertinent family history in first degree relatives
Pt staying with girlfriend in her home, 1 FOS inside. PTA, pt was I with all ADLs and functional mobility with use of SC. Pt owns RW but does not use it

## 2022-11-29 NOTE — PROGRESS NOTE ADULT - PROBLEM SELECTOR PLAN 4
Thrombocytopaenia and gross haematuria precludes pharmacologic DVT prophylaxis.  ALISSA for now.
PAST SURGICAL HISTORY:  Pacemaker 11/2022

## 2023-03-21 NOTE — PATIENT PROFILE ADULT - NSTOBACCO TYPE_GEN_A_CORE_RD
[x][facility] [address]      [facility]     Outpatient Pediatric Rehab Dept      Outpatient Pediatric Rehab Dept     [address], [address], [address] [facility],Third Floor       [address]     [phone]     Fax [phone]        Fax: [phone]    [][place] [address]          [address], 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 30       Timed Tx Min. 0       Charges 1 ST       Pain (0-10) 0       Subjective/  Adverse Reaction to tx Pt arrived with mom, pleasant and coop. Pt approx 10 mins late to session start d/t refusal to use restroom for mom. Screaming and crying \"I don't want to go! \"        GOALS        1. Thelma Regan will produce /l/ in all word positions at word level with 90% accuracy when provided minimal cues. Pt initially producing /l/ in isolation, words, phrases and conversation with lips producing /w/. Given max verbal and visual cues, pt able to produce /l/ in syllables with 75% (15/20) accuracy. Pt able to produce in initial position of words with 54% (13/24) accuracy given max verbal and visual cues. 2. Thelma Regan will produce /l/ blends in words with 85% accuracy when provided minimal cues. DNT       3.  Education: Caregivers will demonstrate understanding and
Cigarettes

## 2023-03-29 NOTE — H&P ADULT - HEMATOLOGY/LYMPHATICS
details…

## 2023-04-03 NOTE — SWALLOW BEDSIDE ASSESSMENT ADULT - SWALLOW EVAL: DIAGNOSIS
numerical 0-10 87 yo M admitted with anemia, SOB; on lasix for chronic CHF; day 3/7 chemo for MDS. Per RN, patient with wet cough on liquids this morning. Patient presents on bedside swallow evaluation with grossly functional oropharyngeal swallow. There is one instance of cough noted during exam, not repeated again over repeated trials across textures. Patient reports h/o postnasal drip with chronic cough due to "irritation". If MD has further concern for aspiration, would suggest MBS to provide objective assessment of pharyngeal swallow. Option for objective swallow study d/w patient; patient reiterates that he thinks it is just his postnasal drip and declines study at this time.

## 2023-04-03 NOTE — PATIENT PROFILE ADULT. - AGENT'S NAME
[Very Good] : ~his/her~  mood as very good [Yes] : Yes [2 - 4 times a month (2 pts)] : 2-4 times a month (2 points) [1 or 2 (0 pts)] : 1 or 2 (0 points) [Never (0 pts)] : Never (0 points) [No falls in past year] : Patient reported no falls in the past year [0] : 2) Feeling down, depressed, or hopeless: Not at all (0) [PHQ-2 Negative - No further assessment needed] : PHQ-2 Negative - No further assessment needed [Patient reported mammogram was normal] : Patient reported mammogram was normal [Patient reported PAP Smear was abnormal] : Patient reported PAP Smear was abnormal [HIV test declined] : HIV test declined [Hepatitis C test declined] : Hepatitis C test declined [None] : None [With Family] : lives with family [Employed] : employed [Unemployed] : unemployed [Single] : single [] :  [# Of Children ___] : has [unfilled] children [Sexually Active] : sexually active [Feels Safe at Home] : Feels safe at home [Fully functional (bathing, dressing, toileting, transferring, walking, feeding)] : Fully functional (bathing, dressing, toileting, transferring, walking, feeding) [Fully functional (using the telephone, shopping, preparing meals, housekeeping, doing laundry, using] : Fully functional and needs no help or supervision to perform IADLs (using the telephone, shopping, preparing meals, housekeeping, doing laundry, using transportation, managing medications and managing finances) [Smoke Detector] : smoke detector [Carbon Monoxide Detector] : carbon monoxide detector [Seat Belt] :  uses seat belt Ashley Dial [Sunscreen] : uses sunscreen [With Patient/Caregiver] : , with patient/caregiver [Aggressive treatment] : aggressive treatment [Former] : Former [< 15 Years] : < 15 Years [FreeTextEntry1] : none [de-identified] : No [de-identified] : None [Audit-CScore] : 2 [de-identified] : walking [de-identified] : regular  [[id number]] : 0 [Change in mental status noted] : No change in mental status noted [Language] : denies difficulty with language [Learning/Retaining New Information] : denies difficulty learning/retaining new information [Reports changes in hearing] : Reports no changes in hearing [Reports changes in vision] : Reports no changes in vision [Reports changes in dental health] : Reports no changes in dental health [MammogramDate] : 04/19 [PapSmearDate] : 06/22 [PapSmearComments] : close f/u  [BoneDensityDate] : NA [ColonoscopyDate] : NA [AdvancecareDate] : 04/23 [de-identified] : 03/23

## 2023-05-17 NOTE — DIETITIAN INITIAL EVALUATION ADULT. - PERSON TAUGHT/METHOD
EMERGENCY DEPARTMENT COURSE and DIFFERENTIAL DIAGNOSIS/MDM:   Vitals:    Vitals:    05/17/23 1415   BP: 120/84   Pulse: 79   Resp: 20   Temp: 98.4 °F (36.9 °C)   TempSrc: Oral   SpO2: 96%   Weight: 37.9 kg (83 lb 10.6 oz)           Medical Decision Making  Patient presenting with mother for evaluation of sore throat, cough. Physical exam revealing pleasant, well-appearing child in no acute respiratory distress. Slightly coarse lung sounds to auscultation without evidence of wheezing. Oxygenation 96% on room air. He does have bilateral tonsillar swelling without exudate. No evidence of peritonsillar abscess. Will obtain strep swab to rule out, chest x-ray to rule out infiltrate. Patient may benefit from ENT referral due to frequent tonsillar infections. 1433 -rapid strep is negative. Chest x-ray interpreted by myself and radiologist revealing left lower lobe infiltrate. Plan to cover with oral amoxicillin. Follow-up with pediatrician. Discussed my clinical impression(s), any labs and/or radiology results with the patient's parent/caregiver. I answered any questions and addressed any concerns. Discussed the importance of following up with their primary care physician and/or specialist(s). Discussed signs or symptoms that would warrant return back to the ER for further evaluation. The patient's caregiver is agreeable with discharge. Amount and/or Complexity of Data Reviewed  Labs: ordered. Decision-making details documented in ED Course. Radiology: ordered and independent interpretation performed. Decision-making details documented in ED Course. Risk  Prescription drug management.             REASSESSMENT          (Please note that portions of this note were completed with a voice recognition program.  Efforts were made to edit the dictations but occasionally words are mis-transcribed.)    TANJA Benjamin - NP (electronically signed)  Nurse Practitioner      TANJA Benjamin
diet education,  ensure, supplements, feeding assistance/verbal instruction/significant other instructed

## 2023-05-30 NOTE — PROGRESS NOTE ADULT - ASSESSMENT
86 year old man with history of MDS with complex cytogenetics including 5q- on Revlimid with stabilization admitted with hypotension and fatigue. Treating for diverticulitis/protocolitis. Improvement on BP with abx.  Primary Defect Length In Cm (Final Defect Size - Required For Flaps/Grafts): 1.2

## 2023-06-10 NOTE — ED PROVIDER NOTE - NS ED MD TWO NIGHTS YN
Problem: Gas Exchange Impaired  Goal: Optimal Gas Exchange  Outcome: Progressing     Problem: Plan of Care - These are the overarching goals to be used throughout the patient stay.    Goal: Optimal Comfort and Wellbeing  Outcome: Progressing   Goal Outcome Evaluation:    Patient alert, oriented x 3. Denied shortness of breath. Saturation 95 % RA. Tolerated Cefepime okay. Noted sleeping comfortably most of the night. Daughter in the room during the night for comfort.     Yes

## 2023-06-13 NOTE — PATIENT PROFILE ADULT. - NS PRO TALK SOMEONE YN
Problem: Discharge Planning  Goal: Discharge to home or other facility with appropriate resources  6/13/2023 1519 by Hesham Alatorre RN  Outcome: Completed  6/13/2023 0206 by Shalonda Quinn RN  Outcome: Progressing     Problem: ABCDS Injury Assessment  Goal: Absence of physical injury  6/13/2023 1519 by Hesham Alatorre RN  Outcome: Completed  6/13/2023 0206 by Shalonda Quinn RN  Outcome: Progressing     Problem: Safety - Adult  Goal: Free from fall injury  6/13/2023 1519 by Hesham Alatorre RN  Outcome: Completed  6/13/2023 0206 by Shalonda Quinn RN  Outcome: Progressing     Problem: Gastrointestinal - Adult  Goal: Maintains or returns to baseline bowel function  6/13/2023 1519 by Hesham Alatorre RN  Outcome: Completed  6/13/2023 0206 by Shalonda Quinn RN  Outcome: Progressing     Problem: Hematologic - Adult  Goal: Maintains hematologic stability  6/13/2023 1519 by Hesham Alatorre RN  Outcome: Completed  6/13/2023 0206 by Shalonda Quinn RN  Outcome: Progressing     Problem: Pain  Goal: Verbalizes/displays adequate comfort level or baseline comfort level  6/13/2023 1519 by Hesham Alatorre RN  Outcome: Completed  6/13/2023 0206 by Shalonda Quinn RN  Outcome: Progressing  Flowsheets (Taken 6/12/2023 1600 by Jaqueline Montano RN)  Verbalizes/displays adequate comfort level or baseline comfort level:   Encourage patient to monitor pain and request assistance   Assess pain using appropriate pain scale
Problem: Discharge Planning  Goal: Discharge to home or other facility with appropriate resources  Outcome: Progressing     Problem: ABCDS Injury Assessment  Goal: Absence of physical injury  Outcome: Progressing     Problem: Safety - Adult  Goal: Free from fall injury  Outcome: Progressing     Problem: Gastrointestinal - Adult  Goal: Maintains or returns to baseline bowel function  Outcome: Progressing     Problem: Hematologic - Adult  Goal: Maintains hematologic stability  Outcome: Progressing     Problem: Pain  Goal: Verbalizes/displays adequate comfort level or baseline comfort level  Outcome: Progressing  Flowsheets (Taken 6/12/2023 1600 by Pepito Camara RN)  Verbalizes/displays adequate comfort level or baseline comfort level:   Encourage patient to monitor pain and request assistance   Assess pain using appropriate pain scale
Problem: Discharge Planning  Goal: Discharge to home or other facility with appropriate resources  Outcome: Progressing  Flowsheets (Taken 6/12/2023 0800)  Discharge to home or other facility with appropriate resources:   Identify barriers to discharge with patient and caregiver   Arrange for needed discharge resources and transportation as appropriate     Problem: ABCDS Injury Assessment  Goal: Absence of physical injury  Outcome: Progressing     Problem: Safety - Adult  Goal: Free from fall injury  Outcome: Progressing     Problem: Gastrointestinal - Adult  Goal: Maintains or returns to baseline bowel function  Outcome: Progressing  Flowsheets (Taken 6/12/2023 0800)  Maintains or returns to baseline bowel function:   Assess bowel function   Encourage oral fluids to ensure adequate hydration     Problem: Hematologic - Adult  Goal: Maintains hematologic stability  Outcome: Progressing  Flowsheets (Taken 6/12/2023 0800)  Maintains hematologic stability:   Assess for signs and symptoms of bleeding or hemorrhage   Monitor labs for bleeding or clotting disorders
no

## 2023-08-01 NOTE — PROGRESS NOTE ADULT - ASSESSMENT
85 year old man with gastrointesinal symptoms, possible colitis vs diverticulitis    1. Possible colitis, if ongoing diarrhea would check cdiff, GI PCR  - on cipro/flagyl    2. Unintentional weight loss  - start Bentyl 5 mg prn abdominal pain, cramping.    3. Possible chronic pancreatitis cannot have an MRI, consider elective EUS    4. MDS  - heme/onc following. follow their ongoing recommendations     The plan of care was discussed with the physician assistant and modifications were made to the notation where appropriate.   Differential diagnosis and plan of care discussed with patient after the evaluation  35 minutes spent on total encounter of which more than fifty percent of the encounter was spent counseling and/or coordinating care by the attending physician.    [facility]  Gastroenterology and Hepatology  [address] [place], [place]  Office: [phone]  Cell: [phone]  85 year old man with gastrointesinal symptoms, possible colitis vs diverticulitis    1. Possible colitis, if ongoing diarrhea would check cdiff, GI PCR  - on cipro/flagyl    2. Unintentional weight loss  - start Bentyl 5 mg prn abdominal pain, cramping.    3. Possible chronic pancreatitis cannot have an MRI, consider elective EUS    4. MDS  - heme/onc following. follow their ongoing recommendations     Attending supervision statement: I have personally seen and examined the patient. I fully participated in the care of this patient. I have made amendments to the documentation where necessary, and agree with the history, physical exam, and plan as outlined by the ACP.  The plan of care was discussed with the physician assistant and modifications were made to the notation where appropriate.   Differential diagnosis and plan of care discussed with patient after the evaluation  35 minutes spent on total encounter of which more than fifty percent of the encounter was spent counseling and/or coordinating care by the attending physician.    [facility]  Gastroenterology and Hepatology  [address] [place], [place]  Office: [phone]  Cell: [phone]  Rituxan Pregnancy And Lactation Text: This medication is Pregnancy Category C and it isn't know if it is safe during pregnancy. It is unknown if this medication is excreted in breast milk but similar antibodies are known to be excreted.

## 2023-08-23 NOTE — ED ADULT NURSE REASSESSMENT NOTE - NSIMPLEMENTINTERV_GEN_ALL_ED
EMS Implemented All Universal Safety Interventions:  [place] to call system. Call bell, personal items and telephone within reach. Instruct patient to call for assistance. Room bathroom lighting operational. Non-slip footwear when patient is off stretcher. Physically safe environment: no spills, clutter or unnecessary equipment. Stretcher in lowest position, wheels locked, appropriate side rails in place. Ambulance

## 2023-09-14 NOTE — ED ADULT TRIAGE NOTE - NS ED NURSE AMBULANCES
Bactrim Pregnancy And Lactation Text: This medication is Pregnancy Category D and is known to cause fetal risk.  It is also excreted in breast milk. [facility]

## 2023-09-27 NOTE — PROVIDER CONTACT NOTE (CRITICAL VALUE NOTIFICATION) - TEST AND RESULT REPORTED:
Hemoglobin = 6.8  PLT=15
plts=13
Hemoglobin 6.8; Hematocrit 22.3 and Platelet 16
hgb 6.7   hct 21.2   plt 12
PLT = 19
PLT= 14
Hgb: 6.0, Hct: 22.0
PLT 16
Plt=18
Detail Level: Zone

## 2023-10-01 PROBLEM — D46.22: Status: ACTIVE | Noted: 2021-01-01

## 2023-10-24 NOTE — PHYSICAL THERAPY INITIAL EVALUATION ADULT - RANGE OF MOTION EXAMINATION, REHAB EVAL
Call Center TCM Note      Flowsheet Row Responses   [facility] patient discharged from? Non-  []   Does the patient have one of the following disease processes/diagnoses(primary or secondary)? Other   TCM attempt successful? No  [no verbal release for PCP]   Unsuccessful attempts Attempt 3            Chuyita Pedersen RN    10/24/2023, 12:05 EDT         no ROM deficits were identified

## 2023-10-24 NOTE — DIETITIAN INITIAL EVALUATION ADULT. - CALCULATED TO (G/KG)
Spoke to Dr Lynn Juan office staff to receive clarification on 10/6/23 note from their office. Note states \"Thyroid nodule in the right lobe- U/S and biopsy showed no malignancy. Continue monitoring, f/u with PCP. \" The only bx patient has received recently was of the abdominal wall, as the thyroid nodule one was denied. I asked if they plan on ordering this and preforming this anyway as this is something that the patient is in need of, and the bx of the abdominal wall would not provide the answers needed. Marisela Wong is willing to order bx if need be, but oncology should be the primary office regarding patients cancer services and we need to know if we need to order this. Staff advised that she would give this information to the nurse and give our office a call back. [phone]

## 2023-11-13 NOTE — PHYSICAL THERAPY INITIAL EVALUATION ADULT - RANGE OF MOTION EXAMINATION, REHAB EVAL
deficits as listed below/Left LE ROM was WFL (within functional limits)/bilateral upper extremity ROM was WFL (within functional limits)/right LE hip flexion not assessed due to hip precautions
Him/He

## 2024-01-01 NOTE — PROVIDER CONTACT NOTE (CRITICAL VALUE NOTIFICATION) - RECOMMENDATIONS
Maintained bleeding precautions.
1 unit PRBC
monitor pt
Maintain bleeding precautions
1 unit prbcs  1 unit plt
No intervention at this time
PRBC infusion?
no new orders at this time
No

## 2025-05-13 NOTE — ED ADULT TRIAGE NOTE - BP NONINVASIVE SYSTOLIC (MM [facility])
Physical Therapy Evaluation    Visit Type: Initial Evaluation  Visit: 1  Referring Provider: Marily Cummings MD  Medical Diagnosis (from order): M54.9, G89.29 - Chronic bilateral back pain, unspecified back location   Treatment Diagnosis: Chronic bilateral low back pain without radicular symptoms - increased pain/symptoms, impaired range of motion, impaired strength, impaired activity tolerance, impaired mobility and impaired muscle length/flexibility.  Onset  - Date of onset: chronic 3-4 yrs  Chart reviewed at time of initial evaluation (relevant co-morbidities, allergies, tests and medication listed):   Past Medical History:  No date: Asthma (CMD)  No date: Chronic constipation  No date: Hyperlipidemia      Past Surgical History:  No date: Myomectomy    ALLERGIES:   -- Seafood   (Food) -- Other (See Comments)    Current Outpatient Medications:  fluticasone (Flonase Allergy Relief) 50 MCG/ACT nasal spray, Spray 1 spray in each nostril daily.  montelukast (SINGULAIR) 10 MG tablet, Take 1 tablet by mouth at bedtime.  albuterol 108 (90 Base) MCG/ACT inhaler, Inhale 2 puffs into the lungs every 4 hours as needed for Wheezing.  Linzess 72 MCG capsule, TAKE 1 CAPSULE BY MOUTH ONCE DAILY BEFORE BREAKFAST  rosuvastatin (CRESTOR) 5 MG tablet, Take 1 tablet by mouth daily.  lidocaine (LIDODERM) 5 % patch, Place 1 patch onto the skin every 12 hours. Remove patch 12 hours after applying  fluticasone furoate (ARNUITY ELLIPTA) 100 MCG/ACT inhaler, Inhale 1 puff into the lungs nightly.  Vitamin D, Ergocalciferol, 50 mcg (2,000 units) capsule, Take 1 capsule by mouth daily.  polyethylene glycol (MIRALAX) 17 g packet, Take 17 g by mouth daily. Stir and dissolve powder in any 4 to 8 ounces of beverage, then drink.  Omega-3 Fatty Acids (Fish Oil) 1000 MG capsule, Take 1,000 mg by mouth.  Multiple Minerals-Vitamins (Calcium Citrate +) Tab, Take 1 tablet by mouth daily.  Ginkgo Biloba 40 MG Tab, Take 1 tablet by mouth  daily.  Sennosides 8.6 MG Cap, Take 8.6 mg by mouth daily as needed (constipation).    No current facility-administered medications for this visit.      SUBJECTIVE                                                                                                               Patient speaks Mandarin/Chinese - son, Reji Medina, interpreting for patient     Patient reports back pain began about 3-4 years ago without injury.  Patient has tried to use heating pad with minimal help.  Patient reports this is first time requesting treatment.  Patient does not do any exercises for it - just uses the heating pad.      Activity: she does stairs as she lives upstairs and she walks at the mall x 2 hrs without pain    Pain / Symptoms  - Pain rating (out of 10): Current: 6 ; Best: 6; Worst: 6  - Location: Center and off to both sides   Denies radiating up into the back or down the legs, denies tingling or numbness, denies giving way of LE, denies changes in bowel and bladder   - Quality / Description: sore, ache     - constant  - Alleviating Factors: heat    Function:   Limitations / Exacerbation Factors:   - bed mobility, lifting/carrying, low transfer (toilet/couch) and car transfers  - Sit to stand,  getting up out of bed,  mopping the floor, vacuum , sitting without back support   Prior Level of Function: declining function, therefore referred to therapy,    Patient Goals: decreased pain, increased strength and independence with ADLs/IADLs. Sit to stand,  getting up out of bed,  mopping the floor, vacuum , sitting without back support     Prior treatment  - no therapies  - Discharged from hospital, home health, or skilled nursing facility in last 30 days: no  Home Environment   - Patient lives with:  in a 2 story home with bedroom upstairs  - Assistance available: as needed (son lives close)  - Denies 2 or more falls or an unexplained fall with injury in the last year.  - Feel safe at home / work / school: yes       OBJECTIVE                                                                                                                     Range of Motion (ROM)   (degrees unless noted; active unless noted; norms in ( ); negative=lacking to 0, positive=beyond 0)  Lumbar:    - Flexion (60-80):  Flex (60-80) degree: 4 inches finger to floor.  pain     - Extension (25):  WFL     - Side Bend (25-35):         Left:  SB (25-35) degree L: able to reach lateral knee joint but sore.  pain          Right:  SB (25-35) degree R: able to reach lateral knee joint but sore.  pain     Strength  (out of 5 unless noted, standard test position unless noted)   Hip:    - Flexion:         Left: 4-         Right: 4-    - Extension:         Left: 3         Right: 3    - Abduction:         Left: 3+, pain         Right: 3+  Knee:    - Flexion:         Left: 5         Right: 5    - Extension:         Left: 5         Right: 5  Ankle:    - Dorsiflexion:         Left: 5         Right: 5  Lumbar:    - Upper Abdominals: 3-     - Lower Abdominals: 3-          Palpation  Left  - Thoracic Paraspinals: tenderness  - Lumbar Paraspinals: tenderness  - Quadratus Lumborum: tenderness  Right  - Thoracic Paraspinals: tenderness  - Lumbar Paraspinals: tenderness  - Quadratus Lumborum: tenderness    Muscle Length Tests  - Piriformis Test:   Left: negative  Right: negative  - 90/90 Hamstring at knee:  - Left:  -35° - Right: -30°    Special Tests  Lumbar: Nerve Tests  - Straight Leg Raise:        - Supine Hip Angle:  Left: 70°  Right: 70°   - Supine Passive Straight Leg Raise: Left: negative (soreness in back) Right: negative (soreness in back)    Sensation/Dermatome Testing:    Intact: LLE light touch, RLE light touch                Treatment     Therapeutic Exercise  Examination results were reviewed with the patient and the role of therapeutic exercise in resolving the function loss and pain was discussed.      Instructed and practiced HEP as below in HEP  section      Skilled input: verbal instruction/cues    Writer verbally educated and received verbal consent for hand placement, positioning of patient, and techniques to be performed today from patient for hand placement and palpation for techniques as described above and how they are pertinent to the patient's plan of care.  Home Exercise Program  Access Code: [email or web address]  URL: [email or web address]  Date: 05/16/2025  Prepared by: Dayanara Maloney    Exercises  - Hooklying Single Knee to Chest Stretch  - 1-2 x daily - 7 x weekly - 1 sets - 3-5 reps - 15-20 sec hold  - Supine Lower Trunk Rotation  - 1-2 x daily - 7 x weekly - 1 sets - 5 reps - 10 sec hold  - Clamshell  - 1-2 x daily - 7 x weekly - 2 sets - 10 reps  - Seated Hamstring Stretch  - 1-2 x daily - 7 x weekly - 1 sets - 5 reps - 15-30 sec hold  - Seated Piriformis Stretch with Trunk Bend  - 1-2 x daily - 7 x weekly - 1 sets - 5 reps - 15-30 sec hold      ASSESSMENT                                                                                                          82 year old patient has reported functional limitations listed above impacted by signs and symptoms consistent with treatment diagnosis below.  Treatment Diagnosis:   - Chronic bilateral low back pain without radicular symptoms  - Symptoms/impairments: increased pain/symptoms, impaired range of motion, impaired strength, impaired activity tolerance, impaired mobility and impaired muscle length/flexibility.    The patient presents to physical therapy with the following deficits: 1. Decreased lumbar AROM, 2. Decreased core and hip strength, 3. Decreased flexibility    The patient will benefit from skilled PT intervention that will focus on  patient education, therapeutic exercise, and manual therapy techniques to attain their functional long term goals.  Failure to intervene could lead to permanent function loss and could lead to increased chronicity of this  disorder.    Statement of medical necessity: Patient is restricted and limited with  pain to certain activities, home management and self care.       Prognosis: patient will benefit from skilled therapy  Rehabilitative: good.  Predicted patient presentation: Low (stable) - Patient comorbidities and complexities, as defined above, will have little effect on progress for prescribed plan of care.  Education:   - Present and ready to learn: patient  - Results of above outlined education: Verbalizes understanding and Needs reinforcement    PLAN                                                                                                                         The following skilled interventions to be implemented to achieve goals listed below:  Neuromuscular Re-Education (02352)  Therapeutic Exercise (72735)  Manual Therapy (41822)  Therapeutic Activity (06523)    Frequency / Duration  1 times per week tapering as patient progresses for 6 weeks for an estimated total of 6 visits    Patient involved in and agreed to plan of care and goals.  Patient has been given attendance policy at time of initial evaluation.    Suggestions for next session as indicated: Progress per plan of care, nustep for warm up, core and hip strengthening, improve flexibility, manual therapy    Goals  Long Term Goals: to be met by end of plan of care  1. Patient will demonstrate increased hip abduction and extension strength to at least 4/5 and complete sit to stand transfers with manageable symptoms   2. Patient will transfer in/out of bed with manageable symptoms  3. Patient will resume light housework (mopping/vacuuming) with manageable symptoms  4. Patient will be independent with progressed and modified home exercise program.    This note sent for referring provider signature        Therapy procedure time and total treatment time can be found documented on the Time Entry flowsheet     115
